# Patient Record
Sex: MALE | Race: WHITE | Employment: OTHER | URBAN - NONMETROPOLITAN AREA
[De-identification: names, ages, dates, MRNs, and addresses within clinical notes are randomized per-mention and may not be internally consistent; named-entity substitution may affect disease eponyms.]

---

## 2017-03-17 ENCOUNTER — OFFICE VISIT (OUTPATIENT)
Dept: FAMILY MEDICINE CLINIC | Age: 67
End: 2017-03-17

## 2017-03-17 VITALS
TEMPERATURE: 97.2 F | HEIGHT: 69 IN | OXYGEN SATURATION: 96 % | WEIGHT: 265 LBS | HEART RATE: 57 BPM | BODY MASS INDEX: 39.25 KG/M2 | SYSTOLIC BLOOD PRESSURE: 124 MMHG | DIASTOLIC BLOOD PRESSURE: 72 MMHG

## 2017-03-17 DIAGNOSIS — J01.40 ACUTE NON-RECURRENT PANSINUSITIS: Primary | ICD-10-CM

## 2017-03-17 PROCEDURE — 3017F COLORECTAL CA SCREEN DOC REV: CPT | Performed by: NURSE PRACTITIONER

## 2017-03-17 PROCEDURE — G8484 FLU IMMUNIZE NO ADMIN: HCPCS | Performed by: NURSE PRACTITIONER

## 2017-03-17 PROCEDURE — 99213 OFFICE O/P EST LOW 20 MIN: CPT | Performed by: NURSE PRACTITIONER

## 2017-03-17 PROCEDURE — 4040F PNEUMOC VAC/ADMIN/RCVD: CPT | Performed by: NURSE PRACTITIONER

## 2017-03-17 PROCEDURE — 1123F ACP DISCUSS/DSCN MKR DOCD: CPT | Performed by: NURSE PRACTITIONER

## 2017-03-17 PROCEDURE — G8427 DOCREV CUR MEDS BY ELIG CLIN: HCPCS | Performed by: NURSE PRACTITIONER

## 2017-03-17 PROCEDURE — 1036F TOBACCO NON-USER: CPT | Performed by: NURSE PRACTITIONER

## 2017-03-17 PROCEDURE — G8417 CALC BMI ABV UP PARAM F/U: HCPCS | Performed by: NURSE PRACTITIONER

## 2017-03-17 RX ORDER — IPRATROPIUM BROMIDE 21 UG/1
SPRAY, METERED NASAL
COMMUNITY
Start: 2006-12-11 | End: 2017-12-07

## 2017-03-17 RX ORDER — AMOXICILLIN AND CLAVULANATE POTASSIUM 875; 125 MG/1; MG/1
1 TABLET, FILM COATED ORAL 2 TIMES DAILY
Qty: 20 TABLET | Refills: 0 | Status: SHIPPED | OUTPATIENT
Start: 2017-03-17 | End: 2017-03-27

## 2017-03-17 ASSESSMENT — ENCOUNTER SYMPTOMS
WHEEZING: 0
COUGH: 1
RHINORRHEA: 1
SHORTNESS OF BREATH: 0
SINUS PRESSURE: 1

## 2017-04-04 ENCOUNTER — OFFICE VISIT (OUTPATIENT)
Dept: FAMILY MEDICINE CLINIC | Age: 67
End: 2017-04-04

## 2017-04-04 VITALS
BODY MASS INDEX: 39.25 KG/M2 | SYSTOLIC BLOOD PRESSURE: 130 MMHG | WEIGHT: 265 LBS | OXYGEN SATURATION: 97 % | HEIGHT: 69 IN | DIASTOLIC BLOOD PRESSURE: 72 MMHG | HEART RATE: 60 BPM

## 2017-04-04 DIAGNOSIS — J44.9 CHRONIC OBSTRUCTIVE PULMONARY DISEASE, UNSPECIFIED COPD TYPE (HCC): ICD-10-CM

## 2017-04-04 DIAGNOSIS — I10 ESSENTIAL HYPERTENSION: Primary | ICD-10-CM

## 2017-04-04 DIAGNOSIS — K50.90 CROHN'S DISEASE WITHOUT COMPLICATION, UNSPECIFIED GASTROINTESTINAL TRACT LOCATION (HCC): ICD-10-CM

## 2017-04-04 DIAGNOSIS — R73.03 PREDIABETES: ICD-10-CM

## 2017-04-04 DIAGNOSIS — Z11.59 NEED FOR HEPATITIS C SCREENING TEST: ICD-10-CM

## 2017-04-04 DIAGNOSIS — R73.9 HYPERGLYCEMIA: ICD-10-CM

## 2017-04-04 PROCEDURE — 3017F COLORECTAL CA SCREEN DOC REV: CPT | Performed by: FAMILY MEDICINE

## 2017-04-04 PROCEDURE — 1036F TOBACCO NON-USER: CPT | Performed by: FAMILY MEDICINE

## 2017-04-04 PROCEDURE — G8427 DOCREV CUR MEDS BY ELIG CLIN: HCPCS | Performed by: FAMILY MEDICINE

## 2017-04-04 PROCEDURE — G8926 SPIRO NO PERF OR DOC: HCPCS | Performed by: FAMILY MEDICINE

## 2017-04-04 PROCEDURE — G8417 CALC BMI ABV UP PARAM F/U: HCPCS | Performed by: FAMILY MEDICINE

## 2017-04-04 PROCEDURE — 1123F ACP DISCUSS/DSCN MKR DOCD: CPT | Performed by: FAMILY MEDICINE

## 2017-04-04 PROCEDURE — 3023F SPIROM DOC REV: CPT | Performed by: FAMILY MEDICINE

## 2017-04-04 PROCEDURE — 4040F PNEUMOC VAC/ADMIN/RCVD: CPT | Performed by: FAMILY MEDICINE

## 2017-04-04 PROCEDURE — 99214 OFFICE O/P EST MOD 30 MIN: CPT | Performed by: FAMILY MEDICINE

## 2017-04-04 ASSESSMENT — ENCOUNTER SYMPTOMS
SHORTNESS OF BREATH: 0
BLURRED VISION: 0

## 2017-04-05 DIAGNOSIS — Z11.59 NEED FOR HEPATITIS C SCREENING TEST: ICD-10-CM

## 2017-04-05 DIAGNOSIS — R73.9 HYPERGLYCEMIA: ICD-10-CM

## 2017-04-05 LAB
HBA1C MFR BLD: 5.6 % (ref 4.8–5.9)
HEPATITIS C ANTIBODY INTERPRETATION: NORMAL

## 2017-06-12 ENCOUNTER — PROCEDURE VISIT (OUTPATIENT)
Dept: FAMILY MEDICINE CLINIC | Age: 67
End: 2017-06-12

## 2017-06-12 VITALS
HEIGHT: 69 IN | OXYGEN SATURATION: 96 % | BODY MASS INDEX: 39.01 KG/M2 | SYSTOLIC BLOOD PRESSURE: 122 MMHG | DIASTOLIC BLOOD PRESSURE: 70 MMHG | WEIGHT: 263.4 LBS | HEART RATE: 70 BPM

## 2017-06-12 DIAGNOSIS — B35.6 TINEA CRURIS: Primary | ICD-10-CM

## 2017-06-12 DIAGNOSIS — J30.2 SEASONAL ALLERGIC RHINITIS, UNSPECIFIED ALLERGIC RHINITIS TRIGGER: ICD-10-CM

## 2017-06-12 PROCEDURE — G8428 CUR MEDS NOT DOCUMENT: HCPCS | Performed by: FAMILY MEDICINE

## 2017-06-12 PROCEDURE — 4040F PNEUMOC VAC/ADMIN/RCVD: CPT | Performed by: FAMILY MEDICINE

## 2017-06-12 PROCEDURE — 3017F COLORECTAL CA SCREEN DOC REV: CPT | Performed by: FAMILY MEDICINE

## 2017-06-12 PROCEDURE — G8417 CALC BMI ABV UP PARAM F/U: HCPCS | Performed by: FAMILY MEDICINE

## 2017-06-12 PROCEDURE — 99214 OFFICE O/P EST MOD 30 MIN: CPT | Performed by: FAMILY MEDICINE

## 2017-06-12 PROCEDURE — 1123F ACP DISCUSS/DSCN MKR DOCD: CPT | Performed by: FAMILY MEDICINE

## 2017-06-12 PROCEDURE — 1036F TOBACCO NON-USER: CPT | Performed by: FAMILY MEDICINE

## 2017-06-12 RX ORDER — CETIRIZINE HYDROCHLORIDE 10 MG/1
10 TABLET ORAL DAILY
Qty: 90 TABLET | Refills: 3 | Status: SHIPPED | OUTPATIENT
Start: 2017-06-12 | End: 2018-06-19

## 2017-06-12 RX ORDER — MONTELUKAST SODIUM 10 MG/1
TABLET ORAL
Qty: 90 TABLET | Refills: 3 | Status: SHIPPED | OUTPATIENT
Start: 2017-06-12 | End: 2018-04-12 | Stop reason: SDUPTHER

## 2017-06-12 ASSESSMENT — ENCOUNTER SYMPTOMS
DIARRHEA: 0
SORE THROAT: 0
EYE PAIN: 0
SHORTNESS OF BREATH: 0
COUGH: 1

## 2017-06-13 ENCOUNTER — TELEPHONE (OUTPATIENT)
Dept: FAMILY MEDICINE CLINIC | Age: 67
End: 2017-06-13

## 2017-06-13 DIAGNOSIS — J40 BRONCHITIS: ICD-10-CM

## 2017-06-13 DIAGNOSIS — J20.9 ACUTE BRONCHITIS, UNSPECIFIED ORGANISM: ICD-10-CM

## 2017-06-14 RX ORDER — BENZONATATE 100 MG/1
200 CAPSULE ORAL 3 TIMES DAILY PRN
Qty: 20 CAPSULE | Refills: 0 | Status: SHIPPED | OUTPATIENT
Start: 2017-06-14 | End: 2018-05-15 | Stop reason: SDUPTHER

## 2017-06-14 RX ORDER — AZITHROMYCIN 250 MG/1
TABLET, FILM COATED ORAL
Qty: 1 PACKET | Refills: 0 | Status: SHIPPED | OUTPATIENT
Start: 2017-06-14 | End: 2017-06-30

## 2017-06-28 DIAGNOSIS — E29.1 HYPOGONADISM MALE: ICD-10-CM

## 2017-06-28 RX ORDER — TESTOSTERONE GEL, 1% 10 MG/G
GEL TRANSDERMAL
Qty: 135 PACKAGE | Refills: 3 | Status: SHIPPED | OUTPATIENT
Start: 2017-06-28 | End: 2018-03-07 | Stop reason: SDUPTHER

## 2017-06-30 ENCOUNTER — HOSPITAL ENCOUNTER (EMERGENCY)
Age: 67
Discharge: HOME | End: 2017-06-30
Payer: MEDICARE

## 2017-06-30 ENCOUNTER — HOSPITAL ENCOUNTER (OUTPATIENT)
Dept: GENERAL RADIOLOGY | Age: 67
Discharge: HOME | End: 2017-06-30
Payer: MEDICARE

## 2017-06-30 VITALS
DIASTOLIC BLOOD PRESSURE: 69 MMHG | TEMPERATURE: 98.8 F | BODY MASS INDEX: 38.51 KG/M2 | SYSTOLIC BLOOD PRESSURE: 123 MMHG | OXYGEN SATURATION: 94 % | HEART RATE: 88 BPM | WEIGHT: 260 LBS | HEIGHT: 69 IN | RESPIRATION RATE: 20 BRPM

## 2017-06-30 DIAGNOSIS — R05.9 COUGH: ICD-10-CM

## 2017-06-30 DIAGNOSIS — J18.9 PNEUMONIA OF RIGHT LOWER LOBE DUE TO INFECTIOUS ORGANISM: Primary | ICD-10-CM

## 2017-06-30 LAB
ALBUMIN SERPL-MCNC: 3.8 G/DL (ref 3.9–4.9)
ALP BLD-CCNC: 73 U/L (ref 35–104)
ALT SERPL-CCNC: 19 U/L (ref 0–41)
ANION GAP SERPL CALCULATED.3IONS-SCNC: 19 MEQ/L (ref 7–13)
AST SERPL-CCNC: 29 U/L (ref 0–40)
BASOPHILS ABSOLUTE: 0.1 K/UL (ref 0–0.2)
BASOPHILS RELATIVE PERCENT: 0.4 %
BILIRUB SERPL-MCNC: 0.8 MG/DL (ref 0–1.2)
BUN BLDV-MCNC: 13 MG/DL (ref 8–23)
CALCIUM SERPL-MCNC: 8.7 MG/DL (ref 8.6–10.2)
CHLORIDE BLD-SCNC: 97 MEQ/L (ref 98–107)
CO2: 19 MEQ/L (ref 22–29)
CREAT SERPL-MCNC: 0.66 MG/DL (ref 0.7–1.2)
EOSINOPHILS ABSOLUTE: 0.2 K/UL (ref 0–0.7)
EOSINOPHILS RELATIVE PERCENT: 1.2 %
GFR AFRICAN AMERICAN: >60
GFR NON-AFRICAN AMERICAN: >60
GLOBULIN: 2.6 G/DL (ref 2.3–3.5)
GLUCOSE BLD-MCNC: 64 MG/DL (ref 74–109)
HCT VFR BLD CALC: 43.3 % (ref 42–52)
HEMOGLOBIN: 14.3 G/DL (ref 14–18)
LACTIC ACID: 1.3 MMOL/L (ref 0.5–2.2)
LYMPHOCYTES ABSOLUTE: 1.3 K/UL (ref 1–4.8)
LYMPHOCYTES RELATIVE PERCENT: 9.5 %
MCH RBC QN AUTO: 28.2 PG (ref 27–31.3)
MCHC RBC AUTO-ENTMCNC: 33.1 % (ref 33–37)
MCV RBC AUTO: 85.2 FL (ref 80–100)
MONOCYTES ABSOLUTE: 1 K/UL (ref 0.2–0.8)
MONOCYTES RELATIVE PERCENT: 7.4 %
NEUTROPHILS ABSOLUTE: 11.5 K/UL (ref 1.4–6.5)
NEUTROPHILS RELATIVE PERCENT: 81.5 %
PDW BLD-RTO: 13.6 % (ref 11.5–14.5)
PLATELET # BLD: 152 K/UL (ref 130–400)
POTASSIUM SERPL-SCNC: 3.9 MEQ/L (ref 3.5–5.1)
RBC # BLD: 5.08 M/UL (ref 4.7–6.1)
SODIUM BLD-SCNC: 135 MEQ/L (ref 132–144)
TOTAL PROTEIN: 6.4 G/DL (ref 6.4–8.1)
WBC # BLD: 14.1 K/UL (ref 4.8–10.8)

## 2017-06-30 PROCEDURE — 99283 EMERGENCY DEPT VISIT LOW MDM: CPT

## 2017-06-30 PROCEDURE — 85025 COMPLETE CBC W/AUTO DIFF WBC: CPT

## 2017-06-30 PROCEDURE — 6370000000 HC RX 637 (ALT 250 FOR IP): Performed by: PHYSICIAN ASSISTANT

## 2017-06-30 PROCEDURE — 71020 XR CHEST STANDARD TWO VW: CPT

## 2017-06-30 PROCEDURE — 83605 ASSAY OF LACTIC ACID: CPT

## 2017-06-30 PROCEDURE — 87040 BLOOD CULTURE FOR BACTERIA: CPT

## 2017-06-30 PROCEDURE — 36415 COLL VENOUS BLD VENIPUNCTURE: CPT

## 2017-06-30 PROCEDURE — 80053 COMPREHEN METABOLIC PANEL: CPT

## 2017-06-30 RX ORDER — GUAIFENESIN AND CODEINE PHOSPHATE 100; 10 MG/5ML; MG/5ML
5 SOLUTION ORAL 4 TIMES DAILY PRN
Qty: 60 ML | Refills: 0 | Status: SHIPPED | OUTPATIENT
Start: 2017-06-30 | End: 2017-07-07

## 2017-06-30 RX ORDER — BENZONATATE 100 MG/1
100 CAPSULE ORAL 3 TIMES DAILY PRN
Qty: 20 CAPSULE | Refills: 0 | Status: SHIPPED | OUTPATIENT
Start: 2017-06-30 | End: 2017-07-31 | Stop reason: ALTCHOICE

## 2017-06-30 RX ORDER — AMOXICILLIN AND CLAVULANATE POTASSIUM 875; 125 MG/1; MG/1
1 TABLET, FILM COATED ORAL 2 TIMES DAILY
Qty: 20 TABLET | Refills: 0 | Status: SHIPPED | OUTPATIENT
Start: 2017-06-30 | End: 2017-07-10

## 2017-06-30 RX ORDER — ACETAMINOPHEN 500 MG
1000 TABLET ORAL ONCE
Status: COMPLETED | OUTPATIENT
Start: 2017-06-30 | End: 2017-06-30

## 2017-06-30 RX ORDER — AMOXICILLIN AND CLAVULANATE POTASSIUM 875; 125 MG/1; MG/1
1 TABLET, FILM COATED ORAL ONCE
Status: COMPLETED | OUTPATIENT
Start: 2017-06-30 | End: 2017-06-30

## 2017-06-30 RX ADMIN — AMOXICILLIN AND CLAVULANATE POTASSIUM 1 TABLET: 875; 125 TABLET, FILM COATED ORAL at 20:58

## 2017-06-30 RX ADMIN — ACETAMINOPHEN 1000 MG: 500 TABLET ORAL at 19:41

## 2017-06-30 ASSESSMENT — PAIN SCALES - GENERAL
PAINLEVEL_OUTOF10: 0
PAINLEVEL_OUTOF10: 0

## 2017-06-30 ASSESSMENT — ENCOUNTER SYMPTOMS
BACK PAIN: 0
NAUSEA: 0
APNEA: 0
PHOTOPHOBIA: 0
VOICE CHANGE: 0
EYE DISCHARGE: 0
ANAL BLEEDING: 0
COUGH: 1
VOMITING: 0
ABDOMINAL DISTENTION: 0

## 2017-07-05 LAB
BLOOD CULTURE, ROUTINE: NORMAL
CULTURE, BLOOD 2: NORMAL

## 2017-07-31 ENCOUNTER — OFFICE VISIT (OUTPATIENT)
Dept: FAMILY MEDICINE CLINIC | Age: 67
End: 2017-07-31

## 2017-07-31 VITALS
HEIGHT: 70 IN | OXYGEN SATURATION: 96 % | BODY MASS INDEX: 37.62 KG/M2 | DIASTOLIC BLOOD PRESSURE: 54 MMHG | WEIGHT: 262.8 LBS | HEART RATE: 74 BPM | TEMPERATURE: 97.9 F | SYSTOLIC BLOOD PRESSURE: 96 MMHG

## 2017-07-31 DIAGNOSIS — M25.474 SWELLING OF FOOT JOINT, RIGHT: ICD-10-CM

## 2017-07-31 DIAGNOSIS — M25.474 SWELLING OF FOOT JOINT, RIGHT: Primary | ICD-10-CM

## 2017-07-31 DIAGNOSIS — G25.81 RESTLESS LEG SYNDROME: ICD-10-CM

## 2017-07-31 DIAGNOSIS — M79.671 RIGHT FOOT PAIN: ICD-10-CM

## 2017-07-31 LAB — URIC ACID, SERUM: 6.7 MG/DL (ref 3.4–7)

## 2017-07-31 PROCEDURE — 1036F TOBACCO NON-USER: CPT | Performed by: NURSE PRACTITIONER

## 2017-07-31 PROCEDURE — 99213 OFFICE O/P EST LOW 20 MIN: CPT | Performed by: NURSE PRACTITIONER

## 2017-07-31 PROCEDURE — 3017F COLORECTAL CA SCREEN DOC REV: CPT | Performed by: NURSE PRACTITIONER

## 2017-07-31 PROCEDURE — 1123F ACP DISCUSS/DSCN MKR DOCD: CPT | Performed by: NURSE PRACTITIONER

## 2017-07-31 PROCEDURE — G8427 DOCREV CUR MEDS BY ELIG CLIN: HCPCS | Performed by: NURSE PRACTITIONER

## 2017-07-31 PROCEDURE — 4040F PNEUMOC VAC/ADMIN/RCVD: CPT | Performed by: NURSE PRACTITIONER

## 2017-07-31 PROCEDURE — G8417 CALC BMI ABV UP PARAM F/U: HCPCS | Performed by: NURSE PRACTITIONER

## 2017-07-31 RX ORDER — NITROGLYCERIN 0.4 MG/1
0.4 TABLET SUBLINGUAL EVERY 5 MIN PRN
Qty: 25 TABLET | Refills: 0 | Status: SHIPPED | OUTPATIENT
Start: 2017-07-31 | End: 2017-12-12 | Stop reason: SDUPTHER

## 2017-07-31 RX ORDER — GABAPENTIN 300 MG/1
300 CAPSULE ORAL 3 TIMES DAILY
Qty: 90 CAPSULE | Refills: 3 | Status: SHIPPED | OUTPATIENT
Start: 2017-07-31 | End: 2017-10-23 | Stop reason: SDUPTHER

## 2017-07-31 RX ORDER — METHYLPREDNISOLONE 4 MG/1
TABLET ORAL
Qty: 21 TABLET | Refills: 0 | Status: SHIPPED | OUTPATIENT
Start: 2017-07-31 | End: 2017-08-06

## 2017-07-31 ASSESSMENT — ENCOUNTER SYMPTOMS
SHORTNESS OF BREATH: 0
COUGH: 0

## 2017-08-10 ENCOUNTER — OFFICE VISIT (OUTPATIENT)
Dept: FAMILY MEDICINE CLINIC | Age: 67
End: 2017-08-10

## 2017-08-10 VITALS
SYSTOLIC BLOOD PRESSURE: 118 MMHG | HEIGHT: 70 IN | OXYGEN SATURATION: 96 % | HEART RATE: 62 BPM | WEIGHT: 260.8 LBS | BODY MASS INDEX: 37.34 KG/M2 | DIASTOLIC BLOOD PRESSURE: 70 MMHG

## 2017-08-10 DIAGNOSIS — M79.671 PAIN OF RIGHT HEEL: ICD-10-CM

## 2017-08-10 DIAGNOSIS — R73.9 HYPERGLYCEMIA: ICD-10-CM

## 2017-08-10 DIAGNOSIS — C14.0 THROAT CANCER (HCC): ICD-10-CM

## 2017-08-10 DIAGNOSIS — R73.03 PREDIABETES: ICD-10-CM

## 2017-08-10 DIAGNOSIS — E29.1 HYPOGONADISM MALE: ICD-10-CM

## 2017-08-10 DIAGNOSIS — I10 ESSENTIAL HYPERTENSION: Primary | ICD-10-CM

## 2017-08-10 DIAGNOSIS — E78.2 MIXED HYPERLIPIDEMIA: ICD-10-CM

## 2017-08-10 LAB — HBA1C MFR BLD: 5.5 % (ref 4.8–5.9)

## 2017-08-10 PROCEDURE — G8417 CALC BMI ABV UP PARAM F/U: HCPCS | Performed by: FAMILY MEDICINE

## 2017-08-10 PROCEDURE — 1123F ACP DISCUSS/DSCN MKR DOCD: CPT | Performed by: FAMILY MEDICINE

## 2017-08-10 PROCEDURE — 4040F PNEUMOC VAC/ADMIN/RCVD: CPT | Performed by: FAMILY MEDICINE

## 2017-08-10 PROCEDURE — 99214 OFFICE O/P EST MOD 30 MIN: CPT | Performed by: FAMILY MEDICINE

## 2017-08-10 PROCEDURE — G8427 DOCREV CUR MEDS BY ELIG CLIN: HCPCS | Performed by: FAMILY MEDICINE

## 2017-08-10 PROCEDURE — 1036F TOBACCO NON-USER: CPT | Performed by: FAMILY MEDICINE

## 2017-08-10 PROCEDURE — 3017F COLORECTAL CA SCREEN DOC REV: CPT | Performed by: FAMILY MEDICINE

## 2017-08-10 ASSESSMENT — ENCOUNTER SYMPTOMS
SHORTNESS OF BREATH: 0
BLURRED VISION: 0

## 2017-08-11 LAB — TESTOSTERONE TOTAL-MALE: 681 NG/DL (ref 300–720)

## 2017-10-04 ENCOUNTER — OFFICE VISIT (OUTPATIENT)
Dept: FAMILY MEDICINE CLINIC | Age: 67
End: 2017-10-04

## 2017-10-04 VITALS
TEMPERATURE: 97.8 F | DIASTOLIC BLOOD PRESSURE: 70 MMHG | OXYGEN SATURATION: 96 % | BODY MASS INDEX: 39.69 KG/M2 | SYSTOLIC BLOOD PRESSURE: 118 MMHG | HEIGHT: 69 IN | HEART RATE: 71 BPM | WEIGHT: 268 LBS

## 2017-10-04 DIAGNOSIS — J20.9 ACUTE BRONCHITIS, UNSPECIFIED ORGANISM: Primary | ICD-10-CM

## 2017-10-04 DIAGNOSIS — B37.2 SKIN YEAST INFECTION: ICD-10-CM

## 2017-10-04 PROCEDURE — 1123F ACP DISCUSS/DSCN MKR DOCD: CPT | Performed by: NURSE PRACTITIONER

## 2017-10-04 PROCEDURE — G8417 CALC BMI ABV UP PARAM F/U: HCPCS | Performed by: NURSE PRACTITIONER

## 2017-10-04 PROCEDURE — G8484 FLU IMMUNIZE NO ADMIN: HCPCS | Performed by: NURSE PRACTITIONER

## 2017-10-04 PROCEDURE — 4040F PNEUMOC VAC/ADMIN/RCVD: CPT | Performed by: NURSE PRACTITIONER

## 2017-10-04 PROCEDURE — 1036F TOBACCO NON-USER: CPT | Performed by: NURSE PRACTITIONER

## 2017-10-04 PROCEDURE — 99213 OFFICE O/P EST LOW 20 MIN: CPT | Performed by: NURSE PRACTITIONER

## 2017-10-04 PROCEDURE — 3017F COLORECTAL CA SCREEN DOC REV: CPT | Performed by: NURSE PRACTITIONER

## 2017-10-04 PROCEDURE — G8427 DOCREV CUR MEDS BY ELIG CLIN: HCPCS | Performed by: NURSE PRACTITIONER

## 2017-10-04 RX ORDER — NYSTATIN 100000 U/G
CREAM TOPICAL
Qty: 1 TUBE | Refills: 0 | Status: SHIPPED | OUTPATIENT
Start: 2017-10-04 | End: 2017-10-23 | Stop reason: ALTCHOICE

## 2017-10-04 RX ORDER — AZITHROMYCIN 250 MG/1
TABLET, FILM COATED ORAL
Qty: 1 PACKET | Refills: 0 | Status: SHIPPED | OUTPATIENT
Start: 2017-10-04 | End: 2017-10-23 | Stop reason: ALTCHOICE

## 2017-10-04 ASSESSMENT — ENCOUNTER SYMPTOMS
SHORTNESS OF BREATH: 0
SINUS PRESSURE: 1
RHINORRHEA: 1
COUGH: 1
WHEEZING: 0

## 2017-10-04 NOTE — MR AVS SNAPSHOT
estimate of body fat, calculated from your height and weight. The higher your BMI, the greater your risk of heart disease, high blood pressure, type 2 diabetes, stroke, gallstones, arthritis, sleep apnea, and certain cancers. BMI is not perfect. It may overestimate body fat in athletes and people who are more muscular. Even a small weight loss (between 5 and 10 percent of your current weight) by decreasing your calorie intake and becoming more physically active will help lower your risk of developing or worsening diseases associated with obesity. Learn more at: [employer]             Today's Medication Changes          These changes are accurate as of: 10/4/17 11:30 AM.  If you have any questions, ask your nurse or doctor. START taking these medications           azithromycin 250 MG tablet   Commonly known as:  ZITHROMAX Z-OLAYINKA   Instructions: Take 2 tablets on day 1 then 1 tablet on days 2-5   Quantity:  1 packet   Refills:  0   Started by:  Harjeet Marquez CNP       nystatin 209611 UNIT/GM cream   Commonly known as:  MYCOSTATIN   Instructions:  Apply topically 2 times daily. Quantity:  1 Tube   Refills:  0   Started by:  Harjeet Marquez CNP            Where to Get Your Medications      These medications were sent to [address] [place] [address] [address] - [address], [address] [place] [address]  [id number] [place] [id number]     Phone:  [phone]     azithromycin 250 MG tablet    nystatin 387015 UNIT/GM cream               Your Current Medications Are              azithromycin (ZITHROMAX Z-OLAYINKA) 250 MG tablet Take 2 tablets on day 1 then 1 tablet on days 2-5    nystatin (MYCOSTATIN) 075391 UNIT/GM cream Apply topically 2 times daily.     gabapentin (NEURONTIN) 300 MG capsule Take 1 capsule by mouth 3 times daily    nitroGLYCERIN (NITROSTAT) 0.4 MG SL tablet Place 1 tablet under the tongue every 5 minutes as needed for Chest pain    fluticasone (FLONASE) 50 MCG/ACT nasal spray     testosterone (ANDROGEL; TESTIM) 50 MG/5GM (1%) GEL 1% gel Alternate one packet daily with 2 packets daily every other day    montelukast (SINGULAIR) 10 MG tablet TAKE 1 TABLET NIGHTLY    cetirizine (ZYRTEC ALLERGY) 10 MG tablet Take 1 tablet by mouth daily    ipratropium (ATROVENT) 0.03 % nasal spray by Nasal route    Saccharomyces boulardii (PROBIOTIC) 250 MG CAPS Take 1 tablet by mouth daily    pravastatin (PRAVACHOL) 40 MG tablet Take 1 tablet by mouth daily    metoprolol tartrate (LOPRESSOR) 50 MG tablet Take 1 1 /2 po bid    lisinopril (PRINIVIL;ZESTRIL) 20 MG tablet Take 1 tablet by mouth daily    ciclopirox (PENLAC) 8 % solution Apply qhs to affected nails, clean off once a week with alcohol soaked cotton ball    doxazosin (CARDURA) 2 MG tablet Take 1 tablet by mouth 2 times daily    ketoconazole (NIZORAL) 2 % cream Apply bid to both feet    esomeprazole Magnesium (NEXIUM) 20 MG PACK Take 20 mg by mouth daily    HUMIRA PEN STARTER 40 MG/0.8ML injection     aspirin 81 MG tablet Take 81 mg by mouth daily. albuterol (PROVENTIL HFA) 108 (90 BASE) MCG/ACT inhaler Inhale 2 puffs into the lungs every 6 hours as needed for Wheezing. mesalamine (PENTASA) 250 MG CR capsule Take 500 mg by mouth 4 times daily.       Allergies              Campath [Alemtuzumab]     Low grade fever    Mercaptopurine          Additional Information        Basic Information     Date Of Birth Sex Race Ethnicity Preferred Language    1950 Male White Non-/Non  English      Problem List as of 10/4/2017  Date Reviewed: 10/4/2017                Pain of right heel    Tinea unguium    Throat cancer (HCC)    Chronic back pain    Degenerative disc disease, lumbar    Osteoarthritis of lumbar spine    Mononeuritis multiplex    Seborrheic dermatitis    Hypogonadism male    Allergic rhinitis    Hyperlipidemia    Crohn disease ([facility]

## 2017-10-04 NOTE — PROGRESS NOTES
Subjective  Chief Complaint   Patient presents with    Cough     pt states that he has had a clear productive cough for about 2 weeks now. Cough   This is a new problem. The current episode started 1 to 4 weeks ago. The problem has been gradually worsening. The cough is productive of sputum (clear, thick). Associated symptoms include rhinorrhea. Pertinent negatives include no chest pain, chills, ear congestion, ear pain, fever, nasal congestion, shortness of breath or wheezing. The symptoms are aggravated by exercise. He has tried nothing for the symptoms. His past medical history is significant for COPD and pneumonia. Pt does also mention that he has had a rash in his left groin area that he would like looked at.      Patient Active Problem List    Diagnosis Date Noted    Pain of right heel     Tinea unguium     Throat cancer ([address]     Chronic back pain     Degenerative disc disease, lumbar     Osteoarthritis of lumbar spine     Mononeuritis multiplex     Seborrheic dermatitis     Hypogonadism male     Allergic rhinitis     Hyperlipidemia 01/21/2014    Crohn disease ([address] 01/21/2014    COPD (chronic obstructive pulmonary disease) ([address] 08/09/2012    GERD (gastroesophageal reflux disease)     Hypertension 10/21/2011     Past Medical History:   Diagnosis Date    Abnormal EMG 12/09/2015    Actinic keratoses     Allergic rhinitis     Anemia 11/18/2014    Arthritis     Chronic back pain     COPD (chronic obstructive pulmonary disease) ([address] 8/9/2012    Crohns disease ([address]     Degenerative disc disease, lumbar     Dermatitis     Diabetes ([address] 03/19/2015    diet controlled    GERD (gastroesophageal reflux disease)     History of atrial fibrillation 2006    Dr. Nikki Sheffield Hyperlipidemia 1/21/2014    Hypertension     Hypogonadism male     Mononeuritis multiplex     Osteoarthritis of lumbar spine     Pain of right heel     Paresthesia of foot, bilateral     Prediabetes 12/3/2014 by mouth 2 times daily 180 tablet 3    ketoconazole (NIZORAL) 2 % cream Apply bid to both feet 3 Tube 1    esomeprazole Magnesium (NEXIUM) 20 MG PACK Take 20 mg by mouth daily      HUMIRA PEN STARTER 40 MG/0.8ML injection       aspirin 81 MG tablet Take 81 mg by mouth daily.  albuterol (PROVENTIL HFA) 108 (90 BASE) MCG/ACT inhaler Inhale 2 puffs into the lungs every 6 hours as needed for Wheezing. 1 Inhaler 2    mesalamine (PENTASA) 250 MG CR capsule Take 500 mg by mouth 4 times daily.  nitroGLYCERIN (NITROSTAT) 0.4 MG SL tablet Place 1 tablet under the tongue every 5 minutes as needed for Chest pain 25 tablet 0    ciclopirox (PENLAC) 8 % solution Apply qhs to affected nails, clean off once a week with alcohol soaked cotton ball 3 Bottle 3     No current facility-administered medications on file prior to visit. Allergies   Allergen Reactions    Campath [Alemtuzumab]      Low grade fever    Mercaptopurine        Review of Systems   Constitutional: Negative for chills, diaphoresis, fatigue and fever. HENT: Positive for rhinorrhea, sinus pressure and sneezing. Negative for ear pain. Respiratory: Positive for cough. Negative for shortness of breath and wheezing. Cardiovascular: Negative for chest pain, palpitations and leg swelling. Objective  Vitals:    10/04/17 1107   BP: 118/70   Site: Left Arm   Position: Sitting   Cuff Size: Large Adult   Pulse: 71   Temp: 97.8 °F (36.6 °C)   TempSrc: Tympanic   SpO2: 96%   Weight: 268 lb (121.6 kg)   Height: 5' 9\" (1.753 m)     Physical Exam   Constitutional: He is oriented to person, place, and time. He appears well-developed and well-nourished. No distress. HENT:   Head: Normocephalic and atraumatic. Right Ear: Tympanic membrane, external ear and ear canal normal.   Left Ear: Tympanic membrane, external ear and ear canal normal.   Nose: Nose normal. Right sinus exhibits no maxillary sinus tenderness and no frontal sinus tenderness.  Left sinus exhibits no maxillary sinus tenderness and no frontal sinus tenderness. Mouth/Throat: Posterior oropharyngeal erythema present. No oropharyngeal exudate. Eyes: Conjunctivae are normal. Pupils are equal, round, and reactive to light. Neck: Normal range of motion. Neck supple. Cardiovascular: Normal rate, regular rhythm and normal heart sounds. Pulmonary/Chest: Effort normal. He has rhonchi in the left middle field and the left lower field. Lymphadenopathy:     He has no cervical adenopathy. Neurological: He is alert and oriented to person, place, and time. Skin: Skin is warm and dry. No rash noted. He is not diaphoretic. There is erythema. No pallor. Psychiatric: He has a normal mood and affect. His behavior is normal. Judgment and thought content normal.     Assessment & Plan    1. Acute bronchitis, unspecified organism  azithromycin (ZITHROMAX Z-OLAYINKA) 250 MG tablet   2. Skin yeast infection  nystatin (MYCOSTATIN) 119534 UNIT/GM cream     Pt advised to rest and drink plenty of fluids. Finish all antibiotics even if feeling better. OTC analgesics for symptom management. Salt water gargle for sore throat. RTO if symptoms worsen or if no improvement in 72 hours. Nystatin topically to left groin BID x 7 days. F/u if no improvement. Side effects, adverse effects of the medication prescribed today, as well as treatment plan/ rationale and result expectations have been discussed with the patient who expresses understanding and desires to proceed. Close follow up to evaluate treatment results and for coordination of care. I have reviewed the patient's medical history in detail and updated the computerized patient record. As always, patient is advised that if symptoms worsen in any way they will proceed to the nearest emergency room. No orders of the defined types were placed in this encounter.       Orders Placed This Encounter   Medications    azithromycin (Nanine Sarna)

## 2017-10-23 ENCOUNTER — TELEPHONE (OUTPATIENT)
Dept: FAMILY MEDICINE CLINIC | Age: 67
End: 2017-10-23

## 2017-10-23 ENCOUNTER — OFFICE VISIT (OUTPATIENT)
Dept: FAMILY MEDICINE CLINIC | Age: 67
End: 2017-10-23

## 2017-10-23 VITALS
OXYGEN SATURATION: 93 % | TEMPERATURE: 97.8 F | HEIGHT: 70 IN | SYSTOLIC BLOOD PRESSURE: 118 MMHG | HEART RATE: 65 BPM | WEIGHT: 267.4 LBS | DIASTOLIC BLOOD PRESSURE: 70 MMHG | BODY MASS INDEX: 38.28 KG/M2

## 2017-10-23 DIAGNOSIS — J20.9 ACUTE BRONCHITIS, UNSPECIFIED ORGANISM: Primary | ICD-10-CM

## 2017-10-23 DIAGNOSIS — B37.2 SKIN YEAST INFECTION: ICD-10-CM

## 2017-10-23 DIAGNOSIS — G25.81 RESTLESS LEG SYNDROME: ICD-10-CM

## 2017-10-23 PROCEDURE — 1123F ACP DISCUSS/DSCN MKR DOCD: CPT | Performed by: NURSE PRACTITIONER

## 2017-10-23 PROCEDURE — 99213 OFFICE O/P EST LOW 20 MIN: CPT | Performed by: NURSE PRACTITIONER

## 2017-10-23 PROCEDURE — G8484 FLU IMMUNIZE NO ADMIN: HCPCS | Performed by: NURSE PRACTITIONER

## 2017-10-23 PROCEDURE — G8427 DOCREV CUR MEDS BY ELIG CLIN: HCPCS | Performed by: NURSE PRACTITIONER

## 2017-10-23 PROCEDURE — 4040F PNEUMOC VAC/ADMIN/RCVD: CPT | Performed by: NURSE PRACTITIONER

## 2017-10-23 PROCEDURE — G8417 CALC BMI ABV UP PARAM F/U: HCPCS | Performed by: NURSE PRACTITIONER

## 2017-10-23 PROCEDURE — 1036F TOBACCO NON-USER: CPT | Performed by: NURSE PRACTITIONER

## 2017-10-23 PROCEDURE — 3017F COLORECTAL CA SCREEN DOC REV: CPT | Performed by: NURSE PRACTITIONER

## 2017-10-23 RX ORDER — METHYLPREDNISOLONE 4 MG/1
TABLET ORAL
Qty: 21 TABLET | Refills: 0 | Status: SHIPPED | OUTPATIENT
Start: 2017-10-23 | End: 2017-12-07

## 2017-10-23 RX ORDER — NYSTATIN 100000 U/G
CREAM TOPICAL
Qty: 1 TUBE | Refills: 0 | Status: SHIPPED | OUTPATIENT
Start: 2017-10-23 | End: 2017-12-12 | Stop reason: ALTCHOICE

## 2017-10-23 RX ORDER — DOXYCYCLINE HYCLATE 100 MG
100 TABLET ORAL 2 TIMES DAILY
Qty: 20 TABLET | Refills: 0 | Status: SHIPPED | OUTPATIENT
Start: 2017-10-23 | End: 2017-11-02

## 2017-10-23 RX ORDER — GABAPENTIN 300 MG/1
300 CAPSULE ORAL DAILY
Qty: 30 CAPSULE | Refills: 3 | Status: SHIPPED | OUTPATIENT
Start: 2017-10-23 | End: 2017-11-20 | Stop reason: SDUPTHER

## 2017-10-23 RX ORDER — LEVOFLOXACIN 500 MG/1
500 TABLET, FILM COATED ORAL DAILY
Qty: 7 TABLET | Refills: 0 | Status: SHIPPED | OUTPATIENT
Start: 2017-10-23 | End: 2017-10-23

## 2017-10-23 ASSESSMENT — ENCOUNTER SYMPTOMS
ABDOMINAL PAIN: 0
ABDOMINAL DISTENTION: 0
DIARRHEA: 0
NAUSEA: 0
SORE THROAT: 0
WHEEZING: 0
SHORTNESS OF BREATH: 1
COUGH: 1
RHINORRHEA: 1

## 2017-10-23 NOTE — PROGRESS NOTES
Subjective  Chief Complaint   Patient presents with    Cough     pt states he was since around Oct 1st and received a Zpack. pt states it helped, but now his cough is back. Cough   This is a recurrent problem. The current episode started 1 to 4 weeks ago. The problem has been gradually worsening. The problem occurs every few minutes. The cough is productive of sputum (mostly clear with occasional green). Associated symptoms include headaches (only during coughing fits), postnasal drip, rhinorrhea and shortness of breath (with exertion). Pertinent negatives include no chest pain, chills, ear congestion, ear pain, fever, sore throat or wheezing. Exacerbated by: night time. He has tried nothing for the symptoms. Pt. Reports that he finished the zpack and initially felt better with less coughing but over the weekend started to feel worse again. The cough is much worse at night time. He is having some shortness of breath. Says he was around his daughter this weekend who also had a cough so he is not sure if he got this from her or not.       Patient Active Problem List    Diagnosis Date Noted    Pain of right heel     Tinea unguium     Throat cancer ([address]     Chronic back pain     Degenerative disc disease, lumbar     Osteoarthritis of lumbar spine     Mononeuritis multiplex     Seborrheic dermatitis     Hypogonadism male     Allergic rhinitis     Hyperlipidemia 01/21/2014    Crohn disease ([address] 01/21/2014    COPD (chronic obstructive pulmonary disease) ([address] 08/09/2012    GERD (gastroesophageal reflux disease)     Hypertension 10/21/2011     Past Medical History:   Diagnosis Date    Abnormal EMG 12/09/2015    Actinic keratoses     Allergic rhinitis     Anemia 11/18/2014    Arthritis     Chronic back pain     COPD (chronic obstructive pulmonary disease) ([address] 8/9/2012    Crohns disease ([address]     Degenerative disc disease, lumbar     Dermatitis     Diabetes ([address] 03/19/2015 Saccharomyces boulardii (PROBIOTIC) 250 MG CAPS Take 1 tablet by mouth daily      pravastatin (PRAVACHOL) 40 MG tablet Take 1 tablet by mouth daily 90 tablet 3    metoprolol tartrate (LOPRESSOR) 50 MG tablet Take 1 1 /2 po bid 270 tablet 3    lisinopril (PRINIVIL;ZESTRIL) 20 MG tablet Take 1 tablet by mouth daily 90 tablet 3    ciclopirox (PENLAC) 8 % solution Apply qhs to affected nails, clean off once a week with alcohol soaked cotton ball 3 Bottle 3    doxazosin (CARDURA) 2 MG tablet Take 1 tablet by mouth 2 times daily 180 tablet 3    esomeprazole Magnesium (NEXIUM) 20 MG PACK Take 20 mg by mouth daily      HUMIRA PEN STARTER 40 MG/0.8ML injection       aspirin 81 MG tablet Take 81 mg by mouth daily.  albuterol (PROVENTIL HFA) 108 (90 BASE) MCG/ACT inhaler Inhale 2 puffs into the lungs every 6 hours as needed for Wheezing. 1 Inhaler 2    mesalamine (PENTASA) 250 MG CR capsule Take 500 mg by mouth 4 times daily. No current facility-administered medications on file prior to visit. Allergies   Allergen Reactions    Campath [Alemtuzumab]      Low grade fever    Mercaptopurine        Review of Systems   Constitutional: Negative for chills, diaphoresis, fatigue and fever. HENT: Positive for postnasal drip and rhinorrhea. Negative for ear pain and sore throat. Respiratory: Positive for cough and shortness of breath (with exertion). Negative for wheezing. Cardiovascular: Negative for chest pain, palpitations and leg swelling. Gastrointestinal: Negative for abdominal distention, abdominal pain, diarrhea and nausea. Neurological: Positive for headaches (only during coughing fits). Psychiatric/Behavioral: Negative.         Objective  Vitals:    10/23/17 1139 10/23/17 1152   BP: 118/70    Site: Left Arm    Position: Sitting    Cuff Size: Large Adult    Pulse: 72 65   Temp: 97.8 °F (36.6 °C)    TempSrc: Tympanic    SpO2: 94% 93%   Weight: 267 lb 6.4 oz (121.3 kg)    Height: 5' 10\" Date:   10/23/2018     Order Specific Question:   Reason for exam:     Answer:   cough, shortness of breath       Orders Placed This Encounter   Medications    methylPREDNISolone (MEDROL DOSEPACK) 4 MG tablet     Sig: Take by mouth. Dispense:  21 tablet     Refill:  0    levofloxacin (LEVAQUIN) 500 MG tablet     Sig: Take 1 tablet by mouth daily for 7 days     Dispense:  7 tablet     Refill:  0    gabapentin (NEURONTIN) 300 MG capsule     Sig: Take 1 capsule by mouth daily     Dispense:  30 capsule     Refill:  3    nystatin (MYCOSTATIN) 248676 UNIT/GM cream     Sig: Apply topically 2 times daily. Dispense:  1 Tube     Refill:  0     Side effects, adverse effects of the medication prescribed today, as well as treatment plan/ rationale and result expectations have been discussed with the patient who expresses understanding and desires to proceed. Close follow up to evaluate treatment results and for coordination of care. I have reviewed the patient's medical history in detail and updated the computerized patient record. As always, patient is advised that if symptoms worsen in any way they will proceed to the nearest emergency room. Return if symptoms worsen or fail to improve.     Oxana Conner, CNP

## 2017-10-23 NOTE — TELEPHONE ENCOUNTER
Pt states he is actually allergic to moxifloxacin not doxycycline. Pt wants doxycycline sent to pharmacy.

## 2017-11-10 DIAGNOSIS — I10 ESSENTIAL HYPERTENSION: ICD-10-CM

## 2017-11-10 RX ORDER — LISINOPRIL 20 MG/1
20 TABLET ORAL DAILY
Qty: 3 TABLET | Refills: 0 | Status: SHIPPED | OUTPATIENT
Start: 2017-11-10 | End: 2017-12-12 | Stop reason: SDUPTHER

## 2017-11-10 RX ORDER — DOXAZOSIN 2 MG/1
2 TABLET ORAL 2 TIMES DAILY
Qty: 6 TABLET | Refills: 0 | Status: SHIPPED | OUTPATIENT
Start: 2017-11-10 | End: 2017-11-20 | Stop reason: SDUPTHER

## 2017-11-10 RX ORDER — METOPROLOL TARTRATE 50 MG/1
TABLET, FILM COATED ORAL
Qty: 12 TABLET | Refills: 0 | Status: SHIPPED | OUTPATIENT
Start: 2017-11-10 | End: 2017-12-07 | Stop reason: ALTCHOICE

## 2017-11-10 NOTE — TELEPHONE ENCOUNTER
Pt called back and would like these meds sent to: [address], [address]  Ph: [phone]    Pt says he really appreciates it! Thank you.

## 2017-11-10 NOTE — TELEPHONE ENCOUNTER
Pt doesn't want these meds going to the pharmacy I picked. He is now traveling to [place], will be staying the night there, and will call back with new pharmacy.

## 2017-11-27 ENCOUNTER — CARE COORDINATION (OUTPATIENT)
Dept: CARE COORDINATION | Age: 67
End: 2017-11-27

## 2017-11-27 ENCOUNTER — NURSE ONLY (OUTPATIENT)
Dept: FAMILY MEDICINE CLINIC | Age: 67
End: 2017-11-27

## 2017-11-27 DIAGNOSIS — Z23 NEED FOR INFLUENZA VACCINATION: Primary | ICD-10-CM

## 2017-11-27 DIAGNOSIS — Z23 NEED FOR 23-POLYVALENT PNEUMOCOCCAL POLYSACCHARIDE VACCINE: ICD-10-CM

## 2017-11-27 PROCEDURE — 90732 PPSV23 VACC 2 YRS+ SUBQ/IM: CPT | Performed by: FAMILY MEDICINE

## 2017-11-27 PROCEDURE — G0008 ADMIN INFLUENZA VIRUS VAC: HCPCS | Performed by: FAMILY MEDICINE

## 2017-11-27 PROCEDURE — 90662 IIV NO PRSV INCREASED AG IM: CPT | Performed by: FAMILY MEDICINE

## 2017-11-27 PROCEDURE — G0009 ADMIN PNEUMOCOCCAL VACCINE: HCPCS | Performed by: FAMILY MEDICINE

## 2017-11-27 NOTE — PROGRESS NOTES
After obtaining consent, and per orders of Dr. Monroe Pena, injection of flu given in Left deltoid by Angelina Drake. Patient instructed to remain in clinic for 20 minutes afterwards, and to report any adverse reaction to me immediately. Vaccine Information Sheet, \"Influenza - Inactivated\"  given to Maci Marquez, or parent/legal guardian of  Maci Marquez and verbalized understanding. Patient responses:    Have you ever had a reaction to a flu vaccine? No  Are you able to eat eggs without adverse effects? No  Do you have any current illness? No  Have you ever had Guillian [place] Syndrome? No    Flu vaccine given per order. Please see immunization tab. After obtaining consent, and per orders of Dr. Monroe Pena, injection of pne23 given in Right deltoid by Charlie Rodriguez. Patient instructed to remain in clinic for 20 minutes afterwards, and to report any adverse reaction to me immediately.

## 2017-11-27 NOTE — CARE COORDINATION
financial resources (including ability to afford all required medical care)?:  Financially secure, resources adequate, no identified problems   How wells does the patient now understand their health and well-being (symptoms, signs or risk factors) and what they need to do to manage their health?:  Reasonable to good understanding and already engages in managing health or is willing to undertake better management   How well do you think your patient can engage in healthcare discussions? (Barriers include language, deafness, aphasia, alcohol or drug problems, learning difficulties, concentration):  Clear and open communication, no identified barriers   Do other services need to be involved to help this patient?:  Other care/services not required at this time   Suggested Interventions and Community Resources                  Prior to Admission medications    Medication Sig Start Date End Date Taking? Authorizing Provider   doxazosin (CARDURA) 2 MG tablet Take 1 tablet by mouth 2 times daily 11/20/17   Kenia Shaw MD   gabapentin (NEURONTIN) 300 MG capsule Take 1 capsule by mouth daily 11/20/17   Kenia Shaw MD   metoprolol tartrate (LOPRESSOR) 50 MG tablet Take 1 1 /2 po bid 11/10/17   Analia Woods CNP   lisinopril (PRINIVIL;ZESTRIL) 20 MG tablet Take 1 tablet by mouth daily 11/10/17   Analia Woods CNP   methylPREDNISolone (MEDROL DOSEPACK) 4 MG tablet Take by mouth. 10/23/17   Analia Woods CNP   nystatin (MYCOSTATIN) 056222 UNIT/GM cream Apply topically 2 times daily.  10/23/17   Analia Woods CNP   nitroGLYCERIN (NITROSTAT) 0.4 MG SL tablet Place 1 tablet under the tongue every 5 minutes as needed for Chest pain 7/31/17   Analia Woods CNP   fluticasone [facility]) 50 MCG/ACT nasal spray  6/20/17   Historical Provider, MD   testosterone (ANDROGEL; TESTIM) 50 MG/5GM (1%) GEL 1% gel Alternate one packet daily with 2 packets daily every other day 6/28/17   MD willie Negro

## 2017-11-28 ENCOUNTER — PROCEDURE VISIT (OUTPATIENT)
Dept: FAMILY MEDICINE CLINIC | Age: 67
End: 2017-11-28

## 2017-11-28 VITALS
DIASTOLIC BLOOD PRESSURE: 78 MMHG | WEIGHT: 273 LBS | HEIGHT: 70 IN | OXYGEN SATURATION: 94 % | SYSTOLIC BLOOD PRESSURE: 138 MMHG | HEART RATE: 69 BPM | BODY MASS INDEX: 39.08 KG/M2

## 2017-11-28 DIAGNOSIS — L91.8 INFLAMED SKIN TAG: Primary | ICD-10-CM

## 2017-11-28 PROCEDURE — 11200 RMVL SKIN TAGS UP TO&INC 15: CPT | Performed by: FAMILY MEDICINE

## 2017-11-28 ASSESSMENT — ENCOUNTER SYMPTOMS
SHORTNESS OF BREATH: 0
ABDOMINAL PAIN: 0

## 2017-11-28 NOTE — PROGRESS NOTES
Subjective  Nila Hernández, 79 y.o. male presents today with:  Chief Complaint   Patient presents with    Lesion(s)       HPI    Here today for treatment of skin takes around his right eye that are bothering him they're itchy tender can sometimes bleed and he can see them in his field of vision. Review of Systems   Constitutional: Negative for fever. Respiratory: Negative for shortness of breath. Cardiovascular: Negative for chest pain. Gastrointestinal: Negative for abdominal pain. Skin: Negative for rash. Past Medical History:   Diagnosis Date    Abnormal EMG 12/09/2015    Actinic keratoses     Allergic rhinitis     Anemia 11/18/2014    Arthritis     Chronic back pain     COPD (chronic obstructive pulmonary disease) ([facility] 8/9/2012    Crohns disease ([facility] [facility]     Degenerative disc disease, lumbar     Dermatitis     Diabetes ([facility] [address] 03/19/2015    diet controlled    GERD (gastroesophageal reflux disease)     History of atrial fibrillation 2006    Dr. Nav Shanks Hyperlipidemia 1/21/2014    Hypertension     Hypogonadism male     Mononeuritis multiplex     Osteoarthritis of lumbar spine     Pain of right heel     Paresthesia of foot, bilateral     Prediabetes 12/3/2014    Seborrheic dermatitis     Seborrheic keratoses, inflamed     Throat cancer (HCC)     throat, had surgery, sees Dr Barrera Mantle yearly    Tinea cruris     Tinea pedis     Tinea unguium      Past Surgical History:   Procedure Laterality Date    COLONOSCOPY  04/15/2015    LARYNGECTOMY  2004    UPPER GASTROINTESTINAL ENDOSCOPY  03/24/13    Dr. Frank James W/NAPOLEON RODRIGUEZ  2002     Social History     Social History    Marital status:      Spouse name: N/A    Number of children: 3    Years of education: N/A     Occupational History    Not on file.      Social History Main Topics    Smoking status: Former Smoker     Packs/day: 1.00     Types: Cigarettes     Quit date: 10/21/1990    Smokeless daily      HUMIRA PEN STARTER 40 MG/0.8ML injection       aspirin 81 MG tablet Take 81 mg by mouth daily.  albuterol (PROVENTIL HFA) 108 (90 BASE) MCG/ACT inhaler Inhale 2 puffs into the lungs every 6 hours as needed for Wheezing. 1 Inhaler 2    mesalamine (PENTASA) 250 MG CR capsule Take 500 mg by mouth 4 times daily. No current facility-administered medications for this visit. Objective    Vitals:    11/28/17 1725   BP: 138/78   Pulse: 69   SpO2: 94%   Weight: 273 lb (123.8 kg)   Height: 5' 10\" (1.778 m)       Physical Exam   Constitutional: He appears well-developed and well-nourished. HENT:   Head: Normocephalic and atraumatic. Skin: Skin is warm and dry. Skin colored pedunculated papules in the following locations: under right eye x 1, lateral right eyelid x 1, right eyelid x 3. Assessment & Plan   1. Inflamed skin tag  63155 - [place] REMOVAL OF SKIN TAGS, UP TO 15     Irritated skin tag(s) x 5. Electrocautery destruction to all 5 lesion(s). The lesions did not require any anesthesia. Informed consent was given by the patient. She understands the risks including but not limited to infection, bleeding and scarring. No guarantee how the scars will look. Post op instructions given to keep areas clean with soap and water. Advised patient to f/u in 2 weeks if not completely resolved. Orders Placed This Encounter   Procedures    39772 - MI REMOVAL OF SKIN TAGS, UP TO 15     No orders of the defined types were placed in this encounter. There are no discontinued medications. Return if symptoms worsen or fail to improve.     Starla Suarez MD

## 2017-11-28 NOTE — PATIENT INSTRUCTIONS
Clean surgical areas with antibacterial soap and water once daily. You can apply vaseline or polysporin to the affected areas. Follow up immediately if any redness surrounds the surgical sites or if drainage occurs at surgical sites.

## 2017-12-07 ENCOUNTER — TELEPHONE (OUTPATIENT)
Dept: FAMILY MEDICINE CLINIC | Age: 67
End: 2017-12-07

## 2017-12-07 ENCOUNTER — OFFICE VISIT (OUTPATIENT)
Dept: FAMILY MEDICINE CLINIC | Age: 67
End: 2017-12-07

## 2017-12-07 VITALS
OXYGEN SATURATION: 93 % | BODY MASS INDEX: 38.8 KG/M2 | HEART RATE: 66 BPM | WEIGHT: 271 LBS | DIASTOLIC BLOOD PRESSURE: 78 MMHG | TEMPERATURE: 98.4 F | HEIGHT: 70 IN | SYSTOLIC BLOOD PRESSURE: 120 MMHG

## 2017-12-07 DIAGNOSIS — J44.9 CHRONIC OBSTRUCTIVE PULMONARY DISEASE, UNSPECIFIED COPD TYPE (HCC): Primary | ICD-10-CM

## 2017-12-07 PROCEDURE — 4040F PNEUMOC VAC/ADMIN/RCVD: CPT | Performed by: FAMILY MEDICINE

## 2017-12-07 PROCEDURE — 3023F SPIROM DOC REV: CPT | Performed by: FAMILY MEDICINE

## 2017-12-07 PROCEDURE — G8926 SPIRO NO PERF OR DOC: HCPCS | Performed by: FAMILY MEDICINE

## 2017-12-07 PROCEDURE — G8427 DOCREV CUR MEDS BY ELIG CLIN: HCPCS | Performed by: FAMILY MEDICINE

## 2017-12-07 PROCEDURE — 1036F TOBACCO NON-USER: CPT | Performed by: FAMILY MEDICINE

## 2017-12-07 PROCEDURE — G8484 FLU IMMUNIZE NO ADMIN: HCPCS | Performed by: FAMILY MEDICINE

## 2017-12-07 PROCEDURE — 3017F COLORECTAL CA SCREEN DOC REV: CPT | Performed by: FAMILY MEDICINE

## 2017-12-07 PROCEDURE — 99214 OFFICE O/P EST MOD 30 MIN: CPT | Performed by: FAMILY MEDICINE

## 2017-12-07 PROCEDURE — 1123F ACP DISCUSS/DSCN MKR DOCD: CPT | Performed by: FAMILY MEDICINE

## 2017-12-07 PROCEDURE — G8417 CALC BMI ABV UP PARAM F/U: HCPCS | Performed by: FAMILY MEDICINE

## 2017-12-07 PROCEDURE — G8510 SCR DEP NEG, NO PLAN REQD: HCPCS | Performed by: FAMILY MEDICINE

## 2017-12-07 ASSESSMENT — ENCOUNTER SYMPTOMS
COUGH: 1
SORE THROAT: 0

## 2017-12-07 ASSESSMENT — PATIENT HEALTH QUESTIONNAIRE - PHQ9
SUM OF ALL RESPONSES TO PHQ QUESTIONS 1-9: 0
1. LITTLE INTEREST OR PLEASURE IN DOING THINGS: 0
2. FEELING DOWN, DEPRESSED OR HOPELESS: 0
SUM OF ALL RESPONSES TO PHQ9 QUESTIONS 1 & 2: 0

## 2017-12-07 NOTE — TELEPHONE ENCOUNTER
Has he been taking it or not? Sometimes, meds like metoprolol called a beta blockers can cause bronchospasm and are not necessarily the best choice for people with lung problems.

## 2017-12-08 ENCOUNTER — TELEPHONE (OUTPATIENT)
Dept: FAMILY MEDICINE CLINIC | Age: 67
End: 2017-12-08

## 2017-12-12 ENCOUNTER — OFFICE VISIT (OUTPATIENT)
Dept: FAMILY MEDICINE CLINIC | Age: 67
End: 2017-12-12

## 2017-12-12 VITALS
HEART RATE: 60 BPM | OXYGEN SATURATION: 93 % | SYSTOLIC BLOOD PRESSURE: 128 MMHG | DIASTOLIC BLOOD PRESSURE: 70 MMHG | HEIGHT: 70 IN | WEIGHT: 271 LBS | BODY MASS INDEX: 38.8 KG/M2

## 2017-12-12 DIAGNOSIS — J44.9 CHRONIC OBSTRUCTIVE PULMONARY DISEASE, UNSPECIFIED COPD TYPE (HCC): Primary | ICD-10-CM

## 2017-12-12 DIAGNOSIS — I10 ESSENTIAL HYPERTENSION: ICD-10-CM

## 2017-12-12 DIAGNOSIS — E78.5 HYPERLIPIDEMIA, UNSPECIFIED HYPERLIPIDEMIA TYPE: ICD-10-CM

## 2017-12-12 PROCEDURE — G8926 SPIRO NO PERF OR DOC: HCPCS | Performed by: FAMILY MEDICINE

## 2017-12-12 PROCEDURE — 3017F COLORECTAL CA SCREEN DOC REV: CPT | Performed by: FAMILY MEDICINE

## 2017-12-12 PROCEDURE — 4040F PNEUMOC VAC/ADMIN/RCVD: CPT | Performed by: FAMILY MEDICINE

## 2017-12-12 PROCEDURE — 99214 OFFICE O/P EST MOD 30 MIN: CPT | Performed by: FAMILY MEDICINE

## 2017-12-12 PROCEDURE — G8427 DOCREV CUR MEDS BY ELIG CLIN: HCPCS | Performed by: FAMILY MEDICINE

## 2017-12-12 PROCEDURE — 1123F ACP DISCUSS/DSCN MKR DOCD: CPT | Performed by: FAMILY MEDICINE

## 2017-12-12 PROCEDURE — G8484 FLU IMMUNIZE NO ADMIN: HCPCS | Performed by: FAMILY MEDICINE

## 2017-12-12 PROCEDURE — 1036F TOBACCO NON-USER: CPT | Performed by: FAMILY MEDICINE

## 2017-12-12 PROCEDURE — 3023F SPIROM DOC REV: CPT | Performed by: FAMILY MEDICINE

## 2017-12-12 PROCEDURE — G8417 CALC BMI ABV UP PARAM F/U: HCPCS | Performed by: FAMILY MEDICINE

## 2017-12-12 RX ORDER — LISINOPRIL 20 MG/1
20 TABLET ORAL DAILY
Qty: 90 TABLET | Refills: 3 | Status: SHIPPED | OUTPATIENT
Start: 2017-12-12 | End: 2018-11-30 | Stop reason: SDUPTHER

## 2017-12-12 RX ORDER — NITROGLYCERIN 0.4 MG/1
0.4 TABLET SUBLINGUAL EVERY 5 MIN PRN
Qty: 25 TABLET | Refills: 0 | Status: SHIPPED | OUTPATIENT
Start: 2017-12-12 | End: 2018-10-05 | Stop reason: SDUPTHER

## 2017-12-12 RX ORDER — METOPROLOL TARTRATE 50 MG/1
TABLET, FILM COATED ORAL
Qty: 270 TABLET | Refills: 3 | Status: SHIPPED | OUTPATIENT
Start: 2017-12-12 | End: 2018-04-12 | Stop reason: SDUPTHER

## 2017-12-12 RX ORDER — PRAVASTATIN SODIUM 40 MG
40 TABLET ORAL NIGHTLY
Qty: 90 TABLET | Refills: 3 | Status: SHIPPED | OUTPATIENT
Start: 2017-12-12 | End: 2018-11-30 | Stop reason: SDUPTHER

## 2017-12-12 ASSESSMENT — ENCOUNTER SYMPTOMS: BLURRED VISION: 0

## 2017-12-12 NOTE — PROGRESS NOTES
Subjective  Mandy Harada, 79 y.o. male presents today with:  Chief Complaint   Patient presents with    3 Month Follow-Up     feels like singulair is not working any longer       Hypertension   This is a chronic problem. The current episode started more than 1 year ago. The problem has been rapidly improving since onset. The problem is controlled. Pertinent negatives include no blurred vision or palpitations. There are no associated agents to hypertension. Risk factors for coronary artery disease include dyslipidemia, male gender and obesity. Past treatments include ACE inhibitors, alpha 1 blockers and beta blockers. Here today for f/u on HTN and COPD/cough. Still coughing Has appmt tomorrow with Dr. Laura Fonseca. He tried bevespi but had side effects so stopped it on Saturday. Tried stopping metoprolol but his bp went up. Review of Systems   Eyes: Negative for blurred vision. Cardiovascular: Negative for palpitations.        Past Medical History:   Diagnosis Date    Abnormal EMG 12/09/2015    Actinic keratoses     Allergic rhinitis     Anemia 11/18/2014    Arthritis     Chronic back pain     COPD (chronic obstructive pulmonary disease) ([facility]) 08/09/2012    Crohns disease ([address]     Degenerative disc disease, lumbar     Dermatitis     Diabetes ([address] 03/19/2015    diet controlled    GERD (gastroesophageal reflux disease)     History of atrial fibrillation 2006    Dr. Umer Batista Hyperlipidemia 1/21/2014    Hypertension     Hypogonadism male     Mononeuritis multiplex     Osteoarthritis of lumbar spine     Pain of right heel     Paresthesia of foot, bilateral     Prediabetes 12/3/2014    Seborrheic dermatitis     Seborrheic keratoses, inflamed     Throat cancer ([facility])     throat, had surgery, sees Dr Collins Wick yearly    Tinea cruris     Tinea pedis     Tinea unguium      Past Surgical History:   Procedure Laterality Date    COLONOSCOPY  04/15/2015    LARYNGECTOMY  2004    UPPER GASTROINTESTINAL ENDOSCOPY  03/24/13    Dr. Trinidad Paz W/NAPOLEON INJ  2002     Social History     Social History    Marital status:      Spouse name: N/A    Number of children: 3    Years of education: N/A     Occupational History    Not on file. 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      Family History   Problem Relation Age of Onset    Heart Disease Mother     Heart Disease Father      Allergies   Allergen Reactions    Bevespi Aerosphere [Glycopyrrolate-Formoterol] Other (See Comments)     Dizzy and felt like heart rate sped up    Campath [Alemtuzumab]      Low grade fever    Mercaptopurine     Moxifloxacin Other (See Comments)     Pt states He gets sores in his mouth     Current Outpatient Prescriptions   Medication Sig Dispense Refill    lisinopril (PRINIVIL;ZESTRIL) 20 MG tablet Take 1 tablet by mouth daily 90 tablet 3    pravastatin (PRAVACHOL) 40 MG tablet Take 1 tablet by mouth nightly 90 tablet 3    metoprolol tartrate (LOPRESSOR) 50 MG tablet Take 1 1 /2 po bid 270 tablet 3    nitroGLYCERIN (NITROSTAT) 0.4 MG SL tablet Place 1 tablet under the tongue every 5 minutes as needed for Chest pain Max 3 tabs 25 tablet 0    doxazosin (CARDURA) 2 MG tablet Take 1 tablet by mouth 2 times daily 180 tablet 3    gabapentin (NEURONTIN) 300 MG capsule Take 1 capsule by mouth daily 90 capsule 3    fluticasone (FLONASE) 50 MCG/ACT nasal spray       testosterone (ANDROGEL; TESTIM) 50 MG/5GM (1%) GEL 1% gel Alternate one packet daily with 2 packets daily every other day 135 Package 3    montelukast (SINGULAIR) 10 MG tablet TAKE 1 TABLET NIGHTLY 90 tablet 3    Value Date    LDLCALC 67 10/21/2016    LDLCALC 49 12/05/2015    LDLCALC 52 06/23/2015     No results found for: LABVLDL, VLDL  Lab Results   Component Value Date    CHOLHDLRATIO 4.0 03/19/2013    CHOLHDLRATIO 3.3 12/20/2012     Assessment & Plan   1. Chronic obstructive pulmonary disease, unspecified COPD type ([address]     2. Essential hypertension  lisinopril (PRINIVIL;ZESTRIL) 20 MG tablet    metoprolol tartrate (LOPRESSOR) 50 MG tablet   3. Hyperlipidemia, unspecified hyperlipidemia type  pravastatin (PRAVACHOL) 40 MG tablet     He already stopped taking the bevespi. He has an appointment to see Dr. Chema Sutton tomorrow. He states Dr. Concepcion Rodriguez was never concerned about him being on a beta blocker. BP better back on his beta blocker along with the alpha blocker and ace. Will see if Dr. Chema Sutton ok with continuing beta blocker. No orders of the defined types were placed in this encounter. Orders Placed This Encounter   Medications    lisinopril (PRINIVIL;ZESTRIL) 20 MG tablet     Sig: Take 1 tablet by mouth daily     Dispense:  90 tablet     Refill:  3    pravastatin (PRAVACHOL) 40 MG tablet     Sig: Take 1 tablet by mouth nightly     Dispense:  90 tablet     Refill:  3    metoprolol tartrate (LOPRESSOR) 50 MG tablet     Sig: Take 1 1 /2 po bid     Dispense:  270 tablet     Refill:  3    nitroGLYCERIN (NITROSTAT) 0.4 MG SL tablet     Sig: Place 1 tablet under the tongue every 5 minutes as needed for Chest pain Max 3 tabs     Dispense:  25 tablet     Refill:  0     Medications Discontinued During This Encounter   Medication Reason    nystatin (MYCOSTATIN) 062935 UNIT/GM cream Therapy completed    Glycopyrrolate-Formoterol (BEVESPI AEROSPHERE) 9-4.8 MCG/ACT AERO Side effects    lisinopril (PRINIVIL;ZESTRIL) 20 MG tablet Reorder    pravastatin (PRAVACHOL) 40 MG tablet Reorder    nitroGLYCERIN (NITROSTAT) 0.4 MG SL tablet Reorder     Return in about 4 months (around 4/12/2018).     Delmy Quigley MD

## 2017-12-13 ENCOUNTER — CARE COORDINATION (OUTPATIENT)
Dept: CARE COORDINATION | Age: 67
End: 2017-12-13

## 2017-12-13 ENCOUNTER — OFFICE VISIT (OUTPATIENT)
Dept: PULMONOLOGY | Age: 67
End: 2017-12-13

## 2017-12-13 VITALS
DIASTOLIC BLOOD PRESSURE: 70 MMHG | SYSTOLIC BLOOD PRESSURE: 124 MMHG | OXYGEN SATURATION: 90 % | HEART RATE: 83 BPM | TEMPERATURE: 97.4 F | BODY MASS INDEX: 38.8 KG/M2 | WEIGHT: 271 LBS | HEIGHT: 70 IN

## 2017-12-13 DIAGNOSIS — C14.0 THROAT CANCER (HCC): ICD-10-CM

## 2017-12-13 DIAGNOSIS — G47.33 OSA ON CPAP: Primary | ICD-10-CM

## 2017-12-13 DIAGNOSIS — E66.9 OBESITY (BMI 30-39.9): ICD-10-CM

## 2017-12-13 DIAGNOSIS — Z99.89 OSA ON CPAP: Primary | ICD-10-CM

## 2017-12-13 DIAGNOSIS — J44.9 CHRONIC OBSTRUCTIVE PULMONARY DISEASE, UNSPECIFIED COPD TYPE (HCC): ICD-10-CM

## 2017-12-13 PROCEDURE — 3017F COLORECTAL CA SCREEN DOC REV: CPT | Performed by: INTERNAL MEDICINE

## 2017-12-13 PROCEDURE — G8427 DOCREV CUR MEDS BY ELIG CLIN: HCPCS | Performed by: INTERNAL MEDICINE

## 2017-12-13 PROCEDURE — 1123F ACP DISCUSS/DSCN MKR DOCD: CPT | Performed by: INTERNAL MEDICINE

## 2017-12-13 PROCEDURE — G8417 CALC BMI ABV UP PARAM F/U: HCPCS | Performed by: INTERNAL MEDICINE

## 2017-12-13 PROCEDURE — G8484 FLU IMMUNIZE NO ADMIN: HCPCS | Performed by: INTERNAL MEDICINE

## 2017-12-13 PROCEDURE — G8926 SPIRO NO PERF OR DOC: HCPCS | Performed by: INTERNAL MEDICINE

## 2017-12-13 PROCEDURE — 3023F SPIROM DOC REV: CPT | Performed by: INTERNAL MEDICINE

## 2017-12-13 PROCEDURE — 4040F PNEUMOC VAC/ADMIN/RCVD: CPT | Performed by: INTERNAL MEDICINE

## 2017-12-13 PROCEDURE — 99214 OFFICE O/P EST MOD 30 MIN: CPT | Performed by: INTERNAL MEDICINE

## 2017-12-13 PROCEDURE — 1036F TOBACCO NON-USER: CPT | Performed by: INTERNAL MEDICINE

## 2017-12-13 ASSESSMENT — ENCOUNTER SYMPTOMS
DIARRHEA: 0
ABDOMINAL PAIN: 0
SORE THROAT: 0
NAUSEA: 0
COUGH: 1
VOMITING: 0
WHEEZING: 1
RHINORRHEA: 0
VOICE CHANGE: 0
CHEST TIGHTNESS: 0
SHORTNESS OF BREATH: 1
EYE ITCHING: 0

## 2017-12-13 NOTE — CARE COORDINATION
Ambulatory Care Coordination Note  12/13/2017  CM Risk Score: 6  Anne-Marie Mortality Risk Score: 4.36    ACC: Faye Loza, CARLIE    Summary Note: I met with Jasmeet Foster as a scheduled appointment to help him develop a healthy lifestyle in regards to diet and exercise. He and I have worked together in the past and he had great success in reducing his A1C while changing his eating habits and loosing weight. It has been a couple of years and he has gained about 47 pounds since our last visit. His weight today is 271.2 pounds He would like to get things back on track so that he can enjoy a healthy life. I reviewed his allergies, medical history, current medications. We did talk about his current eating habits and it seems that he has fallen back on bad habits. We talked about meal planning, portion, control, making sure that he has the proper food groups in his diet, healthy snacks, and exercise. He is motivated to make sure that he eats healthy and looses weight. He has plans to participate in [employer] after the first of the year which is when his insurance plan will take affect and he will be able to participate in this plan. I also spoke with him about COPD and he tells me that he is seeing Dr Mehdi Murillo today for his COPD. Ambulatory Care Coordination Assessment    Care Coordination Protocol  Program Enrollment:  Complex Care  Referral from Primary Care Provider:  No  Week 1 - Initial Assessment     Do you have all of your prescriptions and are they filled?:  Yes  Barriers to medication adherence:  None  Are you able to afford your medications?:  Yes  How often do you have trouble taking your medications the way you have been told to take them?:  I always take them as prescribed. Do you have Home O2 Therapy?:  No      Ability to seek help/take action for Emergent Urgent situations i.e. fire, crime, inclement weather or health crisis. :  Independent  Ability to ambulate to restroom:  Independent  Ability handle you rate their social network (family, work, friends)?:  Adequate participation with social networks   How would you rate their financial resources (including ability to afford all required medical care)?:  Financially secure, resources adequate, no identified problems   How wells does the patient now understand their health and well-being (symptoms, signs or risk factors) and what they need to do to manage their health?:  Reasonable to good understanding and already engages in managing health or is willing to undertake better management   How well do you think your patient can engage in healthcare discussions? (Barriers include language, deafness, aphasia, alcohol or drug problems, learning difficulties, concentration):  Clear and open communication, no identified barriers   Do other services need to be involved to help this patient?:  Other care/services not required at this time   Suggested Interventions and Community Resources   Disease Assocation: In Process   Zone Management Tools: In Process                  Prior to Admission medications    Medication Sig Start Date End Date Taking?  Authorizing Provider   lisinopril (PRINIVIL;ZESTRIL) 20 MG tablet Take 1 tablet by mouth daily 12/12/17  Yes Cecilia Ontiveros MD   pravastatin (PRAVACHOL) 40 MG tablet Take 1 tablet by mouth nightly 12/12/17  Yes Cecilia Ontiveros MD   metoprolol tartrate (LOPRESSOR) 50 MG tablet Take 1 1 /2 po bid 12/12/17  Yes Cecilia Ontiveros MD   doxazosin (CARDURA) 2 MG tablet Take 1 tablet by mouth 2 times daily 11/20/17  Yes Cecilia Ontiveros MD   gabapentin (NEURONTIN) 300 MG capsule Take 1 capsule by mouth daily 11/20/17  Yes Cecilia Ontiveros MD   fluticasone [place]) 50 MCG/ACT nasal spray  6/20/17  Yes Historical Provider, MD   testosterone (ANDROGEL; TESTIM) 50 MG/5GM (1%) GEL 1% gel Alternate one packet daily with 2 packets daily every other day 6/28/17  Yes Cecilia Ontiveros MD   montelukast (SINGULAIR) 10 MG tablet TAKE 1 TABLET NIGHTLY 6/12/17  Yes Kay Sawyer MD   Saccharomyces boulardii (PROBIOTIC) 250 MG CAPS Take 1 tablet by mouth daily   Yes Historical Provider, MD   esomeprazole Magnesium (NEXIUM) 20 MG PACK Take 20 mg by mouth daily   Yes Historical Provider, MD CUADRA PEN STARTER 40 MG/0.8ML injection  11/13/14  Yes Historical Provider, MD   aspirin 81 MG tablet Take 81 mg by mouth daily. Yes Historical Provider, MD   albuterol (PROVENTIL HFA) 108 (90 BASE) MCG/ACT inhaler Inhale 2 puffs into the lungs every 6 hours as needed for Wheezing. 5/8/12  Yes Abel Morgan MD   mesalamine (PENTASA) 250 MG CR capsule Take 500 mg by mouth 4 times daily.    Yes Historical Provider, MD   nitroGLYCERIN (NITROSTAT) 0.4 MG SL tablet Place 1 tablet under the tongue every 5 minutes as needed for Chest pain Max 3 tabs 12/12/17   Kay Sawyer MD   cetirizine (ZYRTEC ALLERGY) 10 MG tablet Take 1 tablet by mouth daily 6/12/17   Kay Sawyer MD   ciclopirox [facility]) 8 % solution Apply qhs to affected nails, clean off once a week with alcohol soaked cotton ball 11/29/16   Kay Sawyer MD       Future Appointments  Date Time Provider Chase Barrera   12/13/2017 1:45 PM Lilli New, [address] [facility],[address]   4/12/2018 1:00 PM Kay Sawyer MD [facility] EMERGENCY MEDICAL [place] AT [place]

## 2017-12-13 NOTE — PROGRESS NOTES
CARDIAC CATHETERIZATION      COLONOSCOPY  04/15/2015    KNEE ARTHROSCOPY      LARYNGECTOMY  2004    UPPER GASTROINTESTINAL ENDOSCOPY  03/24/13    Dr. Agustina Ortiz W/SOLOMONE INJ  2002     Family History   Problem Relation Age of Onset    Heart Disease Mother     Liver Disease Mother     Heart Disease Father     Cancer Sister      lung     Social History     Social History    Marital status:      Spouse name: N/A    Number of children: 3    Years of education: N/A     Occupational History    Not on file. 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 Review of Systems   Constitutional: Negative for chills, diaphoresis, fatigue and fever. HENT: Positive for postnasal drip. Negative for congestion, mouth sores, nosebleeds, rhinorrhea, sneezing, sore throat and voice change. Eyes: Negative for itching and visual disturbance. Respiratory: Positive for cough, shortness of breath and wheezing. Negative for chest tightness. Cardiovascular: Negative. Negative for chest pain, palpitations and leg swelling. Gastrointestinal: Negative for abdominal pain, diarrhea, nausea and vomiting. Genitourinary: Negative for difficulty urinating and hematuria. Musculoskeletal: Negative for arthralgias, joint swelling and myalgias. Skin: Negative for rash. Allergic/Immunologic: Negative for environmental allergies. Neurological: Negative for dizziness, tremors, weakness and headaches. Psychiatric/Behavioral: Negative for behavioral problems and sleep disturbance.          Objective:     Vitals:    12/13/17 1402   BP: 124/70   Site: Left testosterone (ANDROGEL; TESTIM) 50 MG/5GM (1%) GEL 1% gel Alternate one packet daily with 2 packets daily every other day 135 Package 3    montelukast (SINGULAIR) 10 MG tablet TAKE 1 TABLET NIGHTLY 90 tablet 3    cetirizine (ZYRTEC ALLERGY) 10 MG tablet Take 1 tablet by mouth daily 90 tablet 3    Saccharomyces boulardii (PROBIOTIC) 250 MG CAPS Take 1 tablet by mouth daily      ciclopirox (PENLAC) 8 % solution Apply qhs to affected nails, clean off once a week with alcohol soaked cotton ball 3 Bottle 3    esomeprazole Magnesium (NEXIUM) 20 MG PACK Take 20 mg by mouth daily      HUMIRA PEN STARTER 40 MG/0.8ML injection       aspirin 81 MG tablet Take 81 mg by mouth daily.  albuterol (PROVENTIL HFA) 108 (90 BASE) MCG/ACT inhaler Inhale 2 puffs into the lungs every 6 hours as needed for Wheezing. 1 Inhaler 2    mesalamine (PENTASA) 250 MG CR capsule Take 500 mg by mouth 4 times daily. No current facility-administered medications for this visit. Results for orders placed in visit on 10/23/17   XR CHEST STANDARD (2 VW)    Narrative EXAMINATION: XR CHEST STANDARD TWO VW    REASON FOR EXAM:J20.9 Acute bronchitis, unspecified organism ICD10    FINDINGS: Heart normal in size. Atherosclerotic changes in the aortic arch noted. Mediastinal structures otherwise unremarkable. Lung fields free of active disease. Bones and soft tissues intact. No change from June 30, 2017 study. Impression NO ACTIVE DISEASE IN THE CHEST. Assessment/Plan:     1. JARRELL on CPAP  Patient is on CPAP with 14 cm of H2O. He will use CPAP 6-8 hours every night nasal pillow. Sleeping well with CPAP therapy continue CPAP as before. Counseling: CPAP/BiPAP uses, patient advised to use CPAP at least 5-6 hours every night.     Driving: patient denies extreme caution when driving or operating machinery if there is a feeling of drowsiness, especially while driving it is preferable to stop driving and take a brief nap.  Sleep hygiene: He avoid supine sleep, sleep on her sides. Avoid  sleep deprivation. Explained sleep hygiene. Advice to avoid Alcohol and sedative      2. Chronic obstructive pulmonary disease, unspecified COPD type ([address]  Patient is currently using albuterol HFA 2 puff 4 times a day when necessary. He is having short of breath with exertion and occasional twitching which is baseline. Continue bronchodilator as before I'll request PFT for further evaluation. chest x-ray was done recently showing no active disease in the chest  - FULL PFT STUDY WITH PRE AND POST; Future    3. Throat cancer [facility]) s/p radiation s/p surgery. 2004  Patient had a surgery and radiation therapy done for throat cancer. Patient  is doing well with no recurrence    4. Obesity (BMI 30-39. 9)  Patient patient is advised try to lose weight. obesity related risk explained to the patient ,  Current weight:  271 lb (122.9 kg) Lbs. BMI:  Body mass index is 38.88 kg/m². Return in about 3 months (around 3/13/2018) for COPD, CPAP f/u.       Jodi Henderson MD

## 2017-12-29 ENCOUNTER — OFFICE VISIT (OUTPATIENT)
Dept: FAMILY MEDICINE CLINIC | Age: 67
End: 2017-12-29

## 2017-12-29 VITALS
WEIGHT: 272 LBS | DIASTOLIC BLOOD PRESSURE: 72 MMHG | OXYGEN SATURATION: 95 % | TEMPERATURE: 97.7 F | HEART RATE: 67 BPM | BODY MASS INDEX: 38.94 KG/M2 | SYSTOLIC BLOOD PRESSURE: 122 MMHG | HEIGHT: 70 IN

## 2017-12-29 DIAGNOSIS — R05.3 CHRONIC COUGH: Primary | ICD-10-CM

## 2017-12-29 PROCEDURE — G8427 DOCREV CUR MEDS BY ELIG CLIN: HCPCS | Performed by: NURSE PRACTITIONER

## 2017-12-29 PROCEDURE — 4040F PNEUMOC VAC/ADMIN/RCVD: CPT | Performed by: NURSE PRACTITIONER

## 2017-12-29 PROCEDURE — G8484 FLU IMMUNIZE NO ADMIN: HCPCS | Performed by: NURSE PRACTITIONER

## 2017-12-29 PROCEDURE — 99213 OFFICE O/P EST LOW 20 MIN: CPT | Performed by: NURSE PRACTITIONER

## 2017-12-29 PROCEDURE — G8417 CALC BMI ABV UP PARAM F/U: HCPCS | Performed by: NURSE PRACTITIONER

## 2017-12-29 PROCEDURE — 1036F TOBACCO NON-USER: CPT | Performed by: NURSE PRACTITIONER

## 2017-12-29 PROCEDURE — 1123F ACP DISCUSS/DSCN MKR DOCD: CPT | Performed by: NURSE PRACTITIONER

## 2017-12-29 PROCEDURE — 3017F COLORECTAL CA SCREEN DOC REV: CPT | Performed by: NURSE PRACTITIONER

## 2017-12-29 ASSESSMENT — ENCOUNTER SYMPTOMS
SHORTNESS OF BREATH: 1
COUGH: 1

## 2017-12-29 NOTE — PROGRESS NOTES
Subjective  Chief Complaint   Patient presents with    Cough     would like a chest xray       Cough   This is a new problem. The current episode started more than 1 month ago. The problem has been unchanged. The cough is non-productive. Associated symptoms include nasal congestion and shortness of breath. Pertinent negatives include no chest pain, chills or fever. Nothing aggravates the symptoms. He has tried a beta-agonist inhaler (mucinex) for the symptoms. The treatment provided no relief. Has had a cough since December 10. Saw Dr. Elenita Cano on 12/12/17 and then Dr. Tamar Mendoza on 12/13/17 who advised him to take mucinex for this. Is scheduled for PFT on 1/4/17. Feels that this is not getting worse but is not getting better either.       Patient Active Problem List    Diagnosis Date Noted    Pain of right heel     Tinea unguium     Throat cancer ([address]     Chronic back pain     Degenerative disc disease, lumbar     Osteoarthritis of lumbar spine     Mononeuritis multiplex     Seborrheic dermatitis     Hypogonadism male     Allergic rhinitis     Hyperlipidemia 01/21/2014    Crohn disease ([address] 01/21/2014    COPD (chronic obstructive pulmonary disease) ([address] 08/09/2012    GERD (gastroesophageal reflux disease)     Hypertension 10/21/2011     Past Medical History:   Diagnosis Date    Abnormal EMG 12/09/2015    Actinic keratoses     Allergic rhinitis     Anemia 11/18/2014    Arthritis     Chronic back pain     COPD (chronic obstructive pulmonary disease) ([address] 08/09/2012    Crohns disease ([address]     Degenerative disc disease, lumbar     Dermatitis     Diabetes ([address] 03/19/2015    diet controlled    GERD (gastroesophageal reflux disease)     History of atrial fibrillation 2006    Dr. Marcia Tolliver Hyperlipidemia 1/21/2014    Hypertension     Hypogonadism male     Mononeuritis multiplex     Osteoarthritis of lumbar spine     Pain of right heel     Paresthesia of foot, bilateral     MG/5GM (1%) GEL 1% gel Alternate one packet daily with 2 packets daily every other day 135 Package 3    montelukast (SINGULAIR) 10 MG tablet TAKE 1 TABLET NIGHTLY 90 tablet 3    Saccharomyces boulardii (PROBIOTIC) 250 MG CAPS Take 1 tablet by mouth daily      ciclopirox (PENLAC) 8 % solution Apply qhs to affected nails, clean off once a week with alcohol soaked cotton ball 3 Bottle 3    esomeprazole Magnesium (NEXIUM) 20 MG PACK Take 20 mg by mouth daily      HUMIRA PEN STARTER 40 MG/0.8ML injection       aspirin 81 MG tablet Take 81 mg by mouth daily.  albuterol (PROVENTIL HFA) 108 (90 BASE) MCG/ACT inhaler Inhale 2 puffs into the lungs every 6 hours as needed for Wheezing. 1 Inhaler 2    mesalamine (PENTASA) 250 MG CR capsule Take 500 mg by mouth 4 times daily.  cetirizine (ZYRTEC ALLERGY) 10 MG tablet Take 1 tablet by mouth daily 90 tablet 3     No current facility-administered medications on file prior to visit. Allergies   Allergen Reactions    Bevespi Aerosphere [Glycopyrrolate-Formoterol] Other (See Comments)     Dizzy and felt like heart rate sped up    Campath [Alemtuzumab]      Low grade fever    Mercaptopurine     Moxifloxacin Other (See Comments)     Pt states He gets sores in his mouth       Review of Systems   Constitutional: Negative for chills, diaphoresis, fatigue and fever. Respiratory: Positive for cough and shortness of breath. Cardiovascular: Negative for chest pain, palpitations and leg swelling. Objective  Vitals:    12/29/17 1441   BP: 122/72   Pulse: 67   Temp: 97.7 °F (36.5 °C)   TempSrc: Tympanic   SpO2: 95%   Weight: 272 lb (123.4 kg)   Height: 5' 10\" (1.778 m)     Physical Exam   Constitutional: He is oriented to person, place, and time. He appears well-developed and well-nourished. No distress. Cardiovascular: Normal rate, regular rhythm and normal heart sounds. Pulmonary/Chest: Effort normal. He has wheezes.    Neurological: He is alert and oriented to person, place, and time. Skin: Skin is warm and dry. No rash noted. He is not diaphoretic. No erythema. No pallor. Psychiatric: He has a normal mood and affect. His behavior is normal. Judgment and thought content normal.     Assessment & Plan    1. Chronic cough  XR CHEST STANDARD (2 VW)     Chest xray as ordered today. Restart zyrtec. PFTs on Monday as ordered by Dr. Prieto Beaulieu. Side effects, adverse effects of the medication prescribed today, as well as treatment plan/ rationale and result expectations have been discussed with the patient who expresses understanding and desires to proceed. Close follow up to evaluate treatment results and for coordination of care. I have reviewed the patient's medical history in detail and updated the computerized patient record. As always, patient is advised that if symptoms worsen in any way they will proceed to the nearest emergency room. Orders Placed This Encounter   Procedures    XR CHEST STANDARD (2 VW)     Standing Status:   Future     Number of Occurrences:   1     Standing Expiration Date:   12/29/2018     Order Specific Question:   Reason for exam:     Answer:   cough x 3 weeks       No orders of the defined types were placed in this encounter. Return if symptoms worsen or fail to improve.     Harjeet Marquez, CNP

## 2018-01-03 ENCOUNTER — CARE COORDINATOR VISIT (OUTPATIENT)
Dept: CARE COORDINATION | Age: 68
End: 2018-01-03

## 2018-01-03 NOTE — CARE COORDINATION
Tools: In Process         Goals Addressed             Most Recent     Conditions and Symptoms   Improving (1/3/2018)             I will schedule office visits, as directed by my provider. I will keep my appointment or reschedule if I have to cancel. I will notify my provider of any barriers to my plan of care. I will notify my provider of any symptoms that indicate a worsening of my condition. Barriers: none  Plan for overcoming my barriers: N/A  Confidence: 9/10  Anticipated Goal Completion Date: 1/31/2017              Prior to Admission medications    Medication Sig Start Date End Date Taking?  Authorizing Provider   CPAP Machine MISC by Does not apply route    Historical Provider, MD   lisinopril (PRINIVIL;ZESTRIL) 20 MG tablet Take 1 tablet by mouth daily 12/12/17   Gwendolyn Marcos MD   pravastatin (PRAVACHOL) 40 MG tablet Take 1 tablet by mouth nightly 12/12/17   Gwendolyn Marcos MD   metoprolol tartrate (LOPRESSOR) 50 MG tablet Take 1 1 /2 po bid 12/12/17   Gwendolyn Marcos MD   nitroGLYCERIN (NITROSTAT) 0.4 MG SL tablet Place 1 tablet under the tongue every 5 minutes as needed for Chest pain Max 3 tabs 12/12/17   Gwendolyn Marcos MD   doxazosin (CARDURA) 2 MG tablet Take 1 tablet by mouth 2 times daily 11/20/17   Gwendolyn Marcos MD   gabapentin (NEURONTIN) 300 MG capsule Take 1 capsule by mouth daily 11/20/17   Gwendolyn Marcos MD   fluticasone [place]ack) 50 MCG/ACT nasal spray  6/20/17   Historical Provider, MD   testosterone (ANDROGEL; TESTIM) 50 MG/5GM (1%) GEL 1% gel Alternate one packet daily with 2 packets daily every other day 6/28/17   Gwendolyn Marcos MD   montelukast (SINGULAIR) 10 MG tablet TAKE 1 TABLET NIGHTLY 6/12/17   Gwendolyn Marcos MD   cetirizine (ZYRTEC ALLERGY) 10 MG tablet Take 1 tablet by mouth daily 6/12/17   Gwendolyn Marcos MD   Saccharomyces boulardii (PROBIOTIC) 250 MG CAPS Take 1 tablet by mouth daily    Historical Provider, MD   ciclopirox (PENLAC) 8 % solution Apply qhs to affected nails,

## 2018-01-04 ENCOUNTER — HOSPITAL ENCOUNTER (OUTPATIENT)
Dept: PULMONOLOGY | Age: 68
Discharge: HOME | End: 2018-01-04
Payer: MEDICARE

## 2018-01-04 DIAGNOSIS — J44.9 CHRONIC OBSTRUCTIVE PULMONARY DISEASE, UNSPECIFIED COPD TYPE (HCC): ICD-10-CM

## 2018-01-04 PROCEDURE — 6360000002 HC RX W HCPCS: Performed by: INTERNAL MEDICINE

## 2018-01-04 PROCEDURE — 94729 DIFFUSING CAPACITY: CPT

## 2018-01-04 PROCEDURE — 94726 PLETHYSMOGRAPHY LUNG VOLUMES: CPT

## 2018-01-04 PROCEDURE — 94060 EVALUATION OF WHEEZING: CPT

## 2018-01-04 RX ORDER — ALBUTEROL SULFATE 2.5 MG/3ML
2.5 SOLUTION RESPIRATORY (INHALATION) ONCE
Status: COMPLETED | OUTPATIENT
Start: 2018-01-04 | End: 2018-01-04

## 2018-01-04 RX ADMIN — ALBUTEROL SULFATE 2.5 MG: 2.5 SOLUTION RESPIRATORY (INHALATION) at 10:04

## 2018-01-08 ENCOUNTER — TELEPHONE (OUTPATIENT)
Dept: PULMONOLOGY | Age: 68
End: 2018-01-08

## 2018-01-09 ENCOUNTER — TELEPHONE (OUTPATIENT)
Dept: SURGERY | Age: 68
End: 2018-01-09

## 2018-01-10 NOTE — TELEPHONE ENCOUNTER
Pt called again today, cherise answered, told her if he can not wait to put him on a Monday appt to go over results

## 2018-01-12 ENCOUNTER — TELEPHONE (OUTPATIENT)
Dept: PULMONOLOGY | Age: 68
End: 2018-01-12

## 2018-01-12 DIAGNOSIS — G47.33 OSA (OBSTRUCTIVE SLEEP APNEA): Primary | ICD-10-CM

## 2018-01-15 PROCEDURE — 94729 DIFFUSING CAPACITY: CPT | Performed by: INTERNAL MEDICINE

## 2018-01-15 PROCEDURE — 94060 EVALUATION OF WHEEZING: CPT | Performed by: INTERNAL MEDICINE

## 2018-01-15 PROCEDURE — 94726 PLETHYSMOGRAPHY LUNG VOLUMES: CPT | Performed by: INTERNAL MEDICINE

## 2018-01-17 ENCOUNTER — CARE COORDINATOR VISIT (OUTPATIENT)
Dept: FAMILY MEDICINE CLINIC | Age: 68
End: 2018-01-17

## 2018-01-17 ASSESSMENT — ENCOUNTER SYMPTOMS: DYSPNEA ASSOCIATED WITH: EXERTION

## 2018-01-29 ENCOUNTER — OFFICE VISIT (OUTPATIENT)
Dept: PULMONOLOGY | Age: 68
End: 2018-01-29
Payer: MEDICARE

## 2018-01-29 VITALS
WEIGHT: 273 LBS | SYSTOLIC BLOOD PRESSURE: 110 MMHG | DIASTOLIC BLOOD PRESSURE: 60 MMHG | OXYGEN SATURATION: 91 % | HEART RATE: 51 BPM | HEIGHT: 71 IN | TEMPERATURE: 97 F | BODY MASS INDEX: 38.22 KG/M2

## 2018-01-29 DIAGNOSIS — Z99.89 OSA ON CPAP: ICD-10-CM

## 2018-01-29 DIAGNOSIS — C14.0 THROAT CANCER (HCC): ICD-10-CM

## 2018-01-29 DIAGNOSIS — E66.9 OBESITY (BMI 30-39.9): ICD-10-CM

## 2018-01-29 DIAGNOSIS — J44.9 CHRONIC OBSTRUCTIVE PULMONARY DISEASE, UNSPECIFIED COPD TYPE (HCC): Primary | ICD-10-CM

## 2018-01-29 DIAGNOSIS — G47.33 OSA ON CPAP: ICD-10-CM

## 2018-01-29 PROCEDURE — 99214 OFFICE O/P EST MOD 30 MIN: CPT | Performed by: INTERNAL MEDICINE

## 2018-01-29 RX ORDER — PREDNISONE 5 MG/1
TABLET ORAL
COMMUNITY
End: 2018-05-10 | Stop reason: ALTCHOICE

## 2018-01-29 ASSESSMENT — ENCOUNTER SYMPTOMS
ABDOMINAL PAIN: 0
WHEEZING: 0
NAUSEA: 0
RHINORRHEA: 0
VOICE CHANGE: 0
SHORTNESS OF BREATH: 1
DIARRHEA: 0
CHEST TIGHTNESS: 0
SORE THROAT: 0
COUGH: 1
VOMITING: 0
EYE ITCHING: 0

## 2018-01-29 NOTE — PROGRESS NOTES
01/29/18 1138   BP: 110/60   Site: Right Arm   Position: Sitting   Cuff Size: Large Adult   Pulse: 51   Temp: 97 °F (36.1 °C)   TempSrc: Tympanic   SpO2: 91%   Weight: 273 lb (123.8 kg)   Height: 5' 11\" (1.803 m)         Physical Exam   Constitutional: He is oriented to person, place, and time. He appears well-developed and well-nourished. obese   HENT:   Head: Normocephalic and atraumatic. Nose: Nose normal.   Mouth/Throat: Oropharynx is clear and moist.   Eyes: Conjunctivae and EOM are normal. Pupils are equal, round, and reactive to light. Neck: No JVD present. No tracheal deviation present. No thyromegaly present. Cardiovascular: Normal rate and regular rhythm. Exam reveals no gallop and no friction rub. No murmur heard. Pulmonary/Chest: Effort normal. No respiratory distress. He has no wheezes. He has no rales. He exhibits no tenderness. Abdominal: He exhibits no distension. Musculoskeletal: Normal range of motion. Lymphadenopathy:     He has no cervical adenopathy. Neurological: He is alert and oriented to person, place, and time. No cranial nerve deficit. Skin: Skin is warm and dry. No rash noted. Psychiatric: He has a normal mood and affect.  His behavior is normal.       Current Outpatient Prescriptions   Medication Sig Dispense Refill    PredniSONE 5 MG (48) TBPK Take by mouth      CPAP Machine MISC New cpap supplies mask hose filters etc 1 each 0    lisinopril (PRINIVIL;ZESTRIL) 20 MG tablet Take 1 tablet by mouth daily 90 tablet 3    pravastatin (PRAVACHOL) 40 MG tablet Take 1 tablet by mouth nightly 90 tablet 3    metoprolol tartrate (LOPRESSOR) 50 MG tablet Take 1 1 /2 po bid 270 tablet 3    nitroGLYCERIN (NITROSTAT) 0.4 MG SL tablet Place 1 tablet under the tongue every 5 minutes as needed for Chest pain Max 3 tabs 25 tablet 0    doxazosin (CARDURA) 2 MG tablet Take 1 tablet by mouth 2 times daily 180 tablet 3    gabapentin (NEURONTIN) 300 MG capsule Take 1 capsule by had a surgery and radiation therapy done for throat cancer. Patient  is doing well with no recurrence    4. Obesity (BMI 30-39. 9)  Patient patient is advised try to lose weight. obesity related risk explained to the patient ,  Current weight:  273 lb (123.8 kg) Lbs. BMI:  Body mass index is 38.08 kg/m². No Follow-up on file.       Winifred Loera MD

## 2018-01-30 ENCOUNTER — TELEPHONE (OUTPATIENT)
Dept: PULMONOLOGY | Age: 68
End: 2018-01-30

## 2018-01-30 DIAGNOSIS — J44.9 CHRONIC OBSTRUCTIVE PULMONARY DISEASE, UNSPECIFIED COPD TYPE (HCC): Primary | ICD-10-CM

## 2018-02-01 ENCOUNTER — CARE COORDINATION (OUTPATIENT)
Dept: FAMILY MEDICINE CLINIC | Age: 68
End: 2018-02-01

## 2018-02-14 ENCOUNTER — CARE COORDINATION (OUTPATIENT)
Dept: FAMILY MEDICINE CLINIC | Age: 68
End: 2018-02-14

## 2018-02-14 NOTE — CARE COORDINATION
Ambulatory Care Coordination Note  2/14/2018  CM Risk Score: 2  Anne-Marie Mortality Risk Score: 3.27    ACC: Essence Tovar, CARLIE    Summary Note: I met with Lyndsey Lucas as a scheduled appointment to discuss diet and exercise. His weight today is 270.6 pounds which is down 2.4 pounds from his previous weight. He tells me that he is working on making good changes. he says that he is now going to the gym on a regular basis and he is walking two miles on the treadmill. He is also watching his diet and he is trying to eat 3 regular meals and at least one snack before bed. We did talk about his snacks as he says that he likes to eat peanut butter and crackers. He says that he is going to switch this to celery and peanut butter. I also encouraged him to eat more vegetables during the day and for a snack including carrots, grape tomatoes, and cucumbers. I also suggested dipping the vegetables in Isac dressing but no more than a quarter cup for 2 cups of vegetables. I also provided him with a 1800 calorie diabetic exchange menu. COPD Assessment    Does the patient understand envrionmental exposure?:  Yes  Is the patient able to verbalize Rescue vs. Long Acting medications?:  Yes  Does the patient have a nebulizer?:  No  Does the patient use a space with inhaled medications?:  No            Symptoms:               Care Coordination Interventions    Program Enrollment:  Complex Care  Referral from Primary Care Provider:  No  Suggested Interventions and Community Resources  Disease Association: In Process  Zone Management Tools: In Process         Goals Addressed             Most Recent     Conditions and Symptoms   Improving (2/14/2018)             I will schedule office visits, as directed by my provider. I will keep my appointment or reschedule if I have to cancel. I will notify my provider of any barriers to my plan of care.   I will notify my provider of any symptoms that indicate a worsening of my Historical Provider, MD   HUMIRA PEN STARTER 40 MG/0.8ML injection  11/13/14   Historical Provider, MD   aspirin 81 MG tablet Take 81 mg by mouth daily. Historical Provider, MD   albuterol (PROVENTIL HFA) 108 (90 BASE) MCG/ACT inhaler Inhale 2 puffs into the lungs every 6 hours as needed for Wheezing. 5/8/12   Doris Kimbrough MD   mesalamine (PENTASA) 250 MG CR capsule Take 500 mg by mouth 4 times daily.     Historical Provider, MD       Future Appointments  Date Time Provider Chase Barrera   4/12/2018 1:00 PM Cecilia Ontiveros MD [facility]   6/6/2018 2:45 PM Eduardo Barone MD [facility]

## 2018-02-19 RX ORDER — DOXAZOSIN MESYLATE 4 MG/1
TABLET ORAL
Qty: 90 TABLET | Refills: 3 | Status: SHIPPED | OUTPATIENT
Start: 2018-02-19 | End: 2019-02-11 | Stop reason: SDUPTHER

## 2018-02-19 RX ORDER — DOXAZOSIN MESYLATE 4 MG/1
4 TABLET ORAL NIGHTLY
COMMUNITY
End: 2018-02-19 | Stop reason: SDUPTHER

## 2018-02-28 ENCOUNTER — CARE COORDINATION (OUTPATIENT)
Dept: FAMILY MEDICINE CLINIC | Age: 68
End: 2018-02-28

## 2018-03-07 DIAGNOSIS — E29.1 HYPOGONADISM MALE: ICD-10-CM

## 2018-03-07 RX ORDER — TESTOSTERONE GEL, 1% 10 MG/G
GEL TRANSDERMAL
Qty: 135 PACKAGE | Refills: 0 | Status: SHIPPED | OUTPATIENT
Start: 2018-03-07 | End: 2018-06-19 | Stop reason: SDUPTHER

## 2018-03-14 ENCOUNTER — CARE COORDINATOR VISIT (OUTPATIENT)
Dept: FAMILY MEDICINE CLINIC | Age: 68
End: 2018-03-14

## 2018-03-28 ENCOUNTER — CARE COORDINATOR VISIT (OUTPATIENT)
Dept: FAMILY MEDICINE CLINIC | Age: 68
End: 2018-03-28

## 2018-03-28 NOTE — CARE COORDINATION
Ambulatory Care Coordination Note  3/28/2018  CM Risk Score: 2  Anne-Marie Mortality Risk Score: 3.27    ACC: Rodolfo Mirza, RN    Summary Note:  I met with Yulia Gr as a scheduled appointment to discuss diabetes and diet. He is down 4.6 pounds since our last visit 2 weeks ago. He tells me that he is has been watching his carbohydrates a little more closely. He tells me that he is eating 3 meals and one snack. He is going to the gym 4-5 times per week with a friend and this has helped to keep him on track with making sure that he exercises. We did talk about goals and he says that he would like to loose about 65 pounds to help him feel better. He says that he doesn't like to look at the big goal.  He wants to break up his goal into 10 pound increments. He also tells me that he is struggling with his daughter and he is stressed out about this. I explained that he really cannot help her unless he is in a good place and taking good care of his health. He does understand this concept and he is working on plans to make sure that he dedicates time to himself each and everyday. Care Coordination Interventions    Program Enrollment:  Complex Care  Referral from Primary Care Provider:  No  Suggested Interventions and Community Resources  Disease Association: In Process  Zone Management Tools: In Process         Goals Addressed             Most Recent     Conditions and Symptoms   Improving (3/28/2018)             I will schedule office visits, as directed by my provider. I will keep my appointment or reschedule if I have to cancel. I will notify my provider of any barriers to my plan of care. I will notify my provider of any symptoms that indicate a worsening of my condition.     Barriers: none  Plan for overcoming my barriers: N/A  Confidence: 9/10  Anticipated Goal Completion Date: 1/31/2017         Weight < 200                Yulia Gr will lose at least one pound per week or 4 pounds per month until he reaches

## 2018-04-11 ENCOUNTER — CARE COORDINATION (OUTPATIENT)
Dept: FAMILY MEDICINE CLINIC | Age: 68
End: 2018-04-11

## 2018-04-12 ENCOUNTER — OFFICE VISIT (OUTPATIENT)
Dept: FAMILY MEDICINE CLINIC | Age: 68
End: 2018-04-12
Payer: MEDICARE

## 2018-04-12 VITALS
SYSTOLIC BLOOD PRESSURE: 118 MMHG | HEART RATE: 59 BPM | BODY MASS INDEX: 38.11 KG/M2 | WEIGHT: 266.2 LBS | OXYGEN SATURATION: 95 % | DIASTOLIC BLOOD PRESSURE: 70 MMHG | HEIGHT: 70 IN

## 2018-04-12 DIAGNOSIS — Z12.5 PROSTATE CANCER SCREENING: ICD-10-CM

## 2018-04-12 DIAGNOSIS — I10 ESSENTIAL HYPERTENSION: ICD-10-CM

## 2018-04-12 DIAGNOSIS — E29.1 HYPOGONADISM MALE: ICD-10-CM

## 2018-04-12 DIAGNOSIS — J30.2 CHRONIC SEASONAL ALLERGIC RHINITIS, UNSPECIFIED TRIGGER: ICD-10-CM

## 2018-04-12 DIAGNOSIS — K21.9 GASTROESOPHAGEAL REFLUX DISEASE WITHOUT ESOPHAGITIS: ICD-10-CM

## 2018-04-12 DIAGNOSIS — E78.2 MIXED HYPERLIPIDEMIA: Primary | ICD-10-CM

## 2018-04-12 DIAGNOSIS — J44.9 CHRONIC OBSTRUCTIVE PULMONARY DISEASE, UNSPECIFIED COPD TYPE (HCC): ICD-10-CM

## 2018-04-12 DIAGNOSIS — L57.0 ACTINIC KERATOSES: ICD-10-CM

## 2018-04-12 PROCEDURE — 99214 OFFICE O/P EST MOD 30 MIN: CPT | Performed by: FAMILY MEDICINE

## 2018-04-12 RX ORDER — METOPROLOL TARTRATE 50 MG/1
TABLET, FILM COATED ORAL
Qty: 270 TABLET | Refills: 3 | Status: SHIPPED | OUTPATIENT
Start: 2018-04-12 | End: 2019-03-07 | Stop reason: SDUPTHER

## 2018-04-12 RX ORDER — MONTELUKAST SODIUM 10 MG/1
TABLET ORAL
Qty: 90 TABLET | Refills: 3 | Status: SHIPPED | OUTPATIENT
Start: 2018-04-12 | End: 2018-06-19 | Stop reason: SDUPTHER

## 2018-04-12 RX ORDER — TESTOSTERONE GEL, 1% 10 MG/G
GEL TRANSDERMAL
Qty: 135 PACKAGE | Refills: 0 | Status: CANCELLED | OUTPATIENT
Start: 2018-04-12 | End: 2018-07-13

## 2018-04-12 ASSESSMENT — ENCOUNTER SYMPTOMS
ABDOMINAL PAIN: 0
SHORTNESS OF BREATH: 0

## 2018-04-13 DIAGNOSIS — Z12.5 PROSTATE CANCER SCREENING: ICD-10-CM

## 2018-04-13 DIAGNOSIS — E29.1 HYPOGONADISM MALE: ICD-10-CM

## 2018-04-13 DIAGNOSIS — E78.2 MIXED HYPERLIPIDEMIA: ICD-10-CM

## 2018-04-13 DIAGNOSIS — I10 ESSENTIAL HYPERTENSION: ICD-10-CM

## 2018-04-13 LAB
ALT SERPL-CCNC: 13 U/L (ref 0–41)
ANION GAP SERPL CALCULATED.3IONS-SCNC: 13 MEQ/L (ref 7–13)
AST SERPL-CCNC: 16 U/L (ref 0–40)
BASOPHILS ABSOLUTE: 0.1 K/UL (ref 0–0.2)
BASOPHILS RELATIVE PERCENT: 1 %
BUN BLDV-MCNC: 16 MG/DL (ref 8–23)
CALCIUM SERPL-MCNC: 8.9 MG/DL (ref 8.6–10.2)
CHLORIDE BLD-SCNC: 101 MEQ/L (ref 98–107)
CHOLESTEROL, TOTAL: 113 MG/DL (ref 0–199)
CO2: 27 MEQ/L (ref 22–29)
CREAT SERPL-MCNC: 0.86 MG/DL (ref 0.7–1.2)
EOSINOPHILS ABSOLUTE: 0.4 K/UL (ref 0–0.7)
EOSINOPHILS RELATIVE PERCENT: 4.8 %
GFR AFRICAN AMERICAN: >60
GFR NON-AFRICAN AMERICAN: >60
GLUCOSE BLD-MCNC: 92 MG/DL (ref 74–109)
HCT VFR BLD CALC: 41.9 % (ref 42–52)
HDLC SERPL-MCNC: 32 MG/DL (ref 40–59)
HEMOGLOBIN: 14 G/DL (ref 14–18)
LDL CHOLESTEROL CALCULATED: 54 MG/DL (ref 0–129)
LYMPHOCYTES ABSOLUTE: 2.9 K/UL (ref 1–4.8)
LYMPHOCYTES RELATIVE PERCENT: 30.7 %
MCH RBC QN AUTO: 29.6 PG (ref 27–31.3)
MCHC RBC AUTO-ENTMCNC: 33.4 % (ref 33–37)
MCV RBC AUTO: 88.7 FL (ref 80–100)
MONOCYTES ABSOLUTE: 1.1 K/UL (ref 0.2–0.8)
MONOCYTES RELATIVE PERCENT: 11.9 %
NEUTROPHILS ABSOLUTE: 4.8 K/UL (ref 1.4–6.5)
NEUTROPHILS RELATIVE PERCENT: 51.6 %
PDW BLD-RTO: 14.8 % (ref 11.5–14.5)
PLATELET # BLD: 175 K/UL (ref 130–400)
POTASSIUM SERPL-SCNC: 4.2 MEQ/L (ref 3.5–5.1)
PROSTATE SPECIFIC ANTIGEN: 1.44 NG/ML (ref 0–5.4)
RBC # BLD: 4.73 M/UL (ref 4.7–6.1)
SODIUM BLD-SCNC: 141 MEQ/L (ref 132–144)
TRIGL SERPL-MCNC: 137 MG/DL (ref 0–200)
WBC # BLD: 9.3 K/UL (ref 4.8–10.8)

## 2018-04-16 LAB — TESTOSTERONE TOTAL-MALE: 446 NG/DL (ref 300–720)

## 2018-05-02 ENCOUNTER — CARE COORDINATION (OUTPATIENT)
Dept: FAMILY MEDICINE CLINIC | Age: 68
End: 2018-05-02

## 2018-05-10 ENCOUNTER — OFFICE VISIT (OUTPATIENT)
Dept: FAMILY MEDICINE CLINIC | Age: 68
End: 2018-05-10
Payer: MEDICARE

## 2018-05-10 VITALS
BODY MASS INDEX: 38.2 KG/M2 | HEART RATE: 61 BPM | WEIGHT: 266.8 LBS | HEIGHT: 70 IN | SYSTOLIC BLOOD PRESSURE: 116 MMHG | OXYGEN SATURATION: 94 % | TEMPERATURE: 97.3 F | DIASTOLIC BLOOD PRESSURE: 68 MMHG

## 2018-05-10 DIAGNOSIS — J20.9 ACUTE BRONCHITIS, UNSPECIFIED ORGANISM: Primary | ICD-10-CM

## 2018-05-10 PROCEDURE — 99213 OFFICE O/P EST LOW 20 MIN: CPT | Performed by: NURSE PRACTITIONER

## 2018-05-10 RX ORDER — AZITHROMYCIN 250 MG/1
TABLET, FILM COATED ORAL
Qty: 1 PACKET | Refills: 0 | Status: SHIPPED | OUTPATIENT
Start: 2018-05-10 | End: 2018-05-17 | Stop reason: ALTCHOICE

## 2018-05-10 ASSESSMENT — ENCOUNTER SYMPTOMS
SINUS PAIN: 0
RHINORRHEA: 1
COUGH: 1
SINUS PRESSURE: 0
WHEEZING: 1
SHORTNESS OF BREATH: 1

## 2018-05-15 DIAGNOSIS — J40 BRONCHITIS: ICD-10-CM

## 2018-05-16 ENCOUNTER — CARE COORDINATION (OUTPATIENT)
Dept: FAMILY MEDICINE CLINIC | Age: 68
End: 2018-05-16

## 2018-05-16 RX ORDER — BENZONATATE 100 MG/1
200 CAPSULE ORAL 3 TIMES DAILY PRN
Qty: 20 CAPSULE | Refills: 0 | Status: SHIPPED | OUTPATIENT
Start: 2018-05-16 | End: 2018-05-23

## 2018-05-17 ENCOUNTER — OFFICE VISIT (OUTPATIENT)
Dept: FAMILY MEDICINE CLINIC | Age: 68
End: 2018-05-17
Payer: MEDICARE

## 2018-05-17 VITALS
HEIGHT: 70 IN | SYSTOLIC BLOOD PRESSURE: 132 MMHG | HEART RATE: 63 BPM | OXYGEN SATURATION: 95 % | WEIGHT: 262 LBS | DIASTOLIC BLOOD PRESSURE: 80 MMHG | BODY MASS INDEX: 37.51 KG/M2 | TEMPERATURE: 97.7 F

## 2018-05-17 DIAGNOSIS — R06.02 SHORTNESS OF BREATH: ICD-10-CM

## 2018-05-17 DIAGNOSIS — R06.2 WHEEZING: ICD-10-CM

## 2018-05-17 DIAGNOSIS — J44.9 CHRONIC OBSTRUCTIVE PULMONARY DISEASE, UNSPECIFIED COPD TYPE (HCC): ICD-10-CM

## 2018-05-17 DIAGNOSIS — J20.9 ACUTE BRONCHITIS, UNSPECIFIED ORGANISM: Primary | ICD-10-CM

## 2018-05-17 PROCEDURE — 99213 OFFICE O/P EST LOW 20 MIN: CPT | Performed by: NURSE PRACTITIONER

## 2018-05-17 RX ORDER — CEFUROXIME AXETIL 500 MG/1
500 TABLET ORAL 2 TIMES DAILY
Qty: 20 TABLET | Refills: 0 | Status: SHIPPED | OUTPATIENT
Start: 2018-05-17 | End: 2018-05-27

## 2018-05-17 RX ORDER — METHYLPREDNISOLONE 4 MG/1
TABLET ORAL
Qty: 21 TABLET | Refills: 0 | Status: SHIPPED | OUTPATIENT
Start: 2018-05-17 | End: 2018-05-23

## 2018-05-17 ASSESSMENT — ENCOUNTER SYMPTOMS
SORE THROAT: 1
WHEEZING: 1
RHINORRHEA: 0
COUGH: 1
SHORTNESS OF BREATH: 1

## 2018-06-06 ENCOUNTER — OFFICE VISIT (OUTPATIENT)
Dept: PULMONOLOGY | Age: 68
End: 2018-06-06
Payer: MEDICARE

## 2018-06-06 VITALS
WEIGHT: 269 LBS | SYSTOLIC BLOOD PRESSURE: 138 MMHG | BODY MASS INDEX: 37.66 KG/M2 | DIASTOLIC BLOOD PRESSURE: 80 MMHG | TEMPERATURE: 97.3 F | OXYGEN SATURATION: 94 % | HEIGHT: 71 IN | HEART RATE: 61 BPM

## 2018-06-06 DIAGNOSIS — G47.33 OSA ON CPAP: Primary | ICD-10-CM

## 2018-06-06 DIAGNOSIS — J44.9 CHRONIC OBSTRUCTIVE PULMONARY DISEASE, UNSPECIFIED COPD TYPE (HCC): ICD-10-CM

## 2018-06-06 DIAGNOSIS — Z99.89 OSA ON CPAP: Primary | ICD-10-CM

## 2018-06-06 DIAGNOSIS — E66.9 OBESITY (BMI 30-39.9): ICD-10-CM

## 2018-06-06 DIAGNOSIS — C14.0 THROAT CANCER (HCC): ICD-10-CM

## 2018-06-06 PROCEDURE — 99214 OFFICE O/P EST MOD 30 MIN: CPT | Performed by: INTERNAL MEDICINE

## 2018-06-06 ASSESSMENT — ENCOUNTER SYMPTOMS
NAUSEA: 0
COUGH: 0
RHINORRHEA: 0
VOMITING: 0
ABDOMINAL PAIN: 0
VOICE CHANGE: 0
EYE ITCHING: 0
WHEEZING: 0
CHEST TIGHTNESS: 0
SORE THROAT: 0
DIARRHEA: 0
SHORTNESS OF BREATH: 1

## 2018-06-13 ENCOUNTER — CARE COORDINATION (OUTPATIENT)
Dept: FAMILY MEDICINE CLINIC | Age: 68
End: 2018-06-13

## 2018-06-19 ENCOUNTER — OFFICE VISIT (OUTPATIENT)
Dept: FAMILY MEDICINE CLINIC | Age: 68
End: 2018-06-19
Payer: MEDICARE

## 2018-06-19 VITALS
BODY MASS INDEX: 37.65 KG/M2 | DIASTOLIC BLOOD PRESSURE: 78 MMHG | SYSTOLIC BLOOD PRESSURE: 128 MMHG | OXYGEN SATURATION: 96 % | WEIGHT: 263 LBS | HEART RATE: 58 BPM | HEIGHT: 70 IN

## 2018-06-19 DIAGNOSIS — L23.9 ALLERGIC DERMATITIS: Primary | ICD-10-CM

## 2018-06-19 DIAGNOSIS — L50.3 DERMATOGRAPHIA: ICD-10-CM

## 2018-06-19 DIAGNOSIS — J30.2 CHRONIC SEASONAL ALLERGIC RHINITIS, UNSPECIFIED TRIGGER: ICD-10-CM

## 2018-06-19 DIAGNOSIS — E29.1 HYPOGONADISM MALE: ICD-10-CM

## 2018-06-19 PROCEDURE — 99213 OFFICE O/P EST LOW 20 MIN: CPT | Performed by: FAMILY MEDICINE

## 2018-06-19 RX ORDER — TESTOSTERONE GEL, 1% 10 MG/G
GEL TRANSDERMAL
Qty: 135 PACKAGE | Refills: 0 | Status: SHIPPED | OUTPATIENT
Start: 2018-06-19 | End: 2018-10-08 | Stop reason: SDUPTHER

## 2018-06-19 RX ORDER — TRIAMCINOLONE ACETONIDE 1 MG/G
CREAM TOPICAL
Qty: 80 G | Refills: 0 | Status: SHIPPED | OUTPATIENT
Start: 2018-06-19 | End: 2019-05-13 | Stop reason: SDUPTHER

## 2018-06-19 RX ORDER — METHYLPREDNISOLONE 4 MG/1
TABLET ORAL
Qty: 21 TABLET | Refills: 0 | Status: SHIPPED | OUTPATIENT
Start: 2018-06-19 | End: 2018-06-25

## 2018-06-19 RX ORDER — FAMOTIDINE 20 MG/1
20 TABLET, FILM COATED ORAL 2 TIMES DAILY
Qty: 60 TABLET | Refills: 0 | Status: SHIPPED | OUTPATIENT
Start: 2018-06-19 | End: 2019-03-07 | Stop reason: ALTCHOICE

## 2018-06-19 RX ORDER — MONTELUKAST SODIUM 10 MG/1
TABLET ORAL
Qty: 90 TABLET | Refills: 3 | Status: SHIPPED | OUTPATIENT
Start: 2018-06-19 | End: 2019-07-16 | Stop reason: SDUPTHER

## 2018-06-19 RX ORDER — CETIRIZINE HYDROCHLORIDE 10 MG/1
10 TABLET ORAL 2 TIMES DAILY
Qty: 60 TABLET | Refills: 0 | Status: SHIPPED | OUTPATIENT
Start: 2018-06-19 | End: 2019-10-09

## 2018-06-19 ASSESSMENT — ENCOUNTER SYMPTOMS
COUGH: 0
SORE THROAT: 0
SHORTNESS OF BREATH: 0

## 2018-07-13 ENCOUNTER — CARE COORDINATION (OUTPATIENT)
Dept: FAMILY MEDICINE CLINIC | Age: 68
End: 2018-07-13

## 2018-07-13 NOTE — CARE COORDINATION
nightly 12/12/17   Nerissa Molina MD   nitroGLYCERIN (NITROSTAT) 0.4 MG SL tablet Place 1 tablet under the tongue every 5 minutes as needed for Chest pain Max 3 tabs 12/12/17   Nerissa Molina MD   gabapentin (NEURONTIN) 300 MG capsule Take 1 capsule by mouth daily 11/20/17   Nerissa Molina MD   fluticasone Carolynn Cunning) 50 MCG/ACT nasal spray  6/20/17   Historical Provider, MD   Saccharomyces boulardii (PROBIOTIC) 250 MG CAPS Take 1 tablet by mouth daily    Historical Provider, MD   esomeprazole Magnesium (NEXIUM) 20 MG PACK Take 20 mg by mouth daily    Historical Provider, MD   HUMIRA PEN STARTER 40 MG/0.8ML injection  11/13/14   Historical Provider, MD   aspirin 81 MG tablet Take 81 mg by mouth daily. Historical Provider, MD   albuterol (PROVENTIL HFA) 108 (90 BASE) MCG/ACT inhaler Inhale 2 puffs into the lungs every 6 hours as needed for Wheezing. 5/8/12   Valentin Melendrez MD   mesalamine (PENTASA) 250 MG CR capsule Take 500 mg by mouth 4 times daily.     Historical Provider, MD       Future Appointments  Date Time Provider Chase Barrera   8/16/2018 1:15 PM Nerissa Molina MD [facility]   10/17/2018 3:45 PM Lotus Grimes MD [address] [place] [address]

## 2018-07-16 ENCOUNTER — OFFICE VISIT (OUTPATIENT)
Dept: FAMILY MEDICINE CLINIC | Age: 68
End: 2018-07-16
Payer: MEDICARE

## 2018-07-16 ENCOUNTER — HOSPITAL ENCOUNTER (OUTPATIENT)
Dept: ULTRASOUND IMAGING | Age: 68
Discharge: HOME | End: 2018-07-18
Payer: MEDICARE

## 2018-07-16 VITALS
HEIGHT: 70 IN | SYSTOLIC BLOOD PRESSURE: 104 MMHG | OXYGEN SATURATION: 93 % | WEIGHT: 262 LBS | HEART RATE: 70 BPM | DIASTOLIC BLOOD PRESSURE: 64 MMHG | TEMPERATURE: 98 F | BODY MASS INDEX: 37.51 KG/M2

## 2018-07-16 DIAGNOSIS — S85.992A OTHER SPECIFIED INJURY OF UNSPECIFIED BLOOD VESSEL AT LOWER LEG LEVEL, LEFT LEG, INITIAL ENCOUNTER: Primary | ICD-10-CM

## 2018-07-16 DIAGNOSIS — S85.992A OTHER SPECIFIED INJURY OF UNSPECIFIED BLOOD VESSEL AT LOWER LEG LEVEL, LEFT LEG, INITIAL ENCOUNTER: ICD-10-CM

## 2018-07-16 PROCEDURE — 99214 OFFICE O/P EST MOD 30 MIN: CPT | Performed by: NURSE PRACTITIONER

## 2018-07-16 PROCEDURE — 93971 EXTREMITY STUDY: CPT

## 2018-07-16 ASSESSMENT — ENCOUNTER SYMPTOMS
TROUBLE SWALLOWING: 0
EYE DISCHARGE: 0
FACIAL SWELLING: 0
EYE ITCHING: 0
HEMOPTYSIS: 0
SINUS PRESSURE: 0
RHINORRHEA: 0
SORE THROAT: 0
SINUS PAIN: 0
COUGH: 1
EYE PAIN: 0
SHORTNESS OF BREATH: 1
DIARRHEA: 0
ABDOMINAL PAIN: 0
EYE REDNESS: 0
VISUAL CHANGE: 0
SWOLLEN GLANDS: 0
STRIDOR: 0
HEARTBURN: 0
NAUSEA: 0
PHOTOPHOBIA: 0
VOICE CHANGE: 0
WHEEZING: 1
CHANGE IN BOWEL HABIT: 0
VOMITING: 0
CHEST TIGHTNESS: 0
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
## 2018-07-20 ENCOUNTER — OFFICE VISIT (OUTPATIENT)
Dept: FAMILY MEDICINE CLINIC | Age: 68
End: 2018-07-20
Payer: MEDICARE

## 2018-07-20 VITALS
SYSTOLIC BLOOD PRESSURE: 132 MMHG | WEIGHT: 268 LBS | BODY MASS INDEX: 39.69 KG/M2 | DIASTOLIC BLOOD PRESSURE: 78 MMHG | HEIGHT: 69 IN | TEMPERATURE: 97.7 F | HEART RATE: 70 BPM | OXYGEN SATURATION: 98 %

## 2018-07-20 DIAGNOSIS — J44.1 CHRONIC OBSTRUCTIVE PULMONARY DISEASE WITH ACUTE EXACERBATION (HCC): Primary | ICD-10-CM

## 2018-07-20 PROCEDURE — 99213 OFFICE O/P EST LOW 20 MIN: CPT | Performed by: NURSE PRACTITIONER

## 2018-07-20 RX ORDER — PREDNISONE 20 MG/1
20 TABLET ORAL 2 TIMES DAILY
Qty: 10 TABLET | Refills: 0 | Status: SHIPPED | OUTPATIENT
Start: 2018-07-20 | End: 2018-07-25

## 2018-07-20 ASSESSMENT — ENCOUNTER SYMPTOMS
RHINORRHEA: 0
WHEEZING: 1
COUGH: 1
SHORTNESS OF BREATH: 1

## 2018-07-20 NOTE — PROGRESS NOTES
Subjective  Chief Complaint   Patient presents with    Cough     pt has a cough and wheezing that started about 2 weeks ago that worsens at night. Cough   This is a recurrent problem. The current episode started in the past 7 days. The problem has been gradually worsening. The problem occurs hourly. The cough is productive of sputum. Associated symptoms include nasal congestion, shortness of breath and wheezing. Pertinent negatives include no chest pain, chills, fever or rhinorrhea. The symptoms are aggravated by lying down and exercise. He has tried steroid inhaler and a beta-agonist inhaler for the symptoms. The treatment provided mild relief. His past medical history is significant for bronchitis, COPD and environmental allergies.        Patient Active Problem List    Diagnosis Date Noted    JARRELL on CPAP 06/06/2018    Actinic keratoses     MACIEL (dyspnea on exertion)     Pain of right heel     Tinea unguium     Throat cancer (HCC)     Chronic back pain     Degenerative disc disease, lumbar     Osteoarthritis of lumbar spine     Mononeuritis multiplex     Seborrheic dermatitis     Hypogonadism male     Allergic rhinitis     Hyperlipidemia 01/21/2014    Crohn disease ([address] 01/21/2014    COPD (chronic obstructive pulmonary disease) ([address] 08/09/2012    GERD (gastroesophageal reflux disease)     Hypertension 10/21/2011     Past Medical History:   Diagnosis Date    Abnormal EMG 12/09/2015    Actinic keratoses     Actinic keratoses     Allergic rhinitis     Anemia 11/18/2014    Arthritis     Chronic back pain     COPD (chronic obstructive pulmonary disease) ([address] 08/09/2012    Crohns disease ([address]     Degenerative disc disease, lumbar     Dermatitis     Diabetes ([address] 03/19/2015    diet controlled    GERD (gastroesophageal reflux disease)     History of atrial fibrillation 2006    Dr. Diego Lazcano Hyperlipidemia 1/21/2014    Hypertension     Hypogonadism male     Mononeuritis multiplex  Osteoarthritis of lumbar spine     Pain of right heel     Paresthesia of foot, bilateral     Prediabetes 12/3/2014    Seborrheic dermatitis     Seborrheic keratoses, inflamed     Throat cancer (HCC)     throat, had surgery, sees Dr Hung Ty yearly    Tinea cruris     Tinea pedis     Tinea unguium      Past Surgical History:   Procedure Laterality Date    CARDIAC CATHETERIZATION      COLONOSCOPY  04/15/2015    KNEE ARTHROSCOPY      LARYNGECTOMY  2004    UPPER GASTROINTESTINAL ENDOSCOPY  03/24/13    Dr. 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 Current Outpatient Prescriptions on File Prior to Visit   Medication Sig Dispense Refill    montelukast (SINGULAIR) 10 MG tablet TAKE 1 TABLET NIGHTLY 90 tablet 3    testosterone (ANDROGEL; TESTIM) 50 MG/5GM (1%) GEL 1% gel Alternate one packet daily with 2 packets daily every other day.  135 Package 0    famotidine (PEPCID) 20 MG tablet Take 1 tablet by mouth 2 times daily 60 tablet 0    albuterol-ipratropium (COMBIVENT RESPIMAT)  MCG/ACT AERS inhaler Inhale 1 puff into the lungs every 6 hours 1 Inhaler 1    metoprolol tartrate (LOPRESSOR) 50 MG tablet Take 1.5 po bid 270 tablet 3    doxazosin (CARDURA) 4 MG tablet Take 1/2 BID 90 tablet 3    CPAP Machine MISC New cpap supplies mask hose filters etc 1 each 0    lisinopril (PRINIVIL;ZESTRIL) 20 MG tablet Take 1 tablet by mouth daily 90 tablet 3    pravastatin (PRAVACHOL) 40 MG tablet Take 1 tablet by mouth nightly 90 tablet 3    nitroGLYCERIN (NITROSTAT) 0.4 MG SL tablet Place 1 tablet under the tongue every 5 minutes as needed for Chest pain Max 3 tabs 25 tablet 0    gabapentin (NEURONTIN) 300 MG capsule Take 1 capsule by mouth daily 90 capsule 3    fluticasone (FLONASE) 50 MCG/ACT nasal spray       Saccharomyces boulardii (PROBIOTIC) 250 MG CAPS Take 1 tablet by mouth daily      esomeprazole Magnesium (NEXIUM) 20 MG PACK Take 20 mg by mouth daily      HUMIRA PEN STARTER 40 MG/0.8ML injection       aspirin 81 MG tablet Take 81 mg by mouth daily.  albuterol (PROVENTIL HFA) 108 (90 BASE) MCG/ACT inhaler Inhale 2 puffs into the lungs every 6 hours as needed for Wheezing. 1 Inhaler 2    mesalamine (PENTASA) 250 MG CR capsule Take 500 mg by mouth 4 times daily.  cetirizine (ZYRTEC ALLERGY) 10 MG tablet Take 1 tablet by mouth 2 times daily 60 tablet 0    triamcinolone (KENALOG) 0.1 % cream Apply bid to affected area(s)  Mon thru Friday only, take weekends off. Do not use on face, groin, armpits or breast tissue. 80 g 0     No current facility-administered medications on file prior to visit. Allergies   Allergen Reactions    Bevespi Aerosphere [Glycopyrrolate-Formoterol] Other (See Comments)     Dizzy and felt like heart rate sped up    Campath [Alemtuzumab]      Low grade fever    Mercaptopurine     Moxifloxacin Other (See Comments)     Pt states He gets sores in his mouth       Review of Systems   Constitutional: Negative for chills, diaphoresis, fatigue and fever. HENT: Negative for rhinorrhea. Respiratory: Positive for cough, shortness of breath and wheezing. Cardiovascular: Negative for chest pain, palpitations and leg swelling. Allergic/Immunologic: Positive for environmental allergies.

## 2018-07-26 ENCOUNTER — OFFICE VISIT (OUTPATIENT)
Dept: FAMILY MEDICINE CLINIC | Age: 68
End: 2018-07-26
Payer: MEDICARE

## 2018-07-26 VITALS
WEIGHT: 268 LBS | BODY MASS INDEX: 39.58 KG/M2 | TEMPERATURE: 97.7 F | DIASTOLIC BLOOD PRESSURE: 98 MMHG | OXYGEN SATURATION: 96 % | HEART RATE: 67 BPM | SYSTOLIC BLOOD PRESSURE: 154 MMHG

## 2018-07-26 DIAGNOSIS — Z23 NEED FOR SHINGLES VACCINE: ICD-10-CM

## 2018-07-26 DIAGNOSIS — M79.675 PAIN OF TOE OF LEFT FOOT: Primary | ICD-10-CM
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
## 2018-07-26 DIAGNOSIS — R58 ECCHYMOSIS: ICD-10-CM

## 2018-07-26 DIAGNOSIS — S80.12XD CONTUSION OF LEFT LOWER LEG, SUBSEQUENT ENCOUNTER: ICD-10-CM

## 2018-07-26 PROCEDURE — 99213 OFFICE O/P EST LOW 20 MIN: CPT | Performed by: FAMILY MEDICINE

## 2018-07-26 ASSESSMENT — ENCOUNTER SYMPTOMS
SHORTNESS OF BREATH: 0
ABDOMINAL PAIN: 0

## 2018-07-26 NOTE — PROGRESS NOTES
Laterality Date    CARDIAC CATHETERIZATION      COLONOSCOPY  04/15/2015    KNEE ARTHROSCOPY      LARYNGECTOMY  2004    UPPER GASTROINTESTINAL ENDOSCOPY  03/24/13    Dr. Herbert Leo W/NAPOLEON RODRIGUEZ  2002     Social History     Social History    Marital status:      Spouse name: N/A    Number of children: 3    Years of education: N/A     Occupational History    Not on file. 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 Family History   Problem Relation Age of Onset    Heart Disease Mother     Liver Disease Mother     Heart Disease Father     Cancer Sister         lung     Allergies   Allergen Reactions    Bevespi Aerosphere [Glycopyrrolate-Formoterol] Other (See Comments)     Dizzy and felt like heart rate sped up    Campath [Alemtuzumab]      Low grade fever    Mercaptopurine     Moxifloxacin Other (See Comments)     Pt states He gets sores in his mouth     Current Outpatient Prescriptions   Medication Sig Dispense Refill    zoster recombinant adjuvanted vaccine (SHINGRIX) 50 MCG SUSR injection Inject 0.5 mLs into the muscle once for 1 dose 0.5 mL 1    montelukast (SINGULAIR) 10 MG tablet TAKE 1 TABLET NIGHTLY 90 tablet 3    testosterone (ANDROGEL; TESTIM) 50 MG/5GM (1%) GEL 1% gel Alternate one packet daily with 2 packets daily every other day.  135 Package 0    cetirizine (ZYRTEC ALLERGY) 10 MG tablet Take 1 tablet by mouth 2 times daily 60 tablet 0    famotidine (PEPCID) 20 MG tablet Take 1 tablet by mouth 2 times daily 60 tablet 0    triamcinolone (KENALOG) 0.1 % cream Apply bid to affected area(s)  Mon thru Friday only, take

## 2018-07-26 NOTE — PATIENT INSTRUCTIONS
Patient Education        Bruises: Care Instructions  Your Care Instructions    Bruises occur when small blood vessels under the skin tear or rupture, most often from a twist, bump, or fall. Blood leaks into tissues under the skin and causes a black-and-blue spot that often turns colors, including purplish black, reddish blue, or yellowish green, as the bruise heals. Bruises hurt, but most are not serious and will go away on their own within 2 to 4 weeks. Sometimes, gravity causes them to spread down the body. A leg bruise usually will take longer to heal than a bruise on the face or arms. Follow-up care is a key part of your treatment and safety. Be sure to make and go to all appointments, and call your doctor if you are having problems. It's also a good idea to know your test results and keep a list of the medicines you take. How can you care for yourself at home? · Take pain medicines exactly as directed. ¨ If the doctor gave you a prescription medicine for pain, take it as prescribed. ¨ If you are not taking a prescription pain medicine, ask your doctor if you can take an over-the-counter medicine. · Put ice or a cold pack on the area for 10 to 20 minutes at a time. Put a thin cloth between the ice and your skin. · If you can, prop up the bruised area on pillows as much as possible for the next few days. Try to keep the bruise above the level of your heart. When should you call for help? Call your doctor now or seek immediate medical care if:    · You have signs of infection, such as:  ¨ Increased pain, swelling, warmth, or redness. ¨ Red streaks leading from the bruise. ¨ Pus draining from the bruise. ¨ A fever.     · You have a bruise on your leg and signs of a blood clot, such as:  ¨ Increasing redness and swelling along with warmth, tenderness, and pain in the bruised area. ¨ Pain in your calf, back of the knee, thigh, or groin.   ¨ Redness and swelling in your leg or groin.     · Your pain gets you are having problems. It's also a good idea to know your test results and keep a list of the medicines you take. How can you care for yourself at home? · Put ice or a cold pack on the sore area for 10 to 20 minutes at a time to stop swelling. Put a thin cloth between the ice pack and your skin. · Be safe with medicines. Read and follow all instructions on the label. ¨ If the doctor gave you a prescription medicine for pain, take it as prescribed. ¨ If you are not taking a prescription pain medicine, ask your doctor if you can take an over-the-counter medicine. · If you can, prop up the sore area on pillows as much as possible for the next few days. Try to keep the sore area above the level of your heart. When should you call for help? Call your doctor now or seek immediate medical care if:    · Your pain gets worse.     · You have new or worse swelling.     · You have tingling, weakness, or numbness in the area near the contusion.     · The area near the contusion is cold or pale.    Watch closely for changes in your health, and be sure to contact your doctor if:    · You do not get better as expected. Where can you learn more? Go to [email or web address]. org and sign in to your [employer] account. Enter U564 in the CareCloud box to learn more about \"Contusion: Care Instructions. \"     If you do not have an account, please click on the \"Sign Up Now\" link. Current as of: November 20, 2017  Content Version: 11.6  © 5792-3192 [employer]. Care instructions adapted under license by [facility] ([facility]). If you have questions about a medical condition or this instruction, always ask your healthcare professional. [address] any warranty or liability for your use of this information.

## 2018-08-16 ENCOUNTER — OFFICE VISIT (OUTPATIENT)
Dept: FAMILY MEDICINE CLINIC | Age: 68
End: 2018-08-16
Payer: MEDICARE

## 2018-08-16 VITALS
OXYGEN SATURATION: 98 % | HEIGHT: 70 IN | SYSTOLIC BLOOD PRESSURE: 116 MMHG | HEART RATE: 62 BPM | DIASTOLIC BLOOD PRESSURE: 62 MMHG | WEIGHT: 262 LBS | BODY MASS INDEX: 37.51 KG/M2

## 2018-08-16 DIAGNOSIS — G89.29 CHRONIC MIDLINE BACK PAIN, UNSPECIFIED BACK LOCATION: ICD-10-CM

## 2018-08-16 DIAGNOSIS — I10 ESSENTIAL HYPERTENSION: Primary | ICD-10-CM

## 2018-08-16 DIAGNOSIS — K21.9 GASTROESOPHAGEAL REFLUX DISEASE WITHOUT ESOPHAGITIS: ICD-10-CM

## 2018-08-16 DIAGNOSIS — E78.2 MIXED HYPERLIPIDEMIA: ICD-10-CM

## 2018-08-16 DIAGNOSIS — E29.1 HYPOGONADISM MALE: ICD-10-CM

## 2018-08-16 DIAGNOSIS — M54.9 CHRONIC MIDLINE BACK PAIN, UNSPECIFIED BACK LOCATION: ICD-10-CM

## 2018-08-16 PROCEDURE — 99214 OFFICE O/P EST MOD 30 MIN: CPT | Performed by: FAMILY MEDICINE

## 2018-08-16 RX ORDER — GABAPENTIN 300 MG/1
300 CAPSULE ORAL DAILY
Qty: 90 CAPSULE | Refills: 0 | Status: SHIPPED | OUTPATIENT
Start: 2018-08-16 | End: 2018-11-30 | Stop reason: SDUPTHER

## 2018-08-16 RX ORDER — TESTOSTERONE GEL, 1% 10 MG/G
GEL TRANSDERMAL
Qty: 135 PACKAGE | Refills: 0 | Status: CANCELLED | OUTPATIENT
Start: 2018-08-16 | End: 2018-11-16

## 2018-08-16 ASSESSMENT — ENCOUNTER SYMPTOMS
SHORTNESS OF BREATH: 0
ABDOMINAL PAIN: 0
BLURRED VISION: 0

## 2018-08-17 ENCOUNTER — CARE COORDINATION (OUTPATIENT)
Dept: FAMILY MEDICINE CLINIC | Age: 68
End: 2018-08-17

## 2018-08-17 NOTE — CARE COORDINATION
Ambulatory Care Coordination Note  8/17/2018  CM Risk Score: 2  Anne-Marie Mortality Risk Score: 4.73    ACC: Tejal Castillo RN    Summary Note: I met with Aracelis Robert as a scheduled appointment to discuss diet and weight. His weight is 260.6 pounds today which is about the same as his previous visit with me. He states that he has not been good about his diet or exercise. He says that he just cannot get into going to the gym. I again have asked him to walk on the track at the school. We spoke about taking the leap of sebastián, creating the action and then following through. I have asked him to schedule the time to at least walk at least every other day. He also says that he is trying to be better about his food choices but he admits that he usually is vigilant within a week or two of coming to see me. I have offered to increase the weigh in times to see if this would help. He says that he is going be better \"starting today. \"  I again spoke with him about water and increasing his water intake to promote weight loss. I have asked him to drink half of his weight in ounces of water. He says that he has a 20 ounce glass that he keeps with him all the time. He is agreeable to drinking at least six glasses everyday. COPD Assessment    Does the patient understand envrionmental exposure?:  Yes  Is the patient able to verbalize Rescue vs. Long Acting medications?:  Yes  Does the patient have a nebulizer?:  No  Does the patient use a space with inhaled medications?:  No            Symptoms:               Care Coordination Interventions    Program Enrollment:  Complex Care  Referral from Primary Care Provider:  No  Suggested Interventions and Community Resources  Disease Association: In Process  Zone Management Tools: In Process         Goals Addressed             Most Recent     Conditions and Symptoms   On track (8/17/2018)             I will schedule office visits, as directed by my provider.   I will keep my

## 2018-08-21 ENCOUNTER — OFFICE VISIT (OUTPATIENT)
Dept: FAMILY MEDICINE CLINIC | Age: 68
End: 2018-08-21
Payer: MEDICARE

## 2018-08-21 VITALS
HEART RATE: 65 BPM | SYSTOLIC BLOOD PRESSURE: 124 MMHG | WEIGHT: 262.6 LBS | OXYGEN SATURATION: 96 % | HEIGHT: 69 IN | DIASTOLIC BLOOD PRESSURE: 78 MMHG | TEMPERATURE: 96.4 F | BODY MASS INDEX: 38.89 KG/M2

## 2018-08-21 DIAGNOSIS — L24.9 IRRITANT CONTACT DERMATITIS, UNSPECIFIED TRIGGER: Primary | ICD-10-CM

## 2018-08-21 PROCEDURE — 99213 OFFICE O/P EST LOW 20 MIN: CPT | Performed by: NURSE PRACTITIONER

## 2018-08-21 RX ORDER — DESOXIMETASONE 2.5 MG/G
CREAM TOPICAL
Qty: 30 G | Refills: 0 | Status: SHIPPED | OUTPATIENT
Start: 2018-08-21 | End: 2019-01-03 | Stop reason: ALTCHOICE

## 2018-08-21 ASSESSMENT — ENCOUNTER SYMPTOMS: COLOR CHANGE: 1

## 2018-08-21 NOTE — PROGRESS NOTES
 COLONOSCOPY  04/15/2015    KNEE ARTHROSCOPY      LARYNGECTOMY  2004    UPPER GASTROINTESTINAL ENDOSCOPY  03/24/13    Dr. 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 Current Outpatient Prescriptions on File Prior to Visit   Medication Sig Dispense Refill    gabapentin (NEURONTIN) 300 MG capsule Take 1 capsule by mouth daily for 90 days. . 90 capsule 0    montelukast (SINGULAIR) 10 MG tablet TAKE 1 TABLET NIGHTLY 90 tablet 3    testosterone (ANDROGEL; TESTIM) 50 MG/5GM (1%) GEL 1% gel Alternate one packet daily with 2 packets daily every other day. 135 Package 0    cetirizine (ZYRTEC ALLERGY) 10 MG tablet Take 1 tablet by mouth 2 times daily 60 tablet 0    famotidine (PEPCID) 20 MG tablet Take 1 tablet by mouth 2 times daily 60 tablet 0    triamcinolone (KENALOG) 0.1 % cream Apply bid to affected area(s)  Mon thru Friday only, take weekends off. Do not use on face, groin, armpits or breast tissue.  80 g 0    albuterol-ipratropium (COMBIVENT RESPIMAT)  MCG/ACT AERS inhaler Inhale 1 puff into the lungs every 6 hours 1 Inhaler 1    metoprolol tartrate (LOPRESSOR) 50 MG tablet Take 1.5 po bid 270 tablet 3    doxazosin (CARDURA) 4 MG tablet Take 1/2 BID 90 tablet 3    CPAP Machine MISC New cpap supplies mask hose filters etc 1 each 0    lisinopril (PRINIVIL;ZESTRIL) 20 MG tablet Take 1 tablet by mouth daily 90 tablet 3    pravastatin (PRAVACHOL) 40 MG tablet Take 1 tablet by mouth nightly 90 tablet 3    nitroGLYCERIN (NITROSTAT) 0.4 MG SL tablet Place 1 tablet under the tongue every 5 minutes as needed for Chest pain Max 3 tabs 25 tablet 0    fluticasone (FLONASE) 50 MCG/ACT nasal spray       Saccharomyces boulardii (PROBIOTIC) 250 MG CAPS Take 1 tablet by mouth daily      esomeprazole Magnesium (NEXIUM) 20 MG PACK Take 20 mg by mouth daily      HUMIRA PEN STARTER 40 MG/0.8ML injection       aspirin 81 MG tablet Take 81 mg by mouth daily.  albuterol (PROVENTIL HFA) 108 (90 BASE) MCG/ACT inhaler Inhale 2 puffs into the lungs every 6 hours as needed for Wheezing. 1 Inhaler 2    mesalamine (PENTASA) 250 MG CR capsule Take 500 mg by mouth 4 times daily. No current facility-administered medications on file prior to visit. Allergies   Allergen Reactions    Bevespi Aerosphere [Glycopyrrolate-Formoterol] Other (See Comments)     Dizzy and felt like heart rate sped up    Campath [Alemtuzumab]      Low grade fever    Mercaptopurine     Moxifloxacin Other (See Comments)     Pt states He gets sores in his mouth       Review of Systems   Skin: Positive for color change. Objective  Vitals:    08/21/18 0854   BP: 124/78   Pulse: 65   Temp: 96.4 °F (35.8 °C)   SpO2: 96%   Weight: 262 lb 9.6 oz (119.1 kg)   Height: 5' 9\" (1.753 m)     Physical Exam   Constitutional: He is oriented to person, place, and time. He appears well-developed and well-nourished. Eyes: Pupils are equal, round, and reactive to light. Conjunctivae are normal.   Neurological: He is alert and oriented to person, place, and time. Skin: Skin is warm. Psychiatric: He has a normal mood and affect. His behavior is normal. Judgment and thought content normal.       Assessment & Plan     Diagnosis Orders   1.  Irritant contact dermatitis,

## 2018-08-30 ENCOUNTER — OFFICE VISIT (OUTPATIENT)
Dept: FAMILY MEDICINE CLINIC | Age: 68
End: 2018-08-30
Payer: MEDICARE

## 2018-08-30 VITALS
WEIGHT: 260 LBS | OXYGEN SATURATION: 96 % | HEIGHT: 70 IN | HEART RATE: 58 BPM | BODY MASS INDEX: 37.22 KG/M2 | DIASTOLIC BLOOD PRESSURE: 70 MMHG | SYSTOLIC BLOOD PRESSURE: 132 MMHG

## 2018-08-30 DIAGNOSIS — L30.9 DERMATITIS: Primary | ICD-10-CM

## 2018-08-30 DIAGNOSIS — L29.9 PRURITUS OF SKIN: ICD-10-CM

## 2018-08-30 DIAGNOSIS — L50.3 DERMATOGRAPHIA: ICD-10-CM

## 2018-08-30 PROCEDURE — 99213 OFFICE O/P EST LOW 20 MIN: CPT | Performed by: FAMILY MEDICINE

## 2018-08-30 RX ORDER — METHYLPREDNISOLONE 4 MG/1
TABLET ORAL
Qty: 21 TABLET | Refills: 0 | Status: SHIPPED | OUTPATIENT
Start: 2018-08-30 | End: 2018-09-24 | Stop reason: SDUPTHER

## 2018-08-30 ASSESSMENT — ENCOUNTER SYMPTOMS
SORE THROAT: 0
SHORTNESS OF BREATH: 0
COUGH: 0
DIARRHEA: 0
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
## 2018-09-14 ENCOUNTER — OFFICE VISIT (OUTPATIENT)
Dept: FAMILY MEDICINE CLINIC | Age: 68
End: 2018-09-14
Payer: MEDICARE

## 2018-09-14 VITALS
SYSTOLIC BLOOD PRESSURE: 130 MMHG | WEIGHT: 243.6 LBS | BODY MASS INDEX: 34.88 KG/M2 | OXYGEN SATURATION: 94 % | RESPIRATION RATE: 16 BRPM | HEART RATE: 68 BPM | TEMPERATURE: 97.2 F | HEIGHT: 70 IN | DIASTOLIC BLOOD PRESSURE: 88 MMHG

## 2018-09-14 DIAGNOSIS — J06.9 ACUTE URI: Primary | ICD-10-CM

## 2018-09-14 DIAGNOSIS — R05.9 COUGH: ICD-10-CM

## 2018-09-14 PROCEDURE — 99213 OFFICE O/P EST LOW 20 MIN: CPT | Performed by: NURSE PRACTITIONER

## 2018-09-14 RX ORDER — AZITHROMYCIN 250 MG/1
TABLET, FILM COATED ORAL
Qty: 1 PACKET | Refills: 0 | Status: SHIPPED | OUTPATIENT
Start: 2018-09-14 | End: 2018-09-21 | Stop reason: ALTCHOICE

## 2018-09-14 RX ORDER — BENZONATATE 100 MG/1
100 CAPSULE ORAL 3 TIMES DAILY PRN
Qty: 30 CAPSULE | Refills: 0 | Status: SHIPPED | OUTPATIENT
Start: 2018-09-14 | End: 2019-01-03 | Stop reason: ALTCHOICE

## 2018-09-14 ASSESSMENT — ENCOUNTER SYMPTOMS
COUGH: 1
RHINORRHEA: 1
SORE THROAT: 1
WHEEZING: 1
SHORTNESS OF BREATH: 1
SINUS PRESSURE: 1

## 2018-09-14 ASSESSMENT — PATIENT HEALTH QUESTIONNAIRE - PHQ9
SUM OF ALL RESPONSES TO PHQ QUESTIONS 1-9: 0
SUM OF ALL RESPONSES TO PHQ9 QUESTIONS 1 & 2: 0
2. FEELING DOWN, DEPRESSED OR HOPELESS: 0
SUM OF ALL RESPONSES TO PHQ QUESTIONS 1-9: 0
1. LITTLE INTEREST OR PLEASURE IN DOING THINGS: 0

## 2018-09-17 ENCOUNTER — CARE COORDINATION (OUTPATIENT)
Dept: FAMILY MEDICINE CLINIC | Age: 68
End: 2018-09-17

## 2018-09-17 NOTE — CARE COORDINATION
Ambulatory Care Coordination Note  9/17/2018  CM Risk Score: 2  Anne-Marie Mortality Risk Score: 4.73    ACC: Dilcia Eubanks, RN    Summary Note: I called to check in on Houston Jj and to discuss diet and exercise. He says that he is doing very well and his weight is down 3-4 pounds. He says that he has been watching what he is eating and he is trying to make healthier decisions about his meals and snacks in general.  He states that the biggest change that he has made is adjusting his portions down to a more reasonable amount. He is not measuring foods as we have discussed in the past however, he tells me that he is not overflowing and piling foods onto his plate as he had in the past.  He states that he has not gotten back to gym. He admits that he is making excuses not to go but he also feels that he has increased his activity and he is more active with his grandchildren. We spoke briefly about making a stop eating time at night and he states that he had done this in the past and he is going to try to set 8:30 pm as his stop time. I feel that [place] Jj is ready for discharge from care coordination but I will check in with him one more month to ensure that his changes are becoming habits. COPD Assessment    Does the patient understand envrionmental exposure?:  Yes  Is the patient able to verbalize Rescue vs. Long Acting medications?:  Yes  Does the patient have a nebulizer?:  No  Does the patient use a space with inhaled medications?:  No     No patient-reported symptoms         Symptoms:               Care Coordination Interventions    Program Enrollment:  Complex Care  Referral from Primary Care Provider:  No  Suggested Interventions and Community Resources  Disease Association: In Process  Zone Management Tools: In Process         Goals Addressed             Most Recent     Conditions and Symptoms   On track (9/17/2018)             I will schedule office visits, as directed by my provider.   I will keep my appointment or reschedule if I have to cancel. I will notify my provider of any barriers to my plan of care. I will notify my provider of any symptoms that indicate a worsening of my condition. Barriers: none  Plan for overcoming my barriers: N/A  Confidence: 9/10  Anticipated Goal Completion Date: 10/31/2018              Prior to Admission medications    Medication Sig Start Date End Date Taking? Authorizing Provider   azithromycin (ZITHROMAX) 250 MG tablet Take 2 tabs (500 mg) on Day 1, and take 1 tab (250 mg) on days 2 through 5. 9/14/18   Maricruz Taylor APRN - CNP   benzonatate (TESSALON) 100 MG capsule Take 1 capsule by mouth 3 times daily as needed for Cough 9/14/18   STARLA Laguerre - CNP   desoximetasone (TOPICORT) 0.25 % cream Apply topically 2 times daily. 8/21/18   SATRLA Marshall - CNP   gabapentin (NEURONTIN) 300 MG capsule Take 1 capsule by mouth daily for 90 days. . 8/16/18 11/14/18  Lottie Ag MD   montelukast (SINGULAIR) 10 MG tablet TAKE 1 TABLET NIGHTLY 6/19/18   Lottie Ag MD   testosterone (ANDROGEL; TESTIM) 50 MG/5GM (1%) GEL 1% gel Alternate one packet daily with 2 packets daily every other day. 6/19/18 9/19/18  Lottie Ag MD   cetirizine (ZYRTEC ALLERGY) 10 MG tablet Take 1 tablet by mouth 2 times daily 6/19/18   Lottie Ag MD   famotidine (PEPCID) 20 MG tablet Take 1 tablet by mouth 2 times daily 6/19/18   Lottie Ag MD   triamcinolone (KENALOG) 0.1 % cream Apply bid to affected area(s)  Mon thru Friday only, take weekends off. Do not use on face, groin, armpits or breast tissue.  6/19/18   Lottie Ag MD   albuterol-ipratropium (COMBIVENT RESPIMAT)  MCG/ACT AERS inhaler Inhale 1 puff into the lungs every 6 hours 6/6/18   Emiliano Espinosa MD   metoprolol tartrate (LOPRESSOR) 50 MG tablet Take 1.5 po bid 4/12/18   Lottie Ag MD   doxazosin (CARDURA) 4 MG tablet Take 1/2 BID 2/19/18   Lottie Ag MD   CPAP Machine MISC New cpap supplies

## 2018-09-21 ENCOUNTER — OFFICE VISIT (OUTPATIENT)
Dept: FAMILY MEDICINE CLINIC | Age: 68
End: 2018-09-21
Payer: MEDICARE

## 2018-09-21 VITALS
DIASTOLIC BLOOD PRESSURE: 76 MMHG | WEIGHT: 248 LBS | OXYGEN SATURATION: 95 % | BODY MASS INDEX: 35.5 KG/M2 | HEIGHT: 70 IN | HEART RATE: 69 BPM | SYSTOLIC BLOOD PRESSURE: 128 MMHG | TEMPERATURE: 97.3 F

## 2018-09-21 DIAGNOSIS — J44.1 CHRONIC OBSTRUCTIVE PULMONARY DISEASE WITH ACUTE EXACERBATION (HCC): Primary | ICD-10-CM

## 2018-09-21 PROCEDURE — 99213 OFFICE O/P EST LOW 20 MIN: CPT | Performed by: NURSE PRACTITIONER

## 2018-09-21 RX ORDER — CEFUROXIME AXETIL 500 MG/1
500 TABLET ORAL 2 TIMES DAILY
Qty: 20 TABLET | Refills: 0 | Status: SHIPPED | OUTPATIENT
Start: 2018-09-21 | End: 2018-10-01

## 2018-09-21 ASSESSMENT — ENCOUNTER SYMPTOMS
COUGH: 1
SHORTNESS OF BREATH: 1
SINUS PAIN: 0
SINUS PRESSURE: 0
RHINORRHEA: 1
SORE THROAT: 0
WHEEZING: 1

## 2018-09-21 NOTE — PROGRESS NOTES
(LOPRESSOR) 50 MG tablet Take 1.5 po bid 270 tablet 3    doxazosin (CARDURA) 4 MG tablet Take 1/2 BID 90 tablet 3    CPAP Machine MISC New cpap supplies mask hose filters etc 1 each 0    lisinopril (PRINIVIL;ZESTRIL) 20 MG tablet Take 1 tablet by mouth daily 90 tablet 3    pravastatin (PRAVACHOL) 40 MG tablet Take 1 tablet by mouth nightly 90 tablet 3    nitroGLYCERIN (NITROSTAT) 0.4 MG SL tablet Place 1 tablet under the tongue every 5 minutes as needed for Chest pain Max 3 tabs 25 tablet 0    fluticasone (FLONASE) 50 MCG/ACT nasal spray       Saccharomyces boulardii (PROBIOTIC) 250 MG CAPS Take 1 tablet by mouth daily      esomeprazole Magnesium (NEXIUM) 20 MG PACK Take 20 mg by mouth daily      HUMIRA PEN STARTER 40 MG/0.8ML injection       aspirin 81 MG tablet Take 81 mg by mouth daily.  albuterol (PROVENTIL HFA) 108 (90 BASE) MCG/ACT inhaler Inhale 2 puffs into the lungs every 6 hours as needed for Wheezing. 1 Inhaler 2    mesalamine (PENTASA) 250 MG CR capsule Take 500 mg by mouth 4 times daily.  testosterone (ANDROGEL; TESTIM) 50 MG/5GM (1%) GEL 1% gel Alternate one packet daily with 2 packets daily every other day. 135 Package 0    triamcinolone (KENALOG) 0.1 % cream Apply bid to affected area(s)  Mon thru Friday only, take weekends off. Do not use on face, groin, armpits or breast tissue. 80 g 0     No current facility-administered medications on file prior to visit. Allergies   Allergen Reactions    Bevespi Aerosphere [Glycopyrrolate-Formoterol] Other (See Comments)     Dizzy and felt like heart rate sped up    Campath [Alemtuzumab]      Low grade fever    Mercaptopurine     Moxifloxacin Other (See Comments)     Pt states He gets sores in his mouth       Review of Systems   Constitutional: Negative for chills, diaphoresis, fatigue and fever. HENT: Positive for congestion, postnasal drip and rhinorrhea. Negative for sinus pain, sinus pressure and sore throat.     Respiratory: (CEFTIN) 500 MG tablet     Pt advised to rest and drink plenty of fluids. Finish all antibiotics even if feeling better. OTC analgesics for symptom management. Salt water gargle for sore throat. Continue mucous relief OTC medication. Combivent QID. RTO if symptoms worsen or if no improvement in 72 hours. No orders of the defined types were placed in this encounter. Orders Placed This Encounter   Medications    cefUROXime (CEFTIN) 500 MG tablet     Sig: Take 1 tablet by mouth 2 times daily for 10 days     Dispense:  20 tablet     Refill:  0       Return if symptoms worsen or fail to improve.     Souleymane Alfaro, APRN - CNP

## 2018-09-24 ENCOUNTER — TELEPHONE (OUTPATIENT)
Dept: FAMILY MEDICINE CLINIC | Age: 68
End: 2018-09-24

## 2018-09-24 DIAGNOSIS — J44.1 COPD WITH ACUTE EXACERBATION (HCC): Primary | ICD-10-CM

## 2018-09-24 RX ORDER — METHYLPREDNISOLONE 4 MG/1
TABLET ORAL
Qty: 21 TABLET | Refills: 0 | Status: SHIPPED | OUTPATIENT
Start: 2018-09-24 | End: 2018-09-30

## 2018-09-24 NOTE — TELEPHONE ENCOUNTER
No appointments available here today. Sent in medrol dosepack, but suggest walk in clinic so he can be evaluated.

## 2018-09-24 NOTE — TELEPHONE ENCOUNTER
Pt is still taking the antibiotic, pt is wheezing, sob, ok at times then pt has a hard time breathing. Please advise. Pt is in the waiting room.

## 2018-10-08 ENCOUNTER — OFFICE VISIT (OUTPATIENT)
Dept: FAMILY MEDICINE CLINIC | Age: 68
End: 2018-10-08
Payer: MEDICARE

## 2018-10-08 VITALS
OXYGEN SATURATION: 96 % | WEIGHT: 254.2 LBS | DIASTOLIC BLOOD PRESSURE: 56 MMHG | BODY MASS INDEX: 36.39 KG/M2 | SYSTOLIC BLOOD PRESSURE: 108 MMHG | HEIGHT: 70 IN | HEART RATE: 58 BPM

## 2018-10-08 DIAGNOSIS — E78.2 MIXED HYPERLIPIDEMIA: Primary | ICD-10-CM

## 2018-10-08 DIAGNOSIS — R73.9 HYPERGLYCEMIA: ICD-10-CM

## 2018-10-08 DIAGNOSIS — E29.1 HYPOGONADISM MALE: ICD-10-CM

## 2018-10-08 PROCEDURE — 99214 OFFICE O/P EST MOD 30 MIN: CPT | Performed by: FAMILY MEDICINE

## 2018-10-08 RX ORDER — TESTOSTERONE GEL, 1% 10 MG/G
GEL TRANSDERMAL
Qty: 135 PACKAGE | Refills: 0 | Status: SHIPPED | OUTPATIENT
Start: 2018-10-08 | End: 2019-03-07 | Stop reason: SDUPTHER

## 2018-10-08 RX ORDER — TESTOSTERONE GEL, 1% 10 MG/G
GEL TRANSDERMAL
Qty: 135 PACKAGE | Refills: 0 | Status: SHIPPED | OUTPATIENT
Start: 2018-10-08 | End: 2018-10-08 | Stop reason: SDUPTHER

## 2018-10-08 ASSESSMENT — ENCOUNTER SYMPTOMS
SHORTNESS OF BREATH: 0
ABDOMINAL PAIN: 0

## 2018-10-08 NOTE — PROGRESS NOTES
membrane, external ear and ear canal normal.   Nose: Nose normal.   Mouth/Throat: Uvula is midline, oropharynx is clear and moist and mucous membranes are normal.   Neck: Normal range of motion. Neck supple. Cardiovascular: Normal rate, regular rhythm and normal heart sounds. No murmur heard. Pulmonary/Chest: Effort normal and breath sounds normal. He has no wheezes. Lymphadenopathy:     He has no cervical adenopathy. Skin: Skin is warm and dry. No rash noted. Lab Results   Component Value Date    CHOL 113 04/13/2018    CHOL 121 10/21/2016    CHOL 117 12/05/2015     Lab Results   Component Value Date    TRIG 137 04/13/2018    TRIG 96 10/21/2016    TRIG 178 12/05/2015     Lab Results   Component Value Date    HDL 32 (L) 04/13/2018    HDL 35 (L) 10/21/2016    HDL 32 (L) 12/05/2015     Lab Results   Component Value Date    LDLCALC 54 04/13/2018    LDLCALC 67 10/21/2016    LDLCALC 49 12/05/2015     No results found for: LABVLDL, VLDL  Lab Results   Component Value Date    CHOLHDLRATIO 4.0 03/19/2013    CHOLHDLRATIO 3.3 12/20/2012       Assessment & Plan    Diagnosis Orders   1. Mixed hyperlipidemia     2. Hypogonadism male  testosterone (ANDROGEL; TESTIM) 50 MG/5GM (1%) GEL 1% gel    DISCONTINUED: testosterone (ANDROGEL; TESTIM) 50 MG/5GM (1%) GEL 1% gel   3. Hyperglycemia  Hemoglobin A1C     Can stop zyrtec and pepcid. Plan as noted above. Lipids controlled so continue same. Orders Placed This Encounter   Procedures    Hemoglobin A1C     Standing Status:   Future     Standing Expiration Date:   10/8/2019     Orders Placed This Encounter   Medications    DISCONTD: testosterone (ANDROGEL; TESTIM) 50 MG/5GM (1%) GEL 1% gel     Sig: Alternate one packet daily with 2 packets daily every other day. Dispense:  135 Package     Refill:  0    testosterone (ANDROGEL; TESTIM) 50 MG/5GM (1%) GEL 1% gel     Sig: Alternate one packet daily with 2 packets daily every other day.      Dispense:  [address]

## 2018-10-17 ENCOUNTER — OFFICE VISIT (OUTPATIENT)
Dept: PULMONOLOGY | Age: 68
End: 2018-10-17
Payer: MEDICARE

## 2018-10-17 VITALS
HEIGHT: 70 IN | RESPIRATION RATE: 16 BRPM | BODY MASS INDEX: 37.22 KG/M2 | HEART RATE: 69 BPM | OXYGEN SATURATION: 94 % | WEIGHT: 260 LBS | SYSTOLIC BLOOD PRESSURE: 138 MMHG | TEMPERATURE: 97.9 F | DIASTOLIC BLOOD PRESSURE: 68 MMHG

## 2018-10-17 DIAGNOSIS — J44.1 CHRONIC OBSTRUCTIVE PULMONARY DISEASE WITH ACUTE EXACERBATION (HCC): Primary | ICD-10-CM

## 2018-10-17 DIAGNOSIS — E66.9 OBESITY (BMI 30-39.9): ICD-10-CM

## 2018-10-17 DIAGNOSIS — G47.33 OSA ON CPAP: ICD-10-CM

## 2018-10-17 DIAGNOSIS — Z99.89 OSA ON CPAP: ICD-10-CM

## 2018-10-17 PROCEDURE — 99214 OFFICE O/P EST MOD 30 MIN: CPT | Performed by: INTERNAL MEDICINE

## 2018-10-17 RX ORDER — PREDNISONE 10 MG/1
TABLET ORAL
Qty: 40 TABLET | Refills: 0 | Status: SHIPPED | OUTPATIENT
Start: 2018-10-17 | End: 2018-11-17

## 2018-10-17 ASSESSMENT — ENCOUNTER SYMPTOMS
NAUSEA: 0
VOMITING: 0
COUGH: 1
EYE ITCHING: 0
SORE THROAT: 0
DIARRHEA: 0
SHORTNESS OF BREATH: 1
WHEEZING: 1
ABDOMINAL PAIN: 0
CHEST TIGHTNESS: 0
RHINORRHEA: 0
VOICE CHANGE: 0

## 2018-10-17 NOTE — PROGRESS NOTES
Subjective:     Nimisha Finley is a 76 y.o. male who complains today of:     Chief Complaint   Patient presents with    Follow-up     four month f/u for JARRELL on CPAP and COPD. HPI  He is on combivent respimat  Puff TID and albuterol HFA 2 puff QID prn , since he started using combivent he think it is helping a lot  C/o shortness of breath  with exertion, more than usual .   C/o Wheezing with any exertion   Cough with  Clear Sputum  No Hemoptysis  No Chest tightness   No Chest pain with radiation  or pleuritic pain  No Fever or chills. No Rhinorrhea and postnasal drip. Patient is using CPAP with14 centimeters of H2O with heated humidity. Patient is using CPAP for about  6-7  hours every night. Patient is using CPAP with  Nasal pillow. Patient said  sleep is restful with the CPAP use. Patient is compliant with CPAP therapy and benefiting with CPAP use. No snoring with CPAP use. No early morning headache. Patient has no c/o vivid dreams or night castillo. No complaint of daytime sleepiness or tiredness with CPAP use. Patient denies taking naps. No sleepiness with driving,   Patient denies difficulty falling asleep or staying asleep. Allergies:  Bevespi aerosphere [glycopyrrolate-formoterol]; Campath [alemtuzumab];  Mercaptopurine; and Moxifloxacin  Past Medical History:   Diagnosis Date    Abnormal EMG 12/09/2015    Actinic keratoses     Actinic keratoses     Allergic rhinitis     Anemia 11/18/2014    Arthritis     Chronic back pain     COPD (chronic obstructive pulmonary disease) ([facility] [address] 08/09/2012    Crohns disease ([facility] [address]     Degenerative disc disease, lumbar     Dermatitis     Diabetes ([facility] [address] 03/19/2015    diet controlled    GERD (gastroesophageal reflux disease)     History of atrial fibrillation 2006    Dr. Jose Anderson Hyperlipidemia 1/21/2014    Hypertension     Hypogonadism male     Lung disease     Mononeuritis multiplex     Osteoarthritis of lumbar spine     Pain of right atherosclerotic calcifications in the aortic arch. Lungs are clear without consolidation. No pleural effusion or pneumothorax. There are moderate degenerative changes in spine. Impression NO ACUTE CARDIOPULMONARY ABNORMALITY. NO CHANGE.       ]  Assessment/Plan:     1. Chronic obstructive pulmonary disease with acute exacerbation ([address]  He is on combivent respimat  Puff TID and albuterol HFA 2 puff QID prn , C/o shortness of breath  with exertion, more than usual .  C/o Wheezing with any exertion. He will be started on taper dose of prednisone     2. JARRELL on CPAP  Patient is using CPAP with14 centimeters of H2O with heated humidity. Patient is using CPAP for about  6-7  hours every night. Patient is using CPAP with  Nasal pillow. Patient said  sleep is restful with the CPAP use. Patient is compliant with CPAP therapy and benefiting with CPAP use. Counseling: CPAP/BiPAP uses, patient advised to use CPAP at least 5-6 hours every night. Driving: patient is advised for extreme caution when driving or operating machinery if there is a feeling of drowsiness, especially while driving it is preferable to stop driving and take a brief nap. Sleep hygiene:Avoid supine sleep, sleep on  sides. Avoid  sleep deprivation. Explained sleep hygiene. Advice to avoid Alcohol and sedative      3. Obesity (BMI 30-39. 9)  Patient patient is advised try to lose weight. obesity related risk explained to the patient ,  Current weight:  260 lb (117.9 kg) Lbs. BMI:  Body mass index is 37.31 kg/m². Return in about 3 months (around 1/17/2019) for COPD, jarrell.       Dashawn Castro MD

## 2018-11-07 ENCOUNTER — NURSE ONLY (OUTPATIENT)
Dept: FAMILY MEDICINE CLINIC | Age: 68
End: 2018-11-07
Payer: MEDICARE

## 2018-11-07 DIAGNOSIS — Z23 NEED FOR INFLUENZA VACCINATION: Primary | ICD-10-CM

## 2018-11-07 PROCEDURE — G0008 ADMIN INFLUENZA VIRUS VAC: HCPCS | Performed by: FAMILY MEDICINE

## 2018-11-07 PROCEDURE — 90662 IIV NO PRSV INCREASED AG IM: CPT | Performed by: FAMILY MEDICINE

## 2018-11-14 ENCOUNTER — CARE COORDINATION (OUTPATIENT)
Dept: FAMILY MEDICINE CLINIC | Age: 68
End: 2018-11-14

## 2018-11-16 ENCOUNTER — HOSPITAL ENCOUNTER (EMERGENCY)
Age: 68
Discharge: HOME | End: 2018-11-17
Payer: MEDICARE

## 2018-11-16 DIAGNOSIS — J40 BRONCHITIS: Primary | ICD-10-CM

## 2018-11-16 DIAGNOSIS — J44.1 COPD EXACERBATION (HCC): ICD-10-CM

## 2018-11-16 LAB
BASOPHILS ABSOLUTE: 0.1 K/UL (ref 0–0.2)
BASOPHILS RELATIVE PERCENT: 1 %
EOSINOPHILS ABSOLUTE: 0.8 K/UL (ref 0–0.7)
EOSINOPHILS RELATIVE PERCENT: 9.5 %
HCT VFR BLD CALC: 40.8 % (ref 42–52)
HEMOGLOBIN: 13.8 G/DL (ref 14–18)
LYMPHOCYTES ABSOLUTE: 2.4 K/UL (ref 1–4.8)
LYMPHOCYTES RELATIVE PERCENT: 27.4 %
MCH RBC QN AUTO: 29.5 PG (ref 27–31.3)
MCHC RBC AUTO-ENTMCNC: 33.7 % (ref 33–37)
MCV RBC AUTO: 87.5 FL (ref 80–100)
MONOCYTES ABSOLUTE: 0.9 K/UL (ref 0.2–0.8)
MONOCYTES RELATIVE PERCENT: 10.9 %
NEUTROPHILS ABSOLUTE: 4.5 K/UL (ref 1.4–6.5)
NEUTROPHILS RELATIVE PERCENT: 51.2 %
PDW BLD-RTO: 14.1 % (ref 11.5–14.5)
PLATELET # BLD: 163 K/UL (ref 130–400)
RBC # BLD: 4.66 M/UL (ref 4.7–6.1)
WBC # BLD: 8.7 K/UL (ref 4.8–10.8)

## 2018-11-16 PROCEDURE — 96365 THER/PROPH/DIAG IV INF INIT: CPT

## 2018-11-16 PROCEDURE — 94640 AIRWAY INHALATION TREATMENT: CPT

## 2018-11-16 PROCEDURE — 99285 EMERGENCY DEPT VISIT HI MDM: CPT

## 2018-11-16 PROCEDURE — 80053 COMPREHEN METABOLIC PANEL: CPT

## 2018-11-16 PROCEDURE — 6370000000 HC RX 637 (ALT 250 FOR IP): Performed by: PERSONAL EMERGENCY RESPONSE ATTENDANT

## 2018-11-16 PROCEDURE — 6360000002 HC RX W HCPCS: Performed by: PERSONAL EMERGENCY RESPONSE ATTENDANT

## 2018-11-16 PROCEDURE — 85025 COMPLETE CBC W/AUTO DIFF WBC: CPT

## 2018-11-16 PROCEDURE — 36415 COLL VENOUS BLD VENIPUNCTURE: CPT

## 2018-11-16 RX ORDER — IPRATROPIUM BROMIDE AND ALBUTEROL SULFATE 2.5; .5 MG/3ML; MG/3ML
1 SOLUTION RESPIRATORY (INHALATION) CONTINUOUS PRN
Status: DISCONTINUED | OUTPATIENT
Start: 2018-11-16 | End: 2018-11-17 | Stop reason: HOSPADM

## 2018-11-16 RX ORDER — MAGNESIUM SULFATE IN WATER 40 MG/ML
2 INJECTION, SOLUTION INTRAVENOUS ONCE
Status: COMPLETED | OUTPATIENT
Start: 2018-11-16 | End: 2018-11-17

## 2018-11-16 RX ADMIN — MAGNESIUM SULFATE IN WATER 2 G: 40 INJECTION, SOLUTION INTRAVENOUS at 23:38

## 2018-11-16 RX ADMIN — IPRATROPIUM BROMIDE AND ALBUTEROL SULFATE 1 AMPULE: .5; 3 SOLUTION RESPIRATORY (INHALATION) at 23:18

## 2018-11-16 RX ADMIN — IPRATROPIUM BROMIDE AND ALBUTEROL SULFATE 1 AMPULE: .5; 3 SOLUTION RESPIRATORY (INHALATION) at 23:06

## 2018-11-16 RX ADMIN — IPRATROPIUM BROMIDE AND ALBUTEROL SULFATE 1 AMPULE: .5; 3 SOLUTION RESPIRATORY (INHALATION) at 23:26

## 2018-11-16 ASSESSMENT — ENCOUNTER SYMPTOMS
BLOOD IN STOOL: 0
COLOR CHANGE: 0
ABDOMINAL PAIN: 0
SORE THROAT: 0
VOMITING: 0
COUGH: 1
SHORTNESS OF BREATH: 1
NAUSEA: 0
WHEEZING: 1
RHINORRHEA: 0
DIARRHEA: 0

## 2018-11-17 ENCOUNTER — APPOINTMENT (OUTPATIENT)
Dept: GENERAL RADIOLOGY | Age: 68
End: 2018-11-17
Payer: MEDICARE

## 2018-11-17 VITALS
HEART RATE: 65 BPM | HEIGHT: 70 IN | SYSTOLIC BLOOD PRESSURE: 103 MMHG | BODY MASS INDEX: 36.65 KG/M2 | RESPIRATION RATE: 18 BRPM | DIASTOLIC BLOOD PRESSURE: 63 MMHG | TEMPERATURE: 97.5 F | WEIGHT: 256 LBS | OXYGEN SATURATION: 95 %

## 2018-11-17 LAB
ALBUMIN SERPL-MCNC: 3.7 G/DL (ref 3.9–4.9)
ALP BLD-CCNC: 72 U/L (ref 35–104)
ALT SERPL-CCNC: 16 U/L (ref 0–41)
ANION GAP SERPL CALCULATED.3IONS-SCNC: 11 MEQ/L (ref 7–13)
AST SERPL-CCNC: 20 U/L (ref 0–40)
BILIRUB SERPL-MCNC: 0.4 MG/DL (ref 0–1.2)
BUN BLDV-MCNC: 15 MG/DL (ref 8–23)
CALCIUM SERPL-MCNC: 9.1 MG/DL (ref 8.6–10.2)
CHLORIDE BLD-SCNC: 102 MEQ/L (ref 98–107)
CO2: 24 MEQ/L (ref 22–29)
CREAT SERPL-MCNC: 0.74 MG/DL (ref 0.7–1.2)
GFR AFRICAN AMERICAN: >60
GFR NON-AFRICAN AMERICAN: >60
GLOBULIN: 3 G/DL (ref 2.3–3.5)
GLUCOSE BLD-MCNC: 87 MG/DL (ref 74–109)
POTASSIUM SERPL-SCNC: 4.5 MEQ/L (ref 3.5–5.1)
SODIUM BLD-SCNC: 137 MEQ/L (ref 132–144)
TOTAL PROTEIN: 6.7 G/DL (ref 6.4–8.1)

## 2018-11-17 PROCEDURE — 6360000002 HC RX W HCPCS: Performed by: PERSONAL EMERGENCY RESPONSE ATTENDANT

## 2018-11-17 PROCEDURE — 96367 TX/PROPH/DG ADDL SEQ IV INF: CPT

## 2018-11-17 PROCEDURE — 96375 TX/PRO/DX INJ NEW DRUG ADDON: CPT

## 2018-11-17 PROCEDURE — 2580000003 HC RX 258: Performed by: PERSONAL EMERGENCY RESPONSE ATTENDANT

## 2018-11-17 PROCEDURE — 71046 X-RAY EXAM CHEST 2 VIEWS: CPT

## 2018-11-17 RX ORDER — METHYLPREDNISOLONE SODIUM SUCCINATE 125 MG/2ML
80 INJECTION, POWDER, LYOPHILIZED, FOR SOLUTION INTRAMUSCULAR; INTRAVENOUS ONCE
Status: COMPLETED | OUTPATIENT
Start: 2018-11-17 | End: 2018-11-17

## 2018-11-17 RX ORDER — ALBUTEROL SULFATE 2.5 MG/3ML
2.5 SOLUTION RESPIRATORY (INHALATION) EVERY 6 HOURS PRN
Qty: 120 EACH | Refills: 0 | Status: SHIPPED | OUTPATIENT
Start: 2018-11-17 | End: 2019-01-03 | Stop reason: SDUPTHER

## 2018-11-17 RX ORDER — AZITHROMYCIN 250 MG/1
250 TABLET, FILM COATED ORAL DAILY
Qty: 4 TABLET | Refills: 0 | Status: SHIPPED | OUTPATIENT
Start: 2018-11-17 | End: 2018-11-21

## 2018-11-17 RX ORDER — PREDNISONE 50 MG/1
50 TABLET ORAL DAILY
Qty: 5 TABLET | Refills: 0 | Status: SHIPPED | OUTPATIENT
Start: 2018-11-17 | End: 2018-11-22

## 2018-11-17 RX ADMIN — AZITHROMYCIN MONOHYDRATE 500 MG: 500 INJECTION, POWDER, LYOPHILIZED, FOR SOLUTION INTRAVENOUS at 00:33

## 2018-11-17 RX ADMIN — METHYLPREDNISOLONE SODIUM SUCCINATE 80 MG: 125 INJECTION, POWDER, FOR SOLUTION INTRAMUSCULAR; INTRAVENOUS at 00:32

## 2018-11-17 NOTE — ED NOTES
Pt medicated PER ORDERS, ANISHA. WELL, MED INFUSING VIA PUMP OVER 1 HOUR, IV SITE WNL, PT A&OX4, 0 DISTRESS, SKIN W/D/PINK, OCCASIONAL CONGESTED COUGH NOTED, 0 PAIN, MSP'S INTACT. 2L NC ON FOR COMFORT, PT ON MONITOR, SRMeme Bello RN  11/16/18 [id number]

## 2018-11-17 NOTE — ED PROVIDER NOTES
[facility] ED  eMERGENCY dEPARTMENT eNCOUnter      Pt Name: Ruby De La O  MRN: [id number]  Lorengfjohn 1950  Date of evaluation: 11/16/2018  Provider: Nati [facility], [address]    Ruby De La O is a 76 y.o. male with PMHx of COPD, Afib, HTN, DM (diet controlled) presents to the emergency department with SOB and wheezing. Patient states he had a moist cough with shortness of breath a couple weeks ago and was on steroids. Once he finished the steroids, the shortness of breath seemed to return. He has had increasing shortness of breath and wheezing over the past couple days which worsened this evening while in the cold weather. He does have chest congestion and a moist cough. He states sputum is clear. He is not doing inhaler or nebulizer at home. He denies fevers, runny nose, nasal congestion, chest pain. He has not been on antibiotics. HPI    Nursing Notes were reviewed. REVIEW OF SYSTEMS       Review of Systems   Constitutional: Negative for appetite change, chills and fever. HENT: Negative for congestion, rhinorrhea and sore throat. Respiratory: Positive for cough, shortness of breath and wheezing. Cardiovascular: Negative for chest pain. Gastrointestinal: Negative for abdominal pain, blood in stool, diarrhea, nausea and vomiting. Genitourinary: Negative for difficulty urinating. Musculoskeletal: Negative for neck stiffness. Skin: Negative for color change and rash. Neurological: Negative for dizziness, syncope, weakness, light-headedness, numbness and headaches. All other systems reviewed and are negative.             PAST MEDICAL HISTORY     Past Medical History:   Diagnosis Date    Abnormal EMG 12/09/2015    Actinic keratoses     Actinic keratoses     Allergic rhinitis     Anemia 11/18/2014    Arthritis     Chronic back pain     COPD (chronic obstructive pulmonary disease) ([facility] [address] 08/09/2012    Crohns disease (HCC)     Degenerative [employer] cpap supplies mask hose filters etc      lisinopril (PRINIVIL;ZESTRIL) 20 MG tablet Take 1 tablet by mouth daily, Disp-90 tablet, R-3Normal      pravastatin (PRAVACHOL) 40 MG tablet Take 1 tablet by mouth nightly, Disp-90 tablet, R-3Normal      Saccharomyces boulardii (PROBIOTIC) 250 MG CAPS Take 1 tablet by mouth daily      esomeprazole Magnesium (NEXIUM) 20 MG PACK Take 20 mg by mouth daily      HUMIRA PEN STARTER 40 MG/0.8ML injection Historical Med      aspirin 81 MG tablet Take 81 mg by mouth daily. mesalamine (PENTASA) 250 MG CR capsule Take 500 mg by mouth 4 times daily. benzonatate (TESSALON) 100 MG capsule Take 1 capsule by mouth 3 times daily as needed for Cough, Disp-30 capsule, R-0Normal      desoximetasone (TOPICORT) 0.25 % cream Apply topically 2 times daily. , Disp-30 g, R-0, Normal      cetirizine (ZYRTEC ALLERGY) 10 MG tablet Take 1 tablet by mouth 2 times daily, Disp-60 tablet, R-0Normal      famotidine (PEPCID) 20 MG tablet Take 1 tablet by mouth 2 times daily, Disp-60 tablet, R-0Normal      triamcinolone (KENALOG) 0.1 % cream Apply bid to affected area(s)  Mon thru Friday only, take weekends off. Do not use on face, groin, armpits or breast tissue., Disp-80 g, R-0, Normal      fluticasone (FLONASE) 50 MCG/ACT nasal spray Historical Med             ALLERGIES     Bevespi aerosphere [glycopyrrolate-formoterol]; Campath [alemtuzumab];  Mercaptopurine; and Moxifloxacin    FAMILY HISTORY       Family History   Problem Relation Age of Onset    Heart Disease Mother     Liver Disease Mother     Heart Disease Father     Cancer Sister         lung          SOCIAL HISTORY       Social History     Social History    Marital status:      Spouse name: N/A    Number of children: 3    Years of education: N/A     Social History Main Topics    Smoking status: Former Smoker     Packs/day: 1.00     Years: 25.00     Types: Cigarettes     Quit date: 10/21/1990    Smokeless tobacco:

## 2018-11-19 ENCOUNTER — TELEPHONE (OUTPATIENT)
Dept: FAMILY MEDICINE CLINIC | Age: 68
End: 2018-11-19

## 2018-11-30 DIAGNOSIS — G89.29 CHRONIC MIDLINE BACK PAIN, UNSPECIFIED BACK LOCATION: ICD-10-CM

## 2018-11-30 DIAGNOSIS — E78.5 HYPERLIPIDEMIA, UNSPECIFIED HYPERLIPIDEMIA TYPE: ICD-10-CM

## 2018-11-30 DIAGNOSIS — M54.9 CHRONIC MIDLINE BACK PAIN, UNSPECIFIED BACK LOCATION: ICD-10-CM

## 2018-11-30 DIAGNOSIS — I10 ESSENTIAL HYPERTENSION: ICD-10-CM

## 2018-12-03 RX ORDER — GABAPENTIN 300 MG/1
300 CAPSULE ORAL DAILY
Qty: 90 CAPSULE | Refills: 0 | Status: SHIPPED | OUTPATIENT
Start: 2018-12-03 | End: 2019-02-11 | Stop reason: SDUPTHER

## 2018-12-03 RX ORDER — PRAVASTATIN SODIUM 40 MG
40 TABLET ORAL NIGHTLY
Qty: 90 TABLET | Refills: 3 | Status: SHIPPED | OUTPATIENT
Start: 2018-12-03 | End: 2019-05-01 | Stop reason: SDUPTHER

## 2018-12-03 RX ORDER — LISINOPRIL 20 MG/1
20 TABLET ORAL DAILY
Qty: 90 TABLET | Refills: 3 | Status: SHIPPED | OUTPATIENT
Start: 2018-12-03 | End: 2019-11-21 | Stop reason: SDUPTHER

## 2018-12-06 ENCOUNTER — OFFICE VISIT (OUTPATIENT)
Dept: FAMILY MEDICINE CLINIC | Age: 68
End: 2018-12-06
Payer: MEDICARE

## 2018-12-06 VITALS
WEIGHT: 261 LBS | DIASTOLIC BLOOD PRESSURE: 64 MMHG | TEMPERATURE: 96.4 F | OXYGEN SATURATION: 97 % | HEART RATE: 67 BPM | SYSTOLIC BLOOD PRESSURE: 110 MMHG | HEIGHT: 69 IN | BODY MASS INDEX: 38.66 KG/M2

## 2018-12-06 DIAGNOSIS — J44.1 COPD EXACERBATION (HCC): Primary | ICD-10-CM

## 2018-12-06 PROCEDURE — 99213 OFFICE O/P EST LOW 20 MIN: CPT | Performed by: NURSE PRACTITIONER

## 2018-12-06 RX ORDER — AZITHROMYCIN 250 MG/1
250 TABLET, FILM COATED ORAL DAILY
Qty: 6 TABLET | Refills: 0 | Status: SHIPPED | OUTPATIENT
Start: 2018-12-06 | End: 2018-12-11

## 2018-12-06 RX ORDER — PREDNISONE 10 MG/1
TABLET ORAL
Qty: 40 TABLET | Refills: 0 | Status: SHIPPED | OUTPATIENT
Start: 2018-12-06 | End: 2019-01-03 | Stop reason: ALTCHOICE

## 2018-12-06 ASSESSMENT — ENCOUNTER SYMPTOMS
SINUS PRESSURE: 0
SHORTNESS OF BREATH: 1
SORE THROAT: 0
VOMITING: 0
EYE DISCHARGE: 0
EYE ITCHING: 0
EYE PAIN: 0
EYE REDNESS: 0
CHEST TIGHTNESS: 0
WHEEZING: 1
SINUS PAIN: 0
SPUTUM PRODUCTION: 1
COUGH: 1

## 2018-12-14 DIAGNOSIS — R73.9 HYPERGLYCEMIA: ICD-10-CM

## 2018-12-14 LAB — HBA1C MFR BLD: 6 % (ref 4.8–5.9)

## 2018-12-17 ENCOUNTER — CARE COORDINATION (OUTPATIENT)
Dept: CARE COORDINATION | Age: 68
End: 2018-12-17

## 2018-12-17 NOTE — CARE COORDINATION
mouth daily 12/3/18   Oj Leger MD   albuterol (PROVENTIL) (2.5 MG/3ML) 0.083% nebulizer solution Take 3 mLs by nebulization every 6 hours as needed for Wheezing 11/17/18   TEREZA Reddy   albuterol-ipratropium (COMBIVENT RESPIMAT)  MCG/ACT AERS inhaler Inhale 1 puff into the lungs every 6 hours 10/17/18   Margaret Douglas MD   testosterone (ANDROGEL; TESTIM) 50 MG/5GM (1%) GEL 1% gel Alternate one packet daily with 2 packets daily every other day. 10/8/18 1/8/19  Oj Leger MD   nitroGLYCERIN (NITROSTAT) 0.4 MG SL tablet DISSOLVE 1 TABLET UNDER THE TONGUE EVERY 5 MINUTES AS NEEDED FOR CHEST PAIN MAXIMUM OF 3 TABLETS 10/5/18   Oj Leger MD   benzonatate (TESSALON) 100 MG capsule Take 1 capsule by mouth 3 times daily as needed for Cough 9/14/18   STARLA Najera - CNP   desoximetasone (TOPICORT) 0.25 % cream Apply topically 2 times daily. 8/21/18   Fan GarciachesSTARLA ruby CNP   montelukast (SINGULAIR) 10 MG tablet TAKE 1 TABLET NIGHTLY 6/19/18   Oj Leger MD   cetirizine (ZYRTEC ALLERGY) 10 MG tablet Take 1 tablet by mouth 2 times daily 6/19/18   Oj Leger MD   famotidine (PEPCID) 20 MG tablet Take 1 tablet by mouth 2 times daily 6/19/18   Oj Leger MD   triamcinolone (KENALOG) 0.1 % cream Apply bid to affected area(s)  Mon thru Friday only, take weekends off. Do not use on face, groin, armpits or breast tissue.  6/19/18   Oj Leger MD   metoprolol tartrate (LOPRESSOR) 50 MG tablet Take 1.5 po bid 4/12/18   Oj Leger MD   doxazosin (CARDURA) 4 MG tablet Take 1/2 BID 2/19/18   Oj Leger MD   CPAP Machine MISC New cpap supplies mask hose filters etc 1/15/18   Margaret Douglas MD   fluticasone Rubbie Deejay) 50 MCG/ACT nasal spray  6/20/17   Historical Provider, MD   Saccharomyces boulardii (PROBIOTIC) 250 MG CAPS Take 1 tablet by mouth daily    Historical Provider, MD   esomeprazole Magnesium (NEXIUM) 20 MG PACK Take 20 mg by mouth daily    Historical Provider,

## 2019-01-03 ENCOUNTER — OFFICE VISIT (OUTPATIENT)
Dept: FAMILY MEDICINE CLINIC | Age: 69
End: 2019-01-03
Payer: MEDICARE

## 2019-01-03 VITALS
TEMPERATURE: 97.3 F | HEIGHT: 70 IN | HEART RATE: 74 BPM | WEIGHT: 260 LBS | OXYGEN SATURATION: 93 % | BODY MASS INDEX: 37.22 KG/M2 | DIASTOLIC BLOOD PRESSURE: 68 MMHG | SYSTOLIC BLOOD PRESSURE: 124 MMHG

## 2019-01-03 DIAGNOSIS — J44.1 CHRONIC OBSTRUCTIVE PULMONARY DISEASE WITH ACUTE EXACERBATION (HCC): Primary | ICD-10-CM

## 2019-01-03 DIAGNOSIS — R06.02 SHORTNESS OF BREATH: ICD-10-CM

## 2019-01-03 PROCEDURE — 99214 OFFICE O/P EST MOD 30 MIN: CPT | Performed by: NURSE PRACTITIONER

## 2019-01-03 RX ORDER — DOXYCYCLINE HYCLATE 100 MG
100 TABLET ORAL 2 TIMES DAILY
Qty: 14 TABLET | Refills: 0 | Status: SHIPPED | OUTPATIENT
Start: 2019-01-03 | End: 2019-01-10

## 2019-01-03 RX ORDER — METHYLPREDNISOLONE 4 MG/1
TABLET ORAL
Qty: 21 TABLET | Refills: 0 | Status: SHIPPED | OUTPATIENT
Start: 2019-01-03 | End: 2019-01-09

## 2019-01-03 RX ORDER — ALBUTEROL SULFATE 2.5 MG/3ML
2.5 SOLUTION RESPIRATORY (INHALATION) EVERY 6 HOURS PRN
Qty: 120 EACH | Refills: 2 | Status: SHIPPED | OUTPATIENT
Start: 2019-01-03 | End: 2019-01-11 | Stop reason: SDUPTHER

## 2019-01-03 ASSESSMENT — ENCOUNTER SYMPTOMS
EYE REDNESS: 0
ABDOMINAL PAIN: 0
EYES NEGATIVE: 1
GASTROINTESTINAL NEGATIVE: 1
CHEST TIGHTNESS: 1
EYE DISCHARGE: 0
VOMITING: 0
SHORTNESS OF BREATH: 1
NAUSEA: 0
WHEEZING: 1
EYE PAIN: 0
SINUS PRESSURE: 0
RHINORRHEA: 0
SINUS PAIN: 0
EYE ITCHING: 0
DIARRHEA: 0
SORE THROAT: 0
COUGH: 1

## 2019-01-10 ENCOUNTER — OFFICE VISIT (OUTPATIENT)
Dept: FAMILY MEDICINE CLINIC | Age: 69
End: 2019-01-10
Payer: MEDICARE

## 2019-01-10 VITALS
SYSTOLIC BLOOD PRESSURE: 130 MMHG | BODY MASS INDEX: 37.56 KG/M2 | DIASTOLIC BLOOD PRESSURE: 80 MMHG | HEART RATE: 65 BPM | WEIGHT: 262.4 LBS | OXYGEN SATURATION: 95 % | TEMPERATURE: 96.5 F | HEIGHT: 70 IN

## 2019-01-10 DIAGNOSIS — J44.1 CHRONIC OBSTRUCTIVE PULMONARY DISEASE WITH ACUTE EXACERBATION (HCC): Primary | ICD-10-CM

## 2019-01-10 PROCEDURE — 99213 OFFICE O/P EST LOW 20 MIN: CPT | Performed by: NURSE PRACTITIONER

## 2019-01-10 ASSESSMENT — ENCOUNTER SYMPTOMS
COUGH: 0
SHORTNESS OF BREATH: 0
CHEST TIGHTNESS: 0

## 2019-01-11 RX ORDER — ALBUTEROL SULFATE 2.5 MG/3ML
2.5 SOLUTION RESPIRATORY (INHALATION) EVERY 6 HOURS PRN
Qty: 120 EACH | Refills: 2 | Status: SHIPPED | OUTPATIENT
Start: 2019-01-11 | End: 2019-10-18 | Stop reason: SDUPTHER

## 2019-01-22 ENCOUNTER — TELEPHONE (OUTPATIENT)
Dept: PULMONOLOGY | Age: 69
End: 2019-01-22

## 2019-01-22 RX ORDER — ALBUTEROL SULFATE 2.5 MG/3ML
2.5 SOLUTION RESPIRATORY (INHALATION) EVERY 6 HOURS PRN
Qty: 120 EACH | Refills: 2 | Status: CANCELLED | OUTPATIENT
Start: 2019-01-22

## 2019-01-23 ENCOUNTER — OFFICE VISIT (OUTPATIENT)
Dept: PULMONOLOGY | Age: 69
End: 2019-01-23
Payer: MEDICARE

## 2019-01-23 VITALS
TEMPERATURE: 98.9 F | OXYGEN SATURATION: 93 % | WEIGHT: 260 LBS | BODY MASS INDEX: 38.51 KG/M2 | DIASTOLIC BLOOD PRESSURE: 70 MMHG | HEIGHT: 69 IN | RESPIRATION RATE: 16 BRPM | HEART RATE: 79 BPM | SYSTOLIC BLOOD PRESSURE: 134 MMHG

## 2019-01-23 DIAGNOSIS — J44.9 CHRONIC OBSTRUCTIVE PULMONARY DISEASE, UNSPECIFIED COPD TYPE (HCC): Primary | ICD-10-CM

## 2019-01-23 DIAGNOSIS — Z99.89 OSA ON CPAP: ICD-10-CM

## 2019-01-23 DIAGNOSIS — J20.9 ACUTE BRONCHITIS, UNSPECIFIED ORGANISM: ICD-10-CM

## 2019-01-23 DIAGNOSIS — G47.33 OSA ON CPAP: ICD-10-CM

## 2019-01-23 PROCEDURE — 99214 OFFICE O/P EST MOD 30 MIN: CPT | Performed by: INTERNAL MEDICINE

## 2019-01-23 RX ORDER — PREDNISONE 10 MG/1
TABLET ORAL
Qty: 40 TABLET | Refills: 0 | Status: SHIPPED | OUTPATIENT
Start: 2019-01-23 | End: 2019-03-07 | Stop reason: ALTCHOICE

## 2019-01-23 ASSESSMENT — ENCOUNTER SYMPTOMS
RHINORRHEA: 0
SORE THROAT: 0
EYE ITCHING: 0
SHORTNESS OF BREATH: 1
ABDOMINAL PAIN: 0
WHEEZING: 1
CHEST TIGHTNESS: 0
VOICE CHANGE: 0
VOMITING: 0
DIARRHEA: 0
NAUSEA: 0
COUGH: 1

## 2019-02-11 DIAGNOSIS — G89.29 CHRONIC MIDLINE BACK PAIN, UNSPECIFIED BACK LOCATION: ICD-10-CM

## 2019-02-11 DIAGNOSIS — M54.9 CHRONIC MIDLINE BACK PAIN, UNSPECIFIED BACK LOCATION: ICD-10-CM

## 2019-02-11 RX ORDER — DOXAZOSIN MESYLATE 4 MG/1
TABLET ORAL
Qty: 90 TABLET | Refills: 0 | Status: SHIPPED | OUTPATIENT
Start: 2019-02-11 | End: 2019-03-07 | Stop reason: SDUPTHER

## 2019-02-11 RX ORDER — GABAPENTIN 300 MG/1
300 CAPSULE ORAL DAILY
Qty: 90 CAPSULE | Refills: 0 | Status: SHIPPED | OUTPATIENT
Start: 2019-02-11 | End: 2019-03-07 | Stop reason: SDUPTHER

## 2019-02-27 ENCOUNTER — OFFICE VISIT (OUTPATIENT)
Dept: PULMONOLOGY | Age: 69
End: 2019-02-27
Payer: MEDICARE

## 2019-02-27 VITALS
TEMPERATURE: 98.6 F | OXYGEN SATURATION: 95 % | HEIGHT: 70 IN | BODY MASS INDEX: 37.51 KG/M2 | WEIGHT: 262 LBS | RESPIRATION RATE: 16 BRPM | HEART RATE: 65 BPM | SYSTOLIC BLOOD PRESSURE: 120 MMHG | DIASTOLIC BLOOD PRESSURE: 60 MMHG

## 2019-02-27 DIAGNOSIS — G47.33 OSA ON CPAP: ICD-10-CM

## 2019-02-27 DIAGNOSIS — J44.9 CHRONIC OBSTRUCTIVE PULMONARY DISEASE, UNSPECIFIED COPD TYPE (HCC): Primary | ICD-10-CM

## 2019-02-27 DIAGNOSIS — R06.09 DOE (DYSPNEA ON EXERTION): ICD-10-CM

## 2019-02-27 DIAGNOSIS — Z99.89 OSA ON CPAP: ICD-10-CM

## 2019-02-27 DIAGNOSIS — E66.9 OBESITY (BMI 30-39.9): ICD-10-CM

## 2019-02-27 PROCEDURE — 99214 OFFICE O/P EST MOD 30 MIN: CPT | Performed by: INTERNAL MEDICINE

## 2019-02-27 RX ORDER — ALBUTEROL SULFATE 90 UG/1
2 AEROSOL, METERED RESPIRATORY (INHALATION) EVERY 6 HOURS PRN
COMMUNITY
End: 2020-02-27 | Stop reason: SDUPTHER

## 2019-02-27 ASSESSMENT — ENCOUNTER SYMPTOMS
ABDOMINAL PAIN: 0
VOMITING: 0
WHEEZING: 1
VOICE CHANGE: 0
SHORTNESS OF BREATH: 1
EYE ITCHING: 0
SORE THROAT: 0
NAUSEA: 0
COUGH: 1
DIARRHEA: 0
CHEST TIGHTNESS: 0
RHINORRHEA: 0

## 2019-03-07 ENCOUNTER — OFFICE VISIT (OUTPATIENT)
Dept: FAMILY MEDICINE CLINIC | Age: 69
End: 2019-03-07
Payer: MEDICARE

## 2019-03-07 ENCOUNTER — TELEPHONE (OUTPATIENT)
Dept: FAMILY MEDICINE CLINIC | Age: 69
End: 2019-03-07

## 2019-03-07 VITALS
OXYGEN SATURATION: 98 % | BODY MASS INDEX: 38.36 KG/M2 | HEART RATE: 69 BPM | WEIGHT: 259 LBS | DIASTOLIC BLOOD PRESSURE: 60 MMHG | SYSTOLIC BLOOD PRESSURE: 100 MMHG | HEIGHT: 69 IN

## 2019-03-07 DIAGNOSIS — R73.03 PREDIABETES: ICD-10-CM

## 2019-03-07 DIAGNOSIS — Z12.5 PROSTATE CANCER SCREENING: ICD-10-CM

## 2019-03-07 DIAGNOSIS — G89.29 CHRONIC MIDLINE BACK PAIN, UNSPECIFIED BACK LOCATION: ICD-10-CM

## 2019-03-07 DIAGNOSIS — Z13.1 DIABETES MELLITUS SCREENING: ICD-10-CM

## 2019-03-07 DIAGNOSIS — G89.29 CHRONIC BILATERAL LOW BACK PAIN WITH LEFT-SIDED SCIATICA: Primary | ICD-10-CM

## 2019-03-07 DIAGNOSIS — E29.1 HYPOGONADISM MALE: ICD-10-CM

## 2019-03-07 DIAGNOSIS — R29.898 WEAKNESS OF BOTH LOWER EXTREMITIES: ICD-10-CM

## 2019-03-07 DIAGNOSIS — M54.42 CHRONIC BILATERAL LOW BACK PAIN WITH LEFT-SIDED SCIATICA: Primary | ICD-10-CM

## 2019-03-07 DIAGNOSIS — I10 ESSENTIAL HYPERTENSION: ICD-10-CM

## 2019-03-07 DIAGNOSIS — M54.9 CHRONIC MIDLINE BACK PAIN, UNSPECIFIED BACK LOCATION: ICD-10-CM

## 2019-03-07 DIAGNOSIS — E78.5 HYPERLIPIDEMIA, UNSPECIFIED HYPERLIPIDEMIA TYPE: ICD-10-CM

## 2019-03-07 PROCEDURE — 99213 OFFICE O/P EST LOW 20 MIN: CPT | Performed by: NURSE PRACTITIONER

## 2019-03-07 PROCEDURE — G8510 SCR DEP NEG, NO PLAN REQD: HCPCS | Performed by: NURSE PRACTITIONER

## 2019-03-07 RX ORDER — TESTOSTERONE GEL, 1% 10 MG/G
GEL TRANSDERMAL
Qty: 135 PACKAGE | Refills: 0 | Status: SHIPPED | OUTPATIENT
Start: 2019-03-07 | End: 2019-07-30 | Stop reason: SDUPTHER

## 2019-03-07 RX ORDER — GABAPENTIN 300 MG/1
300 CAPSULE ORAL DAILY
Qty: 90 CAPSULE | Refills: 0 | Status: SHIPPED | OUTPATIENT
Start: 2019-03-07 | End: 2019-05-01 | Stop reason: SDUPTHER

## 2019-03-07 RX ORDER — METOPROLOL TARTRATE 50 MG/1
TABLET, FILM COATED ORAL
Qty: 270 TABLET | Refills: 3 | Status: SHIPPED | OUTPATIENT
Start: 2019-03-07 | End: 2020-05-22 | Stop reason: SDUPTHER

## 2019-03-07 RX ORDER — DOXAZOSIN MESYLATE 4 MG/1
TABLET ORAL
Qty: 90 TABLET | Refills: 0 | Status: SHIPPED | OUTPATIENT
Start: 2019-03-07 | End: 2019-05-01 | Stop reason: SDUPTHER

## 2019-03-07 ASSESSMENT — PATIENT HEALTH QUESTIONNAIRE - PHQ9
SUM OF ALL RESPONSES TO PHQ QUESTIONS 1-9: 0
2. FEELING DOWN, DEPRESSED OR HOPELESS: 0
1. LITTLE INTEREST OR PLEASURE IN DOING THINGS: 0
SUM OF ALL RESPONSES TO PHQ QUESTIONS 1-9: 0
SUM OF ALL RESPONSES TO PHQ9 QUESTIONS 1 & 2: 0

## 2019-03-07 ASSESSMENT — ENCOUNTER SYMPTOMS
COUGH: 0
ROS SKIN COMMENTS: DRY SKIN
BACK PAIN: 1
SHORTNESS OF BREATH: 0

## 2019-03-08 DIAGNOSIS — I10 ESSENTIAL HYPERTENSION: ICD-10-CM

## 2019-03-08 DIAGNOSIS — E29.1 HYPOGONADISM MALE: ICD-10-CM

## 2019-03-08 DIAGNOSIS — Z12.5 PROSTATE CANCER SCREENING: ICD-10-CM

## 2019-03-08 DIAGNOSIS — R73.03 PREDIABETES: ICD-10-CM

## 2019-03-08 DIAGNOSIS — E78.5 HYPERLIPIDEMIA, UNSPECIFIED HYPERLIPIDEMIA TYPE: ICD-10-CM

## 2019-03-08 LAB
ALBUMIN SERPL-MCNC: 3.9 G/DL (ref 3.5–4.6)
ALP BLD-CCNC: 78 U/L (ref 35–104)
ALT SERPL-CCNC: 12 U/L (ref 0–41)
ANION GAP SERPL CALCULATED.3IONS-SCNC: 15 MEQ/L (ref 9–15)
AST SERPL-CCNC: 18 U/L (ref 0–40)
BASOPHILS ABSOLUTE: 0.1 K/UL (ref 0–0.2)
BASOPHILS RELATIVE PERCENT: 0.7 %
BILIRUB SERPL-MCNC: 0.4 MG/DL (ref 0.2–0.7)
BUN BLDV-MCNC: 18 MG/DL (ref 8–23)
CALCIUM SERPL-MCNC: 8.8 MG/DL (ref 8.5–9.9)
CHLORIDE BLD-SCNC: 101 MEQ/L (ref 95–107)
CHOLESTEROL, TOTAL: 108 MG/DL (ref 0–199)
CO2: 24 MEQ/L (ref 20–31)
CREAT SERPL-MCNC: 0.74 MG/DL (ref 0.7–1.2)
EOSINOPHILS ABSOLUTE: 0.6 K/UL (ref 0–0.7)
EOSINOPHILS RELATIVE PERCENT: 6.1 %
GFR AFRICAN AMERICAN: >60
GFR NON-AFRICAN AMERICAN: >60
GLOBULIN: 2.8 G/DL (ref 2.3–3.5)
GLUCOSE BLD-MCNC: 76 MG/DL (ref 70–99)
HBA1C MFR BLD: 5.6 % (ref 4.8–5.9)
HCT VFR BLD CALC: 41 % (ref 42–52)
HDLC SERPL-MCNC: 33 MG/DL (ref 40–59)
HEMOGLOBIN: 13.9 G/DL (ref 14–18)
LDL CHOLESTEROL CALCULATED: 53 MG/DL (ref 0–129)
LYMPHOCYTES ABSOLUTE: 2 K/UL (ref 1–4.8)
LYMPHOCYTES RELATIVE PERCENT: 21.1 %
MCH RBC QN AUTO: 30.2 PG (ref 27–31.3)
MCHC RBC AUTO-ENTMCNC: 34 % (ref 33–37)
MCV RBC AUTO: 88.8 FL (ref 80–100)
MONOCYTES ABSOLUTE: 1.2 K/UL (ref 0.2–0.8)
MONOCYTES RELATIVE PERCENT: 12.4 %
NEUTROPHILS ABSOLUTE: 5.6 K/UL (ref 1.4–6.5)
NEUTROPHILS RELATIVE PERCENT: 59.7 %
PDW BLD-RTO: 13.3 % (ref 11.5–14.5)
PLATELET # BLD: 199 K/UL (ref 130–400)
POTASSIUM SERPL-SCNC: 3.8 MEQ/L (ref 3.4–4.9)
PROSTATE SPECIFIC ANTIGEN: 2.64 NG/ML (ref 0–5.4)
RBC # BLD: 4.61 M/UL (ref 4.7–6.1)
SODIUM BLD-SCNC: 140 MEQ/L (ref 135–144)
TOTAL PROTEIN: 6.7 G/DL (ref 6.3–8)
TRIGL SERPL-MCNC: 111 MG/DL (ref 0–150)
WBC # BLD: 9.5 K/UL (ref 4.8–10.8)

## 2019-03-10 LAB — TESTOSTERONE TOTAL-MALE: 367 NG/DL (ref 300–720)

## 2019-03-11 DIAGNOSIS — R97.20 RISING PSA LEVEL: Primary | ICD-10-CM

## 2019-03-12 ENCOUNTER — TELEPHONE (OUTPATIENT)
Dept: FAMILY MEDICINE CLINIC | Age: 69
End: 2019-03-12

## 2019-03-13 ENCOUNTER — HOSPITAL ENCOUNTER (OUTPATIENT)
Dept: PHYSICAL THERAPY | Age: 69
Setting detail: THERAPIES SERIES
Discharge: HOME | End: 2019-03-13
Payer: MEDICARE

## 2019-03-13 PROCEDURE — G8978 MOBILITY CURRENT STATUS: HCPCS

## 2019-03-13 PROCEDURE — G8979 MOBILITY GOAL STATUS: HCPCS

## 2019-03-13 PROCEDURE — 97110 THERAPEUTIC EXERCISES: CPT

## 2019-03-13 PROCEDURE — 97162 PT EVAL MOD COMPLEX 30 MIN: CPT

## 2019-03-13 ASSESSMENT — PAIN DESCRIPTION - DIRECTION: RADIATING_TOWARDS: L BUTTOCK

## 2019-03-13 ASSESSMENT — PAIN DESCRIPTION - FREQUENCY: FREQUENCY: CONTINUOUS

## 2019-03-13 ASSESSMENT — PAIN DESCRIPTION - ORIENTATION: ORIENTATION: LEFT;LOWER

## 2019-03-13 ASSESSMENT — PAIN DESCRIPTION - PROGRESSION: CLINICAL_PROGRESSION: GRADUALLY WORSENING

## 2019-03-13 ASSESSMENT — PAIN DESCRIPTION - PAIN TYPE: TYPE: CHRONIC PAIN

## 2019-03-13 ASSESSMENT — PAIN DESCRIPTION - DESCRIPTORS: DESCRIPTORS: TIGHTNESS;NUMBNESS

## 2019-03-13 ASSESSMENT — PAIN DESCRIPTION - LOCATION: LOCATION: BUTTOCKS;BACK

## 2019-03-13 ASSESSMENT — PAIN - FUNCTIONAL ASSESSMENT: PAIN_FUNCTIONAL_ASSESSMENT: PREVENTS OR INTERFERES WITH MANY ACTIVE NOT PASSIVE ACTIVITIES

## 2019-03-13 ASSESSMENT — PAIN DESCRIPTION - ONSET: ONSET: PROGRESSIVE

## 2019-03-13 ASSESSMENT — PAIN SCALES - GENERAL: PAINLEVEL_OUTOF10: 2

## 2019-03-15 ENCOUNTER — HOSPITAL ENCOUNTER (EMERGENCY)
Age: 69
Discharge: HOME | End: 2019-03-15
Attending: EMERGENCY MEDICINE
Payer: MEDICARE

## 2019-03-15 ENCOUNTER — APPOINTMENT (OUTPATIENT)
Dept: GENERAL RADIOLOGY | Age: 69
End: 2019-03-15
Payer: MEDICARE

## 2019-03-15 ENCOUNTER — TELEPHONE (OUTPATIENT)
Dept: FAMILY MEDICINE CLINIC | Age: 69
End: 2019-03-15

## 2019-03-15 VITALS
RESPIRATION RATE: 23 BRPM | DIASTOLIC BLOOD PRESSURE: 83 MMHG | WEIGHT: 256 LBS | HEIGHT: 70 IN | SYSTOLIC BLOOD PRESSURE: 137 MMHG | HEART RATE: 73 BPM | BODY MASS INDEX: 36.65 KG/M2 | OXYGEN SATURATION: 92 % | TEMPERATURE: 98 F

## 2019-03-15 DIAGNOSIS — I48.0 PAROXYSMAL A-FIB (HCC): Primary | ICD-10-CM

## 2019-03-15 LAB
ALBUMIN SERPL-MCNC: 3.6 G/DL (ref 3.5–4.6)
ALP BLD-CCNC: 81 U/L (ref 35–104)
ALT SERPL-CCNC: 12 U/L (ref 0–41)
ANION GAP SERPL CALCULATED.3IONS-SCNC: 8 MEQ/L (ref 9–15)
APTT: 24.6 SEC (ref 21.6–35.4)
AST SERPL-CCNC: 18 U/L (ref 0–40)
BASOPHILS ABSOLUTE: 0.1 K/UL (ref 0–0.2)
BASOPHILS RELATIVE PERCENT: 0.7 %
BILIRUB SERPL-MCNC: 0.3 MG/DL (ref 0.2–0.7)
BUN BLDV-MCNC: 13 MG/DL (ref 8–23)
CALCIUM SERPL-MCNC: 8.2 MG/DL (ref 8.5–9.9)
CHLORIDE BLD-SCNC: 105 MEQ/L (ref 95–107)
CO2: 27 MEQ/L (ref 20–31)
CREAT SERPL-MCNC: 0.79 MG/DL (ref 0.7–1.2)
EKG ATRIAL RATE: 80 BPM
EKG P AXIS: 59 DEGREES
EKG P-R INTERVAL: 162 MS
EKG Q-T INTERVAL: 374 MS
EKG QRS DURATION: 98 MS
EKG QTC CALCULATION (BAZETT): 431 MS
EKG R AXIS: -3 DEGREES
EKG T AXIS: 13 DEGREES
EKG VENTRICULAR RATE: 80 BPM
EOSINOPHILS ABSOLUTE: 0.9 K/UL (ref 0–0.7)
EOSINOPHILS RELATIVE PERCENT: 8.2 %
GFR AFRICAN AMERICAN: >60
GFR NON-AFRICAN AMERICAN: >60
GLOBULIN: 3.2 G/DL (ref 2.3–3.5)
GLUCOSE BLD-MCNC: 112 MG/DL (ref 70–99)
HCT VFR BLD CALC: 41.9 % (ref 42–52)
HEMOGLOBIN: 14.2 G/DL (ref 14–18)
INR BLD: 1
LYMPHOCYTES ABSOLUTE: 1.8 K/UL (ref 1–4.8)
LYMPHOCYTES RELATIVE PERCENT: 16.9 %
MAGNESIUM: 1.9 MG/DL (ref 1.7–2.4)
MCH RBC QN AUTO: 29.7 PG (ref 27–31.3)
MCHC RBC AUTO-ENTMCNC: 33.9 % (ref 33–37)
MCV RBC AUTO: 87.6 FL (ref 80–100)
MONOCYTES ABSOLUTE: 1.1 K/UL (ref 0.2–0.8)
MONOCYTES RELATIVE PERCENT: 10.6 %
NEUTROPHILS ABSOLUTE: 6.7 K/UL (ref 1.4–6.5)
NEUTROPHILS RELATIVE PERCENT: 63.6 %
PDW BLD-RTO: 13.6 % (ref 11.5–14.5)
PLATELET # BLD: 183 K/UL (ref 130–400)
POTASSIUM SERPL-SCNC: 4.1 MEQ/L (ref 3.4–4.9)
PRO-BNP: 102 PG/ML
PROTHROMBIN TIME: 9.8 SEC (ref 9–11.5)
RBC # BLD: 4.78 M/UL (ref 4.7–6.1)
SODIUM BLD-SCNC: 140 MEQ/L (ref 135–144)
TOTAL PROTEIN: 6.8 G/DL (ref 6.3–8)
TROPONIN: <0.01 NG/ML (ref 0–0.01)
WBC # BLD: 10.6 K/UL (ref 4.8–10.8)

## 2019-03-15 PROCEDURE — 2580000003 HC RX 258: Performed by: EMERGENCY MEDICINE

## 2019-03-15 PROCEDURE — 85610 PROTHROMBIN TIME: CPT

## 2019-03-15 PROCEDURE — 96374 THER/PROPH/DIAG INJ IV PUSH: CPT

## 2019-03-15 PROCEDURE — 83735 ASSAY OF MAGNESIUM: CPT

## 2019-03-15 PROCEDURE — 80053 COMPREHEN METABOLIC PANEL: CPT

## 2019-03-15 PROCEDURE — 36415 COLL VENOUS BLD VENIPUNCTURE: CPT

## 2019-03-15 PROCEDURE — 93005 ELECTROCARDIOGRAM TRACING: CPT

## 2019-03-15 PROCEDURE — 85025 COMPLETE CBC W/AUTO DIFF WBC: CPT

## 2019-03-15 PROCEDURE — 85730 THROMBOPLASTIN TIME PARTIAL: CPT

## 2019-03-15 PROCEDURE — 84484 ASSAY OF TROPONIN QUANT: CPT

## 2019-03-15 PROCEDURE — 2500000003 HC RX 250 WO HCPCS: Performed by: EMERGENCY MEDICINE

## 2019-03-15 PROCEDURE — 71045 X-RAY EXAM CHEST 1 VIEW: CPT

## 2019-03-15 PROCEDURE — 99285 EMERGENCY DEPT VISIT HI MDM: CPT

## 2019-03-15 PROCEDURE — 83880 ASSAY OF NATRIURETIC PEPTIDE: CPT

## 2019-03-15 RX ORDER — METOPROLOL TARTRATE 5 MG/5ML
5 INJECTION INTRAVENOUS ONCE
Status: COMPLETED | OUTPATIENT
Start: 2019-03-15 | End: 2019-03-15

## 2019-03-15 RX ORDER — 0.9 % SODIUM CHLORIDE 0.9 %
500 INTRAVENOUS SOLUTION INTRAVENOUS ONCE
Status: COMPLETED | OUTPATIENT
Start: 2019-03-15 | End: 2019-03-15

## 2019-03-15 RX ADMIN — SODIUM CHLORIDE 500 ML: 9 INJECTION, SOLUTION INTRAVENOUS at 09:53

## 2019-03-15 RX ADMIN — METOPROLOL TARTRATE 5 MG: 5 INJECTION INTRAVENOUS at 09:53

## 2019-03-15 ASSESSMENT — ENCOUNTER SYMPTOMS
SHORTNESS OF BREATH: 0
DIARRHEA: 0
ABDOMINAL PAIN: 0
NAUSEA: 0
COUGH: 0
VOMITING: 0
SORE THROAT: 0
BACK PAIN: 0

## 2019-03-18 ENCOUNTER — HOSPITAL ENCOUNTER (OUTPATIENT)
Dept: PHYSICAL THERAPY | Age: 69
Setting detail: THERAPIES SERIES
Discharge: HOME | End: 2019-03-18
Payer: MEDICARE

## 2019-03-18 PROCEDURE — 97110 THERAPEUTIC EXERCISES: CPT

## 2019-03-18 ASSESSMENT — PAIN DESCRIPTION - PROGRESSION: CLINICAL_PROGRESSION: GRADUALLY WORSENING

## 2019-03-22 ENCOUNTER — HOSPITAL ENCOUNTER (OUTPATIENT)
Dept: PHYSICAL THERAPY | Age: 69
Setting detail: THERAPIES SERIES
Discharge: HOME | End: 2019-03-22
Payer: MEDICARE

## 2019-03-22 PROCEDURE — 97110 THERAPEUTIC EXERCISES: CPT

## 2019-03-22 ASSESSMENT — PAIN DESCRIPTION - PROGRESSION: CLINICAL_PROGRESSION: GRADUALLY WORSENING

## 2019-03-25 ENCOUNTER — HOSPITAL ENCOUNTER (OUTPATIENT)
Dept: PHYSICAL THERAPY | Age: 69
Setting detail: THERAPIES SERIES
Discharge: HOME | End: 2019-03-25
Payer: MEDICARE

## 2019-03-25 PROCEDURE — 97110 THERAPEUTIC EXERCISES: CPT

## 2019-03-25 ASSESSMENT — PAIN DESCRIPTION - PROGRESSION: CLINICAL_PROGRESSION: GRADUALLY WORSENING

## 2019-03-26 ENCOUNTER — OFFICE VISIT (OUTPATIENT)
Dept: UROLOGY | Age: 69
End: 2019-03-26
Payer: MEDICARE

## 2019-03-26 VITALS
SYSTOLIC BLOOD PRESSURE: 106 MMHG | WEIGHT: 255 LBS | BODY MASS INDEX: 37.77 KG/M2 | HEART RATE: 79 BPM | HEIGHT: 69 IN | DIASTOLIC BLOOD PRESSURE: 60 MMHG

## 2019-03-26 DIAGNOSIS — R97.20 ELEVATED PSA: Primary | ICD-10-CM

## 2019-03-26 DIAGNOSIS — R97.20 RISING PSA LEVEL: ICD-10-CM

## 2019-03-26 LAB
BILIRUBIN, POC: NORMAL
BLOOD URINE, POC: NORMAL
CLARITY, POC: CLEAR
COLOR, POC: YELLOW
GLUCOSE URINE, POC: NORMAL
KETONES, POC: NORMAL
LEUKOCYTE EST, POC: NORMAL
NITRITE, POC: NORMAL
PH, POC: 5.5
PROTEIN, POC: NORMAL
SPECIFIC GRAVITY, POC: 1.02
UROBILINOGEN, POC: 0.2

## 2019-03-26 PROCEDURE — 81003 URINALYSIS AUTO W/O SCOPE: CPT | Performed by: UROLOGY

## 2019-03-26 PROCEDURE — 99203 OFFICE O/P NEW LOW 30 MIN: CPT | Performed by: UROLOGY

## 2019-03-26 RX ORDER — FAMOTIDINE 20 MG/1
20 TABLET, FILM COATED ORAL 2 TIMES DAILY
COMMUNITY
End: 2019-05-13 | Stop reason: ALTCHOICE

## 2019-03-28 ENCOUNTER — HOSPITAL ENCOUNTER (OUTPATIENT)
Dept: PHYSICAL THERAPY | Age: 69
Setting detail: THERAPIES SERIES
Discharge: HOME | End: 2019-03-28
Payer: MEDICARE

## 2019-03-28 PROCEDURE — 97110 THERAPEUTIC EXERCISES: CPT

## 2019-03-29 ENCOUNTER — APPOINTMENT (OUTPATIENT)
Dept: PHYSICAL THERAPY | Age: 69
End: 2019-03-29
Payer: MEDICARE

## 2019-04-01 ENCOUNTER — HOSPITAL ENCOUNTER (OUTPATIENT)
Dept: PHYSICAL THERAPY | Age: 69
Setting detail: THERAPIES SERIES
Discharge: HOME | End: 2019-04-01
Payer: MEDICARE

## 2019-04-01 PROCEDURE — 97110 THERAPEUTIC EXERCISES: CPT

## 2019-04-01 NOTE — PROGRESS NOTES
[address] [address]  Outpatient Physical Therapy    Treatment Note        Date: 2019  Patient: Ml Chang  : 1950  ACCT #: [de-identified]  Referring Practitioner: NICHOLE Valdes  Diagnosis: Chronic midline low back pain, unspecified back location; chronic bilateral low back pain with L sided sciatica; weakness in B LEs    Visit Information:  PT Visit Information  Onset Date: 19  PT Insurance Information: [employer] Medicare  Total # of Visits to Date: 6  Plan of Care/Certification Expiration Date: 19  No Show: 0  Canceled Appointment: 0  Progress Note Counter:  (PN due 19)    Subjective: Pt states \"It's not bothering me today. I'm just really stiff in the morning. \" Pt reports sleeping w/ pillow between knees w/ inc relief. Comments: RTD ? HEP Compliance:  x Good ? Fair ? Poor ?  Reports not doing due to:    Vital Signs  Patient Currently in Pain: Denies   Pain Screening  Patient Currently in Pain: Denies    OBJECTIVE:   Exercises  Exercise 1: SF, L-3.0  5 min  Exercise 2: LTR 10\"x10  Exercise 3: seated lumbar flexion stretch with pball (center, L, R)  5 sec x 10  Exercise 4: TA with breath 5 sec x20  Exercise 7: DLS 3 ways x 20  Exercise 8: TA SLR x 20, b/l  Exercise 9: supine, hip abd with RTB, adduction with ball , 5 sec x 20   Exercise 10: hip flexor stretch 3x30\" B at steps   Exercise 11: HS stretch, 20 sec x 3, standing at steps  Exercise 12: piriformis stretch 20 sec x 3, b/l  Exercise 14: bridges 5 sec x 12  Exercise 15: DLS double arm reach x 20 , with ball #4  Exercise 16: Clams x15 B   Exercise 20: HEP: bridges, SLRs      Strength: ? NT  x MMT completed:  Strength RLE  R Hip Flexion: 4+/5  R Hip ABduction: 4+/5  Strength LLE  L Hip Flexion: 4+/5  L Hip ABduction: 4+/5     ROM: x NT  ? ROM measurements:     Modalities:  Modalities  Moist heat: HP to low back x10 min      *Indicates exercise, modality, or manual techniques to be initiated when appropriate    Assessment: Body structures, Functions, Activity limitations: Decreased ROM, Decreased strength, Increased Pain  Assessment: Significantly inc B hip flex/ABD strength upon MMT this date. Good yancy to added clamshells though inc verbal/tactile cue req to imp form/hip alignment. Treatment Diagnosis: back pain, impaired lumbar ROM, impaired core and B LE strength     Goals:   Long term goals  Time Frame for Long term goals : 4-6 weeks  Long term goal 1: Pt will be indep and compliant with HEP to manage symtoms. Long term goal 2: Pt will increase lumbar AROM to >75% of normal limits to facilitate improved ability to perform ADLs  Long term goal 3: Pt will increase core and B hip strength to >3/5 and 4+/5 respectively to improve functional mobility  Long term goal 4: Pt will improve MARLA to </= 12/50 to demonstrate improved  functional activity tolerance. Long term goal 5: Pt will decrease pain with functional activities </= 2/10 to improve QOL. Progress toward goals: Lumbar AROM, core/hip strength     POST-PAIN       Pain Rating (0-10 pain scale):   0/10   Location and pain description same as pre-treatment unless indicated. Action: ? NA   ? Perform HEP  ? Meds as prescribed  ? Modalities as prescribed   ? Call Physician     Frequency/Duration:  Plan  Times per week: 2  Plan weeks: 4-6  Current Treatment Recommendations: Strengthening, Manual Therapy - Joint Manipulation, Equipment Evaluation, Education, & procurement, Gait Training, Patient/Caregiver Education & Training, Pain Management, Neuromuscular Re-education, Manual Therapy - Soft Tissue Mobilization, Home Exercise Program, Positioning, Modalities, Safety Education & Training, Aquatics, Balance Training, Functional Mobility Training     Pt to continue current HEP. See objective section for any therapeutic exercise changes, additions or modifications this date.     PT Individual Minutes  Time In: 6216  Time Out: 0644  Minutes: 48  Timed

## 2019-04-05 ENCOUNTER — HOSPITAL ENCOUNTER (OUTPATIENT)
Dept: PHYSICAL THERAPY | Age: 69
Setting detail: THERAPIES SERIES
Discharge: HOME | End: 2019-04-05
Payer: MEDICARE

## 2019-04-05 PROCEDURE — 97110 THERAPEUTIC EXERCISES: CPT

## 2019-04-09 ENCOUNTER — HOSPITAL ENCOUNTER (OUTPATIENT)
Dept: PHYSICAL THERAPY | Age: 69
Setting detail: THERAPIES SERIES
Discharge: HOME | End: 2019-04-09
Payer: MEDICARE

## 2019-04-09 PROCEDURE — G8978 MOBILITY CURRENT STATUS: HCPCS

## 2019-04-09 PROCEDURE — G8979 MOBILITY GOAL STATUS: HCPCS

## 2019-04-09 PROCEDURE — 97110 THERAPEUTIC EXERCISES: CPT

## 2019-04-09 NOTE — DISCHARGE SUMMARY
[facility] [address]     Ph: [phone]  Fax: [phone]    [] Certification  [] Recertification []  Plan of Care  [] Progress Note [x] Discharge      To:  Referring Practitioner: NICHOLE Regan      From:  German Shepard PT  Patient: Robert Cordero     : 1950        Date: 4/10/2019  Treatment Diagnosis: back pain, impaired lumbar ROM, impaired core and B LE strength    Plan of Care/Certification Expiration Date: 19  Progress Report Period from:  3/13/2019  to 4/10/2019    Total # of Visits to Date: 8   No Show: 0    Canceled Appointment: 0     OBJECTIVE:   Long Term Goals - Time Frame for Long term goals : 4-6 weeks  Goals Current/ Discharge status Met   Long term goal 1: Pt will be indep and compliant with HEP to manage symtoms. Indep/compliant  [x] yes  [] no   Long term goal 2: Pt will increase lumbar AROM to >75% of normal limits to facilitate improved ability to perform ADLs  Spine  Lumbar: flexion/ ext 100%%, L SB 25%, R SB 25%, B rot 50%   [x] yes  [x] no   Long term goal 3: Pt will increase core and B hip strength to >3/5 and 4+/5 respectively to improve functional mobility Strength RLE  R Hip Flexion: 5/5  R Hip Extension: 4/5  R Hip ABduction: 5/5  R Hip Internal Rotation: 5/5  R Hip External Rotation: 5/5  R Knee Flexion: 4+/5  R Knee Extension: 5/5  R Ankle Dorsiflexion: 5/5  Strength LLE  L Hip Flexion: 5/5  L Hip Extension: 4+/5  L Hip ABduction: 5/5  L Hip Internal Rotation: 5/5  L Hip External Rotation: 5/5  L Knee Flexion: 4+/5  L Knee Extension: 5/5  L Ankle Dorsiflexion: 5/5 [x] yes  [] no   Long term goal 4: Pt will improve MARLA to </= 12/50 to demonstrate improved  functional activity tolerance.   14/50 or 28% disability  [] yes  [x] no   Long term goal 5: Pt will decrease pain with functional activities </= 2/10 to improve QOL.  0/10 reported w/ all activity  [x] yes  [] no        Body structures, Functions, Activity limitations: Decreased ROM, Decreased strength, Increased Pain  Assessment: Pt self reports feeling 98% normal function currently and denies LBP w/ all actiivty. Pt cont's to c/o occasional \"sharp jab\" in Rt hip w/ exs and stiffness in LB upon waking though satisfied w/ current abilities. Pt w/ significantly improved B LE strength w/ grades ranging from 4/5 to 5/5. Lumbar flex/ext WFL's though SB and rot remains limited to 25-50%. D/C this date per POC and pt wishing to continue to perform HEP to manage symptoms. Discharge Recommendations: Defer PT at this time      New Education Provided:    G-What's in My Handbag  PT G-What's in My Handbag  Functional Assessment Tool Used: MARLA and pt self report function, MMT  Score: 14/50 or 28% disability; self reports 98% of PLOF, MMT 4-5/5 grossly  Mobility: Walking and Moving Around Current Status (): At least 1 percent but less than 20 percent impaired, limited or restricted  Mobility: Walking and Moving Around Goal Status (): At least 1 percent but less than 20 percent impaired, limited or restricted    PLAN: discharge             ? Patient Status:[] Continue/ Initiate plan of Care    [x] Discharge PT. Recommend pt continue with HEP. [] Additional visits requested, Please re-certify for additional visits:          Signature: Objective measures taken by: Electronically signed by Veda Jiang PTA on 4/9/19 at 3:23 PM    Electronically signed by Jess Thakur PT on 4/10/2019 at 1:17 PM    If you have any questions or concerns, please don't hesitate to call. Thank you for your referral.    I have reviewed this plan of care and certify a need for medically necessary rehabilitation services.     Physician Signature:__________________________________________________________  Date:  Please sign and return

## 2019-04-09 NOTE — PROGRESS NOTES
[address] [address]  Outpatient Physical Therapy    Treatment Note        Date: 2019  Patient: Ozzie Vizcarra  : 1950  ACCT #: [de-identified]  Referring Practitioner: NICHOLE Whitney  Diagnosis: Chronic midline low back pain, unspecified back location; chronic bilateral low back pain with L sided sciatica; weakness in B LEs    Visit Information:  PT Visit Information  Onset Date: 19  PT Insurance Information: A[employer]na Medicare  Total # of Visits to Date: 8  Plan of Care/Certification Expiration Date: 19  No Show: 0  Canceled Appointment: 0  Progress Note Counter:  (PN due 19)    Subjective: Pt presenting to appt stating \"I have some stiffness in the mornings but not pain. \"   Comments: RTD- in    HEP Compliance:  [x] Good [] Fair [] Poor [] Reports not doing due to:    Vital Signs  Patient Currently in Pain: Denies   Pain Screening  Patient Currently in Pain: Denies    OBJECTIVE:   Exercises  Exercise 1: SF, L-3.5 x  5 min  Exercise 2: LTR 10s x 10  Exercise 3: seated lumbar flexion stretch with pball (center, L, R)  5 sec x 10  Exercise 8: TA SLR x 25, b/l  Exercise 12: piriformis stretch seated 20 sec x 3, b/l  Exercise 14: bridges 5 sec x 15  Exercise 17: S/L hip ABD x15 b/l  Exercise 18:  Objective measures 10 min   Exercise 20: HEP: S/L Hip ABD, clams     Strength: [] NT  [x] MMT completed:  Strength RLE  R Hip Flexion: 5/5  R Hip Extension: 4/5  R Hip ABduction: 5/5  R Hip Internal Rotation: 5/5  R Hip External Rotation: 5/5  R Knee Flexion: 4+/5  R Knee Extension: 5/5  R Ankle Dorsiflexion: 5/5  Strength LLE  L Hip Flexion: 5/5  L Hip Extension: 4+/5  L Hip ABduction: 5/5  L Hip Internal Rotation: 5/5  L Hip External Rotation: 5/5  L Knee Flexion: 4+/5  L Knee Extension: 5/5  L Ankle Dorsiflexion: 5/5     ROM: [] NT  [x] ROM measurements:  Spine  Lumbar: flexion/ ext 100%%, L SB 25%, R SB 25%, B rot 50%    Modalities:  Modalities  Moist heat: HP to low back x10 min *Indicates exercise, modality, or manual techniques to be initiated when appropriate    Assessment: Body structures, Functions, Activity limitations: Decreased ROM, Decreased strength, Increased Pain  Assessment: Pt self reports feeling 98% normal function currently and denies LBP w/ all actiivty. Pt cont's to c/o occ \"sharp jab\" in Rt hip w/ exs and stiffness in LB upon waking though satisfied w/ current abilities. Pt w/ significantly improved B LE strength w/ grades ranging from 4/5 to 5/5. Lumbar flex/ext WFL's though SB and rot remains limited to 25-50%. D/C this date per POC and pt wishing to cont indep. Treatment Diagnosis: back pain, impaired lumbar ROM, impaired core and B LE strength      Goals:   Long term goals  Time Frame for Long term goals : 4-6 weeks  Long term goal 1: Pt will be indep and compliant with HEP to manage symtoms. Long term goal 2: Pt will increase lumbar AROM to >75% of normal limits to facilitate improved ability to perform ADLs  Long term goal 3: Pt will increase core and B hip strength to >3/5 and 4+/5 respectively to improve functional mobility  Long term goal 4: Pt will improve MARLA to </= 12/50 to demonstrate improved  functional activity tolerance. Long term goal 5: Pt will decrease pain with functional activities </= 2/10 to improve QOL. Progress toward goals: Please refer to D/C note for details. POST-PAIN       Pain Rating (0-10 pain scale):   0/10   Location and pain description same as pre-treatment unless indicated. Action: [] NA   [] Perform HEP  [] Meds as prescribed  [] Modalities as prescribed   [] Call Physician     Frequency/Duration:  Plan  Plan Comment: D/C this date per POC and pt. Pt to continue current HEP. See objective section for any therapeutic exercise changes, additions or modifications this date.      PT Individual Minutes  Time In: 1942  Time Out: 1550  Minutes: 53  Timed Code Treatment Minutes: 43 Minutes  Procedure Minutes: 10

## 2019-04-12 ENCOUNTER — APPOINTMENT (OUTPATIENT)
Dept: PHYSICAL THERAPY | Age: 69
End: 2019-04-12
Payer: MEDICARE

## 2019-05-01 ENCOUNTER — OFFICE VISIT (OUTPATIENT)
Dept: FAMILY MEDICINE CLINIC | Age: 69
End: 2019-05-01
Payer: MEDICARE

## 2019-05-01 VITALS
OXYGEN SATURATION: 92 % | HEART RATE: 70 BPM | SYSTOLIC BLOOD PRESSURE: 136 MMHG | BODY MASS INDEX: 39.28 KG/M2 | WEIGHT: 265.2 LBS | TEMPERATURE: 97.5 F | HEIGHT: 69 IN | DIASTOLIC BLOOD PRESSURE: 78 MMHG

## 2019-05-01 DIAGNOSIS — M54.9 CHRONIC MIDLINE BACK PAIN, UNSPECIFIED BACK LOCATION: ICD-10-CM

## 2019-05-01 DIAGNOSIS — I10 ESSENTIAL HYPERTENSION: ICD-10-CM

## 2019-05-01 DIAGNOSIS — J44.1 COPD EXACERBATION (HCC): Primary | ICD-10-CM

## 2019-05-01 DIAGNOSIS — E78.5 HYPERLIPIDEMIA, UNSPECIFIED HYPERLIPIDEMIA TYPE: ICD-10-CM

## 2019-05-01 DIAGNOSIS — G89.29 CHRONIC MIDLINE BACK PAIN, UNSPECIFIED BACK LOCATION: ICD-10-CM

## 2019-05-01 PROCEDURE — 99213 OFFICE O/P EST LOW 20 MIN: CPT | Performed by: NURSE PRACTITIONER

## 2019-05-01 RX ORDER — DOXAZOSIN MESYLATE 4 MG/1
TABLET ORAL
Qty: 90 TABLET | Refills: 0 | Status: SHIPPED | OUTPATIENT
Start: 2019-05-01 | End: 2019-07-16 | Stop reason: SDUPTHER

## 2019-05-01 RX ORDER — METHYLPREDNISOLONE 4 MG/1
TABLET ORAL
Qty: 21 TABLET | Refills: 0 | Status: SHIPPED | OUTPATIENT
Start: 2019-05-01 | End: 2019-05-07

## 2019-05-01 RX ORDER — GABAPENTIN 300 MG/1
300 CAPSULE ORAL DAILY
Qty: 90 CAPSULE | Refills: 0 | Status: SHIPPED | OUTPATIENT
Start: 2019-05-01 | End: 2019-07-16 | Stop reason: SDUPTHER

## 2019-05-01 RX ORDER — PRAVASTATIN SODIUM 40 MG
40 TABLET ORAL NIGHTLY
Qty: 90 TABLET | Refills: 3 | Status: SHIPPED | OUTPATIENT
Start: 2019-05-01 | End: 2020-04-13

## 2019-05-01 ASSESSMENT — ENCOUNTER SYMPTOMS
COUGH: 0
SHORTNESS OF BREATH: 1

## 2019-05-01 NOTE — PROGRESS NOTES
ARTHROSCOPY      LARYNGECTOMY  2004    UPPER GASTROINTESTINAL ENDOSCOPY  13    Dr. Kristi HOWARD/NAPOLEON INJ  2002     Family History   Problem Relation Age of Onset    Heart Disease Mother     Liver Disease Mother     Heart Disease Father     Cancer Sister         lung     Social History     Socioeconomic History    Marital status:      Spouse name: None    Number of children: 3    Years of education: None    Highest education level: None   Occupational History    None   Social Needs    Financial resource strain: None    Food insecurity:     Worry: None     Inability: None    Transportation needs:     Medical: None     Non-medical: None   Tobacco Use    Smoking status: Former Smoker     Packs/day: 1.00     Years: 25.00     Pack years: 25.00     Types: Cigarettes     Last attempt to quit: 10/21/1990     Years since quittin.5    Smokeless tobacco: Never Used   Substance and Sexual Activity    Alcohol use: No     Comment: Attends [employer] 25 years    Drug use: No    Sexual activity: None   Lifestyle    Physical activity:     Days per week: None     Minutes per session: None    Stress: None   Relationships    Social connections:     Talks on phone: None     Gets together: None     Attends Mu-ism service: None     Active member of club or organization: None     Attends meetings of clubs or organizations: None     Relationship status: None    Intimate partner violence:     Fear of current or ex partner: None     Emotionally abused: None     Physically abused: None     Forced sexual activity: None   Other Topics Concern    None   Social History Narrative    Lives alone.      Current Outpatient Medications on File Prior to Visit   Medication Sig Dispense Refill    testosterone (ANDROGEL; TESTIM) 50 MG/5GM (1%) GEL 1% gel Alternate one packet daily with 2 packets daily every other day 135 Package 0    metoprolol tartrate (LOPRESSOR) 50 MG tablet Take 1.5 po bid 270 tablet 3    albuterol sulfate  (90 Base) MCG/ACT inhaler Inhale 2 puffs into the lungs every 6 hours as needed      albuterol (PROVENTIL) (2.5 MG/3ML) 0.083% nebulizer solution Take 3 mLs by nebulization every 6 hours as needed for Wheezing 120 each 2    lisinopril (PRINIVIL;ZESTRIL) 20 MG tablet Take 1 tablet by mouth daily 90 tablet 3    nitroGLYCERIN (NITROSTAT) 0.4 MG SL tablet DISSOLVE 1 TABLET UNDER THE TONGUE EVERY 5 MINUTES AS NEEDED FOR CHEST PAIN MAXIMUM OF 3 TABLETS 25 tablet 0    montelukast (SINGULAIR) 10 MG tablet TAKE 1 TABLET NIGHTLY 90 tablet 3    CPAP Machine MISC New cpap supplies mask hose filters etc 1 each 0    Saccharomyces boulardii (PROBIOTIC) 250 MG CAPS Take 1 tablet by mouth daily      esomeprazole Magnesium (NEXIUM) 20 MG PACK Take 20 mg by mouth daily      HUMIRA PEN STARTER 40 MG/0.8ML injection       aspirin 81 MG tablet Take 81 mg by mouth daily.  mesalamine (PENTASA) 250 MG CR capsule Take 500 mg by mouth 4 times daily.  famotidine (PEPCID) 20 MG tablet Take 20 mg by mouth 2 times daily      fluticasone-salmeterol (ADVAIR) 250-50 MCG/DOSE AEPB Inhale 1 puff into the lungs every 12 hours      tiotropium (SPIRIVA HANDIHALER) 18 MCG inhalation capsule Inhale 1 capsule into the lungs daily 90 capsule 1    cetirizine (ZYRTEC ALLERGY) 10 MG tablet Take 1 tablet by mouth 2 times daily (Patient taking differently: Take 10 mg by mouth 2 times daily as needed ) 60 tablet 0    triamcinolone (KENALOG) 0.1 % cream Apply bid to affected area(s)  Mon thru Friday only, take weekends off. Do not use on face, groin, armpits or breast tissue. 80 g 0     No current facility-administered medications on file prior to visit.       Allergies   Allergen Reactions    Bevespi Aerosphere [Glycopyrrolate-Formoterol] Other (See Comments)     Dizzy and felt like heart rate sped up    Campath [Alemtuzumab]      Low grade fever    Mercaptopurine     Moxifloxacin Other (See Comments)     Pt states He gets sores in his mouth       Review of Systems   Constitutional: Negative for fatigue. HENT: Positive for congestion and postnasal drip. Respiratory: Positive for shortness of breath. Negative for cough. Cardiovascular: Negative for chest pain. Objective  Vitals:    05/01/19 1138   BP: 136/78   Pulse: 70   Temp: 97.5 °F (36.4 °C)   SpO2: 92%   Weight: 265 lb 3.2 oz (120.3 kg)   Height: 5' 9\" (1.753 m)     Physical Exam   Constitutional: He is oriented to person, place, and time. He appears well-developed and well-nourished. HENT:   Head: Normocephalic. Right Ear: External ear normal.   Left Ear: External ear normal.   Nose: Nose normal.   Mouth/Throat: Oropharynx is clear and moist.   Eyes: Pupils are equal, round, and reactive to light. Conjunctivae and EOM are normal.   Neck: Neck supple. No thyromegaly present. Cardiovascular: Normal rate, regular rhythm, normal heart sounds and intact distal pulses. Pulmonary/Chest: Effort normal. He has wheezes. Lymphadenopathy:     He has no cervical adenopathy. Neurological: He is alert and oriented to person, place, and time. Skin: Skin is warm. Psychiatric: He has a normal mood and affect. His behavior is normal. Judgment and thought content normal.   Nursing note and vitals reviewed. Assessment & Plan     Diagnosis Orders   1. COPD exacerbation (HCC)  methylPREDNISolone (MEDROL DOSEPACK) 4 MG tablet   2. Hyperlipidemia, unspecified hyperlipidemia type  pravastatin (PRAVACHOL) 40 MG tablet   3. Chronic midline back pain, unspecified back location  gabapentin (NEURONTIN) 300 MG capsule   4. Essential hypertension  doxazosin (CARDURA) 4 MG tablet        No orders of the defined types were placed in this encounter.       Orders Placed This Encounter   Medications    pravastatin (PRAVACHOL) 40 MG tablet     Sig: Take 1 tablet by mouth nightly     Dispense:  90 tablet     Refill:  3    gabapentin

## 2019-05-13 ENCOUNTER — OFFICE VISIT (OUTPATIENT)
Dept: FAMILY MEDICINE CLINIC | Age: 69
End: 2019-05-13
Payer: MEDICARE

## 2019-05-13 VITALS
OXYGEN SATURATION: 98 % | HEIGHT: 70 IN | SYSTOLIC BLOOD PRESSURE: 102 MMHG | WEIGHT: 260 LBS | HEART RATE: 65 BPM | DIASTOLIC BLOOD PRESSURE: 70 MMHG | TEMPERATURE: 97 F | BODY MASS INDEX: 37.22 KG/M2

## 2019-05-13 DIAGNOSIS — L23.9 ALLERGIC DERMATITIS: ICD-10-CM

## 2019-05-13 DIAGNOSIS — R09.81 SINUS CONGESTION: Primary | ICD-10-CM

## 2019-05-13 PROCEDURE — 99213 OFFICE O/P EST LOW 20 MIN: CPT | Performed by: NURSE PRACTITIONER

## 2019-05-13 RX ORDER — TRIAMCINOLONE ACETONIDE 1 MG/G
CREAM TOPICAL
Qty: 80 G | Refills: 0 | Status: SHIPPED | OUTPATIENT
Start: 2019-05-13 | End: 2019-09-25 | Stop reason: ALTCHOICE

## 2019-05-13 RX ORDER — FLUTICASONE PROPIONATE 50 MCG
1 SPRAY, SUSPENSION (ML) NASAL DAILY
Qty: 3 BOTTLE | Refills: 1 | Status: SHIPPED | OUTPATIENT
Start: 2019-05-13 | End: 2021-06-01 | Stop reason: ALTCHOICE

## 2019-05-13 ASSESSMENT — ENCOUNTER SYMPTOMS
COUGH: 0
SHORTNESS OF BREATH: 0
DIARRHEA: 0
COLOR CHANGE: 0

## 2019-05-13 NOTE — PROGRESS NOTES
Subjective  Chief Complaint   Patient presents with    Rash     states that both of his arms are itching. states that is has been itching on the LT arm for a few weeks also has a spot on his forehead. Rash   This is a new problem. The current episode started more than 1 month ago. The problem is unchanged. Location: left forearm and right forearm. The rash is characterized by itchiness and dryness. He was exposed to nothing (has been cutting the grass, no gardening). Pertinent negatives include no congestion, cough, diarrhea, fatigue, fever or shortness of breath. Treatments tried: kenalog cream \"a couple times\" The treatment provided mild relief.        Past Medical History:   Diagnosis Date    Abnormal EMG 12/09/2015    Actinic keratoses     Actinic keratoses     Allergic rhinitis     Anemia 11/18/2014    Arthritis     Chronic back pain     COPD [facility] obstructive [facility] disease[facility] ([facility]) 08/09/2012    Crohns disease ([address]     Degenerative disc disease, lumbar     Dermatitis     Diabetes ([address] 03/19/2015    diet controlled    GERD (gastroesophageal reflux disease)     History of atrial fibrillation 2006    Dr. Lea Barnhart Hyperlipidemia 1/21/2014    Hypertension     Hypogonadism male     Lung disease     Mononeuritis multiplex     Osteoarthritis of lumbar spine     Pain of right heel     Paresthesia of foot, bilateral     Prediabetes 12/3/2014    Seborrheic dermatitis     Seborrheic keratoses, inflamed     Throat cancer ([facility])     throat, had surgery, sees Dr Missy Castañeda yearly    Tinea cruris     Tinea pedis     Tinea unguium      Patient Active Problem List    Diagnosis Date Noted    JARRELL on CPAP 06/06/2018    Actinic keratoses     MACIEL (dyspnea on exertion)     Pain of right heel     Tinea unguium     Throat cancer ([address]     Chronic back pain     Degenerative disc disease, lumbar     Osteoarthritis of lumbar spine     Mononeuritis multiplex     Seborrheic dermatitis     Hypogonadism male     Allergic rhinitis     Hyperlipidemia 2014    [facility] 2014    COPD [facility] ([facility] 2012    GERD (gastroesophageal reflux disease)     Hypertension 10/21/2011     Past Surgical History:   Procedure Laterality Date    CARDIAC CATHETERIZATION      COLONOSCOPY  04/15/2015    KNEE ARTHROSCOPY      LARYNGECTOMY  2004    UPPER GASTROINTESTINAL ENDOSCOPY  13    Dr. Faisal HOWARD/RADIESSE INJ       Family History   Problem Relation Age of Onset    Heart Disease Mother     Liver Disease Mother     Heart Disease Father     Cancer Sister         lung     Social History     Socioeconomic History    Marital status:      Spouse name: None    Number of children: 3    Years of education: None    Highest education level: None   Occupational History    None   Social Needs    Financial resource strain: None    Food insecurity:     Worry: None     Inability: None    Transportation needs:     Medical: None     Non-medical: None   Tobacco Use    Smoking status: Former Smoker     Packs/day: 1.00     Years: 25.00     Pack years: 25.00     Types: Cigarettes     Last attempt to quit: 10/21/1990     Years since quittin.5    Smokeless tobacco: Never Used   Substance and Sexual Activity    Alcohol use: No     Comment: Attends [employer] 25 years    Drug use: No    Sexual activity: None   Lifestyle    Physical activity:     Days per week: None     Minutes per session: None    Stress: None   Relationships    Social connections:     Talks on phone: None     Gets together: None     Attends Protestant service: None     Active member of club or organization: None     Attends meetings of clubs or organizations: None     Relationship status: None    Intimate partner violence:     Fear of current or ex partner: None     Emotionally abused: None     Physically abused: None     Forced sexual activity: None   Other Topics Concern    None   Social History Narrative    Lives alone. Current Outpatient Medications on File Prior to Visit   Medication Sig Dispense Refill    pravastatin (PRAVACHOL) 40 MG tablet Take 1 tablet by mouth nightly 90 tablet 3    gabapentin (NEURONTIN) 300 MG capsule Take 1 capsule by mouth daily for 90 days. 90 capsule 0    doxazosin (CARDURA) 4 MG tablet Take 1/2 BID 90 tablet 0    testosterone (ANDROGEL; TESTIM) 50 MG/5GM (1%) GEL 1% gel Alternate one packet daily with 2 packets daily every other day 135 Package 0    metoprolol tartrate (LOPRESSOR) 50 MG tablet Take 1.5 po bid 270 tablet 3    albuterol sulfate  (90 Base) MCG/ACT inhaler Inhale 2 puffs into the lungs every 6 hours as needed      tiotropium (SPIRIVA HANDIHALER) 18 MCG inhalation capsule Inhale 1 capsule into the lungs daily 90 capsule 1    albuterol (PROVENTIL) (2.5 MG/3ML) 0.083% nebulizer solution Take 3 mLs by nebulization every 6 hours as needed for Wheezing 120 each 2    lisinopril (PRINIVIL;ZESTRIL) 20 MG tablet Take 1 tablet by mouth daily 90 tablet 3    nitroGLYCERIN (NITROSTAT) 0.4 MG SL tablet DISSOLVE 1 TABLET UNDER THE TONGUE EVERY 5 MINUTES AS NEEDED FOR CHEST PAIN MAXIMUM OF 3 TABLETS 25 tablet 0    montelukast (SINGULAIR) 10 MG tablet TAKE 1 TABLET NIGHTLY 90 tablet 3    cetirizine (ZYRTEC ALLERGY) 10 MG tablet Take 1 tablet by mouth 2 times daily (Patient taking differently: Take 10 mg by mouth 2 times daily as needed ) 60 tablet 0    CPAP Machine MISC New cpap supplies mask hose filters etc 1 each 0    Saccharomyces boulardii (PROBIOTIC) 250 MG CAPS Take 1 tablet by mouth daily      esomeprazole Magnesium (NEXIUM) 20 MG PACK Take 20 mg by mouth daily      HUMIRA PEN STARTER 40 MG/0.8ML injection       aspirin 81 MG tablet Take 81 mg by mouth daily.  mesalamine (PENTASA) 250 MG CR capsule Take 500 mg by mouth 4 times daily.       fluticasone-salmeterol (ADVAIR) 250-50 MCG/DOSE AEPB Inhale 1 puff into the lungs every 12 hours       No current facility-administered medications on file prior to visit. Allergies   Allergen Reactions    Alemtuzumab      Low grade fever  Other reaction(s): Other: See Comments  Low grade fever    Glycopyrrolate-Formoterol Other (See Comments)     Dizzy and felt like heart rate sped up  Other reaction(s): Other: See Comments  Dizzy and felt like heart rate sped up    Mercaptopurine     Moxifloxacin Other (See Comments)     Pt states He gets sores in his mouth       Review of Systems   Constitutional: Negative for chills, diaphoresis, fatigue and fever. HENT: Negative for congestion. Respiratory: Negative for cough and shortness of breath. Cardiovascular: Negative for chest pain, palpitations and leg swelling. Gastrointestinal: Negative for diarrhea. Skin: Positive for rash. Negative for color change, pallor and wound. Neurological: Negative for dizziness and light-headedness. Psychiatric/Behavioral: Negative for decreased concentration. The patient is not nervous/anxious. Objective  Vitals:    05/13/19 1236   BP: 102/70   Site: Left Upper Arm   Position: Sitting   Cuff Size: Large Adult   Pulse: 65   Temp: 97 °F (36.1 °C)   SpO2: 98%   Weight: 260 lb (117.9 kg)   Height: 5' 10\" (1.778 m)     Physical Exam   Constitutional: He is oriented to person, place, and time. He appears well-developed and well-nourished. No distress. HENT:   Head: Normocephalic and atraumatic. Right Ear: External ear normal.   Left Ear: External ear normal.   Cardiovascular: Normal rate, regular rhythm and normal heart sounds. Pulmonary/Chest: Effort normal and breath sounds normal. No respiratory distress. Musculoskeletal: Normal range of motion. He exhibits no edema. Neurological: He is alert and oriented to person, place, and time. No cranial nerve deficit. Skin: Skin is warm and dry. Capillary refill takes less than 2 seconds.  Rash (bilateral erythematous macular rash on forearms) noted. He is not diaphoretic. There is erythema. No pallor. Psychiatric: He has a normal mood and affect. His behavior is normal. Judgment and thought content normal.       Assessment& Plan     Diagnosis Orders   1. Sinus congestion  fluticasone (FLONASE) 50 MCG/ACT nasal spray   2. Allergic dermatitis  triamcinolone (KENALOG) 0.1 % cream     Recommend to keep areas clean/dry as able. May add OTC anti-histamines for itching/irritation. Topical therapy as prescribed. Discussed alarm symptoms indicating subsequent/emergent treatment. Patient acknowledges/agrees with plan. F/u as scheduled in . Side effects, adverse effects of the medication prescribed today, as well as treatment plan/ rationale and result expectations have been discussed with the patient who expresses understanding and desires to proceed. Close follow up to evaluate treatment results and for coordination of care. I have reviewed the patient's medical history in detail and updated the computerized patient record. As always, patient is advised that if symptoms worsen in any way they will proceed to the nearest emergency room. No orders of the defined types were placed in this encounter. Orders Placed This Encounter   Medications    triamcinolone (KENALOG) 0.1 % cream     Sig: Apply bid to affected area(s)  Mon thru Friday only, take weekends off. Do not use on face, groin, armpits or breast tissue. Dispense:  80 g     Refill:  0    fluticasone (FLONASE) 50 MCG/ACT nasal spray     Si spray by Each Nare route daily 1 Spray in each nostril     Dispense:  3 Bottle     Refill:  1       Return if symptoms worsen or fail to improve.     Suzy Nielsen, APRN - CNP

## 2019-05-20 ENCOUNTER — OFFICE VISIT (OUTPATIENT)
Dept: FAMILY MEDICINE CLINIC | Age: 69
End: 2019-05-20
Payer: MEDICARE

## 2019-05-20 VITALS
TEMPERATURE: 97.2 F | SYSTOLIC BLOOD PRESSURE: 130 MMHG | OXYGEN SATURATION: 98 % | HEART RATE: 63 BPM | BODY MASS INDEX: 38.98 KG/M2 | DIASTOLIC BLOOD PRESSURE: 68 MMHG | WEIGHT: 263.2 LBS | HEIGHT: 69 IN

## 2019-05-20 DIAGNOSIS — J44.1 COPD WITH ACUTE EXACERBATION (HCC): Primary | ICD-10-CM

## 2019-05-20 PROCEDURE — 99213 OFFICE O/P EST LOW 20 MIN: CPT | Performed by: NURSE PRACTITIONER

## 2019-05-20 RX ORDER — METHYLPREDNISOLONE 4 MG/1
TABLET ORAL
Qty: 1 KIT | Refills: 0 | Status: SHIPPED | OUTPATIENT
Start: 2019-05-20 | End: 2019-05-26

## 2019-05-20 RX ORDER — AZITHROMYCIN 250 MG/1
250 TABLET, FILM COATED ORAL DAILY
Qty: 6 TABLET | Refills: 0 | Status: SHIPPED | OUTPATIENT
Start: 2019-05-20 | End: 2019-05-25

## 2019-05-20 ASSESSMENT — ENCOUNTER SYMPTOMS
WHEEZING: 0
COUGH: 1
SHORTNESS OF BREATH: 1
CHEST TIGHTNESS: 1
SORE THROAT: 0
RHINORRHEA: 0

## 2019-05-20 NOTE — PROGRESS NOTES
Subjective  Shelly Milder, 71 y.o. male presents today with:  Chief Complaint   Patient presents with    Chest Congestion     x 2-3 days    Cough       Cough   This is a new problem. Episode onset: 2-3 days. The problem has been gradually worsening. The cough is productive of purulent sputum. Associated symptoms include shortness of breath. Pertinent negatives include no chest pain, chills, fever, nasal congestion, postnasal drip, rhinorrhea, sore throat, sweats or wheezing. Treatments tried: albuterol nebulizer, spiriva  The treatment provided mild relief. His past medical history is significant for bronchitis, COPD and pneumonia. Review of Systems   Constitutional: Negative for chills and fever. HENT: Negative for congestion, postnasal drip, rhinorrhea and sore throat. Respiratory: Positive for cough, chest tightness and shortness of breath. Negative for wheezing. Cardiovascular: Negative for chest pain. Objective    Vitals:    05/20/19 1550   BP: 130/68   Pulse: 63   Temp: 97.2 °F (36.2 °C)   SpO2: 98%   Weight: 263 lb 3.2 oz (119.4 kg)   Height: 5' 9\" (1.753 m)       Physical Exam   Constitutional: He appears well-developed and well-nourished. No distress. HENT:   Head: Normocephalic and atraumatic. Right Ear: Tympanic membrane and external ear normal.   Left Ear: Tympanic membrane and external ear normal.   Mouth/Throat: Oropharynx is clear and moist. No oropharyngeal exudate. Eyes: Pupils are equal, round, and reactive to light. Right eye exhibits no discharge. Left eye exhibits no discharge. Neck: No tracheal deviation present. Cardiovascular: Normal rate and regular rhythm. Exam reveals no gallop and no friction rub. No murmur heard. Pulmonary/Chest: Effort normal. No stridor. No respiratory distress. He has wheezes (diffuse expiratory). He has no rales. He exhibits no tenderness. Lymphadenopathy:     He has no cervical adenopathy. Neurological: He is alert.    Skin: Skin is warm. No rash noted. He is not diaphoretic. No erythema. No pallor. Vitals reviewed. POC Testing Today:No results found for this visit on 05/20/19. Assessment & Plan    Diagnosis Orders   1. COPD with acute exacerbation (HCC)  methylPREDNISolone (MEDROL DOSEPACK) 4 MG tablet    azithromycin (ZITHROMAX) 250 MG tablet       No orders of the defined types were placed in this encounter. Continue using nebulizer and Spiriva. Antibiotic Instructions:   Complete the full course of antibiotics as ordered. To prevent antibiotic resistances please take medication as ordered and for the full duration even if you start to feel better. Take each dose with a small snack or meal to lessen potential GI upset. Consider intake of yogurt or probiotic during antibiotic use and for a few days after to help reduce the risk of developing a secondary infection. Take the yogurt or probiotic at least 2 hours after taking the antibiotic. Avoid alcohol while taking antibiotics. Oral Steroid Instructions: Take each dose with a small snack or meal to lessen potential GI upset. Follow dosing instructions provided with prescription. Common side effects include difficulty sleeping and irritability. Take full course as ordered. Return if symptoms worsen or fail to improve, for follow up with your PCP. Side effects and adverse effects of any medication prescribed today, as well as treatment plan/rationale, follow-up care, and result expectations have been discussed with the patient. Expresses understanding and desires to proceed with treatment plan. Discussed signs and symptoms which require immediate follow-up in ED/call to 911. Understanding verbalized. I have reviewed and updated the electronic medical record.     Zayda Rihcardson, STARLA - CNP

## 2019-06-12 ENCOUNTER — OFFICE VISIT (OUTPATIENT)
Dept: FAMILY MEDICINE CLINIC | Age: 69
End: 2019-06-12
Payer: MEDICARE

## 2019-06-12 VITALS
WEIGHT: 263 LBS | HEIGHT: 70 IN | BODY MASS INDEX: 37.65 KG/M2 | SYSTOLIC BLOOD PRESSURE: 124 MMHG | OXYGEN SATURATION: 94 % | DIASTOLIC BLOOD PRESSURE: 70 MMHG | HEART RATE: 70 BPM

## 2019-06-12 DIAGNOSIS — K50.90 CROHN'S DISEASE WITHOUT COMPLICATION, UNSPECIFIED GASTROINTESTINAL TRACT LOCATION (HCC): ICD-10-CM

## 2019-06-12 DIAGNOSIS — I10 ESSENTIAL HYPERTENSION: ICD-10-CM

## 2019-06-12 DIAGNOSIS — M54.9 CHRONIC MIDLINE BACK PAIN, UNSPECIFIED BACK LOCATION: ICD-10-CM

## 2019-06-12 DIAGNOSIS — E78.2 MIXED HYPERLIPIDEMIA: Primary | ICD-10-CM

## 2019-06-12 DIAGNOSIS — L23.9 ALLERGIC DERMATITIS: ICD-10-CM

## 2019-06-12 DIAGNOSIS — G89.29 CHRONIC MIDLINE BACK PAIN, UNSPECIFIED BACK LOCATION: ICD-10-CM

## 2019-06-12 DIAGNOSIS — E78.5 HYPERLIPIDEMIA, UNSPECIFIED HYPERLIPIDEMIA TYPE: ICD-10-CM

## 2019-06-12 PROCEDURE — 99213 OFFICE O/P EST LOW 20 MIN: CPT | Performed by: NURSE PRACTITIONER

## 2019-06-12 ASSESSMENT — ENCOUNTER SYMPTOMS
BACK PAIN: 1
SHORTNESS OF BREATH: 0
COUGH: 0

## 2019-06-12 NOTE — PROGRESS NOTES
Subjective  Chief Complaint   Patient presents with    3 Month Follow-Up     check up, wants labs to check for DM. Hypertension   This is a chronic problem. The current episode started more than 1 year ago. The problem is unchanged. The problem is controlled. Pertinent negatives include no chest pain, headaches, malaise/fatigue, palpitations, peripheral edema or shortness of breath. There are no associated agents to hypertension. Risk factors for coronary artery disease include male gender, obesity and dyslipidemia (prediabetes). Past treatments include ACE inhibitors and central alpha agonists. The current treatment provides significant improvement. There is no history of chronic renal disease. Hyperlipidemia   This is a chronic problem. The current episode started more than 1 year ago. The problem is controlled. Recent lipid tests were reviewed and are normal. Exacerbating diseases include obesity. He has no history of chronic renal disease, diabetes, hypothyroidism, liver disease or nephrotic syndrome. There are no known factors aggravating his hyperlipidemia. Pertinent negatives include no chest pain, focal sensory loss, focal weakness, leg pain, myalgias or shortness of breath. Current antihyperlipidemic treatment includes statins. The current treatment provides significant improvement of lipids. There are no compliance problems. Risk factors for coronary artery disease include dyslipidemia, male sex, hypertension and obesity. Pt here for general check up. States he is doing well overall. Still having issues with rash and itching on bilateral forearms. Taking gabapentin daily for his chronic back pain and sciatica. Doing very well with that.      Past Medical History:   Diagnosis Date    Abnormal EMG 12/09/2015    Actinic keratoses     Actinic keratoses     Allergic rhinitis     Anemia 11/18/2014    Arthritis     Chronic back pain     COPD (chronic obstructive pulmonary disease) None   Social Needs    Financial resource strain: None    Food insecurity:     Worry: None     Inability: None    Transportation needs:     Medical: None     Non-medical: None   Tobacco Use    Smoking status: Former Smoker     Packs/day: 1.00     Years: 25.00     Pack years: 25.00     Types: Cigarettes     Last attempt to quit: 10/21/1990     Years since quittin.6    Smokeless tobacco: Never Used   Substance and Sexual Activity    Alcohol use: No     Comment: Attends AA, 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    Drug use: No    Sexual activity: None   Lifestyle    Physical activity:     Days per week: None     Minutes per session: None    Stress: None   Relationships    Social connections:     Talks on phone: None     Gets together: None     Attends Mandaeism service: None     Active member of club or organization: None     Attends meetings of clubs or organizations: None     Relationship status: None    Intimate partner violence:     Fear of current or ex partner: None     Emotionally abused: None     Physically abused: None     Forced sexual activity: None   Other Topics Concern    None   Social History Narrative    Lives alone. Current Outpatient Medications on File Prior to Visit   Medication Sig Dispense Refill    triamcinolone (KENALOG) 0.1 % cream Apply bid to affected area(s)  Mon thru Friday only, take weekends off. Do not use on face, groin, armpits or breast tissue. 80 g 0    fluticasone (FLONASE) 50 MCG/ACT nasal spray 1 spray by Each Nare route daily 1 Spray in each nostril 3 Bottle 1    pravastatin (PRAVACHOL) 40 MG tablet Take 1 tablet by mouth nightly 90 tablet 3    gabapentin (NEURONTIN) 300 MG capsule Take 1 capsule by mouth daily for 90 days.  90 capsule 0    doxazosin (CARDURA) 4 MG tablet Take 1/2 BID 90 tablet 0    fluticasone-salmeterol (ADVAIR) 250-50 MCG/DOSE AEPB Inhale 1 puff into the lungs every 12 hours      testosterone (ANDROGEL; TESTIM) 50 MG/5GM (1%) GEL 1% gel Alternate one packet daily with 2 packets daily every other day 135 Package 0    metoprolol tartrate (LOPRESSOR) 50 MG tablet Take 1.5 po bid 270 tablet 3    albuterol sulfate  (90 Base) MCG/ACT inhaler Inhale 2 puffs into the lungs every 6 hours as needed      tiotropium (SPIRIVA HANDIHALER) 18 MCG inhalation capsule Inhale 1 capsule into the lungs daily 90 capsule 1    albuterol (PROVENTIL) (2.5 MG/3ML) 0.083% nebulizer solution Take 3 mLs by nebulization every 6 hours as needed for Wheezing 120 each 2    lisinopril (PRINIVIL;ZESTRIL) 20 MG tablet Take 1 tablet by mouth daily 90 tablet 3    nitroGLYCERIN (NITROSTAT) 0.4 MG SL tablet DISSOLVE 1 TABLET UNDER THE TONGUE EVERY 5 MINUTES AS NEEDED FOR CHEST PAIN MAXIMUM OF 3 TABLETS 25 tablet 0    montelukast (SINGULAIR) 10 MG tablet TAKE 1 TABLET NIGHTLY 90 tablet 3    cetirizine (ZYRTEC ALLERGY) 10 MG tablet Take 1 tablet by mouth 2 times daily (Patient taking differently: Take 10 mg by mouth 2 times daily as needed ) 60 tablet 0    CPAP Machine MISC New cpap supplies mask hose filters etc 1 each 0    Saccharomyces boulardii (PROBIOTIC) 250 MG CAPS Take 1 tablet by mouth daily      esomeprazole Magnesium (NEXIUM) 20 MG PACK Take 20 mg by mouth daily      HUMIRA PEN STARTER 40 MG/0.8ML injection       aspirin 81 MG tablet Take 81 mg by mouth daily.  mesalamine (PENTASA) 250 MG CR capsule Take 500 mg by mouth 4 times daily. No current facility-administered medications on file prior to visit. Allergies   Allergen Reactions    Alemtuzumab      Low grade fever  Other reaction(s): Other: See Comments  Low grade fever    Glycopyrrolate-Formoterol Other (See Comments)     Dizzy and felt like heart rate sped up  Other reaction(s):  Other: See Comments  Dizzy and felt like heart rate sped up    Mercaptopurine     Moxifloxacin Other (See Comments)     Pt states He gets sores in his mouth       Review of Systems   Constitutional: Negative for chills, record. As always, patient is advised that if symptoms worsen in any way they will proceed to the nearest emergency room. No orders of the defined types were placed in this encounter. No orders of the defined types were placed in this encounter. Return in about 4 months (around 10/12/2019) for check up, htn, lipid.     STARLA Olivares CNP

## 2019-07-16 DIAGNOSIS — M54.9 CHRONIC MIDLINE BACK PAIN, UNSPECIFIED BACK LOCATION: ICD-10-CM

## 2019-07-16 DIAGNOSIS — G89.29 CHRONIC MIDLINE BACK PAIN, UNSPECIFIED BACK LOCATION: ICD-10-CM

## 2019-07-16 DIAGNOSIS — J30.2 CHRONIC SEASONAL ALLERGIC RHINITIS: ICD-10-CM

## 2019-07-16 DIAGNOSIS — I10 ESSENTIAL HYPERTENSION: ICD-10-CM

## 2019-07-16 RX ORDER — MONTELUKAST SODIUM 10 MG/1
TABLET ORAL
Qty: 90 TABLET | Refills: 3 | Status: SHIPPED | OUTPATIENT
Start: 2019-07-16 | End: 2020-07-22 | Stop reason: SDUPTHER

## 2019-07-16 RX ORDER — GABAPENTIN 300 MG/1
300 CAPSULE ORAL DAILY
Qty: 90 CAPSULE | Refills: 0 | Status: SHIPPED | OUTPATIENT
Start: 2019-07-16 | End: 2019-10-09 | Stop reason: SDUPTHER

## 2019-07-16 RX ORDER — DOXAZOSIN MESYLATE 4 MG/1
TABLET ORAL
Qty: 90 TABLET | Refills: 0 | Status: SHIPPED | OUTPATIENT
Start: 2019-07-16 | End: 2019-10-09 | Stop reason: SDUPTHER

## 2019-07-17 ENCOUNTER — OFFICE VISIT (OUTPATIENT)
Dept: FAMILY MEDICINE CLINIC | Age: 69
End: 2019-07-17
Payer: MEDICARE

## 2019-07-17 VITALS
SYSTOLIC BLOOD PRESSURE: 116 MMHG | DIASTOLIC BLOOD PRESSURE: 68 MMHG | TEMPERATURE: 98.4 F | BODY MASS INDEX: 37.94 KG/M2 | HEIGHT: 70 IN | HEART RATE: 67 BPM | WEIGHT: 265 LBS | OXYGEN SATURATION: 97 %

## 2019-07-17 DIAGNOSIS — B35.4 TINEA CORPORIS: Primary | ICD-10-CM

## 2019-07-17 DIAGNOSIS — L21.9 SEBORRHEIC DERMATITIS: ICD-10-CM

## 2019-07-17 PROCEDURE — 99214 OFFICE O/P EST MOD 30 MIN: CPT | Performed by: FAMILY MEDICINE

## 2019-07-17 ASSESSMENT — ENCOUNTER SYMPTOMS
VOICE CHANGE: 0
COLOR CHANGE: 0
NAUSEA: 0
TROUBLE SWALLOWING: 0
CONSTIPATION: 0
EYE DISCHARGE: 0
ABDOMINAL DISTENTION: 0
ABDOMINAL PAIN: 0
DIARRHEA: 0
SHORTNESS OF BREATH: 0
COUGH: 0

## 2019-07-17 NOTE — PROGRESS NOTES
you may also use the shampoo on this area. Utilize routinely, but once under control it may not be necessary to let the scalp soak. Improvement should be noted. Skin changes can lead behind the treatment. Patient is been advised to get a loofah or a pumice to remove dry skin gently from his feet and lotion at night. He should leave them open to air though to avoid any recurrence of athlete's foot. Patient will apply nystatin with triamcinolone to the areas of forearm      Patient Education        Ringworm: Care Instructions  Your Care Instructions  Ringworm is a fungus infection of the skin. It is not caused by a worm. Ringworm causes a round, scaly rash that may crack and itch. The rash can spread over a wide area. One type of fungus that causes ringworm is often found in locker rooms and swimming pools. It grows well in warm, moist areas of the skin, such as in skin folds. You can get ringworm by sharing towels, clothing, and sports equipment. You can also get it by touching someone who has ringworm. Ringworm is treated with cream that kills the fungus. If the rash is widespread, you may need pills to get rid of it. Ringworm often comes back after treatment. If the rash becomes infected with bacteria, you may need antibiotics. Follow-up care is a key part of your treatment and safety. Be sure to make and go to all appointments, and call your doctor if you are having problems. It's also a good idea to know your test results and keep a list of the medicines you take. How can you care for yourself at home? · Take your medicines exactly as prescribed. Call your doctor if you have any problems with your medicine. · Wash the rash with soap and water, remove flaky skin, and dry thoroughly. · Try an over-the-counter cream with clotrimazole or miconazole in it. Brand names include Lotrimin, Micatin, and Tinactin. Terbinafine cream (Lamisil) is also available without a prescription.  Spread the cream beyond

## 2019-07-24 ENCOUNTER — OFFICE VISIT (OUTPATIENT)
Dept: PULMONOLOGY | Age: 69
End: 2019-07-24
Payer: MEDICARE

## 2019-07-24 VITALS
DIASTOLIC BLOOD PRESSURE: 74 MMHG | OXYGEN SATURATION: 96 % | TEMPERATURE: 98.4 F | BODY MASS INDEX: 37.94 KG/M2 | RESPIRATION RATE: 16 BRPM | WEIGHT: 265 LBS | HEIGHT: 70 IN | SYSTOLIC BLOOD PRESSURE: 118 MMHG | HEART RATE: 72 BPM

## 2019-07-24 DIAGNOSIS — G47.33 OSA ON CPAP: ICD-10-CM

## 2019-07-24 DIAGNOSIS — J44.9 CHRONIC OBSTRUCTIVE PULMONARY DISEASE, UNSPECIFIED COPD TYPE (HCC): Primary | ICD-10-CM

## 2019-07-24 DIAGNOSIS — Z99.89 OSA ON CPAP: ICD-10-CM

## 2019-07-24 DIAGNOSIS — E66.9 OBESITY (BMI 30-39.9): ICD-10-CM

## 2019-07-24 PROCEDURE — 99214 OFFICE O/P EST MOD 30 MIN: CPT | Performed by: INTERNAL MEDICINE

## 2019-07-24 RX ORDER — PREDNISOLONE ACETATE 10 MG/ML
SUSPENSION/ DROPS OPHTHALMIC
COMMUNITY
Start: 2019-07-18 | End: 2019-12-19 | Stop reason: ALTCHOICE

## 2019-07-24 ASSESSMENT — ENCOUNTER SYMPTOMS
CHEST TIGHTNESS: 0
ABDOMINAL PAIN: 0
DIARRHEA: 0
SORE THROAT: 0
NAUSEA: 0
RHINORRHEA: 0
VOICE CHANGE: 0
COUGH: 1
VOMITING: 0
EYE ITCHING: 0
WHEEZING: 0
SHORTNESS OF BREATH: 1

## 2019-07-24 NOTE — PROGRESS NOTES
Assessment/Plan:     1. Chronic obstructive pulmonary disease, unspecified COPD type ([address]  Patient is using spiriva and albutero HFA and nebulizer , he said advair is also not covered   C/o shortness of breath with exertion. No Wheezing  . Cough with clear  Sputum. Continue bronchodilator therapy as before    2. JARRELL on CPAP  Patient is using CPAP with  14  centimeters of H2O with heated humidity. He  is using CPAP for about  5-7 hours every night. He  is using CPAP with nasal pillow He said  sleep is restful with the CPAP use. He  is compliant with CPAP therapy and benefiting with CPAP use. No snoring with CPAP use. Continue CPAP as before    Counseling: CPAP/BiPAP uses, patient advised to use CPAP at least 5-6 hours every night. Driving: patient is advised for extreme caution when driving or operating machinery if there is a feeling of drowsiness, especially while driving it is preferable to stop driving and take a brief nap. Sleep hygiene:Avoid supine sleep, sleep on  sides. Avoid  sleep deprivation. Explained sleep hygiene. Advice to avoid Alcohol and sedative    3. Obesity (BMI 30-39. 9)  Patient patient is advised try to lose weight. obesity related risk explained to the patient ,  Current weight:  265 lb (120.2 kg) Lbs. BMI:  Body mass index is 38.02 kg/m². Return in about 4 months (around 11/24/2019) for jarrell, COPD.       Reza Infante MD

## 2019-07-27 DIAGNOSIS — E29.1 HYPOGONADISM MALE: ICD-10-CM

## 2019-07-27 NOTE — TELEPHONE ENCOUNTER
[facility], he's going out of town and would like it as soon as possible.

## 2019-07-30 DIAGNOSIS — E29.1 HYPOGONADISM MALE: ICD-10-CM

## 2019-07-30 RX ORDER — TESTOSTERONE GEL, 1% 10 MG/G
GEL TRANSDERMAL
Qty: 135 PACKAGE | Refills: 0 | OUTPATIENT
Start: 2019-07-30 | End: 2019-12-24

## 2019-07-30 RX ORDER — TESTOSTERONE GEL, 1% 10 MG/G
GEL TRANSDERMAL
Qty: 135 PACKAGE | Refills: 0 | Status: SHIPPED | OUTPATIENT
Start: 2019-07-30 | End: 2019-11-07 | Stop reason: SDUPTHER

## 2019-08-19 ENCOUNTER — OFFICE VISIT (OUTPATIENT)
Dept: FAMILY MEDICINE CLINIC | Age: 69
End: 2019-08-19
Payer: MEDICARE

## 2019-08-19 VITALS
SYSTOLIC BLOOD PRESSURE: 132 MMHG | HEIGHT: 70 IN | WEIGHT: 265 LBS | OXYGEN SATURATION: 97 % | HEART RATE: 67 BPM | BODY MASS INDEX: 37.94 KG/M2 | DIASTOLIC BLOOD PRESSURE: 68 MMHG

## 2019-08-19 DIAGNOSIS — M54.50 ACUTE LEFT-SIDED LOW BACK PAIN WITHOUT SCIATICA: Primary | ICD-10-CM

## 2019-08-19 PROCEDURE — 99213 OFFICE O/P EST LOW 20 MIN: CPT | Performed by: NURSE PRACTITIONER

## 2019-08-19 RX ORDER — METHYLPREDNISOLONE 4 MG/1
TABLET ORAL
Qty: 1 KIT | Refills: 0 | Status: SHIPPED | OUTPATIENT
Start: 2019-08-19 | End: 2019-08-25

## 2019-08-19 ASSESSMENT — ENCOUNTER SYMPTOMS
COLOR CHANGE: 0
BACK PAIN: 1

## 2019-09-19 DIAGNOSIS — R97.20 RISING PSA LEVEL: ICD-10-CM

## 2019-09-20 LAB — PROSTATE SPECIFIC ANTIGEN: 2.2 NG/ML (ref 0–6.22)

## 2019-09-23 ENCOUNTER — TELEPHONE (OUTPATIENT)
Dept: FAMILY MEDICINE CLINIC | Age: 69
End: 2019-09-23

## 2019-09-23 ENCOUNTER — HOSPITAL ENCOUNTER (EMERGENCY)
Age: 69
Discharge: HOME | End: 2019-09-23
Payer: MEDICARE

## 2019-09-23 ENCOUNTER — APPOINTMENT (OUTPATIENT)
Dept: GENERAL RADIOLOGY | Age: 69
End: 2019-09-23
Payer: MEDICARE

## 2019-09-23 VITALS
WEIGHT: 265 LBS | HEIGHT: 70 IN | DIASTOLIC BLOOD PRESSURE: 82 MMHG | HEART RATE: 78 BPM | OXYGEN SATURATION: 92 % | BODY MASS INDEX: 37.94 KG/M2 | SYSTOLIC BLOOD PRESSURE: 134 MMHG | RESPIRATION RATE: 19 BRPM | TEMPERATURE: 98.4 F

## 2019-09-23 DIAGNOSIS — J44.1 COPD WITH ACUTE EXACERBATION (HCC): Primary | ICD-10-CM

## 2019-09-23 DIAGNOSIS — R07.9 CHEST PAIN, UNSPECIFIED TYPE: ICD-10-CM

## 2019-09-23 LAB
ALBUMIN SERPL-MCNC: 3.6 G/DL (ref 3.5–4.6)
ALP BLD-CCNC: 71 U/L (ref 35–104)
ALT SERPL-CCNC: 15 U/L (ref 0–41)
ANION GAP SERPL CALCULATED.3IONS-SCNC: 10 MEQ/L (ref 9–15)
AST SERPL-CCNC: 18 U/L (ref 0–40)
BASOPHILS ABSOLUTE: 0.2 K/UL (ref 0–0.2)
BASOPHILS RELATIVE PERCENT: 1.4 %
BILIRUB SERPL-MCNC: <0.2 MG/DL (ref 0.2–0.7)
BUN BLDV-MCNC: 12 MG/DL (ref 8–23)
CALCIUM SERPL-MCNC: 8.9 MG/DL (ref 8.5–9.9)
CHLORIDE BLD-SCNC: 103 MEQ/L (ref 95–107)
CO2: 26 MEQ/L (ref 20–31)
CREAT SERPL-MCNC: 0.74 MG/DL (ref 0.7–1.2)
EKG ATRIAL RATE: 84 BPM
EKG Q-T INTERVAL: 300 MS
EKG QRS DURATION: 96 MS
EKG QTC CALCULATION (BAZETT): 436 MS
EKG R AXIS: -3 DEGREES
EKG T AXIS: -14 DEGREES
EKG VENTRICULAR RATE: 127 BPM
EOSINOPHILS ABSOLUTE: 0.5 K/UL (ref 0–0.7)
EOSINOPHILS RELATIVE PERCENT: 4.5 %
GFR AFRICAN AMERICAN: >60
GFR NON-AFRICAN AMERICAN: >60
GLOBULIN: 3.1 G/DL (ref 2.3–3.5)
GLUCOSE BLD-MCNC: 107 MG/DL (ref 70–99)
HCT VFR BLD CALC: 41.7 % (ref 42–52)
HEMOGLOBIN: 13.9 G/DL (ref 14–18)
LYMPHOCYTES ABSOLUTE: 2.5 K/UL (ref 1–4.8)
LYMPHOCYTES RELATIVE PERCENT: 21.1 %
MCH RBC QN AUTO: 29.1 PG (ref 27–31.3)
MCHC RBC AUTO-ENTMCNC: 33.3 % (ref 33–37)
MCV RBC AUTO: 87.5 FL (ref 80–100)
MONOCYTES ABSOLUTE: 1.3 K/UL (ref 0.2–0.8)
MONOCYTES RELATIVE PERCENT: 11.2 %
NEUTROPHILS ABSOLUTE: 7.2 K/UL (ref 1.4–6.5)
NEUTROPHILS RELATIVE PERCENT: 61.8 %
PDW BLD-RTO: 13.8 % (ref 11.5–14.5)
PLATELET # BLD: 199 K/UL (ref 130–400)
POTASSIUM SERPL-SCNC: 4 MEQ/L (ref 3.4–4.9)
RBC # BLD: 4.77 M/UL (ref 4.7–6.1)
SODIUM BLD-SCNC: 139 MEQ/L (ref 135–144)
TOTAL PROTEIN: 6.7 G/DL (ref 6.3–8)
TROPONIN: <0.01 NG/ML (ref 0–0.01)
TROPONIN: <0.01 NG/ML (ref 0–0.01)
WBC # BLD: 11.6 K/UL (ref 4.8–10.8)

## 2019-09-23 PROCEDURE — 84484 ASSAY OF TROPONIN QUANT: CPT

## 2019-09-23 PROCEDURE — 85025 COMPLETE CBC W/AUTO DIFF WBC: CPT

## 2019-09-23 PROCEDURE — 93010 ELECTROCARDIOGRAM REPORT: CPT | Performed by: INTERNAL MEDICINE

## 2019-09-23 PROCEDURE — 94640 AIRWAY INHALATION TREATMENT: CPT

## 2019-09-23 PROCEDURE — 71045 X-RAY EXAM CHEST 1 VIEW: CPT

## 2019-09-23 PROCEDURE — 6370000000 HC RX 637 (ALT 250 FOR IP): Performed by: PHYSICIAN ASSISTANT

## 2019-09-23 PROCEDURE — 6360000002 HC RX W HCPCS: Performed by: PHYSICIAN ASSISTANT

## 2019-09-23 PROCEDURE — 93005 ELECTROCARDIOGRAM TRACING: CPT | Performed by: EMERGENCY MEDICINE

## 2019-09-23 PROCEDURE — 99285 EMERGENCY DEPT VISIT HI MDM: CPT

## 2019-09-23 PROCEDURE — 36415 COLL VENOUS BLD VENIPUNCTURE: CPT

## 2019-09-23 PROCEDURE — 80053 COMPREHEN METABOLIC PANEL: CPT

## 2019-09-23 PROCEDURE — 96374 THER/PROPH/DIAG INJ IV PUSH: CPT

## 2019-09-23 RX ORDER — IPRATROPIUM BROMIDE AND ALBUTEROL SULFATE 2.5; .5 MG/3ML; MG/3ML
1 SOLUTION RESPIRATORY (INHALATION) ONCE
Status: COMPLETED | OUTPATIENT
Start: 2019-09-23 | End: 2019-09-23

## 2019-09-23 RX ORDER — PREDNISONE 50 MG/1
50 TABLET ORAL DAILY
Qty: 5 TABLET | Refills: 0 | Status: SHIPPED | OUTPATIENT
Start: 2019-09-23 | End: 2019-10-18 | Stop reason: SDUPTHER

## 2019-09-23 RX ORDER — AZITHROMYCIN 250 MG/1
TABLET, FILM COATED ORAL
Qty: 1 PACKET | Refills: 0 | Status: SHIPPED | OUTPATIENT
Start: 2019-09-23 | End: 2019-10-02 | Stop reason: ALTCHOICE

## 2019-09-23 RX ORDER — METHYLPREDNISOLONE SODIUM SUCCINATE 125 MG/2ML
125 INJECTION, POWDER, LYOPHILIZED, FOR SOLUTION INTRAMUSCULAR; INTRAVENOUS ONCE
Status: COMPLETED | OUTPATIENT
Start: 2019-09-23 | End: 2019-09-23

## 2019-09-23 RX ADMIN — METHYLPREDNISOLONE SODIUM SUCCINATE 125 MG: 125 INJECTION, POWDER, FOR SOLUTION INTRAMUSCULAR; INTRAVENOUS at 06:07

## 2019-09-23 RX ADMIN — IPRATROPIUM BROMIDE AND ALBUTEROL SULFATE 1 AMPULE: .5; 3 SOLUTION RESPIRATORY (INHALATION) at 04:53

## 2019-09-25 ENCOUNTER — OFFICE VISIT (OUTPATIENT)
Dept: FAMILY MEDICINE CLINIC | Age: 69
End: 2019-09-25
Payer: MEDICARE

## 2019-09-25 VITALS
HEART RATE: 62 BPM | SYSTOLIC BLOOD PRESSURE: 138 MMHG | HEIGHT: 70 IN | TEMPERATURE: 97.2 F | OXYGEN SATURATION: 95 % | BODY MASS INDEX: 37.14 KG/M2 | WEIGHT: 259.45 LBS | DIASTOLIC BLOOD PRESSURE: 70 MMHG

## 2019-09-25 DIAGNOSIS — R20.2 PARESTHESIA OF FINGER: ICD-10-CM

## 2019-09-25 DIAGNOSIS — B35.4 TINEA CORPORIS: ICD-10-CM

## 2019-09-25 DIAGNOSIS — R73.09 ELEVATED GLUCOSE: Primary | ICD-10-CM

## 2019-09-25 DIAGNOSIS — L21.9 SEBORRHEIC DERMATITIS: ICD-10-CM

## 2019-09-25 PROCEDURE — 99214 OFFICE O/P EST MOD 30 MIN: CPT | Performed by: FAMILY MEDICINE

## 2019-09-25 NOTE — PATIENT INSTRUCTIONS
Pt will have labs done and f/u 1-2 weeks. Consider EMG for hand symptoms and forearm itching    Continue with skin care regimen and scalp and eyebrows    Pt to stop nystatin cream    Bring to next appt all foot treatments    Patient Education        Ringworm: Care Instructions  Your Care Instructions  Ringworm is a fungus infection of the skin. It is not caused by a worm. Ringworm causes a round, scaly rash that may crack and itch. The rash can spread over a wide area. One type of fungus that causes ringworm is often found in locker rooms and swimming pools. It grows well in warm, moist areas of the skin, such as in skin folds. You can get ringworm by sharing towels, clothing, and sports equipment. You can also get it by touching someone who has ringworm. Ringworm is treated with cream that kills the fungus. If the rash is widespread, you may need pills to get rid of it. Ringworm often comes back after treatment. If the rash becomes infected with bacteria, you may need antibiotics. Follow-up care is a key part of your treatment and safety. Be sure to make and go to all appointments, and call your doctor if you are having problems. It's also a good idea to know your test results and keep a list of the medicines you take. How can you care for yourself at home? · Take your medicines exactly as prescribed. Call your doctor if you have any problems with your medicine. · Wash the rash with soap and water, remove flaky skin, and dry thoroughly. · Try an over-the-counter cream with clotrimazole or miconazole in it. Brand names include Lotrimin, Micatin, and Tinactin. Terbinafine cream (Lamisil) is also available without a prescription. Spread the cream beyond the edge or border of the rash. Follow the directions on the package. Do not stop using the medicine just because your skin clears up. You will probably need to continue treatment for 2 to 4 weeks. · To keep from getting another infection:  ?  Do not go

## 2019-09-26 ENCOUNTER — OFFICE VISIT (OUTPATIENT)
Dept: UROLOGY | Age: 69
End: 2019-09-26
Payer: MEDICARE

## 2019-09-26 VITALS
HEIGHT: 70 IN | SYSTOLIC BLOOD PRESSURE: 130 MMHG | HEART RATE: 53 BPM | DIASTOLIC BLOOD PRESSURE: 70 MMHG | WEIGHT: 265 LBS | BODY MASS INDEX: 37.94 KG/M2

## 2019-09-26 DIAGNOSIS — R35.0 URINARY FREQUENCY: Primary | ICD-10-CM

## 2019-09-26 DIAGNOSIS — R97.20 RISING PSA LEVEL: ICD-10-CM

## 2019-09-26 PROCEDURE — 99212 OFFICE O/P EST SF 10 MIN: CPT | Performed by: UROLOGY

## 2019-09-26 ASSESSMENT — ENCOUNTER SYMPTOMS
COUGH: 0
DIARRHEA: 0
CONSTIPATION: 0
EYE DISCHARGE: 0
COLOR CHANGE: 0
VOICE CHANGE: 0
ABDOMINAL DISTENTION: 0
ABDOMINAL PAIN: 0
TROUBLE SWALLOWING: 0
SHORTNESS OF BREATH: 0
NAUSEA: 0

## 2019-09-26 NOTE — PROGRESS NOTES
[facility] EVALUATION NOTE                                                 H&P                                                                                                                                                 Reason for Visit  Variable PSA    History of Present Illness  71-year-old male with a slight elevation of his PSA  He is on hormone replacement therapy  Repeat PSA 6 months later shows it to be back to baseline levels      Urologic Review of Systems/Symptoms  Denies hematuria  Denies dysuria  Denies incontinence  Denies flank pain  Other Urologic: Voiding without difficulty    Review of Systems  Head and neck: No issues/reviewed  Cardiac: No recent issues/reviewed  Pulmonary: No issues/reviewed  Gastrointestinal: No issues/reviewed  Neurologic: No recent issues/reviewed  Extremities: No issues/reviewed  Lymphatics: No lymphadenopathy no change  Genitourinary: See above  Skin: No issues/reviewed  Hospitalization: None recent  Meds reviewed  All 14 categories of Review of Systems otherwise reviewed no other findings reported. Past Medical History:   Diagnosis Date    Abnormal EMG 12/09/2015    Actinic keratoses     Actinic keratoses     Allergic rhinitis     Anemia 11/18/2014    Arthritis     Chronic back pain     COPD (chronic obstructive pulmonary disease) ([address] 08/09/2012    Crohns disease ([address]     Degenerative disc disease, lumbar     Dermatitis     Diabetes ([address] 03/19/2015    diet controlled    GERD (gastroesophageal reflux disease)     History of atrial fibrillation 2006    Dr. Heladio Yeboah Hyperlipidemia 1/21/2014    Hypertension     Hypogonadism male     Lung disease     Mononeuritis multiplex     Mononeuritis multiplex     Obesity (BMI 30-39. 9) 7/24/2019    Osteoarthritis of lumbar spine     Pain of right heel     Paresthesia of foot, bilateral     Prediabetes 12/3/2014    Seborrheic dermatitis     Seborrheic keratoses, inflamed     Throat cancer ([address] throat, had surgery, sees Dr Andres Knight yearly    Tinea cruris     Tinea pedis     Tinea unguium      Past Surgical History:   Procedure Laterality Date    CARDIAC CATHETERIZATION      CATARACT REMOVAL WITH IMPLANT Right 2019    CATARACT REMOVAL WITH IMPLANT Left 2019    COLONOSCOPY  04/15/2015    KNEE ARTHROSCOPY      LARYNGECTOMY  2004    UPPER GASTROINTESTINAL ENDOSCOPY  13    Dr. Clemente HOWARD/NAPOLEON RODRIGUEZ       Social History     Socioeconomic History    Marital status:      Spouse name: None    Number of children: 3    Years of education: None    Highest education level: None   Occupational History    None   Social Needs    Financial resource strain: None    Food insecurity:     Worry: None     Inability: None    Transportation needs:     Medical: None     Non-medical: None   Tobacco Use    Smoking status: Former Smoker     Packs/day: 1.00     Years: 25.00     Pack years: 25.00     Types: Cigarettes     Last attempt to quit: 10/21/1990     Years since quittin.9    Smokeless tobacco: Never Used   Substance and Sexual Activity    Alcohol use: No     Comment: Attends AA, 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    Drug use: No    Sexual activity: None   Lifestyle    Physical activity:     Days per week: None     Minutes per session: None    Stress: None   Relationships    Social connections:     Talks on phone: None     Gets together: None     Attends Anabaptism service: None     Active member of club or organization: None     Attends meetings of clubs or organizations: None     Relationship status: None    Intimate partner violence:     Fear of current or ex partner: None     Emotionally abused: None     Physically abused: None     Forced sexual activity: None   Other Topics Concern    None   Social History Narrative    Lives alone.      Family History   Problem Relation Age of Onset    Heart Disease Mother     Liver Disease Mother     Heart Disease

## 2019-10-02 ENCOUNTER — OFFICE VISIT (OUTPATIENT)
Dept: FAMILY MEDICINE CLINIC | Age: 69
End: 2019-10-02
Payer: MEDICARE

## 2019-10-02 VITALS
OXYGEN SATURATION: 98 % | WEIGHT: 268.6 LBS | DIASTOLIC BLOOD PRESSURE: 78 MMHG | HEART RATE: 60 BPM | BODY MASS INDEX: 38.45 KG/M2 | SYSTOLIC BLOOD PRESSURE: 130 MMHG | HEIGHT: 70 IN | TEMPERATURE: 97.4 F

## 2019-10-02 DIAGNOSIS — M25.571 ACUTE RIGHT ANKLE PAIN: Primary | ICD-10-CM

## 2019-10-02 PROBLEM — Z98.42 S/P CATARACT EXTRACTION AND INSERTION OF INTRAOCULAR LENS, LEFT: Status: ACTIVE | Noted: 2019-07-23

## 2019-10-02 PROBLEM — Z96.1 S/P CATARACT EXTRACTION AND INSERTION OF INTRAOCULAR LENS, LEFT: Status: ACTIVE | Noted: 2019-07-23

## 2019-10-02 PROBLEM — C14.0 MALIGNANT NEOPLASM OF EAR, NOSE, AND THROAT: Status: ACTIVE | Noted: 2019-10-02

## 2019-10-02 PROBLEM — R07.9 CHEST PAIN: Status: ACTIVE | Noted: 2019-10-02

## 2019-10-02 PROBLEM — C76.0 MALIGNANT NEOPLASM OF EAR, NOSE, AND THROAT: Status: ACTIVE | Noted: 2019-10-02

## 2019-10-02 PROBLEM — C44.201 MALIGNANT NEOPLASM OF EAR, NOSE, AND THROAT: Status: ACTIVE | Noted: 2019-10-02

## 2019-10-02 PROCEDURE — 99213 OFFICE O/P EST LOW 20 MIN: CPT | Performed by: NURSE PRACTITIONER

## 2019-10-03 ASSESSMENT — ENCOUNTER SYMPTOMS
COUGH: 1
SHORTNESS OF BREATH: 1
ABDOMINAL PAIN: 0
DIARRHEA: 0
PHOTOPHOBIA: 0
VOMITING: 0
SORE THROAT: 0

## 2019-10-09 ENCOUNTER — OFFICE VISIT (OUTPATIENT)
Dept: FAMILY MEDICINE CLINIC | Age: 69
End: 2019-10-09
Payer: MEDICARE

## 2019-10-09 VITALS
HEART RATE: 64 BPM | BODY MASS INDEX: 38.6 KG/M2 | SYSTOLIC BLOOD PRESSURE: 102 MMHG | WEIGHT: 269.6 LBS | HEIGHT: 70 IN | OXYGEN SATURATION: 96 % | DIASTOLIC BLOOD PRESSURE: 62 MMHG

## 2019-10-09 DIAGNOSIS — R79.82 ELEVATED C-REACTIVE PROTEIN (CRP): ICD-10-CM

## 2019-10-09 DIAGNOSIS — L30.1 DERMATITIS, DYSHIDROTIC: ICD-10-CM

## 2019-10-09 DIAGNOSIS — G89.29 CHRONIC MIDLINE BACK PAIN, UNSPECIFIED BACK LOCATION: ICD-10-CM

## 2019-10-09 DIAGNOSIS — R20.2 PARESTHESIA OF LEFT UPPER EXTREMITY: Primary | ICD-10-CM

## 2019-10-09 DIAGNOSIS — I10 ESSENTIAL HYPERTENSION: ICD-10-CM

## 2019-10-09 DIAGNOSIS — M54.9 CHRONIC MIDLINE BACK PAIN, UNSPECIFIED BACK LOCATION: ICD-10-CM

## 2019-10-09 PROCEDURE — G0008 ADMIN INFLUENZA VIRUS VAC: HCPCS | Performed by: FAMILY MEDICINE

## 2019-10-09 PROCEDURE — 90653 IIV ADJUVANT VACCINE IM: CPT | Performed by: FAMILY MEDICINE

## 2019-10-09 PROCEDURE — 99214 OFFICE O/P EST MOD 30 MIN: CPT | Performed by: FAMILY MEDICINE

## 2019-10-09 RX ORDER — NITROGLYCERIN 0.4 MG/1
TABLET SUBLINGUAL
Qty: 25 TABLET | Refills: 0 | Status: SHIPPED | OUTPATIENT
Start: 2019-10-09 | End: 2020-02-21

## 2019-10-09 RX ORDER — DOXAZOSIN MESYLATE 4 MG/1
TABLET ORAL
Qty: 90 TABLET | Refills: 0 | Status: SHIPPED | OUTPATIENT
Start: 2019-10-09 | End: 2020-02-21

## 2019-10-09 RX ORDER — GABAPENTIN 300 MG/1
300 CAPSULE ORAL DAILY
Qty: 90 CAPSULE | Refills: 0 | Status: SHIPPED | OUTPATIENT
Start: 2019-10-09 | End: 2019-11-21 | Stop reason: SDUPTHER

## 2019-10-09 ASSESSMENT — ENCOUNTER SYMPTOMS
DIARRHEA: 0
CONSTIPATION: 0
ABDOMINAL DISTENTION: 0
EYE DISCHARGE: 0
VOICE CHANGE: 0
TROUBLE SWALLOWING: 0
NAUSEA: 0
COUGH: 0
SHORTNESS OF BREATH: 0
COLOR CHANGE: 0
ABDOMINAL PAIN: 0

## 2019-10-14 ENCOUNTER — TELEPHONE (OUTPATIENT)
Dept: UROLOGY | Age: 69
End: 2019-10-14

## 2019-10-18 ENCOUNTER — OFFICE VISIT (OUTPATIENT)
Dept: FAMILY MEDICINE CLINIC | Age: 69
End: 2019-10-18
Payer: MEDICARE

## 2019-10-18 VITALS — OXYGEN SATURATION: 98 % | TEMPERATURE: 97.3 F | BODY MASS INDEX: 38.68 KG/M2 | HEART RATE: 63 BPM | HEIGHT: 70 IN

## 2019-10-18 DIAGNOSIS — J44.1 COPD EXACERBATION (HCC): Primary | ICD-10-CM

## 2019-10-18 PROCEDURE — 99213 OFFICE O/P EST LOW 20 MIN: CPT | Performed by: NURSE PRACTITIONER

## 2019-10-18 PROCEDURE — 94640 AIRWAY INHALATION TREATMENT: CPT | Performed by: NURSE PRACTITIONER

## 2019-10-18 PROCEDURE — 96372 THER/PROPH/DIAG INJ SC/IM: CPT | Performed by: NURSE PRACTITIONER

## 2019-10-18 RX ORDER — IPRATROPIUM BROMIDE AND ALBUTEROL SULFATE 2.5; .5 MG/3ML; MG/3ML
1 SOLUTION RESPIRATORY (INHALATION) ONCE
Status: COMPLETED | OUTPATIENT
Start: 2019-10-18 | End: 2019-10-18

## 2019-10-18 RX ORDER — ALBUTEROL SULFATE 2.5 MG/3ML
2.5 SOLUTION RESPIRATORY (INHALATION) EVERY 6 HOURS PRN
Qty: 120 EACH | Refills: 0 | Status: SHIPPED | OUTPATIENT
Start: 2019-10-18 | End: 2019-11-27 | Stop reason: SDUPTHER

## 2019-10-18 RX ORDER — AZITHROMYCIN 250 MG/1
TABLET, FILM COATED ORAL
Qty: 1 PACKET | Refills: 0 | Status: SHIPPED | OUTPATIENT
Start: 2019-10-18 | End: 2019-10-28

## 2019-10-18 RX ORDER — METHYLPREDNISOLONE ACETATE 80 MG/ML
80 INJECTION, SUSPENSION INTRA-ARTICULAR; INTRALESIONAL; INTRAMUSCULAR; SOFT TISSUE ONCE
Status: COMPLETED | OUTPATIENT
Start: 2019-10-18 | End: 2019-10-18

## 2019-10-18 RX ORDER — PREDNISONE 20 MG/1
40 TABLET ORAL DAILY
Qty: 10 TABLET | Refills: 0 | Status: SHIPPED | OUTPATIENT
Start: 2019-10-18 | End: 2019-10-23

## 2019-10-18 RX ADMIN — IPRATROPIUM BROMIDE AND ALBUTEROL SULFATE 1 AMPULE: 2.5; .5 SOLUTION RESPIRATORY (INHALATION) at 17:09

## 2019-10-18 RX ADMIN — METHYLPREDNISOLONE ACETATE 80 MG: 80 INJECTION, SUSPENSION INTRA-ARTICULAR; INTRALESIONAL; INTRAMUSCULAR; SOFT TISSUE at 16:52

## 2019-10-18 ASSESSMENT — ENCOUNTER SYMPTOMS
CHEST TIGHTNESS: 1
COUGH: 1
SHORTNESS OF BREATH: 1
SORE THROAT: 0
RHINORRHEA: 0
WHEEZING: 1

## 2019-10-23 ENCOUNTER — OFFICE VISIT (OUTPATIENT)
Dept: FAMILY MEDICINE CLINIC | Age: 69
End: 2019-10-23
Payer: MEDICARE

## 2019-10-23 VITALS
SYSTOLIC BLOOD PRESSURE: 130 MMHG | DIASTOLIC BLOOD PRESSURE: 70 MMHG | TEMPERATURE: 97.3 F | HEIGHT: 70 IN | OXYGEN SATURATION: 98 % | BODY MASS INDEX: 38.65 KG/M2 | HEART RATE: 60 BPM | WEIGHT: 270 LBS

## 2019-10-23 DIAGNOSIS — J44.1 COPD EXACERBATION (HCC): Primary | ICD-10-CM

## 2019-10-23 PROCEDURE — 99212 OFFICE O/P EST SF 10 MIN: CPT | Performed by: NURSE PRACTITIONER

## 2019-10-23 ASSESSMENT — ENCOUNTER SYMPTOMS
SHORTNESS OF BREATH: 1
SINUS PAIN: 0
CHEST TIGHTNESS: 0
RHINORRHEA: 0
SINUS PRESSURE: 0
COUGH: 1

## 2019-11-07 ENCOUNTER — OFFICE VISIT (OUTPATIENT)
Dept: FAMILY MEDICINE CLINIC | Age: 69
End: 2019-11-07
Payer: MEDICARE

## 2019-11-07 VITALS
HEART RATE: 68 BPM | SYSTOLIC BLOOD PRESSURE: 120 MMHG | WEIGHT: 272 LBS | BODY MASS INDEX: 40.29 KG/M2 | TEMPERATURE: 97.1 F | HEIGHT: 69 IN | RESPIRATION RATE: 16 BRPM | DIASTOLIC BLOOD PRESSURE: 78 MMHG | OXYGEN SATURATION: 98 %

## 2019-11-07 DIAGNOSIS — R06.02 SHORTNESS OF BREATH ON EXERTION: Primary | ICD-10-CM

## 2019-11-07 DIAGNOSIS — J22 LOWER RESPIRATORY INFECTION (E.G., BRONCHITIS, PNEUMONIA, PNEUMONITIS, PULMONITIS): ICD-10-CM

## 2019-11-07 DIAGNOSIS — J44.9 CHRONIC OBSTRUCTIVE PULMONARY DISEASE, UNSPECIFIED COPD TYPE (HCC): ICD-10-CM

## 2019-11-07 DIAGNOSIS — R06.2 WHEEZING: ICD-10-CM

## 2019-11-07 PROCEDURE — 94640 AIRWAY INHALATION TREATMENT: CPT | Performed by: NURSE PRACTITIONER

## 2019-11-07 PROCEDURE — 99213 OFFICE O/P EST LOW 20 MIN: CPT | Performed by: NURSE PRACTITIONER

## 2019-11-07 RX ORDER — AZITHROMYCIN 250 MG/1
250 TABLET, FILM COATED ORAL SEE ADMIN INSTRUCTIONS
Qty: 6 TABLET | Refills: 0 | Status: SHIPPED | OUTPATIENT
Start: 2019-11-07 | End: 2019-11-12 | Stop reason: ALTCHOICE

## 2019-11-07 RX ORDER — TESTOSTERONE GEL, 1% 10 MG/G
GEL TRANSDERMAL
Qty: 135 PACKAGE | Refills: 0 | Status: SHIPPED | OUTPATIENT
Start: 2019-11-07 | End: 2019-11-07

## 2019-11-07 RX ORDER — IPRATROPIUM BROMIDE AND ALBUTEROL SULFATE 2.5; .5 MG/3ML; MG/3ML
1 SOLUTION RESPIRATORY (INHALATION) ONCE
Status: COMPLETED | OUTPATIENT
Start: 2019-11-07 | End: 2019-11-07

## 2019-11-07 RX ORDER — PREDNISONE 20 MG/1
40 TABLET ORAL DAILY
Qty: 10 TABLET | Refills: 0 | Status: SHIPPED | OUTPATIENT
Start: 2019-11-07 | End: 2019-11-12 | Stop reason: ALTCHOICE

## 2019-11-07 RX ADMIN — IPRATROPIUM BROMIDE AND ALBUTEROL SULFATE 1 AMPULE: 2.5; .5 SOLUTION RESPIRATORY (INHALATION) at 15:25

## 2019-11-07 ASSESSMENT — ENCOUNTER SYMPTOMS
CHEST TIGHTNESS: 0
SINUS PAIN: 0
SHORTNESS OF BREATH: 1
WHEEZING: 1
SINUS PRESSURE: 0
SPUTUM PRODUCTION: 1
RHINORRHEA: 0
SORE THROAT: 0
COUGH: 1
VOMITING: 1

## 2019-11-12 ENCOUNTER — OFFICE VISIT (OUTPATIENT)
Dept: FAMILY MEDICINE CLINIC | Age: 69
End: 2019-11-12
Payer: MEDICARE

## 2019-11-12 VITALS
OXYGEN SATURATION: 95 % | HEART RATE: 63 BPM | DIASTOLIC BLOOD PRESSURE: 60 MMHG | BODY MASS INDEX: 40.88 KG/M2 | HEIGHT: 69 IN | TEMPERATURE: 96.6 F | WEIGHT: 276 LBS | SYSTOLIC BLOOD PRESSURE: 104 MMHG

## 2019-11-12 DIAGNOSIS — R20.2 PARESTHESIA OF LEFT UPPER EXTREMITY: ICD-10-CM

## 2019-11-12 DIAGNOSIS — L30.1 DERMATITIS, DYSHIDROTIC: Primary | ICD-10-CM

## 2019-11-12 PROCEDURE — 99213 OFFICE O/P EST LOW 20 MIN: CPT | Performed by: FAMILY MEDICINE

## 2019-11-12 ASSESSMENT — ENCOUNTER SYMPTOMS
NAUSEA: 0
COUGH: 0
EYE DISCHARGE: 0
DIARRHEA: 0
COLOR CHANGE: 0
CONSTIPATION: 0
ABDOMINAL PAIN: 0
TROUBLE SWALLOWING: 0
SHORTNESS OF BREATH: 0
ABDOMINAL DISTENTION: 0
VOICE CHANGE: 0

## 2019-11-19 DIAGNOSIS — E29.1 HYPOGONADISM MALE: Primary | ICD-10-CM

## 2019-11-19 DIAGNOSIS — I10 ESSENTIAL HYPERTENSION: ICD-10-CM

## 2019-11-19 DIAGNOSIS — M54.9 CHRONIC MIDLINE BACK PAIN, UNSPECIFIED BACK LOCATION: ICD-10-CM

## 2019-11-19 DIAGNOSIS — G89.29 CHRONIC MIDLINE BACK PAIN, UNSPECIFIED BACK LOCATION: ICD-10-CM

## 2019-11-21 RX ORDER — GABAPENTIN 300 MG/1
300 CAPSULE ORAL DAILY
Qty: 90 CAPSULE | Refills: 1 | Status: SHIPPED | OUTPATIENT
Start: 2019-11-21 | End: 2020-07-22 | Stop reason: SDUPTHER

## 2019-11-21 RX ORDER — LISINOPRIL 20 MG/1
20 TABLET ORAL DAILY
Qty: 90 TABLET | Refills: 3 | Status: SHIPPED | OUTPATIENT
Start: 2019-11-21 | End: 2019-12-19 | Stop reason: SDUPTHER

## 2019-11-21 RX ORDER — TESTOSTERONE GEL, 1% 10 MG/G
GEL TRANSDERMAL
Qty: 135 PACKAGE | Refills: 0 | Status: SHIPPED | OUTPATIENT
Start: 2019-11-21 | End: 2021-05-14 | Stop reason: SDUPTHER

## 2019-11-27 ENCOUNTER — OFFICE VISIT (OUTPATIENT)
Dept: PULMONOLOGY | Age: 69
End: 2019-11-27
Payer: MEDICARE

## 2019-11-27 ENCOUNTER — HOSPITAL ENCOUNTER (OUTPATIENT)
Dept: NEUROLOGY | Age: 69
Discharge: HOME | End: 2019-11-27
Payer: MEDICARE

## 2019-11-27 VITALS
WEIGHT: 269 LBS | DIASTOLIC BLOOD PRESSURE: 52 MMHG | BODY MASS INDEX: 39.84 KG/M2 | SYSTOLIC BLOOD PRESSURE: 110 MMHG | HEART RATE: 81 BPM | HEIGHT: 69 IN | TEMPERATURE: 97.6 F | OXYGEN SATURATION: 93 % | RESPIRATION RATE: 16 BRPM

## 2019-11-27 DIAGNOSIS — E66.9 OBESITY (BMI 30-39.9): ICD-10-CM

## 2019-11-27 DIAGNOSIS — G47.33 OSA ON CPAP: Primary | ICD-10-CM

## 2019-11-27 DIAGNOSIS — J20.9 ACUTE BRONCHITIS, UNSPECIFIED ORGANISM: ICD-10-CM

## 2019-11-27 DIAGNOSIS — R20.2 PARESTHESIA OF LEFT UPPER EXTREMITY: ICD-10-CM

## 2019-11-27 DIAGNOSIS — Z99.89 OSA ON CPAP: Primary | ICD-10-CM

## 2019-11-27 DIAGNOSIS — J44.1 COPD WITH EXACERBATION (HCC): ICD-10-CM

## 2019-11-27 PROCEDURE — 95910 NRV CNDJ TEST 7-8 STUDIES: CPT

## 2019-11-27 PROCEDURE — 99214 OFFICE O/P EST MOD 30 MIN: CPT | Performed by: INTERNAL MEDICINE

## 2019-11-27 PROCEDURE — 95886 MUSC TEST DONE W/N TEST COMP: CPT

## 2019-11-27 RX ORDER — PREDNISONE 10 MG/1
TABLET ORAL
Qty: 40 TABLET | Refills: 0 | Status: SHIPPED | OUTPATIENT
Start: 2019-11-27 | End: 2019-12-19 | Stop reason: ALTCHOICE

## 2019-11-27 RX ORDER — AZITHROMYCIN 250 MG/1
250 TABLET, FILM COATED ORAL SEE ADMIN INSTRUCTIONS
Qty: 6 TABLET | Refills: 1 | Status: SHIPPED | OUTPATIENT
Start: 2019-11-27 | End: 2019-12-02

## 2019-11-27 RX ORDER — ALBUTEROL SULFATE 2.5 MG/3ML
2.5 SOLUTION RESPIRATORY (INHALATION) EVERY 6 HOURS PRN
Qty: 120 EACH | Refills: 0 | Status: SHIPPED | OUTPATIENT
Start: 2019-11-27 | End: 2019-12-02 | Stop reason: SDUPTHER

## 2019-11-27 ASSESSMENT — ENCOUNTER SYMPTOMS
CHEST TIGHTNESS: 0
EYE ITCHING: 0
SORE THROAT: 0
RHINORRHEA: 0
VOICE CHANGE: 0
SHORTNESS OF BREATH: 1
DIARRHEA: 0
COUGH: 1
NAUSEA: 0
VOMITING: 0
ABDOMINAL PAIN: 0
WHEEZING: 1

## 2019-12-01 PROBLEM — G62.9 PERIPHERAL NEUROPATHY: Status: ACTIVE | Noted: 2019-12-01

## 2019-12-02 RX ORDER — ALBUTEROL SULFATE 2.5 MG/3ML
2.5 SOLUTION RESPIRATORY (INHALATION) EVERY 6 HOURS PRN
Qty: 120 EACH | Refills: 0 | Status: SHIPPED | OUTPATIENT
Start: 2019-12-02 | End: 2019-12-05 | Stop reason: SDUPTHER

## 2019-12-05 DIAGNOSIS — R20.2 PARESTHESIA OF FINGER: ICD-10-CM

## 2019-12-05 DIAGNOSIS — R73.09 ELEVATED GLUCOSE: ICD-10-CM

## 2019-12-05 LAB
ANION GAP SERPL CALCULATED.3IONS-SCNC: 9 MEQ/L (ref 9–15)
BUN BLDV-MCNC: 22 MG/DL (ref 8–23)
CALCIUM SERPL-MCNC: 9.2 MG/DL (ref 8.5–9.9)
CHLORIDE BLD-SCNC: 102 MEQ/L (ref 95–107)
CO2: 27 MEQ/L (ref 20–31)
CREAT SERPL-MCNC: 0.74 MG/DL (ref 0.7–1.2)
GFR AFRICAN AMERICAN: >60
GFR NON-AFRICAN AMERICAN: >60
GLUCOSE BLD-MCNC: 123 MG/DL (ref 70–99)
HBA1C MFR BLD: 6.1 % (ref 4.8–5.9)
POTASSIUM SERPL-SCNC: 4 MEQ/L (ref 3.4–4.9)
SODIUM BLD-SCNC: 138 MEQ/L (ref 135–144)

## 2019-12-05 RX ORDER — ALBUTEROL SULFATE 2.5 MG/3ML
SOLUTION RESPIRATORY (INHALATION)
Qty: 360 EACH | Refills: 2 | Status: SHIPPED | OUTPATIENT
Start: 2019-12-05 | End: 2019-12-20 | Stop reason: SDUPTHER

## 2019-12-19 ENCOUNTER — OFFICE VISIT (OUTPATIENT)
Dept: FAMILY MEDICINE CLINIC | Age: 69
End: 2019-12-19
Payer: MEDICARE

## 2019-12-19 VITALS
HEART RATE: 58 BPM | HEIGHT: 70 IN | TEMPERATURE: 96.2 F | BODY MASS INDEX: 37.22 KG/M2 | DIASTOLIC BLOOD PRESSURE: 60 MMHG | OXYGEN SATURATION: 98 % | WEIGHT: 260 LBS | SYSTOLIC BLOOD PRESSURE: 104 MMHG

## 2019-12-19 DIAGNOSIS — R73.03 PREDIABETES: Primary | ICD-10-CM

## 2019-12-19 DIAGNOSIS — I10 ESSENTIAL HYPERTENSION: ICD-10-CM

## 2019-12-19 PROCEDURE — 99213 OFFICE O/P EST LOW 20 MIN: CPT | Performed by: NURSE PRACTITIONER

## 2019-12-19 RX ORDER — LISINOPRIL 20 MG/1
20 TABLET ORAL DAILY
Qty: 90 TABLET | Refills: 3 | Status: SHIPPED | OUTPATIENT
Start: 2019-12-19 | End: 2021-01-06 | Stop reason: SDUPTHER

## 2019-12-19 ASSESSMENT — ENCOUNTER SYMPTOMS
COUGH: 0
SHORTNESS OF BREATH: 1

## 2019-12-20 RX ORDER — ALBUTEROL SULFATE 2.5 MG/3ML
SOLUTION RESPIRATORY (INHALATION)
Qty: 360 EACH | Refills: 2 | Status: SHIPPED | OUTPATIENT
Start: 2019-12-20 | End: 2020-12-23 | Stop reason: SDUPTHER

## 2019-12-23 ENCOUNTER — TELEPHONE (OUTPATIENT)
Dept: FAMILY MEDICINE CLINIC | Age: 69
End: 2019-12-23

## 2020-01-29 ENCOUNTER — PROCEDURE VISIT (OUTPATIENT)
Dept: FAMILY MEDICINE CLINIC | Age: 70
End: 2020-01-29
Payer: MEDICARE

## 2020-01-29 VITALS
OXYGEN SATURATION: 98 % | SYSTOLIC BLOOD PRESSURE: 128 MMHG | HEIGHT: 69 IN | BODY MASS INDEX: 40.58 KG/M2 | WEIGHT: 274 LBS | DIASTOLIC BLOOD PRESSURE: 68 MMHG | HEART RATE: 60 BPM

## 2020-01-29 PROBLEM — J20.9 ACUTE BRONCHITIS: Status: RESOLVED | Noted: 2019-11-27 | Resolved: 2020-01-29

## 2020-01-29 PROCEDURE — 17110 DESTRUCTION B9 LES UP TO 14: CPT | Performed by: FAMILY MEDICINE

## 2020-01-29 PROCEDURE — 17000 DESTRUCT PREMALG LESION: CPT | Performed by: FAMILY MEDICINE

## 2020-01-29 PROCEDURE — 99214 OFFICE O/P EST MOD 30 MIN: CPT | Performed by: FAMILY MEDICINE

## 2020-01-29 PROCEDURE — 17003 DESTRUCT PREMALG LES 2-14: CPT | Performed by: FAMILY MEDICINE

## 2020-01-29 ASSESSMENT — PATIENT HEALTH QUESTIONNAIRE - PHQ9
SUM OF ALL RESPONSES TO PHQ QUESTIONS 1-9: 0
SUM OF ALL RESPONSES TO PHQ QUESTIONS 1-9: 0
2. FEELING DOWN, DEPRESSED OR HOPELESS: 0
SUM OF ALL RESPONSES TO PHQ9 QUESTIONS 1 & 2: 0
1. LITTLE INTEREST OR PLEASURE IN DOING THINGS: 0

## 2020-01-29 ASSESSMENT — ENCOUNTER SYMPTOMS
SHORTNESS OF BREATH: 0
COUGH: 0
TROUBLE SWALLOWING: 0
ABDOMINAL DISTENTION: 0
DIARRHEA: 0
COLOR CHANGE: 0
VOICE CHANGE: 0
CONSTIPATION: 0
NAUSEA: 0
ABDOMINAL PAIN: 0
EYE DISCHARGE: 0

## 2020-01-29 NOTE — PATIENT INSTRUCTIONS
Patient Education        Learning About Diabetes Food Guidelines  Your Care Instructions    Meal planning is important to manage diabetes. It helps keep your blood sugar at a target level (which you set with your doctor). You don't have to eat special foods. You can eat what your family eats, including sweets once in a while. But you do have to pay attention to how often you eat and how much you eat of certain foods. You may want to work with a dietitian or a certified diabetes educator (CDE) to help you plan meals and snacks. A dietitian or CDE can also help you lose weight if that is one of your goals. What should you know about eating carbs? Managing the amount of carbohydrate (carbs) you eat is an important part of healthy meals when you have diabetes. Carbohydrate is found in many foods. · Learn which foods have carbs. And learn the amounts of carbs in different foods. ? Bread, cereal, pasta, and rice have about 15 grams of carbs in a serving. A serving is 1 slice of bread (1 ounce), ½ cup of cooked cereal, or 1/3 cup of cooked pasta or rice. ? Fruits have 15 grams of carbs in a serving. A serving is 1 small fresh fruit, such as an apple or orange; ½ of a banana; ½ cup of cooked or canned fruit; ½ cup of fruit juice; 1 cup of melon or raspberries; or 2 tablespoons of dried fruit. ? Milk and no-sugar-added yogurt have 15 grams of carbs in a serving. A serving is 1 cup of milk or 2/3 cup of no-sugar-added yogurt. ? Starchy vegetables have 15 grams of carbs in a serving. A serving is ½ cup of mashed potatoes or sweet potato; 1 cup winter squash; ½ of a small baked potato; ½ cup of cooked beans; or ½ cup cooked corn or green peas. · Learn how much carbs to eat each day and at each meal. A dietitian or CDE can teach you how to keep track of the amount of carbs you eat. This is called carbohydrate counting. · If you are not sure how to count carbohydrate grams, use the Plate Method to plan meals.  It is a when cooking. · Don't skip meals. Your blood sugar may drop too low if you skip meals and take insulin or certain medicines for diabetes. · Check with your doctor before you drink alcohol. Alcohol can cause your blood sugar to drop too low. Alcohol can also cause a bad reaction if you take certain diabetes medicines. Follow-up care is a key part of your treatment and safety. Be sure to make and go to all appointments, and call your doctor if you are having problems. It's also a good idea to know your test results and keep a list of the medicines you take. Where can you learn more? Go to https://[email or web address].DocDep. org and sign in to your [employer] account. Enter S098 in the [employer] box to learn more about \"Learning About Diabetes Food Guidelines. \"     If you do not have an account, please click on the \"Sign Up Now\" link. Current as of: April 16, 2019  Content Version: 12.3  © 0264-3573 [employer]. Care instructions adapted under license by [facility] ([facility]). If you have questions about a medical condition or this instruction, always ask your healthcare professional. [address] any warranty or liability for your use of this information.

## 2020-01-29 NOTE — PROGRESS NOTES
fatigue and unexpected weight change. HENT: Negative for congestion, dental problem, trouble swallowing and voice change. Eyes: Negative for discharge and visual disturbance. Respiratory: Negative for cough and shortness of breath. Cardiovascular: Negative for chest pain. Gastrointestinal: Negative for abdominal distention, abdominal pain, constipation, diarrhea and nausea. Endocrine: Negative for polydipsia and polyuria. Genitourinary: Negative for dysuria and urgency. Musculoskeletal: Negative for gait problem and joint swelling. Skin: Negative for color change and rash. Allergic/Immunologic: Negative for environmental allergies and food allergies. Neurological: Negative for speech difficulty and weakness. Hematological: Does not bruise/bleed easily. Psychiatric/Behavioral: Negative for agitation and behavioral problems. Objective:   /68   Pulse 60   Ht 5' 9\" (1.753 m)   Wt 274 lb (124.3 kg)   SpO2 98%   BMI 40.46 kg/m²     Physical Exam  Vitals signs reviewed. Constitutional:       General: He is not in acute distress. Appearance: He is well-developed. HENT:      Head: Normocephalic and atraumatic. Right Ear: External ear normal.      Left Ear: External ear normal.      Nose: Nose normal.   Eyes:      General:         Right eye: No discharge. Left eye: No discharge. Conjunctiva/sclera: Conjunctivae normal.      Pupils: Pupils are equal, round, and reactive to light. Neck:      Musculoskeletal: Neck supple. Thyroid: No thyromegaly. Cardiovascular:      Rate and Rhythm: Normal rate and regular rhythm. Pulmonary:      Effort: Pulmonary effort is normal. No respiratory distress. Abdominal:      General: There is no distension. Skin:     General: Skin is warm and dry. Comments: Right preauricular area x1 and right postauricular area x2 erythematous base raised rough white flake noted.     Left hip and left lower lumbar x2 brown soft flake raised lesions 3 mm diameters   Neurological:      Mental Status: He is alert and oriented to person, place, and time. Coordination: Coordination normal.   Psychiatric:         Thought Content: Thought content normal.         Judgment: Judgment normal.         No results found for this visit on 01/29/20. Recent Results (from the past 2016 hour(s))   Basic Metabolic Panel    Collection Time: 12/05/19  2:32 PM   Result Value Ref Range    Sodium 138 135 - 144 mEq/L    Potassium 4.0 3.4 - 4.9 mEq/L    Chloride 102 95 - 107 mEq/L    CO2 27 20 - 31 mEq/L    Anion Gap 9 9 - 15 mEq/L    Glucose 123 (H) 70 - 99 mg/dL    BUN 22 8 - 23 mg/dL    CREATININE 0.74 0.70 - 1.20 mg/dL    GFR Non-African American >60.0 >60    GFR  >60.0 >60    Calcium 9.2 8.5 - 9.9 mg/dL   Hemoglobin A1C    Collection Time: 12/05/19  2:32 PM   Result Value Ref Range    Hemoglobin A1C 6.1 (H) 4.8 - 5.9 %       Educated the patient at length that his hemoglobin A1c now reveals that he is in diabetic category and he should start a more official regimen of caring for himself. Encouraged to have diabetic education done    Assessment:       Diagnosis Orders   1. Type 2 diabetes mellitus without complication, without long-term current use of insulin ([facility] [address]  [facility]    Microalbumin / Creatinine Urine Ratio    Hemoglobin A1C   2. Actinic keratoses  IL DESTRUC PREMALIGNANT, FIRST LESION    IL DESTRUC PREMALIGNANT,2-14 LESIONS   3. COPD with exacerbation ([address]     4. JARRELL on CPAP     5.  Crohn's disease without complication, unspecified gastrointestinal tract location ([address]     6. Seborrheic keratoses  IL DESTRUCTION BENIGN LESIONS UP TO 14         Orders Placed This Encounter   Procedures    Microalbumin / Creatinine Urine Ratio     Standing Status:   Future     Standing Expiration Date:   1/28/2021    Hemoglobin A1C     Standing Status:   Future     Standing Expiration Date:   1/29/2021   Severo [place] Nickie Harris     Referral Priority:   Routine     Referral Type:   Eval and Treat     Referral Reason:   Specialty Services Required     Number of Visits Requested:   1    RI DESTRUC PREMALIGNANT, FIRST LESION    RI DESTRUC PREMALIGNANT,2-14 LESIONS    RI DESTRUCTION BENIGN LESIONS UP TO 14         Plan:   Return in about 6 months (around 7/29/2020) for 30 min skin and for routine major medical condition management. Patient Instructions     Patient Education        Learning About Diabetes Food Guidelines  Your Care Instructions    Meal planning is important to manage diabetes. It helps keep your blood sugar at a target level (which you set with your doctor). You don't have to eat special foods. You can eat what your family eats, including sweets once in a while. But you do have to pay attention to how often you eat and how much you eat of certain foods. You may want to work with a dietitian or a certified diabetes educator (CDE) to help you plan meals and snacks. A dietitian or CDE can also help you lose weight if that is one of your goals. What should you know about eating carbs? Managing the amount of carbohydrate (carbs) you eat is an important part of healthy meals when you have diabetes. Carbohydrate is found in many foods. · Learn which foods have carbs. And learn the amounts of carbs in different foods. ? Bread, cereal, pasta, and rice have about 15 grams of carbs in a serving. A serving is 1 slice of bread (1 ounce), ½ cup of cooked cereal, or 1/3 cup of cooked pasta or rice. ? Fruits have 15 grams of carbs in a serving. A serving is 1 small fresh fruit, such as an apple or orange; ½ of a banana; ½ cup of cooked or canned fruit; ½ cup of fruit juice; 1 cup of melon or raspberries; or 2 tablespoons of dried fruit. ? Milk and no-sugar-added yogurt have 15 grams of carbs in a serving. A serving is 1 cup of milk or 2/3 cup of no-sugar-added yogurt.   ? Starchy vegetables have 15 grams of carbs in a serving. A serving is ½ cup of mashed potatoes or sweet potato; 1 cup winter squash; ½ of a small baked potato; ½ cup of cooked beans; or ½ cup cooked corn or green peas. · Learn how much carbs to eat each day and at each meal. A dietitian or CDE can teach you how to keep track of the amount of carbs you eat. This is called carbohydrate counting. · If you are not sure how to count carbohydrate grams, use the Plate Method to plan meals. It is a good, quick way to make sure that you have a balanced meal. It also helps you spread carbs throughout the day. ? Divide your plate by types of foods. Put non-starchy vegetables on half the plate, meat or other protein food on one-quarter of the plate, and a grain or starchy vegetable in the final quarter of the plate. To this you can add a small piece of fruit and 1 cup of milk or yogurt, depending on how many carbs you are supposed to eat at a meal.  · Try to eat about the same amount of carbs at each meal. Do not \"save up\" your daily allowance of carbs to eat at one meal.  · Proteins have very little or no carbs per serving. Examples of proteins are beef, chicken, turkey, fish, eggs, tofu, cheese, cottage cheese, and peanut butter. A serving size of meat is 3 ounces, which is about the size of a deck of cards. Examples of meat substitute serving sizes (equal to 1 ounce of meat) are 1/4 cup of cottage cheese, 1 egg, 1 tablespoon of peanut butter, and ½ cup of tofu. How can you eat out and still eat healthy? · Learn to estimate the serving sizes of foods that have carbohydrate. If you measure food at home, it will be easier to estimate the amount in a serving of restaurant food. · If the meal you order has too much carbohydrate (such as potatoes, corn, or baked beans), ask to have a low-carbohydrate food instead. Ask for a salad or green vegetables.   · If you use insulin, check your blood sugar before and after eating out to help you plan how much to eat in the future. · If you eat more carbohydrate at a meal than you had planned, take a walk or do other exercise. This will help lower your blood sugar. What else should you know? · Limit saturated fat, such as the fat from meat and dairy products. This is a healthy choice because people who have diabetes are at higher risk of heart disease. So choose lean cuts of meat and nonfat or low-fat dairy products. Use olive or canola oil instead of butter or shortening when cooking. · Don't skip meals. Your blood sugar may drop too low if you skip meals and take insulin or certain medicines for diabetes. · Check with your doctor before you drink alcohol. Alcohol can cause your blood sugar to drop too low. Alcohol can also cause a bad reaction if you take certain diabetes medicines. Follow-up care is a key part of your treatment and safety. Be sure to make and go to all appointments, and call your doctor if you are having problems. It's also a good idea to know your test results and keep a list of the medicines you take. Where can you learn more? Go to https://[email or web address] org and sign in to your [employer] account. Enter A170 in the [employer] box to learn more about \"Learning About Diabetes Food Guidelines. \"     If you do not have an account, please click on the \"Sign Up Now\" link. Current as of: April 16, 2019  Content Version: 12.3  © 2963-9770 Healthwise, Incorporated. Care instructions adapted under license by [facility] [place] ([facility]). If you have questions about a medical condition or this instruction, always ask your healthcare professional. [place] [address] any warranty or liability for your use of this information. Sher Badillo M.D.

## 2020-02-03 ENCOUNTER — HOSPITAL ENCOUNTER (OUTPATIENT)
Dept: DIABETES SERVICES | Age: 70
Setting detail: THERAPIES SERIES
Discharge: HOME | End: 2020-02-03
Payer: MEDICARE

## 2020-02-03 PROCEDURE — G0108 DIAB MANAGE TRN  PER INDIV: HCPCS

## 2020-02-03 SDOH — ECONOMIC STABILITY: FOOD INSECURITY: ADDITIONAL INFORMATION: NO

## 2020-02-03 ASSESSMENT — PROBLEM AREAS IN DIABETES QUESTIONNAIRE (PAID)
PAID-5 TOTAL SCORE: 8
FEELING SCARED WHEN YOU THINK ABOUT LIVING WITH DIABETES: 4
WORRYING ABOUT THE FUTURE AND THE POSSIBILITY OF SERIOUS COMPLICATIONS: 4
FEELING DEPRESSED WHEN YOU THINK ABOUT LIVING WITH DIABETES: 0
FEELING THAT DIABETES IS TAKING UP TOO MUCH OF YOUR MENTAL AND PHYSICAL ENERGY EVERY DAY: 0
COPING WITH COMPLICATIONS OF DIABETES: 0

## 2020-02-03 ASSESSMENT — SLEEP AND FATIGUE QUESTIONNAIRES
HAVE YOU EVER BEEN TESTED FOR SLEEP APNEA: YES
HOW DO YOU RATE THE QUALITY OF YOUR SLEEP: GOOD
HOW MANY HOURS OF SLEEP ARE YOU GETTING, ON AVERAGE: 7 OR MORE

## 2020-02-03 NOTE — PROGRESS NOTES
Diabetes Self- Management Education Program Assessment and Education Record-   Also see Diabetic Screening    Patient, Jenifer Marte,  here for diabetes self-management education initial assessment. Today's visit was in an individual setting. Diet History :  Diet Questionnaire and typical meal /portion sheet completed  []Yes  [x] NO    Goals setting:  Initial Goal Set with Patient  [x]Yes  [] NO  (SEE  EDUCATION AND GOALS)    MEDICAL HISTORY:  Past Medical History:   Diagnosis Date    Abnormal EMG 12/09/2015    Actinic keratoses     Actinic keratoses     Allergic rhinitis     Anemia 11/18/2014    Arthritis     Chronic back pain     COPD (chronic obstructive pulmonary disease) ([facility] [address] 08/09/2012    Crohns disease     Degenerative disc disease, lumbar     Dermatitis     Diabetes ([facility] [address] 03/19/2015    diet controlled    GERD (gastroesophageal reflux disease)     History of atrial fibrillation 2006    Dr. Santiago Knight History of throat cancer 2004     cleared for reoccurence years ago    Hyperlipidemia 1/21/2014    Hypertension     Hypogonadism male     Lung disease     Mononeuritis multiplex     Mononeuritis multiplex     Obesity (BMI 30-39. 9) 7/24/2019    Osteoarthritis of lumbar spine     Pain of right heel     Paresthesia of foot, bilateral     Prediabetes 12/3/2014    Seborrheic dermatitis     Seborrheic keratoses, inflamed     Throat cancer (HCC)     throat, had surgery, sees Dr Dolores Fields yearly    Tinea cruris     Tinea pedis     Tinea unguium      Family History   Problem Relation Age of Onset    Heart Disease Mother     Liver Disease Mother     Heart Disease Father     Cancer Sister         lung     Alemtuzumab; Glycopyrrolate-formoterol; Mercaptopurine; and Moxifloxacin   Immunization History   Administered Date(s) Administered    DTaP 04/21/2016    DTaP vaccine 12/08/1988, 02/09/1989, 04/13/1989, 04/05/1990, 04/16/1993, 04/21/2016    Hepatitis B Ped/Adol (Engerix-B, Recombivax HB) 08/24/2001, 10/12/2001, 03/08/2002    Hib vaccine 04/16/1993    Influenza A (S2Q8-96) Vaccine PF IM 11/13/2009    Influenza Vaccine, unspecified formulation 11/05/2005    Influenza Virus Vaccine 11/20/2006, 11/13/2009, 09/06/2013, 10/20/2016    Influenza, High Dose (Fluzone 65 yrs and older) 10/20/2016, 11/27/2017, 11/07/2018    Influenza, Triv, inactivated, subunit, adjuvanted, IM (Fluad 65 yrs and older) 10/09/2019    MMR 04/05/1990, 08/24/2001    Pneumococcal Conjugate 13-valent (Dvedwwl53) 11/14/2016    Pneumococcal Polysaccharide ([id number]) 11/27/2017    Polio OPV 12/08/1988, 02/09/1989, 04/05/1990, 04/16/1993    Zoster Live (Zostavax) 09/24/2013    Zoster Recombinant (Shingrix) 08/13/2018, 01/26/2019       Current Medications  Current Outpatient Medications   Medication Sig Dispense Refill    albuterol (PROVENTIL) (2.5 MG/3ML) 0.083% nebulizer solution Use every 6 hours as needed for wheezing  DX:J44.1 360 each 2    lisinopril (PRINIVIL;ZESTRIL) 20 MG tablet Take 1 tablet by mouth daily 90 tablet 3    gabapentin (NEURONTIN) 300 MG capsule Take 1 capsule by mouth daily for 90 days.  90 capsule 1    testosterone (ANDROGEL; TESTIM) 50 MG/5GM (1%) GEL 1% gel Alternate one packet daily with 2 packets daily every other day 135 Package 0    doxazosin (CARDURA) 4 MG tablet Take 1/2 BID 90 tablet 0    nitroGLYCERIN (NITROSTAT) 0.4 MG SL tablet DISSOLVE 1 TABLET UNDER THE TONGUE EVERY 5 MINUTES AS NEEDED FOR CHEST PAIN MAXIMUM OF 3 TABLETS 25 tablet 0    montelukast (SINGULAIR) 10 MG tablet TAKE 1 TABLET NIGHTLY 90 tablet 3    fluticasone (FLONASE) 50 MCG/ACT nasal spray 1 spray by Each Nare route daily 1 Spray in each nostril 3 Bottle 1    pravastatin (PRAVACHOL) 40 MG tablet Take 1 tablet by mouth nightly 90 tablet 3    metoprolol tartrate (LOPRESSOR) 50 MG tablet Take 1.5 po bid 270 tablet 3    albuterol sulfate  (90 Base) MCG/ACT inhaler Inhale 2 puffs into the lungs every 6 hours as needed      CPAP Machine MISC New cpap supplies mask hose filters etc 1 each 0    Saccharomyces boulardii (PROBIOTIC) 250 MG CAPS Take 1 tablet by mouth daily      esomeprazole Magnesium (NEXIUM) 20 MG PACK Take 20 mg by mouth daily      aspirin 81 MG tablet Take 81 mg by mouth daily.  mesalamine (PENTASA) 250 MG CR capsule Take 500 mg by mouth 4 times daily.  tiotropium (SPIRIVA HANDIHALER) 18 MCG inhalation capsule Inhale 1 capsule into the lungs daily 90 capsule 3     No current facility-administered medications for this encounter.    :     Comments:  Allergies: Allergies   Allergen Reactions    Alemtuzumab      Low grade fever  Other reaction(s): Other: See Comments  Low grade fever    Glycopyrrolate-Formoterol Other (See Comments)     Dizzy and felt like heart rate sped up  Other reaction(s): Other: See Comments  Dizzy and felt like heart rate sped up    Mercaptopurine     Moxifloxacin Other (See Comments)     Pt states He gets sores in his mouth       Diabetes 5  / Health Status    A1C blood level - at goal < 7%   Lab Results   Component Value Date    LABA1C 6.1 (H) 12/05/2019    LABA1C 5.6 03/08/2019    LABA1C 6.0 (H) 12/14/2018     Lab Results   Component Value Date    LDLCALC 53 03/08/2019    CREATININE 0.74 12/05/2019       Blood pressure (140/90)  Or less  BP Readings from Last 3 Encounters:   01/29/20 128/68   12/19/19 104/60   11/27/19 (!) 110/52        Cholesterol ( LDL under  100)   Lab Results   Component Value Date    LDLCALC 53 03/08/2019       4 . Smoking ? []Yes   [x]No    5. Taking an Asprin daily?   [x]Yes   []No            Diabetes Self- Management Education Record    Participant Name: Benjamin Ridley  Referring Provider: Juhi Miranda MD   Assessment/Evaluation Ratings:  1=Needs Instruction   4=Demonstrates Understanding/Competency  2=Needs Review   NC=Not Covered    3=Comprehends Key Points  N/A=Not Applicable    Topics/Learning Objectives Initial Assessment Date:

## 2020-02-11 ENCOUNTER — HOSPITAL ENCOUNTER (OUTPATIENT)
Dept: DIABETES SERVICES | Age: 70
Setting detail: THERAPIES SERIES
Discharge: HOME | End: 2020-02-11
Payer: MEDICARE

## 2020-02-11 PROCEDURE — G0109 DIAB MANAGE TRN IND/GROUP: HCPCS

## 2020-02-11 NOTE — PROGRESS NOTES
CPAP Machine MISC New cpap supplies mask hose filters etc 1 each 0    Saccharomyces boulardii (PROBIOTIC) 250 MG CAPS Take 1 tablet by mouth daily      esomeprazole Magnesium (NEXIUM) 20 MG PACK Take 20 mg by mouth daily      aspirin 81 MG tablet Take 81 mg by mouth daily.  mesalamine (PENTASA) 250 MG CR capsule Take 500 mg by mouth 4 times daily.  tiotropium (SPIRIVA HANDIHALER) 18 MCG inhalation capsule Inhale 1 capsule into the lungs daily 90 capsule 3     No current facility-administered medications for this encounter.    :     Comments:  Allergies: Allergies   Allergen Reactions    Alemtuzumab      Low grade fever  Other reaction(s): Other: See Comments  Low grade fever    Glycopyrrolate-Formoterol Other (See Comments)     Dizzy and felt like heart rate sped up  Other reaction(s): Other: See Comments  Dizzy and felt like heart rate sped up    Mercaptopurine     Moxifloxacin Other (See Comments)     Pt states He gets sores in his mouth       Diabetes 5  / Health Status    A1C blood level - at goal < 7%   Lab Results   Component Value Date    LABA1C 6.1 (H) 12/05/2019    LABA1C 5.6 03/08/2019    LABA1C 6.0 (H) 12/14/2018     Lab Results   Component Value Date    LDLCALC 53 03/08/2019    CREATININE 0.74 12/05/2019       Blood pressure (140/90)  Or less  BP Readings from Last 3 Encounters:   01/29/20 128/68   12/19/19 104/60   11/27/19 (!) 110/52        Cholesterol ( LDL under  100)   Lab Results   Component Value Date    LDLCALC 53 03/08/2019       4 . Smoking ? []Yes   [x]No    5. Taking an Asprin daily? [x]Yes   []No            Diabetes Self- Management Education Record    Participant Name: Cherelle Rhodes  Referring Provider: Oscar Griffin MD   Assessment/Evaluation Ratings:  1=Needs Instruction   4=Demonstrates Understanding/Competency  2=Needs Review   NC=Not Covered    3=Comprehends Key Points  N/A=Not Applicable    Topics/Learning Objectives Initial Assessment Date:   Instr.  Date Reinforce Date Post- session Eval Comments   Diabetes disease process & Treatment process: Define diabetes & pre-diabetes; Identify own type of diabetes; role of the pancreas; signs/symptoms; diagnostic criteria; prevention & treatment options; contributing factors. (1) 02/03/20 Efe Workman RN  02/11/20  Ulysses Riojas RN     02/03/20 Newly diagnosed. Has had preDM for the past few years but has been able to keep \"under control\" with diet and weight loss. Recent spike in A1C and worried. Reports poor knowledge and would like to learn. Efe Workman RN     02/11/20 Discussed basic pathophysiology of DM with the group including the pancreas, insulin, insulin resistance and livers involvement. Reviewed the different types of DM, risk factors, diagnosis, symptoms and the treatment options. Ulysses Riojas RN       Incorporating nutritional management into lifestyle: Describe effect of type, amount & timing of food on blood glucose; Describe basic meal planning techniques & current nutrition guideline   (1) 02/03/20 Efe Workman RN     02/03/20 Reviewed the importance of eating a balanced diet including portion control and still eating carbohydrates. Efe Workman RN     What to eat - Food groups, When to eat - timing of meals and snacks, and How much to eat - portions control. calories/ day   CHO choices/ meal   CHO choices/  day   grams of protein /day   gram of fat /day     Correctly read food labels & demonstrate CHO counting & portion control with personalized meal plan. Identify dining out strategies, & dietary sick day guidelines. (1) 02/03/20 Efe Workman RN     02/03/20  Importance of label reading reviewed with patient including focus on total carbohydrates and not just sugar content as well as serving size.  Efe Workman RN      Incorporating physical activity into lifestyle:   Verbalize effect of exercise on blood glucose levels; benefits of regular exercise; safety considerations; contraindications; maintenance of activity. (1) 02/03/20 Rita Enrique RN  02/11/20  Leobardo Cruz RN     02/03/20 Discussed importance of activity and set goal to get back to the gym 3 times a week. Rita Enrique RN    02/11/20 - Reviewed with the group the importance of exercise, recommendations by the ADA, effects on BG, weight loss and precautions. Discussed what patient is doing to stay active with the group. Leobardo Cruz RN      Using medications safely:  Identify effects of diabetes medicines on blood glucose levels; List diabetes medication taken, action & side effects;  (1) 02/03/20 Rita Enrique RN     02/03/20 no diabetes meds. Rita Enrique RN    Insulin / Injectable - Appropriate injection sites; proper storage; supplies needed; proper technique; safe needle disposal guidelines. (1) 02/03/20 Rita Enrique RN        Monitoring blood glucose, interpreting and using results:  Identify recommended & personal blood glucose targets; importance of testing; testing supplies; HgbA1C target levels; Factors affecting blood glucose; Importance of logging blood glucose levels for pattern recognition; ketone testing; safe lancet disposal.   (1) 02/03/20 Rita Enrique RN  02/11/20  Leobardo Cruz RN     02/03/20 Does not have monitor to check BG. Reviewed goal for A1c and BG. Rita Enrique RN     2/11/20 - Discussed timing of monitoring and goals for BG. Aware of goal A1C and current A1C. BG guidelines reviewed and rule of 15 to treat low BG. Reviewed the importance of monitoring at different times and occasionally post prandial. Encouraged to keep a log book. Leobardo Cruz RN       Prevention, detection & treatment of acute complications:  Identify symptoms of hyper & hypoglycemia, and prevention & treatment strategies.     (1) 02/03/20 Rita Enrique RN  02/11/20  Leobardo Cruz RN     02/03/20 - Patient able to recognize signs and symptoms of high and low BG. Reviewed treatment of both high and low BG. Shaina Strickland RN    02/11/20 - Patient able to recognize signs and symptoms of high and low BG. Reviewed treatment of both high and low BG. Discussed with the group causes of both high and low BG. Reviewed BG guidelines and troubleshooting unexpected numbers. Jacqueline Conn RN       Describe sick day guidelines & indications for physician notification. Identify short term consequences of poor control. (1) 02/03/20 Shaina Strickland RN        Prevention, detection & treatment of chronic complications:  Define the natural course of diabetes & describe the relationship of blood glucose levels to long term complications of diabetes. Identify preventative measures & standards of care. (1) 02/03/20 Shaina Strickland RN        Developing strategies to address psychosocial issues:  Describe feelings about living with diabetes; Describe how stress, depression & anxiety affect blood glucose; Identify coping strategies; Identify support needed & support network available. (1) 02/03/20 Shaina Strickland RN  02/11/20  Jacqueline Conn RN     02/03/20 Worried about diagnosis and possibility of complications. Shaina Strickland RN     02/11/20 - Reviewed healthy coping and unhealthy coping with the group. Discussed what ways of coping each person usually uses and brainstormed ways to change to a healthy coping mechanism with the group. Stressed the importance of stress reduction and the negative effects of stress on the body including elevated BG. Community resources and support networks reviewed and handout provided. Jacqueline Conn RN       Developing strategies to promote health/change behavior: Identify 7 self-care behaviors; Personal health risk factors; Benefits, challenges & strategies for behavioral change;    (1) 02/03/20 Shaina Strickland RN          Individualized goal selection.               My goal , to help me improve my health, I will:     1. 02/03/20 go to the gym 3 times a week. Ken Howe RN     02/11/20  0%. Has not started yet. Annelise Muñoz RN        2.02/11/20  Follow a meal plan for healthy eating habits-make a plan with dietician. Annelise Muñoz RN         Plan  Follow-up Appointments planned in group setting. Next Appointment 2/12/20 2/13/20. Instruction Method: [x]Lecture/Discussion  [x]Power Point Presentation  [x]Handouts  []Return Demonstration      Education Materials/Equipment Provided:      [x]Self-Management  - Initial Assessment - Where do I Begin booklet and Counting Carbs from the ADA. [x]Self-Management  Class 1 - On the Road to better managing your diabetes - Packet of Handouts from Diabetes Department    [] Self-Management  Class 2 - Meal Plan,  Choose your Foods - Food Lists for [employer] and Nutrition in the [employer] Resources - fast facts about fast food    [] Self-Management  Class 3 -  Diabetes ID card,  foot care tips sheet,  Continuing Your Journey with Diabetes, Individualized Diabetes report card, Sick Day Rules, Diabetes Cookbooks, Magazines and Pedometers when available    []Glucose Meter     []Insulin Kit     []Other      Encounter Type Date Start Time End Time Comments No Show Dates   Assessment 02/03/20  Ken Howe RN  1300 1400  Newly diagnosed. Has had preDM for the past few years but has been able to keep \"under control\" with diet and weight loss. Recent spike in A1C and worried. Reports poor knowledge and would like to learn. Class 1 - Understanding diabetes 02/11/20  Annelise Muñoz RN   0900 1200  Pt is new to diabetes and very attentive and participates in class. He does not have a meter. No diabetes meds. A1C 6.1. he is hoping to reverse Diabetes if possible by lifestyle changes.      Class 2- Nutrition and diabetes          Class 3 - Preventing Complications         Individual MNT         3 Month Follow-up      []In Person  []Telephone    Meter Instrx        Insulin Instrx      []Pen []Vial & Syringe      DSMS Support Plan:  Follow-up plan:     [] MNT referral request / Appointment     [] Annual update referral request and appointment after      []ADA  Where do I Begin, Living with Type 2 Diabetes ADA home support program     [] [address] [place] [address] [phone]     [] Fit Walks : FREE [facility] or [facility]    []  Diabetes support Group      []   Emotional Support      [] Vanessa on phone      []  Internet web sites - ADA and D- life    []  Journals/Magazines    []  Other ___________________________      Post Education Referrals:      [] [address] [place] [address] information sheet and [address] [facility] , [address] [phone]      [] Dental care    [] Podiatrist     []  Opthamologist      []Other    Stuart Martinez RN

## 2020-02-12 ENCOUNTER — HOSPITAL ENCOUNTER (OUTPATIENT)
Dept: DIABETES SERVICES | Age: 70
Setting detail: THERAPIES SERIES
Discharge: HOME | End: 2020-02-12
Payer: MEDICARE

## 2020-02-12 PROCEDURE — G0109 DIAB MANAGE TRN IND/GROUP: HCPCS

## 2020-02-12 NOTE — PROGRESS NOTES
10/12/2001, 03/08/2002    Hib vaccine 04/16/1993    Influenza A ([id number]) Vaccine PF IM 11/13/2009    Influenza Vaccine, unspecified formulation 11/05/2005    Influenza Virus Vaccine 11/20/2006, 11/13/2009, 09/06/2013, 10/20/2016    Influenza, High Dose (Fluzone 65 yrs and older) 10/20/2016, 11/27/2017, 11/07/2018    Influenza, Triv, inactivated, subunit, adjuvanted, IM (Fluad 65 yrs and older) 10/09/2019    MMR 04/05/1990, 08/24/2001    Pneumococcal Conjugate 13-valent (Yxpjmiv25) 11/14/2016    Pneumococcal Polysaccharide ([id number]) 11/27/2017    Polio OPV 12/08/1988, 02/09/1989, 04/05/1990, 04/16/1993    Zoster Live (Zostavax) 09/24/2013    Zoster Recombinant (Shingrix) 08/13/2018, 01/26/2019       Current Medications  Current Outpatient Medications   Medication Sig Dispense Refill    albuterol (PROVENTIL) (2.5 MG/3ML) 0.083% nebulizer solution Use every 6 hours as needed for wheezing  [id number] 360 each 2    lisinopril (PRINIVIL;ZESTRIL) 20 MG tablet Take 1 tablet by mouth daily 90 tablet 3    gabapentin (NEURONTIN) 300 MG capsule Take 1 capsule by mouth daily for 90 days.  90 capsule 1    testosterone (ANDROGEL; TESTIM) 50 MG/5GM (1%) GEL 1% gel Alternate one packet daily with 2 packets daily every other day 135 Package 0    doxazosin (CARDURA) 4 MG tablet Take 1/2 BID 90 tablet 0    nitroGLYCERIN (NITROSTAT) 0.4 MG SL tablet DISSOLVE 1 TABLET UNDER THE TONGUE EVERY 5 MINUTES AS NEEDED FOR CHEST PAIN MAXIMUM OF 3 TABLETS 25 tablet 0    montelukast (SINGULAIR) 10 MG tablet TAKE 1 TABLET NIGHTLY 90 tablet 3    fluticasone (FLONASE) 50 MCG/ACT nasal spray 1 spray by Each Nare route daily 1 Spray in each nostril 3 Bottle 1    pravastatin (PRAVACHOL) 40 MG tablet Take 1 tablet by mouth nightly 90 tablet 3    metoprolol tartrate (LOPRESSOR) 50 MG tablet Take 1.5 po bid 270 tablet 3    albuterol sulfate  (90 Base) MCG/ACT inhaler Inhale 2 puffs into the lungs every 6 hours as needed      CPAP Machine MISC New cpap supplies mask hose filters etc 1 each 0    Saccharomyces boulardii (PROBIOTIC) 250 MG CAPS Take 1 tablet by mouth daily      esomeprazole Magnesium (NEXIUM) 20 MG PACK Take 20 mg by mouth daily      aspirin 81 MG tablet Take 81 mg by mouth daily.  mesalamine (PENTASA) 250 MG CR capsule Take 500 mg by mouth 4 times daily.  tiotropium (SPIRIVA HANDIHALER) 18 MCG inhalation capsule Inhale 1 capsule into the lungs daily 90 capsule 3     No current facility-administered medications for this encounter.    :     Comments:  Allergies: Allergies   Allergen Reactions    Alemtuzumab      Low grade fever  Other reaction(s): Other: See Comments  Low grade fever    Glycopyrrolate-Formoterol Other (See Comments)     Dizzy and felt like heart rate sped up  Other reaction(s): Other: See Comments  Dizzy and felt like heart rate sped up    Mercaptopurine     Moxifloxacin Other (See Comments)     Pt states He gets sores in his mouth       Diabetes 5  / Health Status    A1C blood level - at goal < 7%   Lab Results   Component Value Date    LABA1C 6.1 (H) 12/05/2019    LABA1C 5.6 03/08/2019    LABA1C 6.0 (H) 12/14/2018     Lab Results   Component Value Date    LDLCALC 53 03/08/2019    CREATININE 0.74 12/05/2019       Blood pressure (140/90)  Or less  BP Readings from Last 3 Encounters:   01/29/20 128/68   12/19/19 104/60   11/27/19 (!) 110/52        Cholesterol ( LDL under  100)   Lab Results   Component Value Date    LDLCALC 53 03/08/2019       4 . Smoking ? []Yes   [x]No    5. Taking an Asprin daily? [x]Yes   []No            Diabetes Self- Management Education Record    Participant Name: Jossie Turk  Referring Provider: Leeann Garcia MD   Assessment/Evaluation Ratings:  1=Needs Instruction   4=Demonstrates Understanding/Competency  2=Needs Review   NC=Not Covered    3=Comprehends Key Points  N/A=Not Applicable    Topics/Learning Objectives Initial Assessment Date:   Instr.  Date Reinforce Date Post- session Eval Comments   Diabetes disease process & Treatment process: Define diabetes & pre-diabetes; Identify own type of diabetes; role of the pancreas; signs/symptoms; diagnostic criteria; prevention & treatment options; contributing factors. (1) 02/03/20 Angelo Pool, RN  02/11/20  Jina Crenshaw RN     02/03/20 Newly diagnosed. Has had preDM for the past few years but has been able to keep \"under control\" with diet and weight loss. Recent spike in A1C and worried. Reports poor knowledge and would like to learn. Angelo Seals RN     02/11/20 Discussed basic pathophysiology of DM with the group including the pancreas, insulin, insulin resistance and livers involvement. Reviewed the different types of DM, risk factors, diagnosis, symptoms and the treatment options. Jina Crenshaw RN       Incorporating nutritional management into lifestyle: Describe effect of type, amount & timing of food on blood glucose; Describe basic meal planning techniques & current nutrition guideline   (1) 02/03/20 Angelo Seals RN  2/12/2020  MARIEL Acuña   02/03/20 Reviewed the importance of eating a balanced diet including portion control and still eating carbohydrates. Angelo Seals RN     2/12/2020 What to eat - Food groups, When to eat - timing of meals and snacks, and How much to eat - portions control. MARIEL Acuña     2000 calories/ day   4-5 CHO choices/ meal   14 CHO choices/  day   grams of protein /day   gram of fat /day     Correctly read food labels & demonstrate CHO counting & portion control with personalized meal plan. Identify dining out strategies, & dietary sick day guidelines. (1) 02/03/20 Angelo Seals RN  2/12/2020  MARIEL Acuña   02/03/20  Importance of label reading reviewed with patient including focus on total carbohydrates and not just sugar content as well as serving size.  Angelo Seals RN     2/12/2020 Instructed on carb counting, Identify symptoms of hyper & hypoglycemia, and prevention & treatment strategies. (1) 02/03/20 Melissa Jurado RN  02/11/20  Julieta Weir RN     02/03/20 - Patient able to recognize signs and symptoms of high and low BG. Reviewed treatment of both high and low BG. Melissa Jurado RN    02/11/20 - Patient able to recognize signs and symptoms of high and low BG. Reviewed treatment of both high and low BG. Discussed with the group causes of both high and low BG. Reviewed BG guidelines and troubleshooting unexpected numbers. Julieta Weir RN       Describe sick day guidelines & indications for physician notification. Identify short term consequences of poor control. (1) 02/03/20 Melissa Jurado RN        Prevention, detection & treatment of chronic complications:  Define the natural course of diabetes & describe the relationship of blood glucose levels to long term complications of diabetes. Identify preventative measures & standards of care. (1) 02/03/20 Melissa Jurado RN        Developing strategies to address psychosocial issues:  Describe feelings about living with diabetes; Describe how stress, depression & anxiety affect blood glucose; Identify coping strategies; Identify support needed & support network available. (1) 02/03/20 Melissa Jurado RN  02/11/20  Julieta Weir RN     02/03/20 Worried about diagnosis and possibility of complications. Melissa Jurado RN     02/11/20 - Reviewed healthy coping and unhealthy coping with the group. Discussed what ways of coping each person usually uses and brainstormed ways to change to a healthy coping mechanism with the group. Stressed the importance of stress reduction and the negative effects of stress on the body including elevated BG. Community resources and support networks reviewed and handout provided.  Julieta Weir RN       Developing strategies to promote health/change behavior: Identify 7 self-care behaviors; Personal health risk factors; Benefits, challenges & strategies for behavioral change;    (1) 02/03/20 Alfred Nagel RN          Individualized goal selection. My goal , to help me improve my health, I will:     1. 02/03/20 go to the gym 3 times a week. Alfred Nagel RN     02/11/20  0%. Has not started yet. Neris Cedeno RN        2.02/11/20  Follow a meal plan for healthy eating habits-make a plan with dietician. Neris Cedeno RN         Plan  Follow-up Appointments planned in group setting. Next Appointment 2/12/20 2/13/20. Instruction Method: [x]Lecture/Discussion  [x]Power Point Presentation  [x]Handouts  []Return Demonstration      Education Materials/Equipment Provided:      [x]Self-Management  - Initial Assessment - Where do I Begin booklet and Counting Carbs from the ADA. [x]Self-Management  Class 1 - On the Road to better managing your diabetes - Packet of Handouts from Diabetes Department    [x] Self-Management  Class 2 - Meal Plan,  Choose your Foods - Food Lists for [employer] and Nutrition in the [employer] Resources - fast facts about fast food    [] Self-Management  Class 3 -  Diabetes ID card,  foot care tips sheet,  Continuing Your Journey with Diabetes, Individualized Diabetes report card, Sick Day Rules, Diabetes Cookbooks, Magazines and Pedometers when available    []Glucose Meter     []Insulin Kit     []Other      Encounter Type Date Start Time End Time Comments No Show Dates   Assessment 02/03/20  Alfred Nagel RN  1300 1400  Newly diagnosed. Has had preDM for the past few years but has been able to keep \"under control\" with diet and weight loss. Recent spike in A1C and worried. Reports poor knowledge and would like to learn. Class 1 - Understanding diabetes 02/11/20  Neris Cedeno RN   0900 1200  Pt is new to diabetes and very attentive and participates in class. He does not have a meter. No diabetes meds.  A1C 6.1. he is hoping to reverse Diabetes if possible by lifestyle changes.      Class 2- Nutrition and diabetes   2/12/2020   Orvil Heimlich, RDN LD 0900 1200 Attentive in class; showed good understandning    Class 3 - Preventing Complications         Individual MNT         3 Month Follow-up      []In Person  []Telephone    Meter Instrx        Insulin Instrx      []Pen  []Vial & Syringe      DSMS Support Plan:  Follow-up plan:     [] MNT referral request / Appointment     [] Annual update referral request and appointment after      []ADA  Where do I Begin, Living with Type 2 Diabetes ADA home support program     [] [address]     [] Fit Walks : FREE [employer] or [facility]    []  Diabetes support Group      []   Emotional Support      [] Vanessa on phone      []  Internet web sites - ADA and D- life    []  Journals/Magazines    []  Other ___________________________      Post Education Referrals:      [] [address] [place] [address] information sheet and [address] [facility] , [address]       [] Dental care    [] Podiatrist     []  Opthamologist      []Other

## 2020-02-13 ENCOUNTER — APPOINTMENT (OUTPATIENT)
Dept: DIABETES SERVICES | Age: 70
End: 2020-02-13
Payer: MEDICARE

## 2020-02-19 ENCOUNTER — OFFICE VISIT (OUTPATIENT)
Dept: FAMILY MEDICINE CLINIC | Age: 70
End: 2020-02-19
Payer: MEDICARE

## 2020-02-19 VITALS
TEMPERATURE: 96.6 F | HEIGHT: 70 IN | SYSTOLIC BLOOD PRESSURE: 116 MMHG | WEIGHT: 269 LBS | OXYGEN SATURATION: 95 % | HEART RATE: 62 BPM | DIASTOLIC BLOOD PRESSURE: 70 MMHG | BODY MASS INDEX: 38.51 KG/M2

## 2020-02-19 PROCEDURE — 96372 THER/PROPH/DIAG INJ SC/IM: CPT | Performed by: FAMILY MEDICINE

## 2020-02-19 PROCEDURE — 99215 OFFICE O/P EST HI 40 MIN: CPT | Performed by: FAMILY MEDICINE

## 2020-02-19 PROCEDURE — 94640 AIRWAY INHALATION TREATMENT: CPT | Performed by: FAMILY MEDICINE

## 2020-02-19 RX ORDER — METHYLPREDNISOLONE ACETATE 80 MG/ML
80 INJECTION, SUSPENSION INTRA-ARTICULAR; INTRALESIONAL; INTRAMUSCULAR; SOFT TISSUE ONCE
Status: COMPLETED | OUTPATIENT
Start: 2020-02-19 | End: 2020-02-19

## 2020-02-19 RX ORDER — ALBUTEROL SULFATE 2.5 MG/3ML
2.5 SOLUTION RESPIRATORY (INHALATION) ONCE
Status: COMPLETED | OUTPATIENT
Start: 2020-02-19 | End: 2020-02-19

## 2020-02-19 RX ORDER — IPRATROPIUM BROMIDE AND ALBUTEROL SULFATE 2.5; .5 MG/3ML; MG/3ML
1 SOLUTION RESPIRATORY (INHALATION) ONCE
Status: COMPLETED | OUTPATIENT
Start: 2020-02-19 | End: 2020-02-19

## 2020-02-19 RX ADMIN — ALBUTEROL SULFATE 2.5 MG: 2.5 SOLUTION RESPIRATORY (INHALATION) at 10:21

## 2020-02-19 RX ADMIN — IPRATROPIUM BROMIDE AND ALBUTEROL SULFATE 1 AMPULE: 2.5; .5 SOLUTION RESPIRATORY (INHALATION) at 09:52

## 2020-02-19 RX ADMIN — METHYLPREDNISOLONE ACETATE 80 MG: 80 INJECTION, SUSPENSION INTRA-ARTICULAR; INTRALESIONAL; INTRAMUSCULAR; SOFT TISSUE at 10:20

## 2020-02-19 ASSESSMENT — ENCOUNTER SYMPTOMS
SHORTNESS OF BREATH: 1
CONSTIPATION: 0
ABDOMINAL PAIN: 0
COUGH: 1
EYE DISCHARGE: 0
VOICE CHANGE: 0
COLOR CHANGE: 0
WHEEZING: 0
TROUBLE SWALLOWING: 0
ABDOMINAL DISTENTION: 0
NAUSEA: 0
DIARRHEA: 0
CHEST TIGHTNESS: 0

## 2020-02-19 NOTE — PROGRESS NOTES
Pt was educated on the reason for a nebulizer treatment as well as the benefits and potential side effects. Pt acknowledged information and agreed to the treatment ordered. Pt was give an Duo-Neb treatment. After obtaining consent, and per orders of Dr. Sofia Laws, injection of MethyLPREDnisolone given in Right upper quad. gluteus by Arminda Butterfield. Patient instructed to remain in clinic for 20 minutes afterwards, and to report any adverse reaction to me immediately.

## 2020-02-19 NOTE — PROGRESS NOTES
Subjective:      Patient ID: Carrol Weir is a 71 y.o. male who presents for:  Chief Complaint   Patient presents with    Shortness of Breath     states that he has been having SOB for the last 2 weeks. has been using nebulizer frequently he states. did not use it this morning. states that he has a lot of phlegm that is hard to get up. states that he did use his albuterol inhaler this morning. No smoking,  Gas heat. No humidifier in bedroom. No fireplace use. Patient came to the office today extremely short of breath. Wanted to see if he could be seen somewhere. He was about to be sent to the emergency room when the staff brought him to my attention. Patient was able to have conversation. States his significant shortness of breath is been going on for a couple days     see review of systems    Shortness of Breath   Pertinent negatives include no abdominal pain, chest pain, fever, leg swelling, rash or wheezing. Current Outpatient Medications on File Prior to Visit   Medication Sig Dispense Refill    albuterol (PROVENTIL) (2.5 MG/3ML) 0.083% nebulizer solution Use every 6 hours as needed for wheezing  DX:J44.1 360 each 2    lisinopril (PRINIVIL;ZESTRIL) 20 MG tablet Take 1 tablet by mouth daily 90 tablet 3    gabapentin (NEURONTIN) 300 MG capsule Take 1 capsule by mouth daily for 90 days.  90 capsule 1    testosterone (ANDROGEL; TESTIM) 50 MG/5GM (1%) GEL 1% gel Alternate one packet daily with 2 packets daily every other day 135 Package 0    doxazosin (CARDURA) 4 MG tablet Take 1/2 BID 90 tablet 0    nitroGLYCERIN (NITROSTAT) 0.4 MG SL tablet DISSOLVE 1 TABLET UNDER THE TONGUE EVERY 5 MINUTES AS NEEDED FOR CHEST PAIN MAXIMUM OF 3 TABLETS 25 tablet 0    tiotropium (SPIRIVA HANDIHALER) 18 MCG inhalation capsule Inhale 1 capsule into the lungs daily 90 capsule 3    montelukast (SINGULAIR) 10 MG tablet TAKE 1 TABLET NIGHTLY 90 tablet 3    fluticasone (FLONASE) 50 MCG/ACT nasal spray 1 spray 2019    CATARACT REMOVAL WITH IMPLANT Left 2019    COLONOSCOPY  04/15/2015    KNEE ARTHROSCOPY      LARYNGECTOMY  2004    UPPER GASTROINTESTINAL ENDOSCOPY  13    Dr. Elham HOWARD/NAPOLEON RODRIGUEZ       Social History     Socioeconomic History    Marital status:      Spouse name: Not on file    Number of children: 3    Years of education: Not on file    Highest education level: Not on file   Occupational History    Not on file   Social Needs    Financial resource strain: Not on file    Food insecurity:     Worry: Not on file     Inability: Not on file    Transportation needs:     Medical: Not on file     Non-medical: Not on file   Tobacco Use    Smoking status: Former Smoker     Packs/day: 1.00     Years: 25.00     Pack years: 25.00     Types: Cigarettes     Last attempt to quit: 10/21/1990     Years since quittin.3    Smokeless tobacco: Never Used   Substance and Sexual Activity    Alcohol use: No     Comment: Attends [employer] 25 years    Drug use: No    Sexual activity: Not on file   Lifestyle    Physical activity:     Days per week: Not on file     Minutes per session: Not on file    Stress: Not on file   Relationships    Social connections:     Talks on phone: Not on file     Gets together: Not on file     Attends Advent service: Not on file     Active member of club or organization: Not on file     Attends meetings of clubs or organizations: Not on file     Relationship status: Not on file    Intimate partner violence:     Fear of current or ex partner: Not on file     Emotionally abused: Not on file     Physically abused: Not on file     Forced sexual activity: Not on file   Other Topics Concern    Not on file   Social History Narrative    Lives alone.      Family History   Problem Relation Age of Onset    Heart Disease Mother     Liver Disease Mother     Heart Disease Father     Cancer Sister         lung     Allergies: back a little, and breathe out slowly and completely. 5. Place the spacer's mouthpiece in your mouth. 6. Press down on the inhaler to spray one puff of medicine into the spacer, and then start breathing in slowly. Wait to inhale until after you have pressed down on the inhaler. Some spacers have a whistle. If you hear it, you should breathe in more slowly. 7. Hold your breath for 10 seconds. This will let the medicine settle in your lungs. 8. If you need to take a second dose, wait 30 to 60 seconds to allow the inhaler valve to refill. To use an inhaler without a spacer  1. Shake the inhaler as directed. Remove the cap. Check the instructions to see if you need to prime your inhaler before you use it. If it needs priming, follow the instructions on how to prime your inhaler. 2. Hold the inhaler upright with the mouthpiece at the bottom. 3. Tilt your head back a little, and breathe out slowly and completely. 4. Position the inhaler in one of two ways:  ? You can place the inhaler in your mouth. This is easier for most people. And it lowers the risk that any of the medicine will get into your eyes. ? Or you can place the inhaler 1 to 2 inches in front of your open mouth, without closing your lips over it. Try to open your mouth as wide as you can. Placing the inhaler in front of your open mouth may be better for getting the medicine into your lungs. But some people may find this too hard to do. 5. Start taking slow, even breaths through your mouth. Press down on the inhaler once, then inhale fully. 6. Hold your breath for 10 seconds. This will let the medicine settle in your lungs. 7. If you need to take a second dose, wait 30 to 60 seconds to allow the inhaler valve to refill. Where can you learn more? Go to https://MobileAdssara.Cardiosonic. org and sign in to your [employer] account. Enter K111 in the [facility] box to learn more about \"Using a Metered-Dose Inhaler: Care Instructions. \" If you do not have an account, please click on the \"Sign Up Now\" link. Current as of: June 9, 2019  Content Version: 12.3  © 8919-3856 Healthwise, Incorporated. Care instructions adapted under license by [facility] ([facility]). If you have questions about a medical condition or this instruction, always ask your healthcare professional. [address] any warranty or liability for your use of this information. Sher Cartwright M.D.

## 2020-02-19 NOTE — PATIENT INSTRUCTIONS
Pt will use nebulizer machine every 6 hours today and every 6-8 hour tomorrow. F/u appt 2 days    Treatment 4 p and 10 p due    Patient Education        Using a Metered-Dose Inhaler: Care Instructions  Your Care Instructions    A metered-dose inhaler lets you breathe medicine into your lungs quickly. Inhaled medicine works faster than the same medicine in a pill. An inhaler allows you to take less medicine than you would need if you took it as a pill. \"Metered-dose\" means that the inhaler gives a measured amount of medicine each time you use it. A metered-dose inhaler gives medicine in the form of a liquid mist.  Your doctor may want you to use a spacer with your inhaler. A spacer is a chamber that you attach to the inhaler. The chamber holds the medicine before you inhale it. That way, you can inhale the medicine in as many breaths as you need. Doctors recommend using a spacer with most metered-dose inhalers, especially those with corticosteroid medicines. Follow-up care is a key part of your treatment and safety. Be sure to make and go to all appointments, and call your doctor if you are having problems. It's also a good idea to know your test results and keep a list of the medicines you take. How can you care for yourself at home? To get started using your inhaler  · Talk with your health care provider to be sure you are using your inhaler the right way. It might help if you practice using it in front of a mirror. Use the inhaler exactly as prescribed. · Check that you have the correct medicine. If you use more than one inhaler, put a label on each one. This will let you know which one to use at the right time. · Keep track of how much medicine is in the inhaler. Check the label to see how many doses are in the container. If you know how many puffs you can take, you can replace the inhaler before you run out. Ask your health care provider how you can keep track of how much medicine is left.   · Use a spacer if you have problems pressing the inhaler and breathing in at the same time. You also may need a spacer if you are using corticosteroid medicines. · If you are using a corticosteroid inhaler, gargle and rinse out your mouth with water after use. Do not swallow the water. Swallowing the water will increase the chance that the medicine will get into your bloodstream. This may make it more likely that you will have side effects. To use a spacer with an inhaler  1. Shake the inhaler. Remove the inhaler cap, and place the mouthpiece of the inhaler into the spacer. Check the inhaler instructions to see if you need to prime your inhaler before you use it. If it needs priming, follow the instructions on how to prime your inhaler. 2. Remove the cap from the spacer. 3. Hold the inhaler upright with the mouthpiece at the bottom. 4. Tilt your head back a little, and breathe out slowly and completely. 5. Place the spacer's mouthpiece in your mouth. 6. Press down on the inhaler to spray one puff of medicine into the spacer, and then start breathing in slowly. Wait to inhale until after you have pressed down on the inhaler. Some spacers have a whistle. If you hear it, you should breathe in more slowly. 7. Hold your breath for 10 seconds. This will let the medicine settle in your lungs. 8. If you need to take a second dose, wait 30 to 60 seconds to allow the inhaler valve to refill. To use an inhaler without a spacer  1. Shake the inhaler as directed. Remove the cap. Check the instructions to see if you need to prime your inhaler before you use it. If it needs priming, follow the instructions on how to prime your inhaler. 2. Hold the inhaler upright with the mouthpiece at the bottom. 3. Tilt your head back a little, and breathe out slowly and completely. 4. Position the inhaler in one of two ways:  ? You can place the inhaler in your mouth. This is easier for most people.  And it lowers the risk that any of the medicine will get into your eyes. ? Or you can place the inhaler 1 to 2 inches in front of your open mouth, without closing your lips over it. Try to open your mouth as wide as you can. Placing the inhaler in front of your open mouth may be better for getting the medicine into your lungs. But some people may find this too hard to do. 5. Start taking slow, even breaths through your mouth. Press down on the inhaler once, then inhale fully. 6. Hold your breath for 10 seconds. This will let the medicine settle in your lungs. 7. If you need to take a second dose, wait 30 to 60 seconds to allow the inhaler valve to refill. Where can you learn more? Go to https://Magnomatics.Simply [employer] Station[employer] Plan. org and sign in to your [employer] account. Enter K111 in the [place] box to learn more about \"Using a Metered-Dose Inhaler: Care Instructions. \"     If you do not have an account, please click on the \"Sign Up Now\" link. Current as of: June 9, 2019  Content Version: 12.3  © 6192-1219 Healthwise, Incorporated. Care instructions adapted under license by [facility] ([facility]). If you have questions about a medical condition or this instruction, always ask your healthcare professional. [address] any warranty or liability for your use of this information.

## 2020-02-20 ENCOUNTER — APPOINTMENT (OUTPATIENT)
Dept: DIABETES SERVICES | Age: 70
End: 2020-02-20
Payer: MEDICARE

## 2020-02-21 ENCOUNTER — OFFICE VISIT (OUTPATIENT)
Dept: FAMILY MEDICINE CLINIC | Age: 70
End: 2020-02-21
Payer: MEDICARE

## 2020-02-21 VITALS
HEART RATE: 68 BPM | BODY MASS INDEX: 39.99 KG/M2 | DIASTOLIC BLOOD PRESSURE: 64 MMHG | WEIGHT: 270 LBS | SYSTOLIC BLOOD PRESSURE: 102 MMHG | HEIGHT: 69 IN | OXYGEN SATURATION: 95 %

## 2020-02-21 DIAGNOSIS — E29.1 HYPOGONADISM MALE: ICD-10-CM

## 2020-02-21 DIAGNOSIS — E11.9 TYPE 2 DIABETES MELLITUS WITHOUT COMPLICATION, WITHOUT LONG-TERM CURRENT USE OF INSULIN (HCC): ICD-10-CM

## 2020-02-21 PROBLEM — Z98.49 HISTORY OF CATARACT EXTRACTION: Status: ACTIVE | Noted: 2019-07-23

## 2020-02-21 PROBLEM — R20.2 PARESTHESIA OF SKIN: Status: ACTIVE | Noted: 2020-02-21

## 2020-02-21 LAB
CREATININE URINE: 147.2 MG/DL
HBA1C MFR BLD: 5.4 % (ref 4.8–5.9)
MICROALBUMIN UR-MCNC: <1.2 MG/DL
MICROALBUMIN/CREAT UR-RTO: NORMAL MG/G (ref 0–30)

## 2020-02-21 PROCEDURE — 99214 OFFICE O/P EST MOD 30 MIN: CPT | Performed by: FAMILY MEDICINE

## 2020-02-21 RX ORDER — DOXYCYCLINE HYCLATE 100 MG
100 TABLET ORAL 2 TIMES DAILY
Qty: 14 TABLET | Refills: 0 | Status: SHIPPED | OUTPATIENT
Start: 2020-02-21 | End: 2020-02-28

## 2020-02-21 RX ORDER — BUDESONIDE AND FORMOTEROL FUMARATE DIHYDRATE 160; 4.5 UG/1; UG/1
2 AEROSOL RESPIRATORY (INHALATION) 2 TIMES DAILY
Qty: 1 INHALER | Refills: 0 | COMMUNITY
Start: 2020-02-21 | End: 2020-02-27 | Stop reason: SDUPTHER

## 2020-02-21 RX ORDER — BUDESONIDE AND FORMOTEROL FUMARATE DIHYDRATE 160; 4.5 UG/1; UG/1
2 AEROSOL RESPIRATORY (INHALATION) 2 TIMES DAILY
Qty: 1 INHALER | Refills: 0 | COMMUNITY
Start: 2020-02-21 | End: 2020-02-21

## 2020-02-21 ASSESSMENT — ENCOUNTER SYMPTOMS
CONSTIPATION: 0
VOICE CHANGE: 0
COLOR CHANGE: 0
COUGH: 1
EYE DISCHARGE: 0
DIARRHEA: 0
TROUBLE SWALLOWING: 0
NAUSEA: 0
ABDOMINAL DISTENTION: 0
ABDOMINAL PAIN: 0
SHORTNESS OF BREATH: 0

## 2020-02-21 NOTE — PROGRESS NOTES
Subjective:      Patient ID: Jenifer Marte is a 71 y.o. male who presents for:  Chief Complaint   Patient presents with    Check-Up     2 day recomendation COPD - O2 togling between 93- and 95        Feeling better. Definitely notes improvement after nebulizer treatments at home. Is out of Symbicort. Is coughing and productive yellow sputum. No fevers but feeling more fatigued it is been 2 days since his initial evaluation for this. In November he was on steroids and azithromycin from pulmonology. His appointment 2 days ago he had 2 nebulizer treatments and steroid injection    Patient has been feeling well with his testosterone gel. Insurance is requesting information for refill. We completed that paperwork today. He has not had labs for almost a year for his testosterone level. Current Outpatient Medications on File Prior to Visit   Medication Sig Dispense Refill    albuterol (PROVENTIL) (2.5 MG/3ML) 0.083% nebulizer solution Use every 6 hours as needed for wheezing  DX:J44.1 (Patient taking differently: Take 2.5 mg by nebulization every 4 hours as needed Use every 6 hours as needed for wheezing  DX:J44.1) 360 each 2    lisinopril (PRINIVIL;ZESTRIL) 20 MG tablet Take 1 tablet by mouth daily 90 tablet 3    gabapentin (NEURONTIN) 300 MG capsule Take 1 capsule by mouth daily for 90 days.  90 capsule 1    testosterone (ANDROGEL; TESTIM) 50 MG/5GM (1%) GEL 1% gel Alternate one packet daily with 2 packets daily every other day 135 Package 0    doxazosin (CARDURA) 4 MG tablet Take 1/2 BID (Patient taking differently: Take 4 mg by mouth 2 times daily Take 1/2 BID ) 90 tablet 0    nitroGLYCERIN (NITROSTAT) 0.4 MG SL tablet DISSOLVE 1 TABLET UNDER THE TONGUE EVERY 5 MINUTES AS NEEDED FOR CHEST PAIN MAXIMUM OF 3 TABLETS 25 tablet 0    tiotropium (SPIRIVA HANDIHALER) 18 MCG inhalation capsule Inhale 1 capsule into the lungs daily 90 capsule 3    montelukast (SINGULAIR) 10 MG tablet TAKE 1 TABLET NIGHTLY History Narrative    Lives alone. Family History   Problem Relation Age of Onset    Heart Disease Mother     Liver Disease Mother     Heart Disease Father     Cancer Sister         lung     Allergies:  Alemtuzumab; Glycopyrrolate-formoterol; Mercaptopurine; and Moxifloxacin    Review of Systems   Constitutional: Positive for fatigue. Negative for activity change, appetite change and unexpected weight change. HENT: Negative for congestion, dental problem, trouble swallowing and voice change. Eyes: Negative for discharge and visual disturbance. Respiratory: Positive for cough. Negative for shortness of breath. Cardiovascular: Negative for chest pain and leg swelling. Gastrointestinal: Negative for abdominal distention, abdominal pain, constipation, diarrhea and nausea. Endocrine: Negative for polydipsia and polyuria. Genitourinary: Negative for dysuria and urgency. Musculoskeletal: Negative for gait problem and joint swelling. Skin: Negative for color change and rash. Allergic/Immunologic: Negative for environmental allergies and food allergies. Neurological: Negative for speech difficulty and weakness. Hematological: Does not bruise/bleed easily. Psychiatric/Behavioral: Negative for agitation and behavioral problems. Objective:   /64   Pulse 68   Ht 5' 9\" (1.753 m)   Wt 270 lb (122.5 kg)   SpO2 95%   BMI 39.87 kg/m²     Physical Exam  Constitutional:       General: He is not in acute distress. Appearance: Normal appearance. He is well-developed. He is not diaphoretic. HENT:      Head: Normocephalic and atraumatic. Right Ear: Hearing, tympanic membrane, ear canal and external ear normal. No drainage. Left Ear: Hearing, tympanic membrane, ear canal and external ear normal. No drainage. Nose: No nasal deformity, mucosal edema or rhinorrhea. Mouth/Throat:      Dentition: Normal dentition.       Pharynx: No oropharyngeal exudate, posterior oropharyngeal erythema or uvula swelling. Eyes:      General: Lids are normal.      Conjunctiva/sclera: Conjunctivae normal.      Right eye: No exudate. Left eye: No exudate. Pupils: Pupils are equal, round, and reactive to light. Neck:      Musculoskeletal: Full passive range of motion without pain and neck supple. Thyroid: No thyromegaly. Vascular: Normal carotid pulses. Trachea: No tracheal deviation. Cardiovascular:      Rate and Rhythm: Normal rate and regular rhythm. Chest Wall: PMI displaced: nl PMI. Pulses:           Carotid pulses are 2+ on the right side and 2+ on the left side. Radial pulses are 2+ on the right side and 2+ on the left side. Heart sounds: S1 normal and S2 normal. No murmur. No friction rub. No gallop. Pulmonary:      Effort: Pulmonary effort is normal.      Breath sounds: Wheezing present. No rhonchi or rales. Lymphadenopathy:      Head:      Right side of head: No preauricular adenopathy. Left side of head: No preauricular adenopathy. Cervical:      Right cervical: No superficial cervical adenopathy. Left cervical: No superficial cervical adenopathy. Skin:     Coloration: Skin is not pale. Findings: No ecchymosis or rash. Nails: There is no clubbing. Neurological:      Mental Status: He is alert. Motor: No abnormal muscle tone. Gait: Gait normal.   Psychiatric:         Mood and Affect: Mood is not anxious. Affect is not inappropriate. Speech: Speech normal.         Behavior: Behavior is not agitated. Judgment: Judgment normal.         No results found for this visit on 02/21/20.     Recent Results (from the past 2016 hour(s))   Basic Metabolic Panel    Collection Time: 12/05/19  2:32 PM   Result Value Ref Range    Sodium 138 135 - 144 mEq/L    Potassium 4.0 3.4 - 4.9 mEq/L    Chloride 102 95 - 107 mEq/L    CO2 27 20 - 31 mEq/L    Anion Gap 9 9 - 15 mEq/L    Glucose 123 (H) 70 - 99 mg/dL    BUN 22 8 - 23 mg/dL    CREATININE 0.74 0.70 - 1.20 mg/dL    GFR Non-African American >60.0 >60    GFR  >60.0 >60    Calcium 9.2 8.5 - 9.9 mg/dL   Hemoglobin A1C    Collection Time: 12/05/19  2:32 PM   Result Value Ref Range    Hemoglobin A1C 6.1 (H) 4.8 - 5.9 %           Assessment:       Diagnosis Orders   1. COPD with exacerbation (HCC)  doxycycline hyclate (VIBRA-TABS) 100 MG tablet    XR CHEST STANDARD (2 VW)    DISCONTINUED: budesonide-formoterol (SYMBICORT) 160-4.5 MCG/ACT AERO   2. Hypogonadism male  Testosterone Free and Total Male         Orders Placed This Encounter   Procedures    XR CHEST STANDARD (2 VW)     Standing Status:   Future     Number of Occurrences:   1     Standing Expiration Date:   2/21/2021     Order Specific Question:   Reason for exam:     Answer:   cough    Testosterone Free and Total Male     Standing Status:   Future     Number of Occurrences:   1     Standing Expiration Date:   2/21/2021         Plan:   Return in about 6 days (around 2/27/2020) for Dr Pastora rodriguez on 27th. Patient Instructions   In an effort to avoid resistance we will rotate his antibiotic to doxycycline this time. I have given him samples of Symbicort 160 for a total of 120 days. He has a follow-up on next Thursday with Dr. Carmen Mai. Testosterone level has been ordered. Prescription renewed. Patient Education        Learning About Saving Energy When You Have a Chronic Condition  Introduction    Everyday tasks can be tiring when you have COPD, heart failure, or another long-term (chronic) condition. You may feel at times that you've lost your ability to live your life. But learning to conserve, or save, your energy can help you be less tired. Conserving your energy means finding ways of doing daily activities with as little effort as possible. With some small changes in the way you do things, you can get your tasks done more easily. Some treatments are available that might help.

## 2020-02-21 NOTE — PATIENT INSTRUCTIONS
In an effort to avoid resistance we will rotate his antibiotic to doxycycline this time. I have given him samples of Symbicort 160 for a total of 120 days. He has a follow-up on next Thursday with Dr. Hellen Gaines. Testosterone level has been ordered. Prescription renewed. Patient Education        Learning About Saving Energy When You Have a Chronic Condition  Introduction    Everyday tasks can be tiring when you have COPD, heart failure, or another long-term (chronic) condition. You may feel at times that you've lost your ability to live your life. But learning to conserve, or save, your energy can help you be less tired. Conserving your energy means finding ways of doing daily activities with as little effort as possible. With some small changes in the way you do things, you can get your tasks done more easily. Some treatments are available that might help. Pulmonary rehabilitation can teach you ways to breathe easier. Cardiac rehabilitation can help make your heart stronger. You also may want to see an occupational or physical therapist. The therapist can give you more tips on building strength and moving with less effort. What can you do to conserve your energy? Planning  · Make a list of what you have to do every day. Group the tasks by location. · Do all the chores in one part of your house around the same time. · Go out for errands or do chores at the time of day when you have the most energy. · Plan rest periods into your day. Getting things done  · Sit down as often as you can when you get dressed, do chores, or cook. · Use a cart with wheels to roll items, such as laundry, from one room to another. · Push or slide boxes or other large items instead of lifting them. Reaching and bending  · Put things you use the most on shelves that are at the level of your waist or shoulder. · Use long-handled grabbers or other tools to reach items on a high shelf or to  things off the floor.  Use long-handled dusters when you clean the house. · Use a raised toilet seat to avoid bending too far to sit or stand up. Eating  · Eat several small meals instead of three larger meals. · If you get too tired to eat much, try to choose healthy foods that have more calories. Have a yogurt-and-fruit smoothie for breakfast. Put avocado on a sandwich. Or add cheese or peanut butter to snacks. · If you don't feel very hungry, try to eat first and drink water or other fluids later, after a meal. This can help keep you from losing weight. Sip small amounts of fluids if you need to drink while you eat. Having sex  · Choose the time of day when you have more energy. · A [id number]-gv-[id number] position for sex can be less tiring. Sometimes you may want to focus more on caressing. Watch closely for changes in your health, and be sure to contact your doctor if you have any problems. Where can you learn more? Go to https://Sure2Sign RecruitingpeSaluspot.Emergent Views. org and sign in to your [employer] account. Enter H190 in the [employer] box to learn more about \"Learning About Saving Energy When You Have a Chronic Condition. \"     If you do not have an account, please click on the \"Sign Up Now\" link. Current as of: June 9, 2019  Content Version: 12.3  © 1121-4353 Healthwise, Incorporated. Care instructions adapted under license by [facility] ([facility]). If you have questions about a medical condition or this instruction, always ask your healthcare professional. [place] [address] any warranty or liability for your use of this information.

## 2020-02-23 LAB
SEX HORMONE BINDING GLOBULIN: 47 NMOL/L (ref 11–80)
TESTOSTERONE FREE PERCENT: 1.5 % (ref 1.6–2.9)
TESTOSTERONE FREE, CALC: 62 PG/ML (ref 47–244)
TESTOSTERONE TOTAL-MALE: 414 NG/DL (ref 300–720)

## 2020-02-27 ENCOUNTER — OFFICE VISIT (OUTPATIENT)
Dept: PULMONOLOGY | Age: 70
End: 2020-02-27
Payer: MEDICARE

## 2020-02-27 VITALS
HEIGHT: 69 IN | WEIGHT: 269 LBS | RESPIRATION RATE: 16 BRPM | BODY MASS INDEX: 39.84 KG/M2 | OXYGEN SATURATION: 93 % | HEART RATE: 73 BPM | SYSTOLIC BLOOD PRESSURE: 134 MMHG | TEMPERATURE: 96.5 F | DIASTOLIC BLOOD PRESSURE: 74 MMHG

## 2020-02-27 PROCEDURE — 99214 OFFICE O/P EST MOD 30 MIN: CPT | Performed by: INTERNAL MEDICINE

## 2020-02-27 RX ORDER — BUDESONIDE AND FORMOTEROL FUMARATE DIHYDRATE 160; 4.5 UG/1; UG/1
2 AEROSOL RESPIRATORY (INHALATION) 2 TIMES DAILY
Qty: 3 INHALER | Refills: 1 | Status: SHIPPED | OUTPATIENT
Start: 2020-02-27 | End: 2020-02-27 | Stop reason: SDUPTHER

## 2020-02-27 RX ORDER — ALBUTEROL SULFATE 90 UG/1
2 AEROSOL, METERED RESPIRATORY (INHALATION) EVERY 6 HOURS PRN
Qty: 3 INHALER | Refills: 1 | Status: SHIPPED | OUTPATIENT
Start: 2020-02-27 | End: 2021-04-29 | Stop reason: SDUPTHER

## 2020-02-27 ASSESSMENT — ENCOUNTER SYMPTOMS
WHEEZING: 0
COUGH: 0
VOICE CHANGE: 0
SORE THROAT: 0
CHEST TIGHTNESS: 0
EYE ITCHING: 0
DIARRHEA: 0
RHINORRHEA: 0
ABDOMINAL PAIN: 0
NAUSEA: 0
SHORTNESS OF BREATH: 1
VOMITING: 0

## 2020-02-27 NOTE — PROGRESS NOTES
Subjective:     Rachel Disla is a 71 y.o. male who complains today of:     Chief Complaint   Patient presents with    Follow-up     three month f/u for JARRELL and COPD. HPI  Patient was seen by dr. Gissell Wells for bronchitis and he was given doxycycline with  steroid shot. He was coughing green mucus  and more shortness of breath. He is feeling much better now. CXR was done show no active disease. He is on symbicort and spiriva, albuterol HFA prn   C/o shortness of breath with exertion. No Wheezing   No Cough   No Hemoptysis  No Chest tightness   No Chest pain with radiation  or pleuritic pain  No Fever or chills. No Rhinorrhea and postnasal drip. He is using CPAP with  14  centimeters of H2O with heated humidity. He is using CPAP for about  6-8  hours every night. He is using CPAP with  Nasal pillow    He said  sleep is restful with the CPAP use. He is compliant with CPAP therapy and benefiting with CPAP use. No snoring with CPAP use. No complaint of daytime sleepiness or tiredness with CPAP use. He denies taking naps. No sleepiness with driving. He denies difficulty falling asleep or staying asleep.             Allergies:  Alemtuzumab; Glycopyrrolate-formoterol; Mercaptopurine; and Moxifloxacin  Past Medical History:   Diagnosis Date    Abnormal EMG 12/09/2015    Actinic keratoses     Actinic keratoses     Allergic rhinitis     Anemia 11/18/2014    Arthritis     Chronic back pain     [facility] obstructive [facility] 08/09/2012    Crohns disease     Degenerative disc disease, lumbar     Dermatitis     Diabetes ([address] 03/19/2015    diet controlled    GERD (gastroesophageal reflux disease)     History of atrial fibrillation 2006    Dr. Nickie Ribera History of throat cancer 2004     cleared for reoccurence years ago    Hyperlipidemia 1/21/2014    Hypertension     Hypogonadism male     Lung disease     Mononeuritis multiplex     Mononeuritis multiplex     Obesity (BMI 30-39. 9) 2019    Osteoarthritis of lumbar spine     Pain of right heel     Paresthesia of foot, bilateral     Prediabetes 12/3/2014    Seborrheic dermatitis     Seborrheic keratoses, inflamed     Throat cancer (HCC)     throat, had surgery, sees Dr Fuentes Mass yearly    Tinea cruris     Tinea pedis     Tinea unguium      Past Surgical History:   Procedure Laterality Date    CARDIAC CATHETERIZATION      CATARACT REMOVAL WITH IMPLANT Right 2019    CATARACT REMOVAL WITH IMPLANT Left 2019    COLONOSCOPY  04/15/2015    KNEE ARTHROSCOPY      LARYNGECTOMY  2004    UPPER GASTROINTESTINAL ENDOSCOPY  13    Dr. Antonio Dimas W/[facility]       Family History   Problem Relation Age of Onset    Heart Disease Mother     Liver Disease Mother     Heart Disease Father     Cancer Sister         lung     Social History     Socioeconomic History    Marital status:      Spouse name: Not on file    Number of children: 3    Years of education: Not on file    Highest education level: Not on file   Occupational History    Not on file   Social Needs    Financial resource strain: Not on file    Food insecurity:     Worry: Not on file     Inability: Not on file    Transportation needs:     Medical: Not on file     Non-medical: Not on file   Tobacco Use    Smoking status: Former Smoker     Packs/day: 1.00     Years: 25.00     Pack years: 25.00     Types: Cigarettes     Last attempt to quit: 10/21/1990     Years since quittin.3    Smokeless tobacco: Never Used   Substance and Sexual Activity    Alcohol use: No     Comment: Attends [employer] 25 years    Drug use: No    Sexual activity: Not on file   Lifestyle    Physical activity:     Days per week: Not on file     Minutes per session: Not on file    Stress: Not on file   Relationships    Social connections:     Talks on phone: Not on file     Gets together: Not on file     Attends [employer] service: Not on file     Active member of club or organization: Not on file     Attends meetings of clubs or organizations: Not on file     Relationship status: Not on file    Intimate partner violence:     Fear of current or ex partner: Not on file     Emotionally abused: Not on file     Physically abused: Not on file     Forced sexual activity: Not on file   Other Topics Concern    Not on file   Social History Narrative    Lives alone. Review of Systems   Constitutional: Negative for chills, diaphoresis, fatigue and fever. HENT: Negative for congestion, mouth sores, nosebleeds, postnasal drip, rhinorrhea, sneezing, sore throat and voice change. Eyes: Negative for itching and visual disturbance. Respiratory: Positive for shortness of breath. Negative for cough, chest tightness and wheezing. Cardiovascular: Negative. Negative for chest pain, palpitations and leg swelling. Gastrointestinal: Negative for abdominal pain, diarrhea, nausea and vomiting. Genitourinary: Negative for difficulty urinating and hematuria. Musculoskeletal: Negative for arthralgias, joint swelling and myalgias. Skin: Negative for rash. Allergic/Immunologic: Negative for environmental allergies. Neurological: Negative for dizziness, tremors, weakness and headaches. Psychiatric/Behavioral: Negative for behavioral problems and sleep disturbance.         :     Vitals:    02/27/20 1600   BP: 134/74   Pulse: 73   Resp: 16   Temp: 96.5 °F (35.8 °C)   TempSrc: Tympanic   SpO2: 93%   Weight: 269 lb (122 kg)   Height: 5' 9\" (1.753 m)     Wt Readings from Last 3 Encounters:   02/27/20 269 lb (122 kg)   02/21/20 270 lb (122.5 kg)   02/19/20 269 lb (122 kg)         Physical Exam  Constitutional:       Appearance: He is well-developed. He is obese. HENT:      Head: Normocephalic and atraumatic.       Nose: Nose normal.   Eyes:      Conjunctiva/sclera: Conjunctivae normal.      Pupils: Pupils are equal, round, and is advised try to lose weight. obesity related risk explained to the patient ,  Current weight:  269 lb (122 kg) Lbs. BMI:  Body mass index is 39.72 kg/m². Suggested weight control approaches, including dietary changes , exercise, behavioral modification. Return in about 3 months (around 5/27/2020) for COPD, rhona.       Sharda Copeland MD

## 2020-02-28 RX ORDER — BUDESONIDE AND FORMOTEROL FUMARATE DIHYDRATE 160; 4.5 UG/1; UG/1
2 AEROSOL RESPIRATORY (INHALATION) 2 TIMES DAILY
Qty: 3 INHALER | Refills: 1 | Status: SHIPPED | OUTPATIENT
Start: 2020-02-28 | End: 2021-04-29 | Stop reason: SDUPTHER

## 2020-03-05 ENCOUNTER — APPOINTMENT (OUTPATIENT)
Dept: DIABETES SERVICES | Age: 70
End: 2020-03-05
Payer: MEDICARE

## 2020-03-12 ENCOUNTER — HOSPITAL ENCOUNTER (OUTPATIENT)
Dept: DIABETES SERVICES | Age: 70
Setting detail: THERAPIES SERIES
Discharge: HOME | End: 2020-03-12
Payer: MEDICARE

## 2020-03-12 PROCEDURE — G0109 DIAB MANAGE TRN IND/GROUP: HCPCS

## 2020-03-12 NOTE — PROGRESS NOTES
10/12/2001, 03/08/2002    Hib vaccine 04/16/1993    Influenza A ([id number]) Vaccine PF IM 11/13/2009    Influenza Vaccine, unspecified formulation 11/05/2005    Influenza Virus Vaccine 11/20/2006, 11/13/2009, 09/06/2013, 10/20/2016    Influenza, High Dose (Fluzone 65 yrs and older) 10/20/2016, 11/27/2017, 11/07/2018    Influenza, Triv, inactivated, subunit, adjuvanted, IM (Fluad 65 yrs and older) 10/09/2019    MMR 04/05/1990, 08/24/2001    Pneumococcal Conjugate 13-valent ([id number]) 11/14/2016    Pneumococcal Polysaccharide ([id number]) 11/27/2017    Polio OPV 12/08/1988, 02/09/1989, 04/05/1990, 04/16/1993    Zoster Live (Zostavax) 09/24/2013    Zoster Recombinant (Shingrix) 08/13/2018, 01/26/2019       Current Medications  Current Outpatient Medications   Medication Sig Dispense Refill    albuterol (PROVENTIL) (2.5 MG/3ML) 0.083% nebulizer solution Use every 6 hours as needed for wheezing  DX:J44.1 360 each 2    lisinopril (PRINIVIL;ZESTRIL) 20 MG tablet Take 1 tablet by mouth daily 90 tablet 3    gabapentin (NEURONTIN) 300 MG capsule Take 1 capsule by mouth daily for 90 days.  90 capsule 1    testosterone (ANDROGEL; TESTIM) 50 MG/5GM (1%) GEL 1% gel Alternate one packet daily with 2 packets daily every other day 135 Package 0    doxazosin (CARDURA) 4 MG tablet Take 1/2 BID 90 tablet 0    nitroGLYCERIN (NITROSTAT) 0.4 MG SL tablet DISSOLVE 1 TABLET UNDER THE TONGUE EVERY 5 MINUTES AS NEEDED FOR CHEST PAIN MAXIMUM OF 3 TABLETS 25 tablet 0    montelukast (SINGULAIR) 10 MG tablet TAKE 1 TABLET NIGHTLY 90 tablet 3    fluticasone (FLONASE) 50 MCG/ACT nasal spray 1 spray by Each Nare route daily 1 Spray in each nostril 3 Bottle 1    pravastatin (PRAVACHOL) 40 MG tablet Take 1 tablet by mouth nightly 90 tablet 3    metoprolol tartrate (LOPRESSOR) 50 MG tablet Take 1.5 po bid 270 tablet 3    albuterol sulfate  (90 Base) MCG/ACT inhaler Inhale 2 puffs into the lungs every 6 hours as needed      CPAP Machine MISC New cpap supplies mask hose filters etc 1 each 0    Saccharomyces boulardii (PROBIOTIC) 250 MG CAPS Take 1 tablet by mouth daily      esomeprazole Magnesium (NEXIUM) 20 MG PACK Take 20 mg by mouth daily      aspirin 81 MG tablet Take 81 mg by mouth daily.  mesalamine (PENTASA) 250 MG CR capsule Take 500 mg by mouth 4 times daily.  tiotropium (SPIRIVA HANDIHALER) 18 MCG inhalation capsule Inhale 1 capsule into the lungs daily 90 capsule 3     No current facility-administered medications for this encounter.    :     Comments:  Allergies: Allergies   Allergen Reactions    Alemtuzumab      Low grade fever  Other reaction(s): Other: See Comments  Low grade fever    Glycopyrrolate-Formoterol Other (See Comments)     Dizzy and felt like heart rate sped up  Other reaction(s): Other: See Comments  Dizzy and felt like heart rate sped up    Mercaptopurine     Moxifloxacin Other (See Comments)     Pt states He gets sores in his mouth       Diabetes 5  / Health Status    A1C blood level - at goal < 7%   Lab Results   Component Value Date    LABA1C 6.1 (H) 12/05/2019    LABA1C 5.6 03/08/2019    LABA1C 6.0 (H) 12/14/2018     Lab Results   Component Value Date    LDLCALC 53 03/08/2019    CREATININE 0.74 12/05/2019       Blood pressure (140/90)  Or less  BP Readings from Last 3 Encounters:   01/29/20 128/68   12/19/19 104/60   11/27/19 (!) 110/52        Cholesterol ( LDL under  100)   Lab Results   Component Value Date    LDLCALC 53 03/08/2019       4 . Smoking ? []Yes   [x]No    5. Taking an Asprin daily? [x]Yes   []No            Diabetes Self- Management Education Record    Participant Name: Jenifer Marte  Referring Provider: Fawad Johnson MD   Assessment/Evaluation Ratings:  1=Needs Instruction   4=Demonstrates Understanding/Competency  2=Needs Review   NC=Not Covered    3=Comprehends Key Points  N/A=Not Applicable    Topics/Learning Objectives Initial Assessment Date:   Instr.  Date Reinforce Date Post- session Eval Comments   Diabetes disease process & Treatment process: Define diabetes & pre-diabetes; Identify own type of diabetes; role of the pancreas; signs/symptoms; diagnostic criteria; prevention & treatment options; contributing factors. (1) 02/03/20 Eleni Osullivan RN  02/11/20  Stephany Ramos RN     02/03/20 Newly diagnosed. Has had preDM for the past few years but has been able to keep \"under control\" with diet and weight loss. Recent spike in A1C and worried. Reports poor knowledge and would like to learn. Eleni Osullivan RN     02/11/20 Discussed basic pathophysiology of DM with the group including the pancreas, insulin, insulin resistance and livers involvement. Reviewed the different types of DM, risk factors, diagnosis, symptoms and the treatment options. Stephany Ramos RN       Incorporating nutritional management into lifestyle: Describe effect of type, amount & timing of food on blood glucose; Describe basic meal planning techniques & current nutrition guideline   (1) 02/03/20 Eleni Osullivan RN  2/12/2020  MARIEL Gomes   02/03/20 Reviewed the importance of eating a balanced diet including portion control and still eating carbohydrates. Eleni Osullivan RN     2/12/2020 What to eat - Food groups, When to eat - timing of meals and snacks, and How much to eat - portions control. MARIEL Gomes     2000 calories/ day   4-5 CHO choices/ meal   14 CHO choices/  day   grams of protein /day   gram of fat /day     Correctly read food labels & demonstrate CHO counting & portion control with personalized meal plan. Identify dining out strategies, & dietary sick day guidelines. (1) 02/03/20 Eleni Osullivan RN  2/12/2020  MARIEL Gomes   02/03/20  Importance of label reading reviewed with patient including focus on total carbohydrates and not just sugar content as well as serving size.  Eleni Osullivan RN     2/12/2020 Instructed on carb counting, carb containing foods and importance of balanced diet. MARIEL Paredes     Incorporating physical activity into lifestyle:   Verbalize effect of exercise on blood glucose levels; benefits of regular exercise; safety considerations; contraindications; maintenance of activity. (1) 02/03/20 Willie Stokes RN  02/11/20  Michelle Clay RN     02/03/20 Discussed importance of activity and set goal to get back to the gym 3 times a week. Willie Stokes RN    02/11/20 - Reviewed with the group the importance of exercise, recommendations by the ADA, effects on BG, weight loss and precautions. Discussed what patient is doing to stay active with the group. Michelle Clay RN      Using medications safely:  Identify effects of diabetes medicines on blood glucose levels; List diabetes medication taken, action & side effects;  (1) 02/03/20 Willie Stokes RN  03/12/20  Willie Stokes RN     02/03/20 no diabetes meds. Willie Stokes RN     03/12/20 - Discussed all medication classes with group and modes of action including insulin and other injectables. Patient aware of BG medications available, side effects and mode of action. Willie Stokes RN     Insulin / Injectable - Appropriate injection sites; proper storage; supplies needed; proper technique; safe needle disposal guidelines. (1) 02/03/20 Willie Stokes RN  03/12/20   Willie Stokes RN    03/12/20 - Discussed insulin stigma and proper technique including site selection and self injection. Reviewed both pen and vial/syringe use. Discussed needle disposal and proper insulin storage. Willie Stokes RN       Monitoring blood glucose, interpreting and using results:  Identify recommended & personal blood glucose targets; importance of testing; testing supplies; HgbA1C target levels; Factors affecting blood glucose;  Importance of logging blood glucose levels for pattern recognition; ketone testing; safe lancet disposal.   (1) 02/03/20 Quoc Mcguire Type 2 Diabetes ADA home support program     [] [address] [place] [address] [phone]     [] Fit Walks : [facility] or [facility]    [x]  Diabetes support Group      []   Emotional Support      [] Vanessa on phone      []  Internet web sites - ADA and D- life    []  Journals/Magazines    [x]  Other __[employer]_________________________      Post Education Referrals:      [] [facility] information sheet and [address] [facility] , 21       [] Dental care    [] Podiatrist     [x]  Opthamologist      []Other    Eleni Osullivan RN

## 2020-04-27 ENCOUNTER — OFFICE VISIT (OUTPATIENT)
Dept: FAMILY MEDICINE CLINIC | Age: 70
End: 2020-04-27
Payer: MEDICARE

## 2020-04-27 VITALS
WEIGHT: 265.4 LBS | HEIGHT: 70 IN | OXYGEN SATURATION: 98 % | HEART RATE: 67 BPM | TEMPERATURE: 97.8 F | BODY MASS INDEX: 37.99 KG/M2 | SYSTOLIC BLOOD PRESSURE: 114 MMHG | DIASTOLIC BLOOD PRESSURE: 60 MMHG

## 2020-04-27 PROBLEM — C14.0 MALIGNANT NEOPLASM OF EAR, NOSE, AND THROAT: Status: RESOLVED | Noted: 2019-10-02 | Resolved: 2020-04-27

## 2020-04-27 PROBLEM — C44.201 MALIGNANT NEOPLASM OF EAR, NOSE, AND THROAT: Status: RESOLVED | Noted: 2019-10-02 | Resolved: 2020-04-27

## 2020-04-27 PROBLEM — C76.0 MALIGNANT NEOPLASM OF EAR, NOSE, AND THROAT: Status: RESOLVED | Noted: 2019-10-02 | Resolved: 2020-04-27

## 2020-04-27 PROCEDURE — 99215 OFFICE O/P EST HI 40 MIN: CPT | Performed by: FAMILY MEDICINE

## 2020-04-27 RX ORDER — TERBINAFINE HYDROCHLORIDE 250 MG/1
250 TABLET ORAL DAILY
Qty: 84 TABLET | Refills: 0 | Status: SHIPPED | OUTPATIENT
Start: 2020-04-27 | End: 2020-07-22 | Stop reason: SDUPTHER

## 2020-04-27 ASSESSMENT — ENCOUNTER SYMPTOMS
CONSTIPATION: 0
ABDOMINAL DISTENTION: 0
ABDOMINAL PAIN: 0
VOICE CHANGE: 0
COUGH: 0
NAUSEA: 0
COLOR CHANGE: 0
TROUBLE SWALLOWING: 0
DIARRHEA: 0
SHORTNESS OF BREATH: 0
EYE DISCHARGE: 0

## 2020-04-27 NOTE — PROGRESS NOTES
Psychiatric/Behavioral: Negative for agitation and behavioral problems. Objective:   /60   Pulse 67   Temp 97.8 °F (36.6 °C) (Tympanic)   Ht 5' 10\" (1.778 m)   Wt 265 lb 6.4 oz (120.4 kg)   SpO2 98%   BMI 38.08 kg/m²     Physical Exam  Constitutional:       General: He is not in acute distress. Appearance: Normal appearance. He is well-developed. He is not toxic-appearing. HENT:      Head: Normocephalic and atraumatic. Right Ear: Hearing and tympanic membrane normal.      Left Ear: Hearing and tympanic membrane normal.      Nose: Nose normal. No nasal deformity. Eyes:      General: Lids are normal.         Right eye: No discharge. Left eye: No discharge. Conjunctiva/sclera: Conjunctivae normal.      Pupils: Pupils are equal, round, and reactive to light. Neck:      Musculoskeletal: Full passive range of motion without pain. Thyroid: No thyroid mass or thyromegaly. Vascular: No JVD. Trachea: Trachea and phonation normal.   Cardiovascular:      Rate and Rhythm: Normal rate and regular rhythm. Pulmonary:      Effort: No accessory muscle usage or respiratory distress. Musculoskeletal:      Comments: FROM all large muscle groups and joints as witnessed when walking to exam table, getting on, and getting off the exam table. Skin:     General: Skin is warm and dry. Findings: No rash. Comments: Pink base with white flaking noted near the nasolabial fold at the top near the nose junction. White flaking noted in between the eyebrows. Thickened and irregular discolored toenails noted bilateral feet. No active AK's noted on skin scan. Neurological:      Mental Status: He is alert. Motor: No tremor or atrophy. Gait: Gait normal.   Psychiatric:         Speech: Speech normal.         Behavior: Behavior normal.         Thought Content: Thought content normal.             No results found for this visit on 04/27/20.     Recent Results scalp for 2-3 minutes. Rinse off and bath as usual.  Avoid hair products with high alcohol content. If this is affecting the upper face and eyebrows you may also use the shampoo on this area. Pt will obtain a soft bristle brush (like a baby brush) to use on the scalp once done bathing, while the skin is still moist to remove the thickened, flaking skin. Utilize routinely, but once under control it may not be necessary to let the scalp soak. Improvement should be noted. Skin changes can lead behind the treatment. 4. For itching area of upper back apply icy hot or other preferred muscle rub    Patient Education        Toenail Fungus: Care Instructions  Your Care Instructions  A toenail that is infected by a fungus usually turns white or yellow. As the fungus spreads, the nail turns a darker color and gets thicker, and its edges start to turn ragged and crumble. A bad infection can cause toe pain, and the nail may pull away from the toe. Toenails that are exposed to moisture and warmth a lot are more likely to get infected by a fungus. This can happen from wearing sweaty shoes often and from walking barefoot on shower floors. It is hard to treat toenail fungus, and the infection can return after it has cleared up. But medicines can sometimes get rid of toenail fungus for good. If the infection is very bad, or if it causes a lot of pain, you may need to have the nail removed. Follow-up care is a key part of your treatment and safety. Be sure to make and go to all appointments, and call your doctor if you are having problems. It's also a good idea to know your test results and keep a list of the medicines you take. How can you care for yourself at home? · Take your medicines exactly as prescribed. Call your doctor if you have any problems with your medicine. You will get more details on the specific medicines your doctor prescribes.   · If your doctor gave you a cream or liquid to put on your toenail, use

## 2020-04-27 NOTE — PATIENT INSTRUCTIONS
of pain, you may need to have the nail removed. Follow-up care is a key part of your treatment and safety. Be sure to make and go to all appointments, and call your doctor if you are having problems. It's also a good idea to know your test results and keep a list of the medicines you take. How can you care for yourself at home? · Take your medicines exactly as prescribed. Call your doctor if you have any problems with your medicine. You will get more details on the specific medicines your doctor prescribes. · If your doctor gave you a cream or liquid to put on your toenail, use it exactly as directed. · Wash your feet often, and wash your hands after touching your feet. · Keep your toenails clean and dry. Dry your feet completely after you bathe and before you put on shoes and socks. · Keep your toenails trimmed. · Change socks often. Wear dry socks that absorb moisture. · Do not go barefoot in public places. · Use a spray or powder that fights fungus on your feet and in your shoes. · Do not pick at the skin around your nails. · Do not use nail polish or fake nails on your toenails. When should you call for help? Call your doctor now or seek immediate medical care if:    · You have signs of infection, such as:  ? Increased pain, swelling, warmth, or redness. ? Red streaks leading from the site. ? Pus draining from the site. ? A fever.     · You have new or increased toe pain.    Watch closely for changes in your health, and be sure to contact your doctor if:    · You do not get better as expected. Where can you learn more? Go to [email or web address]://[email or web address] org and sign in to your uiu account. Enter D202 in the [facility] box to learn more about \"Toenail Fungus: Care Instructions. \"     If you do not have an account, please click on the \"Sign Up Now\" link. Current as of: October 30, 2019Content Version: 12.4  © 2898-9126 [employer]wise, Incorporated.   Care instructions

## 2020-04-28 DIAGNOSIS — B35.1 ONYCHOMYCOSIS: ICD-10-CM

## 2020-04-28 LAB
ALT SERPL-CCNC: 14 U/L (ref 0–41)
AST SERPL-CCNC: 20 U/L (ref 0–40)

## 2020-04-29 ENCOUNTER — CARE COORDINATION (OUTPATIENT)
Dept: CARE COORDINATION | Age: 70
End: 2020-04-29

## 2020-04-29 NOTE — CARE COORDINATION
Ambulatory Care Coordination Note  4/29/2020  CM Risk Score: 6  Charlson 10 Year Mortality Risk Score: 98%     ACC: Glenroy Harris, CARLIE    Summary Note:     Dr Agustina Santos sent a message to have me work with Katty Strickland on his diet and weight. He tells me that his weight is back up to 260-265 pounds   He states that most of this is from eating the wrong foods. He is not exercising much. The gym is closed. He is walking around his block a couple of times per week. I did make him aware that I am not in the office. I will e mail him a smart food list that I have created. I will have him review to see if this is something he can use. I explained that I want him to eat frequent small meals. I have asked him to include one protein and one carb serving with each meal   He will try this at home and call if he has any issues. We will begin full follow up appointments once I am in the office      Care Coordination Interventions    Program Enrollment:  Complex Care  Referral from Primary Care Provider:  No  Suggested Interventions and Community Resources  Disease Association: In Process  Zone Management Tools: In Process         Goals Addressed    None         Prior to Admission medications    Medication Sig Start Date End Date Taking?  Authorizing Provider   terbinafine (LAMISIL) 250 MG tablet Take 1 tablet by mouth daily 4/27/20 7/20/20  Betty Saab MD   pravastatin (PRAVACHOL) 40 MG tablet TAKE 1 TABLET NIGHTLY 4/13/20   Betty Saab MD   budesonide-formoterol [facility]) 160-4.5 MCG/ACT AERO Inhale 2 puffs into the lungs 2 times daily 2 each LOT [id number] 2/28/20   Fermin Jackson MD   albuterol sulfate  (90 Base) MCG/ACT inhaler Inhale 2 puffs into the lungs every 6 hours as needed for Wheezing or Shortness of Breath 2/27/20   Fermin Jackson MD   doxazosin (CARDURA) 4 MG tablet TAKE ONE-HALF (1/2) TABLET TWICE A DAY 2/21/20   Betty Saab MD   nitroGLYCERIN (NITROSTAT) 0.4 MG SL

## 2020-05-22 ENCOUNTER — OFFICE VISIT (OUTPATIENT)
Dept: FAMILY MEDICINE CLINIC | Age: 70
End: 2020-05-22
Payer: MEDICARE

## 2020-05-22 VITALS
TEMPERATURE: 97.9 F | HEART RATE: 61 BPM | SYSTOLIC BLOOD PRESSURE: 108 MMHG | OXYGEN SATURATION: 98 % | WEIGHT: 270 LBS | HEIGHT: 69 IN | DIASTOLIC BLOOD PRESSURE: 64 MMHG | BODY MASS INDEX: 39.99 KG/M2

## 2020-05-22 PROCEDURE — 99214 OFFICE O/P EST MOD 30 MIN: CPT | Performed by: FAMILY MEDICINE

## 2020-05-22 RX ORDER — METOPROLOL TARTRATE 50 MG/1
TABLET, FILM COATED ORAL
Qty: 270 TABLET | Refills: 3 | Status: SHIPPED | OUTPATIENT
Start: 2020-05-22 | End: 2020-12-28 | Stop reason: SDUPTHER

## 2020-05-22 NOTE — PROGRESS NOTES
Subjective:      Patient ID: Lanette Sol is a 79 y.o. male who presents for:  Chief Complaint   Patient presents with    Skin Problem     Itching  -        Icy hot helped the upper back with less arm symptoms. If he does not use it he notes an itching sensation in his upper back low neck and left upper extremity. Patient was using [facility] and doing well. When he discontinued it he noted a return in the redness and flaking in his face. He noted an exacerbation about 1 week ago. He just recently resumed the [facility]. He did have his liver functions checked and has not noted much change in his nails yet. Current Outpatient Medications on File Prior to Visit   Medication Sig Dispense Refill    terbinafine (LAMISIL) 250 MG tablet Take 1 tablet by mouth daily 84 tablet 0    pravastatin (PRAVACHOL) 40 MG tablet TAKE 1 TABLET NIGHTLY 90 tablet 1    budesonide-formoterol (SYMBICORT) 160-4.5 MCG/ACT AERO Inhale 2 puffs into the lungs 2 times daily 2 each LOT [id number] [facility] [id number] 3 Inhaler 1    albuterol sulfate  (90 Base) MCG/ACT inhaler Inhale 2 puffs into the lungs every 6 hours as needed for Wheezing or Shortness of Breath 3 Inhaler 1    doxazosin (CARDURA) 4 MG tablet TAKE ONE-HALF (1/2) TABLET TWICE A DAY 90 tablet 4    nitroGLYCERIN (NITROSTAT) 0.4 MG SL tablet DISSOLVE 1 TABLET UNDER THE TONGUE EVERY 5 MINUTES AS NEEDED FOR CHEST PAIN. MAXIMUM OF 3 TABLETS 25 tablet 2    albuterol (PROVENTIL) (2.5 MG/3ML) 0.083% nebulizer solution Use every 6 hours as needed for wheezing  DX:J44.1 (Patient taking differently: Take 2.5 mg by nebulization every 4 hours as needed Use every 6 hours as needed for wheezing  DX:J44.1) 360 each 2    lisinopril (PRINIVIL;ZESTRIL) 20 MG tablet Take 1 tablet by mouth daily 90 tablet 3    gabapentin (NEURONTIN) 300 MG capsule Take 1 capsule by mouth daily for 90 days.  90 capsule 1    montelukast (SINGULAIR) 10 MG tablet TAKE 1 TABLET NIGHTLY (Patient taking differently: Take 10 mg by mouth nightly TAKE 1 TABLET NIGHTLY) 90 tablet 3    fluticasone (FLONASE) 50 MCG/ACT nasal spray 1 spray by Each Nare route daily 1 Spray in each nostril 3 Bottle 1    CPAP Machine MISC New cpap supplies mask hose filters etc 1 each 0    Saccharomyces boulardii (PROBIOTIC) 250 MG CAPS Take 1 tablet by mouth daily      esomeprazole Magnesium (NEXIUM) 20 MG PACK Take 20 mg by mouth daily      aspirin 81 MG tablet Take 81 mg by mouth daily.  mesalamine (PENTASA) 250 MG CR capsule Take 500 mg by mouth 4 times daily.  testosterone (ANDROGEL; TESTIM) 50 MG/5GM (1%) GEL 1% gel Alternate one packet daily with 2 packets daily every other day 135 Package 0    tiotropium (SPIRIVA HANDIHALER) 18 MCG inhalation capsule Inhale 1 capsule into the lungs daily 90 capsule 3     No current facility-administered medications on file prior to visit. Past Medical History:   Diagnosis Date    Abnormal EMG 12/09/2015    Actinic keratoses     Actinic keratoses     Allergic rhinitis     Anemia 11/18/2014    Arthritis     Chronic back pain     [facility] disease[facility]) 08/09/2012    Crohns disease     Degenerative disc disease, lumbar     Dermatitis     Diabetes ([address] 03/19/2015    diet controlled    GERD (gastroesophageal reflux disease)     History of atrial fibrillation 2006    Dr. Rich Browning History of throat cancer 2004     cleared for reoccurence years ago    Hyperlipidemia 1/21/2014    Hypertension     Hypogonadism male     Lung disease     Mononeuritis multiplex     Mononeuritis multiplex     Obesity (BMI 30-39. 9) 7/24/2019    Osteoarthritis of lumbar spine     Pain of right heel     Paresthesia of foot, bilateral     Prediabetes 12/3/2014    Seborrheic dermatitis     Seborrheic keratoses, inflamed     Throat cancer ([facility])     throat, had surgery, sees Dr Blanca Vela yearly    Tinea cruris     Tinea pedis     Tinea unguium      Past Surgical History:   Procedure Laterality Date    CARDIAC CATHETERIZATION      CATARACT REMOVAL WITH IMPLANT Right 2019    CATARACT REMOVAL WITH IMPLANT Left 2019    COLONOSCOPY  04/15/2015    KNEE ARTHROSCOPY      LARYNGECTOMY  2004    UPPER GASTROINTESTINAL ENDOSCOPY  13    Dr. Viviane HOWARD/NAPOLEON RODRIGUEZ  2002     Social History     Socioeconomic History    Marital status:      Spouse name: Not on file    Number of children: 3    Years of education: Not on file    Highest education level: Not on file   Occupational History    Not on file   Social Needs    Financial resource strain: Not on file    Food insecurity     Worry: Not on file     Inability: Not on file   [employer] needs     Medical: Not on file     Non-medical: Not on file   Tobacco Use    Smoking status: Former Smoker     Packs/day: 1.00     Years: 25.00     Pack years: 25.00     Types: Cigarettes     Last attempt to quit: 10/21/1990     Years since quittin.6    Smokeless tobacco: Never Used   Substance and Sexual Activity    Alcohol use: No     Comment: Attends [employer] 25 years    Drug use: No    Sexual activity: Not on file   Lifestyle    Physical activity     Days per week: Not on file     Minutes per session: Not on file    Stress: Not on file   Relationships    Social connections     Talks on phone: Not on file     Gets together: Not on file     Attends Rastafarian service: Not on file     Active member of club or organization: Not on file     Attends meetings of clubs or organizations: Not on file     Relationship status: Not on file    Intimate partner violence     Fear of current or ex partner: Not on file     Emotionally abused: Not on file     Physically abused: Not on file     Forced sexual activity: Not on file   Other Topics Concern    Not on file   Social History Narrative    Lives alone.      Family History   Problem Relation Age of Onset    Heart

## 2020-05-26 ENCOUNTER — OFFICE VISIT (OUTPATIENT)
Dept: PULMONOLOGY | Age: 70
End: 2020-05-26
Payer: MEDICARE

## 2020-05-26 VITALS
RESPIRATION RATE: 16 BRPM | HEART RATE: 66 BPM | SYSTOLIC BLOOD PRESSURE: 122 MMHG | HEIGHT: 70 IN | TEMPERATURE: 97.1 F | BODY MASS INDEX: 38.51 KG/M2 | OXYGEN SATURATION: 93 % | WEIGHT: 269 LBS | DIASTOLIC BLOOD PRESSURE: 70 MMHG

## 2020-05-26 PROCEDURE — 99214 OFFICE O/P EST MOD 30 MIN: CPT | Performed by: INTERNAL MEDICINE

## 2020-05-26 ASSESSMENT — ENCOUNTER SYMPTOMS
ABDOMINAL PAIN: 0
VOICE CHANGE: 0
SHORTNESS OF BREATH: 1
EYE ITCHING: 0
SORE THROAT: 0
RHINORRHEA: 0
COUGH: 1
VOMITING: 0
DIARRHEA: 0
NAUSEA: 0
WHEEZING: 1
CHEST TIGHTNESS: 0

## 2020-05-26 NOTE — PROGRESS NOTES
Subjective:     James Lu is a 79 y.o. male who complains today of:     Chief Complaint   Patient presents with    Follow-up     three month f/u for COPD and JARRELL. HPI  He is using CPAP with 14   centimeters of H2O with heated humidity. He is using CPAP for about  7   hours every night. He is using CPAP with  Nasal pillow  Mask. He said  sleep is restful with the CPAP use. He is compliant with CPAP therapy and benefiting with CPAP use. No snoring with CPAP use. No complaint of daytime sleepiness or tiredness with CPAP use. He denies taking naps. No sleepiness with driving. He denies difficulty falling asleep or staying asleep. Patient is using symbicort and spiriva, albuterol HFA prn   C/o shortness of breath , worse with exertion. Occasional Wheezing   C/o Cough with green Sputum  No Hemoptysis  No Chest tightness   No Chest pain with radiation  or pleuritic pain  No Fever or chills. No Rhinorrhea and postnasal drip. Allergies:  Alemtuzumab; Glycopyrrolate-formoterol; Mercaptopurine; and Moxifloxacin  Past Medical History:   Diagnosis Date    Abnormal EMG 12/09/2015    Actinic keratoses     Actinic keratoses     Allergic rhinitis     Anemia 11/18/2014    Arthritis     Chronic back pain     [facility] ([facility]) 08/09/2012    Crohns disease     Degenerative disc disease, lumbar     Dermatitis     Diabetes ([facility] [address] 03/19/2015    diet controlled    GERD (gastroesophageal reflux disease)     History of atrial fibrillation 2006    Dr. Meg Clayton History of throat cancer 2004     cleared for reoccurence years ago    Hyperlipidemia 1/21/2014    Hypertension     Hypogonadism male     Lung disease     Mononeuritis multiplex     Mononeuritis multiplex     Obesity (BMI 30-39. 9) 7/24/2019    Osteoarthritis of lumbar spine     Pain of right heel     Paresthesia of foot, bilateral     Prediabetes 12/3/2014    Seborrheic dermatitis    

## 2020-06-01 DIAGNOSIS — B35.1 ONYCHOMYCOSIS: ICD-10-CM

## 2020-06-01 DIAGNOSIS — R20.2 PARESTHESIA OF FINGER: ICD-10-CM

## 2020-06-01 DIAGNOSIS — R73.09 ELEVATED GLUCOSE: ICD-10-CM

## 2020-06-01 LAB
ALT SERPL-CCNC: 14 U/L (ref 0–41)
ANION GAP SERPL CALCULATED.3IONS-SCNC: 11 MEQ/L (ref 9–15)
AST SERPL-CCNC: 23 U/L (ref 0–40)
BUN BLDV-MCNC: 17 MG/DL (ref 8–23)
CALCIUM SERPL-MCNC: 9.3 MG/DL (ref 8.5–9.9)
CHLORIDE BLD-SCNC: 102 MEQ/L (ref 95–107)
CO2: 27 MEQ/L (ref 20–31)
CREAT SERPL-MCNC: 0.87 MG/DL (ref 0.7–1.2)
GFR AFRICAN AMERICAN: >60
GFR NON-AFRICAN AMERICAN: >60
GLUCOSE BLD-MCNC: 73 MG/DL (ref 70–99)
POTASSIUM SERPL-SCNC: 4.8 MEQ/L (ref 3.4–4.9)
SODIUM BLD-SCNC: 140 MEQ/L (ref 135–144)

## 2020-06-03 ENCOUNTER — OFFICE VISIT (OUTPATIENT)
Dept: FAMILY MEDICINE CLINIC | Age: 70
End: 2020-06-03
Payer: MEDICARE

## 2020-06-03 VITALS
TEMPERATURE: 97.8 F | HEIGHT: 69 IN | HEART RATE: 62 BPM | WEIGHT: 268 LBS | BODY MASS INDEX: 39.69 KG/M2 | OXYGEN SATURATION: 97 % | SYSTOLIC BLOOD PRESSURE: 122 MMHG | DIASTOLIC BLOOD PRESSURE: 64 MMHG

## 2020-06-03 PROCEDURE — 99214 OFFICE O/P EST MOD 30 MIN: CPT | Performed by: FAMILY MEDICINE

## 2020-06-03 ASSESSMENT — ENCOUNTER SYMPTOMS
CONSTIPATION: 0
RECTAL PAIN: 0
NAUSEA: 0
COUGH: 0
PHOTOPHOBIA: 0
SHORTNESS OF BREATH: 1
ABDOMINAL PAIN: 1
WHEEZING: 0
DIARRHEA: 0
CHEST TIGHTNESS: 0
ABDOMINAL DISTENTION: 0

## 2020-06-03 NOTE — PROGRESS NOTES
2014    Hypertension     Hypogonadism male     Lung disease     Mononeuritis multiplex     Mononeuritis multiplex     Obesity (BMI 30-39. 9) 2019    Osteoarthritis of lumbar spine     Pain of right heel     Paresthesia of foot, bilateral     Prediabetes 12/3/2014    Seborrheic dermatitis     Seborrheic keratoses, inflamed     Throat cancer ([facility])     throat, had surgery, sees Dr Blanca Clemonsails yearly    Tinea cruris     Tinea pedis     Tinea unguium      Past Surgical History:   Procedure Laterality Date    CARDIAC CATHETERIZATION      CATARACT REMOVAL WITH IMPLANT Right 2019    CATARACT REMOVAL WITH IMPLANT Left 2019    COLONOSCOPY  04/15/2015    KNEE ARTHROSCOPY      LARYNGECTOMY  2004    UPPER GASTROINTESTINAL ENDOSCOPY  13    Dr. Renate HOWARD/NAPOLEON RODRIGUEZ       Social History     Socioeconomic History    Marital status:      Spouse name: Not on file    Number of children: 3    Years of education: Not on file    Highest education level: Not on file   Occupational History    Not on file   Social Needs    Financial resource strain: Not on file    Food insecurity     Worry: Not on file     Inability: Not on file   [employer] needs     Medical: Not on file     Non-medical: Not on file   Tobacco Use    Smoking status: Former Smoker     Packs/day: 1.00     Years: 25.00     Pack years: 25.00     Types: Cigarettes     Last attempt to quit: 10/21/1990     Years since quittin.6    Smokeless tobacco: Never Used   Substance and Sexual Activity    Alcohol use: No     Comment: 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 Cranial Nerves: Cranial nerve deficit: CN II-XII GI without obvious deficit. Sensory: Sensory deficit: grossly intact for hands. Motor: No tremor. Atrophy: B UE and LE without atrophy. Abnormal muscle tone: B UE and LE have normal tone. Coordination: Coordination normal.      Gait: Gait normal.   Psychiatric:         Attention and Perception: He is attentive. Mood and Affect: Mood is not anxious. Affect is not inappropriate. Speech: Speech normal.         Behavior: Behavior is not agitated. Behavior is cooperative. Judgment: Judgment normal.         No results found for this visit on 06/03/20. Recent Results (from the past 2016 hour(s))   ALT    Collection Time: 04/28/20  3:59 PM   Result Value Ref Range    ALT 14 0 - 41 U/L   AST    Collection Time: 04/28/20  3:59 PM   Result Value Ref Range    AST 20 0 - 40 U/L   AST    Collection Time: 06/01/20  2:15 PM   Result Value Ref Range    AST 23 0 - 40 U/L   ALT    Collection Time: 06/01/20  2:15 PM   Result Value Ref Range    ALT 14 0 - 41 U/L   Basic Metabolic Panel    Collection Time: 06/01/20  2:15 PM   Result Value Ref Range    Sodium 140 135 - 144 mEq/L    Potassium 4.8 3.4 - 4.9 mEq/L    Chloride 102 95 - 107 mEq/L    CO2 27 20 - 31 mEq/L    Anion Gap 11 9 - 15 mEq/L    Glucose 73 70 - 99 mg/dL    BUN 17 8 - 23 mg/dL    CREATININE 0.87 0.70 - 1.20 mg/dL    GFR Non-African American >60.0 >60    GFR  >60.0 >60    Calcium 9.3 8.5 - 9.9 mg/dL           Assessment:       Diagnosis Orders   1. COPD with exacerbation ([address]     2. Seborrheic dermatitis     3. Onychomycosis           No orders of the defined types were placed in this encounter. Plan:   Return in about 4 months (around 10/2/2020) for change july skin check to october so flu shot can be done same day.     Patient Instructions   Be sure to use spacer tube with on your toenails. When should you call for help? Call your doctor now or seek immediate medical care if:  · You have signs of infection, such as:  ? Increased pain, swelling, warmth, or redness. ? Red streaks leading from the site. ? Pus draining from the site. ? A fever. · You have new or increased toe pain. Watch closely for changes in your health, and be sure to contact your doctor if:  · You do not get better as expected. Where can you learn more? Go to [email or web address]. org and sign in to your [employer] account. Enter D202 in the Digitiliti box to learn more about \"Toenail Fungus: Care Instructions. \"     If you do not have an account, please click on the \"Sign Up Now\" link. Current as of: October 31, 2019               Content Version: 12.5  © 3300-0911 Healthwise, Incorporated. Care instructions adapted under license by [facility] ([facility]). If you have questions about a medical condition or this instruction, always ask your healthcare professional. [address] any warranty or liability for your use of this information. Sher Aragon M.D.

## 2020-06-03 NOTE — PATIENT INSTRUCTIONS
Be sure to use spacer tube with albuterol inhaler and may pretreat with albuterol before activities that cause shortness of breath. Patient Education        Toenail Fungus: Care Instructions  Your Care Instructions  A toenail that is infected by a fungus usually turns white or yellow. As the fungus spreads, the nail turns a darker color and gets thicker, and its edges start to turn ragged and crumble. A bad infection can cause toe pain, and the nail may pull away from the toe. Toenails that are exposed to moisture and warmth a lot are more likely to get infected by a fungus. This can happen from wearing sweaty shoes often and from walking barefoot on shower floors. It is hard to treat toenail fungus, and the infection can return after it has cleared up. But medicines can sometimes get rid of toenail fungus for good. If the infection is very bad, or if it causes a lot of pain, you may need to have the nail removed. Follow-up care is a key part of your treatment and safety. Be sure to make and go to all appointments, and call your doctor if you are having problems. It's also a good idea to know your test results and keep a list of the medicines you take. How can you care for yourself at home? · Take your medicines exactly as prescribed. Call your doctor if you have any problems with your medicine. You will get more details on the specific medicines your doctor prescribes. · If your doctor gave you a cream or liquid to put on your toenail, use it exactly as directed. · Wash your feet often, and wash your hands after touching your feet. · Keep your toenails clean and dry. Dry your feet completely after you bathe and before you put on shoes and socks. · Keep your toenails trimmed. · Change socks often. Wear dry socks that absorb moisture. · Do not go barefoot in public places. · Use a spray or powder that fights fungus on your feet and in your shoes. · Do not pick at the skin around your nails.   · Do

## 2020-06-10 ENCOUNTER — HOSPITAL ENCOUNTER (OUTPATIENT)
Dept: PHYSICAL THERAPY | Age: 70
Setting detail: THERAPIES SERIES
Discharge: HOME | End: 2020-06-10
Payer: MEDICARE

## 2020-06-10 PROCEDURE — 97162 PT EVAL MOD COMPLEX 30 MIN: CPT

## 2020-06-10 PROCEDURE — 97140 MANUAL THERAPY 1/> REGIONS: CPT

## 2020-06-10 PROCEDURE — 97110 THERAPEUTIC EXERCISES: CPT

## 2020-06-10 ASSESSMENT — PAIN SCALES - GENERAL: PAINLEVEL_OUTOF10: 0

## 2020-06-10 NOTE — PLAN OF CARE
[address]     Ph: [phone]  Fax: [phone]    [] Certification  [] Recertification [x]  Plan of Care  [] Progress Note [] Discharge      To:  Dr. Agustin Comings        From:  Arthur Brar, PT  Patient: Christina Matias       : 1950  Diagnosis: cervical radiculitis, paresthesia      Date: 6/10/2020  Treatment Diagnosis: neck pain, decreased cervical ROM, postural abnormality     Plan of Care/Certification Expiration Date: 20  Progress Report Period from:  6/10/2020  to 6/10/2020    Total # of Visits to Date: 1   No Show: 0    Canceled Appointment: 0     OBJECTIVE:   Short Term Goals - Time Frame for Short term goals: 2 wks     Goals Current/Discharge status  Met   Short term goal 1: Independent with HEP   Need for HEP  [] yes  [] no   Short term goal 2: Report 25% reduction in sensory changes   C/o parasthesia Lt anterior forearm and cervicothoracic junction [] yes  [] no     Long Term Goals - Time Frame for Long term goals : 5 wks   Goals Current/ Discharge status Met   Long term goal 1: Improve cervical AROM 10 degrees ext, SB   Spine  Cervical: flex 66, ext 18, Rt SB 28, Lt SB 28  [] yes  [] no   Long term goal 2: Improve postural awareness to decrease strain on neck  Postural asymmetry with kyphoscoliosis  [] yes  [] no   Long term goal 3: NDI </= 2 to demonstrate improved tolerance to overall activity  NDI: 5 [] yes  [] no       Body structures, Functions, Activity limitations: Decreased ROM, Increased pain, Decreased posture  Assessment: Pt presents with 6 month h/o sensory changes in Lt anterior forearm and posterior neck with \"itching\" sensation. Pt with postural asymmetry with kyphoscoliosis. Pt with decreased cervical ROM. Pt would benefit from skilled PT to address deficits and decrease parasthesia.    Prognosis: Good    PT Education: Goals;PT Role;Plan of Care;Home Exercise Program    PLAN: [x] Evaluate and Treat  Frequency/Duration:  Plan  Times per week: 2  Plan weeks: 4-5  Current Treatment Recommendations: Strengthening, ROM, Manual Therapy - Soft Tissue Mobilization, Home Exercise Program, Patient/Caregiver Education & Training, Modalities, Equipment Evaluation, Education, & procurement     Precautions:    NA                      Patient Status:[x] Continue/ Initiate plan of Care    [] Discharge PT. Recommend pt continue with HEP. [] Additional visits requested, Please re-certify for additional visits:        Signature: Electronically signed by Charlotte Johnson PT on 6/10/20 at 4:30 PM EDT    If you have any questions or concerns, please don't hesitate to call.   Thank you for your referral.
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
## 2020-06-15 ENCOUNTER — HOSPITAL ENCOUNTER (OUTPATIENT)
Dept: PHYSICAL THERAPY | Age: 70
Setting detail: THERAPIES SERIES
Discharge: HOME | End: 2020-06-15
Payer: MEDICARE

## 2020-06-15 PROCEDURE — 97110 THERAPEUTIC EXERCISES: CPT

## 2020-06-15 PROCEDURE — 97140 MANUAL THERAPY 1/> REGIONS: CPT

## 2020-06-15 ASSESSMENT — PAIN SCALES - GENERAL: PAINLEVEL_OUTOF10: 0

## 2020-06-15 NOTE — PROGRESS NOTES
posture  Assessment: Initiated therex and manual per PT POC to improve pt's thoracic and cervical mobility for improved posture and tolerance to functional activities. Pt denies increased pain or radicular symptoms with activies. Reports relief with manual. Cues to decreased SCM compensation with chin tucks with poor carryover. Treatment Diagnosis: neck pain, decreased cervical ROM, postural abnormality   Prognosis: Good     Goals:  Short term goals  Time Frame for Short term goals: 2 wks   Short term goal 1: Independent with HEP   Short term goal 2: Report 25% reduction in sensory changes     Long term goals  Time Frame for Long term goals : 5 wks   Long term goal 1: Improve cervical AROM 10 degrees ext, SB   Long term goal 2: Improve postural awareness to decrease strain on neck   Long term goal 3: NDI </= 2 to demonstrate improved tolerance to overall activity   Progress toward goals: Progressing towards all    POST-PAIN       Pain Rating (0-10 pain scale):  0 /10   Location and pain description same as pre-treatment unless indicated. Action: [] NA   [x] Perform HEP  [] Meds as prescribed  [] Modalities as prescribed   [] Call Physician     Frequency/Duration:  Plan  Times per week: 2  Plan weeks: 4-5  Current Treatment Recommendations: Strengthening, ROM, Manual Therapy - Soft Tissue Mobilization, Home Exercise Program, Patient/Caregiver Education & Training, Modalities, Equipment Evaluation, Education, & procurement     Pt to continue current HEP. See objective section for any therapeutic exercise changes, additions or modifications this date.          PT Individual Minutes  Time In: 1011  Time Out: 4102  Minutes: 53  Timed Code Treatment Minutes: 53 Minutes  Procedure Minutes: 0     Timed Activity Minutes Units   Ther Ex 43 3   Manual  10 1       Signature:  Electronically signed by Dominic Kurtz PTA on 6/15/20 at 10:14 AM EDT

## 2020-06-18 ENCOUNTER — HOSPITAL ENCOUNTER (OUTPATIENT)
Dept: PHYSICAL THERAPY | Age: 70
Setting detail: THERAPIES SERIES
Discharge: HOME | End: 2020-06-18
Payer: MEDICARE

## 2020-06-18 PROCEDURE — 97140 MANUAL THERAPY 1/> REGIONS: CPT

## 2020-06-18 PROCEDURE — 97110 THERAPEUTIC EXERCISES: CPT

## 2020-06-18 NOTE — PROGRESS NOTES
[address] [address]  Outpatient Physical Therapy    Treatment Note        Date: 2020  Patient: Tenzin Magaña  : 1950  ACCT #: [de-identified]  Referring Practitioner: Dr. Belkis Ortega   Diagnosis: cervical radiculitis, paresthesia     Visit Information:  PT Visit Information  Onset Date: (6 mos )  PT Insurance Information: Jewel Alvarez Medicare(based on medical necessity )  Total # of Visits Approved: (based on medical necessity, follows medicare guidelines )  Total # of Visits to Date: 3  Plan of Care/Certification Expiration Date: 20  No Show: 0  Progress Note Due Date: 20  Canceled Appointment: 0  Progress Note Counter: 3/10    Subjective: Pt notes feeling itching sensation \"less and less. \" Reports fair compliance with HEP. Comments: Pt 20 min late. Able to accomodate. HEP Compliance:  [x] Good [] Fair [] Poor [] Reports not doing due to:    Vital Signs  Patient Currently in Pain: Denies   Pain Screening  Patient Currently in Pain: Denies    OBJECTIVE:   Exercises  Exercise 2: posture exs x15 shrugs, rolls, retraction   Exercise 4: cervical AROM 3 sec x 10  Exercise 5: UT stretch 30 sec x 3 b/l  Exercise 6: pec str in corner 30 sec x 3  Exercise 7: Rolling ball up wall for thoracic extension 5 sec x 10  Exercise 8: Seated thoracic rotation with tennis ball pass x10  Exercise 9: trunk SB over PBall 5 sec x 10  Exercise 11: RTB rows/lats x15 ea  Exercise 19: Seated exercises completed with lumbar towel roll to facilitate improved posture    Manual:   Manual therapy  Manual traction: cervical distraction with some relief   Soft Tissue Mobalization: suboccipital release   Other: Total manual: 10 min    *Indicates exercise, modality, or manual techniques to be initiated when appropriate    Assessment: Body structures, Functions, Activity limitations: Decreased ROM, Increased pain, Decreased posture  Assessment: Cues to decrease shoulder elevation compensations.  Added TB rows and lats to progress postural muscle strengthening with good tolerance. Treatment Diagnosis: neck pain, decreased cervical ROM, postural abnormality   Prognosis: Good       Goals:  Short term goals  Time Frame for Short term goals: 2 wks   Short term goal 1: Independent with HEP   Short term goal 2: Report 25% reduction in sensory changes     Long term goals  Time Frame for Long term goals : 5 wks   Long term goal 1: Improve cervical AROM 10 degrees ext, SB   Long term goal 2: Improve postural awareness to decrease strain on neck   Long term goal 3: NDI </= 2 to demonstrate improved tolerance to overall activity   Progress toward goals: Progressing towards all    POST-PAIN       Pain Rating (0-10 pain scale):   0/10   Location and pain description same as pre-treatment unless indicated. Action: [] NA   [x] Perform HEP  [] Meds as prescribed  [] Modalities as prescribed   [] Call Physician     Frequency/Duration:  Plan  Times per week: 2  Plan weeks: 4-5  Current Treatment Recommendations: Strengthening, ROM, Manual Therapy - Soft Tissue Mobilization, Home Exercise Program, Patient/Caregiver Education & Training, Modalities, Equipment Evaluation, Education, & procurement     Pt to continue current HEP. See objective section for any therapeutic exercise changes, additions or modifications this date.          PT Individual Minutes  Time In: 1520  Time Out: 1600  Minutes: 40  Timed Code Treatment Minutes: 40 Minutes  Procedure Minutes: 0     Timed Activity Minutes Units   Ther Ex 30 2   Manual  10 1       Signature:  Electronically signed by Gardenia Jenkins PTA on 6/18/20 at 3:58 PM EDT

## 2020-06-22 ENCOUNTER — HOSPITAL ENCOUNTER (OUTPATIENT)
Dept: PHYSICAL THERAPY | Age: 70
Setting detail: THERAPIES SERIES
Discharge: HOME | End: 2020-06-22
Payer: MEDICARE

## 2020-06-22 PROCEDURE — 97140 MANUAL THERAPY 1/> REGIONS: CPT

## 2020-06-22 PROCEDURE — 97110 THERAPEUTIC EXERCISES: CPT

## 2020-06-22 NOTE — PROGRESS NOTES
structures, Functions, Activity limitations: Decreased ROM, Increased pain, Decreased posture  Assessment: Cues for correcting technique's. Added 3 way chest pulls to continue progressing postural muscle strengthening and cervical isometrics with good tolerance. Treatment Diagnosis: neck pain, decreased cervical ROM, postural abnormality   Prognosis: Good       Goals:  Short term goals  Time Frame for Short term goals: 2 wks   Short term goal 1: Independent with HEP   Short term goal 2: Report 25% reduction in sensory changes     Long term goals  Time Frame for Long term goals : 5 wks   Long term goal 1: Improve cervical AROM 10 degrees ext, SB   Long term goal 2: Improve postural awareness to decrease strain on neck   Long term goal 3: NDI </= 2 to demonstrate improved tolerance to overall activity   Progress toward goals:rom, strength    POST-PAIN       Pain Rating (0-10 pain scale):   0/10   Location and pain description same as pre-treatment unless indicated. Action: [x] NA   [] Perform HEP  [] Meds as prescribed  [] Modalities as prescribed   [] Call Physician     Frequency/Duration:  Plan  Times per week: 2  Plan weeks: 4-5  Current Treatment Recommendations: Strengthening, ROM, Manual Therapy - Soft Tissue Mobilization, Home Exercise Program, Patient/Caregiver Education & Training, Modalities, Equipment Evaluation, Education, & procurement     Pt to continue current HEP. See objective section for any therapeutic exercise changes, additions or modifications this date.          PT Individual Minutes  Time In: 1168  Time Out: 1278  Minutes: 53  Timed Code Treatment Minutes: 53 Minutes  Procedure Minutes:0     Timed Activity Minutes Units   Ther Ex 43 3   Manual  10 1       Signature:  Electronically signed by Sancho Calvillo PTA on 6/22/20 at 4:25 PM EDT

## 2020-06-24 ENCOUNTER — HOSPITAL ENCOUNTER (OUTPATIENT)
Dept: PHYSICAL THERAPY | Age: 70
Setting detail: THERAPIES SERIES
Discharge: HOME | End: 2020-06-24
Payer: MEDICARE

## 2020-06-24 PROCEDURE — 97110 THERAPEUTIC EXERCISES: CPT

## 2020-06-24 PROCEDURE — 97140 MANUAL THERAPY 1/> REGIONS: CPT

## 2020-06-24 ASSESSMENT — PAIN SCALES - GENERAL: PAINLEVEL_OUTOF10: 0

## 2020-06-24 NOTE — PROGRESS NOTES
[facility]   Outpatient Physical Therapy   Treatment Note  [x] [address]  [] [facility]            of [address]  Date: 2020  Patient: Fela Parikh  : 1950  ACCT #: [de-identified]  Referring Practitioner: Dr. Roberts Prior   Diagnosis: cervical radiculitis, paresthesia     Visit Information:  PT Visit Information  Onset Date: (6 mos )  PT Insurance Information: Salom[employer] Edouard Medicare(based on medical necessity )  Total # of Visits Approved: (based on medical necessity, follows medicare guidelines )  Total # of Visits to Date: 5  Plan of Care/Certification Expiration Date: 20  No Show: 0  Progress Note Due Date: 20  Canceled Appointment: 0  Progress Note Counter: 5/10    SUBJECTIVE:   Subjective  Subjective: Pt reports approximately 50% improvement with symptoms. States no longer having to use icy hot. HEP Compliance:  [x] Good [] Fair [] Poor [] Reports not doing due to:    PAIN   Location:      Pain Rating (0-10 pain scale):  Pain Level: 0  Pain Description:     Action:  [x] Acceptable for treatment  []  Other:    OBJECTIVE:   Exercises  Exercise 2: posture exs x10 with RTB resistance   Exercise 3: seated PBall: pelvic tilts AP, lateral   Exercise 5: UT stretch 30 sec/ 3; Levator str 30 sec/ 3   Exercise 7: seated orange pball over head and diagonal x10 ea   Exercise 8: trunk rotation holding Pball x10   Exercise 11: RTB rows/ lats x15 ea   Exercise 12: chest pulls 3 way x10 ea   Exercise 20: HEP: Tband exercises    Manual: []  NA   Manual therapy  Joint mobilization: cervical glides Gr II, III   Manual traction: cervical distraction with some relief   Soft Tissue Mobalization: suboccipital , ricki UT    ROM: [] NT   Spine  Cervical: flex 68, ext 28, Rt SB 28, Lt SB 28     HEP  Continue with current Home Exercise Program.  See Objective section for progression of HEP.       Comments:       POST-PAIN    Pain Rating (0-10 pain scale): 0  Location

## 2020-07-01 ENCOUNTER — HOSPITAL ENCOUNTER (OUTPATIENT)
Dept: PHYSICAL THERAPY | Age: 70
Setting detail: THERAPIES SERIES
Discharge: HOME | End: 2020-07-01
Payer: MEDICARE

## 2020-07-01 PROCEDURE — 97110 THERAPEUTIC EXERCISES: CPT

## 2020-07-01 PROCEDURE — 97140 MANUAL THERAPY 1/> REGIONS: CPT

## 2020-07-01 NOTE — PROGRESS NOTES
[address]     Ph: [phone]  Fax: [phone]    [] Certification  [] Recertification []  Plan of Care  [x] Progress Note [] Discharge      To:  Dr. Marquis Kenny        From:  Mo Mello, PT, DPT  Patient: Danelle Estrada       : 1950  Diagnosis: cervical radiculitis, paresthesia      Date: 2020  Treatment Diagnosis: neck pain, decreased cervical ROM, postural abnormality     Plan of Care/Certification Expiration Date: 20  Progress Report Period from:  6/10/2020  to 2020    Total # of Visits to Date: 6   No Show: 1    Canceled Appointment: 0     OBJECTIVE:   Short Term Goals - Time Frame for Short term goals: 2 wks     Goals Current/Discharge status  Met   Short term goal 1: Independent with HEP   Pt reports compliance with HEP [x] yes  [] no   Short term goal 2: Report 25% reduction in sensory changes   Pt reports at least 50% improvement in sensory changes [x] yes  [] no     Long Term Goals - Time Frame for Long term goals : 5 wks   Goals Current/ Discharge status Met   Long term goal 1: Improve cervical AROM 10 degrees ext, SB  Spine  Cervical: flex 60, ext 24, Rt SB 22, Lt SB 20    [] yes  [x] no   Long term goal 2: Improve postural awareness to decrease strain on neck  Forward head with signficantly increased thoracic kyphosis and decreased lumbar lordosis in sitting. Continued poor postural awareness. [] yes  [x] no   Long term goal 3: NDI </= 2 to demonstrate improved tolerance to overall activity  NDI = 8/50 = 16% disability  [] yes  [x] no       Body structures, Functions, Activity limitations: Decreased ROM, Increased pain, Decreased posture  Assessment: Pt without signficant changes in cervical ROM. Continues to demonstrate poor seated and standing posture, though reports decreased occurence of sensory changes in Lt forearm since beginning skilled therapy.  Pt would benefit from continued therapy to end of current POC to continue to address postural and ROM deficits for improved tolerance to functional activities. Pt in agreement. Prognosis: Good    PLAN: [x] Evaluate and Treat  Frequency/Duration:  Plan  Times per week: 2  Plan weeks: 4-5  Current Treatment Recommendations: Strengthening, ROM, Manual Therapy - Soft Tissue Mobilization, Home Exercise Program, Patient/Caregiver Education & Training, Modalities, Equipment Evaluation, Education, & procurement     Precautions:   none                      Patient Status:[x] Continue/ Initiate plan of Care    [] Discharge PT. Recommend pt continue with HEP. [] Additional visits requested, Please re-certify for additional visits:        Signature: Objective information by: Electronically signed by Renata Whitfield PTA on 7/1/20 at 10:09 AM EDT  Electronically signed by Deepika Carbone PT on 7/6/2020 at 8:15 AM      If you have any questions or concerns, please don't hesitate to call.   Thank you for your referral.

## 2020-07-01 NOTE — PROGRESS NOTES
signficant changes in cervical ROM. Continues to demonstrate poor seated and standing posture, though reports decreased occurence of sensory changes in Lt forearm since beginning skilled therapy. Pt would benefit from continued therapy to end of current POC to continue to address postural and ROM deficits for improved tolerance to functional activities. Treatment Diagnosis: neck pain, decreased cervical ROM, postural abnormality   Prognosis: Good       Goals:  Short term goals  Time Frame for Short term goals: 2 wks   Short term goal 1: Independent with HEP   Short term goal 2: Report 25% reduction in sensory changes     Long term goals  Time Frame for Long term goals : 5 wks   Long term goal 1: Improve cervical AROM 10 degrees ext, SB   Long term goal 2: Improve postural awareness to decrease strain on neck   Long term goal 3: NDI </= 2 to demonstrate improved tolerance to overall activity   Progress toward goals: Tested for PN    POST-PAIN       Pain Rating (0-10 pain scale):  0 /10   Location and pain description same as pre-treatment unless indicated. Action: [] NA   [x] Perform HEP  [] Meds as prescribed  [] Modalities as prescribed   [] Call Physician     Frequency/Duration:  Plan  Times per week: 2  Plan weeks: 4-5  Current Treatment Recommendations: Strengthening, ROM, Manual Therapy - Soft Tissue Mobilization, Home Exercise Program, Patient/Caregiver Education & Training, Modalities, Equipment Evaluation, Education, & procurement     Pt to continue current HEP. See objective section for any therapeutic exercise changes, additions or modifications this date.          PT Individual Minutes  Time In: 1003  Time Out: 1056  Minutes: 53  Timed Code Treatment Minutes: 53 Minutes  Procedure Minutes: 0     Timed Activity Minutes Units   Ther Ex 41 3   Manual  12 1       Signature:  Electronically signed by Kaleigh Bowen PTA on 7/1/20 at 10:20 AM EDT

## 2020-07-06 ENCOUNTER — HOSPITAL ENCOUNTER (OUTPATIENT)
Dept: PHYSICAL THERAPY | Age: 70
Setting detail: THERAPIES SERIES
Discharge: HOME | End: 2020-07-06
Payer: MEDICARE

## 2020-07-06 NOTE — PROGRESS NOTES
[address]       Physical Therapy  Cancellation/No-show Note  Patient Name:  Robel Zuniga  :  1950   Date:  2020  Referring Practitioner: Dr. Tomy Trevizo   Diagnosis: cervical radiculitis, paresthesia     Visit Information:  PT Visit Information  Onset Date: (6 mos )  PT Insurance Information: Santiago Montiel Medicare(based on medical necessity )  Total # of Visits Approved: (based on medical necessity, follows medicare guidelines )  Total # of Visits to Date: 6  Plan of Care/Certification Expiration Date: 20  No Show: 1  Canceled Appointment: 1  Progress Note Counter: 6/10, cxl 2020    For today's appointment patient:  [x]  Cancelled  []  Rescheduled appointment  []  No-show   []  Called pt to remind of next appointment     Reason given by patient:  []  Patient ill  []  Conflicting appointment  []  No transportation    []  Conflict with work  []  No reason given  [x]  Other: No water      Comments:       Signature: Electronically signed by Teto Clements PTA on 20 at 12:55 PM EDT

## 2020-07-08 ENCOUNTER — HOSPITAL ENCOUNTER (OUTPATIENT)
Dept: PHYSICAL THERAPY | Age: 70
Setting detail: THERAPIES SERIES
Discharge: HOME | End: 2020-07-08
Payer: MEDICARE

## 2020-07-08 PROCEDURE — 97140 MANUAL THERAPY 1/> REGIONS: CPT

## 2020-07-08 PROCEDURE — 97110 THERAPEUTIC EXERCISES: CPT

## 2020-07-08 ASSESSMENT — PAIN SCALES - GENERAL: PAINLEVEL_OUTOF10: 0

## 2020-07-08 NOTE — PROGRESS NOTES
[facility]   Outpatient Physical Therapy   Treatment Note  [x] [address]  [] [facility]            of [address]  Date: 2020  Patient: Tara Tejeda  : 1950  ACCT #: [de-identified]  Referring Practitioner: Dr. Kandi Greco   Diagnosis: cervical radiculitis, paresthesia     Visit Information:  PT Visit Information  Onset Date: (6 mos )  PT Insurance Information: Gayland Bigger Medicare(based on medical necessity )  Total # of Visits Approved: (based on medical necessity, follows medicare guidelines )  Total # of Visits to Date: 7  Plan of Care/Certification Expiration Date: 20  No Show: 1  Progress Note Due Date: 20  Canceled Appointment: 1  Progress Note Counter: 7/10    SUBJECTIVE:   Subjective  Subjective: Pt states symptoms only occurring 1-2 times per day.    HEP Compliance:  [x] Good [] Fair [] Poor [] Reports not doing due to:    PAIN   Location:      Pain Rating (0-10 pain scale):  Pain Level: 0  Pain Description:     Action:  [x] Acceptable for treatment  []  Other:    OBJECTIVE:   Exercises  Exercise 2: Shrugs, rolls GTB x10 with VCs for correct technique   Exercise 4: cervical AROM 3 sec x 10 with VCs for upright posture and avoid compensation   Exercise 5: UT stretch 30 sec/ 3; Levator str 30 sec/ 3   Exercise 9: trunk SB edge of mat x10   Exercise 11: GTB rows/ lats x10 ea with VCs for correct technique   Exercise 12: chest pulls GTB neutral x10 and diagonals x10 ea   Exercise 13: wall stands with chin tucks x5  Exercise 14: seated pelvic tilts   Exercise 15: Initiated barrel stretch 30 sec/ 3 ea   Exercise 16: Initiated UBE retro x3 minutes     Manual: []  NA   Manual therapy  Manual traction: cervical distraction   Soft Tissue Mobalization: suboccipital , ricki UT  Other: total manual: 10 min    ROM: [] NT   Spine  Cervical: Rt rotation 60, Lt rotation 45     HEP  Continue with current Home Exercise Program.  See Objective section for progression of HEP. Comments:       POST-PAIN    Pain Rating (0-10 pain scale): 0  Location and Pain Description same as pre-pain unless otherwise indicated. Action: [x] NA  [] Call Physician  [] Perform HEP  [] Meds as prescribed     ASSESSMENT:     Conditions Requiring Skilled Therapeutic Intervention  Assessment: pt with good tolerance to all treatment, but requires VCs for upright posture and occ VCs for correct technique with ther ex. Initiated standing with chin tucks with improved technique. pt feels he is progressing well and sleeping better. Tolerance to treatment:  [x] Good   [] Fair   [] Poor  [] Fatigued   [] Increased pain   Limited by:    Goals:     Short term goals  Time Frame for Short term goals: 2 wks   Short term goal 1: Independent with HEP   Short term goal 2: Report 25% reduction in sensory changes   Long term goals  Time Frame for Long term goals : 5 wks   Long term goal 1: Improve cervical AROM 10 degrees ext, SB   Long term goal 2: Improve postural awareness to decrease strain on neck   Long term goal 3: NDI </= 2 to demonstrate improved tolerance to overall activity     Progress toward goals: LTG 1, 2   Goals Met: STG 1, 2,     PLAN:  [x] Continue POC to pt tolerance  [] Hold PT for ___ days.   See note to physician  [] Discharge PT    Signature:   Electronically signed by Asiya Lockett PT on 7/8/20 at 3:08 PM EDT    PT Individual Minutes  Time In: 1301  Time Out: 5777  Minutes: 45  Timed Code Treatment Minutes: 45 Minutes    Activity Minutes Units   Ther Ex 35 2   Manual   10  1

## 2020-07-13 ENCOUNTER — HOSPITAL ENCOUNTER (OUTPATIENT)
Dept: PHYSICAL THERAPY | Age: 70
Setting detail: THERAPIES SERIES
Discharge: HOME | End: 2020-07-13
Payer: MEDICARE

## 2020-07-13 PROCEDURE — 97140 MANUAL THERAPY 1/> REGIONS: CPT

## 2020-07-13 PROCEDURE — 97110 THERAPEUTIC EXERCISES: CPT

## 2020-07-15 ENCOUNTER — HOSPITAL ENCOUNTER (OUTPATIENT)
Dept: PHYSICAL THERAPY | Age: 70
Setting detail: THERAPIES SERIES
Discharge: HOME | End: 2020-07-15
Payer: MEDICARE

## 2020-07-15 DIAGNOSIS — B35.1 ONYCHOMYCOSIS: ICD-10-CM

## 2020-07-15 LAB
ALT SERPL-CCNC: 14 U/L (ref 0–41)
AST SERPL-CCNC: 19 U/L (ref 0–40)
CHOLESTEROL, FASTING: 102 MG/DL (ref 0–199)
HDLC SERPL-MCNC: 34 MG/DL (ref 40–59)
LDL CHOLESTEROL CALCULATED: 41 MG/DL (ref 0–129)
TRIGLYCERIDE, FASTING: 133 MG/DL (ref 0–150)

## 2020-07-15 PROCEDURE — 97140 MANUAL THERAPY 1/> REGIONS: CPT

## 2020-07-15 PROCEDURE — 97110 THERAPEUTIC EXERCISES: CPT

## 2020-07-15 ASSESSMENT — PAIN SCALES - GENERAL: PAINLEVEL_OUTOF10: 0

## 2020-07-15 NOTE — PROGRESS NOTES
[address]   Outpatient Physical Therapy   Treatment Note  [x] [address]  [] [facility]  Date: 7/15/2020  Patient: Yuly Issa  : 1950  ACCT #: [de-identified]  Referring Practitioner: Dr. Dayan De León   Diagnosis: cervical radiculitis, paresthesia     Visit Information:  PT Visit Information  Onset Date: (6 mos )  PT Insurance Information: [id number] [facility]. Medicare(based on medical necessity )  Total # of Visits Approved: (based on medical necessity, follows medicare guidelines )  Total # of Visits to Date: 9  Plan of Care/Certification Expiration Date: 20  No Show: 1  Progress Note Due Date: 20  Canceled Appointment: 1  Progress Note Counter: 9/10    SUBJECTIVE:   Subjective  Subjective: Pt reports everything is loosening up. Pt states would like to discuss options next visit hold vs discharge. States no itching currently, only \"once in a while\".    HEP Compliance:  [x] Good [] Fair [] Poor [] Reports not doing due to:    PAIN   Location:      Pain Rating (0-10 pain scale):  Pain Level: 0  Pain Description:     Action:  [x] Acceptable for treatment  []  Other:    OBJECTIVE:   Exercises  Exercise 8: trunk rotation, diagonals, ext/ flexion holding Pball x10   Exercise 9: trunk SB holding Pball x10 bilaterally   Exercise 10: SA wall slides YTB x10  Exercise 11: GTB rows/ lats x15 ea with VCs for correct technique  Exercise 12: chest pulls GTB neutral/ diagonals x10 and diagonals x10 ea   Exercise 13: wall stands with chin tucks x5  Exercise 15: barrel str 30sec/ 3   Exercise 16: UBE L2.0 retro/fwd x 2.5 min ea  Exercise 17: supine Pball: DKTC, LTR, bridges x10   Exercise 18: thoracic extension with ball reaches with decreased coordination   Exercise 20: HEP:continue current     Manual: []  NA   Manual therapy  Manual traction: cervical distraction   Soft Tissue Mobalization: suboccipital , ricki UT  Other: total manual: 12 minutes    ROM: [] NT   Spine  Cervical: flex 67, ext 24, Rt SB 26, Lt SB 28     HEP  Continue with current Home Exercise Program.  See Objective section for progression of HEP. Comments:       POST-PAIN    Pain Rating (0-10 pain scale): 0  Location and Pain Description same as pre-pain unless otherwise indicated. Action: [x] NA  [] Call Physician  [] Perform HEP  [] Meds as prescribed     ASSESSMENT:     Conditions Requiring Skilled Therapeutic Intervention  Assessment: Pt with 80-90% improvement since initiating therapy. Pt continues to require VCs for upright posture, but states this has been an issue since childhood. Plan hold for 30 days after next visit as pt is concerned symptoms will return without PT. Tolerance to treatment:  [x] Good   [] Fair   [] Poor  [] Fatigued   [] Increased pain   Limited by:    Goals:     Short term goals  Time Frame for Short term goals: 2 wks   Short term goal 1: Independent with HEP   Short term goal 2: Report 25% reduction in sensory changes   Long term goals  Time Frame for Long term goals : 5 wks   Long term goal 1: Improve cervical AROM 10 degrees ext, SB   Long term goal 2: Improve postural awareness to decrease strain on neck   Long term goal 3: NDI </= 2 to demonstrate improved tolerance to overall activity     Progress toward goals: LTG 1, 2   Goals Met: STG 1, 2     PLAN:  [x] Continue POC to pt tolerance  [] Hold PT for ___ days.   See note to physician  [] Discharge PT    Signature:   Electronically signed by Nikki Arredondo PT on 7/15/20 at 1:50 PM EDT    PT Individual Minutes  Time In: 5472  Time Out: 8274  Minutes: 45  Timed Code Treatment Minutes: 45 Minutes    Activity Minutes Units   Ther Ex 33 2   Manual   12 1

## 2020-07-22 ENCOUNTER — HOSPITAL ENCOUNTER (OUTPATIENT)
Dept: PHYSICAL THERAPY | Age: 70
Setting detail: THERAPIES SERIES
Discharge: HOME | End: 2020-07-22
Payer: MEDICARE

## 2020-07-22 ENCOUNTER — OFFICE VISIT (OUTPATIENT)
Dept: FAMILY MEDICINE CLINIC | Age: 70
End: 2020-07-22
Payer: MEDICARE

## 2020-07-22 VITALS
SYSTOLIC BLOOD PRESSURE: 122 MMHG | TEMPERATURE: 98.4 F | OXYGEN SATURATION: 97 % | BODY MASS INDEX: 40.14 KG/M2 | DIASTOLIC BLOOD PRESSURE: 68 MMHG | HEART RATE: 66 BPM | HEIGHT: 69 IN | WEIGHT: 271 LBS

## 2020-07-22 PROBLEM — F10.11 NONDEPENDENT ALCOHOL ABUSE, IN REMISSION: Status: ACTIVE | Noted: 2020-07-22

## 2020-07-22 PROBLEM — G47.30 SLEEP APNEA: Status: ACTIVE | Noted: 2018-06-06

## 2020-07-22 PROBLEM — R73.09 BLOOD GLUCOSE ABNORMAL: Status: ACTIVE | Noted: 2020-07-22

## 2020-07-22 PROBLEM — Z87.891 PERSONAL HISTORY OF TOBACCO USE, PRESENTING HAZARDS TO HEALTH: Status: ACTIVE | Noted: 2020-07-22

## 2020-07-22 PROBLEM — E66.01 MORBIDLY OBESE (HCC): Status: ACTIVE | Noted: 2020-07-22

## 2020-07-22 PROBLEM — I48.0 PAROXYSMAL A-FIB (HCC): Status: ACTIVE | Noted: 2020-07-22

## 2020-07-22 PROCEDURE — 97110 THERAPEUTIC EXERCISES: CPT

## 2020-07-22 PROCEDURE — 97140 MANUAL THERAPY 1/> REGIONS: CPT

## 2020-07-22 PROCEDURE — 99214 OFFICE O/P EST MOD 30 MIN: CPT | Performed by: FAMILY MEDICINE

## 2020-07-22 RX ORDER — MONTELUKAST SODIUM 10 MG/1
10 TABLET ORAL NIGHTLY
Qty: 90 TABLET | Refills: 1 | Status: SHIPPED | OUTPATIENT
Start: 2020-07-22 | End: 2020-08-11 | Stop reason: SDUPTHER

## 2020-07-22 RX ORDER — GABAPENTIN 300 MG/1
300 CAPSULE ORAL DAILY
Qty: 90 CAPSULE | Refills: 1 | Status: SHIPPED | OUTPATIENT
Start: 2020-07-22 | End: 2021-03-12

## 2020-07-22 RX ORDER — ALBUTEROL SULFATE 2.5 MG/3ML
2.5 SOLUTION RESPIRATORY (INHALATION) EVERY 6 HOURS PRN
Qty: 120 EACH | Refills: 3 | Status: SHIPPED | OUTPATIENT
Start: 2020-07-22 | End: 2021-03-31 | Stop reason: SDUPTHER

## 2020-07-22 RX ORDER — TERBINAFINE HYDROCHLORIDE 250 MG/1
250 TABLET ORAL DAILY
Qty: 84 TABLET | Refills: 0 | Status: SHIPPED | OUTPATIENT
Start: 2020-07-22 | End: 2020-10-14

## 2020-07-22 NOTE — PROGRESS NOTES
[address]     Ph: [phone]  Fax: [phone]    [] Certification  [] Recertification [x]  Plan of Care  [x] Progress Note [] Discharge      To:  Referring Practitioner: Dr. Symone Barajas       From:  Elvi Lang PT   Patient: Julianne Zaldivar     : 1950  Diagnosis: cervical radiculitis, paresthesia      Date: 2020       Plan of Care/Certification Expiration Date: 20  Progress Report Period from:  6/10/20 to 2020    Total # of Visits to Date: 10   No Show: 1    Canceled Appointment: 1     OBJECTIVE:   Short Term Goals - Time Frame for Short term goals: 2 wks     Goals Current/Discharge status  Met   Short term goal 1: Independent with HEP   Patient independent with Enzo Rodriguez issued for progression of resistance  [x] yes  [] no   Short term goal 2: Report 25% reduction in sensory changes   75% reduction in symptoms  [x] yes  [] no     Long Term Goals - Time Frame for Long term goals : 5 wks   Goals Current/ Discharge status Met   Long term goal 1: Improve cervical AROM 10 degrees ext, SB  Flexion 67, ext 24, SB R 26 L 28   [x] yes  [x] no, SB   Long term goal 2: Improve postural awareness to decrease strain on neck  Continues to require cueing for postural awareness, but improving  [x] yes  [] no   Long term goal 3: NDI </= 2 to demonstrate improved tolerance to overall activity   [] yes  [x] no       Body structures, Functions, Activity limitations: Decreased ROM, Increased pain, Decreased posture  Assessment: Patient continues to demonstrate decreased postural awareness- able to correct for short periods of time. Issued GTB for HEP progression, patient verbalizes good understanding of current program. Pt has met or partially met all goals. Pt requests hold PT at this time vs discharge. Pt is concerned symptoms will return. Will hold x30 days. Pt to call with any issues.      New Education Provided:  HEP     PLAN: [x] Evaluate and Treat  Frequency/Duration:  Plan  Plan Comment: Hold for up to 30 days as previously discussed with PT. Precautions:                  Patient Status:[] Continue/ Initiate plan of Care    [x] Hold PT. Recommend pt continue with HEP. [] Additional visits requested, Please re-certify for additional visits:        Signature: Electronically signed by Petrona Quinn PTA on 7/22/20 at 2:08 PM EDT  Electronically signed by Sayra Cordova PT on 7/28/2020 at 7:50 AM    If you have any questions or concerns, please don't hesitate to call.   Thank you for your referral.

## 2020-07-22 NOTE — PROGRESS NOTES
[address] [address]  Outpatient Physical Therapy    Treatment Note        Date: 2020  Patient: Lien Mendiola  : 1950  ACCT #: [de-identified]  Referring Practitioner: Dr. Baron Nolan   Diagnosis: cervical radiculitis, paresthesia     Visit Information:  PT Visit Information  Onset Date: (6 mos )  PT Insurance Information: Mayank Angulo Medicare(based on medical necessity )  Total # of Visits Approved: (based on medical necessity, follows medicare guidelines )  Total # of Visits to Date: 10  Plan of Care/Certification Expiration Date: 20  No Show: 1  Canceled Appointment: 1  Progress Note Counter: 10/10    Subjective: Patient reports 75% reduction in symptoms since starting therapy. States he has occasional \"itching\" sensation along forearm however all other symptoms seem to be better. Notes he has follow up with PCP this PM. Would like to hold therapy for up to 30 days to make sure symptoms do not worsen. HEP Compliance:  [x] Good [] Fair [] Poor [] Reports not doing due to:    Vital Signs  Patient Currently in Pain: Denies   Pain Screening  Patient Currently in Pain: Denies    OBJECTIVE:   Exercises  Exercise 8: trunk rotation, diagonals, ext/ flexion holding Pball x15  Exercise 9: trunk SB holding Pball x10 bilaterally   Exercise 10: SA wall slides YTB x15  Exercise 11: GTB rows/ lats x20 ea with VCs for correct technique  Exercise 12: chest pulls GTB neutral/ diagonals x15 and diagonals x15 ea  Exercise 13: wall stands with chin tucks x5  Exercise 15: barrel str 30sec/ 3   Exercise 16: UBE L2.0 retro/fwd x 2.5 min ea  Exercise 17: supine Pball: DKTC, LTR, bridges x10   Exercise 20: HEP:continue current   Manual:   Manual therapy  Manual traction: cervical distraction   Soft Tissue Mobalization: suboccipital , ricki UT  Other: total manual: 15  minutes    Assessment:    Body structures, Functions, Activity limitations: Decreased ROM, Increased pain, Decreased posture  Assessment: Patient continues to demonstrate decreased postural awareness- able to correct for short periods of time. Issued GTB for HEP progression, patient verbalizes good understanding of current program.    Goals:  Short term goals  Time Frame for Short term goals: 2 wks   Short term goal 1: Independent with HEP   Short term goal 2: Report 25% reduction in sensory changes     Long term goals  Time Frame for Long term goals : 5 wks   Long term goal 1: Improve cervical AROM 10 degrees ext, SB   Long term goal 2: Improve postural awareness to decrease strain on neck   Long term goal 3: NDI </= 2 to demonstrate improved tolerance to overall activity   Progress toward goals: continue towards all     POST-PAIN       Pain Rating (0-10 pain scale):  0 /10   Location and pain description same as pre-treatment unless indicated. Action: [] NA   [] Perform HEP  [] Meds as prescribed  [] Modalities as prescribed   [] Call Physician     Frequency/Duration:  Plan  Plan Comment: Hold for up to 30 days as previously discussed with PT. Pt to continue current HEP. See objective section for any therapeutic exercise changes, additions or modifications this date.     PT Individual Minutes  Time In: 1300  Time Out: 2829  Minutes: 55  Timed Code Treatment Minutes: 55 Minutes  Procedure Minutes:0     Timed Activity Minutes Units   Ther Ex 40 3   Manual  15 1       Signature:  Electronically signed by Alicia Lara PTA on 7/22/20 at 2:04 PM EDT

## 2020-07-22 NOTE — PROGRESS NOTES
Diagnosis Orders   1. Onychomycosis  terbinafine (LAMISIL) 250 MG tablet   2. Essential hypertension     3. COPD with exacerbation (HCC)  [place] NEBULIZER ADMINISTRATION SET    albuterol (PROVENTIL) (2.5 MG/3ML) 0.083% nebulizer solution   4. Paroxysmal A-fib ([address]     5. Morbidly obese ([address]     6. Chronic midline back pain, unspecified back location  gabapentin (NEURONTIN) 300 MG capsule   7. Chronic seasonal allergic rhinitis  montelukast (SINGULAIR) 10 MG tablet     Return in about 3 months (around 10/22/2020) for for review of outcome of today's recommendation and flu shot. Subjective:      Patient ID: Lizbeth Slade is a 79 y.o. male who presents for:  Chief Complaint   Patient presents with    Nail Problem     on feet     Blood Pressure Check     Pt states that his bp has been going up pt concerned with his numbers        Soaking toenails about once a week in vinegar    Recent headache, home bp monitor (not verified as accurate) showed 151/81. He worked on improving timing of meds for bp and reading went down to 138/70 and no headache. Now he is noting swelling in B LE for the last 3 days. Pt was eating antipasta made by the store over the weekend. Notes swelling improves with elevation easiliy    No irregular heart beats noted. Keeping up with cardiology appt. Physical therapy- completed. Sensation in L arm and in the back is 75-80% better. He has HEP for this    With increased heat he has been using the nebulizer more often.            Current Outpatient Medications on File Prior to Visit   Medication Sig Dispense Refill    metoprolol tartrate (LOPRESSOR) 50 MG tablet Take 1.5 po bid 270 tablet 3    pravastatin (PRAVACHOL) 40 MG tablet TAKE 1 TABLET NIGHTLY 90 tablet 1    budesonide-formoterol (SYMBICORT) 160-4.5 MCG/ACT AERO Inhale 2 puffs into the lungs 2 times daily 2 each LOT [id number] EXP [id number] 3 Inhaler 1    albuterol sulfate  (90 Base) MCG/ACT inhaler Inhale 2 puffs into the     Dr. Anay Caputo History of throat cancer 2004     cleared for reoccurence years ago    Hyperlipidemia 2014    Hypertension     Hypogonadism male     Lung disease     Mononeuritis multiplex     Mononeuritis multiplex     Nondependent alcohol abuse, in remission 2020    Obesity (BMI 30-39. 9) 2019    Osteoarthritis of lumbar spine     Pain of right heel     Paresthesia of foot, bilateral     Prediabetes 12/3/2014    Seborrheic dermatitis     Seborrheic keratoses, inflamed     Throat cancer (HCC)     throat, had surgery, sees Dr Doe Dasha yearly    Tinea cruris     Tinea pedis     Tinea unguium      Past Surgical History:   Procedure Laterality Date    CARDIAC CATHETERIZATION      CATARACT REMOVAL WITH IMPLANT Right 2019    CATARACT REMOVAL WITH IMPLANT Left 2019    COLONOSCOPY  04/15/2015    KNEE ARTHROSCOPY      LARYNGECTOMY  2004    UPPER GASTROINTESTINAL ENDOSCOPY  13    Dr. Jania Medeiros W[facility]       Social History     Socioeconomic History    Marital status:      Spouse name: Not on file    Number of children: 3    Years of education: Not on file    Highest education level: Not on file   Occupational History    Not on file   Social Needs    Financial resource strain: Not on file    Food insecurity     Worry: Not on file     Inability: Not on file   Glocal needs     Medical: Not on file     Non-medical: Not on file   Tobacco Use    Smoking status: Former Smoker     Packs/day: 1.00     Years: 25.00     Pack years: 25.00     Types: Cigarettes     Last attempt to quit: 10/21/1990     Years since quittin.7    Smokeless tobacco: Never Used   Substance and Sexual Activity    Alcohol use: No     Comment: Attends , 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    Drug use: No    Sexual activity: Not on file   Lifestyle    Physical activity     Days per week: Not on file     Minutes per session: Not on file    Stress: Not on file   Relationships    Social connections     Talks on phone: Not on file     Gets together: Not on file     Attends Spiritism service: Not on file     Active member of club or organization: Not on file     Attends meetings of clubs or organizations: Not on file     Relationship status: Not on file    Intimate partner violence     Fear of current or ex partner: Not on file     Emotionally abused: Not on file     Physically abused: Not on file     Forced sexual activity: Not on file   Other Topics Concern    Not on file   Social History Narrative    Lives alone. Family History   Problem Relation Age of Onset    Heart Disease Mother     Liver Disease Mother     Heart Disease Father     Cancer Sister         lung     Allergies:  Alemtuzumab; Glycopyrrolate-formoterol; Mercaptopurine; and Moxifloxacin    Review of Systems   All other systems reviewed and are negative. Objective:   /68   Pulse 66   Temp 98.4 °F (36.9 °C)   Ht 5' 9\" (1.753 m)   Wt 271 lb (122.9 kg)   SpO2 97%   BMI 40.02 kg/m²     Physical Exam  Constitutional:       Appearance: Normal appearance. He is well-developed. He is not ill-appearing or diaphoretic. Comments: Vitals signs reviewed   HENT:      Head: Normocephalic and atraumatic. Right Ear: Hearing and external ear normal.      Left Ear: Hearing and external ear normal.      Nose: No nasal deformity or rhinorrhea. Eyes:      General: Lids are normal.         Right eye: No discharge. Left eye: No discharge. Extraocular Movements:      Right eye: No nystagmus. Left eye: No nystagmus. Conjunctiva/sclera: Conjunctivae normal.      Right eye: No hemorrhage. Left eye: No hemorrhage. Pupils: Pupils are equal, round, and reactive to light. Neck:      Musculoskeletal: Full passive range of motion without pain and neck supple. Normal range of motion. Thyroid: No thyroid mass or thyromegaly.       Vascular: Normal carotid pulses. No carotid bruit or JVD. Trachea: No tracheal tenderness or tracheal deviation. Cardiovascular:      Rate and Rhythm: Normal rate and regular rhythm. Chest Wall: PMI displaced: normal location PMI. Pulses:           Carotid pulses are 2+ on the right side and 2+ on the left side. Radial pulses are 2+ on the right side and 2+ on the left side. Heart sounds: S1 normal and S2 normal. No murmur. No friction rub. No gallop. No S3 sounds. Pulmonary:      Effort: Pulmonary effort is normal. No accessory muscle usage or respiratory distress. Breath sounds: Normal breath sounds. Chest:      Chest wall: No deformity. Abdominal:      General: There is no distension. Musculoskeletal:         General: No deformity. Cervical back: He exhibits normal range of motion, no tenderness and no deformity. Lymphadenopathy:      Cervical:      Right cervical: No superficial cervical adenopathy. Left cervical: No superficial cervical adenopathy. Upper Body:      Right upper body: No supraclavicular adenopathy. Left upper body: No supraclavicular adenopathy. Skin:     General: Skin is warm and dry. Findings: No bruising or rash. Nails: There is no clubbing. Comments: Thickened irregular toenails present   Neurological:      Mental Status: He is alert. Cranial Nerves: Cranial nerve deficit: CN II-XII GI without obvious deficit. Sensory: Sensory deficit: grossly intact for hands. Motor: No tremor. Atrophy: B UE and LE without atrophy. Abnormal muscle tone: B UE and LE have normal tone. Coordination: Coordination normal.      Gait: Gait normal.   Psychiatric:         Attention and Perception: He is attentive. Mood and Affect: Mood is not anxious. Affect is not inappropriate. Speech: Speech normal.         Behavior: Behavior is not agitated. Behavior is cooperative.          Judgment: Judgment normal.         No results found for this visit on 07/22/20. Recent Results (from the past 2016 hour(s))   AST    Collection Time: 06/01/20  2:15 PM   Result Value Ref Range    AST 23 0 - 40 U/L   ALT    Collection Time: 06/01/20  2:15 PM   Result Value Ref Range    ALT 14 0 - 41 U/L   Basic Metabolic Panel    Collection Time: 06/01/20  2:15 PM   Result Value Ref Range    Sodium 140 135 - 144 mEq/L    Potassium 4.8 3.4 - 4.9 mEq/L    Chloride 102 95 - 107 mEq/L    CO2 27 20 - 31 mEq/L    Anion Gap 11 9 - 15 mEq/L    Glucose 73 70 - 99 mg/dL    BUN 17 8 - 23 mg/dL    CREATININE 0.87 0.70 - 1.20 mg/dL    GFR Non-African American >60.0 >60    GFR  >60.0 >60    Calcium 9.3 8.5 - 9.9 mg/dL   ALT    Collection Time: 07/15/20 11:09 AM   Result Value Ref Range    ALT 14 0 - 41 U/L   AST    Collection Time: 07/15/20 11:09 AM   Result Value Ref Range    AST 19 0 - 40 U/L   Lipid, Fasting    Collection Time: 07/15/20 11:09 AM   Result Value Ref Range    Cholesterol, Fasting 102 0 - 199 mg/dL    Triglyceride, Fasting 133 0 - 150 mg/dL    HDL 34 (L) 40 - 59 mg/dL    LDL Calculated 41 0 - 129 mg/dL           Assessment:       Diagnosis Orders   1. Onychomycosis  terbinafine (LAMISIL) 250 MG tablet   2. Essential hypertension     3. COPD with exacerbation (HCC)  [place] NEBULIZER ADMINISTRATION SET    albuterol (PROVENTIL) (2.5 MG/3ML) 0.083% nebulizer solution   4. Paroxysmal A-fib ([address]     5. Morbidly obese ([address]     6. Chronic midline back pain, unspecified back location  gabapentin (NEURONTIN) 300 MG capsule   7. Chronic seasonal allergic rhinitis  montelukast (SINGULAIR) 10 MG tablet         Orders Placed This Encounter   Procedures    [place] NEBULIZER ADMINISTRATION SET         Plan:   Return in about 3 months (around 10/22/2020) for for review of outcome of today's recommendation and flu shot. Patient Instructions   Patient's medical conditions are all pretty stable at this time.   Continue to work on onychomycosis  Patient Education Toenail Fungus: Care Instructions  Your Care Instructions  A toenail that is infected by a fungus usually turns white or yellow. As the fungus spreads, the nail turns a darker color and gets thicker, and its edges start to turn ragged and crumble. A bad infection can cause toe pain, and the nail may pull away from the toe. Toenails that are exposed to moisture and warmth a lot are more likely to get infected by a fungus. This can happen from wearing sweaty shoes often and from walking barefoot on shower floors. It is hard to treat toenail fungus, and the infection can return after it has cleared up. But medicines can sometimes get rid of toenail fungus for good. If the infection is very bad, or if it causes a lot of pain, you may need to have the nail removed. Follow-up care is a key part of your treatment and safety. Be sure to make and go to all appointments, and call your doctor if you are having problems. It's also a good idea to know your test results and keep a list of the medicines you take. How can you care for yourself at home? · Take your medicines exactly as prescribed. Call your doctor if you have any problems with your medicine. You will get more details on the specific medicines your doctor prescribes. · If your doctor gave you a cream or liquid to put on your toenail, use it exactly as directed. · Wash your feet often, and wash your hands after touching your feet. · Keep your toenails clean and dry. Dry your feet completely after you bathe and before you put on shoes and socks. · Keep your toenails trimmed. · Change socks often. Wear dry socks that absorb moisture. · Do not go barefoot in public places. · Use a spray or powder that fights fungus on your feet and in your shoes. · Do not pick at the skin around your nails. · Do not use nail polish or fake nails on your toenails. When should you call for help?    Call your doctor now or seek immediate medical care if:  · You have signs of infection, such as:  ? Increased pain, swelling, warmth, or redness. ? Red streaks leading from the site. ? Pus draining from the site. ? A fever. · You have new or increased toe pain. Watch closely for changes in your health, and be sure to contact your doctor if:  · You do not get better as expected. Where can you learn more? Go to https://SwitchNotepeThriveHiveeb.[employer]Fitness. org and sign in to your [employer] account. Enter D202 in the [employer] box to learn more about \"Toenail Fungus: Care Instructions. \"     If you do not have an account, please click on the \"Sign Up Now\" link. Current as of: October 31, 2019               Content Version: 12.5  © 6281-5659 Healthwise, Incorporated. Care instructions adapted under license by [facility] [address] If you have questions about a medical condition or this instruction, always ask your healthcare professional. [place] [address] any warranty or liability for your use of this information. Sher Shirley M.D.

## 2020-07-22 NOTE — PATIENT INSTRUCTIONS
Patient's medical conditions are all pretty stable at this time. Continue to work on onychomycosis  Patient Education        Toenail Fungus: Care Instructions  Your Care Instructions  A toenail that is infected by a fungus usually turns white or yellow. As the fungus spreads, the nail turns a darker color and gets thicker, and its edges start to turn ragged and crumble. A bad infection can cause toe pain, and the nail may pull away from the toe. Toenails that are exposed to moisture and warmth a lot are more likely to get infected by a fungus. This can happen from wearing sweaty shoes often and from walking barefoot on shower floors. It is hard to treat toenail fungus, and the infection can return after it has cleared up. But medicines can sometimes get rid of toenail fungus for good. If the infection is very bad, or if it causes a lot of pain, you may need to have the nail removed. Follow-up care is a key part of your treatment and safety. Be sure to make and go to all appointments, and call your doctor if you are having problems. It's also a good idea to know your test results and keep a list of the medicines you take. How can you care for yourself at home? · Take your medicines exactly as prescribed. Call your doctor if you have any problems with your medicine. You will get more details on the specific medicines your doctor prescribes. · If your doctor gave you a cream or liquid to put on your toenail, use it exactly as directed. · Wash your feet often, and wash your hands after touching your feet. · Keep your toenails clean and dry. Dry your feet completely after you bathe and before you put on shoes and socks. · Keep your toenails trimmed. · Change socks often. Wear dry socks that absorb moisture. · Do not go barefoot in public places. · Use a spray or powder that fights fungus on your feet and in your shoes. · Do not pick at the skin around your nails.   · Do not use nail polish or fake nails on your toenails. When should you call for help? Call your doctor now or seek immediate medical care if:  · You have signs of infection, such as:  ? Increased pain, swelling, warmth, or redness. ? Red streaks leading from the site. ? Pus draining from the site. ? A fever. · You have new or increased toe pain. Watch closely for changes in your health, and be sure to contact your doctor if:  · You do not get better as expected. Where can you learn more? Go to https://[facility].The [employer]ish[employer] website. org and sign in to your [employer] account. Enter D202 in the [employer] box to learn more about \"Toenail Fungus: Care Instructions. \"     If you do not have an account, please click on the \"Sign Up Now\" link. Current as of: October 31, 2019               Content Version: 12.5  © 1488-2260 Healthwise, Incorporated. Care instructions adapted under license by [facility] ([facility]). If you have questions about a medical condition or this instruction, always ask your healthcare professional. [address] any warranty or liability for your use of this information.

## 2020-08-05 ENCOUNTER — TELEPHONE (OUTPATIENT)
Dept: FAMILY MEDICINE CLINIC | Age: 70
End: 2020-08-05

## 2020-08-05 NOTE — TELEPHONE ENCOUNTER
Pt states that he would like lab order to test for hep c. Would like call when available to complete.

## 2020-08-11 ENCOUNTER — OFFICE VISIT (OUTPATIENT)
Dept: FAMILY MEDICINE CLINIC | Age: 70
End: 2020-08-11
Payer: MEDICARE

## 2020-08-11 VITALS
SYSTOLIC BLOOD PRESSURE: 120 MMHG | OXYGEN SATURATION: 97 % | BODY MASS INDEX: 41.18 KG/M2 | TEMPERATURE: 97.2 F | DIASTOLIC BLOOD PRESSURE: 66 MMHG | HEART RATE: 56 BPM | WEIGHT: 278 LBS | HEIGHT: 69 IN

## 2020-08-11 PROCEDURE — 99214 OFFICE O/P EST MOD 30 MIN: CPT | Performed by: FAMILY MEDICINE

## 2020-08-11 RX ORDER — MONTELUKAST SODIUM 10 MG/1
10 TABLET ORAL NIGHTLY
Qty: 90 TABLET | Refills: 3 | Status: SHIPPED | OUTPATIENT
Start: 2020-08-11 | End: 2021-05-25 | Stop reason: SDUPTHER

## 2020-08-11 ASSESSMENT — ENCOUNTER SYMPTOMS
SORE THROAT: 0
COLOR CHANGE: 0
TROUBLE SWALLOWING: 0

## 2020-08-11 NOTE — PROGRESS NOTES
Diagnosis Orders   1. Radiculopathy, cervical  AFL - ([facility]) - Yimi Bella MD, Neurosurgery, [place]   2. Chronic seasonal allergic rhinitis  montelukast (SINGULAIR) 10 MG tablet   3. Onychomycosis       No follow-ups on file. Subjective:      Patient ID: Danelle Estrada is a 79 y.o. male who presents for:  Chief Complaint   Patient presents with    Skin Problem     Left arm still itchy     Health Maintenance     Pt states that  saw DR Gladys Rai and that hes due in March 2021 for colonoscopy--       Patient is here with increasing itching in the left upper extremity again. He is actually scratched some areas of the skin open. Denies seeing any redness, dryness, actual rash. Just itches. He did physical therapy for cervical radiculopathy. While he was doing the therapy the itch was less. It was also less in his neck and back. His neck and back still responds well to I[employer]. He is not doing his exercises from therapy at home. Still taking medication for onychomycosis. No complications. Reviewed liver functions which are normal.    Needs a refill of his montelukast for his seasonal allergies. It helps control him well. Current Outpatient Medications on File Prior to Visit   Medication Sig Dispense Refill    gabapentin (NEURONTIN) 300 MG capsule Take 1 capsule by mouth daily for 90 days.  90 capsule 1    terbinafine (LAMISIL) 250 MG tablet Take 1 tablet by mouth daily 84 tablet 0    albuterol (PROVENTIL) (2.5 MG/3ML) 0.083% nebulizer solution Take 3 mLs by nebulization every 6 hours as needed for Wheezing 120 each 3    metoprolol tartrate (LOPRESSOR) 50 MG tablet Take 1.5 po bid 270 tablet 3    pravastatin (PRAVACHOL) 40 MG tablet TAKE 1 TABLET NIGHTLY 90 tablet 1    budesonide-formoterol (SYMBICORT) 160-4.5 MCG/ACT AERO Inhale 2 puffs into the lungs 2 times daily 2 each LOT [id number] 3 Inhaler 1    albuterol sulfate  (90 Base) MCG/ACT inhaler Inhale 2 puffs into the lungs every 6 hours as needed for Wheezing or Shortness of Breath 3 Inhaler 1    doxazosin (CARDURA) 4 MG tablet TAKE ONE-HALF (1/2) TABLET TWICE A DAY 90 tablet 4    nitroGLYCERIN (NITROSTAT) 0.4 MG SL tablet DISSOLVE 1 TABLET UNDER THE TONGUE EVERY 5 MINUTES AS NEEDED FOR CHEST PAIN. MAXIMUM OF 3 TABLETS 25 tablet 2    albuterol (PROVENTIL) (2.5 MG/3ML) 0.083% nebulizer solution Use every 6 hours as needed for wheezing  DX:J44.1 (Patient taking differently: Take 2.5 mg by nebulization every 4 hours as needed Use every 6 hours as needed for wheezing  DX:J44.1) 360 each 2    lisinopril (PRINIVIL;ZESTRIL) 20 MG tablet Take 1 tablet by mouth daily 90 tablet 3    testosterone (ANDROGEL; TESTIM) 50 MG/5GM (1%) GEL 1% gel Alternate one packet daily with 2 packets daily every other day 135 Package 0    tiotropium (SPIRIVA HANDIHALER) 18 MCG inhalation capsule Inhale 1 capsule into the lungs daily 90 capsule 3    fluticasone (FLONASE) 50 MCG/ACT nasal spray 1 spray by Each Nare route daily 1 Spray in each nostril 3 Bottle 1    CPAP Machine MISC New cpap supplies mask hose filters etc 1 each 0    Saccharomyces boulardii (PROBIOTIC) 250 MG CAPS Take 1 tablet by mouth daily      esomeprazole Magnesium (NEXIUM) 20 MG PACK Take 20 mg by mouth daily      aspirin 81 MG tablet Take 81 mg by mouth daily.  mesalamine (PENTASA) 250 MG CR capsule Take 500 mg by mouth 4 times daily. No current facility-administered medications on file prior to visit.       Past Medical History:   Diagnosis Date    Abnormal EMG 12/09/2015    Actinic keratoses     Actinic keratoses     Allergic rhinitis     Anemia 11/18/2014    Arthritis     Chronic back pain     [facility] 08/09/2012    Crohns disease     Degenerative disc disease, lumbar     Dermatitis     [facility] 03/19/2015    diet controlled    GERD (gastroesophageal reflux disease)     History of atrial fibrillation 2006    Dr. Salvatore Alcazar History of throat cancer 2004     cleared for reoccurence years ago    Hyperlipidemia 2014    Hypertension     Hypogonadism male     Lung disease     Mononeuritis multiplex     Mononeuritis multiplex     Nondependent alcohol abuse, in remission 2020    Obesity (BMI 30-39. 9) 2019    Osteoarthritis of lumbar spine     Pain of right heel     Paresthesia of foot, bilateral     Prediabetes 12/3/2014    Seborrheic dermatitis     Seborrheic keratoses, inflamed     Throat cancer (HCC)     throat, had surgery, sees Dr Latia Larose yearly    Tinea cruris     Tinea pedis     Tinea unguium      Past Surgical History:   Procedure Laterality Date    CARDIAC CATHETERIZATION      CATARACT REMOVAL WITH IMPLANT Right 2019    CATARACT REMOVAL WITH IMPLANT Left 2019    COLONOSCOPY  04/15/2015    KNEE ARTHROSCOPY      LARYNGECTOMY  2004    UPPER GASTROINTESTINAL ENDOSCOPY  13    Dr. Karen Carvajal W/[facility]       Social History     Socioeconomic History    Marital status:      Spouse name: Not on file    Number of children: 3    Years of education: Not on file    Highest education level: Not on file   Occupational History    Not on file   Social Needs    Financial resource strain: Not on file    Food insecurity     Worry: Not on file     Inability: Not on file   [employer] needs     Medical: Not on file     Non-medical: Not on file   Tobacco Use    Smoking status: Former Smoker     Packs/day: 1.00     Years: 25.00     Pack years: 25.00     Types: Cigarettes     Last attempt to quit: 10/21/1990     Years since quittin.8    Smokeless tobacco: Never Used   Substance and Sexual Activity    Alcohol use: No     Comment: Attends [employer], 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    Drug use: No    Sexual activity: Not on file   Lifestyle    Physical activity     Days per week: Not on file     Minutes per session: Not on file    Stress: Not on file   Relationships    Social connections     Talks on phone: Not on file     Gets together: Not on file     Attends Jain service: Not on file     Active member of club or organization: Not on file     Attends meetings of clubs or organizations: Not on file     Relationship status: Not on file    Intimate partner violence     Fear of current or ex partner: Not on file     Emotionally abused: Not on file     Physically abused: Not on file     Forced sexual activity: Not on file   Other Topics Concern    Not on file   Social History Narrative    Lives alone. Family History   Problem Relation Age of Onset    Heart Disease Mother     Liver Disease Mother     Heart Disease Father     Cancer Sister         lung     Allergies:  Alemtuzumab; Glycopyrrolate-formoterol; Mercaptopurine; and Moxifloxacin    Review of Systems   Constitutional: Negative for activity change, chills and fever. HENT: Negative for congestion, postnasal drip, sore throat and trouble swallowing. Musculoskeletal: Negative for gait problem, joint swelling, neck pain and neck stiffness. Skin: Positive for wound (escoriations ). Negative for color change and rash. Neurological: Negative for dizziness, tremors, weakness, numbness and headaches. Psychiatric/Behavioral: Negative for agitation and sleep disturbance. Objective:   /66   Pulse 56   Temp 97.2 °F (36.2 °C)   Ht 5' 9\" (1.753 m)   Wt 278 lb (126.1 kg)   SpO2 97%   BMI 41.05 kg/m²     Physical Exam  Vitals signs reviewed. Constitutional:       General: He is not in acute distress. Appearance: He is well-developed. HENT:      Head: Normocephalic and atraumatic. Right Ear: External ear normal.      Left Ear: External ear normal.      Nose: Nose normal.   Eyes:      General:         Right eye: No discharge. Left eye: No discharge. Conjunctiva/sclera: Conjunctivae normal.      Pupils: Pupils are equal, round, and reactive to light. montelukast (SINGULAIR) 10 MG tablet   3. Onychomycosis           Orders Placed This Encounter   Procedures    AFL - ([facility]) - Virgil Billy MD, Neurosurgery, [place]     Referral Priority:   Routine     Referral Type:   Eval and Treat     Referral Reason:   Specialty Services Required     Referred to Provider:   Alisson Forrest MD     Requested Specialty:   Neurosurgery     Number of Visits Requested:   1         Plan:   No follow-ups on file. Patient Instructions   Discussed further evaluation options with the patient for his symptoms. I do not believe there is any skin abnormality. I believe this is radiated discomfort and itching from a mild radiculopathy cervical.  Refer to specialty to evaluate further. Continue treatment for onychomycosis. Advised patient to trim nails once a week and apply vinegar to the tips of the nails for 2 to 3 minutes then rinse off. Montelukast refilled for allergy control. Patient Education        Pinched Nerve in the Neck: Care Instructions  Your Care Instructions  A pinched nerve in the neck happens when a vertebra or disc in the upper part of your spine is damaged. This damage can happen because of an injury. Or it can just happen with age. The changes caused by the damage may put pressure on a nearby nerve root, pinching it. This causes symptoms such as sharp pain in your neck, shoulder, arm, hand, or back. You may also have tingling or numbness. Sometimes it makes your arm weaker. The symptoms are usually worse when you turn your head or strain your neck. For many people, the symptoms get better over time and finally go away. Early treatment usually includes medicines for pain and swelling. Sometimes physical therapy and special exercises may help. Follow-up care is a key part of your treatment and safety. Be sure to make and go to all appointments, and call your doctor if you are having problems.  It's also a good idea to know your test results and keep a list of the medicines you take. How can you care for yourself at home? · Be safe with medicines. Read and follow all instructions on the label. ? If the doctor gave you a prescription medicine for pain, take it as prescribed. ? If you are not taking a prescription pain medicine, ask your doctor if you can take an over-the-counter medicine. · Try using a heating pad on a low or medium setting for 15 to 20 minutes every 2 or 3 hours. Try a warm shower in place of one session with the heating pad. You can also buy single-use heat wraps that last up to 8 hours. · You can also try an ice pack for 10 to 15 minutes every 2 to 3 hours. There isn't strong evidence that either heat or ice will help. But you can try them to see if they help you. · Don't spend too long in one position. Take short breaks to move around and change positions. · Wear a seat belt and shoulder harness when you are in a car. · Sleep with a pillow under your head and neck that keeps your neck straight. · If you were given a neck brace (cervical collar) to limit neck motion, wear it as instructed for as many days as your doctor tells you to. Do not wear it longer than you were told to. Wearing a brace for too long can lead to neck stiffness and can weaken the neck muscles. · Follow your doctor's instructions for gentle neck-stretching exercises. · Do not smoke. Smoking can slow healing of your discs. If you need help quitting, talk to your doctor about stop-smoking programs and medicines. These can increase your chances of quitting for good. · Avoid strenuous work or exercise until your doctor says it is okay. When should you call for help? [id number] anytime you think you may need emergency care. For example, call if:  · You are unable to move an arm or a leg at all. Call your doctor now or seek immediate medical care if:  · You have new or worse symptoms in your arms, legs, chest, belly, or buttocks.  Symptoms may include:  ? Numbness or tingling. ? Weakness. ? Pain. · You lose bladder or bowel control. Watch closely for changes in your health, and be sure to contact your doctor if:  · You are not getting better as expected. Where can you learn more? Go to https://chpepiceweb.[email or web address] org and sign in to your [employer] account. Enter S359 in the [employer] box to learn more about \"Pinched Nerve in the Neck: Care Instructions. \"     If you do not have an account, please click on the \"Sign Up Now\" link. Current as of: March 2, 2020               Content Version: 12.5  © 3950-1018 [email or web address]. Care instructions adapted under license by [employer] ([facility]). If you have questions about a medical condition or this instruction, always ask your healthcare professional. [address] any warranty or liability for your use of this information. Patient Education        Dermatomes: Anatomy Sketch    Current as of: October 31, 2019               Content Version: 12.5  © 2006-2020 [email or web address]. Care instructions adapted under license by [employer] ([facility]). If you have questions about a medical condition or this instruction, always ask your healthcare professional. [address] any warranty or liability for your use of this information. Sher Gunn M.D.

## 2020-08-11 NOTE — PATIENT INSTRUCTIONS
Discussed further evaluation options with the patient for his symptoms. I do not believe there is any skin abnormality. I believe this is radiated discomfort and itching from a mild radiculopathy cervical.  Refer to specialty to evaluate further. Continue treatment for onychomycosis. Advised patient to trim nails once a week and apply vinegar to the tips of the nails for 2 to 3 minutes then rinse off. Montelukast refilled for allergy control. Patient Education        Pinched Nerve in the Neck: Care Instructions  Your Care Instructions  A pinched nerve in the neck happens when a vertebra or disc in the upper part of your spine is damaged. This damage can happen because of an injury. Or it can just happen with age. The changes caused by the damage may put pressure on a nearby nerve root, pinching it. This causes symptoms such as sharp pain in your neck, shoulder, arm, hand, or back. You may also have tingling or numbness. Sometimes it makes your arm weaker. The symptoms are usually worse when you turn your head or strain your neck. For many people, the symptoms get better over time and finally go away. Early treatment usually includes medicines for pain and swelling. Sometimes physical therapy and special exercises may help. Follow-up care is a key part of your treatment and safety. Be sure to make and go to all appointments, and call your doctor if you are having problems. It's also a good idea to know your test results and keep a list of the medicines you take. How can you care for yourself at home? · Be safe with medicines. Read and follow all instructions on the label. ? If the doctor gave you a prescription medicine for pain, take it as prescribed. ? If you are not taking a prescription pain medicine, ask your doctor if you can take an over-the-counter medicine. · Try using a heating pad on a low or medium setting for 15 to 20 minutes every 2 or 3 hours.  Try a warm shower in place of one session with the heating pad. You can also buy single-use heat wraps that last up to 8 hours. · You can also try an ice pack for 10 to 15 minutes every 2 to 3 hours. There isn't strong evidence that either heat or ice will help. But you can try them to see if they help you. · Don't spend too long in one position. Take short breaks to move around and change positions. · Wear a seat belt and shoulder harness when you are in a car. · Sleep with a pillow under your head and neck that keeps your neck straight. · If you were given a neck brace (cervical collar) to limit neck motion, wear it as instructed for as many days as your doctor tells you to. Do not wear it longer than you were told to. Wearing a brace for too long can lead to neck stiffness and can weaken the neck muscles. · Follow your doctor's instructions for gentle neck-stretching exercises. · Do not smoke. Smoking can slow healing of your discs. If you need help quitting, talk to your doctor about stop-smoking programs and medicines. These can increase your chances of quitting for good. · Avoid strenuous work or exercise until your doctor says it is okay. When should you call for help? [id number] anytime you think you may need emergency care. For example, call if:  · You are unable to move an arm or a leg at all. Call your doctor now or seek immediate medical care if:  · You have new or worse symptoms in your arms, legs, chest, belly, or buttocks. Symptoms may include:  ? Numbness or tingling. ? Weakness. ? Pain. · You lose bladder or bowel control. Watch closely for changes in your health, and be sure to contact your doctor if:  · You are not getting better as expected. Where can you learn more? Go to https://[employer]ilistics.[employer]. org and sign in to your [employer] account. Enter L972 in the [employer] box to learn more about \"Pinched Nerve in the Neck: Care Instructions. \"     If you do not have an account, please click on the \"Sign Up Now\" link. Current as of: March 2, 2020               Content Version: 12.5  © 2006-2020 Healthwise, [employer]. Care instructions adapted under license by [employer] ([facility]). If you have questions about a medical condition or this instruction, always ask your healthcare professional. [address] any warranty or liability for your use of this information. Patient Education        Dermatomes: Anatomy Sketch    Current as of: October 31, 2019               Content Version: 12.5  © 2006-2020 Healthwise, [employer]. Care instructions adapted under license by [employer] ([facility]). If you have questions about a medical condition or this instruction, always ask your healthcare professional. [address] any warranty or liability for your use of this information.

## 2020-08-27 ENCOUNTER — OFFICE VISIT (OUTPATIENT)
Dept: PULMONOLOGY | Age: 70
End: 2020-08-27
Payer: MEDICARE

## 2020-08-27 VITALS
OXYGEN SATURATION: 95 % | SYSTOLIC BLOOD PRESSURE: 120 MMHG | DIASTOLIC BLOOD PRESSURE: 64 MMHG | TEMPERATURE: 97.6 F | WEIGHT: 268 LBS | HEART RATE: 67 BPM | HEIGHT: 69 IN | BODY MASS INDEX: 39.69 KG/M2 | RESPIRATION RATE: 16 BRPM

## 2020-08-27 PROCEDURE — 99214 OFFICE O/P EST MOD 30 MIN: CPT | Performed by: INTERNAL MEDICINE

## 2020-08-27 RX ORDER — CETIRIZINE HYDROCHLORIDE 5 MG/1
TABLET ORAL
COMMUNITY
End: 2022-05-19

## 2020-08-27 RX ORDER — INFLIXIMAB 100 MG/10ML
5 INJECTION, POWDER, LYOPHILIZED, FOR SOLUTION INTRAVENOUS SEE ADMIN INSTRUCTIONS
COMMUNITY
End: 2021-10-28 | Stop reason: ALTCHOICE

## 2020-08-27 ASSESSMENT — ENCOUNTER SYMPTOMS
VOICE CHANGE: 0
ABDOMINAL PAIN: 0
EYE ITCHING: 0
SHORTNESS OF BREATH: 1
RHINORRHEA: 0
NAUSEA: 0
WHEEZING: 0
CHEST TIGHTNESS: 0
DIARRHEA: 0
VOMITING: 0
COUGH: 0
SORE THROAT: 0

## 2020-08-27 NOTE — PROGRESS NOTES
Subjective:     Sheng Berg is a 79 y.o. male who complains today of:     Chief Complaint   Patient presents with    Follow-up     three month f/u for COPD and JARRELL. HPI  He is using CPAP with 14 centimeters of H2O with heated humidity. He is using CPAP for about 7 hours every night. He is using CPAP with   Nasal pillow Mask. He said  sleep is restful with the CPAP use. He is compliant with CPAP therapy and benefiting with CPAP use. No snoring with CPAP use. No complaint of daytime sleepiness or tiredness with CPAP use. He denies taking naps. No sleepiness with driving. He denies difficulty falling asleep or staying asleep. Patient is using symbicort and spiriva, albuterol HFA prn   C/o shortness of breath with exertion. No  Wheezing   No Cough. No Hemoptysis  No Chest tightness   No Chest pain with radiation  or pleuritic pain  No Fever or chills. No Rhinorrhea and postnasal drip. Allergies:  Alemtuzumab; Glycopyrrolate-formoterol; Mercaptopurine; and Moxifloxacin  Past Medical History:   Diagnosis Date    Abnormal EMG 12/09/2015    Actinic keratoses     Actinic keratoses     Allergic rhinitis     Anemia 11/18/2014    Arthritis     Chronic back pain     [facility] ([facility]) 08/09/2012    Crohns disease     Degenerative disc disease, lumbar     Dermatitis     Diabetes ([facility] [address] 03/19/2015    diet controlled    GERD (gastroesophageal reflux disease)     History of atrial fibrillation 2006    Dr. Bartolome Ricks History of throat cancer 2004     cleared for reoccurence years ago    Hyperlipidemia 1/21/2014    Hypertension     Hypogonadism male     Lung disease     Mononeuritis multiplex     Mononeuritis multiplex     Nondependent alcohol abuse, in remission 7/22/2020    Obesity (BMI 30-39. 9) 7/24/2019    Osteoarthritis of lumbar spine     Pain of right heel     Paresthesia of foot, bilateral     Prediabetes 12/3/2014    Seborrheic dermatitis     Seborrheic keratoses, inflamed     Throat cancer (HCC)     throat, had surgery, sees Dr Marina Jeff yearly    Tinea cruris     Tinea pedis     Tinea unguium      Past Surgical History:   Procedure Laterality Date    CARDIAC CATHETERIZATION      CATARACT REMOVAL WITH IMPLANT Right 2019    CATARACT REMOVAL WITH IMPLANT Left 2019    COLONOSCOPY  04/15/2015    KNEE ARTHROSCOPY      LARYNGECTOMY  2004    UPPER GASTROINTESTINAL ENDOSCOPY  13    Dr. Valente Snellen W/NAPOLEON RODRIGUEZ       Family History   Problem Relation Age of Onset    Heart Disease Mother     Liver Disease Mother     Heart Disease Father     Cancer Sister         lung     Social History     Socioeconomic History    Marital status:      Spouse name: Not on file    Number of children: 3    Years of education: Not on file    Highest education level: Not on file   Occupational History    Not on file   Social Needs    Financial resource strain: Not on file    Food insecurity     Worry: Not on file     Inability: Not on file   [employer] needs     Medical: Not on file     Non-medical: Not on file   Tobacco Use    Smoking status: Former Smoker     Packs/day: 1.00     Years: 25.00     Pack years: 25.00     Types: Cigarettes     Last attempt to quit: 10/21/1990     Years since quittin.8    Smokeless tobacco: Never Used   Substance and Sexual Activity    Alcohol use: No     Comment: Attends [employer] 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    Drug use: No    Sexual activity: Not on file   Lifestyle    Physical activity     Days per week: Not on file     Minutes per session: Not on file    Stress: Not on file   Relationships    Social connections     Talks on phone: Not on file     Gets together: Not on file     Attends Congregation service: Not on file     Active member of club or organization: Not on file     Attends meetings of clubs or organizations: Not on file     Relationship status: Not on file    Intimate partner violence     Fear of current or ex partner: Not on file     Emotionally abused: Not on file     Physically abused: Not on file     Forced sexual activity: Not on file   Other Topics Concern    Not on file   Social History Narrative    Lives alone. Review of Systems   Constitutional: Negative for chills, diaphoresis, fatigue and fever. HENT: Negative for congestion, mouth sores, nosebleeds, postnasal drip, rhinorrhea, sneezing, sore throat and voice change. Eyes: Negative for itching and visual disturbance. Respiratory: Positive for shortness of breath. Negative for cough, chest tightness and wheezing. Cardiovascular: Negative. Negative for chest pain, palpitations and leg swelling. Gastrointestinal: Negative for abdominal pain, diarrhea, nausea and vomiting. Genitourinary: Negative for difficulty urinating and hematuria. Musculoskeletal: Negative for arthralgias, joint swelling and myalgias. Skin: Negative for rash. Allergic/Immunologic: Negative for environmental allergies. Neurological: Negative for dizziness, tremors, weakness and headaches. Psychiatric/Behavioral: Positive for sleep disturbance. Negative for behavioral problems. :     Vitals:    08/27/20 1418   BP: 120/64   Pulse: 67   Resp: 16   Temp: 97.6 °F (36.4 °C)   TempSrc: Temporal   SpO2: 95%   Weight: 268 lb (121.6 kg)   Height: 5' 9\" (1.753 m)     Wt Readings from Last 3 Encounters:   08/27/20 268 lb (121.6 kg)   08/11/20 278 lb (126.1 kg)   07/22/20 271 lb (122.9 kg)         Physical Exam  Constitutional:       Appearance: He is well-developed. He is obese. HENT:      Head: Normocephalic and atraumatic. Nose: Nose normal.   Eyes:      Conjunctiva/sclera: Conjunctivae normal.      Pupils: Pupils are equal, round, and reactive to light. Neck:      Thyroid: No thyromegaly. Vascular: No JVD. Trachea: No tracheal deviation.    Cardiovascular:      Rate and Rhythm: Normal rate and regular rhythm. Heart sounds: No murmur. No friction rub. No gallop. Pulmonary:      Effort: Pulmonary effort is normal. No respiratory distress. Breath sounds: Normal breath sounds. No wheezing or rales. Comments: diminished Breath sound bilaterally. Chest:      Chest wall: No tenderness. Abdominal:      General: There is no distension. Musculoskeletal: Normal range of motion. Lymphadenopathy:      Cervical: No cervical adenopathy. Skin:     General: Skin is warm and dry. Findings: No rash. Neurological:      Mental Status: He is alert and oriented to person, place, and time. Cranial Nerves: No cranial nerve deficit. Psychiatric:         Behavior: Behavior normal.         Current Outpatient Medications   Medication Sig Dispense Refill    cetirizine (ZYRTEC) 5 MG tablet TAKE ONE TABLET BY MOUTH  PRN      inFLIXimab (REMICADE) 100 MG injection Infuse 5 mg/kg intravenously See Admin Instructions      montelukast (SINGULAIR) 10 MG tablet Take 1 tablet by mouth nightly TAKE 1 TABLET NIGHTLY 90 tablet 3    gabapentin (NEURONTIN) 300 MG capsule Take 1 capsule by mouth daily for 90 days.  90 capsule 1    terbinafine (LAMISIL) 250 MG tablet Take 1 tablet by mouth daily 84 tablet 0    albuterol (PROVENTIL) (2.5 MG/3ML) 0.083% nebulizer solution Take 3 mLs by nebulization every 6 hours as needed for Wheezing 120 each 3    metoprolol tartrate (LOPRESSOR) 50 MG tablet Take 1.5 po bid 270 tablet 3    pravastatin (PRAVACHOL) 40 MG tablet TAKE 1 TABLET NIGHTLY 90 tablet 1    budesonide-formoterol (SYMBICORT) 160-4.5 MCG/ACT AERO Inhale 2 puffs into the lungs 2 times daily 2 each LOT [id number] EXP [id number] 3 Inhaler 1    albuterol sulfate  (90 Base) MCG/ACT inhaler Inhale 2 puffs into the lungs every 6 hours as needed for Wheezing or Shortness of Breath 3 Inhaler 1    doxazosin (CARDURA) 4 MG tablet TAKE ONE-HALF (1/2) TABLET TWICE A DAY 90 tablet 4    years    Gender: Male    Order Date: 9/23/2019 4:45 AM.     Exam: XR CHEST PORTABLE    Number of Views: 1     Indication:   Chest Pain, rapid heart beat per pt. Comparison: 3/15/2019    Findings: Stable, enlarged cardiomediastinal silhouette with thoracic aortic vascular calcifications. No pneumonia, pneumothorax or pleural effusion. Impression Impression:  Stable, enlarged cardiomediastinal silhouette with thoracic aortic vascular calcifications       Assessment/Plan:     1. JARRELL on CPAP  He is using CPAP with 14 centimeters of H2O with heated humidity. He is using CPAP for about 7 hours every night. He is using CPAP with   Nasal pillow Mask. He said  sleep is restful with the CPAP use. He is compliant with CPAP therapy and benefiting with CPAP use. No snoring with CPAP use. Continue CPAP therapy as before    2. Obesity (BMI 30-39. 9)  Patient patient is advised try to lose weight. obesity related risk explained to the patient ,  Current weight:  268 lb (121.6 kg) Lbs. BMI:  Body mass index is 39.58 kg/m². Suggested weight control approaches, including dietary changes , exercise, behavioral modification. 3. Chronic obstructive pulmonary disease, unspecified COPD type ([facility] [address]  he is using symbicort and spiriva, albuterol HFA prn C/o shortness of breath with exertion. No  Wheezing   No Cough. Continue bronchodilator therapy as before      Return in about 6 months (around 2/27/2021) for COPD, jarrell.       Radha Chang MD

## 2020-08-31 ENCOUNTER — OFFICE VISIT (OUTPATIENT)
Dept: FAMILY MEDICINE CLINIC | Age: 70
End: 2020-08-31
Payer: MEDICARE

## 2020-08-31 VITALS
BODY MASS INDEX: 39.78 KG/M2 | WEIGHT: 268.6 LBS | HEART RATE: 69 BPM | SYSTOLIC BLOOD PRESSURE: 136 MMHG | OXYGEN SATURATION: 96 % | HEIGHT: 69 IN | DIASTOLIC BLOOD PRESSURE: 78 MMHG | TEMPERATURE: 97.6 F

## 2020-08-31 PROCEDURE — 99214 OFFICE O/P EST MOD 30 MIN: CPT | Performed by: FAMILY MEDICINE

## 2020-08-31 RX ORDER — PRAVASTATIN SODIUM 40 MG
TABLET ORAL
Qty: 90 TABLET | Refills: 1 | Status: SHIPPED | OUTPATIENT
Start: 2020-08-31 | End: 2020-09-16 | Stop reason: SDUPTHER

## 2020-08-31 ASSESSMENT — ENCOUNTER SYMPTOMS: COLOR CHANGE: 0

## 2020-08-31 NOTE — PROGRESS NOTES
Diagnosis Orders   1. Acute pain of right knee  [facility] - [facility]ie[facility]ana   2. Hyperlipidemia, unspecified hyperlipidemia type  pravastatin (PRAVACHOL) 40 MG tablet   3. Radiculopathy, cervical     4. Onychomycosis       Return for as scheduled. Subjective:      Patient ID: Regina Lucas is a 79 y.o. male who presents for:  Chief Complaint   Patient presents with    Knee Pain     RT knee x 3-4 weeks     Arm Pain     RT arm- one day it felt like the bone was hurting but has since gone away.  Health Maintenance     Spoke to pt requesting records for colonoscopy        Approximately 3 to 4 weeks patient has had increasing intermittent pain in his right knee. It sometimes even clicks. It has not swollen or become red but it does feel warm at times. He notes going upstairs to be the most difficult thing. He also notes getting out of a chair or out of the car after sitting for a little while is difficult. Going up stairs is more difficult than going downstairs. Mostly stiffness. There is some pain at times. No problem with flexion. No weakness. No loss of control. No ice or heat. No meds    Noting continued symptoms in his left upper arm. Still isolated to that side. Is seeing neurosurgery soon. No worsening of symptoms. Same issues coming and going. Taking terbinafine for his onychomycosis. He is on his second course. He is using the vinegar to wash the toenails once a week. He is trimming them routinely      Current Outpatient Medications on File Prior to Visit   Medication Sig Dispense Refill    cetirizine (ZYRTEC) 5 MG tablet TAKE ONE TABLET BY MOUTH  PRN      inFLIXimab (REMICADE) 100 MG injection Infuse 5 mg/kg intravenously See Admin Instructions      montelukast (SINGULAIR) 10 MG tablet Take 1 tablet by mouth nightly TAKE 1 TABLET NIGHTLY 90 tablet 3    gabapentin (NEURONTIN) 300 MG capsule Take 1 capsule by mouth daily for 90 days.  90 capsule 1    terbinafine (LAMISIL) 250 MG tablet Take 1 tablet by mouth daily 84 tablet 0    albuterol (PROVENTIL) (2.5 MG/3ML) 0.083% nebulizer solution Take 3 mLs by nebulization every 6 hours as needed for Wheezing 120 each 3    metoprolol tartrate (LOPRESSOR) 50 MG tablet Take 1.5 po bid 270 tablet 3    budesonide-formoterol (SYMBICORT) 160-4.5 MCG/ACT AERO Inhale 2 puffs into the lungs 2 times daily 2 each LOT [id number] EXP [id number] 3 Inhaler 1    albuterol sulfate  (90 Base) MCG/ACT inhaler Inhale 2 puffs into the lungs every 6 hours as needed for Wheezing or Shortness of Breath 3 Inhaler 1    doxazosin (CARDURA) 4 MG tablet TAKE ONE-HALF (1/2) TABLET TWICE A DAY 90 tablet 4    nitroGLYCERIN (NITROSTAT) 0.4 MG SL tablet DISSOLVE 1 TABLET UNDER THE TONGUE EVERY 5 MINUTES AS NEEDED FOR CHEST PAIN. MAXIMUM OF 3 TABLETS 25 tablet 2    albuterol (PROVENTIL) (2.5 MG/3ML) 0.083% nebulizer solution Use every 6 hours as needed for wheezing  DX:J44.1 (Patient taking differently: Take 2.5 mg by nebulization every 4 hours as needed Use every 6 hours as needed for wheezing  DX:J44.1) 360 each 2    lisinopril (PRINIVIL;ZESTRIL) 20 MG tablet Take 1 tablet by mouth daily 90 tablet 3    testosterone (ANDROGEL; TESTIM) 50 MG/5GM (1%) GEL 1% gel Alternate one packet daily with 2 packets daily every other day 135 Package 0    tiotropium (SPIRIVA HANDIHALER) 18 MCG inhalation capsule Inhale 1 capsule into the lungs daily 90 capsule 3    fluticasone (FLONASE) 50 MCG/ACT nasal spray 1 spray by Each Nare route daily 1 Spray in each nostril 3 Bottle 1    CPAP Machine MISC New cpap supplies mask hose filters etc 1 each 0    Saccharomyces boulardii (PROBIOTIC) 250 MG CAPS Take 1 tablet by mouth daily      esomeprazole Magnesium (NEXIUM) 20 MG PACK Take 20 mg by mouth daily      aspirin 81 MG tablet Take 81 mg by mouth daily.  mesalamine (PENTASA) 250 MG CR capsule Take 500 mg by mouth 4 times daily.        No current facility-administered medications on file prior to visit. Past Medical History:   Diagnosis Date    Abnormal EMG 12/09/2015    Actinic keratoses     Actinic keratoses     Allergic rhinitis     Anemia 11/18/2014    Arthritis     Chronic back pain     COPD (chronic obstructive pulmonary disease) ([facility]) 08/09/2012    Crohns disease     Degenerative disc disease, lumbar     Dermatitis     Diabetes ([facility] [address] 03/19/2015    diet controlled    GERD (gastroesophageal reflux disease)     History of atrial fibrillation 2006    Dr. Cristine Phillips History of throat cancer 2004     cleared for reoccurence years ago    Hyperlipidemia 1/21/2014    Hypertension     Hypogonadism male     Lung disease     Mononeuritis multiplex     Mononeuritis multiplex     Nondependent alcohol abuse, in remission 7/22/2020    Obesity (BMI 30-39. 9) 7/24/2019    Osteoarthritis of lumbar spine     Pain of right heel     Paresthesia of foot, bilateral     Prediabetes 12/3/2014    Seborrheic dermatitis     Seborrheic keratoses, inflamed     Throat cancer ([facility])     throat, had surgery, sees Dr Imelda Chaidez yearly    Tinea cruris     Tinea pedis     Tinea unguium      Past Surgical History:   Procedure Laterality Date    CARDIAC CATHETERIZATION      CATARACT REMOVAL WITH IMPLANT Right 09/09/2019    CATARACT REMOVAL WITH IMPLANT Left 07/22/2019    COLONOSCOPY  04/15/2015    KNEE ARTHROSCOPY      LARYNGECTOMY  2004    UPPER GASTROINTESTINAL ENDOSCOPY  03/24/13    Dr. Awa HOWARD/NAPOLE[facility] RODRIGUEZ  2002     Social History     Socioeconomic History    Marital status:      Spouse name: Not on file    Number of children: 3    Years of education: Not on file    Highest education level: Not on file   Occupational History    Not on file   Social Needs    Financial resource strain: Not on file    Food insecurity     Worry: Not on file     Inability: Not on file    Transportation needs     Medical: Not on file     Non-medical: Not on file   Tobacco Use    Smoking status: Former Smoker     Packs/day: 1.00     Years: 25.00     Pack years: 25.00     Types: Cigarettes     Last attempt to quit: 10/21/1990     Years since quittin.8    Smokeless tobacco: Never Used   Substance and Sexual Activity    Alcohol use: No     Comment: Attends [employer], 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    Drug use: No    Sexual activity: Not on file   Lifestyle    Physical activity     Days per week: Not on file     Minutes per session: Not on file    Stress: Not on file   Relationships    Social connections     Talks on phone: Not on file     Gets together: Not on file     Attends Confucianism service: Not on file     Active member of club or organization: Not on file     Attends meetings of clubs or organizations: Not on file     Relationship status: Not on file    Intimate partner violence     Fear of current or ex partner: Not on file     Emotionally abused: Not on file     Physically abused: Not on file     Forced sexual activity: Not on file   Other Topics Concern    Not on file   Social History Narrative    Lives alone. Family History   Problem Relation Age of Onset    Heart Disease Mother     Liver Disease Mother     Heart Disease Father     Cancer Sister         lung     Allergies:  Alemtuzumab; Glycopyrrolate-formoterol; Mercaptopurine; and Moxifloxacin    Review of Systems   Constitutional: Positive for activity change. Negative for chills and fever. Musculoskeletal: Positive for gait problem and neck pain. Negative for joint swelling. Skin: Negative for color change and rash. Neurological: Negative for tremors, weakness and numbness. Psychiatric/Behavioral: Negative for sleep disturbance. Objective:   /78   Pulse 69   Temp 97.6 °F (36.4 °C)   Ht 5' 9\" (1.753 m)   Wt 268 lb 9.6 oz (121.8 kg)   SpO2 96%   BMI 39.67 kg/m²     Physical Exam  Vitals signs reviewed.    Constitutional:       General: He is not in acute distress. Appearance: He is well-developed. HENT:      Head: Normocephalic and atraumatic. Right Ear: External ear normal.      Left Ear: External ear normal.      Nose: Nose normal.   Eyes:      General:         Right eye: No discharge. Left eye: No discharge. Conjunctiva/sclera: Conjunctivae normal.      Pupils: Pupils are equal, round, and reactive to light. Neck:      Musculoskeletal: Neck supple. Thyroid: No thyromegaly. Cardiovascular:      Rate and Rhythm: Normal rate and regular rhythm. Pulmonary:      Effort: Pulmonary effort is normal. No respiratory distress. Abdominal:      General: There is no distension. Musculoskeletal: Normal range of motion. General: No swelling, tenderness or deformity. Comments: Clicking heard with extension of the right knee. Flexion is normal   Skin:     General: Skin is warm and dry. Neurological:      Mental Status: He is alert and oriented to person, place, and time. Coordination: Coordination normal.   Psychiatric:         Thought Content: Thought content normal.         Judgment: Judgment normal.         No results found for this visit on 08/31/20. Recent Results (from the past 2016 hour(s))   ALT    Collection Time: 07/15/20 11:09 AM   Result Value Ref Range    ALT 14 0 - 41 U/L   AST    Collection Time: 07/15/20 11:09 AM   Result Value Ref Range    AST 19 0 - 40 U/L   Lipid, Fasting    Collection Time: 07/15/20 11:09 AM   Result Value Ref Range    Cholesterol, Fasting 102 0 - 199 mg/dL    Triglyceride, Fasting 133 0 - 150 mg/dL    HDL 34 (L) 40 - 59 mg/dL    LDL Calculated 41 0 - 129 mg/dL           Assessment:       Diagnosis Orders   1. Acute pain of right knee  [facility] - [facility]   2. Hyperlipidemia, unspecified hyperlipidemia type  pravastatin (PRAVACHOL) 40 MG tablet   3. Radiculopathy, cervical     4.  Onychomycosis           Orders Placed This Encounter   Procedures   [facility] - [facility]ie/[facility]     Referral Priority:   Routine     Referral Type:   Eval and Treat     Referral Reason:   Specialty Services Required     Requested Specialty:   Physical Therapy     Number of Visits Requested:   1         Plan:   Return for as scheduled. Patient Instructions   Patient will ice knee 5 to 10 minutes 2-3 times per day for the next few days. Refer to PT. Routine f/u due in oct    LFTs normal regarding medication for onychomycosis. Keep appointment with neurosurgery for arm symptoms and neck findings          Sher Choudhury M.D.

## 2020-08-31 NOTE — PATIENT INSTRUCTIONS
Patient will ice knee 5 to 10 minutes 2-3 times per day for the next few days. Refer to PT. Routine f/u due in oct    LFTs normal regarding medication for onychomycosis.     Keep appointment with neurosurgery for arm symptoms and neck findings

## 2020-09-14 ENCOUNTER — HOSPITAL ENCOUNTER (OUTPATIENT)
Dept: PHYSICAL THERAPY | Age: 70
Setting detail: THERAPIES SERIES
Discharge: HOME | End: 2020-09-14
Payer: MEDICARE

## 2020-09-14 PROCEDURE — 97162 PT EVAL MOD COMPLEX 30 MIN: CPT

## 2020-09-14 ASSESSMENT — PAIN DESCRIPTION - PAIN TYPE: TYPE: ACUTE PAIN

## 2020-09-14 ASSESSMENT — PAIN SCALES - GENERAL: PAINLEVEL_OUTOF10: 0

## 2020-09-14 ASSESSMENT — PAIN DESCRIPTION - LOCATION: LOCATION: KNEE

## 2020-09-14 ASSESSMENT — PAIN DESCRIPTION - FREQUENCY: FREQUENCY: INTERMITTENT

## 2020-09-14 ASSESSMENT — PAIN DESCRIPTION - ORIENTATION: ORIENTATION: RIGHT

## 2020-09-14 NOTE — PROGRESS NOTES
Increased pain, Decreased strength  Assessment: Pt is a 80 yo male referred for PT eval of R knee pain. Pt reports his pain is intermit in nature with symptoms most noted during transitional movements including sit-to-stand and steps. Pt reported no pain at time of eval. Pt reports occasional clicking of right knee with sensations of instability but declines hx of falls. Special testing revealed no ligamentous instability or meniscal irritation. Pt does have RLE weakness as noted during functional assessment. Pt can benefit from continued PT services to address strength and to monitor pain symptoms with pt's progressions of daily activity. Pt likes to walk a few times a week and do activities with grandkids which he reports he has reduced since pain started. Discharge Recommendations: Continue to assess pending progress      PT Education: Plan of Care;Goals; Home Exercise Program  Patient Education: Discussed doing knee AROM prior to sit to stands, doing sit-to-stands during commercial breaks while watching TV, and getting back to recreational walking. Pt encouraged to ice R knee if soreness arises. PLAN: [x] Evaluate and Treat  Frequency/Duration:  Plan  Times per week: 1-2  Plan weeks: 3-4  Current Treatment Recommendations: Strengthening, ROM, Manual Therapy - Soft Tissue Mobilization, Home Exercise Program, Patient/Caregiver Education & Training, Modalities, Equipment Evaluation, Education, & procurement, Manual Therapy - Joint Manipulation, Neuromuscular Re-education, Balance Training, Pain Management  Plan Comment: Will start 1x/wk with frequency adjusted as need per symptom report                       Patient Status:[x] Continue/ Initiate plan of Care    [] Discharge PT. Recommend pt continue with HEP.      [] Additional visits requested, Please re-certify for additional visits:          Signature: Electronically signed by Ruben Flor PT on 9/14/20 at 7:09 PM EDT      If you have any questions or concerns, please don't hesitate to call. Thank you for your referral.    I have reviewed this plan of care and certify a need for medically necessary rehabilitation services.     Physician Signature:__________________________________________________________  Date:  Please sign and return
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
## 2020-09-16 ENCOUNTER — OFFICE VISIT (OUTPATIENT)
Dept: FAMILY MEDICINE CLINIC | Age: 70
End: 2020-09-16
Payer: MEDICARE

## 2020-09-16 VITALS
WEIGHT: 270 LBS | HEART RATE: 63 BPM | OXYGEN SATURATION: 97 % | BODY MASS INDEX: 39.99 KG/M2 | TEMPERATURE: 97.1 F | HEIGHT: 69 IN | DIASTOLIC BLOOD PRESSURE: 76 MMHG | SYSTOLIC BLOOD PRESSURE: 138 MMHG

## 2020-09-16 PROCEDURE — G0008 ADMIN INFLUENZA VIRUS VAC: HCPCS | Performed by: FAMILY MEDICINE

## 2020-09-16 PROCEDURE — 90694 VACC AIIV4 NO PRSRV 0.5ML IM: CPT | Performed by: FAMILY MEDICINE

## 2020-09-16 PROCEDURE — 99214 OFFICE O/P EST MOD 30 MIN: CPT | Performed by: FAMILY MEDICINE

## 2020-09-16 RX ORDER — PRAVASTATIN SODIUM 40 MG
TABLET ORAL
Qty: 90 TABLET | Refills: 3 | Status: SHIPPED | OUTPATIENT
Start: 2020-09-16 | End: 2020-12-23 | Stop reason: SINTOL

## 2020-09-16 ASSESSMENT — ENCOUNTER SYMPTOMS: COLOR CHANGE: 1

## 2020-09-16 NOTE — PROGRESS NOTES
Diagnosis Orders   1. Bruising     2. Onychomycosis     3. Hyperlipidemia, unspecified hyperlipidemia type  pravastatin (PRAVACHOL) 40 MG tablet   4. Essential hypertension     5. Flu vaccine need  INFLUENZA, QUADV, ADJUVANTED, 65 YRS =, IM, PF, PREFILL SYR, 0.5ML (FLUAD)     Return for keep next planned appointment. Subjective:      Patient ID: Demarco Giles is a 79 y.o. male who presents for:  Chief Complaint   Patient presents with    Skin Problem     Spots on arms RT side     Hematoma     unexplained- no injury     Health Maintenance     declines colonoscopy - states that he see CC DR FLYNN        Pt noted bruising on only his R arm. He does not recall an injury. No pain. It is going away. He noted a similar one about 2 weeks ago in a slightly different spot but the same arm. He is ambidextrous. No blood thinners. Is worried because he was exposed to a household with bed bugs. No lesions anywhere else on the body. His arms are having a change in texture on them. He wonders if it is old age. No pain, itch, or discoloration. His toenails do not seem to be changing much despite his second course of oral treatment. He has seen podiatry for other reasons in the past.       Current Outpatient Medications on File Prior to Visit   Medication Sig Dispense Refill    cetirizine (ZYRTEC) 5 MG tablet TAKE ONE TABLET BY MOUTH  PRN      inFLIXimab (REMICADE) 100 MG injection Infuse 5 mg/kg intravenously See Admin Instructions      montelukast (SINGULAIR) 10 MG tablet Take 1 tablet by mouth nightly TAKE 1 TABLET NIGHTLY 90 tablet 3    gabapentin (NEURONTIN) 300 MG capsule Take 1 capsule by mouth daily for 90 days.  90 capsule 1    terbinafine (LAMISIL) 250 MG tablet Take 1 tablet by mouth daily 84 tablet 0    albuterol (PROVENTIL) (2.5 MG/3ML) 0.083% nebulizer solution Take 3 mLs by nebulization every 6 hours as needed for Wheezing 120 each 3    metoprolol tartrate (LOPRESSOR) 50 MG tablet Take 1.5 po bid 270 tablet 3    budesonide-formoterol (SYMBICORT) 160-4.5 MCG/ACT AERO Inhale 2 puffs into the lungs 2 times daily 2 each LOT [id number] 3 Inhaler 1    albuterol sulfate  (90 Base) MCG/ACT inhaler Inhale 2 puffs into the lungs every 6 hours as needed for Wheezing or Shortness of Breath 3 Inhaler 1    doxazosin (CARDURA) 4 MG tablet TAKE ONE-HALF (1/2) TABLET TWICE A DAY 90 tablet 4    nitroGLYCERIN (NITROSTAT) 0.4 MG SL tablet DISSOLVE 1 TABLET UNDER THE TONGUE EVERY 5 MINUTES AS NEEDED FOR CHEST PAIN. MAXIMUM OF 3 TABLETS 25 tablet 2    albuterol (PROVENTIL) (2.5 MG/3ML) 0.083% nebulizer solution Use every 6 hours as needed for wheezing  DX:J44.1 (Patient taking differently: Take 2.5 mg by nebulization every 4 hours as needed Use every 6 hours as needed for wheezing  DX:J44.1) 360 each 2    lisinopril (PRINIVIL;ZESTRIL) 20 MG tablet Take 1 tablet by mouth daily 90 tablet 3    testosterone (ANDROGEL; TESTIM) 50 MG/5GM (1%) GEL 1% gel Alternate one packet daily with 2 packets daily every other day 135 Package 0    tiotropium (SPIRIVA HANDIHALER) 18 MCG inhalation capsule Inhale 1 capsule into the lungs daily 90 capsule 3    fluticasone (FLONASE) 50 MCG/ACT nasal spray 1 spray by Each Nare route daily 1 Spray in each nostril 3 Bottle 1    CPAP Machine MISC New cpap supplies mask hose filters etc 1 each 0    Saccharomyces boulardii (PROBIOTIC) 250 MG CAPS Take 1 tablet by mouth daily      esomeprazole Magnesium (NEXIUM) 20 MG PACK Take 20 mg by mouth daily      aspirin 81 MG tablet Take 81 mg by mouth daily.  mesalamine (PENTASA) 250 MG CR capsule Take 500 mg by mouth 4 times daily. No current facility-administered medications on file prior to visit.       Past Medical History:   Diagnosis Date    Abnormal EMG 12/09/2015    Actinic keratoses     Actinic keratoses     Allergic rhinitis     Anemia 11/18/2014    Arthritis     Chronic back pain     [facility] 2012    Crohns disease     Degenerative disc disease, lumbar     Dermatitis     Diabetes ([facility] 2015    diet controlled    GERD (gastroesophageal reflux disease)     History of atrial fibrillation     Dr. Danelle Larry History of throat cancer 2004     cleared for reoccurence years ago    Hyperlipidemia 2014    Hypertension     Hypogonadism male     Lung disease     Mononeuritis multiplex     Mononeuritis multiplex     Nondependent alcohol abuse, in remission 2020    Obesity (BMI 30-39. 9) 2019    Osteoarthritis of lumbar spine     Pain of right heel     Paresthesia of foot, bilateral     Prediabetes 12/3/2014    Seborrheic dermatitis     Seborrheic keratoses, inflamed     Throat cancer (HCC)     throat, had surgery, sees Dr Tracie Lee yearly    Tinea cruris     Tinea pedis     Tinea unguium      Past Surgical History:   Procedure Laterality Date    CARDIAC CATHETERIZATION      CATARACT REMOVAL WITH IMPLANT Right 2019    CATARACT REMOVAL WITH IMPLANT Left 2019    COLONOSCOPY  04/15/2015    KNEE ARTHROSCOPY      LARYNGECTOMY  2004    UPPER GASTROINTESTINAL ENDOSCOPY  13    Dr. Everett Hart W/NAPOLEON RODRIGUEZ       Social History     Socioeconomic History    Marital status:      Spouse name: Not on file    Number of children: 3    Years of education: Not on file    Highest education level: Not on file   Occupational History    Not on file   Social Needs    Financial resource strain: Not on file    Food insecurity     Worry: Not on file     Inability: Not on file   [employer] needs     Medical: Not on file     Non-medical: Not on file   Tobacco Use    Smoking status: Former Smoker     Packs/day: 1.00     Years: 25.00     Pack years: 25.00     Types: Cigarettes     Last attempt to quit: 10/21/1990     Years since quittin.9    Smokeless tobacco: Never Used normal. No nasal deformity. Eyes:      General: Lids are normal.         Right eye: No discharge. Left eye: No discharge. Conjunctiva/sclera: Conjunctivae normal.      Pupils: Pupils are equal, round, and reactive to light. Neck:      Musculoskeletal: Full passive range of motion without pain. Thyroid: No thyroid mass or thyromegaly. Vascular: No JVD. Trachea: Trachea and phonation normal.   Cardiovascular:      Rate and Rhythm: Normal rate and regular rhythm. Pulmonary:      Effort: No accessory muscle usage or respiratory distress. Musculoskeletal:      Comments: FROM all large muscle groups and joints as witnessed when walking to exam table, getting on, and getting off the exam table. Skin:     General: Skin is warm and dry. Findings: No rash. Comments: Right upper extremity anteriorly near the axilla there are 2 discrete ecchymotic regions obviously a few days old each 1 about a centimeter in diameter. Within the axilla posteriorly near the junction of the arm there are 3 faded ecchymotic areas. No other skin abnormalities noted. No notable change in discoloration and thickening of multiple toenails. Neurological:      Mental Status: He is alert. Motor: No tremor or atrophy. Gait: Gait normal.   Psychiatric:         Speech: Speech normal.         Behavior: Behavior normal.         Thought Content: Thought content normal.         No results found for this visit on 09/16/20.     Recent Results (from the past 2016 hour(s))   ALT    Collection Time: 07/15/20 11:09 AM   Result Value Ref Range    ALT 14 0 - 41 U/L   AST    Collection Time: 07/15/20 11:09 AM   Result Value Ref Range    AST 19 0 - 40 U/L   Lipid, Fasting    Collection Time: 07/15/20 11:09 AM   Result Value Ref Range    Cholesterol, Fasting 102 0 - 199 mg/dL    Triglyceride, Fasting 133 0 - 150 mg/dL    HDL 34 (L) 40 - 59 mg/dL    LDL Calculated 41 0 - 129 mg/dL           Assessment: Diagnosis Orders   1. Bruising     2. Onychomycosis     3. Hyperlipidemia, unspecified hyperlipidemia type  pravastatin (PRAVACHOL) 40 MG tablet   4. Essential hypertension     5. Flu vaccine need  INFLUENZA, QUADV, ADJUVANTED, 65 YRS =, IM, PF, PREFILL SYR, 0.5ML (FLUAD)         Orders Placed This Encounter   Procedures    INFLUENZA, QUADV, ADJUVANTED, 72 YRS =, IM, PF, PREFILL SYR, 0.5ML (FLUAD)         Plan:   Return for keep next planned appointment. Patient Instructions   Pt will go back to his podiatrist for his toenails, likely need removal    Will look for source of injury for bruising. Patient will evaluate things he is doing at home. The bruises really appeared to be in a location that would be related to some activity he is doing. Sher Frey M.D.

## 2020-09-16 NOTE — PATIENT INSTRUCTIONS
Pt will go back to his podiatrist for his toenails, likely need removal    Will look for source of injury for bruising. Patient will evaluate things he is doing at home. The bruises really appeared to be in a location that would be related to some activity he is doing.

## 2020-09-16 NOTE — PROGRESS NOTES
Vaccine Information Sheet, \"Influenza - Inactivated\"  given to Perry Torres, or parent/legal guardian of  Perry Torres and verbalized understanding. Patient responses:    Have you ever had a reaction to a flu vaccine? No  Are you able to eat eggs without adverse effects? Yes  Do you have any current illness? No  Have you ever had [place] Syndrome? No    Flu vaccine given per order. Please see immunization tab. After obtaining consent, and per orders of Dr. Jean Marie Smith, injection of high dose flu given in Left deltoid by Lola Palma. Patient instructed to remain in clinic for 20 minutes afterwards, and to report any adverse reaction to me immediately. r

## 2020-09-21 ENCOUNTER — HOSPITAL ENCOUNTER (OUTPATIENT)
Dept: PHYSICAL THERAPY | Age: 70
Setting detail: THERAPIES SERIES
Discharge: HOME | End: 2020-09-21
Payer: MEDICARE

## 2020-09-21 ENCOUNTER — OFFICE VISIT (OUTPATIENT)
Dept: FAMILY MEDICINE CLINIC | Age: 70
End: 2020-09-21
Payer: MEDICARE

## 2020-09-21 VITALS
BODY MASS INDEX: 39.99 KG/M2 | HEART RATE: 64 BPM | SYSTOLIC BLOOD PRESSURE: 136 MMHG | HEIGHT: 69 IN | WEIGHT: 270 LBS | OXYGEN SATURATION: 97 % | DIASTOLIC BLOOD PRESSURE: 78 MMHG | TEMPERATURE: 98.6 F

## 2020-09-21 PROCEDURE — 99213 OFFICE O/P EST LOW 20 MIN: CPT | Performed by: FAMILY MEDICINE

## 2020-09-21 PROCEDURE — 97110 THERAPEUTIC EXERCISES: CPT

## 2020-09-21 ASSESSMENT — ENCOUNTER SYMPTOMS: COLOR CHANGE: 0

## 2020-09-21 NOTE — PROGRESS NOTES
MCG/ACT AERO Inhale 2 puffs into the lungs 2 times daily 2 each LOT [id number] EXP [id number] 3 Inhaler 1    albuterol sulfate  (90 Base) MCG/ACT inhaler Inhale 2 puffs into the lungs every 6 hours as needed for Wheezing or Shortness of Breath 3 Inhaler 1    doxazosin (CARDURA) 4 MG tablet TAKE ONE-HALF (1/2) TABLET TWICE A DAY 90 tablet 4    nitroGLYCERIN (NITROSTAT) 0.4 MG SL tablet DISSOLVE 1 TABLET UNDER THE TONGUE EVERY 5 MINUTES AS NEEDED FOR CHEST PAIN. MAXIMUM OF 3 TABLETS 25 tablet 2    albuterol (PROVENTIL) (2.5 MG/3ML) 0.083% nebulizer solution Use every 6 hours as needed for wheezing  DX:J44.1 (Patient taking differently: Take 2.5 mg by nebulization every 4 hours as needed Use every 6 hours as needed for wheezing  DX:J44.1) 360 each 2    lisinopril (PRINIVIL;ZESTRIL) 20 MG tablet Take 1 tablet by mouth daily 90 tablet 3    testosterone (ANDROGEL; TESTIM) 50 MG/5GM (1%) GEL 1% gel Alternate one packet daily with 2 packets daily every other day 135 Package 0    tiotropium (SPIRIVA HANDIHALER) 18 MCG inhalation capsule Inhale 1 capsule into the lungs daily 90 capsule 3    fluticasone (FLONASE) 50 MCG/ACT nasal spray 1 spray by Each Nare route daily 1 Spray in each nostril 3 Bottle 1    CPAP Machine MISC New cpap supplies mask hose filters etc 1 each 0    Saccharomyces boulardii (PROBIOTIC) 250 MG CAPS Take 1 tablet by mouth daily      esomeprazole Magnesium (NEXIUM) 20 MG PACK Take 20 mg by mouth daily      aspirin 81 MG tablet Take 81 mg by mouth daily.  mesalamine (PENTASA) 250 MG CR capsule Take 500 mg by mouth 4 times daily. No current facility-administered medications on file prior to visit.       Past Medical History:   Diagnosis Date    Abnormal EMG 12/09/2015    Actinic keratoses     Actinic keratoses     Allergic rhinitis     Anemia 11/18/2014    Arthritis     Chronic back pain     COPD (chronic obstructive pulmonary disease) ([facility] [address] 08/09/2012    Crohns disease     Degenerative disc disease, lumbar     Dermatitis     Diabetes ([facility] [address] 2015    diet controlled    GERD (gastroesophageal reflux disease)     History of atrial fibrillation     Dr. Bert Clements History of throat cancer 2004     cleared for reoccurence years ago    Hyperlipidemia 2014    Hypertension     Hypogonadism male     Lung disease     Mononeuritis multiplex     Mononeuritis multiplex     Nondependent alcohol abuse, in remission 2020    Obesity (BMI 30-39. 9) 2019    Osteoarthritis of lumbar spine     Pain of right heel     Paresthesia of foot, bilateral     Prediabetes 12/3/2014    Seborrheic dermatitis     Seborrheic keratoses, inflamed     Throat cancer (HCC)     throat, had surgery, sees Dr Jacy Coleman yearly    Tinea cruris     Tinea pedis     Tinea unguium      Past Surgical History:   Procedure Laterality Date    CARDIAC CATHETERIZATION      CATARACT REMOVAL WITH IMPLANT Right 2019    CATARACT REMOVAL WITH IMPLANT Left 2019    COLONOSCOPY  04/15/2015    KNEE ARTHROSCOPY      LARYNGECTOMY  2004    UPPER GASTROINTESTINAL ENDOSCOPY  13    Dr. Poncho Carrillo W/NAPOLEON RODRIGUEZ       Social History     Socioeconomic History    Marital status:      Spouse name: Not on file    Number of children: 3    Years of education: Not on file    Highest education level: Not on file   Occupational History    Not on file   Social Needs    Financial resource strain: Not on file    Food insecurity     Worry: Not on file     Inability: Not on file   [employer] needs     Medical: Not on file     Non-medical: Not on file   Tobacco Use    Smoking status: Former Smoker     Packs/day: 1.00     Years: 25.00     Pack years: 25.00     Types: Cigarettes     Last attempt to quit: 10/21/1990     Years since quittin.9    Smokeless tobacco: Never Used   Substance and Sexual Activity    Alcohol use: No     Comment: Attends AA, sobjulissa 25 years    Drug use: No    Sexual activity: Not on file   Lifestyle    Physical activity     Days per week: Not on file     Minutes per session: Not on file    Stress: Not on file   Relationships    Social connections     Talks on phone: Not on file     Gets together: Not on file     Attends Sikh service: Not on file     Active member of club or organization: Not on file     Attends meetings of clubs or organizations: Not on file     Relationship status: Not on file    Intimate partner violence     Fear of current or ex partner: Not on file     Emotionally abused: Not on file     Physically abused: Not on file     Forced sexual activity: Not on file   Other Topics Concern    Not on file   Social History Narrative    Lives alone. Family History   Problem Relation Age of Onset    Heart Disease Mother     Liver Disease Mother     Heart Disease Father     Cancer Sister         lung     Allergies:  Alemtuzumab; Glycopyrrolate-formoterol; Mercaptopurine; and Moxifloxacin    Review of Systems   Constitutional: Negative for chills and fever. Skin: Positive for wound. Negative for color change and rash. Allergic/Immunologic: Negative for environmental allergies, food allergies and immunocompromised state. Hematological: Negative for adenopathy. Does not bruise/bleed easily. Psychiatric/Behavioral: Negative for behavioral problems and sleep disturbance. Objective:   /78   Pulse 64   Temp 98.6 °F (37 °C)   Ht 5' 9\" (1.753 m)   Wt 270 lb (122.5 kg)   SpO2 97%   BMI 39.87 kg/m²     Physical Exam  Constitutional:       General: He is not in acute distress. Appearance: Normal appearance. He is well-developed. He is not toxic-appearing. HENT:      Head: Normocephalic and atraumatic. Right Ear: Hearing and tympanic membrane normal.      Left Ear: Hearing and tympanic membrane normal.      Nose: Nose normal. No nasal deformity.    Eyes:      General: Lids are normal. Right eye: No discharge. Left eye: No discharge. Conjunctiva/sclera: Conjunctivae normal.      Pupils: Pupils are equal, round, and reactive to light. Neck:      Musculoskeletal: Full passive range of motion without pain. Thyroid: No thyroid mass or thyromegaly. Vascular: No JVD. Trachea: Trachea and phonation normal.   Cardiovascular:      Rate and Rhythm: Normal rate and regular rhythm. Pulmonary:      Effort: No accessory muscle usage or respiratory distress. Musculoskeletal:      Comments: FROM all large muscle groups and joints as witnessed when walking to exam table, getting on, and getting off the exam table. Skin:     General: Skin is warm and dry. Findings: No rash. Comments: R ant shin and central post. Neck red base with rough white flake lesion x 1 each location   Neurological:      Mental Status: He is alert. Motor: No tremor or atrophy. Gait: Gait normal.   Psychiatric:         Speech: Speech normal.         Behavior: Behavior normal.         Thought Content: Thought content normal.         No results found for this visit on 09/21/20. PROCEDURE:  The procedure was discussed with the patient. All questions were answered and alternative options discussed. The patient is aware of the risks of bleeding, infection, unsatisfactory scar result. Informed consent paperwork was signed by the patient. Liquid nitrogen was applied to the affected areas until freezing was noted then a thaw was allowed between dosing for a total of 2 applications. Applied to 2 lesion(s). The patient tolerated the procedure well. Post op instructions given. A printed copy provided. Assessment:       Diagnosis Orders   1. Actinic keratoses     2. Bruising           No orders of the defined types were placed in this encounter. Plan:   Return in about 6 months (around 3/21/2021) for 15 min skin check.     Patient Instructions   Cryotherapy instructions    Post op instructions given. A printed copy provided. It is best to leave blisters alone if they form for the first 1-3 days to allow the desired damaged tissue (precancer lesion, wart, or whatever lesion is being removed) to separate from healthy tissue. They should be covered with a bandage to prevent blister breakage and dirt exposure. The wounds should remain dry while there is a blister, therefore if this is a sweaty location like the foot you may need to change socks multiple times per day. When the blister(s) pop or patient removes the top as instructed between day 3-5, apply antibiotic (NOT triple antibiotic, one brand is Neosporin) ointment and a bandage to affected area(s). The ointment should be applied to the open area as long as it is not covered with skin. Exposed tissue is meant to be moist.  Once a scab is formed the patient may stop applying ointment. The scab may appear yellow while moist, don't confuse this with infection. If the wound is infection pus will drain from the site. If this treatment was for a large wart you may note that a plug of skin may fall out of the area that was treated. That is the center of the wart and it is appropriate for it to come out. If exposed skin remains, treat that area as you would a ruptured blister as mentioned above. Bacitracin sample supplied                  Sher Shirley M.D.

## 2020-09-29 ENCOUNTER — TELEPHONE (OUTPATIENT)
Dept: FAMILY MEDICINE CLINIC | Age: 70
End: 2020-09-29

## 2020-09-29 NOTE — TELEPHONE ENCOUNTER
Pt calling asking for an appt  States that he is short of breath, ankles swollen and some chest tightness. Pt said that he does see cardiology. I advised pt to go to the ER .  Pt agreed will go to the ER       [facility]

## 2020-09-30 NOTE — TELEPHONE ENCOUNTER
Called and spoke with Carolyn Diaz, he did got to [facility] in [facility] yesterday to the ER. They admitted him for observation. He stayed last night and will be staying there tonight. Noted that he will be getting a stress test done tomorrow and his BP is still elevated. He did state that he is doing okay and I advised him to give us a call to schedule a Hospital f/u when he is d/c.

## 2020-09-30 NOTE — TELEPHONE ENCOUNTER
I don't see an ER visit in [facility], please ensure he is doing okay and did go to ER.

## 2020-10-05 ENCOUNTER — HOSPITAL ENCOUNTER (OUTPATIENT)
Dept: PHYSICAL THERAPY | Age: 70
Setting detail: THERAPIES SERIES
Discharge: HOME | End: 2020-10-05
Payer: MEDICARE

## 2020-10-05 PROCEDURE — 97110 THERAPEUTIC EXERCISES: CPT

## 2020-10-05 NOTE — PROGRESS NOTES
[id number] [address]  Outpatient Physical Therapy    Treatment Note        Date: 10/5/2020  Patient: Severino Davison  : 1950  ACCT #: [de-identified]  Referring Practitioner: Dr. Guy Denis   Diagnosis: Acute pain of right knee    Visit Information:  PT Visit Information  Onset Date: 20  PT Insurance Information: AET[employer] Medicare  Total # of Visits to Date: 3  Plan of Care/Certification Expiration Date: 10/30/20  No Show: 1  Canceled Appointment: 0  Progress Note Counter: / (N/S on )  (PN 10/14/20)    Subjective: no pain currently, it comes and goes     HEP Compliance:  [x] Good [] Fair [] Poor [] Reports not doing due to:    Vital Signs  Patient Currently in Pain: Denies   Pain Screening  Patient Currently in Pain: Denies    OBJECTIVE:   Exercises  Exercise 1: Bike seat-4, 5 min  Exercise 2: box steps x 15, 4 in box step  Exercise 3: CC walkouts 4-way, 3 plates x 5  Exercise 4: rocker board 4-way x 15, lg  Exercise 5: TG squats L-10 x 15 and heel raises x 15  Exercise 7: step ups F/L x 15, 4 in step, leading with RLE  Exercise 8: SLR x 15, b/l  Exercise 9: bridges x 10  Exercise 10: HS curls x 10, b/l, YTB  Exercise 20: HEP, SLR and S/L abd            Strength: [] NT  [x] MMT completed:  Strength RLE  Comment: SLR 4+/5         ROM: [x] NT  [] ROM measurements:           *Indicates exercise, modality, or manual techniques to be initiated when appropriate    Assessment: Body structures, Functions, Activity limitations: Increased pain, Decreased strength  Assessment: vc's required to avoid rocking on Lg tilt bd.  UE support used, added, step ups with 4 in step, SLR's and bridges to improve LE strength, good overall tolerance  Treatment Diagnosis: R knee pain, decreased R LE strength, decreased balance          Goals:  Short term goals  Time Frame for Short term goals: 2 wks  Short term goal 1: Independent with HEP   Short term goal 2: Report no pain with recreational walks in neighborhood    Long term goals  Time Frame for Long term goals : 3-4 weeks  Long term goal 1: Improve ricki LE strength to at least 4+/5, demoing good concentric/eccentric control on steps  Long term goal 2: No pain ambulating steps to navigate split-level home pain free. Long term goal 3: Self report of >90% daily function  Progress toward goals:improved strength as noted    POST-PAIN       Pain Rating (0-10 pain scale):   0/10   Location and pain description same as pre-treatment unless indicated. Action: [] NA   [] Perform HEP  [] Meds as prescribed  [] Modalities as prescribed   [] Call Physician     Frequency/Duration:  Plan  Times per week: 1-2  Plan weeks: 3-4  Current Treatment Recommendations: Strengthening, ROM, Manual Therapy - Soft Tissue Mobilization, Home Exercise Program, Patient/Caregiver Education & Training, Modalities, Equipment Evaluation, Education, & procurement, Manual Therapy - Joint Manipulation, Neuromuscular Re-education, Balance Training, Pain Management  Plan Comment: Will start 1x/wk with frequency adjusted as need per symptom report     Pt to continue current HEP. See objective section for any therapeutic exercise changes, additions or modifications this date.          PT Individual Minutes  Time In: 1300  Time Out: 1467  Minutes: 38  Timed Code Treatment Minutes: 38 Minutes  Procedure Minutes:0     Timed Activity Minutes Units   Ther Ex 38 3       Signature:  Electronically signed by Salvador Welch PTA on 10/5/20 at 1:37 PM EDT

## 2020-10-12 ENCOUNTER — HOSPITAL ENCOUNTER (OUTPATIENT)
Dept: PHYSICAL THERAPY | Age: 70
Setting detail: THERAPIES SERIES
Discharge: HOME | End: 2020-10-12
Payer: MEDICARE

## 2020-10-12 PROCEDURE — 97110 THERAPEUTIC EXERCISES: CPT

## 2020-10-12 NOTE — DISCHARGE SUMMARY
[address]     Ph: [phone]  Fax: [phone]    [] Certification  [] Recertification []  Plan of Care  [] Progress Note [x] Discharge      To:  Referring Practitioner: Dr. Ismael Torres       From:  Sarah Ramos DPT  Patient: Josef Maldonado     : 1950  Diagnosis: Acute pain of right knee     Date: 10/12/2020  Treatment Diagnosis: R knee pain, decreased R LE strength, decreased balance    Plan of Care/Certification Expiration Date: 10/30/20  Progress Report Period from:  2020  to 10/12/2020    Total # of Visits to Date: 4   No Show: 1    Canceled Appointment: 0     OBJECTIVE:   Short Term Goals - Time Frame for Short term goals: 2 wks    Goals Current/Discharge status  Met   Short term goal 1: Independent with HEP   Pt indep w/ HEP [x] yes  [] no   Short term goal 2: Report no pain with recreational walks in neighborhood  Pt denies pain walking in neighborhood [x] yes  [] no     Long Term Goals - Time Frame for Long term goals : 3-4 weeks  Goals Current/ Discharge status Met   Long term goal 1: Improve ricki LE strength to at least 4+/5, demoing good concentric/eccentric control on steps Strength RLE  Comment: 4+/5 ankle, knee, and hip; pt demo's improved eccentric/concentric control on 6'' box steps today  Good eccentric control on both 6'' and 8'' steps [x] yes  [] no   Long term goal 2: No pain ambulating steps to navigate split-level home pain free. Pt denies pain ambulating steps in dept or at home. [x] yes  [] no   Long term goal 3: Self report of >90% daily function Pt self-reports functioning at 95% [x] yes  [] no       Body structures, Functions, Activity limitations: Increased pain, Decreased strength  Assessment: Pt currently meeting all goals at this time.  Pt self-reports functioning at 95%, Pt denies pain w/ his routine walking in the neighborhood, or with 6'' or 8'' steps this date. Pt states he will continue to walk and perform HEP upon D/C. Prognosis: Good    PLAN:   Frequency/Duration:  Plan  Plan Comment: D/C PT                     Patient Status:[] Continue/ Initiate plan of Care    [x] Discharge PT. Recommend pt continue with HEP. [] Additional visits requested, Please re-certify for additional visits:          Signature: Objective Measures Obtained By: Electronically signed by Farhan Arnold PTA on 10/12/20 at 1:31 PM EDT  Signature: Electronically signed by Magalie Aragon PT on 10/12/2020 at 1:52 PM       If you have any questions or concerns, please don't hesitate to call.   Thank you for your referral.

## 2020-10-12 NOTE — PROGRESS NOTES
[id number] [address]  Outpatient Physical Therapy    Treatment Note        Date: 10/12/2020  Patient: Vitor East  : 1950  ACCT #: [de-identified]  Referring Practitioner: Dr. Winter Doyle   Diagnosis: Acute pain of right knee    Visit Information:  PT Visit Information  Onset Date: 20  PT Insurance Information: AET[employer] Medicare  Total # of Visits to Date: 4  Plan of Care/Certification Expiration Date: 10/30/20  No Show: 1  Progress Note Due Date: 10/14/20  Canceled Appointment: 0  Progress Note Counter:   (PN 10/14/20)    Subjective: Pt cont's to deny any pain stating \"it feels a lot better. \" Pt self-reports functioning at 95%. Pt reports the last time he took a walk in his neighborhood the knee didn't hurt. Pt also reports steps at home have gotten better. HEP Compliance:  [x] Good [] Fair [] Poor [] Reports not doing due to:    Vital Signs  Patient Currently in Pain: Denies   Pain Screening  Patient Currently in Pain: Denies    OBJECTIVE:   Exercises  Exercise 1: Bike seat-4, 5 min  Exercise 2: obj measures, HEP, and discussing progress/goals x20 min    Stairs  # Steps : 12  Stairs Height: 6\"  Rails: Right ascending  Device: No Device  Assistance: Independent  Comment: reciprocal pattern, good eccentric control, no pain    Stairs  # Steps : 12  Stairs Height: 8\"  Rails: Right ascending  Device: No Device  Assistance: Independent  Comment: reciprocal pattern, good eccentric control, no pain    Strength: [] NT  [x] MMT completed:  Strength RLE  Comment: 4+/5 ankle, knee, and hip; pt demo's improved eccentric/concentric control on 6'' box steps today     ROM: [x] NT  [] ROM measurements:    *Indicates exercise, modality, or manual techniques to be initiated when appropriate    Assessment: Body structures, Functions, Activity limitations: Increased pain, Decreased strength  Assessment: Pt currently meeting all goals at this time.  Pt self-reports functioning at 95%, Pt denies pain w/ his routine walking in the neighborhood, or with 6'' or 8'' steps this date. Pt states he will continue to walk and perform HEP upon D/C. Treatment Diagnosis: R knee pain, decreased R LE strength, decreased balance  Prognosis: Good     Goals:  Short term goals  Time Frame for Short term goals: 2 wks  Short term goal 1: Independent with HEP   Short term goal 2: Report no pain with recreational walks in neighborhood  Long term goals  Time Frame for Long term goals : 3-4 weeks  Long term goal 1: Improve ricki LE strength to at least 4+/5, demoing good concentric/eccentric control on steps  Long term goal 2: No pain ambulating steps to navigate split-level home pain free. Long term goal 3: Self report of >90% daily function  Progress toward goals: see D/C    POST-PAIN       Pain Rating (0-10 pain scale):   0/10   Location and pain description same as pre-treatment unless indicated. Action: [] NA   [x] Perform HEP  [] Meds as prescribed  [] Modalities as prescribed   [] Call Physician     Frequency/Duration:  Plan  Plan Comment: D/C PT     Pt to continue current HEP. See objective section for any therapeutic exercise changes, additions or modifications this date.     PT Individual Minutes  Time In: 2784  Time Out: 5573  Minutes: 25  Timed Code Treatment Minutes: 25 Minutes  Procedure Minutes: 0     Timed Activity Minutes Units   Ther Ex 25 2     Signature:  Electronically signed by Kathlynn Severin, PTA on 10/12/20 at 1:07 PM EDT

## 2020-10-19 ENCOUNTER — APPOINTMENT (OUTPATIENT)
Dept: PHYSICAL THERAPY | Age: 70
End: 2020-10-19
Payer: MEDICARE

## 2020-10-26 ENCOUNTER — APPOINTMENT (OUTPATIENT)
Dept: PHYSICAL THERAPY | Age: 70
End: 2020-10-26
Payer: MEDICARE

## 2020-10-26 ENCOUNTER — OFFICE VISIT (OUTPATIENT)
Dept: FAMILY MEDICINE CLINIC | Age: 70
End: 2020-10-26
Payer: MEDICARE

## 2020-10-26 VITALS
WEIGHT: 269 LBS | SYSTOLIC BLOOD PRESSURE: 112 MMHG | OXYGEN SATURATION: 98 % | BODY MASS INDEX: 38.51 KG/M2 | TEMPERATURE: 97 F | HEART RATE: 55 BPM | HEIGHT: 70 IN | DIASTOLIC BLOOD PRESSURE: 74 MMHG

## 2020-10-26 PROCEDURE — 99214 OFFICE O/P EST MOD 30 MIN: CPT | Performed by: FAMILY MEDICINE

## 2020-10-26 RX ORDER — FUROSEMIDE 20 MG/1
20 TABLET ORAL DAILY
COMMUNITY
Start: 2020-10-01 | End: 2020-10-26 | Stop reason: SDUPTHER

## 2020-10-26 RX ORDER — FUROSEMIDE 20 MG/1
20 TABLET ORAL DAILY
Qty: 30 TABLET | Refills: 2 | Status: SHIPPED | OUTPATIENT
Start: 2020-10-26 | End: 2021-05-25 | Stop reason: SDUPTHER

## 2020-10-26 RX ORDER — FUROSEMIDE 40 MG/1
TABLET ORAL
COMMUNITY
Start: 2020-09-30 | End: 2020-10-26 | Stop reason: ALTCHOICE

## 2020-10-26 ASSESSMENT — ENCOUNTER SYMPTOMS
COUGH: 0
PHOTOPHOBIA: 0
WHEEZING: 0
ABDOMINAL PAIN: 0
CHEST TIGHTNESS: 0
SHORTNESS OF BREATH: 1
ABDOMINAL DISTENTION: 0

## 2020-10-26 NOTE — PROGRESS NOTES
Plan:   Return in about 3 months (around 10/22/2020) for for review of outcome of today's recommendation and flu shot.     Patient Instructions   Patient's medical conditions are all pretty stable at this time.   Continue to work on onychomycosis  Patient Education        Health Maintenance       Pt states that  saw DR Ashtyn Draper and that [facility] due in March 2021 for colonoscopy--

## 2020-10-26 NOTE — PROGRESS NOTES
Diagnosis Orders   1. Acute diastolic congestive heart failure (HCC)  Basic Metabolic Panel    furosemide (LASIX) 20 MG tablet   2. Chronic obstructive pulmonary disease, unspecified COPD type ([facility] [address]       Return in about 4 months (around 2/26/2021) for POCT HbA1c, for routine major medical condition management. Patient Instructions   Patient will continue furosemide 20 mg daily. He will get a BMP checked in the next week. Follow-up in 3 months. Keep cardiology planned work-up. Patient Education        Learning About Saving Energy When You Have a Chronic Condition  Introduction    Everyday tasks can be tiring when you have COPD, heart failure, or another long-term (chronic) condition. You may feel at times that you've lost your ability to live your life. But learning to conserve, or save, your energy can help you be less tired. Conserving your energy means finding ways of doing daily activities with as little effort as possible. With some small changes in the way you do things, you can get your tasks done more easily. Some treatments are available that might help. Pulmonary rehabilitation can teach you ways to breathe easier. Cardiac rehabilitation can help make your heart stronger. You also may want to see an occupational or physical therapist. The therapist can give you more tips on building strength and moving with less effort. What can you do to conserve your energy? Planning  · Make a list of what you have to do every day. Group the tasks by location. · Do all the chores in one part of your house around the same time. · Go out for errands or do chores at the time of day when you have the most energy. · Plan rest periods into your day. Getting things done  · Sit down as often as you can when you get dressed, do chores, or cook. · Use a cart with wheels to roll items, such as laundry, from one room to another. · Push or slide boxes or other large items instead of lifting them.   Reaching and bending  · Put things you use the most on shelves that are at the level of your waist or shoulder. · Use long-handled grabbers or other tools to reach items on a high shelf or to  things off the floor. Use long-handled dusters when you clean the house. · Use a raised toilet seat to avoid bending too far to sit or stand up. Eating  · Eat several small meals instead of three larger meals. · If you get too tired to eat much, try to choose healthy foods that have more calories. Have a yogurt-and-fruit smoothie for breakfast. Put avocado on a sandwich. Or add cheese or peanut butter to snacks. · If you don't feel very hungry, try to eat first and drink water or other fluids later, after a meal. This can help keep you from losing weight. Sip small amounts of fluids if you need to drink while you eat. Having sex  · Choose the time of day when you have more energy. · A adau-oh-ddpg position for sex can be less tiring. Sometimes you may want to focus more on caressing. Watch closely for changes in your health, and be sure to contact your doctor if you have any problems. Where can you learn more? Go to https://SSN Funding.healthShipServ. org and sign in to your Share Some Style account. Enter H190 in the [facility] box to learn more about \"Learning About Saving Energy When You Have a Chronic Condition. \"     If you do not have an account, please click on the \"Sign Up Now\" link. Current as of: February 24, 2020               Content Version: 12.6  © 2006-2020 Zyn[employer], Incorporated. Care instructions adapted under license by [facility] ([facility]). If you have questions about a medical condition or this instruction, always ask your healthcare professional. [place] [address] any warranty or liability for your use of this information. Patient Education        Learning About Heart Failure Zones  What are heart failure zones?      Heart failure zones give you an easy way to see changes in your heart failure symptoms. They also tell you when you need to get help. Check every day to see which zone you are in. Green zone. You are doing well. This is where you want to be. · Your weight is stable. It's not going up or down. · You breathe easily. · You are sleeping well. You are able to lie flat without shortness of breath. · You can do your usual activities. Yellow zone. Be careful. Your symptoms are changing. Call your doctor. · You have new or increased shortness of breath. · You are dizzy or lightheaded, or you feel like you may faint. · You have sudden weight gain, such as more than 2 to 3 pounds in a day or 5 pounds in a week. (Your doctor may suggest a different range of weight gain.)  · You have increased swelling in your legs, ankles, or feet. · You are so tired or weak that you can't do your usual activities. · You are not sleeping well. Shortness of breath wakes you up at night. You need extra pillows. Red zone. This is an emergency. Call 911. You have symptoms of sudden heart failure. For example:  · You have severe trouble breathing. · You cough up pink, foamy mucus. · You have a new irregular or fast heartbeat. You have symptoms of a heart attack. These may include:  · Chest pain or pressure, or a strange feeling in the chest.  · Sweating. · Shortness of breath. · Nausea or vomiting. · Pain, pressure, or a strange feeling in the back, neck, jaw, or upper belly or in one or both shoulders or arms. · Lightheadedness or sudden weakness. · A fast or irregular heartbeat. If you have symptoms of a heart attack: After you call 911, the  may tell you to chew 1 adult-strength or 2 to 4 low-dose aspirin. Wait for an ambulance. Do not try to drive yourself. Follow-up care is a key part of your treatment and safety. Be sure to make and go to all appointments, and call your doctor if you are having problems.  It's also a good idea to know your test results and keep a list of the medicines you take. Where can you learn more? Go to https://chpepiceweb.Grupo LeÃ±oso SACV. org and sign in to your [employer] account. Enter T174 in the [facility] box to learn more about \"Learning About Heart Failure Zones. \"     If you do not have an account, please click on the \"Sign Up Now\" link. Current as of: December 16, 2019               Content Version: 12.6  © 6368-2295 Nightpro. Care instructions adapted under license by [facility] ([facility]). If you have questions about a medical condition or this instruction, always ask your healthcare professional. [place] [address] any warranty or liability for your use of this information. Subjective:      Patient ID: Marina Zamora is a 79 y.o. male who presents for:  Chief Complaint   Patient presents with    3 Month Follow-Up     pt states that he was recently in the hospital 9/29/2020 - 10/1/2020     Discuss Medications     lasix        Patient is here for follow-up of recent hospitalization. Had increased shortness of breath and lower extremity edema. Was admitted to the hospital and monitored. Hospital chart was reviewed to find diastolic heart failure was a diagnosis at the time. Patient states he is currently taking furosemide 20 mg every day which is different than what they had instructed him to do. He is not taking potassium. Current Outpatient Medications on File Prior to Visit   Medication Sig Dispense Refill    pravastatin (PRAVACHOL) 40 MG tablet TAKE 1 TABLET NIGHTLY 90 tablet 3    cetirizine (ZYRTEC) 5 MG tablet TAKE ONE TABLET BY MOUTH  PRN      inFLIXimab (REMICADE) 100 MG injection Infuse 5 mg/kg intravenously See Admin Instructions      montelukast (SINGULAIR) 10 MG tablet Take 1 tablet by mouth nightly TAKE 1 TABLET NIGHTLY 90 tablet 3    gabapentin (NEURONTIN) 300 MG capsule Take 1 capsule by mouth daily for 90 days.  90 capsule 1    albuterol (PROVENTIL) (2.5 MG/3ML) 0.083% Abnormal EMG 12/09/2015    Actinic keratoses     Actinic keratoses     Allergic rhinitis     Anemia 11/18/2014    Arthritis     Chronic back pain     COPD (chronic obstructive pulmonary disease) ([facility]) 08/09/2012    COPD (chronic obstructive pulmonary disease) ([facility])     Crohns disease     Degenerative disc disease, lumbar     Dermatitis     Diabetes ([address] 03/19/2015    diet controlled    Gastrointestinal problem     GERD (gastroesophageal reflux disease)     History of atrial fibrillation 2006    Dr. Margarita Rm History of throat cancer 2004     cleared for reoccurence years ago    Hyperlipidemia 1/21/2014    Hypertension     Hypogonadism male     Lung disease     Mononeuritis multiplex     Mononeuritis multiplex     Nondependent alcohol abuse, in remission 7/22/2020    Obesity (BMI 30-39. 9) 7/24/2019    Osteoarthritis of lumbar spine     Pain of right heel     Paresthesia of foot, bilateral     Prediabetes 12/3/2014    Restless leg syndrome     Seborrheic dermatitis     Seborrheic keratoses, inflamed     Throat cancer (formerly [facility])     throat, had surgery, sees Dr Josafat Byers yearly    Tinea cruris     Tinea pedis     Tinea unguium      Past Surgical History:   Procedure Laterality Date    CARDIAC CATHETERIZATION      CARPAL TUNNEL RELEASE      CATARACT REMOVAL WITH IMPLANT Right 09/09/2019    CATARACT REMOVAL WITH IMPLANT Left 07/22/2019    COLON SURGERY      COLONOSCOPY  04/15/2015    KNEE ARTHROSCOPY      LARYNGECTOMY  2004    UPPER GASTROINTESTINAL ENDOSCOPY  03/24/13    Dr. Amelia HOWARD/NAPOLEON RODRIGUEZ  2002     Social History     Socioeconomic History    Marital status:      Spouse name: Not on file    Number of children: 3    Years of education: Not on file    Highest education level: Not on file   Occupational History    Not on file   Social Needs    Financial resource strain: Not on file    Food insecurity     Worry: Not on file     Inability: Not on file    Transportation needs     Medical: Not on file     Non-medical: Not on file   Tobacco Use    Smoking status: Former Smoker     Packs/day: 1.00     Years: 25.00     Pack years: 25.00     Types: Cigarettes     Last attempt to quit: 10/21/1990     Years since quittin.0    Smokeless tobacco: Never Used   Substance and Sexual Activity    Alcohol use: No     Comment: Attends [employer] 25 years    Drug use: No    Sexual activity: Not on file   Lifestyle    Physical activity     Days per week: Not on file     Minutes per session: Not on file    Stress: Not on file   Relationships    Social connections     Talks on phone: Not on file     Gets together: Not on file     Attends Confucianism service: Not on file     Active member of club or organization: Not on file     Attends meetings of clubs or organizations: Not on file     Relationship status: Not on file    Intimate partner violence     Fear of current or ex partner: Not on file     Emotionally abused: Not on file     Physically abused: Not on file     Forced sexual activity: Not on file   Other Topics Concern    Not on file   Social History Narrative    Lives alone. Family History   Problem Relation Age of Onset    Heart Disease Mother     Liver Disease Mother     High Blood Pressure Mother     Heart Disease Father     Arthritis Father     Cancer Sister         lung     Allergies:  Alemtuzumab; Glycopyrrolate-formoterol; Mercaptopurine; and Moxifloxacin    Review of Systems   Constitutional: Negative for activity change, appetite change, diaphoresis and unexpected weight change. Eyes: Negative for photophobia and visual disturbance. Respiratory: Positive for shortness of breath ( With increased activity and resolves as soon as he rests). Negative for cough, chest tightness and wheezing. No orthopnea   Cardiovascular: Negative for chest pain, palpitations and leg swelling.    Gastrointestinal: Negative for abdominal distention and abdominal pain. Genitourinary: Negative for flank pain and frequency. Musculoskeletal: Negative for gait problem and joint swelling. Neurological: Negative for dizziness, weakness, light-headedness and headaches. Psychiatric/Behavioral: Negative for confusion. Objective:   /74   Pulse 55   Temp 97 °F (36.1 °C)   Ht 5' 10\" (1.778 m)   Wt 269 lb (122 kg)   SpO2 98%   BMI 38.60 kg/m²     Physical Exam  Constitutional:       Appearance: Normal appearance. He is well-developed. He is not ill-appearing or diaphoretic. Comments: Vitals signs reviewed   HENT:      Head: Normocephalic and atraumatic. Right Ear: Hearing and external ear normal.      Left Ear: Hearing and external ear normal.      Nose: No nasal deformity or rhinorrhea. Eyes:      General: Lids are normal.         Right eye: No discharge. Left eye: No discharge. Extraocular Movements:      Right eye: No nystagmus. Left eye: No nystagmus. Conjunctiva/sclera: Conjunctivae normal.      Right eye: No hemorrhage. Left eye: No hemorrhage. Pupils: Pupils are equal, round, and reactive to light. Neck:      Musculoskeletal: Full passive range of motion without pain and neck supple. Normal range of motion. Thyroid: No thyroid mass or thyromegaly. Vascular: Normal carotid pulses. No carotid bruit or JVD. Trachea: No tracheal tenderness or tracheal deviation. Cardiovascular:      Rate and Rhythm: Normal rate and regular rhythm. Chest Wall: PMI displaced: normal location PMI. Pulses:           Carotid pulses are 2+ on the right side and 2+ on the left side. Radial pulses are 2+ on the right side and 2+ on the left side. Heart sounds: S1 normal and S2 normal. No murmur. No friction rub. No gallop. No S3 sounds. Pulmonary:      Effort: Pulmonary effort is normal. No accessory muscle usage or respiratory distress.       Breath sounds: Normal breath sounds. Chest:      Chest wall: No deformity. Abdominal:      General: There is no distension. Musculoskeletal:         General: No deformity. Cervical back: He exhibits normal range of motion, no tenderness and no deformity. Lymphadenopathy:      Cervical:      Right cervical: No superficial cervical adenopathy. Left cervical: No superficial cervical adenopathy. Upper Body:      Right upper body: No supraclavicular adenopathy. Left upper body: No supraclavicular adenopathy. Skin:     General: Skin is warm and dry. Findings: No bruising or rash. Nails: There is no clubbing. Neurological:      Mental Status: He is alert. Cranial Nerves: Cranial nerve deficit: CN II-XII GI without obvious deficit. Sensory: Sensory deficit: grossly intact for hands. Motor: No tremor. Atrophy: B UE and LE without atrophy. Abnormal muscle tone: B UE and LE have normal tone. Coordination: Coordination normal.      Gait: Gait normal.   Psychiatric:         Attention and Perception: He is attentive. Mood and Affect: Mood is not anxious. Affect is not inappropriate. Speech: Speech normal.         Behavior: Behavior is not agitated. Behavior is cooperative. Judgment: Judgment normal.         No results found for this visit on 10/26/20. No results found for this or any previous visit (from the past 2016 hour(s)). Assessment:       Diagnosis Orders   1. Acute diastolic congestive heart failure (HCC)  Basic Metabolic Panel    furosemide (LASIX) 20 MG tablet   2. [facility]           Orders Placed This Encounter   Procedures    Basic Metabolic Panel     Standing Status:   Future     Standing Expiration Date:   10/26/2021         Plan:   Return in about 4 months (around 2/26/2021) for POCT HbA1c, for routine major medical condition management.     Patient Instructions   Patient will continue activities. Yellow zone. Be careful. Your symptoms are changing. Call your doctor. · You have new or increased shortness of breath. · You are dizzy or lightheaded, or you feel like you may faint. · You have sudden weight gain, such as more than 2 to 3 pounds in a day or 5 pounds in a week. (Your doctor may suggest a different range of weight gain.)  · You have increased swelling in your legs, ankles, or feet. · You are so tired or weak that you can't do your usual activities. · You are not sleeping well. Shortness of breath wakes you up at night. You need extra pillows. Red zone. This is an emergency. Call 911. You have symptoms of sudden heart failure. For example:  · You have severe trouble breathing. · You cough up pink, foamy mucus. · You have a new irregular or fast heartbeat. You have symptoms of a heart attack. These may include:  · Chest pain or pressure, or a strange feeling in the chest.  · Sweating. · Shortness of breath. · Nausea or vomiting. · Pain, pressure, or a strange feeling in the back, neck, jaw, or upper belly or in one or both shoulders or arms. · Lightheadedness or sudden weakness. · A fast or irregular heartbeat. If you have symptoms of a heart attack: After you call 911, the  may tell you to chew 1 adult-strength or 2 to 4 low-dose aspirin. Wait for an ambulance. Do not try to drive yourself. Follow-up care is a key part of your treatment and safety. Be sure to make and go to all appointments, and call your doctor if you are having problems. It's also a good idea to know your test results and keep a list of the medicines you take. Where can you learn more? Go to https://DEONTICSpeAyeah Games.Hackers / Founders. org and sign in to your Silk[employer] account. Enter T174 in the Tip or Skip box to learn more about \"Learning About Heart Failure Zones. \"     If you do not have an account, please click on the \"Sign Up Now\" link.   Current as of: December 16, 1459               GKBWGNP Version: 12.6  © 3178-9895 [employer] Incorporated. Care instructions adapted under license by [facility] ([facility]). If you have questions about a medical condition or this instruction, always ask your healthcare professional. [address] any warranty or liability for your use of this information. This note was partially created with the assistance of dictation. This may lead to grammatical or spelling errors. Sher Dior Se, M.D.

## 2020-10-26 NOTE — PATIENT INSTRUCTIONS
Patient will continue furosemide 20 mg daily. He will get a BMP checked in the next week. Follow-up in 3 months. Keep cardiology planned work-up. Patient Education        Learning About Saving Energy When You Have a Chronic Condition  Introduction    Everyday tasks can be tiring when you have COPD, heart failure, or another long-term (chronic) condition. You may feel at times that you've lost your ability to live your life. But learning to conserve, or save, your energy can help you be less tired. Conserving your energy means finding ways of doing daily activities with as little effort as possible. With some small changes in the way you do things, you can get your tasks done more easily. Some treatments are available that might help. Pulmonary rehabilitation can teach you ways to breathe easier. Cardiac rehabilitation can help make your heart stronger. You also may want to see an occupational or physical therapist. The therapist can give you more tips on building strength and moving with less effort. What can you do to conserve your energy? Planning  · Make a list of what you have to do every day. Group the tasks by location. · Do all the chores in one part of your house around the same time. · Go out for errands or do chores at the time of day when you have the most energy. · Plan rest periods into your day. Getting things done  · Sit down as often as you can when you get dressed, do chores, or cook. · Use a cart with wheels to roll items, such as laundry, from one room to another. · Push or slide boxes or other large items instead of lifting them. Reaching and bending  · Put things you use the most on shelves that are at the level of your waist or shoulder. · Use long-handled grabbers or other tools to reach items on a high shelf or to  things off the floor. Use long-handled dusters when you clean the house.   · Use a raised toilet seat to avoid bending too far to sit or stand up.  Eating  · Eat several small meals instead of three larger meals. · If you get too tired to eat much, try to choose healthy foods that have more calories. Have a yogurt-and-fruit smoothie for breakfast. Put avocado on a sandwich. Or add cheese or peanut butter to snacks. · If you don't feel very hungry, try to eat first and drink water or other fluids later, after a meal. This can help keep you from losing weight. Sip small amounts of fluids if you need to drink while you eat. Having sex  · Choose the time of day when you have more energy. · A hlbt-aa-tqnb position for sex can be less tiring. Sometimes you may want to focus more on caressing. Watch closely for changes in your health, and be sure to contact your doctor if you have any problems. Where can you learn more? Go to https://IMedExchangepezoilaeb.SchoolTube. org and sign in to your [employer] account. Enter H190 in the [facility] box to learn more about \"Learning About Saving Energy When You Have a Chronic Condition. \"     If you do not have an account, please click on the \"Sign Up Now\" link. Current as of: February 24, 2020               Content Version: 12.6  © 2006-2020 viaForensics. Care instructions adapted under license by [facility] ([facility]). If you have questions about a medical condition or this instruction, always ask your healthcare professional. [place] [address] any warranty or liability for your use of this information. Patient Education        Learning About Heart Failure Zones  What are heart failure zones? Heart failure zones give you an easy way to see changes in your heart failure symptoms. They also tell you when you need to get help. Check every day to see which zone you are in. Green zone. You are doing well. This is where you want to be. · Your weight is stable. It's not going up or down. · You breathe easily. · You are sleeping well.  You are able to lie flat without shortness of breath. · You can do your usual activities. Yellow zone. Be careful. Your symptoms are changing. Call your doctor. · You have new or increased shortness of breath. · You are dizzy or lightheaded, or you feel like you may faint. · You have sudden weight gain, such as more than 2 to 3 pounds in a day or 5 pounds in a week. (Your doctor may suggest a different range of weight gain.)  · You have increased swelling in your legs, ankles, or feet. · You are so tired or weak that you can't do your usual activities. · You are not sleeping well. Shortness of breath wakes you up at night. You need extra pillows. Red zone. This is an emergency. Call 911. You have symptoms of sudden heart failure. For example:  · You have severe trouble breathing. · You cough up pink, foamy mucus. · You have a new irregular or fast heartbeat. You have symptoms of a heart attack. These may include:  · Chest pain or pressure, or a strange feeling in the chest.  · Sweating. · Shortness of breath. · Nausea or vomiting. · Pain, pressure, or a strange feeling in the back, neck, jaw, or upper belly or in one or both shoulders or arms. · Lightheadedness or sudden weakness. · A fast or irregular heartbeat. If you have symptoms of a heart attack: After you call 911, the  may tell you to chew 1 adult-strength or 2 to 4 low-dose aspirin. Wait for an ambulance. Do not try to drive yourself. Follow-up care is a key part of your treatment and safety. Be sure to make and go to all appointments, and call your doctor if you are having problems. It's also a good idea to know your test results and keep a list of the medicines you take. Where can you learn more? Go to https://Cobase.bMenu. org and sign in to your Popcorn[employer] account. Enter T174 in the [employer]) box to learn more about \"Learning About Heart Failure Zones. \"     If you do not have an account, please click on the \"Sign Up Now\" link.   Current as of:

## 2020-10-29 DIAGNOSIS — B35.1 ONYCHOMYCOSIS: ICD-10-CM

## 2020-10-29 DIAGNOSIS — I50.31 ACUTE DIASTOLIC CONGESTIVE HEART FAILURE (HCC): ICD-10-CM

## 2020-10-29 LAB
ALT SERPL-CCNC: 14 U/L (ref 0–41)
ANION GAP SERPL CALCULATED.3IONS-SCNC: 8 MEQ/L (ref 9–15)
AST SERPL-CCNC: 18 U/L (ref 0–40)
BUN BLDV-MCNC: 18 MG/DL (ref 8–23)
CALCIUM SERPL-MCNC: 8.7 MG/DL (ref 8.5–9.9)
CHLORIDE BLD-SCNC: 105 MEQ/L (ref 95–107)
CO2: 29 MEQ/L (ref 20–31)
CREAT SERPL-MCNC: 0.87 MG/DL (ref 0.7–1.2)
GFR AFRICAN AMERICAN: >60
GFR NON-AFRICAN AMERICAN: >60
GLUCOSE BLD-MCNC: 105 MG/DL (ref 70–99)
POTASSIUM SERPL-SCNC: 3.9 MEQ/L (ref 3.4–4.9)
SODIUM BLD-SCNC: 142 MEQ/L (ref 135–144)

## 2020-11-05 ENCOUNTER — TELEPHONE (OUTPATIENT)
Dept: DIABETES SERVICES | Age: 70
End: 2020-11-05

## 2020-11-05 NOTE — PROGRESS NOTES
Diabetes Self-Management Education Follow-Up    Name:  Joshua Ugarte   :  1950     Follow-Up Date:  2020     Education completed on:  2020    Goal:  Decrease portions and read labels on food  How often did you meet your goal?     []All the time (5)   [x]Most of the time (4)   []Half the time (3)   []Occasionally (2)    []Never (1)    Goal:  Begin to exercise  How often did you meet your goal?     []All the time (5)   []Most of the time (4)   [x]Half the time (3)   []Occasionally (2)    []Never (1)    FBS: 145  Date: 2020 FBS: 105  Date: 10/29/2020  Wt:   274  Date: 2020 Wt:   269  Date: 10/26/2020  A1C: 6.1  Date: 2020 A1C: 5.4  Date: 2020    Physician Appointment (date of most recent or next scheduled): last labs done in October. Recent health problems or change in diabetes medications: denies    Do you know the amount of carbohydrates you eat per meal?   []Yes   [x]No   Frequency of self-foot exam:   [x]Daily  []Other   Eye exam completed? [x]Yes   []No  How many times a day do you check your blood sugar? []1   []2   []3   []4   [x]none  Have you been exercising since class? [x]Yes    []No   If yes, what kind? Walking and gym   If yes, how many days/week? [x]1   [x]2   []3   []4   []more    Feelings/Attitudes/Other questions: reports doing \"really well\" since attending education and states that his most recent numbers he was told that he doesn't have diabetes. Encouraged to keep up the eating well and exercise to keep his BG in the target range as well as hi A1C. Denies any needs and voices appreciation for the phone call.        Diabetes Self- Management Education Record    Participant Name: Joshua Ugarte  Referring Provider: Skinny Pascual MD   Assessment/Evaluation Ratings:  1=Needs Instruction   4=Demonstrates Understanding/Competency  2=Needs Review   NC=Not Covered    3=Comprehends Key Points  N/A=Not Applicable    Topics/Learning Objectives Initial Assessment Date:   Instr. Date Reinforce Date Post- session Eval Comments   Diabetes disease process & Treatment process: Define diabetes & pre-diabetes; Identify own type of diabetes; role of the pancreas; signs/symptoms; diagnostic criteria; prevention & treatment options; contributing factors. (1) 02/03/20 Ada Doshi RN  02/11/20  Niru Akres RN    (3) 11/05/20 Ada Doshi RN   02/03/20 Newly diagnosed. Has had preDM for the past few years but has been able to keep \"under control\" with diet and weight loss. Recent spike in A1C and worried. Reports poor knowledge and would like to learn. Ada Doshi RN     02/11/20 Discussed basic pathophysiology of DM with the group including the pancreas, insulin, insulin resistance and livers involvement. Reviewed the different types of DM, risk factors, diagnosis, symptoms and the treatment options. Niru Akers RN       Incorporating nutritional management into lifestyle: Describe effect of type, amount & timing of food on blood glucose; Describe basic meal planning techniques & current nutrition guideline   (1) 02/03/20 Ada Doshi RN  2/12/2020  MARIEL Andersen  (3) 11/05/20 Ada Doshi RN  02/03/20 Reviewed the importance of eating a balanced diet including portion control and still eating carbohydrates. Ada Doshi RN     2/12/2020 What to eat - Food groups, When to eat - timing of meals and snacks, and How much to eat - portions control. MARIEL Andersen     2000 calories/ day   4-5 CHO choices/ meal   14 CHO choices/  day   grams of protein /day   gram of fat /day     Correctly read food labels & demonstrate CHO counting & portion control with personalized meal plan. Identify dining out strategies, & dietary sick day guidelines.    (1) 02/03/20 Ada Doshi RN  2/12/2020  MARIEL Andersen  (3) 11/05/20 Ada Doshi RN  02/03/20  Importance of label reading reviewed with patient including focus on total carbohydrates and not just sugar content as well as serving size. Ekaterina Sapp RN     2/12/2020 Instructed on carb counting, carb containing foods and importance of balanced diet. MARIEL Galvez     Incorporating physical activity into lifestyle:   Verbalize effect of exercise on blood glucose levels; benefits of regular exercise; safety considerations; contraindications; maintenance of activity. (1) 02/03/20 Ekaterina Sapp RN  02/11/20  Arnold Nielsen RN    (3) 11/05/20 Ekaterina Sapp RN  02/03/20 Discussed importance of activity and set goal to get back to the gym 3 times a week. Ekaterina Sapp RN    02/11/20 - Reviewed with the group the importance of exercise, recommendations by the ADA, effects on BG, weight loss and precautions. Discussed what patient is doing to stay active with the group. Arnold Nielsen RN      Using medications safely:  Identify effects of diabetes medicines on blood glucose levels; List diabetes medication taken, action & side effects;  (1) 02/03/20 Ekaterina Sapp RN  11/05/20  Ekaterina Sapp RN    (3) 11/05/20 Ekaterina Sapp RN  02/03/20 no diabetes meds. Ekaterina Sapp RN     11/05/20 - Discussed all medication classes with group and modes of action including insulin and other injectables. Patient aware of BG medications available, side effects and mode of action. Ekaterina Sapp RN     Insulin / Injectable - Appropriate injection sites; proper storage; supplies needed; proper technique; safe needle disposal guidelines. (1) 02/03/20 Ekaterina Sapp RN  11/05/20   Ekaterina Sapp RN   (3) 11/05/20 Ekaterina Sapp RN  11/05/20 - Discussed insulin stigma and proper technique including site selection and self injection. Reviewed both pen and vial/syringe use. Discussed needle disposal and proper insulin storage.  Ekaterina Sapp RN       Monitoring blood glucose, interpreting and using results:  Identify recommended & personal blood glucose targets; importance of testing; testing supplies; HgbA1C target levels; Factors affecting blood glucose; Importance of logging blood glucose levels for pattern recognition; ketone testing; safe lancet disposal.   (1) 02/03/20 Coby James RN  02/11/20  Ricardo Guzman RN    (3) 11/05/20 Coby James RN  02/03/20 Does not have monitor to check BG. Reviewed goal for A1c and BG. Coby James RN     2/11/20 - Discussed timing of monitoring and goals for BG. Aware of goal A1C and current A1C. BG guidelines reviewed and rule of 15 to treat low BG. Reviewed the importance of monitoring at different times and occasionally post prandial. Encouraged to keep a log book. Ricardo Guzman RN       Prevention, detection & treatment of acute complications:  Identify symptoms of hyper & hypoglycemia, and prevention & treatment strategies. (1) 02/03/20 Coby James RN  02/11/20  Ricardo Guzman RN    (3) 11/05/20 Coby James RN  02/03/20 - Patient able to recognize signs and symptoms of high and low BG. Reviewed treatment of both high and low BG. Coby James RN    02/11/20 - Patient able to recognize signs and symptoms of high and low BG. Reviewed treatment of both high and low BG. Discussed with the group causes of both high and low BG. Reviewed BG guidelines and troubleshooting unexpected numbers. Ricardo Guzman RN                 Describe sick day guidelines & indications for physician notification. Identify short term consequences of poor control. (1) 02/03/20 Coby James RN  11/05/20  Coby James RN   (3) 11/05/20 Coby James RN  11/05/20 - Reviewed sick days with group and what to do when sick. Including continuing medications, tips for staying hydrated and when to call the doctor. Handout given.  Coby James RN       Prevention, detection & treatment of chronic complications:  Define the natural course of diabetes & describe the relationship of blood glucose levels to long term complications of diabetes. Identify preventative measures & standards of care. (1) 02/03/20 Dennis Jay RN  11/05/20  Dennis Jay RN   (3) 11/05/20 Dennis Jay RN  11/05/20 - Reviewed natural course of T2DM with group and chronic complications related to elevated BG. Discussed chronic complications and how to prevent them from happening. Reviewed patient's ABC's with each patient and provided with ways to reach goal numbers. Dennis Jay RN     Developing strategies to address psychosocial issues:  Describe feelings about living with diabetes; Describe how stress, depression & anxiety affect blood glucose; Identify coping strategies; Identify support needed & support network available. (1) 02/03/20 Dennis Jay RN  02/11/20  Amy Hector RN    (3) 11/05/20 Dennis Jay RN  02/03/20 Worried about diagnosis and possibility of complications. Dennis Jay RN     02/11/20 - Reviewed healthy coping and unhealthy coping with the group. Discussed what ways of coping each person usually uses and brainstormed ways to change to a healthy coping mechanism with the group. Stressed the importance of stress reduction and the negative effects of stress on the body including elevated BG. Community resources and support networks reviewed and handout provided. Amy Hector RN       Developing strategies to promote health/change behavior: Identify 7 self-care behaviors; Personal health risk factors; Benefits, challenges & strategies for behavioral change;    (1) 02/03/20 Dennis Jay RN  11/05/20  Dennis Jay RN   (3) 11/05/20 Dennis Jay RN  11/05/20 - Discussed willingness to change behaviors and allowed patient to select goal to work on. Willing to change. Goals set for behavior change and follow up scheduled. Dennis Jay RN       Individualized goal selection.               My goal , to help me improve my health, I will:     1. 02/03/20 go to the gym 3 times a week. Raquel Gibbs RN     02/11/20  0%. Has not started yet. Rich Lieberman RN    11/05/20 50% of the time getting in exercise. Raquel Gibbs RN    2.02/11/20  Follow a meal plan for healthy eating habits-make a plan with dietician. Rich Lieberman RN    11/05/20 50% \"eating better\" per patient. Raquel Gibbs RN     11/05/20 75% eating better. Raquel Gibbs RN     3. 11/05/20 Attend eye exam. Raquel Gibbs RN     11/05/20 100% done - cataract sx done. Raquel Gibbs RN        Plan  Follow-up as needed      Encounter Type Date Start Time End Time Comments No Show Dates   Assessment 02/03/20  Raquel Gibbs RN  [id number]  Newly diagnosed. Has had preDM for the past few years but has been able to keep \"under control\" with diet and weight loss. Recent spike in A1C and worried. Reports poor knowledge and would like to learn. Class 1 - Understanding diabetes 02/11/20  Rich Lieberman RN   0900 1200  Pt is new to diabetes and very attentive and participates in class. He does not have a meter. No diabetes meds. A1C 6.1. he is hoping to reverse Diabetes if possible by lifestyle changes. Class 2- Nutrition and diabetes   2/12/2020   Jen Harvey RDN LD 0900 1200 Attentive in class; showed good understandning    Class 3 - Preventing Complications 63/22/17  Raquel Gibbs RN  0900 1200 Attentive and good participation in class.      Individual MNT         Follow-up 11/05/20  Raquel Gibbs RN  12:18 PM    []In Person  [x]Telephone    Meter Instrx        Insulin Instrx      []Pen  []Vial & Syringe      DSMS Support Plan:  Follow-up plan:     [x] MNT referral request / Appointment     [] Annual update referral request and appointment after      []ADA  Where do I Begin, Living with Type 2 Diabetes ADA home support program     [] [address] [place] [address] [phone]     [] Fit Walks : [facility] or [facility]    [x]  Diabetes support Group []   Emotional Support      [] Vanessa on phone      []  Internet web sites - ADA and D- life    []  Journals/Magazines    [x]  Other __[employer]_________________________      Post Education Referrals:      [] [address] [place] [address] information sheet and [address] [facility] [address]       [] Dental care    [] Podiatrist     [x]  Opthamologist      []Other    Yemi Black RN

## 2020-11-10 ENCOUNTER — OFFICE VISIT (OUTPATIENT)
Dept: PULMONOLOGY | Age: 70
End: 2020-11-10
Payer: MEDICARE

## 2020-11-10 VITALS
RESPIRATION RATE: 16 BRPM | HEIGHT: 69 IN | HEART RATE: 79 BPM | TEMPERATURE: 97.7 F | DIASTOLIC BLOOD PRESSURE: 66 MMHG | SYSTOLIC BLOOD PRESSURE: 122 MMHG | OXYGEN SATURATION: 96 % | BODY MASS INDEX: 39.99 KG/M2 | WEIGHT: 270 LBS

## 2020-11-10 PROCEDURE — 99214 OFFICE O/P EST MOD 30 MIN: CPT | Performed by: INTERNAL MEDICINE

## 2020-11-10 ASSESSMENT — ENCOUNTER SYMPTOMS
COUGH: 0
EYE ITCHING: 0
ABDOMINAL PAIN: 0
VOMITING: 0
VOICE CHANGE: 0
SHORTNESS OF BREATH: 1
SORE THROAT: 0
DIARRHEA: 0
RHINORRHEA: 0
WHEEZING: 0
NAUSEA: 0
CHEST TIGHTNESS: 0

## 2020-11-10 NOTE — PROGRESS NOTES
Subjective:     Ned Holt is a 79 y.o. male who complains today of:     Chief Complaint   Patient presents with    Follow-Up from Hospital     f/u after hosp. HPI  He was in hospital at [facility] for diastolic HF in 5/59/73 -46/9/87   Patient is using symbicort and spiriva, albuterol HFA prn   C/o shortness of breath  with exertion. No Wheezing   No Cough with  Sputum  No Hemoptysis  No Chest tightness   No Chest pain with radiation  or pleuritic pain  No Fever or chills. No Rhinorrhea and postnasal drip. Using bronchodilator with symbicort and spiriva, albuterol HFA prn     He is using CPAP with 14  centimeters of H2O with heated humidity. He is using CPAP for about   6-7   hours every night. He is using CPAP with   Nasal pillow. He said  sleep is restful with the CPAP use. He is compliant with CPAP therapy and benefiting with CPAP use. No snoring with CPAP use. No complaint of daytime sleepiness or tiredness with CPAP use. He denies taking naps. No sleepiness with driving. He denies difficulty falling asleep or staying asleep.       Allergies:  Alemtuzumab; Glycopyrrolate-formoterol; Mercaptopurine; and Moxifloxacin  Past Medical History:   Diagnosis Date    [facility]     Abnormal EMG 12/09/2015    Actinic keratoses     Actinic keratoses     Allergic rhinitis     Anemia 11/18/2014    Arthritis     Chronic back pain     COPD [facility] disease[facility] 08/09/2012    COPD [facility]ch[facility] obstructive [facility] disease) [facility]     Crohns disease     Degenerative disc disease, lumbar     Dermatitis     Diabetes ([facility] [address] 03/19/2015    diet controlled    Gastrointestinal problem     GERD (gastroesophageal reflux disease)     History of atrial fibrillation 2006    Dr. Dariana Pradhan History of throat cancer 2004     cleared for reoccurence years ago    Hyperlipidemia 1/21/2014    Hypertension     Hypogonadism male     Lung disease     Mononeuritis multiplex     Mononeuritis Not on file     Minutes per session: Not on file    Stress: Not on file   Relationships    Social connections     Talks on phone: Not on file     Gets together: Not on file     Attends Advent service: Not on file     Active member of club or organization: Not on file     Attends meetings of clubs or organizations: Not on file     Relationship status: Not on file    Intimate partner violence     Fear of current or ex partner: Not on file     Emotionally abused: Not on file     Physically abused: Not on file     Forced sexual activity: Not on file   Other Topics Concern    Not on file   Social History Narrative    Lives alone. Review of Systems   Constitutional: Negative for chills, diaphoresis, fatigue and fever. HENT: Negative for congestion, mouth sores, nosebleeds, postnasal drip, rhinorrhea, sneezing, sore throat and voice change. Eyes: Negative for itching and visual disturbance. Respiratory: Positive for shortness of breath. Negative for cough, chest tightness and wheezing. Cardiovascular: Negative. Negative for chest pain, palpitations and leg swelling. Gastrointestinal: Negative for abdominal pain, diarrhea, nausea and vomiting. Genitourinary: Negative for difficulty urinating and hematuria. Musculoskeletal: Negative for arthralgias, joint swelling and myalgias. Skin: Negative for rash. Allergic/Immunologic: Negative for environmental allergies. Neurological: Negative for dizziness, tremors, weakness and headaches. Psychiatric/Behavioral: Positive for sleep disturbance. Negative for behavioral problems.          :     Vitals:    11/10/20 1137   BP: 122/66   Pulse: 79   Resp: 16   Temp: 97.7 °F (36.5 °C)   TempSrc: Temporal   SpO2: 96%   Weight: 270 lb (122.5 kg)   Height: 5' 9\" (1.753 m)     Wt Readings from Last 3 Encounters:   11/10/20 270 lb (122.5 kg)   10/26/20 269 lb (122 kg)   09/23/20 268 lb (121.6 kg)         Physical Exam  Constitutional:       Appearance: He is well-developed. HENT:      Head: Normocephalic and atraumatic. Nose: Nose normal.   Eyes:      Conjunctiva/sclera: Conjunctivae normal.      Pupils: Pupils are equal, round, and reactive to light. Neck:      Thyroid: No thyromegaly. Vascular: No JVD. Trachea: No tracheal deviation. Cardiovascular:      Rate and Rhythm: Normal rate and regular rhythm. Heart sounds: No murmur. No friction rub. No gallop. Pulmonary:      Effort: Pulmonary effort is normal. No respiratory distress. Breath sounds: Normal breath sounds. No wheezing or rales. Chest:      Chest wall: No tenderness. Abdominal:      General: There is no distension. Musculoskeletal: Normal range of motion. Right lower leg: Edema (1+) present. Left lower leg: Edema (1+) present. Lymphadenopathy:      Cervical: No cervical adenopathy. Skin:     General: Skin is warm and dry. Findings: No rash. Neurological:      Mental Status: He is alert and oriented to person, place, and time. Cranial Nerves: No cranial nerve deficit.    Psychiatric:         Behavior: Behavior normal.         Current Outpatient Medications   Medication Sig Dispense Refill    furosemide (LASIX) 20 MG tablet Take 1 tablet by mouth daily 30 tablet 2    pravastatin (PRAVACHOL) 40 MG tablet TAKE 1 TABLET NIGHTLY 90 tablet 3    cetirizine (ZYRTEC) 5 MG tablet TAKE ONE TABLET BY MOUTH  PRN      inFLIXimab (REMICADE) 100 MG injection Infuse 5 mg/kg intravenously See Admin Instructions      montelukast (SINGULAIR) 10 MG tablet Take 1 tablet by mouth nightly TAKE 1 TABLET NIGHTLY 90 tablet 3    albuterol (PROVENTIL) (2.5 MG/3ML) 0.083% nebulizer solution Take 3 mLs by nebulization every 6 hours as needed for Wheezing 120 each 3    metoprolol tartrate (LOPRESSOR) 50 MG tablet Take 1.5 po bid 270 tablet 3    budesonide-formoterol (SYMBICORT) 160-4.5 MCG/ACT AERO Inhale 2 puffs into the lungs 2 times daily 2 each LOT [id number] EXP [id number] 3 Inhaler 1    albuterol sulfate  (90 Base) MCG/ACT inhaler Inhale 2 puffs into the lungs every 6 hours as needed for Wheezing or Shortness of Breath 3 Inhaler 1    doxazosin (CARDURA) 4 MG tablet TAKE ONE-HALF (1/2) TABLET TWICE A DAY 90 tablet 4    nitroGLYCERIN (NITROSTAT) 0.4 MG SL tablet DISSOLVE 1 TABLET UNDER THE TONGUE EVERY 5 MINUTES AS NEEDED FOR CHEST PAIN. MAXIMUM OF 3 TABLETS 25 tablet 2    albuterol (PROVENTIL) (2.5 MG/3ML) 0.083% nebulizer solution Use every 6 hours as needed for wheezing  DX:J44.1 (Patient taking differently: Take 2.5 mg by nebulization every 4 hours as needed Use every 6 hours as needed for wheezing  DX:J44.1) 360 each 2    lisinopril (PRINIVIL;ZESTRIL) 20 MG tablet Take 1 tablet by mouth daily 90 tablet 3    fluticasone (FLONASE) 50 MCG/ACT nasal spray 1 spray by Each Nare route daily 1 Spray in each nostril 3 Bottle 1    CPAP Machine MISC New cpap supplies mask hose filters etc 1 each 0    Saccharomyces boulardii (PROBIOTIC) 250 MG CAPS Take 1 tablet by mouth daily      esomeprazole Magnesium (NEXIUM) 20 MG PACK Take 20 mg by mouth daily      aspirin 81 MG tablet Take 81 mg by mouth daily.  mesalamine (PENTASA) 250 MG CR capsule Take 500 mg by mouth 4 times daily.  gabapentin (NEURONTIN) 300 MG capsule Take 1 capsule by mouth daily for 90 days. 90 capsule 1    testosterone (ANDROGEL; TESTIM) 50 MG/5GM (1%) GEL 1% gel Alternate one packet daily with 2 packets daily every other day 135 Package 0    tiotropium (SPIRIVA HANDIHALER) 18 MCG inhalation capsule Inhale 1 capsule into the lungs daily 90 capsule 3     No current facility-administered medications for this visit.         Results for orders placed in visit on 02/21/20   XR CHEST STANDARD (2 VW)    Narrative EXAMINATION: XR CHEST (2 VW)    DATE AND TIME:2/21/2020 11:08 AM     CLINICAL HISTORY: Shortness of breath  J44.1 COPD with exacerbation ([address] ICD10     COMPARISONS: November 7, 2019 FINDINGS: Allowing for inspiratory effort lungs are clear of any fresh infiltrate. No overt signs of pulmonary edema. No effusion. No pneumothorax. Bones intact. Impression NO ACTIVE LUNG DISEASE.     ]  Results for orders placed during the hospital encounter of 19   XR CHEST PORTABLE    Narrative Patient MRN: [id number]    : 1950    Age:  71 years    Gender: Male    Order Date: 2019 4:45 AM.     Exam: XR CHEST PORTABLE    Number of Views: 1     Indication:   Chest Pain, rapid heart beat per pt. Comparison: 3/15/2019    Findings: Stable, enlarged cardiomediastinal silhouette with thoracic aortic vascular calcifications. No pneumonia, pneumothorax or pleural effusion. Impression Impression:  Stable, enlarged cardiomediastinal silhouette with thoracic aortic vascular calcifications         Assessment/Plan:     1. JARRELL on CPAP  He is using CPAP with 14  centimeters of H2O with heated humidity. He is using CPAP for about   6-7   hours every night. He is using CPAP with   Nasal pillow. He said  sleep is restful with the CPAP use. He is compliant with CPAP therapy and benefiting with CPAP use. No snoring with CPAP use. Continue CPAP therapy as before. Counseling: CPAP/BiPAP uses, patient advised to use CPAP at least 5-6 hours every night. Driving: patient is advised for extreme caution when driving or operating machinery if there is a feeling of drowsiness, especially while driving it is preferable to stop driving and take a brief nap. Sleep hygiene:Avoid supine sleep, sleep on  sides. Avoid  sleep deprivation. Explained sleep hygiene. Advice to avoid Alcohol and sedative    2. Obesity (BMI 30-39. 9)  Patient patient is advised try to lose weight. obesity related risk explained to the patient ,  Current weight:  270 lb (122.5 kg) Lbs. BMI:  Body mass index is 39.87 kg/m². Suggested weight control approaches, including dietary changes , exercise, behavioral modification.     3. Chronic obstructive pulmonary disease, unspecified COPD type ([facility] [address]  He  is using symbicort and spiriva, albuterol HFA prn , C/o shortness of breath  with exertion. No Wheezing . No Cough with  Sputum. He was in hospital at [facility] for diastolic HF in 6/73/63 -38/7/68. He said he is doing better now. Return in about 4 months (around 3/10/2021) for rhona, COPD.       Samy Timmons MD

## 2020-11-17 NOTE — TELEPHONE ENCOUNTER
He had a negative test in 2017. There has been no change in technology since then. Has he had a new risk for exposure? Never

## 2020-11-25 RX ORDER — TIOTROPIUM BROMIDE 18 UG/1
CAPSULE ORAL; RESPIRATORY (INHALATION)
Qty: 90 CAPSULE | Refills: 3 | Status: SHIPPED | OUTPATIENT
Start: 2020-11-25 | End: 2022-04-13

## 2020-12-01 ENCOUNTER — CLINICAL DOCUMENTATION (OUTPATIENT)
Dept: PHYSICAL THERAPY | Age: 70
End: 2020-12-01

## 2020-12-01 NOTE — PROGRESS NOTES
[facility]    [x]  [address] []  [address]     [address] [place] [address]  Ph: [phone]     Ph: [phone]  Fax: [phone]     Fax: [phone]    [] Certification  [] Recertification []  Plan of Care  [] Progress Note [x] Discharge    Date: 2020  Patient Name: Justen Carrera  : 1950  MRN: [id number]    To:    Dr. Rochelle Lawson     From: Derrick Malin PT     [x]   Patient was previously on hold since 2020 and is now appropriate for    discharge. Please see last POC/ Progress Report for last measured    functional status. Thank you for your referral and the opportunity to treat this patient. Please contact us with any questions or concerns.     Electronically signed by Derrick Malin PT on 2020 at 1:12 PM

## 2020-12-14 ENCOUNTER — HOSPITAL ENCOUNTER (OUTPATIENT)
Dept: GENERAL RADIOLOGY | Age: 70
Discharge: HOME | End: 2020-12-16
Payer: MEDICARE

## 2020-12-14 PROCEDURE — 72050 X-RAY EXAM NECK SPINE 4/5VWS: CPT

## 2020-12-23 ENCOUNTER — TELEPHONE (OUTPATIENT)
Dept: ADMINISTRATIVE | Age: 70
End: 2020-12-23

## 2020-12-23 ENCOUNTER — OFFICE VISIT (OUTPATIENT)
Dept: FAMILY MEDICINE CLINIC | Age: 70
End: 2020-12-23
Payer: MEDICARE

## 2020-12-23 ENCOUNTER — NURSE TRIAGE (OUTPATIENT)
Dept: OTHER | Facility: CLINIC | Age: 70
End: 2020-12-23

## 2020-12-23 VITALS
OXYGEN SATURATION: 95 % | HEART RATE: 59 BPM | HEIGHT: 70 IN | TEMPERATURE: 97.3 F | SYSTOLIC BLOOD PRESSURE: 112 MMHG | BODY MASS INDEX: 37.94 KG/M2 | DIASTOLIC BLOOD PRESSURE: 60 MMHG | WEIGHT: 265 LBS

## 2020-12-23 PROCEDURE — 99214 OFFICE O/P EST MOD 30 MIN: CPT | Performed by: FAMILY MEDICINE

## 2020-12-23 RX ORDER — TERBINAFINE HYDROCHLORIDE 250 MG/1
250 TABLET ORAL DAILY
Qty: 84 TABLET | Refills: 0 | Status: SHIPPED | OUTPATIENT
Start: 2020-12-23 | End: 2021-05-25 | Stop reason: SDUPTHER

## 2020-12-23 RX ORDER — ATORVASTATIN CALCIUM 40 MG/1
TABLET, FILM COATED ORAL
COMMUNITY
Start: 2020-11-13 | End: 2020-12-23 | Stop reason: DRUGHIGH

## 2020-12-23 RX ORDER — PRAVASTATIN SODIUM 40 MG
TABLET ORAL
Qty: 90 TABLET | Refills: 3 | Status: SHIPPED | OUTPATIENT
Start: 2020-12-23 | End: 2022-01-10

## 2020-12-23 RX ORDER — AZITHROMYCIN 250 MG/1
TABLET, FILM COATED ORAL
Qty: 6 TABLET | Refills: 0 | Status: SHIPPED | OUTPATIENT
Start: 2020-12-23 | End: 2021-03-23 | Stop reason: ALTCHOICE

## 2020-12-23 ASSESSMENT — ENCOUNTER SYMPTOMS
FREQUENT THROAT CLEARING: 1
HOARSE VOICE: 1
SPUTUM PRODUCTION: 1
WHEEZING: 0
HEMOPTYSIS: 0
COUGH: 1
SHORTNESS OF BREATH: 1
SORE THROAT: 0
RHINORRHEA: 1
DIFFICULTY BREATHING: 1
TROUBLE SWALLOWING: 0

## 2020-12-23 ASSESSMENT — COPD QUESTIONNAIRES: COPD: 1

## 2020-12-23 NOTE — PROGRESS NOTES
Diagnosis Orders   1. COPD exacerbation (HCC)  azithromycin (ZITHROMAX) 250 MG tablet   2. Hyperlipidemia, unspecified hyperlipidemia type  pravastatin (PRAVACHOL) 40 MG tablet   3. Onychomycosis  terbinafine (LAMISIL) 250 MG tablet   4. Tinea corporis  nystatin-triamcinolone (MYCOLOG II) 291682-6.1 UNIT/GM-% cream     Return in about 2 weeks (around 1/6/2021). There are no Patient Instructions on file for this visit. Subjective:      Patient ID: Andrea Quiroga is a 79 y.o. male who presents for:  Chief Complaint   Patient presents with    Check-Up     Pt wanted to get his lungs listened to, feeling short of breath.  COPD    Health Maintenance     due for AWV, will be getting colon screen done 03/2021       Patient notes his toenails are starting to grow out normal.  Needs another course of medication. Hospital put him on atorvastatin but he does not tolerate that well. He would like to go back on pravastatin for his cholesterol. COPD  He complains of cough, difficulty breathing, frequent throat clearing, hoarse voice, shortness of breath and sputum production. There is no hemoptysis or wheezing. This is a recurrent problem. The current episode started in the past 7 days. The problem occurs intermittently. The problem has been gradually worsening. The cough is productive of purulent sputum. Associated symptoms include dyspnea on exertion, nasal congestion and rhinorrhea. Pertinent negatives include no fever, orthopnea, PND, sore throat or trouble swallowing. His symptoms are aggravated by nothing. His symptoms are alleviated by leukotriene antagonist, beta-agonist, oral steroids and ipratropium. He reports minimal improvement on treatment. His past medical history is significant for COPD.        Current Outpatient Medications on File Prior to Visit   Medication Sig Dispense Refill    SPIRIVA HANDIHALER 18 MCG inhalation capsule INHALE THE CONTENTS OF 1 CAPSULE DAILY 90 capsule 3    furosemide (LASIX) 20 MG tablet Take 1 tablet by mouth daily 30 tablet 2    cetirizine (ZYRTEC) 5 MG tablet TAKE ONE TABLET BY MOUTH  PRN      inFLIXimab (REMICADE) 100 MG injection Infuse 5 mg/kg intravenously See Admin Instructions      montelukast (SINGULAIR) 10 MG tablet Take 1 tablet by mouth nightly TAKE 1 TABLET NIGHTLY 90 tablet 3    gabapentin (NEURONTIN) 300 MG capsule Take 1 capsule by mouth daily for 90 days. 90 capsule 1    albuterol (PROVENTIL) (2.5 MG/3ML) 0.083% nebulizer solution Take 3 mLs by nebulization every 6 hours as needed for Wheezing 120 each 3    budesonide-formoterol (SYMBICORT) 160-4.5 MCG/ACT AERO Inhale 2 puffs into the lungs 2 times daily 2 each LOT [id number] 3 Inhaler 1    albuterol sulfate  (90 Base) MCG/ACT inhaler Inhale 2 puffs into the lungs every 6 hours as needed for Wheezing or Shortness of Breath 3 Inhaler 1    doxazosin (CARDURA) 4 MG tablet TAKE ONE-HALF (1/2) TABLET TWICE A DAY 90 tablet 4    nitroGLYCERIN (NITROSTAT) 0.4 MG SL tablet DISSOLVE 1 TABLET UNDER THE TONGUE EVERY 5 MINUTES AS NEEDED FOR CHEST PAIN. MAXIMUM OF 3 TABLETS 25 tablet 2    lisinopril (PRINIVIL;ZESTRIL) 20 MG tablet Take 1 tablet by mouth daily 90 tablet 3    testosterone (ANDROGEL; TESTIM) 50 MG/5GM (1%) GEL 1% gel Alternate one packet daily with 2 packets daily every other day 135 Package 0    fluticasone (FLONASE) 50 MCG/ACT nasal spray 1 spray by Each Nare route daily 1 Spray in each nostril 3 Bottle 1    CPAP Machine MISC New cpap supplies mask hose filters etc 1 each 0    Saccharomyces boulardii (PROBIOTIC) 250 MG CAPS Take 1 tablet by mouth daily      esomeprazole Magnesium (NEXIUM) 20 MG PACK Take 20 mg by mouth daily      aspirin 81 MG tablet Take 81 mg by mouth daily.  mesalamine (PENTASA) 250 MG CR capsule Take 500 mg by mouth 4 times daily. No current facility-administered medications on file prior to visit.       Past Medical History:   Diagnosis Date  A[facility]fib ([facility] [address]     Abnormal EMG 12/09/2015    Actinic keratoses     Actinic keratoses     Acute diastolic congestive heart failure (HCC) 1/6/2021    Allergic rhinitis     Anemia 11/18/2014    Arthritis     Chronic back pain     COPD [facility]chronic obstructive pulmonary disease) ([facility] 08/09/2012    COPD (chronic obstructive pulmonary disease) ([facility])     Crohns disease     Degenerative disc disease, lumbar     Dermatitis     Diabetes ([facility] [address] 03/19/2015    diet controlled    Gastrointestinal problem     GERD (gastroesophageal reflux disease)     History of atrial fibrillation 2006    Dr. Siddhartha Robles History of throat cancer 2004     cleared for reoccurence years ago    Hyperlipidemia 1/21/2014    Hypertension     Hypogonadism male     Lung disease     Mononeuritis multiplex     Mononeuritis multiplex     Nondependent alcohol abuse, in remission 7/22/2020    Obesity (BMI 30-39. 9) 7/24/2019    Osteoarthritis of lumbar spine     Pain of right heel     Paresthesia of foot, bilateral     Prediabetes 12/3/2014    Restless leg syndrome     Seborrheic dermatitis     Seborrheic keratoses, inflamed     Throat cancer ([facility])     throat, had surgery, sees Dr Freeman Haley yearly    Tinea cruris     Tinea pedis     Tinea unguium      Past Surgical History:   Procedure Laterality Date    CARDIAC CATHETERIZATION      CARPAL TUNNEL RELEASE      CATARACT REMOVAL WITH IMPLANT Right 09/09/2019    CATARACT REMOVAL WITH IMPLANT Left 07/22/2019    COLON SURGERY      COLONOSCOPY  04/15/2015    KNEE ARTHROSCOPY      LARYNGECTOMY  2004    UPPER GASTROINTESTINAL ENDOSCOPY  03/24/13    Dr. Braydon HOWARD/NAPOLEON RODRIGUEZ  2002     Social History     Socioeconomic History    Marital status:      Spouse name: Not on file    Number of children: 3    Years of education: Not on file    Highest education level: Not on file   Occupational History    Not on file   Social Needs    Financial resource strain: Not on file    Food insecurity     Worry: Not on file     Inability: Not on file    Transportation needs     Medical: Not on file     Non-medical: Not on file   Tobacco Use    Smoking status: Former Smoker     Packs/day: 1.00     Years: 25.00     Pack years: 25.00     Types: Cigarettes     Quit date: 10/21/1990     Years since quittin.2    Smokeless tobacco: Never Used   Substance and Sexual Activity    Alcohol use: No     Comment: Attends [employer], 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    Drug use: No    Sexual activity: Not on file   Lifestyle    Physical activity     Days per week: Not on file     Minutes per session: Not on file    Stress: Not on file   Relationships    Social connections     Talks on phone: Not on file     Gets together: Not on file     Attends Church service: Not on file     Active member of club or organization: Not on file     Attends meetings of clubs or organizations: Not on file     Relationship status: Not on file    Intimate partner violence     Fear of current or ex partner: Not on file     Emotionally abused: Not on file     Physically abused: Not on file     Forced sexual activity: Not on file   Other Topics Concern    Not on file   Social History Narrative    Lives alone. Family History   Problem Relation Age of Onset    Heart Disease Mother     Liver Disease Mother     High Blood Pressure Mother     Heart Disease Father     Arthritis Father     Cancer Sister         lung     Allergies:  Alemtuzumab, Glycopyrrolate-formoterol, Mercaptopurine, and Moxifloxacin    Review of Systems   Constitutional: Negative for fever. HENT: Positive for congestion, hoarse voice and rhinorrhea. Negative for sore throat and trouble swallowing. Respiratory: Positive for cough, sputum production and shortness of breath. Negative for hemoptysis and wheezing. No frothy sputum.   Green sputum   Cardiovascular: Positive for dyspnea on exertion and leg swelling (resolves with propping). Negative for PND. Objective:   /60 (Site: Left Upper Arm, Position: Sitting, Cuff Size: Large Adult)   Pulse 59   Temp 97.3 °F (36.3 °C) (Infrared)   Ht 5' 10\" (1.778 m)   Wt 265 lb (120.2 kg)   SpO2 95%   BMI 38.02 kg/m²     Physical Exam    No results found for this visit on 12/23/20. Recent Results (from the past 2016 hour(s))   AST    Collection Time: 10/29/20  1:04 PM   Result Value Ref Range    AST 18 0 - 40 U/L   ALT    Collection Time: 10/29/20  1:04 PM   Result Value Ref Range    ALT 14 0 - 41 U/L   Basic Metabolic Panel    Collection Time: 10/29/20  1:04 PM   Result Value Ref Range    Sodium 142 135 - 144 mEq/L    Potassium 3.9 3.4 - 4.9 mEq/L    Chloride 105 95 - 107 mEq/L    CO2 29 20 - 31 mEq/L    Anion Gap 8 (L) 9 - 15 mEq/L    Glucose 105 (H) 70 - 99 mg/dL    BUN 18 8 - 23 mg/dL    CREATININE 0.87 0.70 - 1.20 mg/dL    GFR Non-African American >60.0 >60    GFR  >60.0 >60    Calcium 8.7 8.5 - 9.9 mg/dL           Assessment:       Diagnosis Orders   1. COPD exacerbation (HCC)  azithromycin (ZITHROMAX) 250 MG tablet   2. Hyperlipidemia, unspecified hyperlipidemia type  pravastatin (PRAVACHOL) 40 MG tablet   3. Onychomycosis  terbinafine (LAMISIL) 250 MG tablet   4. Tinea corporis  nystatin-triamcinolone (MYCOLOG II) 419750-7.1 UNIT/GM-% cream         No orders of the defined types were placed in this encounter. Plan:   Return in about 2 weeks (around 1/6/2021). There are no Patient Instructions on file for this visit. This note was partially created with the assistance of dictation. This may lead to grammatical or spelling errors. Sher Montgomery M.D.

## 2020-12-23 NOTE — TELEPHONE ENCOUNTER
C/o post nasal drip and shortness of breath and occasional cough. Take inhalers everyday d/t copd. Reason for Disposition   MODERATE longstanding difficulty breathing (e.g., speaks in phrases, SOB even at rest, pulse 100-120) and SAME as normal    Answer Assessment - Initial Assessment Questions  1. RESPIRATORY STATUS: \"Describe your breathing? \" (e.g., wheezing, shortness of breath, unable to speak, severe coughing)       Shortness of breathe    2. ONSET: \"When did this breathing problem begin? \"       Couple weeks    3. PATTERN \"Does the difficult breathing come and go, or has it been constant since it started? \"       Comes and goes    4. SEVERITY: \"How bad is your breathing? \" (e.g., mild, moderate, severe)     - MILD: No SOB at rest, mild SOB with walking, speaks normally in sentences, can lay down, no retractions, pulse < 100.     - MODERATE: SOB at rest, SOB with minimal exertion and prefers to sit, cannot lie down flat, speaks in phrases, mild retractions, audible wheezing, pulse 100-120.     - SEVERE: Very SOB at rest, speaks in single words, struggling to breathe, sitting hunched forward, retractions, pulse > 120       Mild    5. RECURRENT SYMPTOM: \"Have you had difficulty breathing before? \" If so, ask: \"When was the last time? \" and \"What happened that time? \"       Yes, have copd    6. CARDIAC HISTORY: \"Do you have any history of heart disease? \" (e.g., heart attack, angina, bypass surgery, angioplasty)       Heart failure    7. LUNG HISTORY: \"Do you have any history of lung disease? \"  (e.g., pulmonary embolus, asthma, emphysema)      Copd    8. CAUSE: \"What do you think is causing the breathing problem? \"       Not sure    9. OTHER SYMPTOMS: \"Do you have any other symptoms? (e.g., dizziness, runny nose, cough, chest pain, fever)      Denies    10. PREGNANCY: \"Is there any chance you are pregnant? \" \"When was your last menstrual period? \"        Na    11.  TRAVEL: \"Have you traveled out of the country in the last month? \" (e.g., travel history, exposures)        Denies    Protocols used: BREATHING DIFFICULTY-ADULT-[place]    Patient called pre-service center [facility]) to schedule appointment, with red flag complaint, transferred to RN access for triage. See above questions and answers. Caller talking full sentences without any distress on phone. Discussed disposition and patient agreeable. Discussed potential consequences for not following disposition recommendation. Aware to call back with any concerns or persistent, worsening, or new symptoms develop. Warm transfer to [facility] [place] scheduling for appointment. Attention Provider: Thank you for allowing me to participate in the care of your patient. The  patient was connected to triage in response to information provided to the E[facility]. Please do not respond through this encounter as the response is not directed to a shared pool.

## 2020-12-28 RX ORDER — METOPROLOL TARTRATE 50 MG/1
TABLET, FILM COATED ORAL
Qty: 15 TABLET | Refills: 0 | Status: SHIPPED | OUTPATIENT
Start: 2020-12-28 | End: 2022-01-12 | Stop reason: SDUPTHER

## 2021-01-06 ENCOUNTER — OFFICE VISIT (OUTPATIENT)
Dept: FAMILY MEDICINE CLINIC | Age: 71
End: 2021-01-06
Payer: MEDICARE

## 2021-01-06 DIAGNOSIS — I95.1 ORTHOSTATIC HYPOTENSION: ICD-10-CM

## 2021-01-06 DIAGNOSIS — I48.0 PAROXYSMAL A-FIB (HCC): ICD-10-CM

## 2021-01-06 DIAGNOSIS — K50.80 CROHN'S DISEASE OF BOTH SMALL AND LARGE INTESTINE WITHOUT COMPLICATION (HCC): ICD-10-CM

## 2021-01-06 DIAGNOSIS — E11.9 TYPE 2 DIABETES MELLITUS WITHOUT COMPLICATION, WITHOUT LONG-TERM CURRENT USE OF INSULIN (HCC): ICD-10-CM

## 2021-01-06 DIAGNOSIS — J44.1 COPD EXACERBATION (HCC): Primary | ICD-10-CM

## 2021-01-06 DIAGNOSIS — E66.01 MORBIDLY OBESE (HCC): ICD-10-CM

## 2021-01-06 DIAGNOSIS — I10 ESSENTIAL HYPERTENSION: ICD-10-CM

## 2021-01-06 DIAGNOSIS — L21.9 SEBORRHEIC DERMATITIS: ICD-10-CM

## 2021-01-06 DIAGNOSIS — L57.0 ACTINIC KERATOSES: ICD-10-CM

## 2021-01-06 DIAGNOSIS — I50.31 ACUTE DIASTOLIC CONGESTIVE HEART FAILURE (HCC): ICD-10-CM

## 2021-01-06 PROCEDURE — 17000 DESTRUCT PREMALG LESION: CPT | Performed by: FAMILY MEDICINE

## 2021-01-06 PROCEDURE — 99215 OFFICE O/P EST HI 40 MIN: CPT | Performed by: FAMILY MEDICINE

## 2021-01-06 RX ORDER — LISINOPRIL 20 MG/1
20 TABLET ORAL DAILY
Qty: 90 TABLET | Refills: 3 | Status: SHIPPED | OUTPATIENT
Start: 2021-01-06 | End: 2021-11-29 | Stop reason: SDUPTHER

## 2021-01-06 NOTE — PATIENT INSTRUCTIONS
Low bp, hold lasix for one day and work on hydration. Breathing better. Selsun blue for flaking face rash    No change in meds for CHF or Crohns due to current good control. Pt follows with cardiology every 6 months    Cryotherapy instructions    Post op instructions given. A printed copy provided. It is best to leave blisters alone if they form for the first 1-3 days to allow the desired damaged tissue (precancer lesion, wart, or whatever lesion is being removed) to separate from healthy tissue. They should be covered with a bandage to prevent blister breakage and dirt exposure. The wounds should remain dry while there is a blister, therefore if this is a sweaty location like the foot you may need to change socks multiple times per day. When the blister(s) pop or patient removes the top as instructed between day 3-5, apply antibiotic (NOT triple antibiotic, one brand is Neosporin) ointment and a bandage to affected area(s). The ointment should be applied to the open area as long as it is not covered with skin. Exposed tissue is meant to be moist.  Once a scab is formed the patient may stop applying ointment. The scab may appear yellow while moist, don't confuse this with infection. If the wound is infection pus will drain from the site. If this treatment was for a large wart you may note that a plug of skin may fall out of the area that was treated. That is the center of the wart and it is appropriate for it to come out. If exposed skin remains, treat that area as you would a ruptured blister as mentioned above.     Bacitracin sample supplied

## 2021-01-06 NOTE — PROGRESS NOTES
Diagnosis Orders   1. COPD exacerbation ([address]     2. Orthostatic hypotension     3. Crohn's disease of both small and large intestine without complication ([address]     4. Seborrheic dermatitis     5. Actinic keratoses  [place] DESTRUC PREMALIGNANT, FIRST LESION   6. Acute diastolic congestive heart failure (HCC)     7. Paroxysmal A-fib ([address]     8. Morbidly obese ([facility]     9. Type 2 diabetes mellitus without complication, without long-term current use of insulin ([facility]       Return in about 14 weeks (around 4/14/2021) for copd, blood pressure. Patient Instructions   Low bp, hold lasix for one day and work on hydration. Breathing better. Selsun blue for flaking face rash    Cryotherapy instructions    Post op instructions given. A printed copy provided. It is best to leave blisters alone if they form for the first 1-3 days to allow the desired damaged tissue (precancer lesion, wart, or whatever lesion is being removed) to separate from healthy tissue. They should be covered with a bandage to prevent blister breakage and dirt exposure. The wounds should remain dry while there is a blister, therefore if this is a sweaty location like the foot you may need to change socks multiple times per day. When the blister(s) pop or patient removes the top as instructed between day 3-5, apply antibiotic (NOT triple antibiotic, one brand is Neosporin) ointment and a bandage to affected area(s). The ointment should be applied to the open area as long as it is not covered with skin. Exposed tissue is meant to be moist.  Once a scab is formed the patient may stop applying ointment. The scab may appear yellow while moist, don't confuse this with infection. If the wound is infection pus will drain from the site. If this treatment was for a large wart you may note that a plug of skin may fall out of the area that was treated. That is the center of the wart and it is appropriate for it to come out.   If exposed skin remains, treat that area as you would a ruptured blister as mentioned above. Bacitracin sample supplied    No change in meds for CHF or Crohns due to current good control. Pt follows with cardiology every 6 months          Subjective:      Patient ID: Nick Dill is a 79 y.o. male who presents for:  Chief Complaint   Patient presents with    2 Week Follow-Up     Pt feeling slightly sluggish today     Discuss Medications       Patient notes feeling sluggish with upper lobe around today. Feels better when he sitting down. In general his breathing has been well controlled and he is without complaints. No medication side effects. No cough. Denies hemoptysis. Being followed by GI for his Crohn's disease. Medication has things under good control at this time. No diarrhea or constipation. Denies abdominal pain. Patient denies symptoms of irregular heartbeat. Denies symptoms of congestive heart failure as well. Sees cardiology regularly. He has been interested in losing weight more recently. Trying to be more active and watch what he eats. No actual workout regimen. Current Outpatient Medications on File Prior to Visit   Medication Sig Dispense Refill    metoprolol tartrate (LOPRESSOR) 50 MG tablet Take 1.5 po bid 15 tablet 0    azithromycin (ZITHROMAX) 250 MG tablet 2 tabs by mouth first day. 1 tab daily thereafter until gone. 6 tablet 0    pravastatin (PRAVACHOL) 40 MG tablet TAKE 1 TABLET NIGHTLY 90 tablet 3    terbinafine (LAMISIL) 250 MG tablet Take 1 tablet by mouth daily 84 tablet 0    nystatin-triamcinolone (MYCOLOG II) 494993-8.1 UNIT/GM-% cream Apply topically 2 times daily.  15 g 1    SPIRIVA HANDIHALER 18 MCG inhalation capsule INHALE THE CONTENTS OF 1 CAPSULE DAILY 90 capsule 3    furosemide (LASIX) 20 MG tablet Take 1 tablet by mouth daily 30 tablet 2    cetirizine (ZYRTEC) 5 MG tablet TAKE ONE TABLET BY MOUTH  PRN      inFLIXimab (REMICADE) 100 MG injection Infuse 5 mg/kg intravenously See Admin Instructions      montelukast (SINGULAIR) 10 MG tablet Take 1 tablet by mouth nightly TAKE 1 TABLET NIGHTLY 90 tablet 3    gabapentin (NEURONTIN) 300 MG capsule Take 1 capsule by mouth daily for 90 days. 90 capsule 1    albuterol (PROVENTIL) (2.5 MG/3ML) 0.083% nebulizer solution Take 3 mLs by nebulization every 6 hours as needed for Wheezing 120 each 3    budesonide-formoterol (SYMBICORT) 160-4.5 MCG/ACT AERO Inhale 2 puffs into the lungs 2 times daily 2 each LOT [id number] 3 Inhaler 1    albuterol sulfate  (90 Base) MCG/ACT inhaler Inhale 2 puffs into the lungs every 6 hours as needed for Wheezing or Shortness of Breath 3 Inhaler 1    doxazosin (CARDURA) 4 MG tablet TAKE ONE-HALF (1/2) TABLET TWICE A DAY 90 tablet 4    nitroGLYCERIN (NITROSTAT) 0.4 MG SL tablet DISSOLVE 1 TABLET UNDER THE TONGUE EVERY 5 MINUTES AS NEEDED FOR CHEST PAIN. MAXIMUM OF 3 TABLETS 25 tablet 2    testosterone (ANDROGEL; TESTIM) 50 MG/5GM (1%) GEL 1% gel Alternate one packet daily with 2 packets daily every other day 135 Package 0    fluticasone (FLONASE) 50 MCG/ACT nasal spray 1 spray by Each Nare route daily 1 Spray in each nostril 3 Bottle 1    CPAP Machine MISC New cpap supplies mask hose filters etc 1 each 0    Saccharomyces boulardii (PROBIOTIC) 250 MG CAPS Take 1 tablet by mouth daily      esomeprazole Magnesium (NEXIUM) 20 MG PACK Take 20 mg by mouth daily      aspirin 81 MG tablet Take 81 mg by mouth daily.  mesalamine (PENTASA) 250 MG CR capsule Take 500 mg by mouth 4 times daily. No current facility-administered medications on file prior to visit.       Past Medical History:   Diagnosis Date    A-fib [facility])     Abnormal EMG 12/09/2015    Actinic keratoses     Actinic keratoses     Acute diastolic congestive heart failure (HCC) 1/6/2021    Allergic rhinitis     Anemia 11/18/2014    Arthritis     Chronic back pain     [facility] 08/09/2012    COPD (chronic obstructive pulmonary disease) [facility]     Crohns disease     Degenerative disc disease, lumbar     Dermatitis     Diabetes ([facility] 03/19/2015    diet controlled    Gastrointestinal problem     GERD (gastroesophageal reflux disease)     History of atrial fibrillation 2006    Dr. Zehra Johnson History of throat cancer 2004     cleared for reoccurence years ago    Hyperlipidemia 1/21/2014    Hypertension     Hypogonadism male     Lung disease     Mononeuritis multiplex     Mononeuritis multiplex     Nondependent alcohol abuse, in remission 7/22/2020    Obesity (BMI 30-39. 9) 7/24/2019    Osteoarthritis of lumbar spine     Pain of right heel     Paresthesia of foot, bilateral     Prediabetes 12/3/2014    Restless leg syndrome     Seborrheic dermatitis     Seborrheic keratoses, inflamed     Throat cancer ([facility])     throat, had surgery, sees Dr Amish Ramirez yearly    Tinea cruris     Tinea pedis     Tinea unguium      Past Surgical History:   Procedure Laterality Date    CARDIAC CATHETERIZATION      CARPAL TUNNEL RELEASE      CATARACT REMOVAL WITH IMPLANT Right 09/09/2019    CATARACT REMOVAL WITH IMPLANT Left 07/22/2019    COLON SURGERY      COLONOSCOPY  04/15/2015    KNEE ARTHROSCOPY      LARYNGECTOMY  2004    UPPER GASTROINTESTINAL ENDOSCOPY  03/24/13    Dr. Blake Prieto W/NAPOLEON INJ  2002     Social History     Socioeconomic History    Marital status:      Spouse name: Not on file    Number of children: 3    Years of education: Not on file    Highest education level: Not on file   Occupational History    Not on file   Social Needs    Financial resource strain: Not on file    Food insecurity     Worry: Not on file     Inability: Not on file   [employer] needs     Medical: Not on file     Non-medical: Not on file   Tobacco Use    Smoking status: Former Smoker     Packs/day: 1.00     Years: 25.00 Pack years: 25.00     Types: Cigarettes     Quit date: 10/21/1990     Years since quittin.2    Smokeless tobacco: Never Used   Substance and Sexual Activity    Alcohol use: No     Comment: Attends [employer], 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    Drug use: No    Sexual activity: Not on file   Lifestyle    Physical activity     Days per week: Not on file     Minutes per session: Not on file    Stress: Not on file   Relationships    Social connections     Talks on phone: Not on file     Gets together: Not on file     Attends [employer] service: Not on file     Active member of club or organization: Not on file     Attends meetings of clubs or organizations: Not on file     Relationship status: Not on file    Intimate partner violence     Fear of current or ex partner: Not on file     Emotionally abused: Not on file     Physically abused: Not on file     Forced sexual activity: Not on file   Other Topics Concern    Not on file   Social History Narrative    Lives alone. Family History   Problem Relation Age of Onset    Heart Disease Mother     Liver Disease Mother     High Blood Pressure Mother     Heart Disease Father     Arthritis Father     Cancer Sister         lung     Allergies:  Alemtuzumab, Glycopyrrolate-formoterol, Mercaptopurine, and Moxifloxacin    Review of Systems   Constitutional: Positive for fatigue. Negative for activity change, appetite change, chills, diaphoresis, fever and unexpected weight change. HENT: Negative for congestion and sore throat. Eyes: Negative for photophobia and visual disturbance. Respiratory: Negative for cough, chest tightness and shortness of breath. No orthopnea   Cardiovascular: Negative for chest pain, palpitations and leg swelling. Gastrointestinal: Negative for abdominal distention, abdominal pain, diarrhea and nausea. Genitourinary: Negative for flank pain and frequency. Musculoskeletal: Negative for gait problem and joint swelling.    Neurological: Negative for dizziness, weakness, light-headedness and headaches. Psychiatric/Behavioral: Negative for confusion. Objective:   /62 (Site: Left Upper Arm, Position: Sitting, Cuff Size: Medium Adult)   Pulse 62   Temp 98.2 °F (36.8 °C)   Ht 5' 9\" (1.753 m)   Wt 263 lb (119.3 kg)   SpO2 96%   BMI 38.84 kg/m²     Physical Exam  Constitutional:       Appearance: Normal appearance. He is well-developed. He is not ill-appearing or diaphoretic. Comments: Vitals signs reviewed   HENT:      Head: Normocephalic and atraumatic. Right Ear: Hearing and external ear normal.      Left Ear: Hearing and external ear normal.      Nose: No nasal deformity or rhinorrhea. Eyes:      General: Lids are normal.         Right eye: No discharge. Left eye: No discharge. Extraocular Movements:      Right eye: No nystagmus. Left eye: No nystagmus. Conjunctiva/sclera: Conjunctivae normal.      Right eye: No hemorrhage. Left eye: No hemorrhage. Pupils: Pupils are equal, round, and reactive to light. Neck:      Musculoskeletal: Full passive range of motion without pain and neck supple. Normal range of motion. Thyroid: No thyroid mass or thyromegaly. Vascular: Normal carotid pulses. No carotid bruit or JVD. Trachea: No tracheal tenderness or tracheal deviation. Cardiovascular:      Rate and Rhythm: Normal rate and regular rhythm. Chest Wall: PMI displaced: normal location PMI. Pulses:           Carotid pulses are 2+ on the right side and 2+ on the left side. Radial pulses are 2+ on the right side and 2+ on the left side. Heart sounds: S1 normal and S2 normal. No murmur. No friction rub. No gallop. No S3 sounds. Pulmonary:      Effort: Pulmonary effort is normal. No accessory muscle usage or respiratory distress. Breath sounds: Normal breath sounds. Chest:      Chest wall: No deformity. Abdominal:      General: There is no distension. Musculoskeletal:         General: No deformity. Cervical back: He exhibits normal range of motion, no tenderness and no deformity. Lymphadenopathy:      Cervical:      Right cervical: No superficial cervical adenopathy. Left cervical: No superficial cervical adenopathy. Upper Body:      Right upper body: No supraclavicular adenopathy. Left upper body: No supraclavicular adenopathy. Skin:     General: Skin is warm and dry. Findings: No bruising or rash. Nails: There is no clubbing. Comments: 1 erythematous base raised rough white flaking lesion noted on right forearm   Neurological:      Mental Status: He is alert. Cranial Nerves: Cranial nerve deficit: CN II-XII GI without obvious deficit. Sensory: Sensory deficit: grossly intact for hands. Motor: No tremor. Atrophy: B UE and LE without atrophy. Abnormal muscle tone: B UE and LE have normal tone. Coordination: Coordination normal.      Gait: Gait normal.   Psychiatric:         Attention and Perception: He is attentive. Mood and Affect: Mood is not anxious. Affect is not inappropriate. Speech: Speech normal.         Behavior: Behavior is not agitated. Behavior is cooperative. Judgment: Judgment normal.     Patient's blood pressure was improved after sitting for a little while. Discussed different medications and side effects and realized patient is fluid behind while taking Lasix    No results found for this visit on 01/06/21.     Recent Results (from the past 2016 hour(s))   AST    Collection Time: 10/29/20  1:04 PM   Result Value Ref Range    AST 18 0 - 40 U/L   ALT    Collection Time: 10/29/20  1:04 PM   Result Value Ref Range    ALT 14 0 - 41 U/L   Basic Metabolic Panel    Collection Time: 10/29/20  1:04 PM   Result Value Ref Range    Sodium 142 135 - 144 mEq/L    Potassium 3.9 3.4 - 4.9 mEq/L    Chloride 105 95 - 107 mEq/L    CO2 29 20 - 31 mEq/L    Anion Gap 8 (L) 9 - 15 mEq/L Glucose 105 (H) 70 - 99 mg/dL    BUN 18 8 - 23 mg/dL    CREATININE 0.87 0.70 - 1.20 mg/dL    GFR Non-African American >60.0 >60    GFR  >60.0 >60    Calcium 8.7 8.5 - 9.9 mg/dL         PROCEDURE:  The procedure was discussed with the patient. All questions were answered and alternative options discussed. The patient is aware of the risks of bleeding, infection, unsatisfactory scar result. Informed consent paperwork was signed by the patient. Liquid nitrogen was applied to the affected areas until freezing was noted then a thaw was allowed between dosing for a total of 2 applications. Applied to 1 lesion(s). The patient tolerated the procedure well. Post op instructions given. A printed copy provided. Assessment:       Diagnosis Orders   1. COPD exacerbation ([address]     2. Orthostatic hypotension     3. Crohn's disease of both small and large intestine without complication ([address]     4. Seborrheic dermatitis     5. Actinic keratoses  [place] DESTRUC PREMALIGNANT, FIRST LESION   6. Acute diastolic congestive heart failure (HCC)     7. Paroxysmal A-fib ([address]     8. Morbidly obese ([address]     9. Type 2 diabetes mellitus without complication, without long-term current use of insulin ([address]           Orders Placed This Encounter   Procedures     [facility], FIRST LESION         Plan:   Return in about 14 weeks (around 4/14/2021) for copd, blood pressure. Patient Instructions   Low bp, hold lasix for one day and work on hydration. Breathing better. Selsun blue for flaking face rash    Cryotherapy instructions    Post op instructions given. A printed copy provided. It is best to leave blisters alone if they form for the first 1-3 days to allow the desired damaged tissue (precancer lesion, wart, or whatever lesion is being removed) to separate from healthy tissue. They should be covered with a bandage to prevent blister breakage and dirt exposure.   The wounds should remain dry while there is a blister, therefore if this is a sweaty location like the foot you may need to change socks multiple times per day. When the blister(s) pop or patient removes the top as instructed between day 3-5, apply antibiotic (NOT triple antibiotic, one brand is Neosporin) ointment and a bandage to affected area(s). The ointment should be applied to the open area as long as it is not covered with skin. Exposed tissue is meant to be moist.  Once a scab is formed the patient may stop applying ointment. The scab may appear yellow while moist, don't confuse this with infection. If the wound is infection pus will drain from the site. If this treatment was for a large wart you may note that a plug of skin may fall out of the area that was treated. That is the center of the wart and it is appropriate for it to come out. If exposed skin remains, treat that area as you would a ruptured blister as mentioned above. Bacitracin sample supplied    No change in meds for CHF or Crohns due to current good control. Pt follows with cardiology every 6 months          This note was partially created with the assistance of dictation. This may lead to grammatical or spelling errors. Sher Rehman M.D.

## 2021-01-11 VITALS
HEART RATE: 62 BPM | WEIGHT: 263 LBS | TEMPERATURE: 98.2 F | DIASTOLIC BLOOD PRESSURE: 62 MMHG | HEIGHT: 69 IN | OXYGEN SATURATION: 96 % | BODY MASS INDEX: 38.95 KG/M2 | SYSTOLIC BLOOD PRESSURE: 110 MMHG

## 2021-01-11 ASSESSMENT — ENCOUNTER SYMPTOMS
ABDOMINAL PAIN: 0
ABDOMINAL DISTENTION: 0
NAUSEA: 0
DIARRHEA: 0
CHEST TIGHTNESS: 0
SHORTNESS OF BREATH: 0
COUGH: 0
PHOTOPHOBIA: 0
SORE THROAT: 0

## 2021-01-26 DIAGNOSIS — J44.1 COPD EXACERBATION (HCC): ICD-10-CM

## 2021-01-27 NOTE — TELEPHONE ENCOUNTER
Zithromax is not generally the best for sinus issues.   If he is feeling ill he should have at least a video visit to discuss this

## 2021-01-28 ENCOUNTER — TELEPHONE (OUTPATIENT)
Dept: FAMILY MEDICINE CLINIC | Age: 71
End: 2021-01-28

## 2021-01-28 ASSESSMENT — PATIENT HEALTH QUESTIONNAIRE - PHQ9
SUM OF ALL RESPONSES TO PHQ QUESTIONS 1-9: 0
1. LITTLE INTEREST OR PLEASURE IN DOING THINGS: 0
2. FEELING DOWN, DEPRESSED OR HOPELESS: 0

## 2021-02-01 RX ORDER — AZITHROMYCIN 250 MG/1
TABLET, FILM COATED ORAL
Qty: 6 TABLET | Refills: 0 | OUTPATIENT
Start: 2021-02-01

## 2021-02-26 ENCOUNTER — TELEPHONE (OUTPATIENT)
Dept: PULMONOLOGY | Age: 71
End: 2021-02-26

## 2021-02-26 ENCOUNTER — OFFICE VISIT (OUTPATIENT)
Dept: FAMILY MEDICINE CLINIC | Age: 71
End: 2021-02-26
Payer: MEDICARE

## 2021-02-26 VITALS
TEMPERATURE: 98.3 F | SYSTOLIC BLOOD PRESSURE: 94 MMHG | WEIGHT: 259 LBS | HEIGHT: 69 IN | OXYGEN SATURATION: 96 % | BODY MASS INDEX: 38.36 KG/M2 | HEART RATE: 64 BPM | DIASTOLIC BLOOD PRESSURE: 62 MMHG

## 2021-02-26 DIAGNOSIS — B35.1 ONYCHOMYCOSIS: ICD-10-CM

## 2021-02-26 DIAGNOSIS — L21.9 SEBORRHEIC DERMATITIS: Primary | ICD-10-CM

## 2021-02-26 PROCEDURE — 99214 OFFICE O/P EST MOD 30 MIN: CPT | Performed by: FAMILY MEDICINE

## 2021-02-26 PROCEDURE — 3288F FALL RISK ASSESSMENT DOCD: CPT | Performed by: FAMILY MEDICINE

## 2021-02-26 ASSESSMENT — PATIENT HEALTH QUESTIONNAIRE - PHQ9
1. LITTLE INTEREST OR PLEASURE IN DOING THINGS: 0
SUM OF ALL RESPONSES TO PHQ QUESTIONS 1-9: 0
SUM OF ALL RESPONSES TO PHQ9 QUESTIONS 1 & 2: 0
SUM OF ALL RESPONSES TO PHQ QUESTIONS 1-9: 0

## 2021-02-26 ASSESSMENT — ENCOUNTER SYMPTOMS: COLOR CHANGE: 1

## 2021-02-26 NOTE — TELEPHONE ENCOUNTER
[facility] called stating that they are unable to obtain a Compliance Report at this time because they have to send him a SD Card for CPAP.

## 2021-02-26 NOTE — PATIENT INSTRUCTIONS
Continue Selsun Blue as a face wash intermittently to keep seborrhea under control. Toenail care trimming no more than once a week. Trimmed straight across. Can consider soaking the nail in vinegar for 2 to 3 minutes and then rinsing with water to help decrease fungal count. Initiate Penlac toenail polish routinely. Recheck at next visit with medical conditions in March  Patient Education        Toenail Fungus: Care Instructions  Overview  A nail that is infected by a fungus usually turns white or yellow. As the fungus spreads, the nail turns a darker color and gets thicker. And the nail edges start to turn ragged and crumble. A bad infection can cause pain, and the nail may pull away from the toe or finger. Nails that are exposed to moisture and warmth a lot are more likely to get infected by a fungus. This can happen from wearing sweaty shoes often and from walking barefoot on shower floors. Or it can happen if you share personal things, such as towels and nail clippers. It's hard to treat nail fungus. And the infection can return after it has cleared up. But medicines can sometimes get rid of nail fungus for good. If the infection is very bad, or if it causes a lot of pain, you may need to have the nail removed. Follow-up care is a key part of your treatment and safety. Be sure to make and go to all appointments, and call your doctor if you are having problems. It's also a good idea to know your test results and keep a list of the medicines you take. How can you care for yourself at home? · Take your medicines exactly as prescribed. Call your doctor if you have any problems with your medicine. · If your doctor gave you a cream or liquid to put on your nail, use it exactly as directed. · Wash your hands and feet often, and wash your hands after touching your feet. · Keep your nails clean and dry. Dry your feet completely after you bathe and before you put on shoes and socks.   · Keep your nails trimmed. · Change socks often. Wear dry socks that absorb moisture. · Don't go barefoot in public places. · Use a spray or powder that fights fungus on your feet and in your shoes. · Don't pick at the skin around your nails. · Don't use nail polish or fake nails on your nails. · Don't share personal things, such as towels and nail clippers. When should you call for help? Call your doctor now or seek immediate medical care if:    · You have signs of infection, such as:  ? Increased pain, swelling, warmth, or redness. ? Red streaks leading from the site. ? Pus draining from the site. ? A fever.     · You have new or increased toe pain. Watch closely for changes in your health, and be sure to contact your doctor if:    · You do not get better as expected. Where can you learn more? Go to https://[employer]vantag[employer]ipptereb.[employer]. org and sign in to your [employer] account. Enter D202 in the [facility] box to learn more about \"Toenail Fungus: Care Instructions. \"     If you do not have an account, please click on the \"Sign Up Now\" link. Current as of: July 2, 2020               Content Version: 12.6  © 7585-5931 Healthwise, Incorporated. Care instructions adapted under license by [facility] ([facility]). If you have questions about a medical condition or this instruction, always ask your healthcare professional. [place] [address] any warranty or liability for your use of this information. Patient Education        Seborrheic Dermatitis: Care Instructions  Your Care Instructions  Seborrheic dermatitis (say \"ijd-vgu-OHN-ick juc-twx-MY-tus\") is a skin problem that causes a reddish rash with greasy, flaky, yellow skin patches. The rash may appear on many parts of the body. It may be on the scalp, face (especially the eyebrow area and between the nose and mouth), ears, breasts, underarms, and genital area. The flaky skin on the scalp is called dandruff.   This rash is often a long-term (chronic) condition. It may last for years. But the symptoms may come and go. Symptoms can be treated with special creams, shampoos, or other skin care. The cause of seborrheic dermatitis is not fully understood. It may occur when skin glands make too much oil. It may get worse in cold weather or with stress. A type of skin fungus, or yeast, may also be linked with this condition. Follow-up care is a key part of your treatment and safety. Be sure to make and go to all appointments, and call your doctor if you are having problems. It's also a good idea to know your test results and keep a list of the medicines you take. How can you care for yourself at home? · If your doctor prescribes a steroid cream, dandruff shampoo, or antifungal cream or medicine, use it as directed. If your doctor prescribes other medicine, take it as directed. · Use a dandruff shampoo if seborrheic dermatitis affects your scalp. This includes Head & Shoulders, Sebulex, and Selsun Blue. You may need to try a few kinds of shampoo to find the one that works best for you. · To help with itching:  ? Use hydrocortisone cream. Follow the directions on the label. ? Use cold, wet cloths. ? Take an over-the-counter antihistamine, such as diphenhydramine (Benadryl) or loratadine (Claritin). Read and follow all instructions on the label. When should you call for help? Call your doctor now or seek immediate medical care if:    · You have signs of infection, such as:  ? Increased pain, swelling, warmth, or redness. ? Red streaks leading from the rash. ? Pus draining from the rash. ? A fever. Watch closely for changes in your health, and be sure to contact your doctor if:    · The rash gets worse or spreads to other parts of your body.     · You do not get better as expected. Where can you learn more? Go to https://sunshine.[employer]. org and sign in to your [employer] account.  Enter U225 in the [employer] box to learn more about \"Seborrheic Dermatitis: Care Instructions. \"     If you do not have an account, please click on the \"Sign Up Now\" link. Current as of: July 2, 2020               Content Version: 12.6  © 4605-6080 [employer]. Care instructions adapted under license by [facility] ([facility]). If you have questions about a medical condition or this instruction, always ask your healthcare professional. [address] any warranty or liability for your use of this information.

## 2021-02-26 NOTE — PROGRESS NOTES
Diagnosis Orders   1. Seborrheic dermatitis     2. Onychomycosis  ciclopirox (PENLAC) 8 % solution     Return for keep next planned appointment. Patient Instructions     Continue [facility] as a face wash intermittently to keep seborrhea under control. Toenail care trimming no more than once a week. Trimmed straight across. Can consider soaking the nail in vinegar for 2 to 3 minutes and then rinsing with water to help decrease fungal count. Initiate Penlac toenail polish routinely. Recheck at next visit with medical conditions in March  Patient Education        Toenail Fungus: Care Instructions  Overview  A nail that is infected by a fungus usually turns white or yellow. As the fungus spreads, the nail turns a darker color and gets thicker. And the nail edges start to turn ragged and crumble. A bad infection can cause pain, and the nail may pull away from the toe or finger. Nails that are exposed to moisture and warmth a lot are more likely to get infected by a fungus. This can happen from wearing sweaty shoes often and from walking barefoot on shower floors. Or it can happen if you share personal things, such as towels and nail clippers. It's hard to treat nail fungus. And the infection can return after it has cleared up. But medicines can sometimes get rid of nail fungus for good. If the infection is very bad, or if it causes a lot of pain, you may need to have the nail removed. Follow-up care is a key part of your treatment and safety. Be sure to make and go to all appointments, and call your doctor if you are having problems. It's also a good idea to know your test results and keep a list of the medicines you take. How can you care for yourself at home? · Take your medicines exactly as prescribed. Call your doctor if you have any problems with your medicine. · If your doctor gave you a cream or liquid to put on your nail, use it exactly as directed.   · Wash your hands and feet often, and wash your hands after touching your feet. · Keep your nails clean and dry. Dry your feet completely after you bathe and before you put on shoes and socks. · Keep your nails trimmed. · Change socks often. Wear dry socks that absorb moisture. · Don't go barefoot in public places. · Use a spray or powder that fights fungus on your feet and in your shoes. · Don't pick at the skin around your nails. · Don't use nail polish or fake nails on your nails. · Don't share personal things, such as towels and nail clippers. When should you call for help? Call your doctor now or seek immediate medical care if:    · You have signs of infection, such as:  ? Increased pain, swelling, warmth, or redness. ? Red streaks leading from the site. ? Pus draining from the site. ? A fever.     · You have new or increased toe pain. Watch closely for changes in your health, and be sure to contact your doctor if:    · You do not get better as expected. Where can you learn more? Go to [email or web address] and sign in to your [employer] account. Enter D202 in the [facility] box to learn more about \"Toenail Fungus: Care Instructions. \"     If you do not have an account, please click on the \"Sign Up Now\" link. Current as of: July 2, 2020               Content Version: 12.6  © 3736-9720 [employer]. Care instructions adapted under license by [facility] ([facility]). If you have questions about a medical condition or this instruction, always ask your healthcare professional. [place] [address] any warranty or liability for your use of this information. Patient Education        Seborrheic Dermatitis: Care Instructions  Your Care Instructions  Seborrheic dermatitis (say \"npw-jti-QID-ick rlm-fcj-MP-tus\") is a skin problem that causes a reddish rash with greasy, flaky, yellow skin patches. The rash may appear on many parts of the body.  It may be on the scalp, face (especially the eyebrow area and between the nose and mouth), ears, breasts, underarms, and genital area. The flaky skin on the scalp is called dandruff. This rash is often a long-term (chronic) condition. It may last for years. But the symptoms may come and go. Symptoms can be treated with special creams, shampoos, or other skin care. The cause of seborrheic dermatitis is not fully understood. It may occur when skin glands make too much oil. It may get worse in cold weather or with stress. A type of skin fungus, or yeast, may also be linked with this condition. Follow-up care is a key part of your treatment and safety. Be sure to make and go to all appointments, and call your doctor if you are having problems. It's also a good idea to know your test results and keep a list of the medicines you take. How can you care for yourself at home? · If your doctor prescribes a steroid cream, dandruff shampoo, or antifungal cream or medicine, use it as directed. If your doctor prescribes other medicine, take it as directed. · Use a dandruff shampoo if seborrheic dermatitis affects your scalp. This includes Head & Shoulders, Sebulex, and Selsun Blue. You may need to try a few kinds of shampoo to find the one that works best for you. · To help with itching:  ? Use hydrocortisone cream. Follow the directions on the label. ? Use cold, wet cloths. ? Take an over-the-counter antihistamine, such as diphenhydramine (Benadryl) or loratadine (Claritin). Read and follow all instructions on the label. When should you call for help? Call your doctor now or seek immediate medical care if:    · You have signs of infection, such as:  ? Increased pain, swelling, warmth, or redness. ? Red streaks leading from the rash. ? Pus draining from the rash. ? A fever. Watch closely for changes in your health, and be sure to contact your doctor if:    · The rash gets worse or spreads to other parts of your body.     · You do not get better as expected. Where can you learn more? Go to [email or web address] and sign in to your [employer] account. Enter U444 in the [employer] box to learn more about \"Seborrheic Dermatitis: Care Instructions. \"     If you do not have an account, please click on the \"Sign Up Now\" link. Current as of: July 2, 2020               Content Version: 12.6  © 2006-2020 "[employer] i-marker. Care instructions adapted under license by [facility] ([facility]). If you have questions about a medical condition or this instruction, always ask your healthcare professional. [place] [address] any warranty or liability for your use of this information. Subjective:      Patient ID: Jaimie Grant is a 79 y.o. male who presents for:  Chief Complaint   Patient presents with    Skin Problem     skin on face getting very dry     Toe Pain     would like you too look at tonails        Patient notes a history of oily skin in the past with some rash before now his skin is dry. Not flaky dry just regular dry. He does not know what to do about this. He states he had gotten [employer] in the past and uses to wash his face though he has not been doing this recently. Patient states his toenails appear to be growing out better. He saw the [place] doctor who said he should not have more Lamisil pills. We revisited the fact that this is true this is not the a year-long medication. He denies doing any of the preventative wash or nail polish at this time. Current Outpatient Medications on File Prior to Visit   Medication Sig Dispense Refill    lisinopril (PRINIVIL;ZESTRIL) 20 MG tablet Take 1 tablet by mouth daily 90 tablet 3    metoprolol tartrate (LOPRESSOR) 50 MG tablet Take 1.5 po bid 15 tablet 0    azithromycin (ZITHROMAX) 250 MG tablet 2 tabs by mouth first day. 1 tab daily thereafter until gone.  6 tablet 0    pravastatin (PRAVACHOL) 40 MG tablet TAKE 1 TABLET NIGHTLY 90 tablet 3    terbinafine (LAMISIL) 250 MG tablet Take 1 tablet by mouth daily 84 tablet 0    nystatin-triamcinolone (MYCOLOG II) 435354-8.1 UNIT/GM-% cream Apply topically 2 times daily. 15 g 1    SPIRIVA HANDIHALER 18 MCG inhalation capsule INHALE THE CONTENTS OF 1 CAPSULE DAILY 90 capsule 3    furosemide (LASIX) 20 MG tablet Take 1 tablet by mouth daily 30 tablet 2    cetirizine (ZYRTEC) 5 MG tablet TAKE ONE TABLET BY MOUTH  PRN      inFLIXimab (REMICADE) 100 MG injection Infuse 5 mg/kg intravenously See Admin Instructions      montelukast (SINGULAIR) 10 MG tablet Take 1 tablet by mouth nightly TAKE 1 TABLET NIGHTLY 90 tablet 3    gabapentin (NEURONTIN) 300 MG capsule Take 1 capsule by mouth daily for 90 days. 90 capsule 1    albuterol (PROVENTIL) (2.5 MG/3ML) 0.083% nebulizer solution Take 3 mLs by nebulization every 6 hours as needed for Wheezing 120 each 3    budesonide-formoterol (SYMBICORT) 160-4.5 MCG/ACT AERO Inhale 2 puffs into the lungs 2 times daily 2 each LOT [id number] 3 Inhaler 1    albuterol sulfate  (90 Base) MCG/ACT inhaler Inhale 2 puffs into the lungs every 6 hours as needed for Wheezing or Shortness of Breath 3 Inhaler 1    doxazosin (CARDURA) 4 MG tablet TAKE ONE-HALF (1/2) TABLET TWICE A DAY 90 tablet 4    nitroGLYCERIN (NITROSTAT) 0.4 MG SL tablet DISSOLVE 1 TABLET UNDER THE TONGUE EVERY 5 MINUTES AS NEEDED FOR CHEST PAIN. MAXIMUM OF 3 TABLETS 25 tablet 2    fluticasone (FLONASE) 50 MCG/ACT nasal spray 1 spray by Each Nare route daily 1 Spray in each nostril 3 Bottle 1    CPAP Machine MISC New cpap supplies mask hose filters etc 1 each 0    Saccharomyces boulardii (PROBIOTIC) 250 MG CAPS Take 1 tablet by mouth daily      esomeprazole Magnesium (NEXIUM) 20 MG PACK Take 20 mg by mouth daily      aspirin 81 MG tablet Take 81 mg by mouth daily.  mesalamine (PENTASA) 250 MG CR capsule Take 500 mg by mouth 4 times daily.       testosterone (ANDROGEL; TESTIM) 50 MG/5GM (1%) GEL 1% gel Alternate one packet daily with 2 packets daily every other day 135 Package 0     No current facility-administered medications on file prior to visit. Past Medical History:   Diagnosis Date    A-fib [facility])     Abnormal EMG 12/09/2015    Actinic keratoses     Actinic keratoses     Acute diastolic congestive heart failure (HCC) 1/6/2021    Allergic rhinitis     Anemia 11/18/2014    Arthritis     Chronic back pain     COPD (chronic obstructive pulmonary disease) ([facility]) 08/09/2012    COPD (chronic obstructive pulmonary disease) ([facility])     Crohns disease     Degenerative disc disease, lumbar     Dermatitis     Diabetes ([facility] [address] 03/19/2015    diet controlled    Gastrointestinal problem     GERD (gastroesophageal reflux disease)     History of atrial fibrillation 2006    Dr. Sara Kim History of throat cancer 2004     cleared for reoccurence years ago    Hyperlipidemia 1/21/2014    Hypertension     Hypogonadism male     Lung disease     Mononeuritis multiplex     Mononeuritis multiplex     Nondependent alcohol abuse, in remission 7/22/2020    Obesity (BMI 30-39. 9) 7/24/2019    Osteoarthritis of lumbar spine     Pain of right heel     Paresthesia of foot, bilateral     Prediabetes 12/3/2014    Restless leg syndrome     Seborrheic dermatitis     Seborrheic keratoses, inflamed     Throat cancer ([facility])     throat, had surgery, sees Dr Chaparrita Boone yearly    Tinea cruris     Tinea pedis     Tinea unguium      Past Surgical History:   Procedure Laterality Date    CARDIAC CATHETERIZATION      CARPAL TUNNEL RELEASE      CATARACT REMOVAL WITH IMPLANT Right 09/09/2019    CATARACT REMOVAL WITH IMPLANT Left 07/22/2019    COLON SURGERY      COLONOSCOPY  04/15/2015    KNEE ARTHROSCOPY      LARYNGECTOMY  2004    UPPER GASTROINTESTINAL ENDOSCOPY  03/24/13    Dr. Maryam Sears W/NAPOLEON RODRIGUEZ  2002     Social History     Socioeconomic History    Marital status:      Spouse immunocompromised state. Hematological: Negative for adenopathy. Does not bruise/bleed easily. Psychiatric/Behavioral: Negative for behavioral problems and sleep disturbance. Objective:   BP 94/62   Pulse 64   Temp 98.3 °F (36.8 °C)   Ht 5' 9\" (1.753 m)   Wt 259 lb (117.5 kg)   SpO2 96%   BMI 38.25 kg/m²     Physical Exam  Constitutional:       General: He is not in acute distress. Appearance: Normal appearance. He is well-developed. He is not toxic-appearing. HENT:      Head: Normocephalic and atraumatic. Right Ear: Hearing and tympanic membrane normal.      Left Ear: Hearing and tympanic membrane normal.      Nose: Nose normal. No nasal deformity. Eyes:      General: Lids are normal.         Right eye: No discharge. Left eye: No discharge. Conjunctiva/sclera: Conjunctivae normal.      Pupils: Pupils are equal, round, and reactive to light. Neck:      Musculoskeletal: Full passive range of motion without pain. Thyroid: No thyroid mass or thyromegaly. Vascular: No JVD. Trachea: Trachea and phonation normal.   Cardiovascular:      Rate and Rhythm: Normal rate and regular rhythm. Pulmonary:      Effort: No accessory muscle usage or respiratory distress. Musculoskeletal:      Comments: FROM all large muscle groups and joints as witnessed when walking to exam table, getting on, and getting off the exam table. Skin:     General: Skin is warm and dry. Findings: No rash. Comments: Facial skin clear and without flake or abnormality. Bilateral first toes reveal 50% or less thickening and growth acne external edge of the base of the nail shows clearing   Neurological:      Mental Status: He is alert. Motor: No tremor or atrophy. Gait: Gait normal.   Psychiatric:         Speech: Speech normal.         Behavior: Behavior normal.         Thought Content: Thought content normal.         No results found for this visit on 02/26/21.     No results found for this or any previous visit (from the past 2016 hour(s)). Assessment:       Diagnosis Orders   1. Seborrheic dermatitis     2. Onychomycosis  ciclopirox (PENLAC) 8 % solution         No orders of the defined types were placed in this encounter. Plan:   Return for keep next planned appointment. Patient Instructions     Continue [facility] as a face wash intermittently to keep seborrhea under control. Toenail care trimming no more than once a week. Trimmed straight across. Can consider soaking the nail in vinegar for 2 to 3 minutes and then rinsing with water to help decrease fungal count. Initiate Penlac toenail polish routinely. Recheck at next visit with medical conditions in March  Patient Education        Toenail Fungus: Care Instructions  Overview  A nail that is infected by a fungus usually turns white or yellow. As the fungus spreads, the nail turns a darker color and gets thicker. And the nail edges start to turn ragged and crumble. A bad infection can cause pain, and the nail may pull away from the toe or finger. Nails that are exposed to moisture and warmth a lot are more likely to get infected by a fungus. This can happen from wearing sweaty shoes often and from walking barefoot on shower floors. Or it can happen if you share personal things, such as towels and nail clippers. It's hard to treat nail fungus. And the infection can return after it has cleared up. But medicines can sometimes get rid of nail fungus for good. If the infection is very bad, or if it causes a lot of pain, you may need to have the nail removed. Follow-up care is a key part of your treatment and safety. Be sure to make and go to all appointments, and call your doctor if you are having problems. It's also a good idea to know your test results and keep a list of the medicines you take. How can you care for yourself at home? · Take your medicines exactly as prescribed.  Call your doctor if you have any problems with your medicine. · If your doctor gave you a cream or liquid to put on your nail, use it exactly as directed. · Wash your hands and feet often, and wash your hands after touching your feet. · Keep your nails clean and dry. Dry your feet completely after you bathe and before you put on shoes and socks. · Keep your nails trimmed. · Change socks often. Wear dry socks that absorb moisture. · Don't go barefoot in public places. · Use a spray or powder that fights fungus on your feet and in your shoes. · Don't pick at the skin around your nails. · Don't use nail polish or fake nails on your nails. · Don't share personal things, such as towels and nail clippers. When should you call for help? Call your doctor now or seek immediate medical care if:    · You have signs of infection, such as:  ? Increased pain, swelling, warmth, or redness. ? Red streaks leading from the site. ? Pus draining from the site. ? A fever.     · You have new or increased toe pain. Watch closely for changes in your health, and be sure to contact your doctor if:    · You do not get better as expected. Where can you learn more? Go to [email or web address] and sign in to your [employer] account. Enter D202 in the [facility] box to learn more about \"Toenail Fungus: Care Instructions. \"     If you do not have an account, please click on the \"Sign Up Now\" link. Current as of: July 2, 2020               Content Version: 12.6  © 7689-0506 [employer]. Care instructions adapted under license by [facility] ([facility]). If you have questions about a medical condition or this instruction, always ask your healthcare professional. [place] [address] any warranty or liability for your use of this information.          Patient Education        Seborrheic Dermatitis: Care Instructions  Your Care Instructions  Seborrheic dermatitis (say \"yax-zgz-LZB-ick lky-hqf-GO-tus\") is closely for changes in your health, and be sure to contact your doctor if:    · The rash gets worse or spreads to other parts of your body.     · You do not get better as expected. Where can you learn more? Go to [email or web address] and sign in to your [employer] account. Enter W147 in the [employer] box to learn more about \"Seborrheic Dermatitis: Care Instructions. \"     If you do not have an account, please click on the \"Sign Up Now\" link. Current as of: July 2, 2020               Content Version: 12.6  © 5897-0736 [employer]. Care instructions adapted under license by [facility] ([facility]). If you have questions about a medical condition or this instruction, always ask your healthcare professional. [address] any warranty or liability for your use of this information. This note was partially created with the assistance of dictation. This may lead to grammatical or spelling errors. Sher Suarez M.D.

## 2021-02-26 NOTE — PROGRESS NOTES
Return in about 14 weeks (around 4/14/2021) for copd, blood pressure.   Patient Instructions   Low bp, hold lasix for one day and work on hydration.     Breathing better.      Selsun blue for flaking face rash

## 2021-03-03 ENCOUNTER — OFFICE VISIT (OUTPATIENT)
Dept: FAMILY MEDICINE CLINIC | Age: 71
End: 2021-03-03
Payer: MEDICARE

## 2021-03-03 VITALS
BODY MASS INDEX: 38.21 KG/M2 | HEART RATE: 77 BPM | SYSTOLIC BLOOD PRESSURE: 102 MMHG | HEIGHT: 69 IN | OXYGEN SATURATION: 98 % | DIASTOLIC BLOOD PRESSURE: 68 MMHG | WEIGHT: 258 LBS | TEMPERATURE: 98.5 F

## 2021-03-03 DIAGNOSIS — R19.5 LOOSE STOOLS: ICD-10-CM

## 2021-03-03 DIAGNOSIS — E11.9 TYPE 2 DIABETES MELLITUS WITHOUT COMPLICATION, WITHOUT LONG-TERM CURRENT USE OF INSULIN (HCC): ICD-10-CM

## 2021-03-03 DIAGNOSIS — R19.5 LOOSE STOOLS: Primary | ICD-10-CM

## 2021-03-03 LAB
ALBUMIN SERPL-MCNC: 3.8 G/DL (ref 3.5–4.6)
ALP BLD-CCNC: 94 U/L (ref 35–104)
ALT SERPL-CCNC: 12 U/L (ref 0–41)
ANION GAP SERPL CALCULATED.3IONS-SCNC: 11 MEQ/L (ref 9–15)
AST SERPL-CCNC: 20 U/L (ref 0–40)
BASOPHILS ABSOLUTE: 0.1 K/UL (ref 0–0.2)
BASOPHILS RELATIVE PERCENT: 0.9 %
BILIRUB SERPL-MCNC: 0.3 MG/DL (ref 0.2–0.7)
BUN BLDV-MCNC: 14 MG/DL (ref 8–23)
CALCIUM SERPL-MCNC: 8.7 MG/DL (ref 8.5–9.9)
CHLORIDE BLD-SCNC: 102 MEQ/L (ref 95–107)
CO2: 25 MEQ/L (ref 20–31)
CREAT SERPL-MCNC: 0.98 MG/DL (ref 0.7–1.2)
EOSINOPHILS ABSOLUTE: 0.3 K/UL (ref 0–0.7)
EOSINOPHILS RELATIVE PERCENT: 3.7 %
GFR AFRICAN AMERICAN: >60
GFR NON-AFRICAN AMERICAN: >60
GLOBULIN: 2.8 G/DL (ref 2.3–3.5)
GLUCOSE BLD-MCNC: 84 MG/DL (ref 70–99)
HBA1C MFR BLD: 5.6 %
HCT VFR BLD CALC: 39.2 % (ref 42–52)
HEMOGLOBIN: 13 G/DL (ref 14–18)
LYMPHOCYTES ABSOLUTE: 1.4 K/UL (ref 1–4.8)
LYMPHOCYTES RELATIVE PERCENT: 15.9 %
MCH RBC QN AUTO: 29.3 PG (ref 27–31.3)
MCHC RBC AUTO-ENTMCNC: 33 % (ref 33–37)
MCV RBC AUTO: 88.7 FL (ref 80–100)
MONOCYTES ABSOLUTE: 0.9 K/UL (ref 0.2–0.8)
MONOCYTES RELATIVE PERCENT: 10 %
NEUTROPHILS ABSOLUTE: 6.3 K/UL (ref 1.4–6.5)
NEUTROPHILS RELATIVE PERCENT: 69.5 %
PDW BLD-RTO: 13.6 % (ref 11.5–14.5)
PLATELET # BLD: 202 K/UL (ref 130–400)
POTASSIUM SERPL-SCNC: 4 MEQ/L (ref 3.4–4.9)
RBC # BLD: 4.42 M/UL (ref 4.7–6.1)
SODIUM BLD-SCNC: 138 MEQ/L (ref 135–144)
TOTAL PROTEIN: 6.6 G/DL (ref 6.3–8)
TSH SERPL DL<=0.05 MIU/L-ACNC: 1.38 UIU/ML (ref 0.44–3.86)
WBC # BLD: 9.1 K/UL (ref 4.8–10.8)

## 2021-03-03 PROCEDURE — 83036 HEMOGLOBIN GLYCOSYLATED A1C: CPT | Performed by: FAMILY MEDICINE

## 2021-03-03 PROCEDURE — 99214 OFFICE O/P EST MOD 30 MIN: CPT | Performed by: FAMILY MEDICINE

## 2021-03-03 ASSESSMENT — ENCOUNTER SYMPTOMS
BLOOD IN STOOL: 0
SHORTNESS OF BREATH: 0
NAUSEA: 0
ABDOMINAL DISTENTION: 0
DIARRHEA: 1
CONSTIPATION: 0
ABDOMINAL PAIN: 0
COUGH: 0
VOMITING: 0
SORE THROAT: 0

## 2021-03-03 NOTE — PATIENT INSTRUCTIONS
Hold off on treatment until we get some answers regarding the loose stools. Especially since he is tolerating this so well and they are decreasing in frequency. Diabetic control seems good with hemoglobin A1c 5.6 today.   Remainder of laboratories etc. will be done at his routine visit

## 2021-03-03 NOTE — PROGRESS NOTES
Instructions 1/6/21       Return in about 14 weeks (around 4/14/2021) for copd, blood pressure.   Low bp, hold lasix for one day and work on hydration.     Breathing better.      Selsun blue for flaking face rash     No change in meds for CHF or Crohns due to current good control.      Pt follows with cardiology every 6 months

## 2021-03-05 DIAGNOSIS — R19.5 LOOSE STOOLS: ICD-10-CM

## 2021-03-06 LAB
C DIFF TOXIN/ANTIGEN: NORMAL
GI BACTERIAL PATHOGENS BY PCR: NORMAL
GIARDIA ANTIGEN STOOL: NORMAL
HEMOCCULT STL QL: NORMAL

## 2021-03-10 ENCOUNTER — OFFICE VISIT (OUTPATIENT)
Dept: PULMONOLOGY | Age: 71
End: 2021-03-10
Payer: MEDICARE

## 2021-03-10 VITALS
TEMPERATURE: 96.8 F | BODY MASS INDEX: 38.21 KG/M2 | OXYGEN SATURATION: 94 % | SYSTOLIC BLOOD PRESSURE: 124 MMHG | HEART RATE: 86 BPM | HEIGHT: 69 IN | RESPIRATION RATE: 16 BRPM | WEIGHT: 258 LBS | DIASTOLIC BLOOD PRESSURE: 78 MMHG

## 2021-03-10 DIAGNOSIS — J44.9 CHRONIC OBSTRUCTIVE PULMONARY DISEASE, UNSPECIFIED COPD TYPE (HCC): ICD-10-CM

## 2021-03-10 DIAGNOSIS — E66.9 OBESITY (BMI 30-39.9): ICD-10-CM

## 2021-03-10 DIAGNOSIS — G47.33 OSA ON CPAP: Primary | ICD-10-CM

## 2021-03-10 DIAGNOSIS — Z99.89 OSA ON CPAP: Primary | ICD-10-CM

## 2021-03-10 PROCEDURE — 99214 OFFICE O/P EST MOD 30 MIN: CPT | Performed by: INTERNAL MEDICINE

## 2021-03-10 ASSESSMENT — ENCOUNTER SYMPTOMS
VOICE CHANGE: 0
DIARRHEA: 0
SORE THROAT: 0
CHEST TIGHTNESS: 0
NAUSEA: 0
COUGH: 0
VOMITING: 0
RHINORRHEA: 0
WHEEZING: 0
ABDOMINAL PAIN: 0
EYE ITCHING: 0
SHORTNESS OF BREATH: 1

## 2021-03-10 NOTE — PROGRESS NOTES
Subjective:     Bruce Spears is a 79 y.o. male who complains today of:     Chief Complaint   Patient presents with    Follow-up     four month f/u for  JARRELL giorgi COPD. HPI  He is using CPAP with 14   centimeters of H2O with heated humidity. His CPAP is more than 11 yrs old. He is using CPAP for about 6-8   hours every night. He is using CPAP with  Nasal  pillow  He said  sleep is restful with the CPAP use. He is compliant with CPAP therapy and benefiting with CPAP use. No snoring with CPAP use. No complaint of daytime sleepiness or tiredness with CPAP use. He denies taking naps. No sleepiness with driving. He denies difficulty falling asleep or staying asleep. He is using symbicort and spiriva, albuterol HFA prn   C/o shortness of breath , worse with exertion. No Wheezing   No Cough with  Sputum  No Hemoptysis  No Chest tightness   No Chest pain with radiation  or pleuritic pain  No Fever or chills. No Rhinorrhea and postnasal drip.     Allergies:  Alemtuzumab, Glycopyrrolate-formoterol, Mercaptopurine, and Moxifloxacin  Past Medical History:   Diagnosis Date    A-fib ([address]     Abnormal EMG 12/09/2015    Actinic keratoses     Actinic keratoses     Acute diastolic congestive heart failure (HCC) 1/6/2021    Allergic rhinitis     Anemia 11/18/2014    Arthritis     Chronic back pain     [facility] 08/09/2012    [facility]     Crohns disease     Degenerative disc disease, lumbar     Dermatitis     Diabetes ([address] 03/19/2015    diet controlled    Gastrointestinal problem     GERD (gastroesophageal reflux disease)     History of atrial fibrillation 2006    Dr. Ignacio Conde History of throat cancer 2004     cleared for reoccurence years ago    Hyperlipidemia 1/21/2014    Hypertension     Hypogonadism male     Lung disease     Mononeuritis multiplex     Mononeuritis multiplex     Nondependent alcohol file    Stress: Not on file   Relationships    Social connections     Talks on phone: Not on file     Gets together: Not on file     Attends Che[employer] service: Not on file     Active member of club or organization: Not on file     Attends meetings of clubs or organizations: Not on file     Relationship status: Not on file    Intimate partner violence     Fear of current or ex partner: Not on file     Emotionally abused: Not on file     Physically abused: Not on file     Forced sexual activity: Not on file   Other Topics Concern    Not on file   Social History Narrative    Lives alone. Review of Systems   Constitutional: Negative for chills, diaphoresis, fatigue and fever. HENT: Negative for congestion, mouth sores, nosebleeds, postnasal drip, rhinorrhea, sneezing, sore throat and voice change. Eyes: Negative for itching and visual disturbance. Respiratory: Positive for shortness of breath. Negative for cough, chest tightness and wheezing. Cardiovascular: Negative. Negative for chest pain, palpitations and leg swelling. Gastrointestinal: Negative for abdominal pain, diarrhea, nausea and vomiting. Genitourinary: Negative for difficulty urinating and hematuria. Musculoskeletal: Negative for arthralgias, joint swelling and myalgias. Skin: Negative for rash. Allergic/Immunologic: Negative for environmental allergies. Neurological: Negative for dizziness, tremors, weakness and headaches. Psychiatric/Behavioral: Positive for sleep disturbance. Negative for behavioral problems. :     Vitals:    03/10/21 1435   BP: 124/78   Pulse: 86   Resp: 16   Temp: 96.8 °F (36 °C)   SpO2: 94%   Weight: 258 lb (117 kg)   Height: 5' 9\" (1.753 m)     Wt Readings from Last 3 Encounters:   03/10/21 258 lb (117 kg)   03/03/21 258 lb (117 kg)   02/26/21 259 lb (117.5 kg)         Physical Exam  Constitutional:       Appearance: He is well-developed. He is obese.    HENT:      Head: Normocephalic and atraumatic. Nose: Nose normal.   Eyes:      Conjunctiva/sclera: Conjunctivae normal.      Pupils: Pupils are equal, round, and reactive to light. Neck:      Thyroid: No thyromegaly. Vascular: No JVD. Trachea: No tracheal deviation. Cardiovascular:      Rate and Rhythm: Normal rate and regular rhythm. Heart sounds: No murmur. No friction rub. No gallop. Pulmonary:      Effort: Pulmonary effort is normal. No respiratory distress. Breath sounds: Normal breath sounds. No wheezing or rales. Chest:      Chest wall: No tenderness. Abdominal:      General: There is no distension. Musculoskeletal: Normal range of motion. Lymphadenopathy:      Cervical: No cervical adenopathy. Skin:     General: Skin is warm and dry. Findings: No rash. Neurological:      Mental Status: He is alert and oriented to person, place, and time. Cranial Nerves: No cranial nerve deficit. Psychiatric:         Behavior: Behavior normal.         Current Outpatient Medications   Medication Sig Dispense Refill    ciclopirox (PENLAC) 8 % solution Apply topically nightly. 1 Bottle 2    lisinopril (PRINIVIL;ZESTRIL) 20 MG tablet Take 1 tablet by mouth daily 90 tablet 3    metoprolol tartrate (LOPRESSOR) 50 MG tablet Take 1.5 po bid 15 tablet 0    azithromycin (ZITHROMAX) 250 MG tablet 2 tabs by mouth first day. 1 tab daily thereafter until gone. 6 tablet 0    pravastatin (PRAVACHOL) 40 MG tablet TAKE 1 TABLET NIGHTLY 90 tablet 3    terbinafine (LAMISIL) 250 MG tablet Take 1 tablet by mouth daily 84 tablet 0    nystatin-triamcinolone (MYCOLOG II) 236706-3.1 UNIT/GM-% cream Apply topically 2 times daily.  15 g 1    SPIRIVA HANDIHALER 18 MCG inhalation capsule INHALE THE CONTENTS OF 1 CAPSULE DAILY 90 capsule 3    furosemide (LASIX) 20 MG tablet Take 1 tablet by mouth daily 30 tablet 2    cetirizine (ZYRTEC) 5 MG tablet TAKE ONE TABLET BY MOUTH  PRN      inFLIXimab (REMICADE) 100 MG injection Infuse 5 mg/kg intravenously See Admin Instructions      montelukast (SINGULAIR) 10 MG tablet Take 1 tablet by mouth nightly TAKE 1 TABLET NIGHTLY 90 tablet 3    albuterol (PROVENTIL) (2.5 MG/3ML) 0.083% nebulizer solution Take 3 mLs by nebulization every 6 hours as needed for Wheezing 120 each 3    budesonide-formoterol (SYMBICORT) 160-4.5 MCG/ACT AERO Inhale 2 puffs into the lungs 2 times daily 2 each LOT [id number] 3 Inhaler 1    albuterol sulfate  (90 Base) MCG/ACT inhaler Inhale 2 puffs into the lungs every 6 hours as needed for Wheezing or Shortness of Breath 3 Inhaler 1    doxazosin (CARDURA) 4 MG tablet TAKE ONE-HALF (1/2) TABLET TWICE A DAY 90 tablet 4    nitroGLYCERIN (NITROSTAT) 0.4 MG SL tablet DISSOLVE 1 TABLET UNDER THE TONGUE EVERY 5 MINUTES AS NEEDED FOR CHEST PAIN. MAXIMUM OF 3 TABLETS 25 tablet 2    fluticasone (FLONASE) 50 MCG/ACT nasal spray 1 spray by Each Nare route daily 1 Spray in each nostril 3 Bottle 1    CPAP Machine MISC New cpap supplies mask hose filters etc 1 each 0    Saccharomyces boulardii (PROBIOTIC) 250 MG CAPS Take 1 tablet by mouth daily      esomeprazole Magnesium (NEXIUM) 20 MG PACK Take 20 mg by mouth daily      aspirin 81 MG tablet Take 81 mg by mouth daily.  mesalamine (PENTASA) 250 MG CR capsule Take 500 mg by mouth 4 times daily.  gabapentin (NEURONTIN) 300 MG capsule Take 1 capsule by mouth daily for 90 days. 90 capsule 1    testosterone (ANDROGEL; TESTIM) 50 MG/5GM (1%) GEL 1% gel Alternate one packet daily with 2 packets daily every other day 135 Package 0     No current facility-administered medications for this visit.         Results for orders placed in visit on 02/21/20   XR CHEST STANDARD (2 VW)    Narrative EXAMINATION: XR CHEST (2 VW)    DATE AND TIME:2/21/2020 11:08 AM     CLINICAL HISTORY: Shortness of breath  J44.1 COPD with exacerbation ([address] ICD10     COMPARISONS: November 7, 2019     FINDINGS: Allowing for inspiratory effort lungs are clear of any fresh infiltrate. No overt signs of pulmonary edema. No effusion. No pneumothorax. Bones intact. Impression NO ACTIVE LUNG DISEASE.     ]  Results for orders placed during the hospital encounter of 19   XR CHEST PORTABLE    Narrative Patient MRN: [id number]    : 1950    Age:  71 years    Gender: Male    Order Date: 2019 4:45 AM.     Exam: XR CHEST PORTABLE    Number of Views: 1     Indication:   Chest Pain, rapid heart beat per pt. Comparison: 3/15/2019    Findings: Stable, enlarged cardiomediastinal silhouette with thoracic aortic vascular calcifications. No pneumonia, pneumothorax or pleural effusion. Impression Impression:  Stable, enlarged cardiomediastinal silhouette with thoracic aortic vascular calcifications     ]    Assessment/Plan:     1. JARRELL on CPAP  He is using CPAP with 14   centimeters of H2O with heated humidity. His CPAP is more than 11 yrs old. want new CPAP He is using CPAP for about 6-8   hours every night. He is using CPAP with  Nasal  pillowHe said  sleep is restful with the CPAP use. He is compliant with CPAP therapy and benefiting with CPAP use. No snoring with CPAP use. Continue CPAP therapy as before. New CPAP requested. Counseling: CPAP/BiPAP uses, He advised to use CPAP at least 5-6 hours every night. Driving: He is advised for extreme caution when driving or operating machinery if there is a feeling of drowsiness, especially while driving it is preferable to stop driving and take a brief nap. Sleep hygiene:Avoid supine sleep, sleep on  sides. Avoid  sleep deprivation. Explained sleep hygiene. Advice to avoid Alcohol and sedative    2. Chronic obstructive pulmonary disease, unspecified COPD type ([address]  He is using symbicort and spiriva, albuterol HFA prn . C/o shortness of breath , worse with exertion. No Wheezing , No Cough with  Sputum. He is using symbicort and spiriva, albuterol HFA prn.   Continue same    - XR CHEST STANDARD (2 VW); Future  - Full PFT Study With Bronchodilator; Future  - COVID-19 Ambulatory; Future    3. Obesity (BMI 30-39. 9)  He is advised try to lose weight. obesity related risk explained to the patient ,  Current weight:  258 lb (117 kg) Lbs. BMI:  Body mass index is 38.1 kg/m². Suggested weight control approaches, including dietary changes , exercise, behavioral modification. Return in about 6 weeks (around 4/21/2021) for COPD, rhona.       Lieutenant Jumana MD

## 2021-03-11 DIAGNOSIS — G89.29 CHRONIC MIDLINE BACK PAIN, UNSPECIFIED BACK LOCATION: ICD-10-CM

## 2021-03-11 DIAGNOSIS — M54.9 CHRONIC MIDLINE BACK PAIN, UNSPECIFIED BACK LOCATION: ICD-10-CM

## 2021-03-11 DIAGNOSIS — I10 ESSENTIAL HYPERTENSION: ICD-10-CM

## 2021-03-12 RX ORDER — GABAPENTIN 300 MG/1
CAPSULE ORAL
Qty: 90 CAPSULE | Refills: 3 | Status: SHIPPED | OUTPATIENT
Start: 2021-03-12 | End: 2022-04-14

## 2021-03-12 RX ORDER — DOXAZOSIN MESYLATE 4 MG/1
TABLET ORAL
Qty: 90 TABLET | Refills: 3 | Status: SHIPPED | OUTPATIENT
Start: 2021-03-12 | End: 2022-01-12 | Stop reason: SDUPTHER

## 2021-03-23 ENCOUNTER — OFFICE VISIT (OUTPATIENT)
Dept: FAMILY MEDICINE CLINIC | Age: 71
End: 2021-03-23
Payer: MEDICARE

## 2021-03-23 VITALS
BODY MASS INDEX: 38.45 KG/M2 | DIASTOLIC BLOOD PRESSURE: 66 MMHG | HEART RATE: 56 BPM | OXYGEN SATURATION: 97 % | HEIGHT: 69 IN | TEMPERATURE: 98.2 F | SYSTOLIC BLOOD PRESSURE: 108 MMHG | WEIGHT: 259.6 LBS

## 2021-03-23 DIAGNOSIS — E11.9 TYPE 2 DIABETES MELLITUS WITHOUT COMPLICATION, WITHOUT LONG-TERM CURRENT USE OF INSULIN (HCC): ICD-10-CM

## 2021-03-23 DIAGNOSIS — Z00.00 MEDICARE ANNUAL WELLNESS VISIT, SUBSEQUENT: Primary | ICD-10-CM

## 2021-03-23 DIAGNOSIS — Z13.31 SCREENING FOR DEPRESSION: ICD-10-CM

## 2021-03-23 PROCEDURE — G0439 PPPS, SUBSEQ VISIT: HCPCS | Performed by: FAMILY MEDICINE

## 2021-03-23 PROCEDURE — 99213 OFFICE O/P EST LOW 20 MIN: CPT | Performed by: FAMILY MEDICINE

## 2021-03-23 ASSESSMENT — PATIENT HEALTH QUESTIONNAIRE - PHQ9
SUM OF ALL RESPONSES TO PHQ9 QUESTIONS 1 & 2: 0
2. FEELING DOWN, DEPRESSED OR HOPELESS: 0
SUM OF ALL RESPONSES TO PHQ QUESTIONS 1-9: 0
1. LITTLE INTEREST OR PLEASURE IN DOING THINGS: 0

## 2021-03-23 ASSESSMENT — ENCOUNTER SYMPTOMS
SHORTNESS OF BREATH: 0
PHOTOPHOBIA: 0
ABDOMINAL DISTENTION: 0
CHEST TIGHTNESS: 0
ABDOMINAL PAIN: 0

## 2021-03-23 NOTE — PATIENT INSTRUCTIONS
Personalized Preventive Plan for Daksha Hughes - 3/23/2021  Medicare offers a range of preventive health benefits. Some of the tests and screenings are paid in full while other may be subject to a deductible, co-insurance, and/or copay. Some of these benefits include a comprehensive review of your medical history including lifestyle, illnesses that may run in your family, and various assessments and screenings as appropriate. After reviewing your medical record and screening and assessments performed today your provider may have ordered immunizations, labs, imaging, and/or referrals for you. A list of these orders (if applicable) as well as your Preventive Care list are included within your After Visit Summary for your review. Other Preventive Recommendations:    · A preventive eye exam performed by an eye specialist is recommended every 1-2 years to screen for glaucoma; cataracts, macular degeneration, and other eye disorders. · A preventive dental visit is recommended every 6 months. · Try to get at least 150 minutes of exercise per week or 10,000 steps per day on a pedometer . · Order or download the FREE \"Exercise & Physical Activity: Your Everyday Guide\" from [employer]. Call 4-320.535.3386 or search [employer] online. · You need 5165-0905 mg of calcium and 2413-4668 IU of vitamin D per day. It is possible to meet your calcium requirement with diet alone, but a vitamin D supplement is usually necessary to meet this goal.  · When exposed to the sun, use a sunscreen that protects against both UVA and UVB radiation with an SPF of 30 or greater. Reapply every 2 to 3 hours or after sweating, drying off with a towel, or swimming. · Always wear a seat belt when traveling in a car. Always wear a helmet when riding a bicycle or motorcycle. Patient Education        Learning About Meal Planning for Diabetes  Why plan your meals?      Meal planning can be a hannah part of managing diabetes. Planning meals and snacks with the right balance of carbohydrate, protein, and fat can help you keep your blood sugar at the target level you set with your doctor. You don't have to eat special foods. You can eat what your family eats, including sweets once in a while. But you do have to pay attention to how often you eat and how much you eat of certain foods. You may want to work with a dietitian or a certified diabetes educator. He or she can give you tips and meal ideas and can answer your questions about meal planning. This health professional can also help you reach a healthy weight if that is one of your goals. What plan is right for you? Your dietitian or diabetes educator may suggest that you start with the plate format or carbohydrate counting. The plate format  The plate format is a simple way to help you manage how you eat. You plan meals by learning how much space each food should take on a plate. Using the plate format helps you spread carbohydrate throughout the day. It can make it easier to keep your blood sugar level within your target range. It also helps you see if you're eating healthy portion sizes. To use the plate format, you put non-starchy vegetables on half your plate. Add meat or meat substitutes on one-quarter of the plate. Put a grain or starchy vegetable (such as brown rice or a potato) on the final quarter of the plate. You can add a small piece of fruit and some low-fat or fat-free milk or yogurt, depending on your carbohydrate goal for each meal.  Here are some tips for using the plate format:  · Make sure that you are not using an oversized plate. A 9-inch plate is best. Many restaurants use larger plates. · Get used to using the plate format at home. Then you can use it when you eat out. · Write down your questions about using the plate format. Talk to your doctor, a dietitian, or a diabetes educator about your concerns.   Carbohydrate during the day. So the pump must be programmed at meals to give you extra insulin to cover the rise in blood sugar after meals. When you know how much carbohydrate you will eat, you can take the right amount of insulin. Or, if you always use the same amount of insulin, you need to make sure that you eat the same amount of carbohydrate at meals. If you need more help to understand carbohydrate counting and food labels, ask your doctor, dietitian, or diabetes educator. How can you plan healthy meals? Here are some tips to get started:  · Plan your meals a week at a time. Don't forget to include snacks too. · Use cookbooks or online recipes to plan several main meals. Plan some quick meals for busy nights. You also can double some recipes that freeze well. Then you can save half for other busy nights when you don't have time to cook. · Make sure you have the ingredients you need for your recipes. If you're running low on basic items, put these items on your shopping list too. · List foods that you use to make breakfasts, lunches, and snacks. List plenty of fruits and vegetables. · Post this list on the refrigerator. Add to it as you think of more things you need. · Take the list to the store to do your weekly shopping. Follow-up care is a key part of your treatment and safety. Be sure to make and go to all appointments, and call your doctor if you are having problems. It's also a good idea to know your test results and keep a list of the medicines you take. Where can you learn more? Go to [email or web address] org and sign in to your [employer] account. Enter O927 in the [facility] box to learn more about \"Learning About Meal Planning for Diabetes. \"     If you do not have an account, please click on the \"Sign Up Now\" link. Current as of: August 31, 2020               Content Version: 12.8  © 9397-0913 [employer].    Care instructions adapted under license by [facility]

## 2021-03-23 NOTE — PROGRESS NOTES
2/26/2021:   Continue Selsun Blue as a face wash intermittently to keep seborrhea under control. Toenail care trimming no more than once a week. Trimmed straight across. Can consider soaking the nail in vinegar for 2 to 3 minutes and then rinsing with water to help decrease fungal count. Initiate Penlac toenail polish routinely.      Recheck at next visit with medical conditions in March  Patient Education

## 2021-03-23 NOTE — PROGRESS NOTES
Diagnosis Orders   1. Medicare annual wellness visit, subsequent     2. Type 2 diabetes mellitus without complication, without long-term current use of insulin (HCC)  Microalbumin / Creatinine Urine Ratio    HM DIABETES FOOT EXAM    HM DIABETES EYE EXAM   3. Screening for depression       Return for Medicare Annual Wellness Visit in 1 year. Patient Instructions       Personalized Preventive Plan for Tanvi Melchor - 3/23/2021  Medicare offers a range of preventive health benefits. Some of the tests and screenings are paid in full while other may be subject to a deductible, co-insurance, and/or copay. Some of these benefits include a comprehensive review of your medical history including lifestyle, illnesses that may run in your family, and various assessments and screenings as appropriate. After reviewing your medical record and screening and assessments performed today your provider may have ordered immunizations, labs, imaging, and/or referrals for you. A list of these orders (if applicable) as well as your Preventive Care list are included within your After Visit Summary for your review. Other Preventive Recommendations:    · A preventive eye exam performed by an eye specialist is recommended every 1-2 years to screen for glaucoma; cataracts, macular degeneration, and other eye disorders. · A preventive dental visit is recommended every 6 months. · Try to get at least 150 minutes of exercise per week or 10,000 steps per day on a pedometer . · Order or download the FREE \"Exercise & Physical Activity: Your Everyday Guide\" from [employer]. Call 2-415.331.8773 or search [employer] online. · You need 9614-0243 mg of calcium and 9515-2282 IU of vitamin D per day.  It is possible to meet your calcium requirement with diet alone, but a vitamin D supplement is usually necessary to meet this goal.  · When exposed to the sun, use a sunscreen that protects against both UVA and UVB radiation with an SPF of 30 or greater. Reapply every 2 to 3 hours or after sweating, drying off with a towel, or swimming. · Always wear a seat belt when traveling in a car. Always wear a helmet when riding a bicycle or motorcycle. Patient Education        Learning About Meal Planning for Diabetes  Why plan your meals? Meal planning can be a key part of managing diabetes. Planning meals and snacks with the right balance of carbohydrate, protein, and fat can help you keep your blood sugar at the target level you set with your doctor. You don't have to eat special foods. You can eat what your family eats, including sweets once in a while. But you do have to pay attention to how often you eat and how much you eat of certain foods. You may want to work with a dietitian or a certified diabetes educator. He or she can give you tips and meal ideas and can answer your questions about meal planning. This health professional can also help you reach a healthy weight if that is one of your goals. What plan is right for you? Your dietitian or diabetes educator may suggest that you start with the plate format or carbohydrate counting. The plate format  The plate format is a simple way to help you manage how you eat. You plan meals by learning how much space each food should take on a plate. Using the plate format helps you spread carbohydrate throughout the day. It can make it easier to keep your blood sugar level within your target range. It also helps you see if you're eating healthy portion sizes. To use the plate format, you put non-starchy vegetables on half your plate. Add meat or meat substitutes on one-quarter of the plate. Put a grain or starchy vegetable (such as brown rice or a potato) on the final quarter of the plate.  You can add a small piece of fruit and some low-fat or fat-free milk or yogurt, depending on your carbohydrate goal for each meal.  Here are some tips for using the plate lists carbohydrate in these foods. Ask your doctor, dietitian, or diabetes educator about books or other nutrition guides you can use. If you take insulin, you need to know how many grams of carbohydrate are in a meal. This lets you know how much rapid-acting insulin to take before you eat. If you use an insulin pump, you get a constant rate of insulin during the day. So the pump must be programmed at meals to give you extra insulin to cover the rise in blood sugar after meals. When you know how much carbohydrate you will eat, you can take the right amount of insulin. Or, if you always use the same amount of insulin, you need to make sure that you eat the same amount of carbohydrate at meals. If you need more help to understand carbohydrate counting and food labels, ask your doctor, dietitian, or diabetes educator. How can you plan healthy meals? Here are some tips to get started:  · Plan your meals a week at a time. Don't forget to include snacks too. · Use cookbooks or online recipes to plan several main meals. Plan some quick meals for busy nights. You also can double some recipes that freeze well. Then you can save half for other busy nights when you don't have time to cook. · Make sure you have the ingredients you need for your recipes. If you're running low on basic items, put these items on your shopping list too. · List foods that you use to make breakfasts, lunches, and snacks. List plenty of fruits and vegetables. · Post this list on the refrigerator. Add to it as you think of more things you need. · Take the list to the store to do your weekly shopping. Follow-up care is a key part of your treatment and safety. Be sure to make and go to all appointments, and call your doctor if you are having problems. It's also a good idea to know your test results and keep a list of the medicines you take. Where can you learn more? Go to [email or web address] and sign in to your MyChart account. Enter O410 in the [facility] box to learn more about \"Learning About Meal Planning for Diabetes. \"     If you do not have an account, please click on the \"Sign Up Now\" link. Current as of: August 31, 2020               Content Version: 12.8  © 5637-1512 [employer]. Care instructions adapted under license by [employer] [facility] ([facility]). If you have questions about a medical condition or this instruction, always ask your healthcare professional. [address] any warranty or liability for your use of this information. Patient Education         Diabetes: How to Build Your Plate (93:20)  Your health professional recommends that you watch this short online health video. Learn how to build a healthy meal using the plate method to help manage your diabetes. How to watch the video    Scan the QR code   OR Visit the website    [email or web address]   Current as of: August 31, 2020               Content Version: 12.8  © 2006-2021 [employer]. Care instructions adapted under license by [employer] [facility] ([facility]). If you have questions about a medical condition or this instruction, always ask your healthcare professional. [address] any warranty or liability for your use of this information. Subjective:      Patient ID: Mary Gonzales is a 79 y.o. male who presents for:  Chief Complaint   Patient presents with    Medicare AWV    Skin Exam    Advance Care Planning       Patient states he continues to be concerned about his weight he knows he needs start being active. He would like recommendations on diet. He would like to get on my chart so he can do more interactive learning sessions with the material available. He continues to control his blood sugar with diet and activity only. Most recent hemoglobin A1c in March was 5.6.     PHQ-9 Total Score: 0 (3/23/2021 12:41 PM)        Current Outpatient Medications on File Prior to Visit   Medication Sig Dispense Refill    gabapentin (NEURONTIN) 300 MG capsule TAKE 1 CAPSULE DAILY 90 capsule 3    doxazosin (CARDURA) 4 MG tablet TAKE ONE-HALF (1/2) TABLET TWICE A DAY 90 tablet 3    CPAP Machine MISC by Does not apply route New CPAP with 14 cm 1 each 0    ciclopirox (PENLAC) 8 % solution Apply topically nightly. 1 Bottle 2    lisinopril (PRINIVIL;ZESTRIL) 20 MG tablet Take 1 tablet by mouth daily 90 tablet 3    metoprolol tartrate (LOPRESSOR) 50 MG tablet Take 1.5 po bid 15 tablet 0    pravastatin (PRAVACHOL) 40 MG tablet TAKE 1 TABLET NIGHTLY 90 tablet 3    nystatin-triamcinolone (MYCOLOG II) 564547-6.1 UNIT/GM-% cream Apply topically 2 times daily. 15 g 1    SPIRIVA HANDIHALER 18 MCG inhalation capsule INHALE THE CONTENTS OF 1 CAPSULE DAILY 90 capsule 3    furosemide (LASIX) 20 MG tablet Take 1 tablet by mouth daily 30 tablet 2    cetirizine (ZYRTEC) 5 MG tablet TAKE ONE TABLET BY MOUTH  PRN      inFLIXimab (REMICADE) 100 MG injection Infuse 5 mg/kg intravenously See Admin Instructions      montelukast (SINGULAIR) 10 MG tablet Take 1 tablet by mouth nightly TAKE 1 TABLET NIGHTLY 90 tablet 3    albuterol (PROVENTIL) (2.5 MG/3ML) 0.083% nebulizer solution Take 3 mLs by nebulization every 6 hours as needed for Wheezing 120 each 3    budesonide-formoterol (SYMBICORT) 160-4.5 MCG/ACT AERO Inhale 2 puffs into the lungs 2 times daily 2 each LOT [id number] EXP [id number] 3 Inhaler 1    albuterol sulfate  (90 Base) MCG/ACT inhaler Inhale 2 puffs into the lungs every 6 hours as needed for Wheezing or Shortness of Breath 3 Inhaler 1    nitroGLYCERIN (NITROSTAT) 0.4 MG SL tablet DISSOLVE 1 TABLET UNDER THE TONGUE EVERY 5 MINUTES AS NEEDED FOR CHEST PAIN.  MAXIMUM OF 3 TABLETS 25 tablet 2    fluticasone (FLONASE) 50 MCG/ACT nasal spray 1 spray by Each Nare route daily 1 Spray in each nostril 3 Bottle 1    CPAP Machine MISC New cpap supplies mask hose filters etc 1 each 0    Saccharomyces boulardii (PROBIOTIC) 250 MG CAPS Take 1 tablet by mouth daily      esomeprazole Magnesium (NEXIUM) 20 MG PACK Take 20 mg by mouth daily      aspirin 81 MG tablet Take 81 mg by mouth daily.  mesalamine (PENTASA) 250 MG CR capsule Take 500 mg by mouth 4 times daily.  testosterone (ANDROGEL; TESTIM) 50 MG/5GM (1%) GEL 1% gel Alternate one packet daily with 2 packets daily every other day 135 Package 0     No current facility-administered medications on file prior to visit. Past Medical History:   Diagnosis Date    A-fib [facility])     Abnormal EMG 12/09/2015    Actinic keratoses     Actinic keratoses     Acute diastolic congestive heart failure ([facility]) 1/6/2021    Allergic rhinitis     Anemia 11/18/2014    Arthritis     Chronic back pain     COPD (chronic obstructive pulmonary disease) ([facility]) 08/09/2012    COPD (chronic obstructive pulmonary disease) ([facility])     Crohns disease     Degenerative disc disease, lumbar     Dermatitis     Diabetes ([address] 03/19/2015    diet controlled    Gastrointestinal problem     GERD (gastroesophageal reflux disease)     History of atrial fibrillation 2006    Dr. Shadia Larson History of throat cancer 2004     cleared for reoccurence years ago    Hyperlipidemia 1/21/2014    Hypertension     Hypogonadism male     Lung disease     Mononeuritis multiplex     Mononeuritis multiplex     Nondependent alcohol abuse, in remission 7/22/2020    Obesity (BMI 30-39. 9) 7/24/2019    Osteoarthritis of lumbar spine     Pain of right heel     Paresthesia of foot, bilateral     Prediabetes 12/3/2014    Restless leg syndrome     Seborrheic dermatitis     Seborrheic keratoses, inflamed     Throat cancer ([facility])     throat, had surgery, sees Dr Alma Bragg yearly    Tinea cruris     Tinea pedis     Tinea unguium      Past Surgical History:   Procedure Laterality Date    CARDIAC CATHETERIZATION      CARPAL TUNNEL RELEASE      CATARACT REMOVAL WITH IMPLANT Right Father     Cancer Sister         lung     Allergies:  Alemtuzumab, Glycopyrrolate-formoterol, Mercaptopurine, and Moxifloxacin    Review of Systems   Constitutional: Negative for activity change, appetite change, diaphoresis and unexpected weight change. Eyes: Negative for photophobia and visual disturbance. Respiratory: Negative for chest tightness and shortness of breath. No orthopnea   Cardiovascular: Negative for chest pain, palpitations and leg swelling. Gastrointestinal: Negative for abdominal distention and abdominal pain. Genitourinary: Negative for flank pain and frequency. Musculoskeletal: Negative for gait problem and joint swelling. Neurological: Negative for dizziness, weakness, light-headedness and headaches. Psychiatric/Behavioral: Negative for confusion. Objective:   /66 (Site: Left Upper Arm, Position: Sitting, Cuff Size: Large Adult)   Pulse 56   Temp 98.2 °F (36.8 °C)   Ht 5' 9\" (1.753 m)   Wt 259 lb 9.6 oz (117.8 kg)   SpO2 97%   BMI 38.34 kg/m²     Physical Exam  Constitutional:       Appearance: Normal appearance. He is well-developed. He is not ill-appearing or diaphoretic. Comments: Vitals signs reviewed   HENT:      Head: Normocephalic and atraumatic. Right Ear: Hearing and external ear normal.      Left Ear: Hearing and external ear normal.      Nose: No nasal deformity or rhinorrhea. Eyes:      General: Lids are normal.         Right eye: No discharge. Left eye: No discharge. Extraocular Movements:      Right eye: No nystagmus. Left eye: No nystagmus. Conjunctiva/sclera: Conjunctivae normal.      Right eye: No hemorrhage. Left eye: No hemorrhage. Pupils: Pupils are equal, round, and reactive to light. Neck:      Musculoskeletal: Full passive range of motion without pain and neck supple. Normal range of motion. Thyroid: No thyroid mass or thyromegaly. Vascular: Normal carotid pulses.  No carotid bruit or JVD. Trachea: No tracheal tenderness or tracheal deviation. Cardiovascular:      Rate and Rhythm: Normal rate and regular rhythm. Chest Wall: PMI displaced: normal location PMI. Pulses:           Carotid pulses are 2+ on the right side and 2+ on the left side. Radial pulses are 2+ on the right side and 2+ on the left side. Heart sounds: S1 normal and S2 normal. No murmur. No friction rub. No gallop. No S3 sounds. Pulmonary:      Effort: Pulmonary effort is normal. No accessory muscle usage or respiratory distress. Breath sounds: Normal breath sounds. Chest:      Chest wall: No deformity. Abdominal:      General: There is no distension. Musculoskeletal:         General: No deformity. Cervical back: He exhibits normal range of motion, no tenderness and no deformity. Lymphadenopathy:      Cervical:      Right cervical: No superficial cervical adenopathy. Left cervical: No superficial cervical adenopathy. Upper Body:      Right upper body: No supraclavicular adenopathy. Left upper body: No supraclavicular adenopathy. Skin:     General: Skin is warm and dry. Findings: No bruising or rash. Nails: There is no clubbing. Neurological:      Mental Status: He is alert. Cranial Nerves: Cranial nerve deficit: CN II-XII GI without obvious deficit. Sensory: Sensory deficit: grossly intact for hands. Motor: No tremor. Atrophy: B UE and LE without atrophy. Abnormal muscle tone: B UE and LE have normal tone. Coordination: Coordination normal.      Gait: Gait normal.      Comments: Pt diabetic foot exam performed with the use of the Medline monofilament. Normal exam      Psychiatric:         Attention and Perception: He is attentive. Mood and Affect: Mood is not anxious. Affect is not inappropriate. Speech: Speech normal.         Behavior: Behavior is not agitated. Behavior is cooperative.          Judgment: Judgment normal.         No results found for this visit on 03/23/21.     Recent Results (from the past 2016 hour(s))   POCT glycosylated hemoglobin (Hb A1C)    Collection Time: 03/03/21  4:01 PM   Result Value Ref Range    Hemoglobin A1C 5.6 %   Comprehensive Metabolic Panel    Collection Time: 03/03/21  4:22 PM   Result Value Ref Range    Sodium 138 135 - 144 mEq/L    Potassium 4.0 3.4 - 4.9 mEq/L    Chloride 102 95 - 107 mEq/L    CO2 25 20 - 31 mEq/L    Anion Gap 11 9 - 15 mEq/L    Glucose 84 70 - 99 mg/dL    BUN 14 8 - 23 mg/dL    CREATININE 0.98 0.70 - 1.20 mg/dL    GFR Non-African American >60.0 >60    GFR  >60.0 >60    Calcium 8.7 8.5 - 9.9 mg/dL    Total Protein 6.6 6.3 - 8.0 g/dL    Albumin 3.8 3.5 - 4.6 g/dL    Total Bilirubin 0.3 0.2 - 0.7 mg/dL    Alkaline Phosphatase 94 35 - 104 U/L    ALT 12 0 - 41 U/L    AST 20 0 - 40 U/L    Globulin 2.8 2.3 - 3.5 g/dL   TSH without Reflex    Collection Time: 03/03/21  4:22 PM   Result Value Ref Range    TSH 1.380 0.440 - 3.860 uIU/mL   CBC Auto Differential    Collection Time: 03/03/21  4:22 PM   Result Value Ref Range    WBC 9.1 4.8 - 10.8 K/uL    RBC 4.42 (L) 4.70 - 6.10 M/uL    Hemoglobin 13.0 (L) 14.0 - 18.0 g/dL    Hematocrit 39.2 (L) 42.0 - 52.0 %    MCV 88.7 80.0 - 100.0 fL    MCH 29.3 27.0 - 31.3 pg    MCHC 33.0 33.0 - 37.0 %    RDW 13.6 11.5 - 14.5 %    Platelets 255 166 - 324 K/uL    Neutrophils % 69.5 %    Lymphocytes % 15.9 %    Monocytes % 10.0 %    Eosinophils % 3.7 %    Basophils % 0.9 %    Neutrophils Absolute 6.3 1.4 - 6.5 K/uL    Lymphocytes Absolute 1.4 1.0 - 4.8 K/uL    Monocytes Absolute 0.9 (H) 0.2 - 0.8 K/uL    Eosinophils Absolute 0.3 0.0 - 0.7 K/uL    Basophils Absolute 0.1 0.0 - 0.2 K/uL   Blood Occult Stool Screen #1    Collection Time: 03/05/21  6:14 PM   Result Value Ref Range    OCCULT BLOOD FECAL Negative  Normal Range:Negative      Gastrointestinal Panel, Molecular    Collection Time: 03/05/21  6:14 PM    Specimen: Stool Result Value Ref Range    GI Bacterial Pathogens By PCR       DNA of organisms below NOT DETECTED  The following organisms have been tested using nucleic acid  testing technology. Campylobacter species  Salmonella species  Shigella species  Vibrio species  Yersinia enterocolitica  Shigatoxin 1 and 2 (STEC)  Norovirus  Rotavirus  Normal Range: Not Detected     Giardia antigen    Collection Time: 03/05/21  6:14 PM    Specimen: Stool   Result Value Ref Range    Giardia Ag, Stl Negative  Normal Range: Negative      Clostridium Difficile Toxin/Antigen    Collection Time: 03/05/21  6:14 PM    Specimen: Stool   Result Value Ref Range    C.diff Toxin/Antigen       Negative for Clostridium difficile antigen and toxin  Normal Range: Negative             Assessment:       Diagnosis Orders   1. Medicare annual wellness visit, subsequent     2. Type 2 diabetes mellitus without complication, without long-term current use of insulin (HCC)  Microalbumin / Creatinine Urine Ratio     DIABETES FOOT EXAM     DIABETES EYE EXAM   3. Screening for depression           Orders Placed This Encounter   Procedures    Microalbumin / Creatinine Urine Ratio     DIABETES FOOT EXAM     DIABETES EYE EXAM         Plan:   Return for Medicare Annual Wellness Visit in 1 year. Patient Instructions       Personalized Preventive Plan for Cindie Gilford - 3/23/2021  Medicare offers a range of preventive health benefits. Some of the tests and screenings are paid in full while other may be subject to a deductible, co-insurance, and/or copay. Some of these benefits include a comprehensive review of your medical history including lifestyle, illnesses that may run in your family, and various assessments and screenings as appropriate. After reviewing your medical record and screening and assessments performed today your provider may have ordered immunizations, labs, imaging, and/or referrals for you.   A list of these orders (if applicable) as well as your Preventive Care list are included within your After Visit Summary for your review. Other Preventive Recommendations:    · A preventive eye exam performed by an eye specialist is recommended every 1-2 years to screen for glaucoma; cataracts, macular degeneration, and other eye disorders. · A preventive dental visit is recommended every 6 months. · Try to get at least 150 minutes of exercise per week or 10,000 steps per day on a pedometer . · Order or download the FREE \"Exercise & Physical Activity: Your Everyday Guide\" from [employer]. Call 1-534.632.4256 or search [employer] online. · You need 4122-0085 mg of calcium and 3704-3249 IU of vitamin D per day. It is possible to meet your calcium requirement with diet alone, but a vitamin D supplement is usually necessary to meet this goal.  · When exposed to the sun, use a sunscreen that protects against both UVA and UVB radiation with an SPF of 30 or greater. Reapply every 2 to 3 hours or after sweating, drying off with a towel, or swimming. · Always wear a seat belt when traveling in a car. Always wear a helmet when riding a bicycle or motorcycle. Patient Education        Learning About Meal Planning for Diabetes  Why plan your meals? Meal planning can be a key part of managing diabetes. Planning meals and snacks with the right balance of carbohydrate, protein, and fat can help you keep your blood sugar at the target level you set with your doctor. You don't have to eat special foods. You can eat what your family eats, including sweets once in a while. But you do have to pay attention to how often you eat and how much you eat of certain foods. You may want to work with a dietitian or a certified diabetes educator. He or she can give you tips and meal ideas and can answer your questions about meal planning.  This health professional can also help you reach a healthy weight if that is one of your goals.  What plan is right for you? Your dietitian or diabetes educator may suggest that you start with the plate format or carbohydrate counting. The plate format  The plate format is a simple way to help you manage how you eat. You plan meals by learning how much space each food should take on a plate. Using the plate format helps you spread carbohydrate throughout the day. It can make it easier to keep your blood sugar level within your target range. It also helps you see if you're eating healthy portion sizes. To use the plate format, you put non-starchy vegetables on half your plate. Add meat or meat substitutes on one-quarter of the plate. Put a grain or starchy vegetable (such as brown rice or a potato) on the final quarter of the plate. You can add a small piece of fruit and some low-fat or fat-free milk or yogurt, depending on your carbohydrate goal for each meal.  Here are some tips for using the plate format:  · Make sure that you are not using an oversized plate. A 9-inch plate is best. Many restaurants use larger plates. · Get used to using the plate format at home. Then you can use it when you eat out. · Write down your questions about using the plate format. Talk to your doctor, a dietitian, or a diabetes educator about your concerns. Carbohydrate counting  With carbohydrate counting, you plan meals based on the amount of carbohydrate in each food. Carbohydrate raises blood sugar higher and more quickly than any other nutrient. It is found in desserts, breads and cereals, and fruit. It's also found in starchy vegetables such as potatoes and corn, grains such as rice and pasta, and milk and yogurt. Spreading carbohydrate throughout the day helps keep your blood sugar levels within your target range. Your daily amount depends on several things, including your weight, how active you are, which diabetes medicines you take, and what your goals are for your blood sugar levels.  A registered dietitian or diabetes educator can help you plan how much carbohydrate to include in each meal and snack. A guideline for your daily amount of carbohydrate is:  · 45 to 60 grams at each meal. That's about the same as 3 to 4 carbohydrate servings. · 15 to 20 grams at each snack. That's about the same as 1 carbohydrate serving. The Nutrition Facts label on packaged foods tells you how much carbohydrate is in a serving of the food. First, look at the serving size on the food label. Is that the amount you eat in a serving? All of the nutrition information on a food label is based on that serving size. So if you eat more or less than that, you'll need to adjust the other numbers. Total carbohydrate is the next thing you need to look for on the label. If you count carbohydrate servings, one serving of carbohydrate is 15 grams. For foods that don't come with labels, such as fresh fruits and vegetables, you'll need a guide that lists carbohydrate in these foods. Ask your doctor, dietitian, or diabetes educator about books or other nutrition guides you can use. If you take insulin, you need to know how many grams of carbohydrate are in a meal. This lets you know how much rapid-acting insulin to take before you eat. If you use an insulin pump, you get a constant rate of insulin during the day. So the pump must be programmed at meals to give you extra insulin to cover the rise in blood sugar after meals. When you know how much carbohydrate you will eat, you can take the right amount of insulin. Or, if you always use the same amount of insulin, you need to make sure that you eat the same amount of carbohydrate at meals. If you need more help to understand carbohydrate counting and food labels, ask your doctor, dietitian, or diabetes educator. How can you plan healthy meals? Here are some tips to get started:  · Plan your meals a week at a time. Don't forget to include snacks too.   · Use cookbooks or online recipes to plan several main meals. Plan some quick meals for busy nights. You also can double some recipes that freeze well. Then you can save half for other busy nights when you don't have time to cook. · Make sure you have the ingredients you need for your recipes. If you're running low on basic items, put these items on your shopping list too. · List foods that you use to make breakfasts, lunches, and snacks. List plenty of fruits and vegetables. · Post this list on the refrigerator. Add to it as you think of more things you need. · Take the list to the store to do your weekly shopping. Follow-up care is a key part of your treatment and safety. Be sure to make and go to all appointments, and call your doctor if you are having problems. It's also a good idea to know your test results and keep a list of the medicines you take. Where can you learn more? Go to https://Anal[employer] EnginespeYast.Xamarin. org and sign in to your Battlepro account. Enter E229 in the Wireless Safety box to learn more about \"Learning About Meal Planning for Diabetes. \"     If you do not have an account, please click on the \"Sign Up Now\" link. Current as of: August 31, 2020               Content Version: 12.8  © 2006-2021 Healthwise, Incorporated. Care instructions adapted under license by [facility] ([facility]). If you have questions about a medical condition or this instruction, always ask your healthcare professional. [place] [address] any warranty or liability for your use of this information. Patient Education         Diabetes: How to Build Your Plate (39:98)  Your health professional recommends that you watch this short online health video. Learn how to build a healthy meal using the plate method to help manage your diabetes. How to watch the video    Scan the QR code   OR Visit the website    https://hwi. se/r/Jrou5feibo9a1   Current as of: August 31, 2020               Content Version: 12.8  © 2006-2021 Healthwise, Incorporated. Care instructions adapted under license by [facility] ([facility]). If you have questions about a medical condition or this instruction, always ask your healthcare professional. [address] any warranty or liability for your use of this information. This note was partially created with the assistance of dictation. This may lead to grammatical or spelling errors. Sher Joyce M.D. Medicare Annual Wellness Visit  Name: Valery Robledo Date: 3/23/2021   MRN: [id number] Sex: Male   Age: 79 y.o. Ethnicity: Non-/Non    : 1950 Race: Praveena Franco is here for Medicare AWV, Skin Exam, and Advance Care Planning    Screenings for behavioral, psychosocial and functional/safety risks, and cognitive dysfunction are all negative except as indicated below. These results, as well as other patient data from the [address] form, are documented in Flowsheets linked to this Encounter. Advance Care Planning     Advance Care Planning (ACP) Physician/NP/PA Conversation    Date of Conversation: 3/23/2021  Conducted with: Patient with Decision Making Capacity    Healthcare Decision Maker:  Updated in contacts for chart      Click here to [address] including selection of the Healthcare Decision Maker Relationship (ie \"Primary\")  Today we documented Decision Maker(s) consistent with Legal Next of Kin hierarchy. Care Preferences:    Hospitalization: \"If your health worsens and it becomes clear that your chance of recovery is unlikely, what would be your preference regarding hospitalization? \"  The patient would prefer hospitalization. Ventilation: \"If you were unable to breath on your own and your chance of recovery was unlikely, what would be your preference about the use of a ventilator (breathing machine) if it was available to you? \"  The patient would desire the use of a ventilator. Resuscitation: \"In the event your heart stopped as a result of an underlying serious health condition, would you want attempts made to restart your heart, or would you prefer a natural death? \"  Yes, attempt to resuscitate. treatment goals    Conversation Outcomes / Follow-Up Plan:  ACP complete - no further action today  Reviewed DNR/DNI and patient elects Full Code (Attempt Resuscitation)    Length of Voluntary ACP Conversation in minutes:  16 minutes    Sher Laverne               Allergies   Allergen Reactions    Alemtuzumab      Low grade fever  Other reaction(s): Other: See Comments  Low grade fever    Glycopyrrolate-Formoterol Other (See Comments)     Dizzy and felt like heart rate sped up  Other reaction(s): Other: See Comments  Dizzy and felt like heart rate sped up    Mercaptopurine Other (See Comments)    Moxifloxacin Other (See Comments)     Pt states He gets sores in his mouth         Prior to Visit Medications    Medication Sig Taking? Authorizing Provider   gabapentin (NEURONTIN) 300 MG capsule TAKE 1 CAPSULE DAILY Yes Hardin Ahumada, MD   doxazosin (CARDURA) 4 MG tablet TAKE ONE-HALF (1/2) TABLET TWICE A DAY Yes Hardin Ahumada, MD   CPAP Machine MISC by Does not apply route New CPAP with 14 cm Yes Bard Dana MD   ciclopirox (PENLAC) 8 % solution Apply topically nightly. Yes Hardin Ahumada, MD   lisinopril (PRINIVIL;ZESTRIL) 20 MG tablet Take 1 tablet by mouth daily Yes Hardin Ahumada, MD   metoprolol tartrate (LOPRESSOR) 50 MG tablet Take 1.5 po bid Yes Sanjay Jenog DO   pravastatin (PRAVACHOL) 40 MG tablet TAKE 1 TABLET NIGHTLY Yes Hardin Ahumada, MD   nystatin-triamcinolone [facility]) 789170-1.4 UNIT/GM-% cream Apply topically 2 times daily.  Yes Hardin Ahumada, MD   SPIRIVA HANDIHALER 18 MCG inhalation capsule INHALE THE CONTENTS OF 1 CAPSULE DAILY Yes Bard Dana MD   furosemide (LASIX) 20 MG tablet Take 1 tablet by mouth daily Yes Alexandra Burgos MD Laver[place]   cetirizine (ZYRTEC) 5 MG tablet TAKE ONE TABLET BY MOUTH  PRN Yes Historical Provider, MD   inFLIXimab (REMICADE) 100 MG injection Infuse 5 mg/kg intravenously See Admin Instructions Yes Historical Provider, MD   montelukast (SINGULAIR) 10 MG tablet Take 1 tablet by mouth nightly TAKE 1 TABLET NIGHTLY Yes Priyanka Barrera MD   albuterol (PROVENTIL) (2.5 MG/3ML) 0.083% nebulizer solution Take 3 mLs by nebulization every 6 hours as needed for Wheezing Yes Priyanka Barrera MD   budesonide-formoterol (SYMBICORT) 160-4.5 MCG/ACT AERO Inhale 2 puffs into the lungs 2 times daily 2 each LOT [id number] Yes Shaggy Jo MD   albuterol sulfate  (90 Base) MCG/ACT inhaler Inhale 2 puffs into the lungs every 6 hours as needed for Wheezing or Shortness of Breath Yes Shaggy Jo MD   nitroGLYCERIN (NITROSTAT) 0.4 MG SL tablet DISSOLVE 1 TABLET UNDER THE TONGUE EVERY 5 MINUTES AS NEEDED FOR CHEST PAIN. MAXIMUM OF 3 TABLETS Yes Priyanka Barrera MD   fluticasone (FLONASE) 50 MCG/ACT nasal spray 1 spray by Each Nare route daily 1 Spray in each nostril Yes STARLA Saez CNP   CPAP Machine MISC New cpap supplies mask hose filters etc Yes Shaggy Jo MD   Saccharomyces boulardii (PROBIOTIC) 250 MG CAPS Take 1 tablet by mouth daily Yes Historical Provider, MD   esomeprazole Magnesium (NEXIUM) 20 MG PACK Take 20 mg by mouth daily Yes Historical Provider, MD   aspirin 81 MG tablet Take 81 mg by mouth daily. Yes Historical Provider, MD   mesalamine (PENTASA) 250 MG CR capsule Take 500 mg by mouth 4 times daily.  Yes Historical Provider, MD   testosterone (ANDROGEL; TESTIM) 50 MG/5GM (1%) GEL 1% gel Alternate one packet daily with 2 packets daily every other day  STARLA Saez CNP         Past Medical History:   Diagnosis Date    A-fib [facility])     Abnormal EMG 12/09/2015    Actinic keratoses     Actinic keratoses     Acute diastolic congestive heart failure ([facility] [address] 1/6/2021    Allergic rhinitis     Anemia 11/18/2014    Arthritis     Chronic back pain     COPD (chronic obstructive pulmonary disease) [facility] 08/09/2012    COPD (chronic obstructive pulmonary disease) ([facility]     Crohns disease     Degenerative disc disease, lumbar     Dermatitis     Diabetes ([facility] [address] 03/19/2015    diet controlled    Gastrointestinal problem     GERD (gastroesophageal reflux disease)     History of atrial fibrillation 2006    Dr. Bryson Vitale History of throat cancer 2004     cleared for reoccurence years ago    Hyperlipidemia 1/21/2014    Hypertension     Hypogonadism male     Lung disease     Mononeuritis multiplex     Mononeuritis multiplex     Nondependent alcohol abuse, in remission 7/22/2020    Obesity (BMI 30-39. 9) 7/24/2019    Osteoarthritis of lumbar spine     Pain of right heel     Paresthesia of foot, bilateral     Prediabetes 12/3/2014    Restless leg syndrome     Seborrheic dermatitis     Seborrheic keratoses, inflamed     Throat cancer ([facility])     throat, had surgery, sees Dr Sanchez Appl yearly    Tinea cruris     Tinea pedis     Tinea unguium        Past Surgical History:   Procedure Laterality Date    CARDIAC CATHETERIZATION      CARPAL TUNNEL RELEASE      CATARACT REMOVAL WITH IMPLANT Right 09/09/2019    CATARACT REMOVAL WITH IMPLANT Left 07/22/2019    COLON SURGERY      COLONOSCOPY  04/15/2015    KNEE ARTHROSCOPY      LARYNGECTOMY  2004    UPPER GASTROINTESTINAL ENDOSCOPY  03/24/13    Dr. Gurwinder Tucker W/NAPOLEON RODRIGUEZ  2002         Family History   Problem Relation Age of Onset    Heart Disease Mother     Liver Disease Mother     High Blood Pressure Mother     Heart Disease Father     Arthritis Father     Cancer Sister         lung       CareTeam (Including outside providers/suppliers regularly involved in providing care):   Patient Care Team:  Pretty Rivera MD as PCP - General (Family Medicine)  Pretty Rivera MD as PCP - [facility]  Beatris Terrell MD (Cardiac Electrophysiology)  Alexsandra Horne MD as Consulting Physician (Gastroenterology)  Mario Cordova MD as Consulting Physician (Pulmonology)    Wt Readings from Last 3 Encounters:   03/23/21 259 lb 9.6 oz (117.8 kg)   03/10/21 258 lb (117 kg)   03/03/21 258 lb (117 kg)     Vitals:    03/23/21 1236   BP: 108/66   Site: Left Upper Arm   Position: Sitting   Cuff Size: Large Adult   Pulse: 56   Temp: 98.2 °F (36.8 °C)   SpO2: 97%   Weight: 259 lb 9.6 oz (117.8 kg)   Height: 5' 9\" (1.753 m)     Body mass index is 38.34 kg/m². Based upon direct observation of the patient, evaluation of cognition reveals recent and remote memory intact. Patient's complete Health Risk Assessment and screening values have been reviewed and are found in Flowsheets. The following problems were reviewed today and where indicated follow up appointments were made and/or referrals ordered. Positive Risk Factor Screenings with Interventions:            General Health and ACP:  General  In general, how would you say your health is?: Good  In the past 7 days, have you experienced any of the following?  New or Increased Pain, New or Increased Fatigue, Loneliness, Social Isolation, Stress or Anger?: (!) Loneliness  Do you get the social and emotional support that you need?: Yes  Do you have a Living Will?: Yes  Advance Directives     Power of [address] [place] [address] Will ACP-Advance Directive ACP-Power of     Not on File Not on File Not on File Not on File      General Health Risk Interventions:  · pt will get onfile    Health Habits/Nutrition:  Health Habits/Nutrition  Do you exercise for at least 20 minutes 2-3 times per week?: Yes  Have you lost any weight without trying in the past 3 months?: No  Do you eat only one meal per day?: (!) Yes(munches in between)  Have you seen the dentist within the past year?: (!) No  Body mass index: (!) 38.33  Health Habits/Nutrition Interventions:  · pt knows he needs to get walking       Personalized Preventive Plan   Current Health Maintenance Status  Immunization History   Administered Date(s) Administered    DTaP 04/21/2016    DTaP vaccine 12/08/1988, 02/09/1989, 04/13/1989, 04/05/1990, 04/16/1993, 04/21/2016    Hepatitis B 08/24/2001, 10/12/2001, 03/08/2002    Hepatitis B Ped/Adol (Engerix-B, Recombivax HB) 08/24/2001, 10/12/2001, 03/08/2002    Hib vaccine 04/16/1993    Influenza A ([id number]) Vaccine PF IM 11/13/2009    Influenza Vaccine, unspecified formulation 11/05/2005    Influenza Virus Vaccine 11/20/2006, 11/13/2009, 09/06/2013, 10/20/2016    Influenza, High Dose (Fluzone 65 yrs and older) 10/20/2016, 11/27/2017, 11/07/2018    Influenza, Quadv, adjuvanted, 65 yrs +, IM, PF (Fluad) 09/16/2020    Influenza, Triv, inactivated, subunit, adjuvanted, IM (Fluad 65 yrs and older) 10/09/2019    MMR 04/05/1990, 08/24/2001    Measles/Rubella 04/05/1990, 08/24/2001    Pneumococcal Conjugate 13-valent ([id number]) 11/14/2016    Pneumococcal Polysaccharide ([id number]) 11/27/2017    Polio OPV 12/08/1988, 02/09/1989, 04/05/1990, 04/16/1993    Zoster Live (Zostavax) 09/24/2013    Zoster Recombinant (Shingrix) 08/13/2018, 01/26/2019        Health Maintenance   Topic Date Due    Annual Wellness Visit (AWV)  Never done    Diabetic microalbuminuria test  02/21/2021    Lipid screen  07/15/2021    Colon cancer screen colonoscopy  02/12/2022    A1C test (Diabetic or Prediabetic)  03/03/2022    Potassium monitoring  03/03/2022    Creatinine monitoring  03/03/2022    Diabetic foot exam  03/23/2022    Diabetic retinal exam  03/23/2022    DTaP/Tdap/Td vaccine (8 - Tdap) 04/21/2026    Flu vaccine  Completed    Shingles Vaccine  Completed    Pneumococcal 65+ years Vaccine  Completed    COVID-19 Vaccine  Completed    AAA screen  Completed    Hepatitis C screen  Completed    Hepatitis A vaccine  Aged Out    Hib vaccine  Aged Out    Meningococcal (ACWY) vaccine  Aged Out     Recommendations for [employer] Due: see orders and patient instructions/AVS.  . Recommended screening schedule for the next 5-10 years is provided to the patient in written form: see Patient Instructions/AVS.    Anaya Cerrato was seen today for medicare awv, skin exam and advance care planning.     Diagnoses and all orders for this visit:    Medicare annual wellness visit, subsequent    Type 2 diabetes mellitus without complication, without long-term current use of insulin ([facility] [address]  -     Microalbumin / Creatinine Urine Ratio  -      DIABETES FOOT EXAM  -      DIABETES EYE EXAM    Screening for depression

## 2021-03-31 DIAGNOSIS — J44.1 COPD WITH EXACERBATION (HCC): ICD-10-CM

## 2021-03-31 RX ORDER — ALBUTEROL SULFATE 2.5 MG/3ML
2.5 SOLUTION RESPIRATORY (INHALATION) EVERY 6 HOURS PRN
Qty: 120 EACH | Refills: 3 | Status: SHIPPED | OUTPATIENT
Start: 2021-03-31 | End: 2022-06-16 | Stop reason: SDUPTHER

## 2021-04-12 ENCOUNTER — HOSPITAL ENCOUNTER (OUTPATIENT)
Dept: GENERAL RADIOLOGY | Age: 71
Discharge: HOME | End: 2021-04-14
Payer: MEDICARE

## 2021-04-12 ENCOUNTER — HOSPITAL ENCOUNTER (OUTPATIENT)
Dept: PULMONOLOGY | Age: 71
Discharge: HOME | End: 2021-04-12
Payer: MEDICARE

## 2021-04-12 DIAGNOSIS — J44.9 CHRONIC OBSTRUCTIVE PULMONARY DISEASE, UNSPECIFIED COPD TYPE (HCC): ICD-10-CM

## 2021-04-12 PROCEDURE — 94726 PLETHYSMOGRAPHY LUNG VOLUMES: CPT

## 2021-04-12 PROCEDURE — 94729 DIFFUSING CAPACITY: CPT

## 2021-04-12 PROCEDURE — 94010 BREATHING CAPACITY TEST: CPT

## 2021-04-12 PROCEDURE — 71046 X-RAY EXAM CHEST 2 VIEWS: CPT

## 2021-04-17 PROCEDURE — 94060 EVALUATION OF WHEEZING: CPT | Performed by: INTERNAL MEDICINE

## 2021-04-17 PROCEDURE — 94726 PLETHYSMOGRAPHY LUNG VOLUMES: CPT | Performed by: INTERNAL MEDICINE

## 2021-04-17 PROCEDURE — 94729 DIFFUSING CAPACITY: CPT | Performed by: INTERNAL MEDICINE

## 2021-04-18 NOTE — PROCEDURES
[address]                      [facility], [address] [facility]                               PULMONARY FUNCTION    PATIENT NAME: Casey Martinez                      :        1950  MED REC NO:   [id number]                            ROOM:  ACCOUNT NO:   [de-identified]                           ADMIT DATE: 2021  PROVIDER:     Dominique Quiles MD    DATE OF PROCEDURE:  2021    REQUESTING PROVIDER:  Beverly Cagle. Julio Rubio MD    INTERPRETING PROVIDER:  Beverly Cagle. Julio Rubio MD    REASON FOR STUDY:  Shortness of breath and wheezing. INTERPRETATION:  FVC is 3.66, 86% of predicted. FEV1 is 2.69, 87% of  predicted. FEV1/FVC is 73%. FEF 25-75% is 1.99, 85% of predicted. Lung volume study shows residual volume is 2.34, 96% of predicted. TLC  is 6.32, 92% of predicted. RV to TLC ratio is 37.06, 102% of predicted. Diffusion capacity is 19.20, 82% of predicted. Airway resistance is  1.46, 100% of predicted. SUMMARY:  Normal spirometry. Static lung volume within normal range. Diffusion capacity is normal.  Airway resistance is normal.  Flow volume  loop suggest possibility of obstructive pulmonary disease. Clinical  correlation is requested.         Meaghan Riggins MD    D: 2021 11:52:10       T: 2021 16:51:57     [email or web address]  Job#: [id number]     Doc#: [id number]    CC:

## 2021-04-29 ENCOUNTER — OFFICE VISIT (OUTPATIENT)
Dept: PULMONOLOGY | Age: 71
End: 2021-04-29
Payer: MEDICARE

## 2021-04-29 VITALS
TEMPERATURE: 97.5 F | OXYGEN SATURATION: 97 % | SYSTOLIC BLOOD PRESSURE: 122 MMHG | HEIGHT: 70 IN | BODY MASS INDEX: 37.08 KG/M2 | DIASTOLIC BLOOD PRESSURE: 72 MMHG | WEIGHT: 259 LBS | HEART RATE: 63 BPM

## 2021-04-29 DIAGNOSIS — G47.33 OSA ON CPAP: Primary | ICD-10-CM

## 2021-04-29 DIAGNOSIS — Z99.89 OSA ON CPAP: Primary | ICD-10-CM

## 2021-04-29 DIAGNOSIS — J44.1 COPD WITH EXACERBATION (HCC): ICD-10-CM

## 2021-04-29 DIAGNOSIS — E66.9 OBESITY (BMI 30-39.9): ICD-10-CM

## 2021-04-29 PROCEDURE — 99214 OFFICE O/P EST MOD 30 MIN: CPT | Performed by: INTERNAL MEDICINE

## 2021-04-29 RX ORDER — BUDESONIDE AND FORMOTEROL FUMARATE DIHYDRATE 160; 4.5 UG/1; UG/1
2 AEROSOL RESPIRATORY (INHALATION) 2 TIMES DAILY
Qty: 3 INHALER | Refills: 1 | Status: SHIPPED | OUTPATIENT
Start: 2021-04-29

## 2021-04-29 RX ORDER — ALBUTEROL SULFATE 90 UG/1
2 AEROSOL, METERED RESPIRATORY (INHALATION) EVERY 6 HOURS PRN
Qty: 3 INHALER | Refills: 1 | Status: SHIPPED | OUTPATIENT
Start: 2021-04-29 | End: 2021-05-25 | Stop reason: SDUPTHER

## 2021-04-29 NOTE — PROGRESS NOTES
fibrillation 2006    Dr. Mariela Wells History of throat cancer 2004     cleared for reoccurence years ago    Hyperlipidemia 2014    Hypertension     Hypogonadism male     Lung disease     Mononeuritis multiplex     Mononeuritis multiplex     Nondependent alcohol abuse, in remission 2020    Obesity (BMI 30-39. 9) 2019    Osteoarthritis of lumbar spine     Pain of right heel     Paresthesia of foot, bilateral     Prediabetes 12/3/2014    Restless leg syndrome     Seborrheic dermatitis     Seborrheic keratoses, inflamed     Throat cancer (HCC)     throat, had surgery, sees Dr Bradly Roberto yearly    Tinea cruris     Tinea pedis     Tinea unguium      Past Surgical History:   Procedure Laterality Date    CARDIAC CATHETERIZATION      CARPAL TUNNEL RELEASE      CATARACT REMOVAL WITH IMPLANT Right 2019    CATARACT REMOVAL WITH IMPLANT Left 2019    COLON SURGERY      COLONOSCOPY  04/15/2015    KNEE ARTHROSCOPY      LARYNGECTOMY  2004    UPPER GASTROINTESTINAL ENDOSCOPY  13    Dr. Maribel Pleitez W/NAPOLEON RODRIGUEZ       Family History   Problem Relation Age of Onset    Heart Disease Mother     Liver Disease Mother     High Blood Pressure Mother     Heart Disease Father     Arthritis Father     Cancer Sister         lung     Social History     Socioeconomic History    Marital status:      Spouse name: Not on file    Number of children: 3    Years of education: Not on file    Highest education level: Not on file   Occupational History    Not on file   Social Needs    Financial resource strain: Not on file    Food insecurity     Worry: Not on file     Inability: Not on file   [employer] needs     Medical: Not on file     Non-medical: Not on file   Tobacco Use    Smoking status: Former Smoker     Packs/day: 1.00     Years: 25.00     Pack years: 25.00     Types: Cigarettes     Quit date: 10/21/1990     Years since quittin.5    Smokeless tobacco: Never Used   Substance and Sexual Activity    Alcohol use: No     Comment: Attends [employer] 25 years    Drug use: No    Sexual activity: Not on file   Lifestyle    Physical activity     Days per week: Not on file     Minutes per session: Not on file    Stress: Not on file   Relationships    Social connections     Talks on phone: Not on file     Gets together: Not on file     Attends Orthodoxy service: Not on file     Active member of club or organization: Not on file     Attends meetings of clubs or organizations: Not on file     Relationship status: Not on file    Intimate partner violence     Fear of current or ex partner: Not on file     Emotionally abused: Not on file     Physically abused: Not on file     Forced sexual activity: Not on file   Other Topics Concern    Not on file   Social History Narrative    Lives alone.          Review of Systems      :     Vitals:    04/29/21 1509   BP: 122/72   Pulse: 63   Temp: 97.5 °F (36.4 °C)   SpO2: 97%   Weight: 259 lb (117.5 kg)   Height: 5' 10\" (1.778 m)     Wt Readings from Last 3 Encounters:   04/29/21 259 lb (117.5 kg)   03/23/21 259 lb 9.6 oz (117.8 kg)   03/10/21 258 lb (117 kg)         Physical Exam    Current Outpatient Medications   Medication Sig Dispense Refill    budesonide-formoterol (SYMBICORT) 160-4.5 MCG/ACT AERO Inhale 2 puffs into the lungs 2 times daily 2 each LOT [id number] EXP [id number] 3 Inhaler 1    albuterol sulfate  (90 Base) MCG/ACT inhaler Inhale 2 puffs into the lungs every 6 hours as needed for Wheezing or Shortness of Breath 3 Inhaler 1    albuterol (PROVENTIL) (2.5 MG/3ML) 0.083% nebulizer solution Take 3 mLs by nebulization every 6 hours as needed for Wheezing 120 each 3    gabapentin (NEURONTIN) 300 MG capsule TAKE 1 CAPSULE DAILY 90 capsule 3    doxazosin (CARDURA) 4 MG tablet TAKE ONE-HALF (1/2) TABLET TWICE A DAY 90 tablet 3    CPAP Machine MISC by Does not apply route New CPAP with 14 cm 1 each 0  ciclopirox (PENLAC) 8 % solution Apply topically nightly. 1 Bottle 2    lisinopril (PRINIVIL;ZESTRIL) 20 MG tablet Take 1 tablet by mouth daily 90 tablet 3    metoprolol tartrate (LOPRESSOR) 50 MG tablet Take 1.5 po bid 15 tablet 0    pravastatin (PRAVACHOL) 40 MG tablet TAKE 1 TABLET NIGHTLY 90 tablet 3    nystatin-triamcinolone (MYCOLOG II) 602035-4.1 UNIT/GM-% cream Apply topically 2 times daily. 15 g 1    SPIRIVA HANDIHALER 18 MCG inhalation capsule INHALE THE CONTENTS OF 1 CAPSULE DAILY 90 capsule 3    furosemide (LASIX) 20 MG tablet Take 1 tablet by mouth daily 30 tablet 2    cetirizine (ZYRTEC) 5 MG tablet TAKE ONE TABLET BY MOUTH  PRN      inFLIXimab (REMICADE) 100 MG injection Infuse 5 mg/kg intravenously See Admin Instructions      montelukast (SINGULAIR) 10 MG tablet Take 1 tablet by mouth nightly TAKE 1 TABLET NIGHTLY 90 tablet 3    nitroGLYCERIN (NITROSTAT) 0.4 MG SL tablet DISSOLVE 1 TABLET UNDER THE TONGUE EVERY 5 MINUTES AS NEEDED FOR CHEST PAIN. MAXIMUM OF 3 TABLETS 25 tablet 2    fluticasone (FLONASE) 50 MCG/ACT nasal spray 1 spray by Each Nare route daily 1 Spray in each nostril 3 Bottle 1    CPAP Machine MISC New cpap supplies mask hose filters etc 1 each 0    Saccharomyces boulardii (PROBIOTIC) 250 MG CAPS Take 1 tablet by mouth daily      esomeprazole Magnesium (NEXIUM) 20 MG PACK Take 20 mg by mouth daily      aspirin 81 MG tablet Take 81 mg by mouth daily.  mesalamine (PENTASA) 250 MG CR capsule Take 500 mg by mouth 4 times daily.  testosterone (ANDROGEL; TESTIM) 50 MG/5GM (1%) GEL 1% gel Alternate one packet daily with 2 packets daily every other day 135 Package 0     No current facility-administered medications for this visit. Results for orders placed during the hospital encounter of 04/12/21   XR CHEST (2 VW)    Narrative EXAMINATION: XR CHEST (2 VW)    CLINICAL HISTORY: COPD    COMPARISONS: FEBRUARY 21, 2020. FINDINGS: Osseous structures are intact. Cardiopericardial silhouette is normal. Pulmonary vasculature is normal. Lungs are clear and hyperinflated. Impression NO ACUTE CARDIOPULMONARY DISEASE. EMPHYSEMA.   ]  Results for orders placed during the hospital encounter of 19   XR CHEST PORTABLE    Narrative Patient MRN: [id number]    : 1950    Age:  71 years    Gender: Male    Order Date: 2019 4:45 AM.     Exam: XR CHEST PORTABLE    Number of Views: 1     Indication:   Chest Pain, rapid heart beat per pt. Comparison: 3/15/2019    Findings: Stable, enlarged cardiomediastinal silhouette with thoracic aortic vascular calcifications. No pneumonia, pneumothorax or pleural effusion. Impression Impression:  Stable, enlarged cardiomediastinal silhouette with thoracic aortic vascular calcifications         Assessment/Plan:     1. COPD with exacerbation ([address]  He is using  symbicort and spiriva, albuterol HFA prn C/o shortness of breath , worse with exertion. Occasional Wheezing . Cough with  Sputum. Continue bronchodilator therapy as before    - budesonide-formoterol (SYMBICORT) 160-4.5 MCG/ACT AERO; Inhale 2 puffs into the lungs 2 times daily 2 each LOT [id number] EXP [id number]  Dispense: 3 Inhaler; Refill: 1    2. JARRELL on CPAP  He is using CPAP with 14  centimeters of H2O with heated humidity. He is using CPAP for about  7-8 hours every night. He is using CPAP with   Nasal pillow Mask. He said  sleep is restful with the CPAP use. He is compliant with CPAP therapy and benefiting with CPAP use. No snoring with CPAP use. Continue CPAP therapy as before    I reviewed compliance report with patient regarding CPAP therapy. He is using  CPAP for 30 days out of 30 days    Average usage of days used is 7 hours and 43min , average AHI 1.0  with CPAP use. Counseling: CPAP/BiPAP uses, He advised to use CPAP at least 5-6 hours every night.     Driving: He is advised for extreme caution when driving or operating machinery if there is a feeling of drowsiness, especially while driving it is preferable to stop driving and take a brief nap. Sleep hygiene:Avoid supine sleep, sleep on  sides. Avoid  sleep deprivation. Explained sleep hygiene. Advice to avoid Alcohol and sedative    3. Obesity (BMI 30-39. 9)  He is advised try to lose weight. obesity related risk explained to the patient ,  Current weight:  259 lb (117.5 kg) Lbs. BMI:  Body mass index is 37.16 kg/m². Suggested weight control approaches, including dietary changes , exercise, behavioral modification. Return in about 4 months (around 8/29/2021) for rhona, COPD.       Jhony Andrews MD

## 2021-05-14 ENCOUNTER — TELEPHONE (OUTPATIENT)
Dept: FAMILY MEDICINE CLINIC | Age: 71
End: 2021-05-14

## 2021-05-14 DIAGNOSIS — E29.1 HYPOGONADISM MALE: ICD-10-CM

## 2021-05-14 RX ORDER — TESTOSTERONE GEL, 1% 10 MG/G
GEL TRANSDERMAL
Qty: 135 PACKAGE | Refills: 0 | Status: SHIPPED | OUTPATIENT
Start: 2021-05-14 | End: 2021-08-11

## 2021-05-14 RX ORDER — TESTOSTERONE GEL, 1% 10 MG/G
GEL TRANSDERMAL
Qty: 15 PACKAGE | Refills: 0 | Status: SHIPPED | OUTPATIENT
Start: 2021-05-14 | End: 2021-05-25

## 2021-05-14 NOTE — TELEPHONE ENCOUNTER
Short supply sent to [facility] full prescription going to [facility].   Thank you

## 2021-05-21 NOTE — TELEPHONE ENCOUNTER
Pt states he was told by pharmacy [facility] ...    PA needs to be completed for pt. Ph. [phone]    Pt states he is out of the testosterone gel.

## 2021-05-25 ENCOUNTER — TELEPHONE (OUTPATIENT)
Dept: FAMILY MEDICINE CLINIC | Age: 71
End: 2021-05-25

## 2021-05-25 ENCOUNTER — OFFICE VISIT (OUTPATIENT)
Dept: FAMILY MEDICINE CLINIC | Age: 71
End: 2021-05-25
Payer: MEDICARE

## 2021-05-25 VITALS — WEIGHT: 259 LBS | BODY MASS INDEX: 37.08 KG/M2 | RESPIRATION RATE: 12 BRPM | HEIGHT: 70 IN

## 2021-05-25 DIAGNOSIS — J30.2 CHRONIC SEASONAL ALLERGIC RHINITIS: ICD-10-CM

## 2021-05-25 DIAGNOSIS — E29.1 HYPOGONADISM MALE: Primary | ICD-10-CM

## 2021-05-25 DIAGNOSIS — L82.0 SEBORRHEIC KERATOSES, INFLAMED: ICD-10-CM

## 2021-05-25 DIAGNOSIS — B35.1 ONYCHOMYCOSIS: ICD-10-CM

## 2021-05-25 DIAGNOSIS — E29.1 HYPOGONADISM MALE: ICD-10-CM

## 2021-05-25 DIAGNOSIS — I50.31 ACUTE DIASTOLIC CONGESTIVE HEART FAILURE (HCC): ICD-10-CM

## 2021-05-25 DIAGNOSIS — J41.0 SIMPLE CHRONIC BRONCHITIS (HCC): ICD-10-CM

## 2021-05-25 DIAGNOSIS — B35.4 TINEA CORPORIS: ICD-10-CM

## 2021-05-25 DIAGNOSIS — L91.8 ACROCHORDON: ICD-10-CM

## 2021-05-25 DIAGNOSIS — I10 ESSENTIAL HYPERTENSION: ICD-10-CM

## 2021-05-25 LAB
ALT SERPL-CCNC: 10 U/L (ref 0–41)
AST SERPL-CCNC: 16 U/L (ref 0–40)
BASOPHILS ABSOLUTE: 0 K/UL (ref 0–0.2)
BASOPHILS RELATIVE PERCENT: 0.6 %
EOSINOPHILS ABSOLUTE: 0.4 K/UL (ref 0–0.7)
EOSINOPHILS RELATIVE PERCENT: 4.9 %
HCT VFR BLD CALC: 39.1 % (ref 42–52)
HEMOGLOBIN: 13.1 G/DL (ref 14–18)
LYMPHOCYTES ABSOLUTE: 1.6 K/UL (ref 1–4.8)
LYMPHOCYTES RELATIVE PERCENT: 18.4 %
MCH RBC QN AUTO: 29 PG (ref 27–31.3)
MCHC RBC AUTO-ENTMCNC: 33.6 % (ref 33–37)
MCV RBC AUTO: 86.5 FL (ref 80–100)
MONOCYTES ABSOLUTE: 0.8 K/UL (ref 0.2–0.8)
MONOCYTES RELATIVE PERCENT: 9.3 %
NEUTROPHILS ABSOLUTE: 5.7 K/UL (ref 1.4–6.5)
NEUTROPHILS RELATIVE PERCENT: 66.8 %
PDW BLD-RTO: 14.1 % (ref 11.5–14.5)
PLATELET # BLD: 198 K/UL (ref 130–400)
RBC # BLD: 4.52 M/UL (ref 4.7–6.1)
WBC # BLD: 8.6 K/UL (ref 4.8–10.8)

## 2021-05-25 PROCEDURE — 99214 OFFICE O/P EST MOD 30 MIN: CPT | Performed by: FAMILY MEDICINE

## 2021-05-25 PROCEDURE — 17110 DESTRUCTION B9 LES UP TO 14: CPT | Performed by: FAMILY MEDICINE

## 2021-05-25 RX ORDER — ALBUTEROL SULFATE 90 UG/1
2 AEROSOL, METERED RESPIRATORY (INHALATION) EVERY 6 HOURS PRN
Qty: 3 INHALER | Refills: 1 | Status: SHIPPED | OUTPATIENT
Start: 2021-05-25 | End: 2021-11-29 | Stop reason: SDUPTHER

## 2021-05-25 RX ORDER — TERBINAFINE HYDROCHLORIDE 250 MG/1
250 TABLET ORAL DAILY
Qty: 84 TABLET | Refills: 0 | Status: SHIPPED | OUTPATIENT
Start: 2021-05-25 | End: 2021-08-17

## 2021-05-25 RX ORDER — FUROSEMIDE 20 MG/1
20 TABLET ORAL DAILY
Qty: 30 TABLET | Refills: 2 | Status: SHIPPED | OUTPATIENT
Start: 2021-05-25 | End: 2022-08-02 | Stop reason: SDUPTHER

## 2021-05-25 RX ORDER — MONTELUKAST SODIUM 10 MG/1
10 TABLET ORAL NIGHTLY
Qty: 90 TABLET | Refills: 3 | Status: SHIPPED | OUTPATIENT
Start: 2021-05-25 | End: 2021-09-13

## 2021-05-25 ASSESSMENT — ENCOUNTER SYMPTOMS
ABDOMINAL PAIN: 0
PHOTOPHOBIA: 0
ABDOMINAL DISTENTION: 0
COLOR CHANGE: 1
WHEEZING: 0
COUGH: 0
SHORTNESS OF BREATH: 0
CHEST TIGHTNESS: 0

## 2021-05-25 NOTE — TELEPHONE ENCOUNTER
Pt is out of his testosterone gel, [facility] has sent out his mail today, not sure when he will be receiving this, pt is asking a small script to be sent to [facility] . Please advise. Pt is in the waiting room.

## 2021-05-25 NOTE — PROGRESS NOTES
Diagnosis Orders   1. Hypogonadism male  Testosterone Free and Total Male    CBC Auto Differential    Transferrin   2. Onychomycosis  ALT    AST    terbinafine (LAMISIL) 250 MG tablet   3. Seborrheic keratoses, inflamed  MO DESTRUCTION BENIGN LESIONS UP TO 14   4. Acrochordon  MO DESTRUCTION BENIGN LESIONS UP TO 14   5. Acute diastolic congestive heart failure (HCC)  furosemide (LASIX) 20 MG tablet   6. Chronic seasonal allergic rhinitis  montelukast (SINGULAIR) 10 MG tablet   7. Simple chronic bronchitis (HCC)  albuterol sulfate  (90 Base) MCG/ACT inhaler    montelukast (SINGULAIR) 10 MG tablet   8. Essential hypertension  furosemide (LASIX) 20 MG tablet   9. Tinea corporis  nystatin-triamcinolone (MYCOLOG II) 801605-0.1 UNIT/GM-% cream       Return in about 6 months (around 11/25/2021) for 15 min skin check. Orders Placed This Encounter   Procedures    ALT     Standing Status:   Future     Standing Expiration Date:   5/25/2022    AST     Standing Status:   Future     Standing Expiration Date:   5/25/2022    Testosterone Free and Total Male     Standing Status:   Future     Standing Expiration Date:   5/25/2022    CBC Auto Differential     Standing Status:   Future     Standing Expiration Date:   5/25/2022    Transferrin     Standing Status:   Future     Standing Expiration Date:   5/25/2022    MO DESTRUCTION BENIGN LESIONS UP TO 14       Diagnoses and all orders for this visit:    Hypogonadism male  -     Testosterone Free and Total Male; Future  -     CBC Auto Differential; Future  -     Transferrin; Future    Onychomycosis  -     ALT; Future  -     AST; Future  -     terbinafine (LAMISIL) 250 MG tablet; Take 1 tablet by mouth daily    Seborrheic keratoses, inflamed  -     MO DESTRUCTION BENIGN LESIONS UP TO 14    Acrochordon  -     MO DESTRUCTION BENIGN LESIONS UP TO 14    Acute diastolic congestive heart failure (HCC)  -     furosemide (LASIX) 20 MG tablet;  Take 1 tablet by mouth daily    Chronic seasonal allergic rhinitis  -     montelukast (SINGULAIR) 10 MG tablet; Take 1 tablet by mouth nightly TAKE 1 TABLET NIGHTLY    Simple chronic bronchitis (HCC)  -     albuterol sulfate  (90 Base) MCG/ACT inhaler; Inhale 2 puffs into the lungs every 6 hours as needed for Wheezing or Shortness of Breath  -     montelukast (SINGULAIR) 10 MG tablet; Take 1 tablet by mouth nightly TAKE 1 TABLET NIGHTLY    Essential hypertension  -     furosemide (LASIX) 20 MG tablet; Take 1 tablet by mouth daily    Tinea corporis  -     nystatin-triamcinolone (MYCOLOG II) 550230-0.1 UNIT/GM-% cream; Apply topically 2 times daily. Return in about 6 months (around 11/25/2021) for 15 min skin check. Patient Instructions   Cryotherapy instructions    Post op instructions given. A printed copy provided. It is best to leave blisters alone if they form for the first 1-3 days to allow the desired damaged tissue (precancer lesion, wart, or whatever lesion is being removed) to separate from healthy tissue. They should be covered with a bandage to prevent blister breakage and dirt exposure. The wounds should remain dry while there is a blister, therefore if this is a sweaty location like the foot you may need to change socks multiple times per day. When the blister(s) pop or patient removes the top as instructed between day 3-5, apply antibiotic (NOT triple antibiotic, one brand is Neosporin) ointment and a bandage to affected area(s). The ointment should be applied to the open area as long as it is not covered with skin. Exposed tissue is meant to be moist.  Once a scab is formed the patient may stop applying ointment. The scab may appear yellow while moist, don't confuse this with infection. If the wound is infection pus will drain from the site. If this treatment was for a large wart you may note that a plug of skin may fall out of the area that was treated.   That is the center of the wart and it is appropriate for it to come out. If exposed skin remains, treat that area as you would a ruptured blister as mentioned above. Bacitracin sample supplied                    Patient states he is just about out of his testosterone gel. He has not had labs done long time. The gel works well for him. Breathing is well controlled at this time with current medications needs refills. Toenails are slowly improving needs a refill of medication. Has not had his labs rechecked for this yet. Patient has a couple skin lesions he would like to have removed. The one on his left side/hip gets irritated by his pants line frequently. Needs a refill of his yeast cream.  Works very well on his body folds symptoms. Current Outpatient Medications on File Prior to Visit   Medication Sig Dispense Refill    testosterone (ANDROGEL; TESTIM) 50 MG/5GM (1%) GEL 1% gel Alternate one packet daily with 2 packets daily every other day 135 Package 0    budesonide-formoterol (SYMBICORT) 160-4.5 MCG/ACT AERO Inhale 2 puffs into the lungs 2 times daily 2 each LOT [id number] EXP [id number] 3 Inhaler 1    albuterol (PROVENTIL) (2.5 MG/3ML) 0.083% nebulizer solution Take 3 mLs by nebulization every 6 hours as needed for Wheezing 120 each 3    gabapentin (NEURONTIN) 300 MG capsule TAKE 1 CAPSULE DAILY 90 capsule 3    doxazosin (CARDURA) 4 MG tablet TAKE ONE-HALF (1/2) TABLET TWICE A DAY 90 tablet 3    CPAP Machine MISC by Does not apply route New CPAP with 14 cm 1 each 0    ciclopirox (PENLAC) 8 % solution Apply topically nightly.  1 Bottle 2    lisinopril (PRINIVIL;ZESTRIL) 20 MG tablet Take 1 tablet by mouth daily 90 tablet 3    metoprolol tartrate (LOPRESSOR) 50 MG tablet Take 1.5 po bid 15 tablet 0    pravastatin (PRAVACHOL) 40 MG tablet TAKE 1 TABLET NIGHTLY 90 tablet 3    SPIRIVA HANDIHALER 18 MCG inhalation capsule INHALE THE CONTENTS OF 1 CAPSULE DAILY 90 capsule 3    cetirizine (ZYRTEC) 5 MG tablet TAKE ONE TABLET BY MOUTH  PRN      inFLIXimab (REMICADE) 100 MG injection Infuse 5 mg/kg intravenously See Admin Instructions      nitroGLYCERIN (NITROSTAT) 0.4 MG SL tablet DISSOLVE 1 TABLET UNDER THE TONGUE EVERY 5 MINUTES AS NEEDED FOR CHEST PAIN. MAXIMUM OF 3 TABLETS 25 tablet 2    fluticasone (FLONASE) 50 MCG/ACT nasal spray 1 spray by Each Nare route daily 1 Spray in each nostril 3 Bottle 1    CPAP Machine MISC New cpap supplies mask hose filters etc 1 each 0    Saccharomyces boulardii (PROBIOTIC) 250 MG CAPS Take 1 tablet by mouth daily      esomeprazole Magnesium (NEXIUM) 20 MG PACK Take 20 mg by mouth daily      aspirin 81 MG tablet Take 81 mg by mouth daily.  mesalamine (PENTASA) 250 MG CR capsule Take 500 mg by mouth 4 times daily. No current facility-administered medications on file prior to visit. Alemtuzumab, Glycopyrrolate-formoterol, Mercaptopurine, and Moxifloxacin    Review of Systems   Constitutional: Negative for activity change, appetite change, diaphoresis and unexpected weight change. Eyes: Negative for photophobia and visual disturbance. Respiratory: Negative for cough, chest tightness, shortness of breath and wheezing. No orthopnea   Cardiovascular: Negative for chest pain, palpitations and leg swelling. Gastrointestinal: Negative for abdominal distention and abdominal pain. Genitourinary: Negative for flank pain and frequency. Musculoskeletal: Negative for gait problem and joint swelling. Skin: Positive for color change. Negative for rash and wound. Neurological: Negative for dizziness, weakness, light-headedness and headaches. Psychiatric/Behavioral: Negative for confusion. Resp 12   Ht 5' 10\" (1.778 m)   Wt 259 lb (117.5 kg)   BMI 37.16 kg/m²   Physical Exam  Constitutional:       General: He is not in acute distress. Appearance: Normal appearance. He is well-developed. He is not toxic-appearing. HENT:      Head: Normocephalic and atraumatic. Right Ear: Hearing and tympanic membrane normal.      Left Ear: Hearing and tympanic membrane normal.      Nose: Nose normal. No nasal deformity. Eyes:      General: Lids are normal.         Right eye: No discharge. Left eye: No discharge. Conjunctiva/sclera: Conjunctivae normal.      Pupils: Pupils are equal, round, and reactive to light. Neck:      Thyroid: No thyroid mass or thyromegaly. Vascular: No JVD. Trachea: Trachea and phonation normal.   Cardiovascular:      Rate and Rhythm: Normal rate and regular rhythm. Pulmonary:      Effort: No accessory muscle usage or respiratory distress. Musculoskeletal:      Cervical back: Full passive range of motion without pain. Comments: FROM all large muscle groups and joints as witnessed when walking to exam table, getting on, and getting off the exam table. Skin:     General: Skin is warm and dry. Findings: No rash. Comments: Some toenails have cleared. Some are still very thickened with irregular texture. Left neck has a 1 mm pedunculated fleshy colored lesion. Left hip on the side of his body has a excoriated oval stuck on appearing lesion   Neurological:      Mental Status: He is alert. Motor: No tremor or atrophy. Gait: Gait normal.   Psychiatric:         Speech: Speech normal.         Behavior: Behavior normal.         Thought Content: Thought content normal.           Procedure consent  The procedure was discussed with the patient. All questions were answered and alternative options discussed. The patient is aware of the risks of bleeding, infection, unsatisfactory scar result. Informed consent paperwork was signed by the patient. Liquid nitrogen was applied for 2-6 seconds to affected areas with thaw allowed between dosing for a total of 2 applications. Applied to 2 lesion(s). The patient tolerated the procedure well. Diagnosis Orders   1.  Hypogonadism male  Testosterone Free and Total Male    CBC Auto Differential    Transferrin   2. Onychomycosis  ALT    AST    terbinafine (LAMISIL) 250 MG tablet   3. Seborrheic keratoses, inflamed  IA DESTRUCTION BENIGN LESIONS UP TO 14   4. Acrochordon  IA DESTRUCTION BENIGN LESIONS UP TO 14   5. Acute diastolic congestive heart failure (HCC)  furosemide (LASIX) 20 MG tablet   6. Chronic seasonal allergic rhinitis  montelukast (SINGULAIR) 10 MG tablet   7. Simple chronic bronchitis (HCC)  albuterol sulfate  (90 Base) MCG/ACT inhaler    montelukast (SINGULAIR) 10 MG tablet   8. Essential hypertension  furosemide (LASIX) 20 MG tablet   9. Tinea corporis  nystatin-triamcinolone (MYCOLOG II) 790047-5.1 UNIT/GM-% cream       Return in about 6 months (around 11/25/2021) for 15 min skin check. Orders Placed This Encounter   Procedures    ALT     Standing Status:   Future     Standing Expiration Date:   5/25/2022    AST     Standing Status:   Future     Standing Expiration Date:   5/25/2022    Testosterone Free and Total Male     Standing Status:   Future     Standing Expiration Date:   5/25/2022    CBC Auto Differential     Standing Status:   Future     Standing Expiration Date:   5/25/2022    Transferrin     Standing Status:   Future     Standing Expiration Date:   5/25/2022    IA DESTRUCTION BENIGN LESIONS UP TO 14               Patient Instructions   Cryotherapy instructions    Post op instructions given. A printed copy provided. It is best to leave blisters alone if they form for the first 1-3 days to allow the desired damaged tissue (precancer lesion, wart, or whatever lesion is being removed) to separate from healthy tissue. They should be covered with a bandage to prevent blister breakage and dirt exposure. The wounds should remain dry while there is a blister, therefore if this is a sweaty location like the foot you may need to change socks multiple times per day.    When the blister(s) pop or patient removes the top as instructed between day 3-5, apply antibiotic (NOT triple antibiotic, one brand is Neosporin) ointment and a bandage to affected area(s). The ointment should be applied to the open area as long as it is not covered with skin. Exposed tissue is meant to be moist.  Once a scab is formed the patient may stop applying ointment. The scab may appear yellow while moist, don't confuse this with infection. If the wound is infection pus will drain from the site. If this treatment was for a large wart you may note that a plug of skin may fall out of the area that was treated. That is the center of the wart and it is appropriate for it to come out. If exposed skin remains, treat that area as you would a ruptured blister as mentioned above. Bacitracin sample supplied                DO NOT USE TRIPLE ANTIBIOTIC ON THE WOUND    It is best to leave blisters alone if they form. They should be covered with a bandage to prevent blister breakage and dirt exposure. The wounds should remain dry while there is a blister, therefore if this is a sweaty location like the foot you may need to change socks multiple times per day. If blister(s) pop or patient pops with a sterilized needle, apply antibiotic (NOT triple antibiotic, one brand is Neosporin) ointment and a bandage to affected area(s). The ointment should be applied to the open area as long as it is not covered with skin. Exposed tissue is meant to be moist.  Once a scab formed she may stop applying ointment. If this treatment was for a large wart you may note that a plug of skin may fall out of the area that was treated. That is the center of the wart and it is appropriate for it to come out. If exposed skin remains, treat that area as you would a ruptured blister as mentioned above.

## 2021-05-26 LAB — TRANSFERRIN: 271 MG/DL (ref 200–400)

## 2021-05-27 LAB
SEX HORMONE BINDING GLOBULIN: 48 NMOL/L (ref 11–80)
TESTOSTERONE FREE PERCENT: 1.3 % (ref 1.6–2.9)
TESTOSTERONE FREE, CALC: 7 PG/ML (ref 47–244)
TESTOSTERONE TOTAL-MALE: 54 NG/DL (ref 300–720)

## 2021-06-01 ENCOUNTER — OFFICE VISIT (OUTPATIENT)
Dept: FAMILY MEDICINE CLINIC | Age: 71
End: 2021-06-01
Payer: MEDICARE

## 2021-06-01 VITALS
HEART RATE: 64 BPM | DIASTOLIC BLOOD PRESSURE: 60 MMHG | WEIGHT: 259.2 LBS | TEMPERATURE: 97.7 F | SYSTOLIC BLOOD PRESSURE: 110 MMHG | OXYGEN SATURATION: 98 % | BODY MASS INDEX: 36.29 KG/M2 | HEIGHT: 71 IN

## 2021-06-01 DIAGNOSIS — B35.1 FUNGAL INFECTION OF TOENAIL: Primary | ICD-10-CM

## 2021-06-01 DIAGNOSIS — L30.9 DERMATITIS: ICD-10-CM

## 2021-06-01 DIAGNOSIS — G89.29 CHRONIC PAIN OF RIGHT KNEE: ICD-10-CM

## 2021-06-01 DIAGNOSIS — E11.9 TYPE 2 DIABETES MELLITUS WITHOUT COMPLICATION, WITHOUT LONG-TERM CURRENT USE OF INSULIN (HCC): ICD-10-CM

## 2021-06-01 DIAGNOSIS — M25.561 CHRONIC PAIN OF RIGHT KNEE: ICD-10-CM

## 2021-06-01 PROCEDURE — 99214 OFFICE O/P EST MOD 30 MIN: CPT | Performed by: NURSE PRACTITIONER

## 2021-06-01 RX ORDER — TRIAMCINOLONE ACETONIDE 0.25 MG/G
CREAM TOPICAL
Qty: 1 TUBE | Refills: 0 | Status: SHIPPED | OUTPATIENT
Start: 2021-06-01 | End: 2022-02-16 | Stop reason: DRUGHIGH

## 2021-06-01 ASSESSMENT — ENCOUNTER SYMPTOMS
CHEST TIGHTNESS: 0
COUGH: 0
ABDOMINAL PAIN: 0
EYE PAIN: 0
TROUBLE SWALLOWING: 0
DIARRHEA: 0
CONSTIPATION: 0
SHORTNESS OF BREATH: 0

## 2021-06-01 NOTE — PROGRESS NOTES
Subjective  Chief Complaint   Patient presents with    Knee Pain     RT knee, X2 mos. pt states that he is strggling on and off. pain a 5 at it's worse when present. pt states that he is elevating when he can     Discuss Medications     would like to ask you about Lasix and Lamisil     Diabetes     pt would like to be tested?  Skin Problem     ears drying, asking for cream?        HPI    Patient here for intermittent right medial/inner knee pain x 1 to 2 months. Does not radiate Rates the pain about a 5/10. Notices the pain with movement, walking. Pain not bad right now. Not taking anything for the pain. Pain is tolerable at this time. dneiss  Any injury to the knee or swelling. Patient states he was out of his testosterone when he had labs drawn on 5/25/21. States he got the prescription now and is taking it. Had hgA1C, and cmp done in March. WNL. Has been getting edema in bilateral lower extremeities. Takes lasix daily. Has been on lamisil for fungal nail infection, notices toenails turning \"black. \"    Reports drying of the ears, would like a cream to put on them to stop the drying/flaking/itching.        Past Medical History:   Diagnosis Date    A-fib [facility])     Abnormal EMG 12/09/2015    Actinic keratoses     Actinic keratoses     Acute diastolic congestive heart failure (HCC) 1/6/2021    Allergic rhinitis     Anemia 11/18/2014    Arthritis     Chronic back pain     COPD [facility]chronic obstructive pulmonary disease) ([facility] 08/09/2012    COPD (chronic obstructive pulmonary disease) ([facility])     Crohns disease     Degenerative disc disease, lumbar     Dermatitis     Diabetes ([facility] [address] 03/19/2015    diet controlled    Gastrointestinal problem     GERD (gastroesophageal reflux disease)     History of atrial fibrillation 2006    Dr. Nery Reyes History of throat cancer 2004     cleared for reoccurence years ago    Hyperlipidemia 1/21/2014    Hypertension     Hypogonadism male     Lung disease  Mononeuritis multiplex     Mononeuritis multiplex     Nondependent alcohol abuse, in remission 7/22/2020    Obesity (BMI 30-39. 9) 7/24/2019    Osteoarthritis of lumbar spine     Pain of right heel     Paresthesia of foot, bilateral     Prediabetes 12/3/2014    Restless leg syndrome     Seborrheic dermatitis     Seborrheic keratoses, inflamed     Throat cancer (HCC)     throat, had surgery, sees Dr Terrell [place] yearly    Tinea cruris     Tinea pedis     Tinea unguium      Patient Active Problem List    Diagnosis Date Noted    Chronic obstructive pulmonary disease ([address]     Acute diastolic congestive heart failure ([address] 01/06/2021    Type 2 diabetes mellitus without complication, without long-term current use of insulin ([address] 01/06/2021    Nondependent alcohol abuse, in remission 07/22/2020    Blood glucose abnormal 07/22/2020    Personal history of tobacco use, presenting hazards to health 07/22/2020    Paroxysmal A-fib ([address] 07/22/2020    Morbidly obese ([address] 07/22/2020    Paresthesia 02/21/2020    Peripheral nerve disease 12/01/2019    Acute bronchitis 11/27/2019    Chest pain 10/02/2019    Obesity (BMI 30-39.9) 07/24/2019    History of cataract extraction 07/23/2019    JARRELL on CPAP 06/06/2018    Actinic keratoses     MACIEL (dyspnea on exertion)     Pain of right heel     Tinea unguium     Chronic back pain     Degenerative disc disease, lumbar     Osteoarthritis of lumbar spine     Mononeuritis multiplex     Seborrheic dermatitis     Hypogonadism male     Allergic rhinitis     Other hyperlipidemia 01/21/2014    Crohn's disease ([address] failed remicade and humira 2021 01/21/2014    Chronic bronchitis ([address] 08/09/2012    GERD (gastroesophageal reflux disease)     Essential hypertension 10/21/2011     Past Surgical History:   Procedure Laterality Date    CARDIAC CATHETERIZATION      CARPAL TUNNEL RELEASE      CATARACT REMOVAL WITH IMPLANT Right 09/09/2019    CATARACT REMOVAL WITH IMPLANT Left 2019    COLON SURGERY      COLONOSCOPY  04/15/2015    KNEE ARTHROSCOPY      LARYNGECTOMY  2004    UPPER GASTROINTESTINAL ENDOSCOPY  13    Dr. Eugenio Dangelo W/NAPOLEON RODRIGUEZ       Family History   Problem Relation Age of Onset    Heart Disease Mother     Liver Disease Mother     High Blood Pressure Mother     Heart Disease Father     Arthritis Father     Cancer Sister         lung     Social History     Socioeconomic History    Marital status:      Spouse name: None    Number of children: 3    Years of education: None    Highest education level: None   Occupational History    None   Tobacco Use    Smoking status: Former Smoker     Packs/day: 1.00     Years: 25.00     Pack years: 25.00     Types: Cigarettes     Quit date: 10/21/1990     Years since quittin.6    Smokeless tobacco: Never Used   Substance and Sexual Activity    Alcohol use: No     Comment: Attends AA, 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    Drug use: No    Sexual activity: None   Other Topics Concern    None   Social History Narrative    Lives alone. Social Determinants of Health     Financial Resource Strain:     Difficulty of Paying Living Expenses:    Food Insecurity:     Worried About Running Out of Food in the Last Year:     [address] in the Last Year:    Transportation Needs:     Lack of Transportation (Medical):      Lack of Transportation (Non-Medical):    Physical Activity:     Days of Exercise per Week:     Minutes of Exercise per Session:    Stress:     Feeling of Stress :    Social Connections:     Frequency of Communication with Friends and Family:     Frequency of Social Gatherings with Friends and Family:     Attends Muslim Services:     Active Member of Clubs or Organizations:     Attends Club or Organization Meetings:     Marital Status:    Intimate Partner Violence:     Fear of Current or Ex-Partner:     Emotionally Abused:     Physically mouth daily      esomeprazole Magnesium (NEXIUM) 20 MG PACK Take 20 mg by mouth daily      aspirin 81 MG tablet Take 81 mg by mouth daily.  mesalamine (PENTASA) 250 MG CR capsule Take 500 mg by mouth 4 times daily.  inFLIXimab (REMICADE) 100 MG injection Infuse 5 mg/kg intravenously See Admin Instructions (Patient not taking: Reported on 6/1/2021)       No current facility-administered medications on file prior to visit. Allergies   Allergen Reactions    Alemtuzumab      Low grade fever  Other reaction(s): Other: See Comments  Low grade fever    Glycopyrrolate-Formoterol Other (See Comments)     Dizzy and felt like heart rate sped up  Other reaction(s): Other: See Comments  Dizzy and felt like heart rate sped up    Mercaptopurine Other (See Comments)    Moxifloxacin Other (See Comments)     Pt states He gets sores in his mouth       Review of Systems   Constitutional: Negative for activity change, appetite change and fatigue. HENT: Negative for ear pain, hearing loss and trouble swallowing. Eyes: Negative for pain and visual disturbance. Respiratory: Negative for cough, chest tightness and shortness of breath. Cardiovascular: Positive for palpitations. Negative for chest pain and leg swelling. Gastrointestinal: Negative for abdominal pain, constipation and diarrhea. Genitourinary: Negative for difficulty urinating. Musculoskeletal: Negative. Skin:        Toenails \"black\" thick   Neurological: Negative for dizziness and light-headedness. Psychiatric/Behavioral: Negative. Objective  Vitals:    06/01/21 1506   BP: 110/60   Pulse: 64   Temp: 97.7 °F (36.5 °C)   SpO2: 98%   Weight: 259 lb 3.2 oz (117.6 kg)   Height: 5' 11\" (1.803 m)     Physical Exam  Vitals and nursing note reviewed. Constitutional:       General: He is not in acute distress. Appearance: Normal appearance. He is well-developed. He is obese. He is not diaphoretic.    HENT:      Head: Normocephalic and atraumatic. Right Ear: Hearing, tympanic membrane, ear canal and external ear normal.      Left Ear: Hearing, tympanic membrane, ear canal and external ear normal.      Nose: Nose normal. No congestion or rhinorrhea. Mouth/Throat:      Mouth: Mucous membranes are moist.      Pharynx: Oropharynx is clear. No oropharyngeal exudate or posterior oropharyngeal erythema. Eyes:      General:         Right eye: No discharge. Left eye: No discharge. Conjunctiva/sclera: Conjunctivae normal.      Pupils: Pupils are equal, round, and reactive to light. Neck:      Thyroid: No thyromegaly. Cardiovascular:      Rate and Rhythm: Normal rate and regular rhythm. Pulses: Normal pulses. Heart sounds: Normal heart sounds. No murmur heard. Pulmonary:      Effort: Pulmonary effort is normal. No respiratory distress. Breath sounds: Normal breath sounds. No stridor. No wheezing, rhonchi or rales. Chest:      Chest wall: No tenderness. Musculoskeletal:         General: Tenderness present. Normal range of motion. Cervical back: Normal range of motion and neck supple. No tenderness. No muscular tenderness. Right lower leg: No edema. Left lower leg: No edema. Comments: Right knee tenderness with movement   Feet:      Right foot:      Toenail Condition: Right toenails are abnormally thick. Fungal disease present. Left foot:      Toenail Condition: Left toenails are abnormally thick. Fungal disease present. Lymphadenopathy:      Cervical: No cervical adenopathy. Skin:     General: Skin is warm and dry. Capillary Refill: Capillary refill takes less than 2 seconds. Coloration: Skin is not jaundiced or pale. Findings: No bruising, erythema, lesion or rash. Neurological:      General: No focal deficit present. Mental Status: He is alert and oriented to person, place, and time. Mental status is at baseline.       Cranial Nerves: No cranial nerve deficit. Coordination: Coordination normal.      Gait: Gait normal.   Psychiatric:         Mood and Affect: Mood normal.         Speech: Speech normal.         Behavior: Behavior normal.         Thought Content: Thought content normal.         Judgment: Judgment normal.         Assessment& Plan     Diagnosis Orders   1. Fungal infection of toenail  CARLY Pelletier DPM, Podiatry, [place]   2. Type 2 diabetes mellitus without complication, without long-term current use of insulin ([facility])  Microalbumin / Creatinine Urine Ratio   3. Dermatitis  triamcinolone (KENALOG) 0.025 % cream   4. Chronic pain of right knee  XR KNEE RIGHT (3 VIEWS)    [facility] Physical Therapy - [place]         Orders Placed This Encounter   Procedures    XR KNEE RIGHT (3 VIEWS)     Standing Status:   Future     Number of Occurrences:   1     Standing Expiration Date:   6/1/2022     Order Specific Question:   Reason for exam:     Answer:   right knee pain x 1-2 months    Microalbumin / Creatinine Urine Ratio     Standing Status:   Future     Standing Expiration Date:   6/1/2022    CARLY Pelletier DPM, Podiatry, [place]     Referral Priority:   Routine     Referral Type:   Eval and Treat     Referral Reason:   Specialty Services Required     Referred to Provider:   Alo Larose DPM     Requested Specialty:   Podiatry     Number of Visits Requested:   1    [facility] Physical Therapy - [place]     Referral Priority:   Routine     Referral Type:   Eval and Treat     Referral Reason:   Specialty Services Required     Requested Specialty:   Physical Therapy     Number of Visits Requested:   1       Orders Placed This Encounter   Medications    triamcinolone (KENALOG) 0.025 % cream     Sig: Apply topically 2 times daily. Dispense:  1 Tube     Refill:  0     Apply triamcinolone cream to affected areas on the ears twice daily as needed for dryness, itching. Use for  afew days then take a break.   Obtain xray of right

## 2021-06-04 ENCOUNTER — TELEPHONE (OUTPATIENT)
Dept: FAMILY MEDICINE CLINIC | Age: 71
End: 2021-06-04

## 2021-06-04 DIAGNOSIS — M25.561 CHRONIC PAIN OF RIGHT KNEE: Primary | ICD-10-CM

## 2021-06-04 DIAGNOSIS — G89.29 CHRONIC PAIN OF RIGHT KNEE: Primary | ICD-10-CM

## 2021-06-15 ENCOUNTER — HOSPITAL ENCOUNTER (OUTPATIENT)
Dept: PHYSICAL THERAPY | Age: 71
Setting detail: THERAPIES SERIES
Discharge: HOME | End: 2021-06-15
Payer: MEDICARE

## 2021-06-15 PROCEDURE — 97110 THERAPEUTIC EXERCISES: CPT

## 2021-06-15 PROCEDURE — 97161 PT EVAL LOW COMPLEX 20 MIN: CPT

## 2021-06-15 ASSESSMENT — PAIN SCALES - GENERAL: PAINLEVEL_OUTOF10: 1

## 2021-06-15 ASSESSMENT — PAIN DESCRIPTION - ORIENTATION: ORIENTATION: RIGHT

## 2021-06-15 ASSESSMENT — PAIN DESCRIPTION - DESCRIPTORS: DESCRIPTORS: ACHING

## 2021-06-15 ASSESSMENT — PAIN DESCRIPTION - LOCATION: LOCATION: KNEE

## 2021-06-15 ASSESSMENT — PAIN DESCRIPTION - FREQUENCY: FREQUENCY: INTERMITTENT

## 2021-06-15 ASSESSMENT — PAIN DESCRIPTION - DIRECTION: RADIATING_TOWARDS: DENIES

## 2021-06-15 NOTE — PROGRESS NOTES
[address]  PHYSICAL THERAPY EVALUATION    Date: 6/15/2021  Patient Name: Víctor Minor       MRN: [id number]   Account: [de-identified]   : 1950  (75 y.o.)   Gender: male   Referring Practitioner: NICHOLE Scott                 Diagnosis: chronic pain of right knee  Treatment Diagnosis: increased R knee pain with squat and amb, decreased activity tolerance for amb due to pain, decreased proximal hip strength  Additional Pertinent Hx: acid reflux, COPD, HTN, bronchitis, pneumonia, hearing loss, head injury, throat CA, OA, Crohns, a-fib, GERD, anemia, DM, paresthesia foot, DDD lumbar spine, chronic back pain, pain R heel, obesity, restless leg syndrome, CHF        Past Medical History:  has a past medical history of A-fib ([address] Abnormal EMG, Actinic keratoses, Actinic keratoses, Acute diastolic congestive heart failure ([address] Allergic rhinitis, Anemia, Arthritis, Chronic back pain, COPD (chronic obstructive pulmonary disease) ([address] COPD (chronic obstructive pulmonary disease) ([address] Crohns disease, Degenerative disc disease, lumbar, Dermatitis, Diabetes ([address] Gastrointestinal problem, GERD (gastroesophageal reflux disease), History of atrial fibrillation, History of throat cancer , Hyperlipidemia, Hypertension, Hypogonadism male, Lung disease, Mononeuritis multiplex, Mononeuritis multiplex, Nondependent alcohol abuse, in remission, Obesity (BMI 30-39.9), Osteoarthritis of lumbar spine, Pain of right heel, Paresthesia of foot, bilateral, Prediabetes, Restless leg syndrome, Seborrheic dermatitis, Seborrheic keratoses, inflamed, Throat cancer ([address] Tinea cruris, Tinea pedis, and Tinea unguium. Past Surgical History:   has a past surgical history that includes Vocal Cord Augmentation W/Radiesse Inj (); Laryngectomy (); Upper gastrointestinal endoscopy (13); Colonoscopy (04/15/2015); Cardiac catheterization; Knee arthroscopy;  Cataract removal with implant (Right, 09/09/2019); Cataract removal with implant (Left, 07/22/2019); Carpal tunnel release; and Colon surgery. Vital Signs  Patient Currently in Pain: Yes   Pain Screening  Patient Currently in Pain: Yes  Pain Assessment  Pain Assessment: 0-10  Pain Level: 1 (pain range 0-5/10)  Pain Location: Knee (medial)  Pain Orientation: Right  Pain Radiating Towards: denies  Pain Descriptors: Aching  Pain Frequency: Intermittent     Lives With: Alone  Type of Home: House  Home Layout: Two level; Laundry in basement  Home Access: Stairs to enter without rails  Entrance Stairs - Number of Steps: 1  Ambulation Assistance: Independent  Transfer Assistance: Independent  Active : Yes  Occupation: Retired  Type of occupation: [employer]        Subjective:  Subjective: Pt with c/o increasing R medial knee pain which began approx 3 weeks ago- incidious onset. Pt with increased pain c/o during stair negotiation. Not taking pain medication. Pt tried heat- unsure if it decreased his symptoms. \"I do not walk enough at home\"  Comments: xray R knee 6/1/2021: Evaluation of the right knee with plain films reveals the bone adequately mineralized and intact. Moderate diffuse degenerative changes overlie the right knee joint with narrowing of the medial compartment and spurring along the trochlear groove and at the patellofemoral compartment. No intrinsic bony lesion. No fracture or dislocation or bony destructive process. Objective:   Sensation  Overall Sensation Status: Impaired (B plantar aspect of feet)    Ambulation 1  Surface: carpet  Device: No Device  Assistance: Independent  Quality of Gait: mild to absent antalgic pattern R knee  Distance: 75 ft X 2  Stairs  # Steps : 4  Stairs Height: 6\"  Rails: None  Device: No Device  Assistance: Independent  Comment: increased pain and quick descend with R LE     Transfers  Sit to Stand: Independent  Stand to sit:  Independent  Comment: able to stand from 18 in seat height without UEs and without difficulty; however, increased R knee pain    Strength RLE  Strength RLE: Exception  R Hip Flexion: 4-/5  R Hip Extension: 4-/5  R Hip ABduction: 4-/5  R Knee Flexion: 4+/5  R Knee Extension: 4+/5  R Ankle Dorsiflexion: 5/5  Strength LLE  Strength LLE: Exception  L Hip Flexion: 4-/5  L Hip Extension: 4-/5  L Hip ABduction: 4-/5  L Knee Flexion: 4+/5  L Knee Extension: 4+/5  L Ankle Dorsiflexion: 5/5       AROM RLE (degrees)  RLE General AROM: 0-130     AROM LLE (degrees)  LLE General AROM: 0-130    Observation/Palpation  Palpation: no tenderness with palpation R knee  Edema: none present  Bed mobility  Supine to Sit: Independent  Sit to Supine: Independent    Additional Measures  Special Tests: ligamentous stability and patellar mobility WNL    Exercises:   Exercises  Exercise 1: standing hip abd/ ext X 10 b/l  Exercise 2: hamstring stretch 20 sec X 3 b/l  Exercise 3: TG squats*  Exercise 4: bike*  Exercise 5: lunges on bosu*  Exercise 6: step ups*  Exercise 7: step downs*  Exercise 20: HEP: hip abd, hip ext, hamstring stretch, increase walking at home     Modalities:  Modalities  Moist heat: R knee*  *Indicates exercise,modality, or manual techniques to be initiated when appropriate    Assessment: Body structures, Functions, Activity limitations: Decreased functional mobility , Increased pain, Decreased endurance, Decreased strength  Assessment: Pt is 70 y.o. male with c/o increasing R knee pain since May without incident. X-ray completed on 6/1/2021 and showed degenerative changes to R knee. Pt exhibits impairments of increased R knee pain with squat and amb, decreased activity tolerance for amb due to pain, decreased proximal hip strength which decreases pt quality of life. PT is required to address pt impairments, progress strength and ROM, and provide pt with HEP for self management of knee pain and for improved quality of life.   Prognosis: Excellent  Discharge Recommendations: Continue to assess pending progress     Decision Making: Low Complexity  History: high- acid reflux, COPD, HTN, bronchitis, pneumonia, hearing loss, head injury, throat CA, OA, Crohns, a-fib, GERD, anemia, DM, paresthesia foot, DDD lumbar spine, chronic back pain, pain R heel, obesity, restless leg syndrome, CHF  Exam: med- LEFS 37/80  Clinical Presentation: stable- low      Plan  Frequency/Duration:  Plan  Times per week: 1  Plan weeks: 4  Current Treatment Recommendations: Strengthening, Neuromuscular Re-education, Home Exercise Program, ROM, Manual Therapy - Soft Tissue Mobilization, Safety Education & Training, Aquatics, Balance Training, Endurance Training, Patient/Caregiver Education & Training, Functional Mobility Training, Equipment Evaluation, Education, & procurement, Gait Training, Transfer Training, Modalities, Stair training, Pain Management, Positioning  Plan Comment: can add aquatic sessions if appropriate         Patient Education  New Education Provided: PT Education: Goals;PT Role;Plan of Care;Home Exercise Program    POST-PAIN     Pain Rating (0-10 pain scale):   1/10  Location and pain description same as pre-treatment unless indicated. Action: [x] NA  [] Call Physician  [] Perform HEP  [] Meds as prescribed    Evaluation and patient rights have been reviewed and patient agrees with plan of care. Yes  [x]  No  []   Explain:       Pretty Fall Risk Assessment  Risk Factor Scale  Score   History of Falls [] Yes  [x] No 25  0 0   Secondary Diagnosis [x] Yes  [] No 15  0 15   Ambulatory Aid [] Furniture  [] Crutches/cane/walker  [x] None/bedrest/wheelchair/nurse 30  15  0 0   IV/Heparin Lock [] Yes  [x] No 20  0 0   Gait/Transferring [] Impaired  [] Weak  [x] Normal/bedrest/immobile 20  10  0 0   Mental Status [] Forgets limitations  [x] Oriented to own ability 15  0 0      Total: 15     Based on the Assessment score: check the appropriate box.   [x]  No intervention needed   Low =   Score of 0-24  []  Use standard prevention interventions Moderate =  Score of 24-44   [] Discuss fall prevention strategies   [] Indicate moderate falls risk on eval  []  Use high risk prevention interventions High = Score of 45 and higher   [] Discuss fall prevention strategies   [] Provide supervision during treatment time    Goals  Short term goals  Time Frame for Short term goals: 2 wks  Short term goal 1: The pt will demo improved B LE strength >/= 4+/5 in order to safely ambulate with decreased pain/limitations  Long term goals  Time Frame for Long term goals : 4 wks  Long term goal 1: Pt will demonstrate indep with % of the time for self management of knee pain. Long term goal 2: Pt will demonstrate improved score on LEFS >/= 70/80 indicating minimal distability for improved pt quality of life. Long term goal 3: The pt will report decreased pain </=2/10 at worst in order to increase ease with amb during mowing the lawn.     PT Individual Minutes  Time In: 2708  Time Out: 1540  Minutes: 37  Timed Code Treatment Minutes: 10 Minutes  Procedure Minutes: eval 27 min     Timed Activity Minutes Units   Ther Ex 10 1       Electronically signed by Ramírez Win, PT on 6/15/21 at 4:08 PM EDT

## 2021-06-20 ENCOUNTER — OFFICE VISIT (OUTPATIENT)
Dept: FAMILY MEDICINE CLINIC | Age: 71
End: 2021-06-20
Payer: MEDICARE

## 2021-06-20 VITALS
BODY MASS INDEX: 35.9 KG/M2 | DIASTOLIC BLOOD PRESSURE: 60 MMHG | WEIGHT: 257.4 LBS | RESPIRATION RATE: 16 BRPM | HEART RATE: 72 BPM | TEMPERATURE: 98.4 F | OXYGEN SATURATION: 98 % | SYSTOLIC BLOOD PRESSURE: 118 MMHG

## 2021-06-20 DIAGNOSIS — R05.9 COUGH: Primary | ICD-10-CM

## 2021-06-20 DIAGNOSIS — J01.00 ACUTE MAXILLARY SINUSITIS, RECURRENCE NOT SPECIFIED: ICD-10-CM

## 2021-06-20 PROCEDURE — 99213 OFFICE O/P EST LOW 20 MIN: CPT | Performed by: NURSE PRACTITIONER

## 2021-06-20 RX ORDER — DOXAZOSIN MESYLATE 4 MG/1
4 TABLET ORAL DAILY
Qty: 7 TABLET | Refills: 0 | Status: SHIPPED | OUTPATIENT
Start: 2021-06-20 | End: 2021-10-01 | Stop reason: SDUPTHER

## 2021-06-20 RX ORDER — AMOXICILLIN 875 MG/1
875 TABLET, COATED ORAL 2 TIMES DAILY
Qty: 14 TABLET | Refills: 0 | Status: SHIPPED | OUTPATIENT
Start: 2021-06-20 | End: 2021-06-27

## 2021-06-20 SDOH — ECONOMIC STABILITY: TRANSPORTATION INSECURITY
IN THE PAST 12 MONTHS, HAS THE LACK OF TRANSPORTATION KEPT YOU FROM MEDICAL APPOINTMENTS OR FROM GETTING MEDICATIONS?: NO

## 2021-06-20 SDOH — ECONOMIC STABILITY: FOOD INSECURITY: WITHIN THE PAST 12 MONTHS, THE FOOD YOU BOUGHT JUST DIDN'T LAST AND YOU DIDN'T HAVE MONEY TO GET MORE.: NEVER TRUE

## 2021-06-20 SDOH — ECONOMIC STABILITY: FOOD INSECURITY: WITHIN THE PAST 12 MONTHS, YOU WORRIED THAT YOUR FOOD WOULD RUN OUT BEFORE YOU GOT MONEY TO BUY MORE.: NEVER TRUE

## 2021-06-20 SDOH — ECONOMIC STABILITY: TRANSPORTATION INSECURITY
IN THE PAST 12 MONTHS, HAS LACK OF TRANSPORTATION KEPT YOU FROM MEETINGS, WORK, OR FROM GETTING THINGS NEEDED FOR DAILY LIVING?: NO

## 2021-06-20 ASSESSMENT — ENCOUNTER SYMPTOMS
CHEST TIGHTNESS: 0
EYE DISCHARGE: 0
SORE THROAT: 0
WHEEZING: 0
ABDOMINAL DISTENTION: 0
NAUSEA: 0
ABDOMINAL PAIN: 0
SINUS PRESSURE: 1
EYE ITCHING: 0
COUGH: 1
RHINORRHEA: 1
TROUBLE SWALLOWING: 0
FACIAL SWELLING: 0
SINUS PAIN: 1
VOMITING: 0
DIARRHEA: 1
EYE REDNESS: 0
EYE PAIN: 0
SHORTNESS OF BREATH: 0
VOICE CHANGE: 1

## 2021-06-20 ASSESSMENT — SOCIAL DETERMINANTS OF HEALTH (SDOH): HOW HARD IS IT FOR YOU TO PAY FOR THE VERY BASICS LIKE FOOD, HOUSING, MEDICAL CARE, AND HEATING?: NOT HARD AT ALL

## 2021-06-20 NOTE — PATIENT INSTRUCTIONS
nose.  · Put a hot, wet towel or a warm gel pack on your face 3 or 4 times a day for 5 to 10 minutes each time. · Try a decongestant nasal spray like oxymetazoline (Afrin). Do not use it for more than 3 days in a row. Using it for more than 3 days can make your congestion worse. When should you call for help? Call your doctor now or seek immediate medical care if:    · You have new or worse swelling or redness in your face or around your eyes.     · You have a new or higher fever. Watch closely for changes in your health, and be sure to contact your doctor if:    · You have new or worse facial pain.     · The mucus from your nose becomes thicker (like pus) or has new blood in it.     · You are not getting better as expected. Where can you learn more? Go to [email or web address]://[email or web address]Star. org and sign in to your [employer] account. Enter S948 in the [employer] box to learn more about \"Sinusitis: Care Instructions. \"     If you do not have an account, please click on the \"Sign Up Now\" link. Current as of: December 2, 2020               Content Version: 12.9  © 3074-9642 [employer]. Care instructions adapted under license by [facility] ([facility]). If you have questions about a medical condition or this instruction, always ask your healthcare professional. [address] any warranty or liability for your use of this information.        We'll call with COVID-19 results  Results will also be available on your [facility] account, if activated  Things to Know:   - Do not leave home except to get medical care while waiting for your results  - Testing does not change treatment--> there is no medication that treats COVID-19  - Drink plenty of fluids and rest as much as needed  - May take over the counter medicine such as ibuprofen (aka Motrin/ Advil) or acetaminophen (aka Tylenol) for pain or fever, follow directions on label  - Continually monitor symptoms + contact a medical provider if symptoms are worsening, such as difficulty breathing  - Avoid exposure to environmental irritants/ allergens where possible    - Practice social distancing and wear a face mask when leaving home is necessary  - Symptoms may develop 2 days to 2 weeks following exposure to the virus- if you are exposed after testing, or if you were in the incubation period when tested, you could become ill with COVID-19 later    Keep a low threshold to go to ER or call 9-1-1 for new or suddenly worsening symptoms --> trouble breathing, high fevers that are unresponsive to fever-reducing medications, difficulty staying awake, vomiting/ unable to keep food or fluids down, any other symptoms that you think could be an emergency.

## 2021-06-20 NOTE — PROGRESS NOTES
Subjective:      Patient ID: Natividad Atwood is a 70 y.o. male who presents today for:  Chief Complaint   Patient presents with    Cough     x3 days; productive cough w/green phlegm       HPI  Patient is here with c/o sinus pressure and cough for the last week, worse the last 3 days. Says it started to get even worse yesterday and now getting green phlegm off and on all day. He does not have increase SOB or wheezing. He fees that the illness started in his head but now it is dropping into his chest. He denies sore throat, fever, chills,or ear pain. He has some mild diarrhea the last few days as well. Denies vomiting or nausea. He is unsure about sense of smell or taste. No loss of appetite. He has some underlying COPD and asthma. He is taking all his normal inhalers. He has not had to use rescue inhaler more often. He has his Covid vaccine over a month ago. He has not had Covid that he is aware of. He has not been around anyone ill and says he is careful in public. Past Medical History:   Diagnosis Date    A-fib [facility])     Abnormal EMG 12/09/2015    Actinic keratoses     Actinic keratoses     Acute diastolic congestive heart failure (HCC) 1/6/2021    Allergic rhinitis     Anemia 11/18/2014    Arthritis     Chronic back pain     [facility] 08/09/2012    COPD (chronic obstructive pulmonary disease) ([facility])     Crohns disease     Degenerative disc disease, lumbar     Dermatitis     Diabetes ([facility] [address] 03/19/2015    diet controlled    Gastrointestinal problem     GERD (gastroesophageal reflux disease)     History of atrial fibrillation 2006    Dr. 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 9) 7/24/2019    Osteoarthritis of lumbar spine     Pain of right heel     Paresthesia of foot, bilateral     Prediabetes 12/3/2014    Restless leg syndrome     Seborrheic dermatitis     Seborrheic keratoses, inflamed     Throat cancer (HCC)     throat, had surgery, sees Dr Marleen Yeboah yearly    Tinea cruris     Tinea pedis     Tinea unguium      Past Surgical History:   Procedure Laterality Date    CARDIAC CATHETERIZATION      CARPAL TUNNEL RELEASE      CATARACT REMOVAL WITH IMPLANT Right 2019    CATARACT REMOVAL WITH IMPLANT Left 2019    COLON SURGERY      COLONOSCOPY  04/15/2015    KNEE ARTHROSCOPY      LARYNGECTOMY  2004    UPPER GASTROINTESTINAL ENDOSCOPY  13    Dr. Huma HOWARD/NAPOLEON RODRIGUEZ       Social History     Socioeconomic History    Marital status:      Spouse name: Not on file    Number of children: 3    Years of education: Not on file    Highest education level: Not on file   Occupational History    Not on file   Tobacco Use    Smoking status: Former Smoker     Packs/day: 1.00     Years: 25.00     Pack years: 25.00     Types: Cigarettes     Quit date: 10/21/1990     Years since quittin.6    Smokeless tobacco: Never Used   Substance and Sexual Activity    Alcohol use: No     Comment: Attends , 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    Drug use: No    Sexual activity: Not on file   Other Topics Concern    Not on file   Social History Narrative    Lives alone. Social Determinants of Health     Financial Resource Strain: Low Risk     Difficulty of Paying Living Expenses: Not hard at all   Food Insecurity: No Food Insecurity    Worried About Running Out of Food in the Last Year: Never true    Dacia of Food in the Last Year: Never true   Transportation Needs: No Transportation Needs    Lack of Transportation (Medical): No    Lack of Transportation (Non-Medical):  No   Physical Activity:     Days of Exercise per Week:     Minutes of Exercise per Apply topically 2 times daily. 15 g 1    testosterone (ANDROGEL; TESTIM) 50 MG/5GM (1%) GEL 1% gel Alternate one packet daily with 2 packets daily every other day 135 Package 0    budesonide-formoterol (SYMBICORT) 160-4.5 MCG/ACT AERO Inhale 2 puffs into the lungs 2 times daily 2 each LOT [id number] EXP [id number] 3 Inhaler 1    albuterol (PROVENTIL) (2.5 MG/3ML) 0.083% nebulizer solution Take 3 mLs by nebulization every 6 hours as needed for Wheezing 120 each 3    gabapentin (NEURONTIN) 300 MG capsule TAKE 1 CAPSULE DAILY 90 capsule 3    doxazosin (CARDURA) 4 MG tablet TAKE ONE-HALF (1/2) TABLET TWICE A DAY 90 tablet 3    lisinopril (PRINIVIL;ZESTRIL) 20 MG tablet Take 1 tablet by mouth daily 90 tablet 3    metoprolol tartrate (LOPRESSOR) 50 MG tablet Take 1.5 po bid 15 tablet 0    pravastatin (PRAVACHOL) 40 MG tablet TAKE 1 TABLET NIGHTLY 90 tablet 3    SPIRIVA HANDIHALER 18 MCG inhalation capsule INHALE THE CONTENTS OF 1 CAPSULE DAILY 90 capsule 3    cetirizine (ZYRTEC) 5 MG tablet TAKE ONE TABLET BY MOUTH  PRN      nitroGLYCERIN (NITROSTAT) 0.4 MG SL tablet DISSOLVE 1 TABLET UNDER THE TONGUE EVERY 5 MINUTES AS NEEDED FOR CHEST PAIN. MAXIMUM OF 3 TABLETS 25 tablet 2    CPAP Machine MISC New cpap supplies mask hose filters etc 1 each 0    Saccharomyces boulardii (PROBIOTIC) 250 MG CAPS Take 1 tablet by mouth daily      esomeprazole Magnesium (NEXIUM) 20 MG PACK Take 20 mg by mouth daily      aspirin 81 MG tablet Take 81 mg by mouth daily.  mesalamine (PENTASA) 250 MG CR capsule Take 500 mg by mouth 4 times daily.  inFLIXimab (REMICADE) 100 MG injection Infuse 5 mg/kg intravenously See Admin Instructions  (Patient not taking: Reported on 6/20/2021)       No current facility-administered medications for this visit. Review of Systems   Constitutional: Positive for fatigue. Negative for activity change, appetite change, chills, diaphoresis, fever and unexpected weight change.    HENT: Positive for congestion, postnasal drip, rhinorrhea, sinus pressure, sinus pain and voice change (chronic from vocal cord surgery in the 1990s). Negative for ear discharge, ear pain, facial swelling, sneezing, sore throat and trouble swallowing. Eyes: Negative for pain, discharge, redness and itching. Respiratory: Positive for cough (green phlegm all day long). Negative for chest tightness, shortness of breath and wheezing. Cardiovascular: Negative for chest pain. Gastrointestinal: Positive for diarrhea. Negative for abdominal distention, abdominal pain, nausea and vomiting. Musculoskeletal: Negative for arthralgias and myalgias. Skin: Negative for rash. Allergic/Immunologic: Positive for environmental allergies. Neurological: Positive for headaches. Negative for dizziness, weakness and light-headedness. Objective:   /60 (Site: Right Upper Arm, Position: Sitting, Cuff Size: Medium Adult)   Pulse 72   Temp 98.4 °F (36.9 °C) (Tympanic)   Resp 16   Wt 257 lb 6.4 oz (116.8 kg)   SpO2 98%   BMI 35.90 kg/m²     Physical Exam  Vitals reviewed. Constitutional:       General: He is awake. Appearance: Normal appearance. He is well-developed, well-groomed and overweight. HENT:      Head: Normocephalic and atraumatic. Jaw: There is normal jaw occlusion. Right Ear: Hearing, tympanic membrane, ear canal and external ear normal.      Left Ear: Hearing, tympanic membrane, ear canal and external ear normal.      Nose: Congestion and rhinorrhea present. Rhinorrhea is purulent. Right Turbinates: Swollen. Left Turbinates: Swollen. Right Sinus: Maxillary sinus tenderness and frontal sinus tenderness present. Left Sinus: Maxillary sinus tenderness and frontal sinus tenderness present. Mouth/Throat:      Lips: Pink. Mouth: Mucous membranes are moist.      Pharynx: Oropharynx is clear. No oropharyngeal exudate or posterior oropharyngeal erythema. Comments: PND noted. Eyes:      General: Lids are normal.      Extraocular Movements: Extraocular movements intact. Conjunctiva/sclera: Conjunctivae normal.   Neck:      Trachea: Trachea normal.   Cardiovascular:      Rate and Rhythm: Normal rate and regular rhythm. Pulses: Normal pulses. Heart sounds: Normal heart sounds, S1 normal and S2 normal.   Pulmonary:      Effort: Pulmonary effort is normal.      Breath sounds: Normal breath sounds and air entry. Musculoskeletal:         General: Normal range of motion. Cervical back: Full passive range of motion without pain and neck supple. Lymphadenopathy:      Cervical: No cervical adenopathy. Skin:     General: Skin is warm and dry. Capillary Refill: Capillary refill takes less than 2 seconds. Neurological:      General: No focal deficit present. Mental Status: He is alert and oriented to person, place, and time. Mental status is at baseline. Psychiatric:         Attention and Perception: Attention and perception normal.         Mood and Affect: Mood and affect normal.         Speech: Speech normal.         Behavior: Behavior normal. Behavior is cooperative. Thought Content: Thought content normal.         Cognition and Memory: Cognition and memory normal.         Judgment: Judgment normal.         Assessment:       Diagnosis Orders   1. Cough  Covid-19 Ambulatory    amoxicillin (AMOXIL) 875 MG tablet   2. Acute maxillary sinusitis, recurrence not specified  amoxicillin (AMOXIL) 875 MG tablet     No results found for this visit on 06/20/21. Plan:     Assessment & Plan   There are no diagnoses linked to this encounter. Discussed with patient will get Covid test to r/o although unlikely as he has had Covid vaccine well over 2 weeks ago. Advised will call with results. Discussed given symptoms are worsening will treat sinuses with ANTB. Advised to cont normal inhalers and allergy medications.  Advised to take ANTB with food and consider probiotic while he is on ANTB. Advised patient to monitor breathing sx and call with any SOB/wheezing/new onset fever. Advised to take rescue inhaler if he has wheezing or feel SOB, although cough is coming from PND related to sinuses. Advised on administration and SE of ANTB. Advised to call with any worsening sx or concerns. Patient also asked for short script of Cardura as he ran out and it has not come from mail in pharmacy yet- week supply sent to local pharmacy. Orders Placed This Encounter   Procedures    Covid-19 Ambulatory     Standing Status:   Future     Standing Expiration Date:   6/20/2022     Scheduling Instructions:      Saline media preferred given current shortage of viral transport media but both acceptable     Order Specific Question:   Is this test for diagnosis or screening? Answer:   Diagnosis of ill patient     Order Specific Question:   Symptomatic for COVID-19 as defined by CDC? Answer:   Yes     Order Specific Question:   Date of Symptom Onset     Answer:   6/18/2021     Order Specific Question:   Hospitalized for COVID-19? Answer:   No     Order Specific Question:   Admitted to ICU for COVID-19? Answer:   No     Order Specific Question:   Employed in healthcare setting? Answer:   No     Order Specific Question:   Resident in a congregate (group) care setting? Answer:   No     Order Specific Question:   Pregnant: Answer:   No     Order Specific Question:   Previously tested for COVID-19? Answer:   Yes     Orders Placed This Encounter   Medications    amoxicillin (AMOXIL) 875 MG tablet     Sig: Take 1 tablet by mouth 2 times daily for 7 days     Dispense:  14 tablet     Refill:  0    doxazosin (CARDURA) 4 MG tablet     Sig: Take 1 tablet by mouth daily for 7 days Take 1/2 tab BID     Dispense:  7 tablet     Refill:  0     There are no discontinued medications. Return for if symptoms do not improve in 3-5 days.         Reviewed with the patient/family: current clinical status & medications. Side effects of the medication prescribed today, as well as treatment plan/rationale and result expectations have been discussed with the patient/family who expresses understanding. Patient will be discharged home in stable condition. Follow up with PCP to evaluate treatment results or return if symptoms worsen or fail to improve. Discussed signs and symptoms which require immediate follow-up in ED/call to 911. Understanding verbalized. I have reviewed the patient's medical history in detail and updated the computerized patient record.     Lm Howard, APRN - CNP

## 2021-06-21 DIAGNOSIS — R05.9 COUGH: ICD-10-CM

## 2021-06-21 LAB — SARS-COV-2, PCR: NOT DETECTED

## 2021-06-22 ENCOUNTER — HOSPITAL ENCOUNTER (OUTPATIENT)
Dept: PHYSICAL THERAPY | Age: 71
Setting detail: THERAPIES SERIES
Discharge: HOME | End: 2021-06-22
Payer: MEDICARE

## 2021-06-22 PROCEDURE — 97110 THERAPEUTIC EXERCISES: CPT

## 2021-06-22 ASSESSMENT — PAIN DESCRIPTION - ORIENTATION: ORIENTATION: RIGHT

## 2021-06-22 ASSESSMENT — PAIN SCALES - GENERAL: PAINLEVEL_OUTOF10: 2

## 2021-06-22 ASSESSMENT — PAIN DESCRIPTION - LOCATION: LOCATION: KNEE

## 2021-06-22 ASSESSMENT — PAIN DESCRIPTION - PAIN TYPE: TYPE: ACUTE PAIN

## 2021-06-22 ASSESSMENT — PAIN DESCRIPTION - FREQUENCY: FREQUENCY: INTERMITTENT

## 2021-06-22 ASSESSMENT — PAIN DESCRIPTION - DESCRIPTORS: DESCRIPTORS: ACHING;SORE

## 2021-06-22 NOTE — PROGRESS NOTES
step downs 4\"x10 B  Exercise 20: HEP: hip abd, hip ext, hamstring stretch, increase walking at home    *Indicates exercise, modality, or manual techniques to be initiated when appropriate    Assessment: Body structures, Functions, Activity limitations: Decreased functional mobility , Increased pain, Decreased endurance, Decreased strength  Assessment: initiated exercises per POC for bilateral LE strength to improve quality and safety of gait. Patient demonstrates several LOB during step ups/downs. corrects without assistance. Provided verbal cues to improve posture/alignment during lunges. Patient demonstrates good carryover. Denies pain during session. Treatment Diagnosis: increased R knee pain with squat and amb, decreased activity tolerance for amb due to pain, decreased proximal hip strength  Prognosis: Excellent    Goals:  Short term goals  Time Frame for Short term goals: 2 wks  Short term goal 1: The pt will demo improved B LE strength >/= 4+/5 in order to safely ambulate with decreased pain/limitations    Long term goals  Time Frame for Long term goals : 4 wks  Long term goal 1: Pt will demonstrate indep with % of the time for self management of knee pain. Long term goal 2: Pt will demonstrate improved score on LEFS >/= 70/80 indicating minimal distability for improved pt quality of life. Long term goal 3: The pt will report decreased pain </=2/10 at worst in order to increase ease with amb during mowing the lawn. Progress toward goals:continue towards all     POST-PAIN       Pain Rating (0-10 pain scale):0   /10   Location and pain description same as pre-treatment unless indicated.    Action: [] NA   [] Perform HEP  [] Meds as prescribed  [] Modalities as prescribed   [] Call Physician     Frequency/Duration:  Plan  Times per week: 1  Plan weeks: 4  Current Treatment Recommendations: Strengthening, Neuromuscular Re-education, Home Exercise Program, ROM, Manual Therapy - Soft Tissue Mobilization, Safety Education & Training, Aquatics, Balance Training, Endurance Training, Patient/Caregiver Education & Training, Functional Mobility Training, Equipment Evaluation, Education, & procurement, Gait Training, Transfer Training, Modalities, Stair training, Pain Management, Positioning  Plan Comment: can add aquatic sessions if appropriate     Pt to continue current HEP. See objective section for any therapeutic exercise changes, additions or modifications this date.     PT Individual Minutes  Time In: 1575 (patient arrived late for appt)  Time Out: 1540  Minutes: 31  Timed Code Treatment Minutes: 31 Minutes  Procedure Minutes:0     Timed Activity Minutes Units   Ther Ex 31 2     Signature:  Electronically signed by Leslee Koyanagi, PTA on 6/22/21 at 3:40 PM EDT

## 2021-06-24 ENCOUNTER — OFFICE VISIT (OUTPATIENT)
Dept: ORTHOPEDIC SURGERY | Age: 71
End: 2021-06-24
Payer: MEDICARE

## 2021-06-24 VITALS
TEMPERATURE: 97.5 F | OXYGEN SATURATION: 94 % | HEART RATE: 63 BPM | HEIGHT: 71 IN | WEIGHT: 257 LBS | BODY MASS INDEX: 35.98 KG/M2

## 2021-06-24 DIAGNOSIS — M17.11 PRIMARY OSTEOARTHRITIS OF RIGHT KNEE: Primary | ICD-10-CM

## 2021-06-24 PROCEDURE — 99204 OFFICE O/P NEW MOD 45 MIN: CPT | Performed by: ORTHOPAEDIC SURGERY

## 2021-06-24 RX ORDER — USTEKINUMAB 45 MG/.5ML
45 INJECTION, SOLUTION SUBCUTANEOUS ONCE
COMMUNITY
End: 2022-06-20 | Stop reason: SDUPTHER

## 2021-06-24 NOTE — PROGRESS NOTES
Subjective:      Patient ID: Shae Acosta is a 70 y.o. male who presents today for:  Chief Complaint   Patient presents with    Knee Pain     right knee pain x 2-3 weeks. he had an xray done 06/01/21       HPI  Gentleman spontaneous onset of pain in his right knee a few weeks ago. Not associate any trauma. Localized medially. Difficulty with walking. No rest pain. No popping snapping or mechanical symptoms. No remote trauma. He is attending physical therapy for 2 sets with little improvement. Over-the-counter analgesics have not resulted in a significant improvement. Contralateral knee is asymptomatic. Past Medical History:   Diagnosis Date    A-fib [facility])     Abnormal EMG 12/09/2015    Actinic keratoses     Actinic keratoses     Acute diastolic congestive heart failure ([facility]) 1/6/2021    Allergic rhinitis     Anemia 11/18/2014    Arthritis     Chronic back pain     COPD (chronic obstructive pulmonary disease) ([facility] 08/09/2012    COPD (chronic obstructive pulmonary disease) ([facility]     Crohns disease     Degenerative disc disease, lumbar     Dermatitis     Diabetes ([address] 03/19/2015    diet controlled    Gastrointestinal problem     GERD (gastroesophageal reflux disease)     History of atrial fibrillation 2006    Dr. Mariela Wells History of throat cancer 2004     cleared for reoccurence years ago    Hyperlipidemia 1/21/2014    Hypertension     Hypogonadism male     Lung disease     Mononeuritis multiplex     Mononeuritis multiplex     Nondependent alcohol abuse, in remission 7/22/2020    Obesity (BMI 30-39. 9) 7/24/2019    Osteoarthritis of lumbar spine     Pain of right heel     Paresthesia of foot, bilateral     Prediabetes 12/3/2014    Restless leg syndrome     Seborrheic dermatitis     Seborrheic keratoses, inflamed     Throat cancer ([facility])     throat, had surgery, sees Dr Bradly Roberto yearly    Tinea cruris     Tinea pedis     Tinea unguium      Past Surgical History:   Procedure Laterality Date    CARDIAC CATHETERIZATION      CARPAL TUNNEL RELEASE      CATARACT REMOVAL WITH IMPLANT Right 2019    CATARACT REMOVAL WITH IMPLANT Left 2019    COLON SURGERY      COLONOSCOPY  04/15/2015    KNEE ARTHROSCOPY      LARYNGECTOMY  2004    UPPER GASTROINTESTINAL ENDOSCOPY  13    Dr. Natasha HOWARD/NAPOLEON RODRIGUEZ       Social History     Socioeconomic History    Marital status:      Spouse name: Not on file    Number of children: 3    Years of education: Not on file    Highest education level: Not on file   Occupational History    Not on file   Tobacco Use    Smoking status: Former Smoker     Packs/day: 1.00     Years: 25.00     Pack years: 25.00     Types: Cigarettes     Quit date: 10/21/1990     Years since quittin.6    Smokeless tobacco: Never Used   Substance and Sexual Activity    Alcohol use: No     Comment: Attends [employer], 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    Drug use: No    Sexual activity: Not on file   Other Topics Concern    Not on file   Social History Narrative    Lives alone. Social Determinants of Health     Financial Resource Strain: Low Risk     Difficulty of Paying Living Expenses: Not hard at all   Food Insecurity: No Food Insecurity    Worried About Running Out of Food in the Last Year: Never true    Dacia of Food in the Last Year: Never true   Transportation Needs: No Transportation Needs    Lack of Transportation (Medical): No    Lack of Transportation (Non-Medical):  No   Physical Activity:     Days of Exercise per Week:     Minutes of Exercise per Session:    Stress:     Feeling of Stress :    Social Connections:     Frequency of Communication with Friends and Family:     Frequency of Social Gatherings with Friends and Family:     Attends Voodoo Services:     Active Member of Clubs or Organizations:     Attends Club or Organization Meetings:     Marital Status:    Intimate Partner Violence:     Fear of Current or Ex-Partner:     Emotionally Abused:     Physically Abused:     Sexually Abused:      Family History   Problem Relation Age of Onset    Heart Disease Mother     Liver Disease Mother     High Blood Pressure Mother     Heart Disease Father     Arthritis Father     Cancer Sister         lung     Allergies   Allergen Reactions    Alemtuzumab      Low grade fever  Other reaction(s): Other: See Comments  Low grade fever    Glycopyrrolate-Formoterol Other (See Comments)     Dizzy and felt like heart rate sped up  Other reaction(s): Other: See Comments  Dizzy and felt like heart rate sped up    Mercaptopurine Other (See Comments)    Moxifloxacin Other (See Comments)     Pt states He gets sores in his mouth         Review of Systems  No fever chills. No paresthesias. No hip pain. Objective:   Pulse 63   Temp 97.5 °F (36.4 °C) (Temporal)   Ht 5' 11\" (1.803 m)   Wt 257 lb (116.6 kg)   SpO2 94%   BMI 35.84 kg/m²     ORTHO EXAM  Pleasant moderately overweight gentleman with slight right antalgic gait. Normal alignment of the right knee is noted. No effusion, warmth, no erythema. Tenderness in the medial joint line is noted. Full range of motion. No instability. Pain with Angus's testing. No patellofemoral symptoms. Full range of motion hip without discomfort. Contralateral knee is asymptomatic. Radiographs and Laboratory Studies:     Diagnostic Imaging Studies:    Prior x-rays reviewed. There is chondrocalcinosis in the medial compartment and very slight narrowing. Laboratory Studies:   Lab Results   Component Value Date    WBC 8.6 05/25/2021    HGB 13.1 (L) 05/25/2021    HCT 39.1 (L) 05/25/2021    MCV 86.5 05/25/2021     05/25/2021     Lab Results   Component Value Date    SEDRATE 42 (H) 03/19/2013     No results found for: CRP    Assessment:       Diagnosis Orders   1. Primary osteoarthritis of right knee       Impression: Mild osteoarthritis of the right knee.   No

## 2021-06-29 ENCOUNTER — HOSPITAL ENCOUNTER (OUTPATIENT)
Dept: PHYSICAL THERAPY | Age: 71
Setting detail: THERAPIES SERIES
Discharge: HOME | End: 2021-06-29
Payer: MEDICARE

## 2021-06-29 PROCEDURE — 97110 THERAPEUTIC EXERCISES: CPT

## 2021-06-29 ASSESSMENT — PAIN DESCRIPTION - DESCRIPTORS: DESCRIPTORS: ACHING

## 2021-06-29 ASSESSMENT — PAIN SCALES - GENERAL: PAINLEVEL_OUTOF10: 2

## 2021-06-29 ASSESSMENT — PAIN DESCRIPTION - ORIENTATION: ORIENTATION: RIGHT

## 2021-06-29 ASSESSMENT — PAIN DESCRIPTION - LOCATION: LOCATION: KNEE

## 2021-06-29 NOTE — PROGRESS NOTES
[address] [address]  Outpatient Physical Therapy    Treatment Note        Date: 2021  Patient: Zain Harding  : 1950  ACCT #: [de-identified]  Referring Practitioner: NICHOLE Bernard  Diagnosis: chronic pain of right knee  Treatment Diagnosis: increased R knee pain with squat and amb, decreased activity tolerance for amb due to pain, decreased proximal hip strength    Visit Information:  PT Visit Information  Onset Date:  (approx 3 wks ago)  PT Insurance Information: [employer]  Total # of Visits Approved: 06 (BMN)  Total # of Visits to Date: 3  Plan of Care/Certification Expiration Date: 21  No Show: 0  Canceled Appointment: 0  Progress Note Counter: 3/4 (PN due 2021)    Subjective: Pt reports 2/10 sitting or standing, sit to stand 7-8/10. Comments: xray R knee 2021: Evaluation of the right knee with plain films reveals the bone adequately mineralized and intact. Moderate diffuse degenerative changes overlie the right knee joint with narrowing of the medial compartment and spurring along the trochlear groove and at the patellofemoral compartment. No intrinsic bony lesion. No fracture or dislocation or bony destructive process.   HEP Compliance:  [x] Good [x] Fair [] Poor [] Reports not doing due to:    Vital Signs  Patient Currently in Pain: Yes   Pain Screening  Patient Currently in Pain: Yes  Pain Assessment  Pain Level: 2  Pain Location: Knee  Pain Orientation: Right  Pain Descriptors: Aching    OBJECTIVE:   Exercises  Exercise 1: Sink ex x 15  Exercise 2: hamstring stretch 30 sec X 3 b/l  Exercise 3: TG squats L9 x20  Exercise 4: SF L 1.6 x 5 minutes  Exercise 6: step ups forward/lateral 4\"x15 B  Exercise 7: step downs 4\"x15 B  Exercise 20: HEP: hip abd, hip ext, hamstring stretch, increase walking at home, Sink ex       Strength: [x] NT  [] MMT completed:     ROM: [x] NT  [] ROM measurements:     *Indicates exercise, modality, or manual techniques to be initiated when appropriate    Assessment: Body structures, Functions, Activity limitations: Decreased functional mobility , Increased pain, Decreased endurance, Decreased strength  Assessment: Continue to progress current exercises with focus on LE strengthening. Good tolerance to treatment. No episodes of LOB with stepups states 35-50 % better. Treatment Diagnosis: increased R knee pain with squat and amb, decreased activity tolerance for amb due to pain, decreased proximal hip strength          Goals:  Short term goals  Time Frame for Short term goals: 2 wks  Short term goal 1: The pt will demo improved B LE strength >/= 4+/5 in order to safely ambulate with decreased pain/limitations    Long term goals  Time Frame for Long term goals : 4 wks  Long term goal 1: Pt will demonstrate indep with % of the time for self management of knee pain. Long term goal 2: Pt will demonstrate improved score on LEFS >/= 70/80 indicating minimal distability for improved pt quality of life. Long term goal 3: The pt will report decreased pain </=2/10 at worst in order to increase ease with amb during mowing the lawn. Progress toward goals: Progressing towards goals. POST-PAIN       Pain Rating (0-10 pain scale):  0 /10   Location and pain description same as pre-treatment unless indicated.    Action: [] NA   [x] Perform HEP  [] Meds as prescribed  [] Modalities as prescribed   [] Call Physician     Frequency/Duration:  Plan  Times per week: 1  Plan weeks: 4  Current Treatment Recommendations: Strengthening, Neuromuscular Re-education, Home Exercise Program, ROM, Manual Therapy - Soft Tissue Mobilization, Safety Education & Training, Aquatics, Balance Training, Endurance Training, Patient/Caregiver Education & Training, Functional Mobility Training, Equipment Evaluation, Education, & procurement, Gait Training, Transfer Training, Modalities, Stair training, Pain Management, Positioning  Plan Comment: can add aquatic sessions if appropriate     Pt to continue current HEP. See objective section for any therapeutic exercise changes, additions or modifications this date.          PT Individual Minutes  Time In: 5104  Time Out: 9262  Minutes: 38  Timed Code Treatment Minutes: 38 Minutes  Procedure Minutes: 0     Timed Activity Minutes Units   Ther Ex 38 3     Signature:  Electronically signed by Kostas Cooley PTA on 6/29/21 at 4:18 PM EDT

## 2021-07-01 ENCOUNTER — OFFICE VISIT (OUTPATIENT)
Dept: FAMILY MEDICINE CLINIC | Age: 71
End: 2021-07-01
Payer: MEDICARE

## 2021-07-01 VITALS
DIASTOLIC BLOOD PRESSURE: 72 MMHG | TEMPERATURE: 97.8 F | WEIGHT: 257 LBS | HEART RATE: 69 BPM | HEIGHT: 71 IN | OXYGEN SATURATION: 97 % | SYSTOLIC BLOOD PRESSURE: 134 MMHG | BODY MASS INDEX: 35.98 KG/M2

## 2021-07-01 DIAGNOSIS — E75.5 XANTHOMA: ICD-10-CM

## 2021-07-01 DIAGNOSIS — J40 BRONCHITIS: Primary | ICD-10-CM

## 2021-07-01 PROBLEM — I48.91 UNSPECIFIED ATRIAL FIBRILLATION (HCC): Status: ACTIVE | Noted: 2020-07-22

## 2021-07-01 PROBLEM — E66.2 MORBID (SEVERE) OBESITY WITH ALVEOLAR HYPOVENTILATION (HCC): Status: ACTIVE | Noted: 2020-07-22

## 2021-07-01 PROBLEM — I11.0 HYPERTENSIVE HEART DISEASE WITH HEART FAILURE (HCC): Status: ACTIVE | Noted: 2020-11-13

## 2021-07-01 PROBLEM — J44.9 CHRONIC OBSTRUCTIVE PULMONARY DISEASE, UNSPECIFIED (HCC): Status: ACTIVE | Noted: 2020-10-01

## 2021-07-01 PROBLEM — K21.9 GASTRO-ESOPHAGEAL REFLUX DISEASE WITHOUT ESOPHAGITIS: Status: ACTIVE | Noted: 2020-10-01

## 2021-07-01 PROCEDURE — 99214 OFFICE O/P EST MOD 30 MIN: CPT | Performed by: FAMILY MEDICINE

## 2021-07-01 RX ORDER — DOXYCYCLINE HYCLATE 100 MG
100 TABLET ORAL 2 TIMES DAILY
Qty: 14 TABLET | Refills: 0 | Status: SHIPPED | OUTPATIENT
Start: 2021-07-01 | End: 2021-07-08

## 2021-07-01 ASSESSMENT — ENCOUNTER SYMPTOMS
COUGH: 1
CHEST TIGHTNESS: 0
WHEEZING: 0
PHOTOPHOBIA: 0
ABDOMINAL DISTENTION: 0
ABDOMINAL PAIN: 0
SHORTNESS OF BREATH: 1

## 2021-07-01 NOTE — PATIENT INSTRUCTIONS
Patient will utilize albuterol nebulizer 3 times a day today and tomorrow. Then decrease to twice a day for couple days. May discontinue once cough is resolved. Switch to doxycycline 100 mg twice a day. 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

## 2021-07-01 NOTE — PROGRESS NOTES
congestive heart failure ([facility] 1/6/2021    Allergic rhinitis     Anemia 11/18/2014    Arthritis     Chronic back pain     COPD (chronic obstructive pulmonary disease) ([facility]) 08/09/2012    COPD (chronic obstructive pulmonary disease) ([facility])     Crohns disease     Degenerative disc disease, lumbar     Dermatitis     Diabetes ([facility] 03/19/2015    diet controlled    Gastrointestinal problem     GERD (gastroesophageal reflux disease)     History of atrial fibrillation 2006    Dr. Geraldo Rincon History of throat cancer 2004     cleared for reoccurence years ago    Hyperlipidemia 1/21/2014    Hypertension     Hypogonadism male     Lung disease     Mononeuritis multiplex     Mononeuritis multiplex     Nondependent alcohol abuse, in remission 7/22/2020    Obesity (BMI 30-39. 9) 7/24/2019    Osteoarthritis of lumbar spine     Pain of right heel     Paresthesia of foot, bilateral     Prediabetes 12/3/2014    Restless leg syndrome     Seborrheic dermatitis     Seborrheic keratoses, inflamed     Throat cancer ([facility])     throat, had surgery, sees Dr Collins Askew yearly    Tinea cruris     Tinea pedis     Tinea unguium      Past Surgical History:   Procedure Laterality Date    CARDIAC CATHETERIZATION      CARPAL TUNNEL RELEASE      CATARACT REMOVAL WITH IMPLANT Right 09/09/2019    CATARACT REMOVAL WITH IMPLANT Left 07/22/2019    COLON SURGERY      COLONOSCOPY  04/15/2015    KNEE ARTHROSCOPY      LARYNGECTOMY  2004    UPPER GASTROINTESTINAL ENDOSCOPY  03/24/13    Dr. Roger HOWARD/NAPOLEON RODRIGUEZ  2002     Social History     Socioeconomic History    Marital status:      Spouse name: Not on file    Number of children: 3    Years of education: Not on file    Highest education level: Not on file   Occupational History    Not on file   Tobacco Use    Smoking status: Former Smoker     Packs/day: 1.00     Years: 25.00     Pack years: 25.00     Types: Cigarettes     Quit date: 10/21/1990     Years since quittin.7    Smokeless tobacco: Never Used   Substance and Sexual Activity    Alcohol use: No     Comment: Attends AA, 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    Drug use: No    Sexual activity: Not on file   Other Topics Concern    Not on file   Social History Narrative    Lives alone. Social Determinants of Health     Financial Resource Strain: Low Risk     Difficulty of Paying Living Expenses: Not hard at all   Food Insecurity: No Food Insecurity    Worried About Running Out of Food in the Last Year: Never true    Dacia of Food in the Last Year: Never true   Transportation Needs: No Transportation Needs    Lack of Transportation (Medical): No    Lack of Transportation (Non-Medical): No   Physical Activity:     Days of Exercise per Week:     Minutes of Exercise per Session:    Stress:     Feeling of Stress :    Social Connections:     Frequency of Communication with Friends and Family:     Frequency of Social Gatherings with Friends and Family:     Attends Adventism Services:     Active Member of Clubs or Organizations:     Attends Club or Organization Meetings:     Marital Status:    Intimate Partner Violence:     Fear of Current or Ex-Partner:     Emotionally Abused:     Physically Abused:     Sexually Abused:      Family History   Problem Relation Age of Onset    Heart Disease Mother     Liver Disease Mother     High Blood Pressure Mother     Heart Disease Father     Arthritis Father     Cancer Sister         lung     Allergies:  Alemtuzumab, Glycopyrrolate-formoterol, Mercaptopurine, and Moxifloxacin    Review of Systems   Constitutional: Negative for activity change, appetite change, diaphoresis and unexpected weight change. Eyes: Negative for photophobia and visual disturbance. Respiratory: Positive for cough and shortness of breath. Negative for chest tightness and wheezing.          No orthopnea   Cardiovascular: Negative for chest pain, palpitations and leg swelling. Gastrointestinal: Negative for abdominal distention and abdominal pain. Genitourinary: Negative for flank pain and frequency. Musculoskeletal: Negative for gait problem and joint swelling. Neurological: Negative for dizziness, weakness, light-headedness and headaches. Psychiatric/Behavioral: Negative for confusion. Objective:   /72   Pulse 69   Temp 97.8 °F (36.6 °C)   Ht 5' 11\" (1.803 m)   Wt 257 lb (116.6 kg)   SpO2 97%   BMI 35.84 kg/m²     Physical Exam  Constitutional:       Appearance: Normal appearance. He is well-developed. He is not ill-appearing or diaphoretic. Comments: Vitals signs reviewed   HENT:      Head: Normocephalic and atraumatic. Right Ear: Hearing and external ear normal.      Left Ear: Hearing and external ear normal.      Nose: No nasal deformity or rhinorrhea. Eyes:      General: Lids are normal.         Right eye: No discharge. Left eye: No discharge. Extraocular Movements:      Right eye: No nystagmus. Left eye: No nystagmus. Conjunctiva/sclera: Conjunctivae normal.      Right eye: No hemorrhage. Left eye: No hemorrhage. Pupils: Pupils are equal, round, and reactive to light. Neck:      Thyroid: No thyroid mass or thyromegaly. Vascular: Normal carotid pulses. No carotid bruit or JVD. Trachea: No tracheal tenderness or tracheal deviation. Cardiovascular:      Rate and Rhythm: Normal rate and regular rhythm. Chest Wall: PMI displaced: normal location PMI. Pulses:           Carotid pulses are 2+ on the right side and 2+ on the left side. Radial pulses are 2+ on the right side and 2+ on the left side. Heart sounds: S1 normal and S2 normal. No murmur heard. No friction rub. No gallop. No S3 sounds. Pulmonary:      Effort: Pulmonary effort is normal. No accessory muscle usage or respiratory distress. Breath sounds: No wheezing or rales.    Chest:      Chest wall: No deformity. Abdominal:      General: There is no distension. Musculoskeletal:         General: No deformity. Cervical back: Full passive range of motion without pain and neck supple. No deformity or tenderness. Normal range of motion. Lymphadenopathy:      Cervical:      Right cervical: No superficial cervical adenopathy. Left cervical: No superficial cervical adenopathy. Upper Body:      Right upper body: No supraclavicular adenopathy. Left upper body: No supraclavicular adenopathy. Skin:     General: Skin is warm and dry. Findings: No bruising or rash. Nails: There is no clubbing. Comments: Fleshy covered yellowish lesions center forehead between the eyebrows each 2 to 3 mm in diameter   Neurological:      Mental Status: He is alert. Cranial Nerves: Cranial nerve deficit: CN II-XII GI without obvious deficit. Sensory: Sensory deficit: grossly intact for hands. Motor: No tremor. Atrophy: B UE and LE without atrophy. Abnormal muscle tone: B UE and LE have normal tone. Coordination: Coordination normal.      Gait: Gait normal.   Psychiatric:         Attention and Perception: He is attentive. Mood and Affect: Mood is not anxious. Affect is not inappropriate. Speech: Speech normal.         Behavior: Behavior is not agitated. Behavior is cooperative. Judgment: Judgment normal.         No results found for this visit on 07/01/21.     Recent Results (from the past 2016 hour(s))   Testosterone Free and Total Male    Collection Time: 05/25/21 12:49 PM   Result Value Ref Range    Testosterone Free, Calc 7 (L) 47 - 244 pg/mL    Testosterone % Free 1.3 (L) 1.6 - 2.9 %    Total Testosterone 54 (L) 300 - 720 ng/dL    Sex Hormone Binding 48 11 - 80 nmol/L   Transferrin    Collection Time: 05/25/21 12:49 PM   Result Value Ref Range    Transferrin 271 200 - 400 mg/dL   CBC Auto Differential    Collection Time: 05/25/21 12:49 PM   Result Value Ref Range    WBC 8.6 4.8 - 10.8 K/uL    RBC 4.52 (L) 4.70 - 6.10 M/uL    Hemoglobin 13.1 (L) 14.0 - 18.0 g/dL    Hematocrit 39.1 (L) 42.0 - 52.0 %    MCV 86.5 80.0 - 100.0 fL    MCH 29.0 27.0 - 31.3 pg    MCHC 33.6 33.0 - 37.0 %    RDW 14.1 11.5 - 14.5 %    Platelets 078 211 - 759 K/uL    Neutrophils % 66.8 %    Lymphocytes % 18.4 %    Monocytes % 9.3 %    Eosinophils % 4.9 %    Basophils % 0.6 %    Neutrophils Absolute 5.7 1.4 - 6.5 K/uL    Lymphocytes Absolute 1.6 1.0 - 4.8 K/uL    Monocytes Absolute 0.8 0.2 - 0.8 K/uL    Eosinophils Absolute 0.4 0.0 - 0.7 K/uL    Basophils Absolute 0.0 0.0 - 0.2 K/uL   AST    Collection Time: 05/25/21 12:49 PM   Result Value Ref Range    AST 16 0 - 40 U/L   ALT    Collection Time: 05/25/21 12:49 PM   Result Value Ref Range    ALT 10 0 - 41 U/L   COVID-19    Collection Time: 06/21/21  7:44 AM   Result Value Ref Range    SARS-CoV-2, PCR Not Detected Not Detected           Assessment:       Diagnosis Orders   1. Bronchitis  doxycycline hyclate (VIBRA-TABS) 100 MG tablet   2. Xanthoma  Lipid, Fasting         Orders Placed This Encounter   Procedures    Lipid, Fasting     Standing Status:   Future     Standing Expiration Date:   7/1/2022         Plan:   Return if symptoms worsen or fail to improve, for keep next planned appointment. Patient Instructions   Patient will utilize albuterol nebulizer 3 times a day today and tomorrow. Then decrease to twice a day for couple days. May discontinue once cough is resolved. Switch to doxycycline 100 mg twice a day. 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 This note was partially created with the assistance of dictation. This may lead to grammatical or spelling errors. Sher Buenrostro M.D.

## 2021-07-07 DIAGNOSIS — E11.9 TYPE 2 DIABETES MELLITUS WITHOUT COMPLICATION, WITHOUT LONG-TERM CURRENT USE OF INSULIN (HCC): ICD-10-CM

## 2021-07-07 DIAGNOSIS — E75.5 XANTHOMA: ICD-10-CM

## 2021-07-07 LAB
CHOLESTEROL, FASTING: 108 MG/DL (ref 0–199)
CREATININE URINE: 342.2 MG/DL
HDLC SERPL-MCNC: 35 MG/DL (ref 40–59)
LDL CHOLESTEROL CALCULATED: 53 MG/DL (ref 0–129)
MICROALBUMIN UR-MCNC: 1.3 MG/DL
MICROALBUMIN/CREAT UR-RTO: 3.8 MG/G (ref 0–30)
TRIGLYCERIDE, FASTING: 102 MG/DL (ref 0–150)

## 2021-07-08 ENCOUNTER — OFFICE VISIT (OUTPATIENT)
Dept: ORTHOPEDIC SURGERY | Age: 71
End: 2021-07-08
Payer: MEDICARE

## 2021-07-08 VITALS
TEMPERATURE: 97.9 F | HEART RATE: 61 BPM | BODY MASS INDEX: 35.56 KG/M2 | WEIGHT: 254 LBS | OXYGEN SATURATION: 96 % | HEIGHT: 71 IN

## 2021-07-08 DIAGNOSIS — M17.11 PRIMARY OSTEOARTHRITIS OF RIGHT KNEE: Primary | ICD-10-CM

## 2021-07-08 PROCEDURE — 99213 OFFICE O/P EST LOW 20 MIN: CPT | Performed by: ORTHOPAEDIC SURGERY

## 2021-07-08 RX ORDER — CELECOXIB 200 MG/1
200 CAPSULE ORAL DAILY
Qty: 30 CAPSULE | Refills: 1 | Status: SHIPPED | OUTPATIENT
Start: 2021-07-08 | End: 2021-11-02 | Stop reason: ALTCHOICE

## 2021-07-08 NOTE — PROGRESS NOTES
Subjective:      Patient ID: Carlton Lozoya is a 70 y.o. male who presents today for:  Chief Complaint   Patient presents with    Follow-up     f/u right knee; pt missed PT appt yesterday; rates his pain today 2/10       HPI  Rolin Rubinstein was seen about 2 weeks ago for osteoarthritis of his right knee. He has just started PT and his exercises. He did not  this prescription. Continues with mild symptoms. Past Medical History:   Diagnosis Date    A-fib [facility])     Abnormal EMG 12/09/2015    Actinic keratoses     Actinic keratoses     Acute diastolic congestive heart failure ([facility]) 1/6/2021    Allergic rhinitis     Anemia 11/18/2014    Arthritis     Chronic back pain     COPD (chronic obstructive pulmonary disease) ([facility]) 08/09/2012    COPD (chronic obstructive pulmonary disease) ([facility])     Crohns disease     Degenerative disc disease, lumbar     Dermatitis     Diabetes ([facility] 03/19/2015    diet controlled    Gastrointestinal problem     GERD (gastroesophageal reflux disease)     History of atrial fibrillation 2006    Dr. Siddhartha Robles History of throat cancer 2004     cleared for reoccurence years ago    Hyperlipidemia 1/21/2014    Hypertension     Hypogonadism male     Lung disease     Mononeuritis multiplex     Mononeuritis multiplex     Nondependent alcohol abuse, in remission 7/22/2020    Obesity (BMI 30-39. 9) 7/24/2019    Osteoarthritis of lumbar spine     Pain of right heel     Paresthesia of foot, bilateral     Prediabetes 12/3/2014    Restless leg syndrome     Seborrheic dermatitis     Seborrheic keratoses, inflamed     Throat cancer ([facility])     throat, had surgery, sees Dr Freeman Haley yearly    Tinea cruris     Tinea pedis     Tinea unguium      Past Surgical History:   Procedure Laterality Date    CARDIAC CATHETERIZATION      CARPAL TUNNEL RELEASE      CATARACT REMOVAL WITH IMPLANT Right 09/09/2019    CATARACT REMOVAL WITH IMPLANT Left 07/22/2019    COLON SURGERY      COLONOSCOPY  04/15/2015    KNEE ARTHROSCOPY      LARYNGECTOMY  2004    UPPER GASTROINTESTINAL ENDOSCOPY  13    Dr. Andry Diallo W/NAPOLEON INJ       Social History     Socioeconomic History    Marital status:      Spouse name: Not on file    Number of children: 3    Years of education: Not on file    Highest education level: Not on file   Occupational History    Not on file   Tobacco Use    Smoking status: Former Smoker     Packs/day: 1.00     Years: 25.00     Pack years: 25.00     Types: Cigarettes     Quit date: 10/21/1990     Years since quittin.7    Smokeless tobacco: Never Used   Substance and Sexual Activity    Alcohol use: No     Comment: Attends [employer], 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    Drug use: No    Sexual activity: Not on file   Other Topics Concern    Not on file   Social History Narrative    Lives alone. Social Determinants of Health     Financial Resource Strain: Low Risk     Difficulty of Paying Living Expenses: Not hard at all   Food Insecurity: No Food Insecurity    Worried About Running Out of Food in the Last Year: Never true    Dacia of Food in the Last Year: Never true   Transportation Needs: No Transportation Needs    Lack of Transportation (Medical): No    Lack of Transportation (Non-Medical):  No   Physical Activity:     Days of Exercise per Week:     Minutes of Exercise per Session:    Stress:     Feeling of Stress :    Social Connections:     Frequency of Communication with Friends and Family:     Frequency of Social Gatherings with Friends and Family:     Attends Anabaptism Services:     Active Member of Clubs or Organizations:     Attends Club or Organization Meetings:     Marital Status:    Intimate Partner Violence:     Fear of Current or Ex-Partner:     Emotionally Abused:     Physically Abused:     Sexually Abused:      Family History   Problem Relation Age of Onset    Heart Disease Mother     Liver Disease Mother    [facility]

## 2021-07-13 ENCOUNTER — HOSPITAL ENCOUNTER (OUTPATIENT)
Dept: PHYSICAL THERAPY | Age: 71
Setting detail: THERAPIES SERIES
Discharge: HOME | End: 2021-07-13
Payer: MEDICARE

## 2021-07-13 PROCEDURE — 97140 MANUAL THERAPY 1/> REGIONS: CPT

## 2021-07-13 NOTE — PROGRESS NOTES
[address]     Ph: [phone]  Fax: [phone]    [] Certification  [] Recertification []  Plan of Care  [x] Progress Note [] Discharge      To:  STARLA Alcocer-CNP      From:  Breanna Carlson, PT  Patient: Yannick Branham     : 1950  Diagnosis: chronic pain of right knee     Date: 2021  Treatment Diagnosis: increased R knee pain with squat and amb, decreased activity tolerance for amb due to pain, decreased proximal hip strength    Plan of Care/Certification Expiration Date: 21  Progress Report Period from:   6/15/2021 to 2021    Total # of Visits to Date: 4   No Show: 2    Canceled Appointment: 0     OBJECTIVE:   Short Term Goals - Time Frame for Short term goals: 2 wks    Goals Current/Discharge status  Met   Short term goal 1: The pt will demo improved B LE strength >/= 4+/5 in order to safely ambulate with decreased pain/limitations  Strength RLE  R Hip Flexion: 4/5, 4+/5  R Hip Extension: 4+/5, 5/5  R Hip ABduction: 4+/5, 5/5  R Knee Flexion: 5/5  R Knee Extension: 5/5  R Ankle Dorsiflexion: 5/5  Strength LLE  L Hip Flexion: 4/5, 4+/5  L Hip Extension: 4+/5, 5/5  L Hip ABduction: 4+/5, 5/5  L Knee Flexion: 5/5  L Knee Extension: 5/5  L Ankle Dorsiflexion: 5/5        Pt can ambulate safely with decreased pain. [x] yes  [] no     Long Term Goals - Time Frame for Long term goals : 4 wks  Goals Current/ Discharge status Met   Long term goal 1: Pt will demonstrate indep with % of the time for self management of knee pain. Pt stated he's been busy and hasn't been performing his HEP that much. [] yes  [x] no   Long term goal 2: Pt will demonstrate improved score on LEFS >/= 70/80 indicating minimal distability for improved pt quality of life.  63/80 [] yes  [x] no   Long term goal 3: The pt will report decreased pain </=2/10 at worst in order to increase ease with amb during mowing the lawn. Pt stated his pain is about 2-3/10 at it's worst. [x] yes  [x] no       Body structures, Functions, Activity limitations: Decreased functional mobility , Increased pain, Decreased endurance, Decreased strength  Assessment: Pt has made signifcant progress towards goals with meeting some strength goals. Pt is requesting to be put on a two week hold to perform HEP and self manage himself. Encouarged pt to perform HEP more consistantly to recievinbg full benefirts from therapy with pt understanding. Pt is going to doctors on Thursday and is going to talk to doctor about recieving medication for arthritis. Discharge Recommendations: Continue to assess pending progress     PLAN: [x]   Frequency/Duration:  Plan  Times per week: 1  Plan weeks: 4  Current Treatment Recommendations: Strengthening, Neuromuscular Re-education, Home Exercise Program, ROM, Manual Therapy - Soft Tissue Mobilization, Safety Education & Training, Aquatics, Balance Training, Endurance Training, Patient/Caregiver Education & Training, Functional Mobility Training, Equipment Evaluation, Education, & procurement, Gait Training, Transfer Training, Modalities, Stair training, Pain Management, Positioning  Plan Comment: can add aquatic sessions if appropriate     Precautions:       none                     Patient Status:[x] Continue/ Initiate plan of Care (hold X 2 weeks- per pt request)    [] Discharge PT. Recommend pt continue with HEP. [] Additional visits requested, Please re-certify for additional visits:          Signature:   Objective Information Obtained By: Electronically signed by Jeremy Lugo PTA on 7/13/21 at 3:10 PM EDT  Electronically signed by Parrish Pat PT on 7/13/2021 at 4:21 PM        If you have any questions or concerns, please don't hesitate to call. Thank you for your referral.    I have reviewed this plan of care and certify a need for medically necessary rehabilitation services.     Physician Signature:__________________________________________________________  Date:  Please sign and return

## 2021-07-13 NOTE — PROGRESS NOTES
[address] [address]  Outpatient Physical Therapy    Treatment Note        Date: 2021  Patient: Severino Davison  : 1950  ACCT #: [de-identified]  Referring Practitioner: NICHOLE Ellington  Diagnosis: chronic pain of right knee  Treatment Diagnosis: increased R knee pain with squat and amb, decreased activity tolerance for amb due to pain, decreased proximal hip strength    Visit Information:  PT Visit Information  Onset Date:  (approx 3 wks ago)  PT Insurance Information: [employer]  Total # of Visits Approved: 80 (BMN)  Total # of Visits to Date: 4  Plan of Care/Certification Expiration Date: 21  No Show: 2  Canceled Appointment: 0  Progress Note Counter:  (PN due 2021)    Subjective: Pt stated he's okay today. HEP Compliance:  [] Good [] Fair [x] Poor [] Reports not doing due to:    Vital Signs  Patient Currently in Pain: Denies   Pain Screening  Patient Currently in Pain: Denies    OBJECTIVE:   Exercises  Exercise 4: SF L 2.0 x 5 minutes  Exercise 8: objective Measurements  Strength: [] NT  [x] MMT completed:  Strength RLE  R Hip Flexion: 4/5, 4+/5  R Hip Extension: 4+/5, 5/5  R Hip ABduction: 4+/5, 5/5  R Knee Flexion: 5/5  R Knee Extension: 5/5  R Ankle Dorsiflexion: 5/5  Strength LLE  L Hip Flexion: 4/5, 4+/5  L Hip Extension: 4+/5, 5/5  L Hip ABduction: 4+/5, 5/5  L Knee Flexion: 5/5  L Knee Extension: 5/5  L Ankle Dorsiflexion: 5/5    ROM: [x] NT  [] ROM measurements:    *Indicates exercise, modality, or manual techniques to be initiated when appropriate    Assessment: Body structures, Functions, Activity limitations: Decreased functional mobility , Increased pain, Decreased endurance, Decreased strength  Assessment: Pt has made signifcant progress towards goals with meeting some strength goals. Pt is requesting to be put on a two week hold to perform HEP and self manage himself.  Encouarged pt to perform HEP more consistantly to recievinbg full benefirts from therapy with pt understanding. Pt is going to doctors on Thursday and is going to talk to doctor about recieving medication for arthritis. Treatment Diagnosis: increased R knee pain with squat and amb, decreased activity tolerance for amb due to pain, decreased proximal hip strength    Goals:  Short term goals  Time Frame for Short term goals: 2 wks  Short term goal 1: The pt will demo improved B LE strength >/= 4+/5 in order to safely ambulate with decreased pain/limitations    Long term goals  Time Frame for Long term goals : 4 wks  Long term goal 1: Pt will demonstrate indep with % of the time for self management of knee pain. Long term goal 2: Pt will demonstrate improved score on LEFS >/= 70/80 indicating minimal distability for improved pt quality of life. Long term goal 3: The pt will report decreased pain </=2/10 at worst in order to increase ease with amb during mowing the lawn. Progress toward goals: incr strength and rom    POST-PAIN       Pain Rating (0-10 pain scale): 0  /10   Location and pain description same as pre-treatment unless indicated. Action: [] NA   [x] Perform HEP  [] Meds as prescribed  [] Modalities as prescribed   [] Call Physician     Frequency/Duration:  Plan  Times per week: 1  Plan weeks: 4  Current Treatment Recommendations: Strengthening, Neuromuscular Re-education, Home Exercise Program, ROM, Manual Therapy - Soft Tissue Mobilization, Safety Education & Training, Aquatics, Balance Training, Endurance Training, Patient/Caregiver Education & Training, Functional Mobility Training, Equipment Evaluation, Education, & procurement, Gait Training, Transfer Training, Modalities, Stair training, Pain Management, Positioning  Plan Comment: can add aquatic sessions if appropriate     Pt to continue current HEP. See objective section for any therapeutic exercise changes, additions or modifications this date.     PT Individual Minutes  Time In: 9434  Time Out: 7913  Minutes: 27  Timed Code Treatment Minutes: 27 Minutes  Procedure Minutes:0     Timed Activity Minutes Units   Ther Ex 5 0   Manual  22 2       Signature:  Electronically signed by Paul Montalvo PTA on 7/13/21 at 3:07 PM EDT

## 2021-07-14 ENCOUNTER — OFFICE VISIT (OUTPATIENT)
Dept: FAMILY MEDICINE CLINIC | Age: 71
End: 2021-07-14
Payer: MEDICARE

## 2021-07-14 VITALS
BODY MASS INDEX: 35.53 KG/M2 | DIASTOLIC BLOOD PRESSURE: 62 MMHG | TEMPERATURE: 97.8 F | OXYGEN SATURATION: 96 % | HEIGHT: 71 IN | HEART RATE: 68 BPM | SYSTOLIC BLOOD PRESSURE: 112 MMHG | WEIGHT: 253.8 LBS

## 2021-07-14 DIAGNOSIS — M17.11 PRIMARY OSTEOARTHRITIS OF RIGHT KNEE: ICD-10-CM

## 2021-07-14 DIAGNOSIS — B35.1 ONYCHOMYCOSIS: ICD-10-CM

## 2021-07-14 DIAGNOSIS — J41.1 MUCOPURULENT CHRONIC BRONCHITIS (HCC): ICD-10-CM

## 2021-07-14 DIAGNOSIS — L57.0 ACTINIC KERATOSES: Primary | ICD-10-CM

## 2021-07-14 PROCEDURE — 17003 DESTRUCT PREMALG LES 2-14: CPT | Performed by: FAMILY MEDICINE

## 2021-07-14 PROCEDURE — 17000 DESTRUCT PREMALG LESION: CPT | Performed by: FAMILY MEDICINE

## 2021-07-14 PROCEDURE — 99214 OFFICE O/P EST MOD 30 MIN: CPT | Performed by: FAMILY MEDICINE

## 2021-07-14 RX ORDER — PREDNISONE 10 MG/1
TABLET ORAL
Qty: 20 TABLET | Refills: 0 | Status: SHIPPED | OUTPATIENT
Start: 2021-07-14 | End: 2021-08-12 | Stop reason: ALTCHOICE

## 2021-07-14 ASSESSMENT — ENCOUNTER SYMPTOMS
COLOR CHANGE: 1
WHEEZING: 0
BACK PAIN: 0
SHORTNESS OF BREATH: 0
SORE THROAT: 0
COUGH: 1

## 2021-07-14 NOTE — PROGRESS NOTES
Diagnosis Orders   1. Actinic keratoses  IA DESTRUC PREMALIGNANT, FIRST LESION    IA DESTRUC PREMALIGNANT,2-14 LESIONS   2. Mucopurulent chronic bronchitis (HCC)  predniSONE (DELTASONE) 10 MG tablet   3. Primary osteoarthritis of right knee     4. Onychomycosis         Return for keep next planned appointment. Orders Placed This Encounter   Procedures     [address], FIRST LESION     [facility]       Bri Murrieta was seen today for skin problem and cough. Diagnoses and all orders for this visit:    Actinic keratoses  -     IA DESTRUC PREMALIGNANT, FIRST LESION  -     IA DESTRUC PREMALIGNANT,2-14 LESIONS    Mucopurulent chronic bronchitis ([address]  -     predniSONE (DELTASONE) 10 MG tablet; Take 3 tabs for 3 days, then 2 tabs for 3 days, then 1 tab for 3 days, then 1/2 tab for 3 days, then stop. Primary osteoarthritis of right knee    Onychomycosis        Return for keep next planned appointment. Patient Instructions   Patient will complete terbinafine for nails. No further treatments with this medication for at least 3 months. Patient can take Celebrex for his osteoarthritis is needed. Prednisone being added to complete the treatment for his bronchitis. Treatment for lesions with therapy cryotherapy instructions are below  Cryotherapy instructions    Post op instructions given. A printed copy provided. It is best to leave blisters alone if they form for the first 1-3 days to allow the desired damaged tissue (precancer lesion, wart, or whatever lesion is being removed) to separate from healthy tissue. The area should be covered with a bandage to prevent blister breakage and dirt exposure. The wounds should remain dry while there is a blister, therefore if this is a sweaty location like the foot you may need to change socks multiple times per day.        When the blister(s) pop or patient removes the top as instructed between day 3-5, apply antibiotic (NOT triple antibiotic, one brand is Neosporin) ointment and a bandage to affected area(s). The ointment should be applied to the open area as long as it is not covered with skin. Exposed tissue is meant to be moist.      Once a scab is formed the patient may stop applying ointment. The scab may appear yellow while moist, don't confuse this with infection. If the wound is infection pus will drain from the site. If this treatment was for a large wart you may note that a plug of skin may fall out of the area that was treated. That is the center of the wart and it is appropriate for it to come out. If exposed skin remains, treat that area as you would a ruptured blister as mentioned above. Bacitracin sample supplied        Cryotherapy instructions    Post op instructions given. A printed copy provided. It is best to leave blisters alone if they form for the first 1-3 days to allow the desired damaged tissue (precancer lesion, wart, or whatever lesion is being removed) to separate from healthy tissue. The area should be covered with a bandage to prevent blister breakage and dirt exposure. The wounds should remain dry while there is a blister, therefore if this is a sweaty location like the foot you may need to change socks multiple times per day. When the blister(s) pop or patient removes the top as instructed between day 3-5, apply antibiotic (NOT triple antibiotic, one brand is Neosporin) ointment and a bandage to affected area(s). The ointment should be applied to the open area as long as it is not covered with skin. Exposed tissue is meant to be moist.      Once a scab is formed the patient may stop applying ointment. The scab may appear yellow while moist, don't confuse this with infection. If the wound is infection pus will drain from the site. If this treatment was for a large wart you may note that a plug of skin may fall out of the area that was treated.   That is the center of the wart puffs into the lungs 2 times daily 2 each LOT [id number] [id number] Inhaler 1    albuterol (PROVENTIL) (2.5 MG/3ML) 0.083% nebulizer solution Take 3 mLs by nebulization every 6 hours as needed for Wheezing 120 each 3    gabapentin (NEURONTIN) 300 MG capsule TAKE 1 CAPSULE DAILY 90 capsule 3    doxazosin (CARDURA) 4 MG tablet TAKE ONE-HALF (1/2) TABLET TWICE A DAY 90 tablet 3    lisinopril (PRINIVIL;ZESTRIL) 20 MG tablet Take 1 tablet by mouth daily 90 tablet 3    metoprolol tartrate (LOPRESSOR) 50 MG tablet Take 1.5 po bid 15 tablet 0    pravastatin (PRAVACHOL) 40 MG tablet TAKE 1 TABLET NIGHTLY 90 tablet 3    SPIRIVA HANDIHALER 18 MCG inhalation capsule INHALE THE CONTENTS OF 1 CAPSULE DAILY 90 capsule 3    cetirizine (ZYRTEC) 5 MG tablet TAKE ONE TABLET BY MOUTH  PRN      inFLIXimab (REMICADE) 100 MG injection Infuse 5 mg/kg intravenously See Admin Instructions       nitroGLYCERIN (NITROSTAT) 0.4 MG SL tablet DISSOLVE 1 TABLET UNDER THE TONGUE EVERY 5 MINUTES AS NEEDED FOR CHEST PAIN. MAXIMUM OF 3 TABLETS 25 tablet 2    CPAP Machine MISC New cpap supplies mask hose filters etc 1 each 0    Saccharomyces boulardii (PROBIOTIC) 250 MG CAPS Take 1 tablet by mouth daily      esomeprazole Magnesium (NEXIUM) 20 MG PACK Take 20 mg by mouth daily      aspirin 81 MG tablet Take 81 mg by mouth daily.  mesalamine (PENTASA) 250 MG CR capsule Take 500 mg by mouth 4 times daily.  doxazosin (CARDURA) 4 MG tablet Take 1 tablet by mouth daily for 7 days Take 1/2 tab BID 7 tablet 0     No current facility-administered medications on file prior to visit. Alemtuzumab, Glycopyrrolate-formoterol, Mercaptopurine, and Moxifloxacin    Review of Systems   Constitutional: Negative for chills, diaphoresis, fatigue and fever. HENT: Positive for congestion. Negative for sore throat. Respiratory: Positive for cough. Negative for shortness of breath and wheezing. Musculoskeletal: Positive for arthralgias. Negative for back pain. Skin: Positive for color change. Negative for rash and wound. /62   Pulse 68   Temp 97.8 °F (36.6 °C)   Ht 5' 11\" (1.803 m)   Wt 253 lb 12.8 oz (115.1 kg)   SpO2 96%   BMI 35.40 kg/m²   Physical Exam  Vitals reviewed. Constitutional:       General: He is not in acute distress. Appearance: He is well-developed. HENT:      Head: Normocephalic and atraumatic. Right Ear: External ear normal.      Left Ear: External ear normal.      Nose: Nose normal.   Eyes:      General:         Right eye: No discharge. Left eye: No discharge. Conjunctiva/sclera: Conjunctivae normal.      Pupils: Pupils are equal, round, and reactive to light. Neck:      Thyroid: No thyromegaly. Cardiovascular:      Rate and Rhythm: Normal rate and regular rhythm. Pulmonary:      Effort: Pulmonary effort is normal. No respiratory distress. Breath sounds: Normal breath sounds. No stridor. No wheezing, rhonchi or rales. Comments: Egophony negative bilaterally  Abdominal:      General: There is no distension. Musculoskeletal:      Cervical back: Neck supple. Skin:     General: Skin is warm and dry. Comments: Forehead has 2 lesions erythematous base rough white flake noted. Neurological:      Mental Status: He is alert and oriented to person, place, and time. Coordination: Coordination normal.   Psychiatric:         Thought Content: Thought content normal.         Judgment: Judgment normal.           Procedure consent  The procedure was discussed with the patient. All questions were answered and alternative options discussed. The patient is aware of the risks of bleeding, infection, unsatisfactory scar result. Informed consent paperwork was signed by the patient. Liquid nitrogen was applied for 2-6 seconds to affected areas with thaw allowed between dosing for a total of 2 applications. Applied to 2 lesion(s).      The patient tolerated the procedure well.        Diagnosis Orders   1. Actinic keratoses  CT DESTRUC PREMALIGNANT, FIRST LESION    CT DESTRUC PREMALIGNANT,2-14 LESIONS   2. Mucopurulent chronic bronchitis (HCC)  predniSONE (DELTASONE) 10 MG tablet   3. Primary osteoarthritis of right knee     4. Onychomycosis         Return for keep next planned appointment. Orders Placed This Encounter   Procedures     [facility], FIRST LESION     [facility]               Patient Instructions   Patient will complete terbinafine for nails. No further treatments with this medication for at least 3 months. Patient can take Celebrex for his osteoarthritis is needed. Prednisone being added to complete the treatment for his bronchitis. Treatment for lesions with therapy cryotherapy instructions are below  Cryotherapy instructions    Post op instructions given. A printed copy provided. It is best to leave blisters alone if they form for the first 1-3 days to allow the desired damaged tissue (precancer lesion, wart, or whatever lesion is being removed) to separate from healthy tissue. The area should be covered with a bandage to prevent blister breakage and dirt exposure. The wounds should remain dry while there is a blister, therefore if this is a sweaty location like the foot you may need to change socks multiple times per day. When the blister(s) pop or patient removes the top as instructed between day 3-5, apply antibiotic (NOT triple antibiotic, one brand is Neosporin) ointment and a bandage to affected area(s). The ointment should be applied to the open area as long as it is not covered with skin. Exposed tissue is meant to be moist.      Once a scab is formed the patient may stop applying ointment. The scab may appear yellow while moist, don't confuse this with infection. If the wound is infection pus will drain from the site.  If this treatment was for a large wart you may note that a plug of skin may fall out of the area that was treated. That is the center of the wart and it is appropriate for it to come out. If exposed skin remains, treat that area as you would a ruptured blister as mentioned above. Bacitracin sample supplied        Cryotherapy instructions    Post op instructions given. A printed copy provided. It is best to leave blisters alone if they form for the first 1-3 days to allow the desired damaged tissue (precancer lesion, wart, or whatever lesion is being removed) to separate from healthy tissue. The area should be covered with a bandage to prevent blister breakage and dirt exposure. The wounds should remain dry while there is a blister, therefore if this is a sweaty location like the foot you may need to change socks multiple times per day. When the blister(s) pop or patient removes the top as instructed between day 3-5, apply antibiotic (NOT triple antibiotic, one brand is Neosporin) ointment and a bandage to affected area(s). The ointment should be applied to the open area as long as it is not covered with skin. Exposed tissue is meant to be moist.      Once a scab is formed the patient may stop applying ointment. The scab may appear yellow while moist, don't confuse this with infection. If the wound is infection pus will drain from the site. If this treatment was for a large wart you may note that a plug of skin may fall out of the area that was treated. That is the center of the wart and it is appropriate for it to come out. If exposed skin remains, treat that area as you would a ruptured blister as mentioned above. Bacitracin sample supplied                DO NOT USE TRIPLE ANTIBIOTIC ON THE WOUND    It is best to leave blisters alone if they form. They should be covered with a bandage to prevent blister breakage and dirt exposure.   The wounds should remain dry while there is a blister, therefore if this is a sweaty location like the foot you may need to change socks multiple times per day. If blister(s) pop or patient pops with a sterilized needle, apply antibiotic (NOT triple antibiotic, one brand is Neosporin) ointment and a bandage to affected area(s). The ointment should be applied to the open area as long as it is not covered with skin. Exposed tissue is meant to be moist.  Once a scab formed she may stop applying ointment. If this treatment was for a large wart you may note that a plug of skin may fall out of the area that was treated. That is the center of the wart and it is appropriate for it to come out. If exposed skin remains, treat that area as you would a ruptured blister as mentioned above.

## 2021-07-14 NOTE — PATIENT INSTRUCTIONS
being removed) to separate from healthy tissue. The area should be covered with a bandage to prevent blister breakage and dirt exposure. The wounds should remain dry while there is a blister, therefore if this is a sweaty location like the foot you may need to change socks multiple times per day. When the blister(s) pop or patient removes the top as instructed between day 3-5, apply antibiotic (NOT triple antibiotic, one brand is Neosporin) ointment and a bandage to affected area(s). The ointment should be applied to the open area as long as it is not covered with skin. Exposed tissue is meant to be moist.      Once a scab is formed the patient may stop applying ointment. The scab may appear yellow while moist, don't confuse this with infection. If the wound is infection pus will drain from the site. If this treatment was for a large wart you may note that a plug of skin may fall out of the area that was treated. That is the center of the wart and it is appropriate for it to come out. If exposed skin remains, treat that area as you would a ruptured blister as mentioned above.     Bacitracin sample supplied

## 2021-07-28 ENCOUNTER — NURSE TRIAGE (OUTPATIENT)
Dept: OTHER | Facility: CLINIC | Age: 71
End: 2021-07-28

## 2021-07-29 NOTE — TELEPHONE ENCOUNTER
History of COPD. Son discovered father with Emesis x 3 today. Shaky, sweating, chills, he did have arm pain earlier that has resolved. History of Diastolic HF. Son states between prediabetic and diabetic. Reason for Disposition   High-risk adult (e.g., diabetes mellitus, brain tumor, V-P shunt, hernia)    Answer Assessment - Initial Assessment Questions  1. VOMITING SEVERITY: \"How many times have you vomited in the past 24 hours? \"         Moderate - 3 times in the last few hours. 2. ONSET: \"When did the vomiting begin? \"       Last few hours. 3. FLUIDS: \"What fluids or food have you vomited up today? \" \"Have you been able to keep any fluids down? \"       NA    4. ABDOMINAL PAIN: \"Are your having any abdominal pain? \" If yes : \"How bad is it and what does it feel like? \" (e.g., crampy, dull, intermittent, constant)        Denies. 5. DIARRHEA: \"Is there any diarrhea? \" If so, ask: \"How many times today? \"        Denies. 6. CONTACTS: \"Is there anyone else in the family with the same symptoms? \"       Son is calling for his father. Son on the line while with his dad    9. CAUSE: \"What do you think is causing your vomiting? \"      Son thinks he had [place] [employer] x 2 today, chocolate etc..    Son add's he has been between pre-diabetic and diabetic but does not check sugars or take any medications for DM. 8. HYDRATION STATUS: \"Any signs of dehydration? \" (e.g., dry mouth [not only dry lips], too weak to stand) \"When did you last urinate? \"      Son states urinating more frequentlyl. 9. OTHER SYMPTOMS: \"Do you have any other symptoms? \" (e.g., fever, headache, vertigo, vomiting blood or coffee grounds, recent head injury)      Chills, vomiting - son states there is some dark substance in the emesis. Protocols used: [facility]    Brief description of triage: See above    Triage indicates for patient to Go to ED now. Son will drive him to the ED.     Care advice provided, patient verbalizes understanding; denies any other questions or concerns; instructed to call back for any new or worsening symptoms. This triage is a result of a call to [address] [place] [address]. Please do not respond to the triage nurse through this encounter. Any subsequent communication should be directly with the patient.

## 2021-08-03 ENCOUNTER — TELEPHONE (OUTPATIENT)
Dept: FAMILY MEDICINE CLINIC | Age: 71
End: 2021-08-03

## 2021-08-03 NOTE — TELEPHONE ENCOUNTER
----- Message from Radha Christopher sent at 8/2/2021  1:30 PM EDT -----  Subject: Hospital Follow Up    QUESTIONS  What hospital was the Patient Discharged from? [facility]  Date of Discharge? 2021-07-31  Discharge Location? Home  Reason for hospitalization as patient stated? patient was having breathing   problems and went to the hospital and later found out that he had   pneumonia. What question does the patient have, if applicable?   ---------------------------------------------------------------------------  --------------  CALL BACK INFO  What is the best way for the office to contact you? OK to leave message on   voicemail  Preferred Call Back Phone Number? [phone]  ---------------------------------------------------------------------------  --------------  SCRIPT ANSWERS  Relationship to Patient?  Self

## 2021-08-04 ENCOUNTER — OFFICE VISIT (OUTPATIENT)
Dept: FAMILY MEDICINE CLINIC | Age: 71
End: 2021-08-04
Payer: MEDICARE

## 2021-08-04 VITALS
TEMPERATURE: 98.2 F | SYSTOLIC BLOOD PRESSURE: 110 MMHG | BODY MASS INDEX: 35.56 KG/M2 | HEIGHT: 71 IN | HEART RATE: 62 BPM | DIASTOLIC BLOOD PRESSURE: 66 MMHG | WEIGHT: 254 LBS | OXYGEN SATURATION: 97 %

## 2021-08-04 DIAGNOSIS — I50.9 CONGESTIVE HEART FAILURE, UNSPECIFIED HF CHRONICITY, UNSPECIFIED HEART FAILURE TYPE (HCC): ICD-10-CM

## 2021-08-04 DIAGNOSIS — J18.9 PNEUMONIA DUE TO INFECTIOUS ORGANISM, UNSPECIFIED LATERALITY, UNSPECIFIED PART OF LUNG: Primary | ICD-10-CM

## 2021-08-04 DIAGNOSIS — D72.829 LEUKOCYTOSIS, UNSPECIFIED TYPE: ICD-10-CM

## 2021-08-04 PROCEDURE — 1111F DSCHRG MED/CURRENT MED MERGE: CPT | Performed by: FAMILY MEDICINE

## 2021-08-04 PROCEDURE — 99215 OFFICE O/P EST HI 40 MIN: CPT | Performed by: FAMILY MEDICINE

## 2021-08-04 ASSESSMENT — ENCOUNTER SYMPTOMS
WHEEZING: 0
CHEST TIGHTNESS: 0
ABDOMINAL DISTENTION: 0
COUGH: 0
ABDOMINAL PAIN: 0
SHORTNESS OF BREATH: 1
STRIDOR: 0
PHOTOPHOBIA: 0

## 2021-08-04 NOTE — PROGRESS NOTES
Post-Discharge Transitional Care Management Services or Hospital Follow Up  Hospitalization for possible pneumonia    Marina Zamora   YOB: 1950    Date of Office Visit:  8/4/2021  Date of Hospital Admission: 9/23/19  Date of Hospital Discharge: 9/23/19  Readmission Risk Score(high >=14%.  Medium >=10%):No data recorded    Care management risk score Rising risk (score 2-5) and Complex Care (Scores >=6): 8     Non face to face  following discharge, date last encounter closed (first attempt may have been earlier): *No documented post hospital discharge outreach found in the last 14 days *No documented post hospital discharge outreach found in the last 14 days    Call initiated 2 business days of discharge: *No response recorded in the last 14 days     Patient Active Problem List   Diagnosis    Essential (primary) hypertension    Gastro-esophageal reflux disease without esophagitis    Chronic bronchitis ([address]    Hyperlipidemia, unspecified    Crohn's disease, unspecified, without complications ([address]    Hypogonadism male    Allergic rhinitis    Seborrheic dermatitis    Mononeuritis multiplex    Degenerative disc disease, lumbar    Osteoarthritis of lumbar spine    Chronic back pain    Tinea unguium    Pain of right heel    MACIEL (dyspnea on exertion)    Actinic keratoses    JARRELL on CPAP    Body mass index (BMI) 38.0-38.9, adult    Chest pain    History of cataract extraction    Acute bronchitis    Peripheral nerve disease    Paresthesia    Nondependent alcohol abuse, in remission    Blood glucose abnormal    Personal history of tobacco use, presenting hazards to health    Unspecified atrial fibrillation ([address]    Morbid (severe) obesity with alveolar hypoventilation ([address]    Hypertensive heart disease with heart failure ([address]    Type 2 diabetes mellitus without complication, without long-term current use of insulin ([address]    Chronic obstructive pulmonary disease, unspecified ([address]  Primary osteoarthritis of right knee       Allergies   Allergen Reactions    Alemtuzumab      Low grade fever  Other reaction(s): Other: See Comments  Low grade fever    Glycopyrrolate-Formoterol Other (See Comments)     Dizzy and felt like heart rate sped up  Other reaction(s): Other: See Comments  Dizzy and felt like heart rate sped up    Mercaptopurine Other (See Comments)    Moxifloxacin Other (See Comments)     Pt states He gets sores in his mouth       Medications listed as ordered at the time of discharge from [facility] Medication Instructions TRAVIS:    Printed on:08/04/21 8047   Medication Information                      albuterol (PROVENTIL) (2.5 MG/3ML) 0.083% nebulizer solution  Take 3 mLs by nebulization every 6 hours as needed for Wheezing             albuterol sulfate  (90 Base) MCG/ACT inhaler  Inhale 2 puffs into the lungs every 6 hours as needed for Wheezing or Shortness of Breath             aspirin 81 MG tablet  Take 81 mg by mouth daily.              budesonide-formoterol (SYMBICORT) 160-4.5 MCG/ACT AERO  Inhale 2 puffs into the lungs 2 times daily 2 each LOT [id number] EXP [id number]             celecoxib (CELEBREX) 200 MG capsule  Take 1 capsule by mouth daily             cetirizine (ZYRTEC) 5 MG tablet  TAKE ONE TABLET BY MOUTH  PRN             CPAP Machine MISC  New cpap supplies mask hose filters etc             doxazosin (CARDURA) 4 MG tablet  TAKE ONE-HALF (1/2) TABLET TWICE A DAY             doxazosin (CARDURA) 4 MG tablet  Take 1 tablet by mouth daily for 7 days Take 1/2 tab BID             esomeprazole Magnesium (NEXIUM) 20 MG PACK  Take 20 mg by mouth daily             furosemide (LASIX) 20 MG tablet  Take 1 tablet by mouth daily             gabapentin (NEURONTIN) 300 MG capsule  TAKE 1 CAPSULE DAILY             inFLIXimab (REMICADE) 100 MG injection  Infuse 5 mg/kg intravenously See Admin Instructions              lisinopril (PRINIVIL;ZESTRIL) 20 MG tablet  Take 1 tablet by mouth daily             mesalamine (PENTASA) 250 MG CR capsule  Take 500 mg by mouth 4 times daily. metoprolol tartrate (LOPRESSOR) 50 MG tablet  Take 1.5 po bid             montelukast (SINGULAIR) 10 MG tablet  Take 1 tablet by mouth nightly TAKE 1 TABLET NIGHTLY             nitroGLYCERIN (NITROSTAT) 0.4 MG SL tablet  DISSOLVE 1 TABLET UNDER THE TONGUE EVERY 5 MINUTES AS NEEDED FOR CHEST PAIN. MAXIMUM OF 3 TABLETS             nystatin-triamcinolone (MYCOLOG II) 826826-2.1 UNIT/GM-% cream  Apply topically 2 times daily. pravastatin (PRAVACHOL) 40 MG tablet  TAKE 1 TABLET NIGHTLY             predniSONE (DELTASONE) 10 MG tablet  Take 3 tabs for 3 days, then 2 tabs for 3 days, then 1 tab for 3 days, then 1/2 tab for 3 days, then stop. Saccharomyces boulardii (PROBIOTIC) 250 MG CAPS  Take 1 tablet by mouth daily             SPIRIVA HANDIHALER 18 MCG inhalation capsule  INHALE THE CONTENTS OF 1 CAPSULE DAILY             terbinafine (LAMISIL) 250 MG tablet  Take 1 tablet by mouth daily             testosterone (ANDROGEL; TESTIM) 50 MG/5GM (1%) GEL 1% gel  Alternate one packet daily with 2 packets daily every other day             triamcinolone (KENALOG) 0.025 % cream  Apply topically 2 times daily. ustekinumab (STELARA) 45 MG/0.5ML SOSY prefilled syringe  Inject 45 mg into the skin once                   Medications marked \"taking\" at this time  Outpatient Medications Marked as Taking for the 8/4/21 encounter (Office Visit) with Ismael Torres MD   Medication Sig Dispense Refill    predniSONE (DELTASONE) 10 MG tablet Take 3 tabs for 3 days, then 2 tabs for 3 days, then 1 tab for 3 days, then 1/2 tab for 3 days, then stop.  20 tablet 0    celecoxib (CELEBREX) 200 MG capsule Take 1 capsule by mouth daily 30 capsule 1    ustekinumab (STELARA) 45 MG/0.5ML SOSY prefilled syringe Inject 45 mg into the skin once      terbinafine (LAMISIL) 250 MG tablet Take 1 tablet by mouth daily 84 tablet 0    albuterol sulfate  (90 Base) MCG/ACT inhaler Inhale 2 puffs into the lungs every 6 hours as needed for Wheezing or Shortness of Breath 3 Inhaler 1    furosemide (LASIX) 20 MG tablet Take 1 tablet by mouth daily 30 tablet 2    montelukast (SINGULAIR) 10 MG tablet Take 1 tablet by mouth nightly TAKE 1 TABLET NIGHTLY 90 tablet 3    nystatin-triamcinolone (MYCOLOG II) 495564-2.1 UNIT/GM-% cream Apply topically 2 times daily. 15 g 1    testosterone (ANDROGEL; TESTIM) 50 MG/5GM (1%) GEL 1% gel Alternate one packet daily with 2 packets daily every other day 135 Package 0    budesonide-formoterol (SYMBICORT) 160-4.5 MCG/ACT AERO Inhale 2 puffs into the lungs 2 times daily 2 each LOT [id number] EXP [id number] 3 Inhaler 1    albuterol (PROVENTIL) (2.5 MG/3ML) 0.083% nebulizer solution Take 3 mLs by nebulization every 6 hours as needed for Wheezing 120 each 3    gabapentin (NEURONTIN) 300 MG capsule TAKE 1 CAPSULE DAILY 90 capsule 3    doxazosin (CARDURA) 4 MG tablet TAKE ONE-HALF (1/2) TABLET TWICE A DAY 90 tablet 3    lisinopril (PRINIVIL;ZESTRIL) 20 MG tablet Take 1 tablet by mouth daily 90 tablet 3    metoprolol tartrate (LOPRESSOR) 50 MG tablet Take 1.5 po bid 15 tablet 0    pravastatin (PRAVACHOL) 40 MG tablet TAKE 1 TABLET NIGHTLY 90 tablet 3    SPIRIVA HANDIHALER 18 MCG inhalation capsule INHALE THE CONTENTS OF 1 CAPSULE DAILY 90 capsule 3    cetirizine (ZYRTEC) 5 MG tablet TAKE ONE TABLET BY MOUTH  PRN      nitroGLYCERIN (NITROSTAT) 0.4 MG SL tablet DISSOLVE 1 TABLET UNDER THE TONGUE EVERY 5 MINUTES AS NEEDED FOR CHEST PAIN. MAXIMUM OF 3 TABLETS 25 tablet 2    CPAP Machine MISC New cpap supplies mask hose filters etc 1 each 0    Saccharomyces boulardii (PROBIOTIC) 250 MG CAPS Take 1 tablet by mouth daily      esomeprazole Magnesium (NEXIUM) 20 MG PACK Take 20 mg by mouth daily      aspirin 81 MG tablet Take 81 mg by mouth daily.       mesalamine (PENTASA) 250 MG CR capsule Take 500 mg by mouth 4 times daily. Medications patient taking as of now reconciled against medications ordered at time of hospital discharge: Yes    Chief Complaint   Patient presents with    Follow-Up from [address] [place] [address] - Pneu     Skin Problem     few bumps on left cheek        Patient is noting continued short of breath and low energy. He is not doing much around the house. He no longer feels like he is infected. No fever or chills. Reviewed medications. Hospital course. Patient brings his phone in with access to some of the additional testing he has had done. We reviewed his chest x-ray. Inpatient course: Discharge summary reviewed- see chart. Interval history/Current status: improved still symptomatic      Review of Systems   Constitutional: Positive for fatigue. Negative for activity change, appetite change, diaphoresis and unexpected weight change. Eyes: Negative for photophobia and visual disturbance. Respiratory: Positive for shortness of breath. Negative for cough, chest tightness, wheezing and stridor. No orthopnea   Cardiovascular: Positive for leg swelling. Negative for chest pain and palpitations. Gastrointestinal: Negative for abdominal distention and abdominal pain. Genitourinary: Negative for flank pain and frequency. Musculoskeletal: Negative for gait problem and joint swelling. Neurological: Negative for dizziness, weakness, light-headedness and headaches. Psychiatric/Behavioral: Negative for confusion. Vitals:    08/04/21 1627   BP: 110/66   Pulse: 62   Temp: 98.2 °F (36.8 °C)   SpO2: 97%   Weight: 254 lb (115.2 kg)   Height: 5' 11\" (1.803 m)     Body mass index is 35.43 kg/m².    Wt Readings from Last 3 Encounters:   08/04/21 254 lb (115.2 kg)   07/14/21 253 lb 12.8 oz (115.1 kg)   07/08/21 254 lb (115.2 kg)     BP Readings from Last 3 Encounters:   08/04/21 110/66   07/14/21 112/62   07/01/21 134/72       Physical Exam  Vitals reviewed. Constitutional:       General: He is not in acute distress. Appearance: He is well-developed. HENT:      Head: Normocephalic and atraumatic. Right Ear: External ear normal.      Left Ear: External ear normal.      Nose: Nose normal.   Eyes:      General:         Right eye: No discharge. Left eye: No discharge. Conjunctiva/sclera: Conjunctivae normal.      Pupils: Pupils are equal, round, and reactive to light. Neck:      Thyroid: No thyromegaly. Cardiovascular:      Rate and Rhythm: Normal rate and regular rhythm. Heart sounds: No murmur heard. No friction rub. Gallop (S4 gallop) present. Pulmonary:      Effort: Pulmonary effort is normal. No respiratory distress. Abdominal:      General: There is no distension. Musculoskeletal:      Cervical back: Neck supple. Right lower leg: Edema present. Left lower leg: Edema present. Skin:     General: Skin is warm and dry. Neurological:      Mental Status: He is alert and oriented to person, place, and time. Coordination: Coordination normal.   Psychiatric:         Thought Content: Thought content normal.         Judgment: Judgment normal.         Reviewed hospital records [facility] as well as records patient has available on his phone did demonstrate a recent chest x-ray noting curly B-lines consistent with congestive heart failure. Assessment/Plan:  1. Pneumonia due to infectious organism, unspecified laterality, unspecified part of lung  Continue current treatment. 2. Congestive heart failure, unspecified HF chronicity, unspecified heart failure type (HCC)  Addition of increased doses of Lasix.  Recheck exam tomorrow consider further testing        Medical Decision Making: high complexity

## 2021-08-04 NOTE — PATIENT INSTRUCTIONS
Pt will take 20 mg furosemide today and tomorrow morning he will take 2-20 mg furosemide tablets and f/u in office. Consider BMP and other labs tomorrow.  As well as ordering 2D echo

## 2021-08-04 NOTE — PROGRESS NOTES
Diagnosis Orders   1. Pneumonia due to infectious organism, unspecified laterality, unspecified part of lung     2. Congestive heart failure, unspecified HF chronicity, unspecified heart failure type ([facility] [address]       Return in about 1 day (around 8/5/2021) for add in 10:15am.  Patient Instructions   Pt will take 20 mg furosemide today and tomorrow morning he will take 2-20 mg furosemide tablets and f/u in office. Subjective:      Patient ID: Jos Byrne is a 70 y.o. male who presents for:  Chief Complaint   Patient presents with   [address] from [address] [place] [address] - Pneu     Skin Problem     few bumps on left cheek        HPI    Current Outpatient Medications on File Prior to Visit   Medication Sig Dispense Refill    predniSONE (DELTASONE) 10 MG tablet Take 3 tabs for 3 days, then 2 tabs for 3 days, then 1 tab for 3 days, then 1/2 tab for 3 days, then stop. 20 tablet 0    celecoxib (CELEBREX) 200 MG capsule Take 1 capsule by mouth daily 30 capsule 1    ustekinumab (STELARA) 45 MG/0.5ML SOSY prefilled syringe Inject 45 mg into the skin once      terbinafine (LAMISIL) 250 MG tablet Take 1 tablet by mouth daily 84 tablet 0    albuterol sulfate  (90 Base) MCG/ACT inhaler Inhale 2 puffs into the lungs every 6 hours as needed for Wheezing or Shortness of Breath 3 Inhaler 1    furosemide (LASIX) 20 MG tablet Take 1 tablet by mouth daily 30 tablet 2    montelukast (SINGULAIR) 10 MG tablet Take 1 tablet by mouth nightly TAKE 1 TABLET NIGHTLY 90 tablet 3    nystatin-triamcinolone (MYCOLOG II) 083405-7.1 UNIT/GM-% cream Apply topically 2 times daily.  15 g 1    testosterone (ANDROGEL; TESTIM) 50 MG/5GM (1%) GEL 1% gel Alternate one packet daily with 2 packets daily every other day 135 Package 0    budesonide-formoterol (SYMBICORT) 160-4.5 MCG/ACT AERO Inhale 2 puffs into the lungs 2 times daily 2 each LOT [id number] [id number] 3 Inhaler 1    albuterol (PROVENTIL) (2.5 MG/3ML) 0.083% nebulizer solution Take 3 mLs by nebulization every 6 hours as needed for Wheezing 120 each 3    gabapentin (NEURONTIN) 300 MG capsule TAKE 1 CAPSULE DAILY 90 capsule 3    doxazosin (CARDURA) 4 MG tablet TAKE ONE-HALF (1/2) TABLET TWICE A DAY 90 tablet 3    lisinopril (PRINIVIL;ZESTRIL) 20 MG tablet Take 1 tablet by mouth daily 90 tablet 3    metoprolol tartrate (LOPRESSOR) 50 MG tablet Take 1.5 po bid 15 tablet 0    pravastatin (PRAVACHOL) 40 MG tablet TAKE 1 TABLET NIGHTLY 90 tablet 3    SPIRIVA HANDIHALER 18 MCG inhalation capsule INHALE THE CONTENTS OF 1 CAPSULE DAILY 90 capsule 3    cetirizine (ZYRTEC) 5 MG tablet TAKE ONE TABLET BY MOUTH  PRN      nitroGLYCERIN (NITROSTAT) 0.4 MG SL tablet DISSOLVE 1 TABLET UNDER THE TONGUE EVERY 5 MINUTES AS NEEDED FOR CHEST PAIN. MAXIMUM OF 3 TABLETS 25 tablet 2    CPAP Machine MISC New cpap supplies mask hose filters etc 1 each 0    Saccharomyces boulardii (PROBIOTIC) 250 MG CAPS Take 1 tablet by mouth daily      esomeprazole Magnesium (NEXIUM) 20 MG PACK Take 20 mg by mouth daily      aspirin 81 MG tablet Take 81 mg by mouth daily.  mesalamine (PENTASA) 250 MG CR capsule Take 500 mg by mouth 4 times daily.  doxazosin (CARDURA) 4 MG tablet Take 1 tablet by mouth daily for 7 days Take 1/2 tab BID 7 tablet 0    triamcinolone (KENALOG) 0.025 % cream Apply topically 2 times daily. 1 Tube 0    inFLIXimab (REMICADE) 100 MG injection Infuse 5 mg/kg intravenously See Admin Instructions        No current facility-administered medications on file prior to visit.      Past Medical History:   Diagnosis Date    A-fib [facility])     Abnormal EMG 12/09/2015    Actinic keratoses     Actinic keratoses     Acute diastolic congestive heart failure (HCC) 1/6/2021    Allergic rhinitis     Anemia 11/18/2014    Arthritis     Chronic back pain     [facility] obstructive pulmonary disease) ([facility] 08/09/2012    COPD [facility]chronic obstructive pulmonary disease) ([facility]     Crohns disease     Degenerative disc disease, lumbar     Dermatitis     Diabetes ([facility] [address] 2015    diet controlled    Gastrointestinal problem     GERD (gastroesophageal reflux disease)     History of atrial fibrillation     Dr. Andrews Horvath History of throat cancer 2004     cleared for reoccurence years ago    Hyperlipidemia 2014    Hypertension     Hypogonadism male     Lung disease     Mononeuritis multiplex     Mononeuritis multiplex     Nondependent alcohol abuse, in remission 2020    Obesity (BMI 30-39. 9) 2019    Osteoarthritis of lumbar spine     Pain of right heel     Paresthesia of foot, bilateral     Prediabetes 12/3/2014    Restless leg syndrome     Seborrheic dermatitis     Seborrheic keratoses, inflamed     Throat cancer (HCC)     throat, had surgery, sees Dr Leonard Lynch yearly    Tinea cruris     Tinea pedis     Tinea unguium      Past Surgical History:   Procedure Laterality Date    CARDIAC CATHETERIZATION      CARPAL TUNNEL RELEASE      CATARACT REMOVAL WITH IMPLANT Right 2019    CATARACT REMOVAL WITH IMPLANT Left 2019    COLON SURGERY      COLONOSCOPY  04/15/2015    KNEE ARTHROSCOPY      LARYNGECTOMY  2004    UPPER GASTROINTESTINAL ENDOSCOPY  13    Dr. Terrance Aguilar W/NAPOLEON RODRIGUEZ  2002     Social History     Socioeconomic History    Marital status:      Spouse name: Not on file    Number of children: 3    Years of education: Not on file    Highest education level: Not on file   Occupational History    Not on file   Tobacco Use    Smoking status: Former Smoker     Packs/day: 1.00     Years: 25.00     Pack years: 25.00     Types: Cigarettes     Quit date: 10/21/1990     Years since quittin.8    Smokeless tobacco: Never Used   Substance and Sexual Activity    Alcohol use: No     Comment: Attends AA, 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    Drug use: No    Sexual activity: Not on file   Other Topics Concern    Not on file   Social History Narrative    Lives alone. Social Determinants of Health     Financial Resource Strain: Low Risk     Difficulty of Paying Living Expenses: Not hard at all   Food Insecurity: No Food Insecurity    Worried About Running Out of Food in the Last Year: Never true    Dacia of Food in the Last Year: Never true   Transportation Needs: No Transportation Needs    Lack of Transportation (Medical): No    Lack of Transportation (Non-Medical): No   Physical Activity:     Days of Exercise per Week:     Minutes of Exercise per Session:    Stress:     Feeling of Stress :    Social Connections:     Frequency of Communication with Friends and Family:     Frequency of Social Gatherings with Friends and Family:     Attends Zoroastrian Services:     Active Member of Clubs or Organizations:     Attends Club or Organization Meetings:     Marital Status:    Intimate Partner Violence:     Fear of Current or Ex-Partner:     Emotionally Abused:     Physically Abused:     Sexually Abused:      Family History   Problem Relation Age of Onset    Heart Disease Mother     Liver Disease Mother     High Blood Pressure Mother     Heart Disease Father     Arthritis Father     Cancer Sister         lung     Allergies:  Alemtuzumab, Glycopyrrolate-formoterol, Mercaptopurine, and Moxifloxacin    Review of Systems    Objective:   /66   Pulse 62   Temp 98.2 °F (36.8 °C)   Ht 5' 11\" (1.803 m)   Wt 254 lb (115.2 kg)   SpO2 97%   BMI 35.43 kg/m²     Physical Exam    No results found for this visit on 08/04/21.     Recent Results (from the past 2016 hour(s))   Testosterone Free and Total Male    Collection Time: 05/25/21 12:49 PM   Result Value Ref Range    Testosterone Free, Calc 7 (L) 47 - 244 pg/mL    Testosterone % Free 1.3 (L) 1.6 - 2.9 %    Total Testosterone 54 (L) 300 - 720 ng/dL    Sex Hormone Binding 48 11 - 80 nmol/L   Transferrin    Collection Time: 05/25/21 12:49 PM   Result Value Ref Range Transferrin 271 200 - 400 mg/dL   CBC Auto Differential    Collection Time: 05/25/21 12:49 PM   Result Value Ref Range    WBC 8.6 4.8 - 10.8 K/uL    RBC 4.52 (L) 4.70 - 6.10 M/uL    Hemoglobin 13.1 (L) 14.0 - 18.0 g/dL    Hematocrit 39.1 (L) 42.0 - 52.0 %    MCV 86.5 80.0 - 100.0 fL    MCH 29.0 27.0 - 31.3 pg    MCHC 33.6 33.0 - 37.0 %    RDW 14.1 11.5 - 14.5 %    Platelets 448 103 - 644 K/uL    Neutrophils % 66.8 %    Lymphocytes % 18.4 %    Monocytes % 9.3 %    Eosinophils % 4.9 %    Basophils % 0.6 %    Neutrophils Absolute 5.7 1.4 - 6.5 K/uL    Lymphocytes Absolute 1.6 1.0 - 4.8 K/uL    Monocytes Absolute 0.8 0.2 - 0.8 K/uL    Eosinophils Absolute 0.4 0.0 - 0.7 K/uL    Basophils Absolute 0.0 0.0 - 0.2 K/uL   AST    Collection Time: 05/25/21 12:49 PM   Result Value Ref Range    AST 16 0 - 40 U/L   ALT    Collection Time: 05/25/21 12:49 PM   Result Value Ref Range    ALT 10 0 - 41 U/L   COVID-19    Collection Time: 06/21/21  7:44 AM   Result Value Ref Range    SARS-CoV-2, PCR Not Detected Not Detected   Lipid, Fasting    Collection Time: 07/07/21 12:24 PM   Result Value Ref Range    Cholesterol, Fasting 108 0 - 199 mg/dL    Triglyceride, Fasting 102 0 - 150 mg/dL    HDL 35 (L) 40 - 59 mg/dL    LDL Calculated 53 0 - 129 mg/dL   Microalbumin / Creatinine Urine Ratio    Collection Time: 07/07/21 12:27 PM   Result Value Ref Range    Microalbumin, Random Urine 1.30 Not Established mg/dL    Creatinine, Ur 342.2 Not Established mg/dL    Microalbumin Creatinine Ratio 3.8 0.0 - 30.0 mg/G   CBC with Differential    Collection Time: 07/28/21 11:20 PM   Result Value Ref Range    WBC 18.7 (H) 4.4 - 11.3 x10E9/L    RBC 4.26 (L) 4.50 - 5.9 x10E12/L    Hemoglobin 12.3 (L) 13.5 - 17 g/dL    Hematocrit 37.8 (L) 41.0 - 52 %    MCV 89 80 - 100 fL    MCHC 32.5 32.0 - 36 g/dL    Platelets 799 612 - 029 x10E9/L    RDW-CV 13.8 11.5 - 14 %    Neutrophils % 85.4 40.0 - 80 %    Immature Granulocytes % 0.7 0.0 - 0.9 %    Lymphocytes 6.9 13.0 - 44 %    Monocytes % 6.1 2.0 - 10.0 %    Eosinophils % 0.6 0.0 - 6.0 %    Basophils % 0.3 0.0 - 2.0 %    Neutrophils 15.98 (H) 1.60 - 5.5 x10E9/L    Lymphocytes % 1.29 0.80 - 3.0 x10E9/L    Monocytes 1.14 (H) 0.05 - 0.8 x10E9/L    Eosinophils 0.12 0.00 - 0.4 x10E9/L    Basophils 0.06 0.00 - 0.1 x10E9/L   LACTATE, SERUM    Collection Time: 07/28/21 11:20 PM   Result Value Ref Range    Lactate 1.0 0.4 - 2.0 mmol/L   Basic Metabolic Panel    Collection Time: 07/28/21 11:20 PM   Result Value Ref Range    Glucose 106 (H) 74 - 99 mg/dL    Sodium 135 (L) 136 - 145 mmol/L    Potassium 3.7 3.5 - 5.3 mmol/L    Chloride 102 98 - 107 mmol/L    HCO3 24 21 - 32 mmol/L    Anion Gap 13 10 - 20 mmol/L    Urea Nitrogen 28 (H) 6 - 23 mg/dL    CREATININE 0.86 0.50 - 1.3 mg/dL    GFR Non-African American >60 >60 mL/min/1.73m2    GFR African American >60 >60 mL/min/1.73m2    Calcium 8.5 (L) 8.6 - 10.3 mg/dL   Hepatic Function Panel    Collection Time: 07/28/21 11:20 PM   Result Value Ref Range    Albumin 3.7 3.4 - 5.0 g/dL    Total Bilirubin 0.5 0.0 - 1.2 mg/dL    Bilirubin, Direct 0.1 0.0 - 0.3 mg/dL    Alkaline Phosphatase 70 33 - 136 U/L    ALT 10 10 - 52 U/L    AST 15 9 - 39 U/L    Total Protein 6.4 6.4 - 8.2 g/dL   Brain Natriuretic Peptide    Collection Time: 07/28/21 11:20 PM   Result Value Ref Range     (H) 0 - 99 pg/mL   COVID-19    Collection Time: 07/28/21 11:50 PM   Result Value Ref Range    SARS-CoV-2, PCR NOT DETECTED Not Detect    Specimen Source Nasal, Nasopharyngeal    PROTHROMBIN TIME (PT)    Collection Time: 07/29/21  3:25 AM   Result Value Ref Range    Protime 13.2 10.1 - 13 sec    INR 1.1 0.9 - 1.1   VTE Exclusion D-Dimer    Collection Time: 07/29/21  3:25 AM   Result Value Ref Range    VTE Exclusion D-Dimer 1325 (A) < or = 500 ng/mL FEU   S. PNEUMONIAE AG, UR    Collection Time: 07/29/21  5:58 AM   Result Value Ref Range    STREP PNEUMONIAE ANTIGEN, URINE NEGATIVE Negative   Legionella antigen, urine Collection Time: 07/29/21  5:58 AM   Result Value Ref Range    Legionella Urinary Ag NEGATIVE Negative    Specimen Source     LACTATE, SERUM    Collection Time: 07/29/21  6:13 AM   Result Value Ref Range    Lactate 1.1 0.4 - 2.0 mmol/L   Troponin    Collection Time: 07/29/21  6:14 AM   Result Value Ref Range    Troponin I <0.02 0.00 - 0.0 ng/mL   Procalcitonin    Collection Time: 07/29/21  6:14 AM   Result Value Ref Range    Procalcitonin 1.44 (A) <=0.07 ng/mL   CBC    Collection Time: 07/30/21  6:31 AM   Result Value Ref Range    WBC 17.0 (H) 4.4 - 11.3 x10E9/L    RBC 4.06 (L) 4.50 - 5.9 x10E12/L    Hemoglobin 11.6 (L) 13.5 - 17 g/dL    Hematocrit 37.0 (L) 41.0 - 52 %    MCV 91 80 - 100 fL    MCHC 31.4 (L) 32.0 - 36 g/dL    Platelets 817 900 - 570 x10E9/L    RDW-CV 13.6 11.5 - 14 %   Basic Metabolic Panel    Collection Time: 07/30/21  6:32 AM   Result Value Ref Range    Glucose 198 (H) 74 - 99 mg/dL    Sodium 139 136 - 145 mmol/L    Potassium 4.5 3.5 - 5.3 mmol/L    Chloride 105 98 - 107 mmol/L    HCO3 25 21 - 32 mmol/L    Anion Gap 14 10 - 20 mmol/L    Urea Nitrogen 19 6 - 23 mg/dL    CREATININE 0.77 0.50 - 1.3 mg/dL    GFR Non-African American >60 >60 mL/min/1.73m2    GFR African American >60 >60 mL/min/1.73m2    Calcium 9.0 8.6 - 10.3 mg/dL   CBC    Collection Time: 07/31/21  5:44 AM   Result Value Ref Range    WBC 16.6 (H) 4.4 - 11.3 x10E9/L    RBC 4.10 (L) 4.50 - 5.9 x10E12/L    Hemoglobin 11.8 (L) 13.5 - 17 g/dL    Hematocrit 36.9 (L) 41.0 - 52 %    MCV 90 80 - 100 fL    MCHC 32.0 32.0 - 36 g/dL    Platelets 619 518 - 530 x10E9/L    RDW-CV 13.9 11.5 - 14 %   Basic Metabolic Panel    Collection Time: 07/31/21  5:44 AM   Result Value Ref Range    Glucose 151 (H) 74 - 99 mg/dL    Sodium 141 136 - 145 mmol/L    Potassium 4.2 3.5 - 5.3 mmol/L    Chloride 106 98 - 107 mmol/L    HCO3 25 21 - 32 mmol/L    Anion Gap 14 10 - 20 mmol/L    Urea Nitrogen 22 6 - 23 mg/dL    CREATININE 0.81 0.50 - 1.3 mg/dL    GFR Non-African American >60 >60 mL/min/1.73m2    GFR African American >60 >60 mL/min/1.73m2    Calcium 9.1 8.6 - 10.3 mg/dL           Assessment:       Diagnosis Orders   1. Pneumonia due to infectious organism, unspecified laterality, unspecified part of lung     2. Congestive heart failure, unspecified HF chronicity, unspecified heart failure type ([facility] [address]           No orders of the defined types were placed in this encounter. Plan:   Return in about 1 day (around 8/5/2021) for add in 10:15am.    Patient Instructions   Pt will take 20 mg furosemide today and tomorrow morning he will take 2-20 mg furosemide tablets and f/u in office. This note was partially created with the assistance of dictation. This may lead to grammatical or spelling errors. Sher Garcia M.D.

## 2021-08-05 ENCOUNTER — HOSPITAL ENCOUNTER (OUTPATIENT)
Dept: NON INVASIVE DIAGNOSTICS | Age: 71
Discharge: HOME | End: 2021-08-05
Payer: MEDICARE

## 2021-08-05 ENCOUNTER — OFFICE VISIT (OUTPATIENT)
Dept: FAMILY MEDICINE CLINIC | Age: 71
End: 2021-08-05
Payer: MEDICARE

## 2021-08-05 ENCOUNTER — OFFICE VISIT (OUTPATIENT)
Dept: ORTHOPEDIC SURGERY | Age: 71
End: 2021-08-05
Payer: MEDICARE

## 2021-08-05 VITALS
TEMPERATURE: 97.4 F | OXYGEN SATURATION: 96 % | BODY MASS INDEX: 35.7 KG/M2 | WEIGHT: 255 LBS | HEIGHT: 71 IN | HEART RATE: 57 BPM

## 2021-08-05 VITALS
DIASTOLIC BLOOD PRESSURE: 70 MMHG | WEIGHT: 255 LBS | SYSTOLIC BLOOD PRESSURE: 120 MMHG | BODY MASS INDEX: 35.7 KG/M2 | HEART RATE: 57 BPM | OXYGEN SATURATION: 97 % | TEMPERATURE: 96.6 F | HEIGHT: 71 IN

## 2021-08-05 DIAGNOSIS — R01.2 ABNORMAL HEART SOUNDS: ICD-10-CM

## 2021-08-05 DIAGNOSIS — I50.9 CONGESTIVE HEART FAILURE, UNSPECIFIED HF CHRONICITY, UNSPECIFIED HEART FAILURE TYPE (HCC): ICD-10-CM

## 2021-08-05 DIAGNOSIS — R00.8 GALLOP RHYTHM: Primary | ICD-10-CM

## 2021-08-05 DIAGNOSIS — R00.8 GALLOP RHYTHM: ICD-10-CM

## 2021-08-05 DIAGNOSIS — D72.829 LEUKOCYTOSIS, UNSPECIFIED TYPE: ICD-10-CM

## 2021-08-05 DIAGNOSIS — M17.11 PRIMARY OSTEOARTHRITIS OF RIGHT KNEE: Primary | ICD-10-CM

## 2021-08-05 LAB
LV EF: 53 %
LVEF MODALITY: NORMAL

## 2021-08-05 PROCEDURE — 93306 TTE W/DOPPLER COMPLETE: CPT

## 2021-08-05 PROCEDURE — 99214 OFFICE O/P EST MOD 30 MIN: CPT | Performed by: FAMILY MEDICINE

## 2021-08-05 PROCEDURE — 99213 OFFICE O/P EST LOW 20 MIN: CPT | Performed by: ORTHOPAEDIC SURGERY

## 2021-08-05 ASSESSMENT — ENCOUNTER SYMPTOMS
ABDOMINAL PAIN: 0
PHOTOPHOBIA: 0
SHORTNESS OF BREATH: 0
CHEST TIGHTNESS: 0
ABDOMINAL DISTENTION: 0

## 2021-08-05 NOTE — PATIENT INSTRUCTIONS
Pt will take 2- 20 mg furosemide again tomorrow morning, 8/6, and then resume usual starting Saturday. 2 d echo and labs ordered. F/u appt one week. Pt will look for salt substitutes with \"no sodium\"    Cardiac gallop did resolve with diuresis. However this is not a long-term solution without further investigation which is why the above testing is being ordered.

## 2021-08-05 NOTE — PROGRESS NOTES
unable to determine functional outcomes d/t unexpected D/C. Pt called 8/4/2021 and requested d/c. PLAN: [x]   Frequency/Duration:  Plan  D/c                     Patient Status:[] Continue/ Initiate plan of Care    [x] Discharge PT. Recommend pt continue with HEP. [] Additional visits requested, Please re-certify for additional visits:          Signature: Electronically signed by Steve Aguilar PT on 8/5/21 at 10:41 AM EDT      If you have any questions or concerns, please don't hesitate to call. Thank you for your referral.    I have reviewed this plan of care and certify a need for medically necessary rehabilitation services.     Physician Signature:__________________________________________________________  Date:  Please sign and return

## 2021-08-05 NOTE — PROGRESS NOTES
Diagnosis Orders   1. Gallop rhythm  ECHO 2D WO Color Doppler Complete    Brain Natriuretic Peptide   2. Abnormal heart sounds  ECHO 2D WO Color Doppler Complete    Brain Natriuretic Peptide   3. Leukocytosis, unspecified type  CBC Auto Differential   4. Congestive heart failure, unspecified HF chronicity, unspecified heart failure type [facility])  Comprehensive Metabolic Panel     Return in about 1 week (around 8/12/2021) for for review of outcome of today's recommendation. Patient Instructions   Pt will take 2- 20 mg furosemide again tomorrow morning, 8/6, and then resume usual starting Saturday. 2 d echo and labs ordered. F/u appt one week. Pt will look for salt substitutes with \"no sodium\"    Cardiac gallop did resolve with diuresis. However this is not a long-term solution without further investigation which is why the above testing is being ordered. Subjective:      Patient ID: Alvaro Sandoval is a 70 y.o. male who presents for:  Chief Complaint   Patient presents with    Discuss Medications     x 1 day follow up , states that edema is better       Patient states he slept better last night. Only woke up once to urinate. Feeling better today. No noted short of breath with activity. Current Outpatient Medications on File Prior to Visit   Medication Sig Dispense Refill    predniSONE (DELTASONE) 10 MG tablet Take 3 tabs for 3 days, then 2 tabs for 3 days, then 1 tab for 3 days, then 1/2 tab for 3 days, then stop.  20 tablet 0    celecoxib (CELEBREX) 200 MG capsule Take 1 capsule by mouth daily 30 capsule 1    ustekinumab (STELARA) 45 MG/0.5ML SOSY prefilled syringe Inject 45 mg into the skin once      doxazosin (CARDURA) 4 MG tablet Take 1 tablet by mouth daily for 7 days Take 1/2 tab BID 7 tablet 0    terbinafine (LAMISIL) 250 MG tablet Take 1 tablet by mouth daily 84 tablet 0    albuterol sulfate  (90 Base) MCG/ACT inhaler Inhale 2 puffs into the lungs every 6 hours as needed for Wheezing or Shortness of Breath 3 Inhaler 1    furosemide (LASIX) 20 MG tablet Take 1 tablet by mouth daily 30 tablet 2    montelukast (SINGULAIR) 10 MG tablet Take 1 tablet by mouth nightly TAKE 1 TABLET NIGHTLY 90 tablet 3    nystatin-triamcinolone (MYCOLOG II) 852141-7.1 UNIT/GM-% cream Apply topically 2 times daily. 15 g 1    testosterone (ANDROGEL; TESTIM) 50 MG/5GM (1%) GEL 1% gel Alternate one packet daily with 2 packets daily every other day 135 Package 0    budesonide-formoterol (SYMBICORT) 160-4.5 MCG/ACT AERO Inhale 2 puffs into the lungs 2 times daily 2 each LOT [id number] [id number] 3 Inhaler 1    albuterol (PROVENTIL) (2.5 MG/3ML) 0.083% nebulizer solution Take 3 mLs by nebulization every 6 hours as needed for Wheezing 120 each 3    gabapentin (NEURONTIN) 300 MG capsule TAKE 1 CAPSULE DAILY 90 capsule 3    doxazosin (CARDURA) 4 MG tablet TAKE ONE-HALF (1/2) TABLET TWICE A DAY 90 tablet 3    lisinopril (PRINIVIL;ZESTRIL) 20 MG tablet Take 1 tablet by mouth daily 90 tablet 3    metoprolol tartrate (LOPRESSOR) 50 MG tablet Take 1.5 po bid 15 tablet 0    pravastatin (PRAVACHOL) 40 MG tablet TAKE 1 TABLET NIGHTLY 90 tablet 3    SPIRIVA HANDIHALER 18 MCG inhalation capsule INHALE THE CONTENTS OF 1 CAPSULE DAILY 90 capsule 3    cetirizine (ZYRTEC) 5 MG tablet TAKE ONE TABLET BY MOUTH  PRN      nitroGLYCERIN (NITROSTAT) 0.4 MG SL tablet DISSOLVE 1 TABLET UNDER THE TONGUE EVERY 5 MINUTES AS NEEDED FOR CHEST PAIN. MAXIMUM OF 3 TABLETS 25 tablet 2    CPAP Machine MISC New cpap supplies mask hose filters etc 1 each 0    Saccharomyces boulardii (PROBIOTIC) 250 MG CAPS Take 1 tablet by mouth daily      esomeprazole Magnesium (NEXIUM) 20 MG PACK Take 20 mg by mouth daily      aspirin 81 MG tablet Take 81 mg by mouth daily.  mesalamine (PENTASA) 250 MG CR capsule Take 500 mg by mouth 4 times daily.  triamcinolone (KENALOG) 0.025 % cream Apply topically 2 times daily.  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 Social Determinants of Health     Financial Resource Strain: Low Risk     Difficulty of Paying Living Expenses: Not hard at all   Food Insecurity: No Food Insecurity    Worried About Running Out of Food in the Last Year: Never true    Dacia of Food in the Last Year: Never true   Transportation Needs: No Transportation Needs    Lack of Transportation (Medical): No    Lack of Transportation (Non-Medical):  No   Physical Activity:     Days of Exercise per Week:     Minutes of Exercise per Session:    Stress:     Feeling of Stress :    Social Connections:     Frequency of Communication with Friends and Family:     Frequency of Social Gatherings with Friends and Family:     Attends Worship Services:     Active Member of Clubs or Organizations:     Attends Club or Organization Meetings:     Marital Status:    Intimate Partner Violence:     Fear of Current or Ex-Partner:     Emotionally Abused:     Physically Abused:     Sexually Abused:      Family History   Problem Relation Age of Onset    Heart Disease Mother     Liver Disease Mother     High Blood Pressure Mother     Heart Disease Father     Arthritis Father     Cancer Sister         lung     Allergies:  Alemtuzumab, Glycopyrrolate-formoterol, Mercaptopurine, and Moxifloxacin    Review of Systems   Constitutional: Negative for activity change, appetite change, diaphoresis and unexpected weight change. Eyes: Negative for photophobia and visual disturbance. Respiratory: Negative for chest tightness and shortness of breath. No orthopnea   Cardiovascular: Negative for chest pain, palpitations and leg swelling. Gastrointestinal: Negative for abdominal distention and abdominal pain. Genitourinary: Negative for flank pain and frequency. Musculoskeletal: Negative for gait problem and joint swelling. Neurological: Negative for dizziness, weakness, light-headedness and headaches. Psychiatric/Behavioral: Negative for confusion. Objective:   /70 (Site: Left Upper Arm, Position: Sitting, Cuff Size: Medium Adult)   Pulse 57   Temp 96.6 °F (35.9 °C)   Ht 5' 11\" (1.803 m)   Wt 255 lb (115.7 kg)   SpO2 97%   BMI 35.57 kg/m²     Physical Exam  Vitals reviewed. Constitutional:       General: He is not in acute distress. Appearance: He is well-developed. HENT:      Head: Normocephalic and atraumatic. Right Ear: External ear normal.      Left Ear: External ear normal.      Nose: Nose normal.   Eyes:      General:         Right eye: No discharge. Left eye: No discharge. Conjunctiva/sclera: Conjunctivae normal.      Pupils: Pupils are equal, round, and reactive to light. Neck:      Thyroid: No thyromegaly. Cardiovascular:      Rate and Rhythm: Normal rate and regular rhythm. Heart sounds: Normal heart sounds. No murmur heard. No friction rub. No gallop. Pulmonary:      Effort: Pulmonary effort is normal. No respiratory distress. Abdominal:      General: There is no distension. Musculoskeletal:      Cervical back: Neck supple. Skin:     General: Skin is warm and dry. Neurological:      Mental Status: He is alert and oriented to person, place, and time. Coordination: Coordination normal.   Psychiatric:         Thought Content:  Thought content normal.         Judgment: Judgment normal.         No results found for this visit on 08/05/21.     Recent Results (from the past 2016 hour(s))   Testosterone Free and Total Male    Collection Time: 05/25/21 12:49 PM   Result Value Ref Range    Testosterone Free, Calc 7 (L) 47 - 244 pg/mL    Testosterone % Free 1.3 (L) 1.6 - 2.9 %    Total Testosterone 54 (L) 300 - 720 ng/dL    Sex Hormone Binding 48 11 - 80 nmol/L   Transferrin    Collection Time: 05/25/21 12:49 PM   Result Value Ref Range    Transferrin 271 200 - 400 mg/dL   CBC Auto Differential    Collection Time: 05/25/21 12:49 PM   Result Value Ref Range    WBC 8.6 4.8 - 10.8 K/uL    RBC 4.52 (L) 4.70 - 6.10 M/uL    Hemoglobin 13.1 (L) 14.0 - 18.0 g/dL    Hematocrit 39.1 (L) 42.0 - 52.0 %    MCV 86.5 80.0 - 100.0 fL    MCH 29.0 27.0 - 31.3 pg    MCHC 33.6 33.0 - 37.0 %    RDW 14.1 11.5 - 14.5 %    Platelets 298 124 - 072 K/uL    Neutrophils % 66.8 %    Lymphocytes % 18.4 %    Monocytes % 9.3 %    Eosinophils % 4.9 %    Basophils % 0.6 %    Neutrophils Absolute 5.7 1.4 - 6.5 K/uL    Lymphocytes Absolute 1.6 1.0 - 4.8 K/uL    Monocytes Absolute 0.8 0.2 - 0.8 K/uL    Eosinophils Absolute 0.4 0.0 - 0.7 K/uL    Basophils Absolute 0.0 0.0 - 0.2 K/uL   AST    Collection Time: 05/25/21 12:49 PM   Result Value Ref Range    AST 16 0 - 40 U/L   ALT    Collection Time: 05/25/21 12:49 PM   Result Value Ref Range    ALT 10 0 - 41 U/L   COVID-19    Collection Time: 06/21/21  7:44 AM   Result Value Ref Range    SARS-CoV-2, PCR Not Detected Not Detected   Lipid, Fasting    Collection Time: 07/07/21 12:24 PM   Result Value Ref Range    Cholesterol, Fasting 108 0 - 199 mg/dL    Triglyceride, Fasting 102 0 - 150 mg/dL    HDL 35 (L) 40 - 59 mg/dL    LDL Calculated 53 0 - 129 mg/dL   Microalbumin / Creatinine Urine Ratio    Collection Time: 07/07/21 12:27 PM   Result Value Ref Range    Microalbumin, Random Urine 1.30 Not Established mg/dL    Creatinine, Ur 342.2 Not Established mg/dL    Microalbumin Creatinine Ratio 3.8 0.0 - 30.0 mg/G   CBC with Differential    Collection Time: 07/28/21 11:20 PM   Result Value Ref Range    WBC 18.7 (H) 4.4 - 11.3 x10E9/L    RBC 4.26 (L) 4.50 - 5.9 x10E12/L    Hemoglobin 12.3 (L) 13.5 - 17 g/dL    Hematocrit 37.8 (L) 41.0 - 52 %    MCV 89 80 - 100 fL    MCHC 32.5 32.0 - 36 g/dL    Platelets 295 602 - 972 x10E9/L    RDW-CV 13.8 11.5 - 14 %    Neutrophils % 85.4 40.0 - 80 %    Immature Granulocytes % 0.7 0.0 - 0.9 %    Lymphocytes 6.9 13.0 - 44 %    Monocytes % 6.1 2.0 - 10.0 %    Eosinophils % 0.6 0.0 - 6.0 %    Basophils % 0.3 0.0 - 2.0 %    Neutrophils 15.98 (H) 1.60 - 5.5 x10E9/L    Lymphocytes % 1.29 0.80 - 3.0 x10E9/L    Monocytes 1.14 (H) 0.05 - 0.8 x10E9/L    Eosinophils 0.12 0.00 - 0.4 x10E9/L    Basophils 0.06 0.00 - 0.1 x10E9/L   LACTATE, SERUM    Collection Time: 07/28/21 11:20 PM   Result Value Ref Range    Lactate 1.0 0.4 - 2.0 mmol/L   Basic Metabolic Panel    Collection Time: 07/28/21 11:20 PM   Result Value Ref Range    Glucose 106 (H) 74 - 99 mg/dL    Sodium 135 (L) 136 - 145 mmol/L    Potassium 3.7 3.5 - 5.3 mmol/L    Chloride 102 98 - 107 mmol/L    HCO3 24 21 - 32 mmol/L    Anion Gap 13 10 - 20 mmol/L    Urea Nitrogen 28 (H) 6 - 23 mg/dL    CREATININE 0.86 0.50 - 1.3 mg/dL    GFR Non-African American >60 >60 mL/min/1.73m2    GFR African American >60 >60 mL/min/1.73m2    Calcium 8.5 (L) 8.6 - 10.3 mg/dL   Hepatic Function Panel    Collection Time: 07/28/21 11:20 PM   Result Value Ref Range    Albumin 3.7 3.4 - 5.0 g/dL    Total Bilirubin 0.5 0.0 - 1.2 mg/dL    Bilirubin, Direct 0.1 0.0 - 0.3 mg/dL    Alkaline Phosphatase 70 33 - 136 U/L    ALT 10 10 - 52 U/L    AST 15 9 - 39 U/L    Total Protein 6.4 6.4 - 8.2 g/dL   Brain Natriuretic Peptide    Collection Time: 07/28/21 11:20 PM   Result Value Ref Range     (H) 0 - 99 pg/mL   COVID-19    Collection Time: 07/28/21 11:50 PM   Result Value Ref Range    SARS-CoV-2, PCR NOT DETECTED Not Detect    Specimen Source Nasal, Nasopharyngeal    PROTHROMBIN TIME (PT)    Collection Time: 07/29/21  3:25 AM   Result Value Ref Range    Protime 13.2 10.1 - 13 sec    INR 1.1 0.9 - 1.1   VTE Exclusion D-Dimer    Collection Time: 07/29/21  3:25 AM   Result Value Ref Range    VTE Exclusion D-Dimer 1325 (A) < or = 500 ng/mL FEU   S. PNEUMONIAE AG, UR    Collection Time: 07/29/21  5:58 AM   Result Value Ref Range    STREP PNEUMONIAE ANTIGEN, URINE NEGATIVE Negative   Legionella antigen, urine    Collection Time: 07/29/21  5:58 AM   Result Value Ref Range    Legionella Urinary Ag NEGATIVE Negative    Specimen Source     LACTATE, SERUM    Collection Time: 07/29/21  6:13 AM   Result Value Ref Range    Lactate 1.1 0.4 - 2.0 mmol/L   Troponin    Collection Time: 07/29/21  6:14 AM   Result Value Ref Range    Troponin I <0.02 0.00 - 0.0 ng/mL   Procalcitonin    Collection Time: 07/29/21  6:14 AM   Result Value Ref Range    Procalcitonin 1.44 (A) <=0.07 ng/mL   CBC    Collection Time: 07/30/21  6:31 AM   Result Value Ref Range    WBC 17.0 (H) 4.4 - 11.3 x10E9/L    RBC 4.06 (L) 4.50 - 5.9 x10E12/L    Hemoglobin 11.6 (L) 13.5 - 17 g/dL    Hematocrit 37.0 (L) 41.0 - 52 %    MCV 91 80 - 100 fL    MCHC 31.4 (L) 32.0 - 36 g/dL    Platelets 798 039 - 378 x10E9/L    RDW-CV 13.6 11.5 - 14 %   Basic Metabolic Panel    Collection Time: 07/30/21  6:32 AM   Result Value Ref Range    Glucose 198 (H) 74 - 99 mg/dL    Sodium 139 136 - 145 mmol/L    Potassium 4.5 3.5 - 5.3 mmol/L    Chloride 105 98 - 107 mmol/L    HCO3 25 21 - 32 mmol/L    Anion Gap 14 10 - 20 mmol/L    Urea Nitrogen 19 6 - 23 mg/dL    CREATININE 0.77 0.50 - 1.3 mg/dL    GFR Non-African American >60 >60 mL/min/1.73m2    GFR African American >60 >60 mL/min/1.73m2    Calcium 9.0 8.6 - 10.3 mg/dL   CBC    Collection Time: 07/31/21  5:44 AM   Result Value Ref Range    WBC 16.6 (H) 4.4 - 11.3 x10E9/L    RBC 4.10 (L) 4.50 - 5.9 x10E12/L    Hemoglobin 11.8 (L) 13.5 - 17 g/dL    Hematocrit 36.9 (L) 41.0 - 52 %    MCV 90 80 - 100 fL    MCHC 32.0 32.0 - 36 g/dL    Platelets 933 466 - 755 x10E9/L    RDW-CV 13.9 11.5 - 14 %   Basic Metabolic Panel    Collection Time: 07/31/21  5:44 AM   Result Value Ref Range    Glucose 151 (H) 74 - 99 mg/dL    Sodium 141 136 - 145 mmol/L    Potassium 4.2 3.5 - 5.3 mmol/L    Chloride 106 98 - 107 mmol/L    HCO3 25 21 - 32 mmol/L    Anion Gap 14 10 - 20 mmol/L    Urea Nitrogen 22 6 - 23 mg/dL    CREATININE 0.81 0.50 - 1.3 mg/dL    GFR Non-African American >60 >60 mL/min/1.73m2    GFR African American >60 >60 mL/min/1.73m2    Calcium 9.1 8.6 - 10.3 mg/dL           Assessment:       Diagnosis Orders   1. Gallop rhythm  ECHO 2D WO Color Doppler Complete    Brain Natriuretic Peptide   2. Abnormal heart sounds  ECHO 2D WO Color Doppler Complete    Brain Natriuretic Peptide   3. Leukocytosis, unspecified type  CBC Auto Differential   4. Congestive heart failure, unspecified HF chronicity, unspecified heart failure type ([address]  Comprehensive Metabolic Panel         Orders Placed This Encounter   Procedures    Brain Natriuretic Peptide     Standing Status:   Future     Standing Expiration Date:   8/5/2022    CBC Auto Differential     Standing Status:   Future     Standing Expiration Date:   8/5/2022    Comprehensive Metabolic Panel     Standing Status:   Future     Standing Expiration Date:   8/5/2022    ECHO 2D WO Color Doppler Complete     Standing Status:   Future     Standing Expiration Date:   8/5/2022     Order Specific Question:   Reason for exam:     Answer:   sob after appropriate treatment for pneumonia and new onset S4 gallop improved with increased diuretic         Plan:   Return in about 1 week (around 8/12/2021) for for review of outcome of today's recommendation. Patient Instructions   Pt will take 2- 20 mg furosemide again tomorrow morning, 8/6, and then resume usual starting Saturday. 2 d echo and labs ordered. F/u appt one week.      Pt will look for salt

## 2021-08-05 NOTE — PROGRESS NOTES
Subjective:      Patient ID: Carlton Lozoya is a 70 y.o. male who presents today for:  Chief Complaint   Patient presents with    Follow-up     for Primary osteoarthritis of right knee. he has been taking celebrex but not regularly. He feels his \"twinge\" is back in his knee. HPI Rolin Rubinstein has a history of osteoarthritis of his right knee. Localized medially. He has has had improvement from the Celebrex. No locking or mechanical symptoms. No giving way. Past Medical History:   Diagnosis Date    A-fib [facility])     Abnormal EMG 12/09/2015    Actinic keratoses     Actinic keratoses     Acute diastolic congestive heart failure ([facility]) 1/6/2021    Allergic rhinitis     Anemia 11/18/2014    Arthritis     Chronic back pain     COPD (chronic obstructive pulmonary disease) ([facility]) 08/09/2012    COPD (chronic obstructive pulmonary disease) ([facility])     Crohns disease     Degenerative disc disease, lumbar     Dermatitis     Diabetes ([facility] [address] 03/19/2015    diet controlled    Gastrointestinal problem     GERD (gastroesophageal reflux disease)     History of atrial fibrillation 2006    Dr. Siddhartha Robles History of throat cancer 2004     cleared for reoccurence years ago    Hyperlipidemia 1/21/2014    Hypertension     Hypogonadism male     Lung disease     Mononeuritis multiplex     Mononeuritis multiplex     Nondependent alcohol abuse, in remission 7/22/2020    Obesity (BMI 30-39. 9) 7/24/2019    Osteoarthritis of lumbar spine     Pain of right heel     Paresthesia of foot, bilateral     Prediabetes 12/3/2014    Restless leg syndrome     Seborrheic dermatitis     Seborrheic keratoses, inflamed     Throat cancer ([facility])     throat, had surgery, sees Dr Freeman Haley yearly    Tinea cruris     Tinea pedis     Tinea unguium      Past Surgical History:   Procedure Laterality Date    CARDIAC CATHETERIZATION      CARPAL TUNNEL RELEASE      CATARACT REMOVAL WITH IMPLANT Right 09/09/2019    CATARACT REMOVAL WITH IMPLANT Left 2019    COLON SURGERY      COLONOSCOPY  04/15/2015    KNEE ARTHROSCOPY      LARYNGECTOMY  2004    UPPER GASTROINTESTINAL ENDOSCOPY  13    Dr. 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 Social Determinants of Health     Financial Resource Strain: Low Risk     Difficulty of Paying Living Expenses: Not hard at all   Food Insecurity: No Food Insecurity    Worried About Running Out of Food in the Last Year: Never true    Dacia of Food in the Last Year: Never true   Transportation Needs: No Transportation Needs    Lack of Transportation (Medical): No    Lack of Transportation (Non-Medical):  No   Physical Activity:     Days of Exercise per Week:     Minutes of Exercise per Session:    Stress:     Feeling of Stress :    Social Connections:     Frequency of Communication with Friends and Family:     Frequency of Social Gatherings with Friends and Family:     Attends [employer] Services:     Active Member of Clubs or Organizations:     Attends Club or Organization Meetings:     Marital Status:    Intimate Partner Violence:     Fear of Current or Ex-Partner:     Emotionally Abused:     Physically Abused:     Sexually Abused:      Family History   Problem Relation Age of Onset    Heart Disease Mother     Liver Disease Mother     High Blood Pressure Mother     Heart Disease Father     Arthritis Father     Cancer Sister         lung     Allergies   Allergen Reactions    Alemtuzumab      Low grade fever  Other reaction(s): Other: See Comments  Low grade fever    Glycopyrrolate-Formoterol Other (See Comments)     Dizzy and felt like heart rate sped up  Other reaction(s): Other: See Comments  Dizzy and felt like heart rate sped up    Mercaptopurine Other (See Comments)    Moxifloxacin Other (See Comments)     Pt states He gets sores in his mouth         Review of Systems  Unremarkable. Objective:   Pulse 57   Temp 97.4 °F (36.3 °C) (Temporal)   Ht 5' 11\" (1.803 m)   Wt 255 lb (115.7 kg)   SpO2 96%   BMI 35.57 kg/m²     ORTHO EXAM  Full range of motion of the right knee. Tenderness along the medial joint line. No effusion, warmth, erythema nor crepitus. No extensor lag. No instability. Radiographs and Laboratory Studies:     Diagnostic Imaging Studies:          Laboratory Studies:   Lab Results   Component Value Date    WBC 16.6 (H) 07/31/2021    HGB 11.8 (L) 07/31/2021    HCT 36.9 (L) 07/31/2021    MCV 90 07/31/2021     07/31/2021     Lab Results   Component Value Date    SEDRATE 42 (H) 03/19/2013     No results found for: CRP    Assessment:       Diagnosis Orders   1. Primary osteoarthritis of right knee       Impression: Right knee arthritis manageable at this time. Plan: We will continue with the Celebrex on a as needed basis. Voltaren gel topically was recommended. Follow-up as needed    No orders of the defined types were placed in this encounter. No orders of the defined types were placed in this encounter. Return if symptoms worsen or fail to improve.       Shelly Bianchi MD

## 2021-08-11 DIAGNOSIS — E29.1 HYPOGONADISM MALE: ICD-10-CM

## 2021-08-11 RX ORDER — TESTOSTERONE GEL, 1% 10 MG/G
GEL TRANSDERMAL
Qty: 675 G | Refills: 0 | Status: SHIPPED | OUTPATIENT
Start: 2021-08-11 | End: 2021-11-04 | Stop reason: HOSPADM

## 2021-08-12 ENCOUNTER — OFFICE VISIT (OUTPATIENT)
Dept: FAMILY MEDICINE CLINIC | Age: 71
End: 2021-08-12
Payer: MEDICARE

## 2021-08-12 VITALS
TEMPERATURE: 98.3 F | BODY MASS INDEX: 35.84 KG/M2 | HEIGHT: 71 IN | WEIGHT: 256 LBS | HEART RATE: 60 BPM | SYSTOLIC BLOOD PRESSURE: 106 MMHG | DIASTOLIC BLOOD PRESSURE: 64 MMHG | OXYGEN SATURATION: 97 %

## 2021-08-12 DIAGNOSIS — R01.2 ABNORMAL HEART SOUNDS: Primary | ICD-10-CM

## 2021-08-12 DIAGNOSIS — I11.0 HYPERTENSIVE HEART DISEASE WITH HEART FAILURE (HCC): ICD-10-CM

## 2021-08-12 DIAGNOSIS — I10 ESSENTIAL (PRIMARY) HYPERTENSION: ICD-10-CM

## 2021-08-12 PROCEDURE — 99213 OFFICE O/P EST LOW 20 MIN: CPT | Performed by: FAMILY MEDICINE

## 2021-08-12 ASSESSMENT — ENCOUNTER SYMPTOMS
PHOTOPHOBIA: 0
SHORTNESS OF BREATH: 0
ABDOMINAL PAIN: 0
ABDOMINAL DISTENTION: 0
CHEST TIGHTNESS: 0

## 2021-08-12 NOTE — PROGRESS NOTES
Return in about 1 week (around 8/12/2021) for for review of outcome of today's recommendation. Patient Instructions   Pt will take 2- 20 mg furosemide again tomorrow morning, 8/6, and then resume usual starting Saturday. 2 d echo and labs ordered. F/u appt one week. Pt will look for salt substitutes with \"no sodium\"     Cardiac gallop did resolve with diuresis. However this is not a long-term solution without further investigation which is why the above testing is being ordered.

## 2021-08-12 NOTE — PATIENT INSTRUCTIONS
Patient Education        Heart Failure: Care Instructions  Your Care Instructions     Heart failure occurs when your heart does not pump as much blood as the body needs. Failure does not mean that the heart has stopped pumping but rather that it is not pumping as well as it should. Over time, this causes fluid buildup in your lungs and other parts of your body. Fluid buildup can cause shortness of breath, fatigue, swollen ankles, and other problems. By taking medicines regularly, reducing sodium (salt) in your diet, checking your weight every day, and making lifestyle changes, you can feel better and live longer. Follow-up care is a key part of your treatment and safety. Be sure to make and go to all appointments, and call your doctor if you are having problems. It's also a good idea to know your test results and keep a list of the medicines you take. How can you care for yourself at home? Medicines    · Be safe with medicines. Take your medicines exactly as prescribed. Call your doctor if you think you are having a problem with your medicine.     · Do not take any vitamins, over-the-counter medicine, or herbal products without talking to your doctor first. [place] not take ibuprofen (Advil or Motrin) and naproxen (Aleve) without talking to your doctor first. They could make your heart failure worse.     · You may take some of the following medicine. ? Angiotensin-converting enzyme inhibitors (ACEIs) or angiotensin II receptor blockers (ARBs) reduce the heart's workload, lower blood pressure, and reduce swelling. Taking an ACEI or ARB may lower your chance of needing to be hospitalized. ? Beta-blockers can slow heart rate, decrease blood pressure, and improve your condition. Taking a beta-blocker may lower your chance of needing to be hospitalized. ? Diuretics, also called water pills, reduce swelling. You will get more details on the specific medicines your doctor prescribes.   Diet    · Your doctor may suggest that you limit sodium. Your doctor can tell you how much sodium is right for you. An example is less than 3,000 mg a day. This includes all the salt you eat in cooking or in packaged foods. People get most of their sodium from processed foods. Fast food and restaurant meals also tend to be very high in sodium.     · Ask your doctor how much liquid you can drink each day. You may have to limit liquids. Weight    · Weigh yourself without clothing at the same time each day. Record your weight. Call your doctor if you have a sudden weight gain, such as more than 2 to 3 pounds in a day or 5 pounds in a week. (Your doctor may suggest a different range of weight gain.) A sudden weight gain may mean that your heart failure is getting worse. Activity level    · Start light exercise (if your doctor says it is okay). Even if you can only do a small amount, exercise will help you get stronger, have more energy, and manage your weight and your stress. Walking is an easy way to get exercise. Start out by walking a little more than you did before. Bit by bit, increase the amount you walk.     · When you exercise, watch for signs that your heart is working too hard. You are pushing yourself too hard if you cannot talk while you are exercising. If you become short of breath or dizzy or have chest pain, stop, sit down, and rest.     · If you feel \"wiped out\" the day after you exercise, walk slower or for a shorter distance until you can work up to a better pace.     · Get enough rest at night. Sleeping with 1 or 2 pillows under your upper body and head may help you breathe easier. Lifestyle changes    · Do not smoke. Smoking can make a heart condition worse. If you need help quitting, talk to your doctor about stop-smoking programs and medicines. These can increase your chances of quitting for good.  Quitting smoking may be the most important step you can take to protect your heart.     · Limit alcohol to 2 drinks a day for men and 1 drink a day for women. Too much alcohol can cause health problems.     · Avoid getting sick from colds and the flu. Get a pneumococcal vaccine shot. If you have had one before, ask your doctor whether you need another dose. Get a flu shot each year. If you must be around people with colds or the flu, wash your hands often. When should you call for help? Call 911  if you have symptoms of sudden heart failure such as:    · You have severe trouble breathing.     · You cough up pink, foamy mucus.     · You have a new irregular or rapid heartbeat. Call your doctor now or seek immediate medical care if:    · You have new or increased shortness of breath.     · You are dizzy or lightheaded, or you feel like you may faint.     · You have sudden weight gain, such as more than 2 to 3 pounds in a day or 5 pounds in a week. (Your doctor may suggest a different range of weight gain.)     · You have increased swelling in your legs, ankles, or feet.     · You are suddenly so tired or weak that you cannot do your usual activities. Watch closely for changes in your health, and be sure to contact your doctor if you develop new symptoms. Where can you learn more? Go to https://[email or web address].[email or web address] org and sign in to your [employer] account. Enter P495 in the [employer] box to learn more about \"Heart Failure: Care Instructions. \"     If you do not have an account, please click on the \"Sign Up Now\" link. Current as of: August 31, 2020               Content Version: 12.9  © 2006-2021 [employer]. Care instructions adapted under license by [facility] ([facility]). If you have questions about a medical condition or this instruction, always ask your healthcare professional. [place] [address] any warranty or liability for your use of this information. Patient Education        Learning About Heart Failure Zones  What are heart failure zones?      Heart failure zones give you an easy way to see changes in your heart failure symptoms. They also tell you when you need to get help. Check every day to see which zone you are in. Green zone. You are doing well. This is where you want to be. · Your weight is stable. It's not going up or down. · You breathe easily. · You are sleeping well. You are able to lie flat without shortness of breath. · You can do your usual activities. Yellow zone. Be careful. Your symptoms are changing. Call your doctor. · You have new or increased shortness of breath. · You are dizzy or lightheaded, or you feel like you may faint. · You have sudden weight gain, such as more than 2 to 3 pounds in a day or 5 pounds in a week. (Your doctor may suggest a different range of weight gain.)  · You have increased swelling in your legs, ankles, or feet. · You are so tired or weak that you can't do your usual activities. · You are not sleeping well. Shortness of breath wakes you up at night. You need extra pillows. Red zone. 911  This is an emergency. Call . You have symptoms of sudden heart failure. For example:  · You have severe trouble breathing. · You cough up pink, foamy mucus. · You have a new irregular or fast heartbeat. You have symptoms of a heart attack. These may include:  · Chest pain or pressure, or a strange feeling in the chest.  · Sweating. · Shortness of breath. · Nausea or vomiting. · Pain, pressure, or a strange feeling in the back, neck, jaw, or upper belly or in one or both shoulders or arms. · Lightheadedness or sudden weakness. · A fast or irregular heartbeat. If you have symptoms of a heart attack 911 : After you call , the  may tell you to chew 1 adult-strength or 2 to 4 low-dose aspirin. Wait for an ambulance. Do not try to drive yourself. Follow-up care is a key part of your treatment and safety. Be sure to make and go to all appointments, and call your doctor if you are having problems.  It's also a good idea to know your test results and keep a list of the medicines you take. Where can you learn more? Go to [email or web address]://[email or web address] and sign in to your [employer] account. Enter T174 in the [facility] box to learn more about \"Learning About Heart Failure Zones. \"     If you do not have an account, please click on the \"Sign Up Now\" link. Current as of: August 31, 2020               Content Version: 12.9  © 2006-2021 [employer]. Care instructions adapted under license by [facility] ([facility]). If you have questions about a medical condition or this instruction, always ask your healthcare professional. [place] [address] any warranty or liability for your use of this information. Patient Education        Avoiding Triggers With Heart Failure: Care Instructions  Your Care Instructions     Triggers are anything that make your heart failure flare up. A flare-up is also called \"sudden heart failure\" or \"acute heart failure. \" When you have a flare-up, fluid builds up in your lungs, and you have problems breathing. You might need to go to the hospital. By watching for changes in your condition and avoiding triggers, you can prevent heart failure flare-ups. Follow-up care is a key part of your treatment and safety. Be sure to make and go to all appointments, and call your doctor if you are having problems. It's also a good idea to know your test results and keep a list of the medicines you take. How can you care for yourself at home? Watch for changes in your weight and condition  · Weigh yourself without clothing at the same time each day. Record your weight. Call your doctor if you have sudden weight gain, such as more than 2 to 3 pounds in a day or 5 pounds in a week. (Your doctor may suggest a different range of weight gain.) A sudden weight gain may mean that your heart failure is getting worse. · Keep a daily record of your symptoms.  Write down any changes in how you feel, such as new shortness of breath, cough, or problems eating. Also record if your ankles are more swollen than usual and if you feel more tired than usual. Note anything that you ate or did that could have triggered these changes. Limit sodium  Sodium causes your body to hold on to extra water. This may cause your heart failure symptoms to get worse. People get most of their sodium from processed foods. Fast food and restaurant meals also tend to be very high in sodium. · Your doctor may suggest that you limit sodium. Your doctor can tell you how much sodium is right for you. This includes limiting sodium in cooked and packaged foods. · Read food labels on cans and food packages. They tell you how much sodium you get in one serving. Check the serving size. If you eat more than one serving, you are getting more sodium. · Be aware that sodium can come in forms other than salt, including monosodium glutamate (MSG), sodium citrate, and sodium bicarbonate (baking soda). MSG is often added to Asian food. You can sometimes ask for food without MSG or salt. · Slowly reducing salt will help you adjust to the taste. Take the salt shaker off the table. · Flavor your food with garlic, lemon juice, onion, vinegar, herbs, and spices instead of salt. Do not use soy sauce, steak sauce, onion salt, garlic salt, mustard, or ketchup on your food, unless it is labeled \"low-sodium\" or \"low-salt. \"  · Make your own salad dressings, sauces, and ketchup without adding salt. · Use fresh or frozen ingredients, instead of canned ones, whenever you can. Choose low-sodium canned goods. · Eat less processed food and food from restaurants, including fast food. Exercise as directed  Moderate, regular exercise is very good for your heart. It improves your blood flow and helps control your weight. But too much exercise can stress your heart and cause a heart failure flare-up.   · Check with your doctor before you start an exercise program.  · Walking is an easy way to get exercise. Start out slowly. Gradually increase the length and pace of your walk. Swimming, riding a bike, and using a treadmill are also good forms of exercise. · When you exercise, watch for signs that your heart is working too hard. You are pushing yourself too hard if you cannot talk while you are exercising. If you become short of breath or dizzy or have chest pain, stop, sit down, and rest.  · Do not exercise when you do not feel well. Take medicines correctly  · Take your medicines exactly as prescribed. Call your doctor if you think you are having a problem with your medicine. · Make a list of all the medicines you take. Include those prescribed to you by other doctors and any over-the-counter medicines, vitamins, or supplements you take. Take this list with you when you go to any doctor. · Take your medicines at the same time every day. It may help you to post a list of all the medicines you take every day and what time of day you take them. · Make taking your medicine as simple as you can. Plan times to take your medicines when you are doing other things, such as eating a meal or getting ready for bed. This will make it easier to remember to take your medicines. · Get organized. Use helpful tools, such as daily or weekly pill containers. When should you call for help? Call 911  if you have symptoms of sudden heart failure such as:    · You have severe trouble breathing.     · You cough up pink, foamy mucus.     · You have a new irregular or rapid heartbeat. Call your doctor now or seek immediate medical care if:    · You have new or increased shortness of breath.     · You are dizzy or lightheaded, or you feel like you may faint.     · You have sudden weight gain, such as more than 2 to 3 pounds in a day or 5 pounds in a week.  (Your doctor may suggest a different range of weight gain.)     · You have increased swelling in your legs, ankles, or feet.     · You are suddenly so tired or weak that you cannot do your usual activities. Watch closely for changes in your health, and be sure to contact your doctor if you develop new symptoms. Where can you learn more? Go to https://Credit [email or web address].[email or web address] org and sign in to your [employer] account. Enter M240 in the [employer] box to learn more about \"Avoiding Triggers With Heart Failure: Care Instructions. \"     If you do not have an account, please click on the \"Sign Up Now\" link. Current as of: August 31, 2020               Content Version: 12.9  © 9470-3929 [employer]. Care instructions adapted under license by [facility] ([facility]). If you have questions about a medical condition or this instruction, always ask your healthcare professional. [address] any warranty or liability for your use of this information.

## 2021-08-12 NOTE — PROGRESS NOTES
Diagnosis Orders   1. Abnormal heart sounds     2. Essential (primary) hypertension     3. Hypertensive heart disease with heart failure ([facility] [address]       Return in about 3 months (around 11/12/2021) for for routine major medical condition management and ski check. Patient Instructions       Patient Education        Heart Failure: Care Instructions  Your Care Instructions     Heart failure occurs when your heart does not pump as much blood as the body needs. Failure does not mean that the heart has stopped pumping but rather that it is not pumping as well as it should. Over time, this causes fluid buildup in your lungs and other parts of your body. Fluid buildup can cause shortness of breath, fatigue, swollen ankles, and other problems. By taking medicines regularly, reducing sodium (salt) in your diet, checking your weight every day, and making lifestyle changes, you can feel better and live longer. Follow-up care is a key part of your treatment and safety. Be sure to make and go to all appointments, and call your doctor if you are having problems. It's also a good idea to know your test results and keep a list of the medicines you take. How can you care for yourself at home? Medicines    · Be safe with medicines. Take your medicines exactly as prescribed. Call your doctor if you think you are having a problem with your medicine.     · Do not take any vitamins, over-the-counter medicine, or herbal products without talking to your doctor first. Loren Bishops not take ibuprofen (Advil or Motrin) and naproxen (Aleve) without talking to your doctor first. They could make your heart failure worse.     · You may take some of the following medicine. ? Angiotensin-converting enzyme inhibitors (ACEIs) or angiotensin II receptor blockers (ARBs) reduce the heart's workload, lower blood pressure, and reduce swelling. Taking an ACEI or ARB may lower your chance of needing to be hospitalized. ?  Beta-blockers can slow heart rate, decrease condition worse. If you need help quitting, talk to your doctor about stop-smoking programs and medicines. These can increase your chances of quitting for good. Quitting smoking may be the most important step you can take to protect your heart.     · Limit alcohol to 2 drinks a day for men and 1 drink a day for women. Too much alcohol can cause health problems.     · Avoid getting sick from colds and the flu. Get a pneumococcal vaccine shot. If you have had one before, ask your doctor whether you need another dose. Get a flu shot each year. If you must be around people with colds or the flu, wash your hands often. When should you call for help? Call 911  if you have symptoms of sudden heart failure such as:    · You have severe trouble breathing.     · You cough up pink, foamy mucus.     · You have a new irregular or rapid heartbeat. Call your doctor now or seek immediate medical care if:    · You have new or increased shortness of breath.     · You are dizzy or lightheaded, or you feel like you may faint.     · You have sudden weight gain, such as more than 2 to 3 pounds in a day or 5 pounds in a week. (Your doctor may suggest a different range of weight gain.)     · You have increased swelling in your legs, ankles, or feet.     · You are suddenly so tired or weak that you cannot do your usual activities. Watch closely for changes in your health, and be sure to contact your doctor if you develop new symptoms. Where can you learn more? Go to https://VoicendonayeRehabtics.InfiniDB. org and sign in to your [employer] account. Enter U656 in the [facility] box to learn more about \"Heart Failure: Care Instructions. \"     If you do not have an account, please click on the \"Sign Up Now\" link. Current as of: August 31, 2020               Content Version: 12.9  © 1521-7074 Healthwise, Incorporated. Care instructions adapted under license by [facility] ([facility]).  If you have questions about a medical condition or this instruction, always ask your healthcare professional. [place] [address] any warranty or liability for your use of this information. Patient Education        Learning About Heart Failure Zones  What are heart failure zones? Heart failure zones give you an easy way to see changes in your heart failure symptoms. They also tell you when you need to get help. Check every day to see which zone you are in. Green zone. You are doing well. This is where you want to be. · Your weight is stable. It's not going up or down. · You breathe easily. · You are sleeping well. You are able to lie flat without shortness of breath. · You can do your usual activities. Yellow zone. Be careful. Your symptoms are changing. Call your doctor. · You have new or increased shortness of breath. · You are dizzy or lightheaded, or you feel like you may faint. · You have sudden weight gain, such as more than 2 to 3 pounds in a day or 5 pounds in a week. (Your doctor may suggest a different range of weight gain.)  · You have increased swelling in your legs, ankles, or feet. · You are so tired or weak that you can't do your usual activities. · You are not sleeping well. Shortness of breath wakes you up at night. You need extra pillows. Red zone. 911  This is an emergency. Call . You have symptoms of sudden heart failure. For example:  · You have severe trouble breathing. · You cough up pink, foamy mucus. · You have a new irregular or fast heartbeat. You have symptoms of a heart attack. These may include:  · Chest pain or pressure, or a strange feeling in the chest.  · Sweating. · Shortness of breath. · Nausea or vomiting. · Pain, pressure, or a strange feeling in the back, neck, jaw, or upper belly or in one or both shoulders or arms. · Lightheadedness or sudden weakness. · A fast or irregular heartbeat. If you have symptoms of a heart attack 911 :  After you call , the  may tell you to chew 1 adult-strength or 2 to 4 low-dose aspirin. Wait for an ambulance. Do not try to drive yourself. Follow-up care is a key part of your treatment and safety. Be sure to make and go to all appointments, and call your doctor if you are having problems. It's also a good idea to know your test results and keep a list of the medicines you take. Where can you learn more? Go to https://Hermes IQpePlaceVineew"MedDiary, [email or web address].".AdsIt. org and sign in to your [employer] account. Enter T174 in the [employer] box to learn more about \"Learning About Heart Failure Zones. \"     If you do not have an account, please click on the \"Sign Up Now\" link. Current as of: August 31, 2020               Content Version: 12.9  © 2006-2021 Helios. Care instructions adapted under license by [facility] ([facility]). If you have questions about a medical condition or this instruction, always ask your healthcare professional. [place] [address] any warranty or liability for your use of this information. Patient Education        Avoiding Triggers With Heart Failure: Care Instructions  Your Care Instructions     Triggers are anything that make your heart failure flare up. A flare-up is also called \"sudden heart failure\" or \"acute heart failure. \" When you have a flare-up, fluid builds up in your lungs, and you have problems breathing. You might need to go to the hospital. By watching for changes in your condition and avoiding triggers, you can prevent heart failure flare-ups. Follow-up care is a key part of your treatment and safety. Be sure to make and go to all appointments, and call your doctor if you are having problems. It's also a good idea to know your test results and keep a list of the medicines you take. How can you care for yourself at home? Watch for changes in your weight and condition  · Weigh yourself without clothing at the same time each day. Record your weight.  Call your doctor if you have sudden weight gain, such as more than 2 to 3 pounds in a day or 5 pounds in a week. (Your doctor may suggest a different range of weight gain.) A sudden weight gain may mean that your heart failure is getting worse. · Keep a daily record of your symptoms. Write down any changes in how you feel, such as new shortness of breath, cough, or problems eating. Also record if your ankles are more swollen than usual and if you feel more tired than usual. Note anything that you ate or did that could have triggered these changes. Limit sodium  Sodium causes your body to hold on to extra water. This may cause your heart failure symptoms to get worse. People get most of their sodium from processed foods. Fast food and restaurant meals also tend to be very high in sodium. · Your doctor may suggest that you limit sodium. Your doctor can tell you how much sodium is right for you. This includes limiting sodium in cooked and packaged foods. · Read food labels on cans and food packages. They tell you how much sodium you get in one serving. Check the serving size. If you eat more than one serving, you are getting more sodium. · Be aware that sodium can come in forms other than salt, including monosodium glutamate (MSG), sodium citrate, and sodium bicarbonate (baking soda). MSG is often added to Asian food. You can sometimes ask for food without MSG or salt. · Slowly reducing salt will help you adjust to the taste. Take the salt shaker off the table. · Flavor your food with garlic, lemon juice, onion, vinegar, herbs, and spices instead of salt. Do not use soy sauce, steak sauce, onion salt, garlic salt, mustard, or ketchup on your food, unless it is labeled \"low-sodium\" or \"low-salt. \"  · Make your own salad dressings, sauces, and ketchup without adding salt. · Use fresh or frozen ingredients, instead of canned ones, whenever you can. Choose low-sodium canned goods.   · Eat less processed food and food from restaurants, including fast food.  Exercise as directed  Moderate, regular exercise is very good for your heart. It improves your blood flow and helps control your weight. But too much exercise can stress your heart and cause a heart failure flare-up. · Check with your doctor before you start an exercise program.  · Walking is an easy way to get exercise. Start out slowly. Gradually increase the length and pace of your walk. Swimming, riding a bike, and using a treadmill are also good forms of exercise. · When you exercise, watch for signs that your heart is working too hard. You are pushing yourself too hard if you cannot talk while you are exercising. If you become short of breath or dizzy or have chest pain, stop, sit down, and rest.  · Do not exercise when you do not feel well. Take medicines correctly  · Take your medicines exactly as prescribed. Call your doctor if you think you are having a problem with your medicine. · Make a list of all the medicines you take. Include those prescribed to you by other doctors and any over-the-counter medicines, vitamins, or supplements you take. Take this list with you when you go to any doctor. · Take your medicines at the same time every day. It may help you to post a list of all the medicines you take every day and what time of day you take them. · Make taking your medicine as simple as you can. Plan times to take your medicines when you are doing other things, such as eating a meal or getting ready for bed. This will make it easier to remember to take your medicines. · Get organized. Use helpful tools, such as daily or weekly pill containers. When should you call for help? Call 911  if you have symptoms of sudden heart failure such as:    · You have severe trouble breathing.     · You cough up pink, foamy mucus.     · You have a new irregular or rapid heartbeat.    Call your doctor now or seek immediate medical care if:    · You have new or increased shortness of breath.     · You are dizzy or lightheaded, or you feel like you may faint.     · You have sudden weight gain, such as more than 2 to 3 pounds in a day or 5 pounds in a week. (Your doctor may suggest a different range of weight gain.)     · You have increased swelling in your legs, ankles, or feet.     · You are suddenly so tired or weak that you cannot do your usual activities. Watch closely for changes in your health, and be sure to contact your doctor if you develop new symptoms. Where can you learn more? Go to https://FliplingopeOutdoor Water Solutions.Project Talents. org and sign in to your [employer] account. Enter V713 in the [employer] box to learn more about \"Avoiding Triggers With Heart Failure: Care Instructions. \"     If you do not have an account, please click on the \"Sign Up Now\" link. Current as of: August 31, 2020               Content Version: 12.9  © 4290-1397 Solutionary. Care instructions adapted under license by [facility] ([facility]). If you have questions about a medical condition or this instruction, always ask your healthcare professional. [place] [address] any warranty or liability for your use of this information. Subjective:      Patient ID: Bijal Pierre is a 70 y.o. male who presents for:  Chief Complaint   Patient presents with    Discuss Medications     x 1 week follow up        Patient states he feels better than before. He is able to do basic activities around the house without shortness of breath. Carrying a laundry basket around the house is not a problem. Had his 2D echo done we discussed the result. No longer taking extra Lasix.       Current Outpatient Medications on File Prior to Visit   Medication Sig Dispense Refill    testosterone (ANDROGEL; TESTIM) 50 MG/5GM (1%) GEL 1% gel APPLY 1 PACKET ALTERNATING WITH 2 PACKETS DAILY EVERY OTHER  g 0    celecoxib (CELEBREX) 200 MG capsule Take 1 capsule by mouth daily 30 capsule 1    ustekinumab (STELARA) 45 MG/0.5ML SOSY prefilled syringe Inject 45 mg into the skin once      doxazosin (CARDURA) 4 MG tablet Take 1 tablet by mouth daily for 7 days Take 1/2 tab BID 7 tablet 0    terbinafine (LAMISIL) 250 MG tablet Take 1 tablet by mouth daily 84 tablet 0    albuterol sulfate  (90 Base) MCG/ACT inhaler Inhale 2 puffs into the lungs every 6 hours as needed for Wheezing or Shortness of Breath 3 Inhaler 1    furosemide (LASIX) 20 MG tablet Take 1 tablet by mouth daily 30 tablet 2    montelukast (SINGULAIR) 10 MG tablet Take 1 tablet by mouth nightly TAKE 1 TABLET NIGHTLY 90 tablet 3    nystatin-triamcinolone (MYCOLOG II) 355564-3.1 UNIT/GM-% cream Apply topically 2 times daily. 15 g 1    budesonide-formoterol (SYMBICORT) 160-4.5 MCG/ACT AERO Inhale 2 puffs into the lungs 2 times daily 2 each LOT [id number] EXP [id number] 3 Inhaler 1    albuterol (PROVENTIL) (2.5 MG/3ML) 0.083% nebulizer solution Take 3 mLs by nebulization every 6 hours as needed for Wheezing 120 each 3    gabapentin (NEURONTIN) 300 MG capsule TAKE 1 CAPSULE DAILY 90 capsule 3    doxazosin (CARDURA) 4 MG tablet TAKE ONE-HALF (1/2) TABLET TWICE A DAY 90 tablet 3    lisinopril (PRINIVIL;ZESTRIL) 20 MG tablet Take 1 tablet by mouth daily 90 tablet 3    metoprolol tartrate (LOPRESSOR) 50 MG tablet Take 1.5 po bid 15 tablet 0    pravastatin (PRAVACHOL) 40 MG tablet TAKE 1 TABLET NIGHTLY 90 tablet 3    SPIRIVA HANDIHALER 18 MCG inhalation capsule INHALE THE CONTENTS OF 1 CAPSULE DAILY 90 capsule 3    cetirizine (ZYRTEC) 5 MG tablet TAKE ONE TABLET BY MOUTH  PRN      nitroGLYCERIN (NITROSTAT) 0.4 MG SL tablet DISSOLVE 1 TABLET UNDER THE TONGUE EVERY 5 MINUTES AS NEEDED FOR CHEST PAIN.  MAXIMUM OF 3 TABLETS 25 tablet 2    CPAP Machine MISC New cpap supplies mask hose filters etc 1 each 0    Saccharomyces boulardii (PROBIOTIC) 250 MG CAPS Take 1 tablet by mouth daily      esomeprazole Magnesium (NEXIUM) 20 MG PACK Take 20 mg by mouth daily      aspirin 81 MG tablet Take 81 mg by mouth daily.  mesalamine (PENTASA) 250 MG CR capsule Take 500 mg by mouth 4 times daily.  triamcinolone (KENALOG) 0.025 % cream Apply topically 2 times daily. 1 Tube 0    inFLIXimab (REMICADE) 100 MG injection Infuse 5 mg/kg intravenously See Admin Instructions        No current facility-administered medications on file prior to visit. Past Medical History:   Diagnosis Date    A-fib [facility])     Abnormal EMG 12/09/2015    Actinic keratoses     Actinic keratoses     Acute diastolic congestive heart failure ([facility]) 1/6/2021    Allergic rhinitis     Anemia 11/18/2014    Arthritis     Chronic back pain     COPD (chronic obstructive pulmonary disease) ([facility]) 08/09/2012    COPD (chronic obstructive pulmonary disease) ([facility])     Crohns disease     Degenerative disc disease, lumbar     Dermatitis     Diabetes ([facility] [address] 03/19/2015    diet controlled    Gastrointestinal problem     GERD (gastroesophageal reflux disease)     History of atrial fibrillation 2006    Dr. Chary Lee History of throat cancer 2004     cleared for reoccurence years ago    Hyperlipidemia 1/21/2014    Hypertension     Hypogonadism male     Lung disease     Mononeuritis multiplex     Mononeuritis multiplex     Nondependent alcohol abuse, in remission 7/22/2020    Obesity (BMI 30-39. 9) 7/24/2019    Osteoarthritis of lumbar spine     Pain of right heel     Paresthesia of foot, bilateral     Prediabetes 12/3/2014    Restless leg syndrome     Seborrheic dermatitis     Seborrheic keratoses, inflamed     Throat cancer ([facility])     throat, had surgery, sees Dr Glynn Putnam yearly    Tinea cruris     Tinea pedis     Tinea unguium      Past Surgical History:   Procedure Laterality Date    CARDIAC CATHETERIZATION      CARPAL TUNNEL RELEASE      CATARACT REMOVAL WITH IMPLANT Right 09/09/2019    CATARACT REMOVAL WITH IMPLANT Left 07/22/2019    COLON SURGERY      COLONOSCOPY  04/15/2015    KNEE ARTHROSCOPY Father     Arthritis Father     Cancer Sister         lung     Allergies:  Alemtuzumab, Glycopyrrolate-formoterol, Mercaptopurine, and Moxifloxacin    Review of Systems   Constitutional: Negative for activity change, appetite change, diaphoresis and unexpected weight change. Eyes: Negative for photophobia and visual disturbance. Respiratory: Negative for chest tightness and shortness of breath. No orthopnea   Cardiovascular: Negative for chest pain, palpitations and leg swelling. Gastrointestinal: Negative for abdominal distention and abdominal pain. Genitourinary: Negative for flank pain and frequency. Musculoskeletal: Negative for gait problem and joint swelling. Neurological: Negative for dizziness, weakness, light-headedness and headaches. Psychiatric/Behavioral: Negative for confusion. Objective:   /64   Pulse 60   Temp 98.3 °F (36.8 °C)   Ht 5' 11\" (1.803 m)   Wt 256 lb (116.1 kg)   SpO2 97%   BMI 35.70 kg/m²     Physical Exam  Vitals reviewed. Constitutional:       General: He is not in acute distress. Appearance: He is well-developed. HENT:      Head: Normocephalic and atraumatic. Right Ear: External ear normal.      Left Ear: External ear normal.      Nose: Nose normal.   Eyes:      General:         Right eye: No discharge. Left eye: No discharge. Conjunctiva/sclera: Conjunctivae normal.      Pupils: Pupils are equal, round, and reactive to light. Neck:      Thyroid: No thyromegaly. Cardiovascular:      Rate and Rhythm: Normal rate and regular rhythm. Heart sounds: Normal heart sounds. Pulmonary:      Effort: Pulmonary effort is normal. No respiratory distress. Breath sounds: Normal breath sounds. Abdominal:      General: There is no distension. Musculoskeletal:      Cervical back: Neck supple. Skin:     General: Skin is warm and dry.    Neurological:      Mental Status: He is alert and oriented to person, place, and time.      Coordination: Coordination normal.   Psychiatric:         Thought Content: Thought content normal.         Judgment: Judgment normal.         No results found for this visit on 08/12/21.     Recent Results (from the past 2016 hour(s))   Testosterone Free and Total Male    Collection Time: 05/25/21 12:49 PM   Result Value Ref Range    Testosterone Free, Calc 7 (L) 47 - 244 pg/mL    Testosterone % Free 1.3 (L) 1.6 - 2.9 %    Total Testosterone 54 (L) 300 - 720 ng/dL    Sex Hormone Binding 48 11 - 80 nmol/L   Transferrin    Collection Time: 05/25/21 12:49 PM   Result Value Ref Range    Transferrin 271 200 - 400 mg/dL   CBC Auto Differential    Collection Time: 05/25/21 12:49 PM   Result Value Ref Range    WBC 8.6 4.8 - 10.8 K/uL    RBC 4.52 (L) 4.70 - 6.10 M/uL    Hemoglobin 13.1 (L) 14.0 - 18.0 g/dL    Hematocrit 39.1 (L) 42.0 - 52.0 %    MCV 86.5 80.0 - 100.0 fL    MCH 29.0 27.0 - 31.3 pg    MCHC 33.6 33.0 - 37.0 %    RDW 14.1 11.5 - 14.5 %    Platelets 311 722 - 421 K/uL    Neutrophils % 66.8 %    Lymphocytes % 18.4 %    Monocytes % 9.3 %    Eosinophils % 4.9 %    Basophils % 0.6 %    Neutrophils Absolute 5.7 1.4 - 6.5 K/uL    Lymphocytes Absolute 1.6 1.0 - 4.8 K/uL    Monocytes Absolute 0.8 0.2 - 0.8 K/uL    Eosinophils Absolute 0.4 0.0 - 0.7 K/uL    Basophils Absolute 0.0 0.0 - 0.2 K/uL   AST    Collection Time: 05/25/21 12:49 PM   Result Value Ref Range    AST 16 0 - 40 U/L   ALT    Collection Time: 05/25/21 12:49 PM   Result Value Ref Range    ALT 10 0 - 41 U/L   COVID-19    Collection Time: 06/21/21  7:44 AM   Result Value Ref Range    SARS-CoV-2, PCR Not Detected Not Detected   Lipid, Fasting    Collection Time: 07/07/21 12:24 PM   Result Value Ref Range    Cholesterol, Fasting 108 0 - 199 mg/dL    Triglyceride, Fasting 102 0 - 150 mg/dL    HDL 35 (L) 40 - 59 mg/dL    LDL Calculated 53 0 - 129 mg/dL   Microalbumin / Creatinine Urine Ratio    Collection Time: 07/07/21 12:27 PM   Result Value Ref Range Microalbumin, Random Urine 1.30 Not Established mg/dL    Creatinine, Ur 342.2 Not Established mg/dL    Microalbumin Creatinine Ratio 3.8 0.0 - 30.0 mg/G   CBC with Differential    Collection Time: 07/28/21 11:20 PM   Result Value Ref Range    WBC 18.7 (H) 4.4 - 11.3 x10E9/L    RBC 4.26 (L) 4.50 - 5.9 x10E12/L    Hemoglobin 12.3 (L) 13.5 - 17 g/dL    Hematocrit 37.8 (L) 41.0 - 52 %    MCV 89 80 - 100 fL    MCHC 32.5 32.0 - 36 g/dL    Platelets 278 088 - 366 x10E9/L    RDW-CV 13.8 11.5 - 14 %    Neutrophils % 85.4 40.0 - 80 %    Immature Granulocytes % 0.7 0.0 - 0.9 %    Lymphocytes 6.9 13.0 - 44 %    Monocytes % 6.1 2.0 - 10.0 %    Eosinophils % 0.6 0.0 - 6.0 %    Basophils % 0.3 0.0 - 2.0 %    Neutrophils 15.98 (H) 1.60 - 5.5 x10E9/L    Lymphocytes % 1.29 0.80 - 3.0 x10E9/L    Monocytes 1.14 (H) 0.05 - 0.8 x10E9/L    Eosinophils 0.12 0.00 - 0.4 x10E9/L    Basophils 0.06 0.00 - 0.1 x10E9/L   LACTATE, SERUM    Collection Time: 07/28/21 11:20 PM   Result Value Ref Range    Lactate 1.0 0.4 - 2.0 mmol/L   Basic Metabolic Panel    Collection Time: 07/28/21 11:20 PM   Result Value Ref Range    Glucose 106 (H) 74 - 99 mg/dL    Sodium 135 (L) 136 - 145 mmol/L    Potassium 3.7 3.5 - 5.3 mmol/L    Chloride 102 98 - 107 mmol/L    HCO3 24 21 - 32 mmol/L    Anion Gap 13 10 - 20 mmol/L    Urea Nitrogen 28 (H) 6 - 23 mg/dL    CREATININE 0.86 0.50 - 1.3 mg/dL    GFR Non-African American >60 >60 mL/min/1.73m2    GFR African American >60 >60 mL/min/1.73m2    Calcium 8.5 (L) 8.6 - 10.3 mg/dL   Hepatic Function Panel    Collection Time: 07/28/21 11:20 PM   Result Value Ref Range    Albumin 3.7 3.4 - 5.0 g/dL    Total Bilirubin 0.5 0.0 - 1.2 mg/dL    Bilirubin, Direct 0.1 0.0 - 0.3 mg/dL    Alkaline Phosphatase 70 33 - 136 U/L    ALT 10 10 - 52 U/L    AST 15 9 - 39 U/L    Total Protein 6.4 6.4 - 8.2 g/dL   Brain Natriuretic Peptide    Collection Time: 07/28/21 11:20 PM   Result Value Ref Range     (H) 0 - 99 pg/mL   COVID-19 Collection Time: 07/28/21 11:50 PM   Result Value Ref Range    SARS-CoV-2, PCR NOT DETECTED Not Detect    Specimen Source Nasal, Nasopharyngeal    PROTHROMBIN TIME (PT)    Collection Time: 07/29/21  3:25 AM   Result Value Ref Range    Protime 13.2 10.1 - 13 sec    INR 1.1 0.9 - 1.1   VTE Exclusion D-Dimer    Collection Time: 07/29/21  3:25 AM   Result Value Ref Range    VTE Exclusion D-Dimer 1325 (A) < or = 500 ng/mL FEU   S. PNEUMONIAE AG, UR    Collection Time: 07/29/21  5:58 AM   Result Value Ref Range    STREP PNEUMONIAE ANTIGEN, URINE NEGATIVE Negative   Legionella antigen, urine    Collection Time: 07/29/21  5:58 AM   Result Value Ref Range    Legionella Urinary Ag NEGATIVE Negative    Specimen Source     LACTATE, SERUM    Collection Time: 07/29/21  6:13 AM   Result Value Ref Range    Lactate 1.1 0.4 - 2.0 mmol/L   Troponin    Collection Time: 07/29/21  6:14 AM   Result Value Ref Range    Troponin I <0.02 0.00 - 0.0 ng/mL   Procalcitonin    Collection Time: 07/29/21  6:14 AM   Result Value Ref Range    Procalcitonin 1.44 (A) <=0.07 ng/mL   CBC    Collection Time: 07/30/21  6:31 AM   Result Value Ref Range    WBC 17.0 (H) 4.4 - 11.3 x10E9/L    RBC 4.06 (L) 4.50 - 5.9 x10E12/L    Hemoglobin 11.6 (L) 13.5 - 17 g/dL    Hematocrit 37.0 (L) 41.0 - 52 %    MCV 91 80 - 100 fL    MCHC 31.4 (L) 32.0 - 36 g/dL    Platelets 266 873 - 458 x10E9/L    RDW-CV 13.6 11.5 - 14 %   Basic Metabolic Panel    Collection Time: 07/30/21  6:32 AM   Result Value Ref Range    Glucose 198 (H) 74 - 99 mg/dL    Sodium 139 136 - 145 mmol/L    Potassium 4.5 3.5 - 5.3 mmol/L    Chloride 105 98 - 107 mmol/L    HCO3 25 21 - 32 mmol/L    Anion Gap 14 10 - 20 mmol/L    Urea Nitrogen 19 6 - 23 mg/dL    CREATININE 0.77 0.50 - 1.3 mg/dL    GFR Non-African American >60 >60 mL/min/1.73m2    GFR African American >60 >60 mL/min/1.73m2    Calcium 9.0 8.6 - 10.3 mg/dL   CBC    Collection Time: 07/31/21  5:44 AM   Result Value Ref Range    WBC 16.6 (H) 4.4 - 11.3 x10E9/L    RBC 4.10 (L) 4.50 - 5.9 x10E12/L    Hemoglobin 11.8 (L) 13.5 - 17 g/dL    Hematocrit 36.9 (L) 41.0 - 52 %    MCV 90 80 - 100 fL    MCHC 32.0 32.0 - 36 g/dL    Platelets 474 277 - 560 x10E9/L    RDW-CV 13.9 11.5 - 14 %   Basic Metabolic Panel    Collection Time: 07/31/21  5:44 AM   Result Value Ref Range    Glucose 151 (H) 74 - 99 mg/dL    Sodium 141 136 - 145 mmol/L    Potassium 4.2 3.5 - 5.3 mmol/L    Chloride 106 98 - 107 mmol/L    HCO3 25 21 - 32 mmol/L    Anion Gap 14 10 - 20 mmol/L    Urea Nitrogen 22 6 - 23 mg/dL    CREATININE 0.81 0.50 - 1.3 mg/dL    GFR Non-African American >60 >60 mL/min/1.73m2    GFR African American >60 >60 mL/min/1.73m2    Calcium 9.1 8.6 - 10.3 mg/dL   Brain Natriuretic Peptide    Collection Time: 08/05/21  2:44 PM   Result Value Ref Range    Pro- pg/mL   CBC Auto Differential    Collection Time: 08/05/21  2:44 PM   Result Value Ref Range    WBC 11.5 (H) 4.8 - 10.8 K/uL    RBC 4.43 (L) 4.70 - 6.10 M/uL    Hemoglobin 12.6 (L) 14.0 - 18.0 g/dL    Hematocrit 39.1 (L) 42.0 - 52.0 %    MCV 88.3 80.0 - 100.0 fL    MCH 28.5 27.0 - 31.3 pg    MCHC 32.3 (L) 33.0 - 37.0 %    RDW 14.8 (H) 11.5 - 14.5 %    Platelets 026 927 - 644 K/uL    Neutrophils % 79.6 %    Lymphocytes % 15.2 %    Monocytes % 4.7 %    Eosinophils % 0.4 %    Basophils % 0.1 %    Neutrophils Absolute 9.1 (H) 1.4 - 6.5 K/uL    Lymphocytes Absolute 1.7 1.0 - 4.8 K/uL    Monocytes Absolute 0.5 0.2 - 0.8 K/uL    Eosinophils Absolute 0.0 0.0 - 0.7 K/uL    Basophils Absolute 0.0 0.0 - 0.2 K/uL   Comprehensive Metabolic Panel    Collection Time: 08/05/21  2:44 PM   Result Value Ref Range    Sodium 136 135 - 144 mEq/L    Potassium 4.5 3.4 - 4.9 mEq/L    Chloride 97 95 - 107 mEq/L    CO2 31 20 - 31 mEq/L    Anion Gap 8 (L) 9 - 15 mEq/L    Glucose 117 (H) 70 - 99 mg/dL    BUN 19 8 - 23 mg/dL    CREATININE 0.78 0.70 - 1.20 mg/dL    GFR Non-African American >60.0 >60    GFR  >60.0 >60    Calcium 9.0 8.5 - 9.9 mg/dL    Total Protein 6.5 6.3 - 8.0 g/dL    Albumin 3.8 3.5 - 4.6 g/dL    Total Bilirubin 0.4 0.2 - 0.7 mg/dL    Alkaline Phosphatase 75 35 - 104 U/L    ALT 13 0 - 41 U/L    AST 12 0 - 40 U/L    Globulin 2.7 2.3 - 3.5 g/dL           Assessment:       Diagnosis Orders   1. Abnormal heart sounds     2. Essential (primary) hypertension     3. Hypertensive heart disease with heart failure ([facility] [address]           No orders of the defined types were placed in this encounter. Plan:   Return in about 3 months (around 11/12/2021) for for routine major medical condition management and ski check. Patient Instructions       Patient Education        Heart Failure: Care Instructions  Your Care Instructions     Heart failure occurs when your heart does not pump as much blood as the body needs. Failure does not mean that the heart has stopped pumping but rather that it is not pumping as well as it should. Over time, this causes fluid buildup in your lungs and other parts of your body. Fluid buildup can cause shortness of breath, fatigue, swollen ankles, and other problems. By taking medicines regularly, reducing sodium (salt) in your diet, checking your weight every day, and making lifestyle changes, you can feel better and live longer. Follow-up care is a key part of your treatment and safety. Be sure to make and go to all appointments, and call your doctor if you are having problems. It's also a good idea to know your test results and keep a list of the medicines you take. How can you care for yourself at home? Medicines    · Be safe with medicines. Take your medicines exactly as prescribed.  Call your doctor if you think you are having a problem with your medicine.     · Do not take any vitamins, over-the-counter medicine, or herbal products without talking to your doctor first. Kathy Kubas not take ibuprofen (Advil or Motrin) and naproxen (Aleve) without talking to your doctor first. They could make your heart failure worse.     · You may take some of the following medicine. ? Angiotensin-converting enzyme inhibitors (ACEIs) or angiotensin II receptor blockers (ARBs) reduce the heart's workload, lower blood pressure, and reduce swelling. Taking an ACEI or ARB may lower your chance of needing to be hospitalized. ? Beta-blockers can slow heart rate, decrease blood pressure, and improve your condition. Taking a beta-blocker may lower your chance of needing to be hospitalized. ? Diuretics, also called water pills, reduce swelling. You will get more details on the specific medicines your doctor prescribes. Diet    · Your doctor may suggest that you limit sodium. Your doctor can tell you how much sodium is right for you. An example is less than 3,000 mg a day. This includes all the salt you eat in cooking or in packaged foods. People get most of their sodium from processed foods. Fast food and restaurant meals also tend to be very high in sodium.     · Ask your doctor how much liquid you can drink each day. You may have to limit liquids. Weight    · Weigh yourself without clothing at the same time each day. Record your weight. Call your doctor if you have a sudden weight gain, such as more than 2 to 3 pounds in a day or 5 pounds in a week. (Your doctor may suggest a different range of weight gain.) A sudden weight gain may mean that your heart failure is getting worse. Activity level    · Start light exercise (if your doctor says it is okay). Even if you can only do a small amount, exercise will help you get stronger, have more energy, and manage your weight and your stress. Walking is an easy way to get exercise. Start out by walking a little more than you did before. Bit by bit, increase the amount you walk.     · When you exercise, watch for signs that your heart is working too hard. You are pushing yourself too hard if you cannot talk while you are exercising.  If you become short of breath or dizzy or have chest pain, stop, sit down, and rest.     · If you feel \"wiped out\" the day after you exercise, walk slower or for a shorter distance until you can work up to a better pace.     · Get enough rest at night. Sleeping with 1 or 2 pillows under your upper body and head may help you breathe easier. Lifestyle changes    · Do not smoke. Smoking can make a heart condition worse. If you need help quitting, talk to your doctor about stop-smoking programs and medicines. These can increase your chances of quitting for good. Quitting smoking may be the most important step you can take to protect your heart.     · Limit alcohol to 2 drinks a day for men and 1 drink a day for women. Too much alcohol can cause health problems.     · Avoid getting sick from colds and the flu. Get a pneumococcal vaccine shot. If you have had one before, ask your doctor whether you need another dose. Get a flu shot each year. If you must be around people with colds or the flu, wash your hands often. When should you call for help? Call 911  if you have symptoms of sudden heart failure such as:    · You have severe trouble breathing.     · You cough up pink, foamy mucus.     · You have a new irregular or rapid heartbeat. Call your doctor now or seek immediate medical care if:    · You have new or increased shortness of breath.     · You are dizzy or lightheaded, or you feel like you may faint.     · You have sudden weight gain, such as more than 2 to 3 pounds in a day or 5 pounds in a week. (Your doctor may suggest a different range of weight gain.)     · You have increased swelling in your legs, ankles, or feet.     · You are suddenly so tired or weak that you cannot do your usual activities. Watch closely for changes in your health, and be sure to contact your doctor if you develop new symptoms. Where can you learn more? Go to https://sunshine.Picatcha. org and sign in to your Buzz360 account.  Enter E424 in the [employer]ata box to learn more about \"Heart Failure: Care Instructions. \"     If you do not have an account, please click on the \"Sign Up Now\" link. Current as of: August 31, 2020               Content Version: 12.9  © 2006-2021 [employer]. Care instructions adapted under license by [facility] ([facility]). If you have questions about a medical condition or this instruction, always ask your healthcare professional. [place] [address] any warranty or liability for your use of this information. Patient Education        Learning About Heart Failure Zones  What are heart failure zones? Heart failure zones give you an easy way to see changes in your heart failure symptoms. They also tell you when you need to get help. Check every day to see which zone you are in. Green zone. You are doing well. This is where you want to be. · Your weight is stable. It's not going up or down. · You breathe easily. · You are sleeping well. You are able to lie flat without shortness of breath. · You can do your usual activities. Yellow zone. Be careful. Your symptoms are changing. Call your doctor. · You have new or increased shortness of breath. · You are dizzy or lightheaded, or you feel like you may faint. · You have sudden weight gain, such as more than 2 to 3 pounds in a day or 5 pounds in a week. (Your doctor may suggest a different range of weight gain.)  · You have increased swelling in your legs, ankles, or feet. · You are so tired or weak that you can't do your usual activities. · You are not sleeping well. Shortness of breath wakes you up at night. You need extra pillows. Red zone. 911  This is an emergency. Call . You have symptoms of sudden heart failure. For example:  · You have severe trouble breathing. · You cough up pink, foamy mucus. · You have a new irregular or fast heartbeat. You have symptoms of a heart attack.  These may include:  · Chest pain or pressure, or a strange feeling in the chest.  · Sweating. · Shortness of breath. · Nausea or vomiting. · Pain, pressure, or a strange feeling in the back, neck, jaw, or upper belly or in one or both shoulders or arms. · Lightheadedness or sudden weakness. · A fast or irregular heartbeat. If you have symptoms of a heart attack 911 : After you call , the  may tell you to chew 1 adult-strength or 2 to 4 low-dose aspirin. Wait for an ambulance. Do not try to drive yourself. Follow-up care is a key part of your treatment and safety. Be sure to make and go to all appointments, and call your doctor if you are having problems. It's also a good idea to know your test results and keep a list of the medicines you take. Where can you learn more? Go to https://KeVitapeFiber Options.GreenLink Networks. org and sign in to your Pressable account. Enter T174 in the [employer] box to learn more about \"Learning About Heart Failure Zones. \"     If you do not have an account, please click on the \"Sign Up Now\" link. Current as of: August 31, 2020               Content Version: 12.9  © 4536-8117 [employer]. Care instructions adapted under license by [employer] [address] If you have questions about a medical condition or this instruction, always ask your healthcare professional. [place] [address] any warranty or liability for your use of this information. Patient Education        Avoiding Triggers With Heart Failure: Care Instructions  Your Care Instructions     Triggers are anything that make your heart failure flare up. A flare-up is also called \"sudden heart failure\" or \"acute heart failure. \" When you have a flare-up, fluid builds up in your lungs, and you have problems breathing. You might need to go to the hospital. By watching for changes in your condition and avoiding triggers, you can prevent heart failure flare-ups. Follow-up care is a key part of your treatment and safety.  Be sure to make and go to all appointments, and call your doctor if you are having problems. It's also a good idea to know your test results and keep a list of the medicines you take. How can you care for yourself at home? Watch for changes in your weight and condition  · Weigh yourself without clothing at the same time each day. Record your weight. Call your doctor if you have sudden weight gain, such as more than 2 to 3 pounds in a day or 5 pounds in a week. (Your doctor may suggest a different range of weight gain.) A sudden weight gain may mean that your heart failure is getting worse. · Keep a daily record of your symptoms. Write down any changes in how you feel, such as new shortness of breath, cough, or problems eating. Also record if your ankles are more swollen than usual and if you feel more tired than usual. Note anything that you ate or did that could have triggered these changes. Limit sodium  Sodium causes your body to hold on to extra water. This may cause your heart failure symptoms to get worse. People get most of their sodium from processed foods. Fast food and restaurant meals also tend to be very high in sodium. · Your doctor may suggest that you limit sodium. Your doctor can tell you how much sodium is right for you. This includes limiting sodium in cooked and packaged foods. · Read food labels on cans and food packages. They tell you how much sodium you get in one serving. Check the serving size. If you eat more than one serving, you are getting more sodium. · Be aware that sodium can come in forms other than salt, including monosodium glutamate (MSG), sodium citrate, and sodium bicarbonate (baking soda). MSG is often added to Asian food. You can sometimes ask for food without MSG or salt. · Slowly reducing salt will help you adjust to the taste. Take the salt shaker off the table. · Flavor your food with garlic, lemon juice, onion, vinegar, herbs, and spices instead of salt.  Do not use soy sauce, steak sauce, onion salt, garlic salt, mustard, or ketchup on your food, unless it is labeled \"low-sodium\" or \"low-salt. \"  · Make your own salad dressings, sauces, and ketchup without adding salt. · Use fresh or frozen ingredients, instead of canned ones, whenever you can. Choose low-sodium canned goods. · Eat less processed food and food from restaurants, including fast food. Exercise as directed  Moderate, regular exercise is very good for your heart. It improves your blood flow and helps control your weight. But too much exercise can stress your heart and cause a heart failure flare-up. · Check with your doctor before you start an exercise program.  · Walking is an easy way to get exercise. Start out slowly. Gradually increase the length and pace of your walk. Swimming, riding a bike, and using a treadmill are also good forms of exercise. · When you exercise, watch for signs that your heart is working too hard. You are pushing yourself too hard if you cannot talk while you are exercising. If you become short of breath or dizzy or have chest pain, stop, sit down, and rest.  · Do not exercise when you do not feel well. Take medicines correctly  · Take your medicines exactly as prescribed. Call your doctor if you think you are having a problem with your medicine. · Make a list of all the medicines you take. Include those prescribed to you by other doctors and any over-the-counter medicines, vitamins, or supplements you take. Take this list with you when you go to any doctor. · Take your medicines at the same time every day. It may help you to post a list of all the medicines you take every day and what time of day you take them. · Make taking your medicine as simple as you can. Plan times to take your medicines when you are doing other things, such as eating a meal or getting ready for bed. This will make it easier to remember to take your medicines. · Get organized. Use helpful tools, such as daily or weekly pill containers.   When should you call for help? Call 911  if you have symptoms of sudden heart failure such as:    · You have severe trouble breathing.     · You cough up pink, foamy mucus.     · You have a new irregular or rapid heartbeat. Call your doctor now or seek immediate medical care if:    · You have new or increased shortness of breath.     · You are dizzy or lightheaded, or you feel like you may faint.     · You have sudden weight gain, such as more than 2 to 3 pounds in a day or 5 pounds in a week. (Your doctor may suggest a different range of weight gain.)     · You have increased swelling in your legs, ankles, or feet.     · You are suddenly so tired or weak that you cannot do your usual activities. Watch closely for changes in your health, and be sure to contact your doctor if you develop new symptoms. Where can you learn more? Go to https://EnergySavvy.com.Sold. org and sign in to your [employer] account. Enter R686 in the Popps Apps box to learn more about \"Avoiding Triggers With Heart Failure: Care Instructions. \"     If you do not have an account, please click on the \"Sign Up Now\" link. Current as of: August 31, 2020               Content Version: 12.9  © 2006-2021 [employer]. Care instructions adapted under license by [facility] ([facility]). If you have questions about a medical condition or this instruction, always ask your healthcare professional. [place] [address] any warranty or liability for your use of this information. This note was partially created with the assistance of dictation. This may lead to grammatical or spelling errors. Sher Garcia M.D.

## 2021-08-30 ENCOUNTER — OFFICE VISIT (OUTPATIENT)
Dept: PULMONOLOGY | Age: 71
End: 2021-08-30
Payer: MEDICARE

## 2021-08-30 VITALS
HEIGHT: 71 IN | HEART RATE: 74 BPM | DIASTOLIC BLOOD PRESSURE: 77 MMHG | OXYGEN SATURATION: 98 % | BODY MASS INDEX: 35.98 KG/M2 | SYSTOLIC BLOOD PRESSURE: 136 MMHG | WEIGHT: 257 LBS | TEMPERATURE: 98 F

## 2021-08-30 DIAGNOSIS — Z99.89 OSA ON CPAP: ICD-10-CM

## 2021-08-30 DIAGNOSIS — J18.9 COMMUNITY ACQUIRED PNEUMONIA, UNSPECIFIED LATERALITY: ICD-10-CM

## 2021-08-30 DIAGNOSIS — G47.33 OSA ON CPAP: ICD-10-CM

## 2021-08-30 DIAGNOSIS — E66.9 OBESITY (BMI 30-39.9): ICD-10-CM

## 2021-08-30 DIAGNOSIS — J44.9 CHRONIC OBSTRUCTIVE PULMONARY DISEASE, UNSPECIFIED COPD TYPE (HCC): Primary | ICD-10-CM

## 2021-08-30 PROCEDURE — 99214 OFFICE O/P EST MOD 30 MIN: CPT | Performed by: INTERNAL MEDICINE

## 2021-08-30 RX ORDER — ALBUTEROL SULFATE 90 UG/1
2 AEROSOL, METERED RESPIRATORY (INHALATION) EVERY 6 HOURS PRN
Qty: 1 INHALER | Refills: 3 | Status: SHIPPED | OUTPATIENT
Start: 2021-08-30 | End: 2022-04-13

## 2021-08-30 ASSESSMENT — ENCOUNTER SYMPTOMS
CHEST TIGHTNESS: 0
VOICE CHANGE: 0
RHINORRHEA: 0
NAUSEA: 0
COUGH: 0
ABDOMINAL PAIN: 0
VOMITING: 0
SORE THROAT: 0
DIARRHEA: 0
WHEEZING: 1
SHORTNESS OF BREATH: 1
EYE ITCHING: 0

## 2021-08-30 NOTE — PROGRESS NOTES
Subjective:     Fernanda Bonilla is a 70 y.o. male who complains today of:     Chief Complaint   Patient presents with    Sleep Apnea     4 month f/u    COPD       HPI  He is using CPAP with  14  centimeters of H2O with heated humidity. He is using CPAP for about   8  hours every night. He is using CPAP with  Nasal pillow Mask. He said  sleep is restful with the CPAP use. He is compliant with CPAP therapy and benefiting with CPAP use. No snoring with CPAP use. No complaint of daytime sleepiness or tiredness with CPAP use. He denies taking naps. No sleepiness with driving. He denies difficulty falling asleep or staying asleep. I reviewed compliance report with patient regarding CPAP therapy. He is using  CPAP for 30 days out of 30 days  Average usage of days used is  7  hours and 42 min , average AHI 1.0  with CPAP use. He is using symbicort and spiriva, albuterol HFA prn   C/o shortness of breath  with exertion. Occasional Wheezing. No Cough  No Hemoptysis. No Chest tightness. No Chest pain with radiation  or pleuritic pain. No  leg edema. No orthopnea. No Fever or chills. No Rhinorrhea and postnasal drip. He was at [facility] for shortness of breath , he was told he has pneumonia and treated . No record available at this time.           Allergies:  Alemtuzumab, Glycopyrrolate-formoterol, Mercaptopurine, and Moxifloxacin  Past Medical History:   Diagnosis Date    [facility] [address]     Abnormal EMG 12/09/2015    Actinic keratoses     Actinic keratoses     Acute diastolic congestive heart failure (HCC) 1/6/2021    Allergic rhinitis     Anemia 11/18/2014    Arthritis     Chronic back pain     COPD [facility]ch[facility] obstructive pulmonary disease) ([facility]) 08/09/2012    COPD [facility]ch[facility] obstructive pulmonary disease) ([facility]     Crohns disease     Degenerative disc disease, lumbar     Dermatitis     Diabetes ([facility] [address] 03/19/2015    diet controlled    Gastrointestinal problem     GERD (gastroesophageal file   Other Topics Concern    Not on file   Social History Narrative    Lives alone. Social Determinants of Health     Financial Resource Strain: Low Risk     Difficulty of Paying Living Expenses: Not hard at all   Food Insecurity: No Food Insecurity    Worried About Running Out of Food in the Last Year: Never true    Dacia of Food in the Last Year: Never true   Transportation Needs: No Transportation Needs    Lack of Transportation (Medical): No    Lack of Transportation (Non-Medical): No   Physical Activity:     Days of Exercise per Week:     Minutes of Exercise per Session:    Stress:     Feeling of Stress :    Social Connections:     Frequency of Communication with Friends and Family:     Frequency of Social Gatherings with Friends and Family:     Attends [employer] Services:     Active Member of Clubs or Organizations:     Attends Club or Organization Meetings:     Marital Status:    Intimate Partner Violence:     Fear of Current or Ex-Partner:     Emotionally Abused:     Physically Abused:     Sexually Abused:          Review of Systems   Constitutional: Negative for chills, diaphoresis, fatigue and fever. HENT: Negative for congestion, mouth sores, nosebleeds, postnasal drip, rhinorrhea, sneezing, sore throat and voice change. Eyes: Negative for itching and visual disturbance. Respiratory: Positive for shortness of breath and wheezing. Negative for cough and chest tightness. Cardiovascular: Positive for leg swelling. Negative for chest pain and palpitations. Gastrointestinal: Negative for abdominal pain, diarrhea, nausea and vomiting. Genitourinary: Negative for difficulty urinating and hematuria. Musculoskeletal: Negative for arthralgias, joint swelling and myalgias. Skin: Negative for rash. Allergic/Immunologic: Negative for environmental allergies. Neurological: Negative for dizziness, tremors, weakness and headaches.    Psychiatric/Behavioral: Negative for behavioral problems and sleep disturbance.         :     Vitals:    08/30/21 1433   BP: 136/77   Pulse: 74   Temp: 98 °F (36.7 °C)   SpO2: 98%   Weight: 257 lb (116.6 kg)   Height: 5' 11\" (1.803 m)     Wt Readings from Last 3 Encounters:   08/30/21 257 lb (116.6 kg)   08/12/21 256 lb (116.1 kg)   08/05/21 255 lb (115.7 kg)         Physical Exam  Constitutional:       Appearance: He is well-developed. He is obese. HENT:      Head: Normocephalic and atraumatic. Nose: Nose normal.   Eyes:      Conjunctiva/sclera: Conjunctivae normal.      Pupils: Pupils are equal, round, and reactive to light. Neck:      Thyroid: No thyromegaly. Vascular: No JVD. Trachea: No tracheal deviation. Cardiovascular:      Rate and Rhythm: Normal rate and regular rhythm. Heart sounds: No murmur heard. No friction rub. No gallop. Pulmonary:      Effort: Pulmonary effort is normal. No respiratory distress. Breath sounds: Normal breath sounds. No wheezing or rales. Chest:      Chest wall: No tenderness. Abdominal:      General: There is no distension. Musculoskeletal:         General: Normal range of motion. Right lower leg: Edema present. Left lower leg: Edema present. Lymphadenopathy:      Cervical: No cervical adenopathy. Skin:     General: Skin is warm and dry. Findings: No rash. Neurological:      Mental Status: He is alert and oriented to person, place, and time. Cranial Nerves: No cranial nerve deficit.    Psychiatric:         Behavior: Behavior normal.         Current Outpatient Medications   Medication Sig Dispense Refill    albuterol sulfate HFA (PROAIR HFA) 108 (90 Base) MCG/ACT inhaler Inhale 2 puffs into the lungs every 6 hours as needed for Wheezing 1 Inhaler 3    testosterone (ANDROGEL; TESTIM) 50 MG/5GM (1%) GEL 1% gel APPLY 1 PACKET ALTERNATING WITH 2 PACKETS DAILY EVERY OTHER  g 0    ustekinumab (STELARA) 45 MG/0.5ML SOSY prefilled syringe Inject 45 mg (CARDURA) 4 MG tablet Take 1 tablet by mouth daily for 7 days Take 1/2 tab BID 7 tablet 0    triamcinolone (KENALOG) 0.025 % cream Apply topically 2 times daily. 1 Tube 0    inFLIXimab (REMICADE) 100 MG injection Infuse 5 mg/kg intravenously See Admin Instructions        No current facility-administered medications for this visit. Results for orders placed during the hospital encounter of 04/12/21    XR CHEST (2 VW)    Narrative  EXAMINATION: XR CHEST (2 VW)    CLINICAL HISTORY: COPD    COMPARISONS: FEBRUARY 21, 2020. FINDINGS: Osseous structures are intact. Cardiopericardial silhouette is normal. Pulmonary vasculature is normal. Lungs are clear and hyperinflated. Impression  NO ACUTE CARDIOPULMONARY DISEASE. EMPHYSEMA. Results for orders placed in visit on 02/21/20    XR CHEST STANDARD (2 VW)    Narrative  EXAMINATION: XR CHEST (2 VW)    DATE AND TIME:2/21/2020 11:08 AM    CLINICAL HISTORY: Shortness of breath  J44.1 COPD with exacerbation ([address] ICD10    COMPARISONS: November 7, 2019    FINDINGS: Allowing for inspiratory effort lungs are clear of any fresh infiltrate. No overt signs of pulmonary edema. No effusion. No pneumothorax. Bones intact. Impression  NO ACTIVE LUNG DISEASE. Results for orders placed in visit on 11/07/19    XR CHEST STANDARD (2 VW)    Narrative  EXAMINATION: CHEST X-RAY, PA AND LATERAL    CLINICAL HISTORY: HX OF COPD, PATIENT WITH EXERTIONAL DYSPNEA AND RESPIRATORY WHEEZING    COMPARISONS: 9/23/2019    FINDINGS: The heart is not enlarged. There is atherosclerotic calcification of the aortic arch. Lungs appear expanded and clear. There are no pulmonary infiltrates or pleural effusions. There is no pneumothorax. There is degenerative bone spurring  throughout the spine.  No radiographic evidence of active disease in the chest.    Impression  NO RADIOGRAPHIC EVIDENCE OF ACTIVE DISEASE IN THE CHEST AND NO SIGNIFICANT INTERVAL CHANGE SINCE THE 9/23/2019 PORTABLE CHEST X-RAY.  ]  Results for orders placed during the hospital encounter of 19    XR CHEST PORTABLE    Narrative  Patient MRN: [id number]    : 1950    Age:  71 years    Gender: Male    Order Date: 2019 4:45 AM.    Exam: XR CHEST PORTABLE    Number of Views: 1    Indication:   Chest Pain, rapid heart beat per pt. Comparison: 3/15/2019    Findings: Stable, enlarged cardiomediastinal silhouette with thoracic aortic vascular calcifications. No pneumonia, pneumothorax or pleural effusion. Impression  Impression:  Stable, enlarged cardiomediastinal silhouette with thoracic aortic vascular calcifications      Results for orders placed during the hospital encounter of 03/15/19    XR CHEST PORTABLE    Narrative  Portable chest radiograph    History: Palpitations    Technique: AP portable view of the chest obtained. Comparison: Chest radiograph from 2019    Findings:    Atherosclerotic calcification of the thoracic aorta. The cardiomediastinal silhouette is within normal limits. No pneumothorax, pleural effusion, or focal consolidation. Degenerative changes of the thoracic spine. Impression  No acute intrathoracic process. Assessment/Plan:     1. Chronic obstructive pulmonary disease, unspecified COPD type ([address]  He is using symbicort and spiriva, albuterol HFA prn C/o shortness of breath  with exertion. Occasional Wheezing. No Cough. He was at [facility] for shortness of breath , he was told he has pneumonia and treated . No record available at this time. Continue bronchodilator therapy as before. He said he will get records from [facility] regarding pneumonia    2. JARRELL on CPAP  He is using CPAP with  14  centimeters of H2O with heated humidity. He is using CPAP for about   8  hours every night. He is using CPAP with  Nasal pillow Mask. He said  sleep is restful with the CPAP use. He is compliant with CPAP therapy and benefiting with CPAP use. No snoring with CPAP use.      I reviewed compliance report with patient regarding CPAP therapy. He is using  CPAP for 30 days out of 30 days  Average usage of days used is  7  hours and 42 min , average AHI 1.0  with CPAP use. 3. Obesity (BMI 30-39. 9)  He is advised try to lose weight. obesity related risk explained to the patient ,  Current weight:  257 lb (116.6 kg) Lbs. BMI:  Body mass index is 35.84 kg/m². Suggested weight control approaches, including dietary changes , exercise, behavioral modification. Return in about 4 months (around 12/30/2021) for rhona, COPD.       Nyla Lorenz MD

## 2021-09-10 DIAGNOSIS — J30.2 CHRONIC SEASONAL ALLERGIC RHINITIS: ICD-10-CM

## 2021-09-10 DIAGNOSIS — J41.0 SIMPLE CHRONIC BRONCHITIS (HCC): ICD-10-CM

## 2021-09-10 DIAGNOSIS — E29.1 HYPOGONADISM MALE: ICD-10-CM

## 2021-09-10 RX ORDER — TESTOSTERONE GEL, 1% 10 MG/G
GEL TRANSDERMAL
Qty: 675 G | Refills: 0 | Status: CANCELLED | OUTPATIENT
Start: 2021-09-10 | End: 2021-12-11

## 2021-09-10 NOTE — TELEPHONE ENCOUNTER
Future Appointments     Provider   11/29/2021 Murray Alberts MD   Department: [facility] Primary Care   Appt Notes: six month follow up skin check   1/5/2022 Nyla Lorenz MD   Department: [facility] Pulmonology   Appt Notes: 4M F/U   3/24/2022 Murray Alberts MD   Department: [facility] Primary Care   Appt Notes: AWV   Recent Visits    08/30/2021 Chronic obstructive pulmonary disease, unspecified COPD type [facility])   [address] MD   08/12/2021 Abnormal heart sounds   [facility] Murray Alberts MD   08/05/2021 Gallop rhythm   [facility] Murray Alberts MD

## 2021-09-13 RX ORDER — MONTELUKAST SODIUM 10 MG/1
TABLET ORAL
Qty: 90 TABLET | Refills: 3 | Status: SHIPPED | OUTPATIENT
Start: 2021-09-13 | End: 2022-01-12 | Stop reason: SDUPTHER

## 2021-10-01 ENCOUNTER — VIRTUAL VISIT (OUTPATIENT)
Dept: FAMILY MEDICINE CLINIC | Age: 71
End: 2021-10-01
Payer: MEDICARE

## 2021-10-01 ENCOUNTER — NURSE ONLY (OUTPATIENT)
Dept: FAMILY MEDICINE CLINIC | Age: 71
End: 2021-10-01

## 2021-10-01 DIAGNOSIS — Z20.822 ENCOUNTER FOR LABORATORY TESTING FOR COVID-19 VIRUS: ICD-10-CM

## 2021-10-01 DIAGNOSIS — Z20.822 CLOSE EXPOSURE TO COVID-19 VIRUS: Primary | ICD-10-CM

## 2021-10-01 PROCEDURE — 99213 OFFICE O/P EST LOW 20 MIN: CPT | Performed by: NURSE PRACTITIONER

## 2021-10-01 RX ORDER — CYCLOSPORINE 0.5 MG/ML
1 EMULSION OPHTHALMIC DAILY
COMMUNITY
Start: 2021-08-23

## 2021-10-01 RX ORDER — MOMETASONE FUROATE 1 MG/G
CREAM TOPICAL
COMMUNITY
Start: 2021-09-08 | End: 2022-02-16 | Stop reason: DRUGHIGH

## 2021-10-01 ASSESSMENT — ENCOUNTER SYMPTOMS
ABDOMINAL PAIN: 0
SORE THROAT: 0
NAUSEA: 0
CHEST TIGHTNESS: 0
RHINORRHEA: 0
DIARRHEA: 1

## 2021-10-01 NOTE — PROGRESS NOTES
Review of Systems   Constitutional: Negative for activity change, appetite change, chills, diaphoresis and fatigue. Fever: unsure. not taking temp. HENT: Negative for congestion, ear pain, rhinorrhea and sore throat. Respiratory: Negative for chest tightness. Cough: chronic. COPD. Shortness of breath: chronic. COPD. Cardiovascular: Negative for chest pain and palpitations. Gastrointestinal: Positive for diarrhea (yesterday ). Negative for abdominal pain and nausea. Musculoskeletal: Negative for myalgias. Neurological: Negative for dizziness, light-headedness and headaches. Psychiatric/Behavioral: Negative for sleep disturbance. Prior to Visit Medications    Medication Sig Taking? Authorizing Provider   RESTASIS 0.05 % ophthalmic emulsion  Yes Historical Provider, MD   montelukast (SINGULAIR) 10 MG tablet TAKE 1 TABLET NIGHTLY Yes Paola Mathew MD   albuterol sulfate HFA (PROAIR HFA) 108 (90 Base) MCG/ACT inhaler Inhale 2 puffs into the lungs every 6 hours as needed for Wheezing Yes Willy Thomas MD   testosterone (ANDROGEL; TESTIM) 50 MG/5GM (1%) GEL 1% gel APPLY 1 PACKET ALTERNATING WITH 2 PACKETS DAILY EVERY OTHER DAY Yes Paola Mathew MD   ustekinumab (STELARA) 45 MG/0.5ML SOSY prefilled syringe Inject 45 mg into the skin once Yes Historical Provider, MD   albuterol sulfate  (90 Base) MCG/ACT inhaler Inhale 2 puffs into the lungs every 6 hours as needed for Wheezing or Shortness of Breath Yes Paola Mathew MD   furosemide (LASIX) 20 MG tablet Take 1 tablet by mouth daily Yes Paola Mathew MD   nystatin-triamcinolone [facility]) 743566-2.1 UNIT/GM-% cream Apply topically 2 times daily.  Yes Paola Mathew MD   budesonide-formoterol [facility]) 160-4.5 MCG/ACT AERO Inhale 2 puffs into the lungs 2 times daily 2 each LOT [id number] [id number] Yes Willy Thomas MD   albuterol (PROVENTIL) (2.5 MG/3ML) 0.083% nebulizer solution Take 3 mLs by nebulization every 6 hours as needed for Wheezing Yes Eloy Siddiqui MD   gabapentin (NEURONTIN) 300 MG capsule TAKE 1 CAPSULE DAILY Yes Sandra Neal MD   doxazosin (CARDURA) 4 MG tablet TAKE ONE-HALF (1/2) TABLET TWICE A DAY Yes Sandra Neal MD   lisinopril (PRINIVIL;ZESTRIL) 20 MG tablet Take 1 tablet by mouth daily Yes Sandra Neal MD   metoprolol tartrate (LOPRESSOR) 50 MG tablet Take 1.5 po bid Yes Louisa Saenz DO   pravastatin (PRAVACHOL) 40 MG tablet TAKE 1 TABLET NIGHTLY Yes Sandra Neal MD   SPIRIVA HANDIHALER 18 MCG inhalation capsule INHALE THE CONTENTS OF 1 CAPSULE DAILY Yes Eloy Siddiqui MD   cetirizine (ZYRTEC) 5 MG tablet TAKE ONE TABLET BY MOUTH  PRN Yes Historical Provider, MD   nitroGLYCERIN (NITROSTAT) 0.4 MG SL tablet DISSOLVE 1 TABLET UNDER THE TONGUE EVERY 5 MINUTES AS NEEDED FOR CHEST PAIN. MAXIMUM OF 3 TABLETS Yes Sandra Neal MD   CPAP Machine MISC New cpap supplies mask hose filters etc Yes Eloy Siddiqui MD   Saccharomyces boulardii (PROBIOTIC) 250 MG CAPS Take 1 tablet by mouth daily Yes Historical Provider, MD   esomeprazole Magnesium (NEXIUM) 20 MG PACK Take 20 mg by mouth daily Yes Historical Provider, MD   aspirin 81 MG tablet Take 81 mg by mouth daily. Yes Historical Provider, MD   mesalamine (PENTASA) 250 MG CR capsule Take 500 mg by mouth 4 times daily. Yes Historical Provider, MD   mometasone (ELOCON) 0.1 % cream APPLY to [address] Provider, MD   celecoxib (CELEBREX) 200 MG capsule Take 1 capsule by mouth daily  Karsten Garcia MD   triamcinolone (KENALOG) 0.025 % cream Apply topically 2 times daily.   Leeanne Lazo, APRN - CNP   inFLIXimab (REMICADE) 100 MG injection Infuse 5 mg/kg intravenously See Admin Instructions   Historical Provider, MD       Past Medical History:   Diagnosis Date    A-fib [facility])     Abnormal EMG 12/09/2015    Actinic keratoses     Actinic keratoses     Acute diastolic congestive heart failure ([facility] [address] 1/6/2021    Allergic rhinitis     Anemia 11/18/2014    Arthritis     Chronic back pain     COPD [facility]ch[facility] obstructive [facility] disease) [facility] 08/09/2012    COPD (chronic obstructive pulmonary disease) ([facility]     Crohns disease     Degenerative disc disease, lumbar     Dermatitis     Diabetes ([facility] [address] 03/19/2015    diet controlled    Gastrointestinal problem     GERD (gastroesophageal reflux disease)     History of atrial fibrillation 2006    Dr. Danielle Barclay History of throat cancer 2004     cleared for reoccurence years ago    Hyperlipidemia 1/21/2014    Hypertension     Hypogonadism male     Lung disease     Mononeuritis multiplex     Mononeuritis multiplex     Nondependent alcohol abuse, in remission 7/22/2020    Obesity (BMI 30-39. 9) 7/24/2019    Osteoarthritis of lumbar spine     Pain of right heel     Paresthesia of foot, bilateral     Prediabetes 12/3/2014    Restless leg syndrome     Seborrheic dermatitis     Seborrheic keratoses, inflamed     Throat cancer ([facility])     throat, had surgery, sees Dr Shelia Gauthier yearly    Tinea cruris     Tinea pedis     Tinea unguium      Past Surgical History:   Procedure Laterality Date    CARDIAC CATHETERIZATION      CARPAL TUNNEL RELEASE      CATARACT REMOVAL WITH IMPLANT Right 09/09/2019    CATARACT REMOVAL WITH IMPLANT Left 07/22/2019    COLON SURGERY      COLONOSCOPY  04/15/2015    KNEE ARTHROSCOPY      LARYNGECTOMY  2004    UPPER GASTROINTESTINAL ENDOSCOPY  03/24/13    Dr. Kayode HOWARD/NAPOLEON RODRIGUEZ  2002     Social History     Socioeconomic History    Marital status:      Spouse name: Not on file    Number of children: 3    Years of education: Not on file    Highest education level: Not on file   Occupational History    Not on file   Tobacco Use    Smoking status: Former Smoker     Packs/day: 1.00     Years: 25.00     Pack years: 25.00     Types: Cigarettes     Quit date: 10/21/1990     Years since quittin.9    Smokeless tobacco: Never Used   Substance and Sexual Activity    Alcohol use: No     Comment: Attends AA, 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    Drug use: No    Sexual activity: Not on file   Other Topics Concern    Not on file   Social History Narrative    Lives alone. Social Determinants of Health     Financial Resource Strain: Low Risk     Difficulty of Paying Living Expenses: Not hard at all   Food Insecurity: No Food Insecurity    Worried About Running Out of Food in the Last Year: Never true    Dacia of Food in the Last Year: Never true   Transportation Needs: No Transportation Needs    Lack of Transportation (Medical): No    Lack of Transportation (Non-Medical): No   Physical Activity:     Days of Exercise per Week:     Minutes of Exercise per Session:    Stress:     Feeling of Stress :    Social Connections:     Frequency of Communication with Friends and Family:     Frequency of Social Gatherings with Friends and Family:     Attends Yazdanism Services:     Active Member of Clubs or Organizations:     Attends Club or Organization Meetings:     Marital Status:    Intimate Partner Violence:     Fear of Current or Ex-Partner:     Emotionally Abused:     Physically Abused:     Sexually Abused:      Family History   Problem Relation Age of Onset    Heart Disease Mother     Liver Disease Mother     High Blood Pressure Mother     Heart Disease Father     Arthritis Father     Cancer Sister         lung     Allergies   Allergen Reactions    Alemtuzumab      Low grade fever  Other reaction(s): Other: See Comments  Low grade fever    Glycopyrrolate-Formoterol Other (See Comments)     Dizzy and felt like heart rate sped up  Other reaction(s):  Other: See Comments  Dizzy and felt like heart rate sped up    Mercaptopurine Other (See Comments)    Moxifloxacin Other (See Comments)     Pt states He gets sores in his mouth       PMH, Surgical Hx, Family Hx, and Social Hx reviewed and updated. PHYSICAL EXAMINATION: N/A. VV Audio    Oriented and conversant   No audible distress   No cough throughout visit           Other pertinent observable physical exam findings-   Results for orders placed or performed during the hospital encounter of 08/05/21   Echocardiogram complete 2D with doppler with color   Result Value Ref Range    Left Ventricular Ejection Fraction 53     LVEF MODALITY ECHO        ASSESSMENT/PLAN:  Assessment & Plan   Elaine Tinsley was seen today for concern for covid-19. Diagnoses and all orders for this visit:    Close exposure to COVID-19 virus  -     Covid-19 Ambulatory; Future    Encounter for laboratory testing for COVID-19 virus  -     Covid-19 Ambulatory; Future      Orders Placed This Encounter   Procedures    Covid-19 Ambulatory     Standing Status:   Future     Standing Expiration Date:   10/1/2022     Scheduling Instructions:      Saline media preferred given current shortage of viral transport media but both acceptable     Order Specific Question:   Is this test for diagnosis or screening? Answer:   Diagnosis of ill patient     Order Specific Question:   Symptomatic for COVID-19 as defined by CDC? Answer:   Yes     Order Specific Question:   Date of Symptom Onset     Answer:   9/30/2021     Order Specific Question:   Hospitalized for COVID-19? Answer:   No     Order Specific Question:   Admitted to ICU for COVID-19? Answer:   No     Order Specific Question:   Employed in healthcare setting? Answer:   No     Order Specific Question:   Resident in a congregate (group) care setting? Answer:   No     Order Specific Question:   Pregnant: Answer:   No     Order Specific Question:   Previously tested for COVID-19? Answer:   Yes     No orders of the defined types were placed in this encounter.     Medications Discontinued During This Encounter   Medication Reason    doxazosin (CARDURA) 4 MG tablet DUPLICATE         If you experience any of the red flag s/s, seek care at the ER        Reviewed with the patient: current clinical status. Discussed with patient COVID-19 s/s. Pt aware to remain home until he hears from us about the result. Pt instructed on red flag s/s to go to the ER for or to call 911. Pt verbalized understanding. Will update pt with result when it is available. When to call for help  Call 911 anytime you think you may need emergency care. For example, call if:  · You have severe trouble breathing. · You have severe dehydration. Patient identification was verified at the start of the visit: Yes  Total time spent on this encounter: 20 minutes  >50% of 20 minutes was spent spent on counseling, answering questions, instructions on meds & testing & coordinating the care based on my plan and assessment as noted. --STARLA Pate - NP on 10/1/2021 at 1:32 PM    An electronic signature was used to authenticate this note.

## 2021-10-06 ENCOUNTER — NURSE ONLY (OUTPATIENT)
Dept: FAMILY MEDICINE CLINIC | Age: 71
End: 2021-10-06
Payer: MEDICARE

## 2021-10-06 DIAGNOSIS — Z23 NEED FOR INFLUENZA VACCINATION: Primary | ICD-10-CM

## 2021-10-06 PROCEDURE — 90694 VACC AIIV4 NO PRSRV 0.5ML IM: CPT | Performed by: FAMILY MEDICINE

## 2021-10-06 PROCEDURE — G0008 ADMIN INFLUENZA VIRUS VAC: HCPCS | Performed by: FAMILY MEDICINE

## 2021-10-06 NOTE — PROGRESS NOTES
Vaccine Information Sheet, \"Influenza - Inactivated\"  given to Fan Alarcon, or parent/legal guardian of  Fan Alarcon and verbalized understanding. Patient responses:    Have you ever had a reaction to a flu vaccine? No  Are you able to eat eggs without adverse effects? Yes  Do you have any current illness? No  Have you ever had Guillian Townsend Syndrome? No    Flu vaccine given per order. Please see immunization tab.

## 2021-10-18 NOTE — TELEPHONE ENCOUNTER
Contact patient. He really should be getting this filled by GI. I am not sure if he supposed to be on this along with the Remicade? ?

## 2021-10-27 ENCOUNTER — HOSPITAL ENCOUNTER (EMERGENCY)
Age: 71
Discharge: HOME | End: 2021-10-28
Attending: EMERGENCY MEDICINE
Payer: MEDICARE

## 2021-10-27 DIAGNOSIS — I26.99 PULMONARY EMBOLISM, OTHER, UNSPECIFIED CHRONICITY, UNSPECIFIED WHETHER ACUTE COR PULMONALE PRESENT (HCC): Primary | ICD-10-CM

## 2021-10-27 PROCEDURE — 85379 FIBRIN DEGRADATION QUANT: CPT

## 2021-10-27 PROCEDURE — 93005 ELECTROCARDIOGRAM TRACING: CPT | Performed by: EMERGENCY MEDICINE

## 2021-10-27 PROCEDURE — 84484 ASSAY OF TROPONIN QUANT: CPT

## 2021-10-27 PROCEDURE — 85025 COMPLETE CBC W/AUTO DIFF WBC: CPT

## 2021-10-27 PROCEDURE — 99283 EMERGENCY DEPT VISIT LOW MDM: CPT

## 2021-10-27 PROCEDURE — 36415 COLL VENOUS BLD VENIPUNCTURE: CPT

## 2021-10-27 PROCEDURE — 80053 COMPREHEN METABOLIC PANEL: CPT

## 2021-10-27 ASSESSMENT — ENCOUNTER SYMPTOMS
CHEST TIGHTNESS: 0
COUGH: 0
ABDOMINAL PAIN: 0
WHEEZING: 0
DIARRHEA: 0
EYE ITCHING: 0
PHOTOPHOBIA: 0
EYE DISCHARGE: 0
SHORTNESS OF BREATH: 0
VOMITING: 0
SORE THROAT: 0
ABDOMINAL DISTENTION: 0
NAUSEA: 0

## 2021-10-27 ASSESSMENT — PAIN DESCRIPTION - LOCATION: LOCATION: CHEST

## 2021-10-27 ASSESSMENT — PAIN SCALES - GENERAL: PAINLEVEL_OUTOF10: 5

## 2021-10-27 ASSESSMENT — PAIN DESCRIPTION - PAIN TYPE: TYPE: ACUTE PAIN

## 2021-10-28 ENCOUNTER — OFFICE VISIT (OUTPATIENT)
Dept: FAMILY MEDICINE CLINIC | Age: 71
End: 2021-10-28
Payer: MEDICARE

## 2021-10-28 ENCOUNTER — APPOINTMENT (OUTPATIENT)
Dept: GENERAL RADIOLOGY | Age: 71
End: 2021-10-28
Payer: MEDICARE

## 2021-10-28 ENCOUNTER — APPOINTMENT (OUTPATIENT)
Dept: CT IMAGING | Age: 71
End: 2021-10-28
Payer: MEDICARE

## 2021-10-28 VITALS
SYSTOLIC BLOOD PRESSURE: 118 MMHG | HEART RATE: 66 BPM | TEMPERATURE: 97.2 F | OXYGEN SATURATION: 96 % | HEIGHT: 70 IN | WEIGHT: 262 LBS | BODY MASS INDEX: 37.51 KG/M2 | DIASTOLIC BLOOD PRESSURE: 62 MMHG

## 2021-10-28 VITALS
BODY MASS INDEX: 36.36 KG/M2 | RESPIRATION RATE: 18 BRPM | SYSTOLIC BLOOD PRESSURE: 129 MMHG | TEMPERATURE: 98.4 F | HEIGHT: 70 IN | WEIGHT: 254 LBS | OXYGEN SATURATION: 94 % | DIASTOLIC BLOOD PRESSURE: 75 MMHG | HEART RATE: 61 BPM

## 2021-10-28 DIAGNOSIS — I26.99 PULMONARY EMBOLISM, OTHER, UNSPECIFIED CHRONICITY, UNSPECIFIED WHETHER ACUTE COR PULMONALE PRESENT (HCC): Primary | ICD-10-CM

## 2021-10-28 DIAGNOSIS — E11.40 TYPE 2 DIABETES MELLITUS WITH DIABETIC NEUROPATHY, WITHOUT LONG-TERM CURRENT USE OF INSULIN (HCC): ICD-10-CM

## 2021-10-28 PROBLEM — I50.30 DIASTOLIC HEART FAILURE (HCC): Status: ACTIVE | Noted: 2020-11-13

## 2021-10-28 LAB
ALBUMIN SERPL-MCNC: 3.8 G/DL (ref 3.5–4.6)
ALP BLD-CCNC: 88 U/L (ref 35–104)
ALT SERPL-CCNC: 11 U/L (ref 0–41)
ANION GAP SERPL CALCULATED.3IONS-SCNC: 12 MEQ/L (ref 9–15)
AST SERPL-CCNC: 17 U/L (ref 0–40)
BASOPHILS ABSOLUTE: 0.1 K/UL (ref 0–0.2)
BASOPHILS RELATIVE PERCENT: 0.7 %
BILIRUB SERPL-MCNC: 0.5 MG/DL (ref 0.2–0.7)
BUN BLDV-MCNC: 20 MG/DL (ref 8–23)
CALCIUM SERPL-MCNC: 9.1 MG/DL (ref 8.5–9.9)
CHLORIDE BLD-SCNC: 101 MEQ/L (ref 95–107)
CO2: 26 MEQ/L (ref 20–31)
CREAT SERPL-MCNC: 0.9 MG/DL (ref 0.7–1.2)
D DIMER: 2.95 MG/L FEU (ref 0–0.5)
EKG ATRIAL RATE: 69 BPM
EKG P AXIS: 13 DEGREES
EKG P-R INTERVAL: 150 MS
EKG Q-T INTERVAL: 390 MS
EKG QRS DURATION: 96 MS
EKG QTC CALCULATION (BAZETT): 417 MS
EKG R AXIS: -5 DEGREES
EKG T AXIS: 6 DEGREES
EKG VENTRICULAR RATE: 69 BPM
EOSINOPHILS ABSOLUTE: 0.4 K/UL (ref 0–0.7)
EOSINOPHILS RELATIVE PERCENT: 3.4 %
GFR AFRICAN AMERICAN: >60
GFR NON-AFRICAN AMERICAN: >60
GLOBULIN: 2.8 G/DL (ref 2.3–3.5)
GLUCOSE BLD-MCNC: 133 MG/DL (ref 70–99)
HBA1C MFR BLD: 5.6 %
HCT VFR BLD CALC: 38.9 % (ref 42–52)
HEMOGLOBIN: 13.3 G/DL (ref 14–18)
LYMPHOCYTES ABSOLUTE: 2 K/UL (ref 1–4.8)
LYMPHOCYTES RELATIVE PERCENT: 18.6 %
MCH RBC QN AUTO: 29.8 PG (ref 27–31.3)
MCHC RBC AUTO-ENTMCNC: 34.2 % (ref 33–37)
MCV RBC AUTO: 86.9 FL (ref 80–100)
MONOCYTES ABSOLUTE: 1.2 K/UL (ref 0.2–0.8)
MONOCYTES RELATIVE PERCENT: 11.8 %
NEUTROPHILS ABSOLUTE: 6.9 K/UL (ref 1.4–6.5)
NEUTROPHILS RELATIVE PERCENT: 65.5 %
PDW BLD-RTO: 14.1 % (ref 11.5–14.5)
PLATELET # BLD: 181 K/UL (ref 130–400)
POTASSIUM SERPL-SCNC: 3.8 MEQ/L (ref 3.4–4.9)
RBC # BLD: 4.47 M/UL (ref 4.7–6.1)
SODIUM BLD-SCNC: 139 MEQ/L (ref 135–144)
TOTAL PROTEIN: 6.6 G/DL (ref 6.3–8)
TROPONIN: <0.01 NG/ML (ref 0–0.01)
WBC # BLD: 10.5 K/UL (ref 4.8–10.8)

## 2021-10-28 PROCEDURE — 6360000002 HC RX W HCPCS: Performed by: EMERGENCY MEDICINE

## 2021-10-28 PROCEDURE — 99214 OFFICE O/P EST MOD 30 MIN: CPT | Performed by: FAMILY MEDICINE

## 2021-10-28 PROCEDURE — 93010 ELECTROCARDIOGRAM REPORT: CPT | Performed by: INTERNAL MEDICINE

## 2021-10-28 PROCEDURE — 71046 X-RAY EXAM CHEST 2 VIEWS: CPT

## 2021-10-28 PROCEDURE — 96372 THER/PROPH/DIAG INJ SC/IM: CPT

## 2021-10-28 PROCEDURE — 83036 HEMOGLOBIN GLYCOSYLATED A1C: CPT | Performed by: FAMILY MEDICINE

## 2021-10-28 PROCEDURE — 71275 CT ANGIOGRAPHY CHEST: CPT

## 2021-10-28 PROCEDURE — 6360000004 HC RX CONTRAST MEDICATION: Performed by: EMERGENCY MEDICINE

## 2021-10-28 RX ADMIN — ENOXAPARIN SODIUM 120 MG: 120 INJECTION SUBCUTANEOUS at 02:01

## 2021-10-28 RX ADMIN — IOPAMIDOL 100 ML: 755 INJECTION, SOLUTION INTRAVENOUS at 01:07

## 2021-10-28 ASSESSMENT — ENCOUNTER SYMPTOMS
ABDOMINAL PAIN: 0
PHOTOPHOBIA: 0
SHORTNESS OF BREATH: 1
ABDOMINAL DISTENTION: 0
CHEST TIGHTNESS: 0

## 2021-10-28 NOTE — PROGRESS NOTES
and medicines. Check with your doctor about whether you should use hormone forms of birth control or hormone therapy. These may increase your risk of blood clots. Use compression stockings if your doctor prescribes them. These are specially fitted stockings that may prevent blood clots by keeping blood from pooling in your legs. Current as of: July 6, 2021               Content Version: 13.0  © 2006-2021 [employer]. Care instructions adapted under license by [facility] ([facility]). If you have questions about a medical condition or this instruction, always ask your healthcare professional. [place] [address] any warranty or liability for your use of this information. Patient Education        Pulmonary Embolism: Care Instructions  Overview     Pulmonary embolism is the sudden blockage of an artery in the lung. Blood clots in the deep veins of the leg or pelvis (deep vein thrombosis, or DVT) are the most common cause. These blood clots can travel to the lungs. Pulmonary embolism can be very serious. Because you have had one pulmonary embolism, you are at greater risk for having another one. But you can take steps to prevent another pulmonary embolism. You will probably take a prescription blood-thinning medicine to prevent blood clots. A blood thinner can stop a blood clot from growing larger and prevent new clots from forming. Follow-up care is a key part of your treatment and safety. Be sure to make and go to all appointments, and call your doctor if you are having problems. It's also a good idea to know your test results and keep a list of the medicines you take. How can you care for yourself at home? · Take your medicines exactly as prescribed. Call your doctor if you think you are having a problem with your medicine. You will get more details on the specific medicines your doctor prescribes.   · If you are taking a blood thinner, be sure you get instructions about how to take your medicine safely. Blood thinners can cause serious bleeding problems. · Try to walk several times a day. Walking helps keep blood moving in your legs. Before doing other types of exercise, ask your doctor what type and level of exercise is safe for you. · Take steps to help prevent blood clots in your legs. For example:  ? Exercise your lower leg muscles if you sit for long periods of time. Pump your feet up and down by pulling your toes up toward your knees then pointing them down. Repeat. ? After an illness or surgery, try to get up and out of bed often. If you can't get out of bed, flex your feet every hour to keep the blood moving through your legs. ? Take plenty of breaks when you travel. On long car trips, stop the car and walk around every hour or so. On the bus, plane, or train, get out of your seat and walk up and down the aisle every hour, if you can.  ? Wear compression stockings if your doctor recommends them. ? Check with your doctor about whether you should use hormonal forms of birth control or hormone therapy. These may increase your risk of blood clots. · Have a healthy lifestyle. This includes being active, staying at a healthy weight, and not smoking. When should you call for help? Call 911 anytime you think you may need emergency care. For example, call if:    · You have shortness of breath.     · You have chest pain.     · You passed out (lost consciousness).     · You cough up blood. Call your doctor now or seek immediate medical care if:    · You have new or worsening pain or swelling in your leg. Watch closely for changes in your health, and be sure to contact your doctor if:    · You do not get better as expected. Where can you learn more? Go to [email or web address] and sign in to your 3Leaf account. Enter N630 in the Se[employer] box to learn more about \"Pulmonary Embolism: Care Instructions. \"     If you do not have an account, please click on the \"Sign Up Now\" link. Current as of: July 6, 2021               Content Version: 13.0  © 2006-2021 Healthwise, [email or web address]. Care instructions adapted under license by [facility] ([facility]). If you have questions about a medical condition or this instruction, always ask your healthcare professional. [address] any warranty or liability for your use of this information. Subjective:      Patient ID: Tello Bills is a 70 y.o. male who presents for:  Chief Complaint   Patient presents with    Other     States he is prediabetic. Would like this checked.  Pulmonary Embolism       She has been having increased chest discomfort and strange breathing and opted to go to the emergency room last night. Was diagnosed with multiple small pulmonary emboli. Reviewed his past medical history he states he was on some sort of blood thinner many years ago by cardiology but does not recall why. His most recent 2D echo was in August 2021 and was essentially normal.  COPD and sleep apnea have been well controlled with medications and CPAP. No recent injuries or surgeries. Denies sedentary lifestyle. Denies recent long travel with outbreaks. Denies recent Covid. And he is also had a couple negative Covid tests.       Current Outpatient Medications on File Prior to Visit   Medication Sig Dispense Refill    apixaban (ELIQUIS) 5 MG TABS tablet Take 1 tablet by mouth 2 times daily 60 tablet 0    RESTASIS 0.05 % ophthalmic emulsion       mometasone (ELOCON) 0.1 % cream APPLY to EAR TWICE DAILY      montelukast (SINGULAIR) 10 MG tablet TAKE 1 TABLET NIGHTLY 90 tablet 3    albuterol sulfate HFA (PROAIR HFA) 108 (90 Base) MCG/ACT inhaler Inhale 2 puffs into the lungs every 6 hours as needed for Wheezing 1 Inhaler 3    testosterone (ANDROGEL; TESTIM) 50 MG/5GM (1%) GEL 1% gel APPLY 1 PACKET ALTERNATING WITH 2 PACKETS DAILY EVERY OTHER  g 0    celecoxib (CELEBREX) 200 MG capsule Take 1 capsule by mouth daily 30 capsule 1    ustekinumab (STELARA) 45 MG/0.5ML SOSY prefilled syringe Inject 45 mg into the skin once      triamcinolone (KENALOG) 0.025 % cream Apply topically 2 times daily. 1 Tube 0    albuterol sulfate  (90 Base) MCG/ACT inhaler Inhale 2 puffs into the lungs every 6 hours as needed for Wheezing or Shortness of Breath 3 Inhaler 1    furosemide (LASIX) 20 MG tablet Take 1 tablet by mouth daily 30 tablet 2    nystatin-triamcinolone (MYCOLOG II) 366229-8.1 UNIT/GM-% cream Apply topically 2 times daily. 15 g 1    budesonide-formoterol (SYMBICORT) 160-4.5 MCG/ACT AERO Inhale 2 puffs into the lungs 2 times daily 2 each LOT [id number] EXP [id number] 3 Inhaler 1    albuterol (PROVENTIL) (2.5 MG/3ML) 0.083% nebulizer solution Take 3 mLs by nebulization every 6 hours as needed for Wheezing 120 each 3    gabapentin (NEURONTIN) 300 MG capsule TAKE 1 CAPSULE DAILY 90 capsule 3    doxazosin (CARDURA) 4 MG tablet TAKE ONE-HALF (1/2) TABLET TWICE A DAY 90 tablet 3    lisinopril (PRINIVIL;ZESTRIL) 20 MG tablet Take 1 tablet by mouth daily 90 tablet 3    metoprolol tartrate (LOPRESSOR) 50 MG tablet Take 1.5 po bid 15 tablet 0    pravastatin (PRAVACHOL) 40 MG tablet TAKE 1 TABLET NIGHTLY 90 tablet 3    SPIRIVA HANDIHALER 18 MCG inhalation capsule INHALE THE CONTENTS OF 1 CAPSULE DAILY 90 capsule 3    cetirizine (ZYRTEC) 5 MG tablet TAKE ONE TABLET BY MOUTH  PRN      nitroGLYCERIN (NITROSTAT) 0.4 MG SL tablet DISSOLVE 1 TABLET UNDER THE TONGUE EVERY 5 MINUTES AS NEEDED FOR CHEST PAIN. MAXIMUM OF 3 TABLETS 25 tablet 2    CPAP Machine MISC New cpap supplies mask hose filters etc 1 each 0    Saccharomyces boulardii (PROBIOTIC) 250 MG CAPS Take 1 tablet by mouth daily      esomeprazole Magnesium (NEXIUM) 20 MG PACK Take 20 mg by mouth daily      aspirin 81 MG tablet Take 81 mg by mouth daily.  mesalamine (PENTASA) 250 MG CR capsule Take 500 mg by mouth 4 times daily. No current facility-administered medications on file prior to visit. Past Medical History:   Diagnosis Date    A-fib [facility])     Abnormal EMG 12/09/2015    Actinic keratoses     Actinic keratoses     Acute diastolic congestive heart failure (HCC) 1/6/2021    Allergic rhinitis     Anemia 11/18/2014    Arthritis     Chronic back pain     COPD [facility]chronic obstructive pulmonary disease) [facility]) 08/09/2012    COPD (chronic obstructive pulmonary disease) ([facility])     Crohns disease     Degenerative disc disease, lumbar     Dermatitis     Diabetes ([address] 03/19/2015    diet controlled    Gastrointestinal problem     GERD (gastroesophageal reflux disease)     History of atrial fibrillation 2006    Dr. Carol Schafer History of throat cancer 2004     cleared for reoccurence years ago    Hyperlipidemia 1/21/2014    Hypertension     Hypogonadism male     Lung disease     Mononeuritis multiplex     Mononeuritis multiplex     Nondependent alcohol abuse, in remission 7/22/2020    Obesity (BMI 30-39. 9) 7/24/2019    Osteoarthritis of lumbar spine     Pain of right heel     Paresthesia of foot, bilateral     Prediabetes 12/3/2014    Restless leg syndrome     Seborrheic dermatitis     Seborrheic keratoses, inflamed     Throat cancer ([facility])     throat, had surgery, sees Dr Valencia Begun yearly    Tinea cruris     Tinea pedis     Tinea unguium      Past Surgical History:   Procedure Laterality Date    CARDIAC CATHETERIZATION      CARPAL TUNNEL RELEASE      CATARACT REMOVAL WITH IMPLANT Right 09/09/2019    CATARACT REMOVAL WITH IMPLANT Left 07/22/2019    COLON SURGERY      COLONOSCOPY  04/15/2015    KNEE ARTHROSCOPY      LARYNGECTOMY  2004    UPPER GASTROINTESTINAL ENDOSCOPY  03/24/13    Dr. Lola Garcia W/NAPOLEON RODRIGUEZ  2002     Social History     Socioeconomic History    Marital status:      Spouse name: Not on file    Number of children: 3    Years of education: Not on file  Highest education level: Not on file   Occupational History    Not on file   Tobacco Use    Smoking status: Former Smoker     Packs/day: 1.00     Years: 25.00     Pack years: 25.00     Types: Cigarettes     Quit date: 10/21/1990     Years since quittin.0    Smokeless tobacco: Never Used   Substance and Sexual Activity    Alcohol use: No     Comment: Attends [employer], 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    Drug use: No    Sexual activity: Not on file   Other Topics Concern    Not on file   Social History Narrative    Lives alone. Social Determinants of Health     Financial Resource Strain: Low Risk     Difficulty of Paying Living Expenses: Not hard at all   Food Insecurity: No Food Insecurity    Worried About Running Out of Food in the Last Year: Never true    Dacia of Food in the Last Year: Never true   Transportation Needs: No Transportation Needs    Lack of Transportation (Medical): No    Lack of Transportation (Non-Medical): No   Physical Activity:     Days of Exercise per Week:     Minutes of Exercise per Session:    Stress:     Feeling of Stress :    Social Connections:     Frequency of Communication with Friends and Family:     Frequency of Social Gatherings with Friends and Family:     Attends Jain Services:     Active Member of Clubs or Organizations:     Attends Club or Organization Meetings:     Marital Status:    Intimate Partner Violence:     Fear of Current or Ex-Partner:     Emotionally Abused:     Physically Abused:     Sexually Abused:      Family History   Problem Relation Age of Onset    Heart Disease Mother     Liver Disease Mother     High Blood Pressure Mother     Heart Disease Father     Arthritis Father     Cancer Sister         lung     Allergies:  Alemtuzumab, Glycopyrrolate-formoterol, Mercaptopurine, and Moxifloxacin    Review of Systems   Constitutional: Negative for activity change, appetite change, diaphoresis and unexpected weight change.    Eyes: Negative for Hemoglobin A1C 5.6 %       Recent Results (from the past 2016 hour(s))   Covid-19 Ambulatory    Collection Time: 10/01/21  3:42 PM    Specimen: Nasopharyngeal Swab   Result Value Ref Range    SARS-CoV-2 Not Detected Not Detected    Source Anterior nares    EKG 12 Lead    Collection Time: 10/27/21 11:37 PM   Result Value Ref Range    Ventricular Rate 69 BPM    Atrial Rate 69 BPM    P-R Interval 150 ms    QRS Duration 96 ms    Q-T Interval 390 ms    QTc Calculation (Bazett) 417 ms    P Axis 13 degrees    R Axis -5 degrees    T Axis 6 degrees   CBC Auto Differential    Collection Time: 10/27/21 11:56 PM   Result Value Ref Range    WBC 10.5 4.8 - 10.8 K/uL    RBC 4.47 (L) 4.70 - 6.10 M/uL    Hemoglobin 13.3 (L) 14.0 - 18.0 g/dL    Hematocrit 38.9 (L) 42.0 - 52.0 %    MCV 86.9 80.0 - 100.0 fL    MCH 29.8 27.0 - 31.3 pg    MCHC 34.2 33.0 - 37.0 %    RDW 14.1 11.5 - 14.5 %    Platelets 640 272 - 181 K/uL    Neutrophils % 65.5 %    Lymphocytes % 18.6 %    Monocytes % 11.8 %    Eosinophils % 3.4 %    Basophils % 0.7 %    Neutrophils Absolute 6.9 (H) 1.4 - 6.5 K/uL    Lymphocytes Absolute 2.0 1.0 - 4.8 K/uL    Monocytes Absolute 1.2 (H) 0.2 - 0.8 K/uL    Eosinophils Absolute 0.4 0.0 - 0.7 K/uL    Basophils Absolute 0.1 0.0 - 0.2 K/uL   Comprehensive Metabolic Panel    Collection Time: 10/27/21 11:57 PM   Result Value Ref Range    Sodium 139 135 - 144 mEq/L    Potassium 3.8 3.4 - 4.9 mEq/L    Chloride 101 95 - 107 mEq/L    CO2 26 20 - 31 mEq/L    Anion Gap 12 9 - 15 mEq/L    Glucose 133 (H) 70 - 99 mg/dL    BUN 20 8 - 23 mg/dL    CREATININE 0.90 0.70 - 1.20 mg/dL    GFR Non-African American >60.0 >60    GFR  >60.0 >60    Calcium 9.1 8.5 - 9.9 mg/dL    Total Protein 6.6 6.3 - 8.0 g/dL    Albumin 3.8 3.5 - 4.6 g/dL    Total Bilirubin 0.5 0.2 - 0.7 mg/dL    Alkaline Phosphatase 88 35 - 104 U/L    ALT 11 0 - 41 U/L    AST 17 0 - 40 U/L    Globulin 2.8 2.3 - 3.5 g/dL   Troponin    Collection Time: 10/27/21 11:57 PM Result Value Ref Range    Troponin <0.010 0.000 - 0.010 ng/mL   D-Dimer, Quantitative    Collection Time: 10/27/21 11:57 PM   Result Value Ref Range    D-Dimer, Quant 2.95 (HH) 0.00 - 0.50 mg/L FEU   POCT glycosylated hemoglobin (Hb A1C)    Collection Time: 10/28/21  3:53 PM   Result Value Ref Range    Hemoglobin A1C 5.6 %       [] Pt was seen by provider for      Minutes  Counseling and coordination of care was done for all assessment diagnosis listed for today with patient and any family/friend present. More than 50% of this visit was spent coordinating cuurent care, obtaining information for prior records, and counseling for current plan of action. Assessment:       Diagnosis Orders   1. Pulmonary embolism, other, unspecified chronicity, unspecified whether acute cor pulmonale present ([facility]  Prince Anish MD, Tiffanie Rodney   2. Type 2 diabetes mellitus with diabetic neuropathy, without long-term current use of insulin ([facility])  POCT glycosylated hemoglobin (Hb A1C)         Orders Placed This Encounter   Procedures   Prince Anish MD, Tiffanie Rodney     Referral Priority:   Routine     Referral Type:   Eval and Treat     Referral Reason:   Specialty Services Required     Referred to Provider:   Anibal Bird MD     Requested Specialty:   Hematology and Oncology     Number of Visits Requested:   1    POCT glycosylated hemoglobin (Hb A1C)       No orders of the defined types were placed in this encounter. Medication List          Accurate as of October 28, 2021  4:04 PM. If you have any questions, ask your nurse or doctor.             CONTINUE taking these medications    * albuterol (2.5 MG/3ML) 0.083% nebulizer solution  Commonly known as: PROVENTIL  Take 3 mLs by nebulization every 6 hours as needed for Wheezing     * albuterol sulfate  (90 Base) MCG/ACT inhaler  Inhale 2 puffs into the lungs every 6 hours as needed for Wheezing or Shortness of Breath     * albuterol sulfate  (90 Base) MCG/ACT inhaler  Commonly known as: ProAir HFA  Inhale 2 puffs into the lungs every 6 hours as needed for Wheezing     apixaban 5 MG Tabs tablet  Commonly known as: Eliquis  Take 1 tablet by mouth 2 times daily     aspirin 81 MG tablet     budesonide-formoterol 160-4.5 MCG/ACT Aero  Commonly known as: Symbicort  Inhale 2 puffs into the lungs 2 times daily 2 each LOT [id number] EXP [id number]     celecoxib 200 MG capsule  Commonly known as: CELEBREX  Take 1 capsule by mouth daily     cetirizine 5 MG tablet  Commonly known as: ZYRTEC     CPAP Machine Misc  New cpap supplies mask hose filters etc     doxazosin 4 MG tablet  Commonly known as: CARDURA  TAKE ONE-HALF (1/2) TABLET TWICE A DAY     esomeprazole Magnesium 20 MG Pack  Commonly known as: NEXIUM     furosemide 20 MG tablet  Commonly known as: LASIX  Take 1 tablet by mouth daily     gabapentin 300 MG capsule  Commonly known as: NEURONTIN  TAKE 1 CAPSULE DAILY     lisinopril 20 MG tablet  Commonly known as: PRINIVIL;ZESTRIL  Take 1 tablet by mouth daily     mesalamine 250 MG extended release capsule  Commonly known as: PENTASA     metoprolol tartrate 50 MG tablet  Commonly known as: LOPRESSOR  Take 1.5 po bid     mometasone 0.1 % cream  Commonly known as: ELOCON     montelukast 10 MG tablet  Commonly known as: SINGULAIR  TAKE 1 TABLET NIGHTLY     nitroGLYCERIN 0.4 MG SL tablet  Commonly known as: NITROSTAT  DISSOLVE 1 TABLET UNDER THE TONGUE EVERY 5 MINUTES AS NEEDED FOR CHEST PAIN. MAXIMUM OF 3 TABLETS     nystatin-triamcinolone 547081-0.1 UNIT/GM-% cream  Commonly known as: MYCOLOG II  Apply topically 2 times daily.      pravastatin 40 MG tablet  Commonly known as: PRAVACHOL  TAKE 1 TABLET NIGHTLY     Probiotic 250 MG Caps     Restasis 0.05 % ophthalmic emulsion  Generic drug: cycloSPORINE     Spiriva HandiHaler 18 MCG inhalation capsule  Generic drug: tiotropium  INHALE THE CONTENTS OF 1 CAPSULE DAILY     Stelara 45 MG/0.5ML Sosy prefilled syringe  Generic drug: ustekinumab     testosterone 50 MG/5GM (1%) Gel 1% gel  Commonly known as: ANDROGEL; TESTIM  APPLY 1 PACKET ALTERNATING WITH 2 PACKETS DAILY EVERY OTHER DAY     triamcinolone 0.025 % cream  Commonly known as: KENALOG  Apply topically 2 times daily. * This list has 3 medication(s) that are the same as other medications prescribed for you. Read the directions carefully, and ask your doctor or other care provider to review them with you. STOP taking these medications    Remicade 100 MG injection  Generic drug: inFLIXimab  Stopped by: Hortencia Weiss MD              Plan:   Return for with specialist.    Patient Instructions     Unclear etiology as to why patient has had a pulmonary embolism which then also makes it difficult to determine how long he should be on treatment. Refer to hematology to help with evaluation for this. Patient Education        8 Things You Can Do to Help Prevent Blood Clots  A blood clot in a deep vein, usually in the legs, is called a deep vein thrombosis (DVT). A DVT can break loose and travel through the bloodstream to the lungs. Then the clot can block blood flow in the lungs (pulmonary embolism). This can be life-threatening. That's why it's important to take steps to prevent a clot. Take a blood-thinning medicine (called an anticoagulant), if prescribed. If your doctor prescribes medicine, take it as directed. Exercise your lower leg muscles. This helps keep the blood moving through your legs. Pump your feet up and down by pulling your toes up toward your knees then pointing them down. Repeat. This is a good exercise to do when you are sitting for long periods of time. Get up out of bed as soon as your doctor says it's okay. Being active is one of the best things you can do to prevent clots. Take plenty of breaks when you travel. On long car trips, stop the car and walk around every hour or so.  On the bus, plane, or train, get out of your seat and walk up and down the aisle every hour, if you can. Be active. Try to get 30 minutes or more of activity on most days of the week. Don't smoke. Smoking can increase your risk of blood clots. If you need help quitting, talk to your doctor about stop-smoking programs and medicines. Check with your doctor about whether you should use hormone forms of birth control or hormone therapy. These may increase your risk of blood clots. Use compression stockings if your doctor prescribes them. These are specially fitted stockings that may prevent blood clots by keeping blood from pooling in your legs. Current as of: July 6, 2021               Content Version: 13.0  © 2006-2021 [employer]. Care instructions adapted under license by [facility] ([facility]). If you have questions about a medical condition or this instruction, always ask your healthcare professional. [place] [address] any warranty or liability for your use of this information. Patient Education        Pulmonary Embolism: Care Instructions  Overview     Pulmonary embolism is the sudden blockage of an artery in the lung. Blood clots in the deep veins of the leg or pelvis (deep vein thrombosis, or DVT) are the most common cause. These blood clots can travel to the lungs. Pulmonary embolism can be very serious. Because you have had one pulmonary embolism, you are at greater risk for having another one. But you can take steps to prevent another pulmonary embolism. You will probably take a prescription blood-thinning medicine to prevent blood clots. A blood thinner can stop a blood clot from growing larger and prevent new clots from forming. Follow-up care is a key part of your treatment and safety. Be sure to make and go to all appointments, and call your doctor if you are having problems.  It's also a good idea to know your test results and keep a list of the medicines you contact your doctor if:    · You do not get better as expected. Where can you learn more? Go to [email or web address] and sign in to your [employer] account. Enter W058 in the [facility] box to learn more about \"Pulmonary Embolism: Care Instructions. \"     If you do not have an account, please click on the \"Sign Up Now\" link. Current as of: July 6, 2021               Content Version: 13.0  © 2467-7249 Healthwise, [facility]. Care instructions adapted under license by [facility] ([facility]). If you have questions about a medical condition or this instruction, always ask your healthcare professional. [address] any warranty or liability for your use of this information. This note was partially created with the assistance of dictation. This may lead to grammatical or spelling errors. Sher Sotelo M.D.

## 2021-10-28 NOTE — ED PROVIDER NOTES
[address] [facility] ED  eMERGENCY dEPARTMENT eNCOUnter      Pt Name: Ese Hodgson  MRN: [id number]  [place] 1950  Date of evaluation: 10/27/2021  Provider: Cesar Chambers MD    CHIEF COMPLAINT       Chief Complaint   Patient presents with    Chest Pain         HISTORY OF PRESENT ILLNESS   (Location/Symptom, Timing/Onset,Context/Setting, Quality, Duration, Modifying Factors, Severity)  Note limiting factors. Ese Hodgson is a 70 y.o. male who presents to the emergency department for evaluation of right-sided chest pain. Patient's had 24 history of right-sided chest pain that is nonradiating. Describes it as an ache type discomfort nonradiating as mentioned. No related shortness of breath. No nausea or vomiting. No fever chills. He has no history of MI or stents but he has had congestive heart failure in the past with a 55% ejection fraction seen on last echocardiogram in August.  Has chronic leg edema 1+ today that is chronic and consistent. No history of pulmonary embolism. No cough today. No fever. No nausea or vomiting    HPI    NursingNotes were reviewed. REVIEW OF SYSTEMS    (2-9 systems for level 4, 10 or more for level 5)     Review of Systems   Constitutional: Negative for chills and diaphoresis. HENT: Negative for congestion, ear pain, mouth sores and sore throat. Eyes: Negative for photophobia, discharge and itching. Respiratory: Negative for cough, chest tightness, shortness of breath and wheezing. Cardiovascular: Positive for chest pain. Negative for palpitations. Gastrointestinal: Negative for abdominal distention, abdominal pain, diarrhea, nausea and vomiting. Endocrine: Negative for cold intolerance. Genitourinary: Negative for difficulty urinating. Musculoskeletal: Negative for arthralgias. Skin: Negative for pallor and rash. Allergic/Immunologic: Negative for immunocompromised state. Neurological: Negative for dizziness and syncope. Hematological: Negative for adenopathy. Psychiatric/Behavioral: Negative for agitation and hallucinations. All other systems reviewed and are negative. Except as noted above the remainder of the review of systems was reviewed and negative. PAST MEDICAL HISTORY     Past Medical History:   Diagnosis Date    A-fib [facility])     Abnormal EMG 12/09/2015    Actinic keratoses     Actinic keratoses     Acute diastolic congestive heart failure (HCC) 1/6/2021    Allergic rhinitis     Anemia 11/18/2014    Arthritis     Chronic back pain     COPD [facility]chronic obstructive pulmonary disease) [facility] 08/09/2012    COPD (chronic obstructive pulmonary disease) ([facility])     Crohns disease     Degenerative disc disease, lumbar     Dermatitis     Diabetes ([facility] [address] 03/19/2015    diet controlled    Gastrointestinal problem     GERD (gastroesophageal reflux disease)     History of atrial fibrillation 2006    Dr. Ara Pennington History of throat cancer 2004     cleared for reoccurence years ago    Hyperlipidemia 1/21/2014    Hypertension     Hypogonadism male     Lung disease     Mononeuritis multiplex     Mononeuritis multiplex     Nondependent alcohol abuse, in remission 7/22/2020    Obesity (BMI 30-39. 9) 7/24/2019    Osteoarthritis of lumbar spine     Pain of right heel     Paresthesia of foot, bilateral     Prediabetes 12/3/2014    Restless leg syndrome     Seborrheic dermatitis     Seborrheic keratoses, inflamed     Throat cancer (HCC)     throat, had surgery, sees Dr Janeth Smith yearly    Tinea cruris     Tinea pedis     Tinea unguium          SURGICALHISTORY       Past Surgical History:   Procedure Laterality Date    CARDIAC CATHETERIZATION      CARPAL TUNNEL RELEASE      CATARACT REMOVAL WITH IMPLANT Right 09/09/2019    CATARACT REMOVAL WITH IMPLANT Left 07/22/2019    COLON SURGERY      COLONOSCOPY  04/15/2015    KNEE ARTHROSCOPY      LARYNGECTOMY  2004    UPPER GASTROINTESTINAL ENDOSCOPY  03/24/13 Dr. Hema Nguyễn       Previous Medications    ALBUTEROL (PROVENTIL) (2.5 MG/3ML) 0.083% NEBULIZER SOLUTION    Take 3 mLs by nebulization every 6 hours as needed for Wheezing    ALBUTEROL SULFATE HFA (PROAIR HFA) 108 (90 BASE) MCG/ACT INHALER    Inhale 2 puffs into the lungs every 6 hours as needed for Wheezing    ALBUTEROL SULFATE  (90 BASE) MCG/ACT INHALER    Inhale 2 puffs into the lungs every 6 hours as needed for Wheezing or Shortness of Breath    ASPIRIN 81 MG TABLET    Take 81 mg by mouth daily. BUDESONIDE-FORMOTEROL (SYMBICORT) 160-4.5 MCG/ACT AERO    Inhale 2 puffs into the lungs 2 times daily 2 each LOT [id number]    CELECOXIB (CELEBREX) 200 MG CAPSULE    Take 1 capsule by mouth daily    CETIRIZINE (ZYRTEC) 5 MG TABLET    TAKE ONE TABLET BY MOUTH  PRN    CPAP MACHINE MISC    New cpap supplies mask hose filters etc    DOXAZOSIN (CARDURA) 4 MG TABLET    TAKE ONE-HALF (1/2) TABLET TWICE A DAY    ESOMEPRAZOLE MAGNESIUM (NEXIUM) 20 MG PACK    Take 20 mg by mouth daily    FUROSEMIDE (LASIX) 20 MG TABLET    Take 1 tablet by mouth daily    GABAPENTIN (NEURONTIN) 300 MG CAPSULE    TAKE 1 CAPSULE DAILY    INFLIXIMAB (REMICADE) 100 MG INJECTION    Infuse 5 mg/kg intravenously See Admin Instructions     LISINOPRIL (PRINIVIL;ZESTRIL) 20 MG TABLET    Take 1 tablet by mouth daily    MESALAMINE (PENTASA) 250 MG CR CAPSULE    Take 500 mg by mouth 4 times daily. METOPROLOL TARTRATE (LOPRESSOR) 50 MG TABLET    Take 1.5 po bid    MOMETASONE (ELOCON) 0.1 % CREAM    APPLY to EAR TWICE DAILY    MONTELUKAST (SINGULAIR) 10 MG TABLET    TAKE 1 TABLET NIGHTLY    NITROGLYCERIN (NITROSTAT) 0.4 MG SL TABLET    DISSOLVE 1 TABLET UNDER THE TONGUE EVERY 5 MINUTES AS NEEDED FOR CHEST PAIN. MAXIMUM OF 3 TABLETS    NYSTATIN-TRIAMCINOLONE (MYCOLOG II) 340067-2.1 UNIT/GM-% CREAM    Apply topically 2 times daily.     PRAVASTATIN (PRAVACHOL) 40 MG TABLET    TAKE 1 TABLET NIGHTLY    RESTASIS 0.05 % OPHTHALMIC EMULSION        SACCHAROMYCES BOULARDII (PROBIOTIC) 250 MG CAPS    Take 1 tablet by mouth daily    SPIRIVA HANDIHALER 18 MCG INHALATION CAPSULE    INHALE THE CONTENTS OF 1 CAPSULE DAILY    TESTOSTERONE (ANDROGEL; TESTIM) 50 MG/5GM (1%) GEL 1% GEL    APPLY 1 PACKET ALTERNATING WITH 2 PACKETS DAILY EVERY OTHER DAY    TRIAMCINOLONE (KENALOG) 0.025 % CREAM    Apply topically 2 times daily. USTEKINUMAB (STELARA) 45 MG/0.5ML SOSY PREFILLED SYRINGE    Inject 45 mg into the skin once       ALLERGIES     Alemtuzumab, Glycopyrrolate-formoterol, Mercaptopurine, and Moxifloxacin    FAMILY HISTORY       Family History   Problem Relation Age of Onset    Heart Disease Mother     Liver Disease Mother     High Blood Pressure Mother     Heart Disease Father     Arthritis Father     Cancer Sister         lung          SOCIAL HISTORY       Social History     Socioeconomic History    Marital status:      Spouse name: None    Number of children: 3    Years of education: None    Highest education level: None   Occupational History    None   Tobacco Use    Smoking status: Former Smoker     Packs/day: 1.00     Years: 25.00     Pack years: 25.00     Types: Cigarettes     Quit date: 10/21/1990     Years since quittin.0    Smokeless tobacco: Never Used   Substance and Sexual Activity    Alcohol use: No     Comment: Attends AA, 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    Drug use: No    Sexual activity: None   Other Topics Concern    None   Social History Narrative    Lives alone. Social Determinants of Health     Financial Resource Strain: Low Risk     Difficulty of Paying Living Expenses: Not hard at all   Food Insecurity: No Food Insecurity    Worried About Running Out of Food in the Last Year: Never true    Dacia of Food in the Last Year: Never true   Transportation Needs: No Transportation Needs    Lack of Transportation (Medical):  No    Lack of Transportation (Non-Medical): No   Physical Activity:     Days of Exercise per Week:     Minutes of Exercise per Session:    Stress:     Feeling of Stress :    Social Connections:     Frequency of Communication with Friends and Family:     Frequency of Social Gatherings with Friends and Family:     Attends Confucianist Services:     Active Member of Clubs or Organizations:     Attends Club or Organization Meetings:     Marital Status:    Intimate Partner Violence:     Fear of Current or Ex-Partner:     Emotionally Abused:     Physically Abused:     Sexually Abused:        SCREENINGS      @FLOW([id number])@      PHYSICAL EXAM    (up to 7 for level 4, 8 or more for level 5)     ED Triage Vitals [10/27/21 2329]   BP Temp Temp Source Pulse Resp SpO2 Height Weight   (!) 148/81 98.4 °F (36.9 °C) Oral 76 18 94 % 5' 10\" (1.778 m) 254 lb (115.2 kg)       Physical Exam  Vitals and nursing note reviewed. Constitutional:       Appearance: He is well-developed. HENT:      Head: Normocephalic. Right Ear: Tympanic membrane normal.      Left Ear: Tympanic membrane normal.      Nose: Nose normal.      Mouth/Throat:      Mouth: Mucous membranes are moist.   Eyes:      Conjunctiva/sclera: Conjunctivae normal.      Pupils: Pupils are equal, round, and reactive to light. Cardiovascular:      Rate and Rhythm: Normal rate and regular rhythm. Heart sounds: Normal heart sounds. Pulmonary:      Effort: Pulmonary effort is normal.      Breath sounds: Normal breath sounds. Abdominal:      General: Bowel sounds are normal.      Palpations: Abdomen is soft. Tenderness: There is no abdominal tenderness. There is no guarding. Musculoskeletal:         General: Normal range of motion. Cervical back: Normal range of motion and neck supple. Right lower leg: Edema present. Left lower leg: Edema present. Skin:     General: Skin is warm and dry. Capillary Refill: Capillary refill takes less than 2 seconds. Neurological:      Mental Status: He is alert and oriented to person, place, and time. Mental status is at baseline. Psychiatric:         Mood and Affect: Mood normal.         DIAGNOSTIC RESULTS     EKG: All EKG's are interpreted by the Emergency Department Physician who either signs or Co-signsthis chart in the absence of a cardiologist.    EKG shows a normal sinus rhythm rate of 69 no acute ST-T wave changes. Normal axis. Normal EKG. RADIOLOGY:   Non-plain filmimages such as CT, Ultrasound and MRI are read by the radiologist. Plain radiographic images are visualized and preliminarily interpreted by the emergency physician with the below findings:      Interpretation per the Radiologist below, if available at the time ofthis note:    XR CHEST (2 VW)    (Results Pending)   CTA Chest W WO  (PE study)    (Results Pending)         ED BEDSIDE ULTRASOUND:   Performed by ED Physician - none    LABS:  Labs Reviewed   COMPREHENSIVE METABOLIC PANEL - Abnormal; Notable for the following components:       Result Value    Glucose 133 (*)     All other components within normal limits   CBC WITH AUTO DIFFERENTIAL - Abnormal; Notable for the following components:    RBC 4.47 (*)     Hemoglobin 13.3 (*)     Hematocrit 38.9 (*)     Neutrophils Absolute 6.9 (*)     Monocytes Absolute 1.2 (*)     All other components within normal limits   D-DIMER, QUANTITATIVE - Abnormal; Notable for the following components:    D-Dimer, Quant 2.95 (*)     All other components within normal limits    Narrative:     Christian Du tel. [id number],  DIMER  results called to and read back by Orquidea Patel to Dr. Kimberly Cheung, 10/28/2021  00:39, by [employer]   TROPONIN       All other labs were within normal range or not returned as of this dictation.     EMERGENCY DEPARTMENT COURSE and DIFFERENTIAL DIAGNOSIS/MDM:   Vitals:    Vitals:    10/27/21 2329 10/28/21 0000 10/28/21 0045   BP: (!) 148/81 113/65 129/75   Pulse: 76 68 61   Resp: 18     Temp: 98.4 °F (36.9 °C)     TempSrc: Oral     SpO2: 94% 94% 94%   Weight: 254 lb (115.2 kg)     Height: 5' 10\" (1.778 m)          MDM on recheck patient is feeling better. He is found to have small pulmonary emboli in the segmental pulmonary arteries of bilateral upper and right lower lobes. No signs of right heart strain. Based on the algorithm for treating pulmonary emboli the patient can be discharged home after receiving Lovenox with a prescription for Eliquis. He has an appointment to be seen by his primary care physician in 12 hours. I would like him to keep that appointment and discuss the short and long-term treatment plan. He is not hypoxic. No signs of right heart strain. Normal vitals. CONSULTS:  None    PROCEDURES:  Unless otherwise noted below, none     Procedures    FINAL IMPRESSION      1.  Pulmonary embolism, other, unspecified chronicity, unspecified whether acute cor pulmonale present [facility])          DISPOSITION/PLAN   DISPOSITION Decision To Discharge 10/28/2021 02:18:10 AM      PATIENT REFERRED TO:  Graham Victor MD  [address] [place] [address]  [phone]    Today  Keep appointment for later today      DISCHARGE MEDICATIONS:  New Prescriptions    APIXABAN (ELIQUIS) 5 MG TABS TABLET    Take 1 tablet by mouth 2 times daily          (Please note that portions of this note were completed with a voice recognition program.  Efforts were made to edit the dictations but occasionally words are mis-transcribed.)    Mushtaq López MD (electronically signed)  Attending Emergency Physician         Mushtaq López MD  10/28/21 0556

## 2021-10-28 NOTE — ED TRIAGE NOTES
Arrived to the ER for c/o CP. States began to have CP yesterday on the right side. Pain begins on the right side of chest at the base of ribs and radiates superiorly. Thought was having gas so took medication for gas relief without relief. Reports mild increase in SOB from baseline. Denies n/v, diarrhea.

## 2021-10-28 NOTE — PATIENT INSTRUCTIONS
Unclear etiology as to why patient has had a pulmonary embolism which then also makes it difficult to determine how long he should be on treatment. Refer to hematology to help with evaluation for this. Patient Education        8 Things You Can Do to Help Prevent Blood Clots  A blood clot in a deep vein, usually in the legs, is called a deep vein thrombosis (DVT). A DVT can break loose and travel through the bloodstream to the lungs. Then the clot can block blood flow in the lungs (pulmonary embolism). This can be life-threatening. That's why it's important to take steps to prevent a clot. Take a blood-thinning medicine (called an anticoagulant), if prescribed. If your doctor prescribes medicine, take it as directed. Exercise your lower leg muscles. This helps keep the blood moving through your legs. Pump your feet up and down by pulling your toes up toward your knees then pointing them down. Repeat. This is a good exercise to do when you are sitting for long periods of time. Get up out of bed as soon as your doctor says it's okay. Being active is one of the best things you can do to prevent clots. Take plenty of breaks when you travel. On long car trips, stop the car and walk around every hour or so. On the bus, plane, or train, get out of your seat and walk up and down the aisle every hour, if you can. Be active. Try to get 30 minutes or more of activity on most days of the week. Don't smoke. Smoking can increase your risk of blood clots. If you need help quitting, talk to your doctor about stop-smoking programs and medicines. Check with your doctor about whether you should use hormone forms of birth control or hormone therapy. These may increase your risk of blood clots. Use compression stockings if your doctor prescribes them. These are specially fitted stockings that may prevent blood clots by keeping blood from pooling in your legs.    Current as of: July 6, 2021               Content Version: 13.0  © 2006-2021 [employer]. Care instructions adapted under license by [facility] ([facility]). If you have questions about a medical condition or this instruction, always ask your healthcare professional. [address] any warranty or liability for your use of this information. Patient Education        Pulmonary Embolism: Care Instructions  Overview     Pulmonary embolism is the sudden blockage of an artery in the lung. Blood clots in the deep veins of the leg or pelvis (deep vein thrombosis, or DVT) are the most common cause. These blood clots can travel to the lungs. Pulmonary embolism can be very serious. Because you have had one pulmonary embolism, you are at greater risk for having another one. But you can take steps to prevent another pulmonary embolism. You will probably take a prescription blood-thinning medicine to prevent blood clots. A blood thinner can stop a blood clot from growing larger and prevent new clots from forming. Follow-up care is a key part of your treatment and safety. Be sure to make and go to all appointments, and call your doctor if you are having problems. It's also a good idea to know your test results and keep a list of the medicines you take. How can you care for yourself at home? · Take your medicines exactly as prescribed. Call your doctor if you think you are having a problem with your medicine. You will get more details on the specific medicines your doctor prescribes. · If you are taking a blood thinner, be sure you get instructions about how to take your medicine safely. Blood thinners can cause serious bleeding problems. · Try to walk several times a day. Walking helps keep blood moving in your legs. Before doing other types of exercise, ask your doctor what type and level of exercise is safe for you. · Take steps to help prevent blood clots in your legs.  For example:  ? Exercise your lower leg muscles if you sit for long periods of time. Pump your feet up and down by pulling your toes up toward your knees then pointing them down. Repeat. ? After an illness or surgery, try to get up and out of bed often. If you can't get out of bed, flex your feet every hour to keep the blood moving through your legs. ? Take plenty of breaks when you travel. On long car trips, stop the car and walk around every hour or so. On the bus, plane, or train, get out of your seat and walk up and down the aisle every hour, if you can.  ? Wear compression stockings if your doctor recommends them. ? Check with your doctor about whether you should use hormonal forms of birth control or hormone therapy. These may increase your risk of blood clots. · Have a healthy lifestyle. This includes being active, staying at a healthy weight, and not smoking. When should you call for help? Call 911 anytime you think you may need emergency care. For example, call if:    · You have shortness of breath.     · You have chest pain.     · You passed out (lost consciousness).     · You cough up blood. Call your doctor now or seek immediate medical care if:    · You have new or worsening pain or swelling in your leg. Watch closely for changes in your health, and be sure to contact your doctor if:    · You do not get better as expected. Where can you learn more? Go to https://[email or web address].[employer]. org and sign in to your [employer] account. Enter I331 in the [facility] box to learn more about \"Pulmonary Embolism: Care Instructions. \"     If you do not have an account, please click on the \"Sign Up Now\" link. Current as of: July 6, 2021               Content Version: 13.0  © 0771-9534 [employer]. Care instructions adapted under license by [facility] ([facility]).  If you have questions about a medical condition or this instruction, always ask your healthcare professional. [employer] disclaims any warranty or liability for your use of this information.

## 2021-10-31 ENCOUNTER — APPOINTMENT (OUTPATIENT)
Dept: GENERAL RADIOLOGY | Age: 71
End: 2021-10-31
Payer: MEDICARE

## 2021-10-31 ENCOUNTER — HOSPITAL ENCOUNTER (EMERGENCY)
Age: 71
Discharge: HOME | End: 2021-10-31
Attending: FAMILY MEDICINE
Payer: MEDICARE

## 2021-10-31 VITALS
TEMPERATURE: 97.8 F | OXYGEN SATURATION: 95 % | HEIGHT: 70 IN | DIASTOLIC BLOOD PRESSURE: 74 MMHG | HEART RATE: 63 BPM | BODY MASS INDEX: 36.36 KG/M2 | SYSTOLIC BLOOD PRESSURE: 150 MMHG | WEIGHT: 254 LBS | RESPIRATION RATE: 20 BRPM

## 2021-10-31 DIAGNOSIS — M54.50 CHRONIC RIGHT-SIDED LOW BACK PAIN WITHOUT SCIATICA: ICD-10-CM

## 2021-10-31 DIAGNOSIS — I26.99 BILATERAL PULMONARY EMBOLISM (HCC): Primary | ICD-10-CM

## 2021-10-31 DIAGNOSIS — G89.29 CHRONIC RIGHT-SIDED LOW BACK PAIN WITHOUT SCIATICA: ICD-10-CM

## 2021-10-31 LAB
ALBUMIN SERPL-MCNC: 3.8 G/DL (ref 3.5–4.6)
ALP BLD-CCNC: 80 U/L (ref 35–104)
ALT SERPL-CCNC: 12 U/L (ref 0–41)
ANION GAP SERPL CALCULATED.3IONS-SCNC: 11 MEQ/L (ref 9–15)
AST SERPL-CCNC: 16 U/L (ref 0–40)
BASOPHILS ABSOLUTE: 0.1 K/UL (ref 0–0.2)
BASOPHILS RELATIVE PERCENT: 0.6 %
BILIRUB SERPL-MCNC: 0.5 MG/DL (ref 0.2–0.7)
BUN BLDV-MCNC: 17 MG/DL (ref 8–23)
CALCIUM SERPL-MCNC: 9 MG/DL (ref 8.5–9.9)
CHLORIDE BLD-SCNC: 100 MEQ/L (ref 95–107)
CO2: 28 MEQ/L (ref 20–31)
CREAT SERPL-MCNC: 0.76 MG/DL (ref 0.7–1.2)
EOSINOPHILS ABSOLUTE: 0.4 K/UL (ref 0–0.7)
EOSINOPHILS RELATIVE PERCENT: 4.2 %
GFR AFRICAN AMERICAN: >60
GFR NON-AFRICAN AMERICAN: >60
GLOBULIN: 2.3 G/DL (ref 2.3–3.5)
GLUCOSE BLD-MCNC: 110 MG/DL (ref 70–99)
HCT VFR BLD CALC: 36.2 % (ref 42–52)
HEMOGLOBIN: 12.3 G/DL (ref 14–18)
LACTIC ACID: 0.8 MMOL/L (ref 0.5–2.2)
LYMPHOCYTES ABSOLUTE: 1.7 K/UL (ref 1–4.8)
LYMPHOCYTES RELATIVE PERCENT: 17.2 %
MCH RBC QN AUTO: 29 PG (ref 27–31.3)
MCHC RBC AUTO-ENTMCNC: 34 % (ref 33–37)
MCV RBC AUTO: 85.4 FL (ref 80–100)
MONOCYTES ABSOLUTE: 1.2 K/UL (ref 0.2–0.8)
MONOCYTES RELATIVE PERCENT: 11.8 %
NEUTROPHILS ABSOLUTE: 6.6 K/UL (ref 1.4–6.5)
NEUTROPHILS RELATIVE PERCENT: 66.2 %
PDW BLD-RTO: 14.1 % (ref 11.5–14.5)
PLATELET # BLD: 193 K/UL (ref 130–400)
POTASSIUM SERPL-SCNC: 3.7 MEQ/L (ref 3.4–4.9)
PRO-BNP: 149 PG/ML
PROCALCITONIN: 0.04 NG/ML (ref 0–0.15)
RBC # BLD: 4.25 M/UL (ref 4.7–6.1)
SODIUM BLD-SCNC: 139 MEQ/L (ref 135–144)
TOTAL CK: 165 U/L (ref 0–190)
TOTAL PROTEIN: 6.1 G/DL (ref 6.3–8)
TROPONIN: <0.01 NG/ML (ref 0–0.01)
WBC # BLD: 10 K/UL (ref 4.8–10.8)

## 2021-10-31 PROCEDURE — 83880 ASSAY OF NATRIURETIC PEPTIDE: CPT

## 2021-10-31 PROCEDURE — 83605 ASSAY OF LACTIC ACID: CPT

## 2021-10-31 PROCEDURE — 80053 COMPREHEN METABOLIC PANEL: CPT

## 2021-10-31 PROCEDURE — 84145 PROCALCITONIN (PCT): CPT

## 2021-10-31 PROCEDURE — 85025 COMPLETE CBC W/AUTO DIFF WBC: CPT

## 2021-10-31 PROCEDURE — 99282 EMERGENCY DEPT VISIT SF MDM: CPT

## 2021-10-31 PROCEDURE — 82550 ASSAY OF CK (CPK): CPT

## 2021-10-31 PROCEDURE — 84484 ASSAY OF TROPONIN QUANT: CPT

## 2021-10-31 PROCEDURE — 36415 COLL VENOUS BLD VENIPUNCTURE: CPT

## 2021-10-31 PROCEDURE — 71045 X-RAY EXAM CHEST 1 VIEW: CPT

## 2021-10-31 PROCEDURE — 93005 ELECTROCARDIOGRAM TRACING: CPT | Performed by: FAMILY MEDICINE

## 2021-10-31 RX ORDER — LIDOCAINE 50 MG/G
1 PATCH TOPICAL DAILY
Qty: 10 PATCH | Refills: 0 | Status: SHIPPED | OUTPATIENT
Start: 2021-10-31 | End: 2021-11-10

## 2021-10-31 ASSESSMENT — ENCOUNTER SYMPTOMS
BACK PAIN: 1
SHORTNESS OF BREATH: 1

## 2021-10-31 ASSESSMENT — PAIN DESCRIPTION - LOCATION: LOCATION: FLANK

## 2021-10-31 ASSESSMENT — PAIN DESCRIPTION - DESCRIPTORS: DESCRIPTORS: ACHING

## 2021-10-31 ASSESSMENT — PAIN DESCRIPTION - ORIENTATION: ORIENTATION: RIGHT

## 2021-10-31 ASSESSMENT — PAIN SCALES - GENERAL: PAINLEVEL_OUTOF10: 6

## 2021-10-31 ASSESSMENT — PAIN DESCRIPTION - PAIN TYPE: TYPE: ACUTE PAIN

## 2021-10-31 NOTE — ED NOTES
Provider at bedside. Pt states pain is in lower right flank stabbing pain.      Bianka Mejia RN  10/31/21 0395

## 2021-10-31 NOTE — ED PROVIDER NOTES
[address] [facility] ED  eMERGENCY dEPARTMENT eNCOUnter      Pt Name: Bard Dukes  MRN: [id number]  [place] 1950  Date of evaluation: 10/31/2021  Provider: Sarahi Hernández MD    CHIEF COMPLAINT       Chief Complaint   Patient presents with    Back Pain         HISTORY OF PRESENT ILLNESS   (Location/Symptom, Timing/Onset,Context/Setting, Quality, Duration, Modifying Factors, Severity)  Note limiting factors. Bard Dukes is a 70 y.o. male who presents to the emergency department BACK PAIN    70years old male patient with known history of hypertension hyperlipidemia degenerative disc disease recent diagnosis of bilateral pulmonary embolism currently on Eliquis and tolerating well. Presented today states he have left lower back pain started yesterday and was worse this morning pain is worse with movement but denies any blood in the urine urinary urgency frequency denies any other complaint or concern denies any cough shortness of breath    The history is provided by the patient. NursingNotes were reviewed. REVIEW OF SYSTEMS    (2-9 systems for level 4, 10 or more for level 5)     Review of Systems   Constitutional: Negative. HENT: Negative. Respiratory: Positive for shortness of breath. Cardiovascular: Negative. Musculoskeletal: Positive for back pain. Skin: Negative. Psychiatric/Behavioral: Negative. Except as noted above the remainder of the review of systems was reviewed and negative.        PAST MEDICAL HISTORY     Past Medical History:   Diagnosis Date    A-fib [facility])     Abnormal EMG 12/09/2015    Actinic keratoses     Actinic keratoses     Acute diastolic congestive heart failure (HCC) 1/6/2021    Allergic rhinitis     Anemia 11/18/2014    Arthritis     Chronic back pain     COPD (chronic obstructive pulmonary disease) ([facility] [address] 08/09/2012    COPD (chronic obstructive pulmonary disease) (HCC)     Crohns disease     Degenerative disc disease, lumbar     Dermatitis     Diabetes ([facility] [address] 03/19/2015    diet controlled    Gastrointestinal problem     GERD (gastroesophageal reflux disease)     History of atrial fibrillation 2006    Dr. Rian Lombard History of throat cancer 2004     cleared for reoccurence years ago    Hyperlipidemia 1/21/2014    Hypertension     Hypogonadism male     Lung disease     Mononeuritis multiplex     Mononeuritis multiplex     Nondependent alcohol abuse, in remission 7/22/2020    Obesity (BMI 30-39. 9) 7/24/2019    Osteoarthritis of lumbar spine     Pain of right heel     Paresthesia of foot, bilateral     Prediabetes 12/3/2014    Restless leg syndrome     Seborrheic dermatitis     Seborrheic keratoses, inflamed     Throat cancer (HCC)     throat, had surgery, sees Dr Castro Holding yearly    Tinea cruris     Tinea pedis     Tinea unguium          SURGICALHISTORY       Past Surgical History:   Procedure Laterality Date    CARDIAC CATHETERIZATION      CARPAL TUNNEL RELEASE      CATARACT REMOVAL WITH IMPLANT Right 09/09/2019    CATARACT REMOVAL WITH IMPLANT Left 07/22/2019    COLON SURGERY      COLONOSCOPY  04/15/2015    KNEE ARTHROSCOPY      LARYNGECTOMY  2004    UPPER GASTROINTESTINAL ENDOSCOPY  03/24/13    Dr. Margarito Ernandez       Discharge Medication List as of 10/31/2021 12:52 PM      CONTINUE these medications which have NOT CHANGED    Details   apixaban (ELIQUIS) 5 MG TABS tablet Take 1 tablet by mouth 2 times daily, Disp-60 tablet, R-0Print      RESTASIS 0.05 % ophthalmic emulsion DAWHistorical Med      mometasone (ELOCON) 0.1 % cream APPLY to EAR TWICE DAILY, Historical Med      montelukast (SINGULAIR) 10 MG tablet TAKE 1 TABLET NIGHTLY, Disp-90 tablet, R-3Normal      !! albuterol sulfate HFA (PROAIR HFA) 108 (90 Base) MCG/ACT inhaler Inhale 2 puffs into the lungs every 6 hours as needed for Wheezing, Disp-1 Inhaler, R-3Normal      testosterone (ANDROGEL; TESTIM) 50 MG/5GM (1%) GEL 1% gel APPLY 1 PACKET ALTERNATING WITH 2 PACKETS DAILY EVERY OTHER DAY, Disp-675 g, R-0Normal      celecoxib (CELEBREX) 200 MG capsule Take 1 capsule by mouth daily, Disp-30 capsule, R-1Print      ustekinumab (STELARA) 45 MG/0.5ML SOSY prefilled syringe Inject 45 mg into the skin onceHistorical Med      triamcinolone (KENALOG) 0.025 % cream Apply topically 2 times daily. , Disp-1 Tube, R-0, Normal      !! albuterol sulfate  (90 Base) MCG/ACT inhaler Inhale 2 puffs into the lungs every 6 hours as needed for Wheezing or Shortness of Breath, Disp-3 Inhaler, R-1Normal      furosemide (LASIX) 20 MG tablet Take 1 tablet by mouth daily, Disp-30 tablet, R-2Normal      nystatin-triamcinolone (MYCOLOG II) 959666-0.1 UNIT/GM-% cream Apply topically 2 times daily. , Disp-15 g, R-1, Normal      budesonide-formoterol (SYMBICORT) 160-4.5 MCG/ACT AERO Inhale 2 puffs into the lungs 2 times daily 2 each LOT [id number] EXP [id number], Disp-3 Inhaler, R-1Normal      albuterol (PROVENTIL) (2.5 MG/3ML) 0.083% nebulizer solution Take 3 mLs by nebulization every 6 hours as needed for Wheezing, Disp-120 each, R-3Normal      gabapentin (NEURONTIN) 300 MG capsule TAKE 1 CAPSULE DAILY, Disp-90 capsule, R-3Normal      doxazosin (CARDURA) 4 MG tablet TAKE ONE-HALF (1/2) TABLET TWICE A DAY, Disp-90 tablet, R-3Normal      lisinopril (PRINIVIL;ZESTRIL) 20 MG tablet Take 1 tablet by mouth daily, Disp-90 tablet, R-3Normal      metoprolol tartrate (LOPRESSOR) 50 MG tablet Take 1.5 po bid, Disp-15 tablet, R-0Normal      pravastatin (PRAVACHOL) 40 MG tablet TAKE 1 TABLET NIGHTLY, Disp-90 tablet, R-3Normal      SPIRIVA HANDIHALER 18 MCG inhalation capsule INHALE THE CONTENTS OF 1 CAPSULE DAILY, Disp-90 capsule,R-3Normal      cetirizine (ZYRTEC) 5 MG tablet TAKE ONE TABLET BY MOUTH  PRNHistorical Med      nitroGLYCERIN (NITROSTAT) 0.4 MG SL tablet DISSOLVE 1 TABLET UNDER THE TONGUE EVERY 5 MINUTES AS NEEDED FOR CHEST PAIN. MAXIMUM OF 3 TABLETS, Disp-25 tablet, R-2Normal      CPAP Machine MISC Disp-1 each, R-0, PrintNew cpap supplies mask hose filters etc      Saccharomyces boulardii (PROBIOTIC) 250 MG CAPS Take 1 tablet by mouth daily      esomeprazole Magnesium (NEXIUM) 20 MG PACK Take 20 mg by mouth daily      aspirin 81 MG tablet Take 81 mg by mouth daily. mesalamine (PENTASA) 250 MG CR capsule Take 500 mg by mouth 4 times daily. !! - Potential duplicate medications found. Please discuss with provider. ALLERGIES     Alemtuzumab, Glycopyrrolate-formoterol, Mercaptopurine, and Moxifloxacin    FAMILY HISTORY       Family History   Problem Relation Age of Onset    Heart Disease Mother     Liver Disease Mother     High Blood Pressure Mother     Heart Disease Father     Arthritis Father     Cancer Sister         lung          SOCIAL HISTORY       Social History     Socioeconomic History    Marital status:      Spouse name: Not on file    Number of children: 1    Years of education: Not on file    Highest education level: Not on file   Occupational History    Not on file   Tobacco Use    Smoking status: Former Smoker     Packs/day: 1.00     Years: 25.00     Pack years: 25.00     Types: Cigarettes     Quit date: 10/21/1990     Years since quittin.0    Smokeless tobacco: Never Used   Substance and Sexual Activity    Alcohol use: No     Comment: Attends AA, 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    Drug use: No    Sexual activity: Not on file   Other Topics Concern    Not on file   Social History Narrative    Lives alone. Social Determinants of Health     Financial Resource Strain: Low Risk     Difficulty of Paying Living Expenses: Not hard at all   Food Insecurity: No Food Insecurity    Worried About Running Out of Food in the Last Year: Never true    Dacia of Food in the Last Year: Never true   Transportation Needs: No Transportation Needs    Lack of Transportation (Medical):  No    Lack of Transportation (Non-Medical): No   Physical Activity:     Days of Exercise per Week:     Minutes of Exercise per Session:    Stress:     Feeling of Stress :    Social Connections:     Frequency of Communication with Friends and Family:     Frequency of Social Gatherings with Friends and Family:     Attends Mandaeism Services:     Active Member of Clubs or Organizations:     Attends Club or Organization Meetings:     Marital Status:    Intimate Partner Violence:     Fear of Current or Ex-Partner:     Emotionally Abused:     Physically Abused:     Sexually Abused:        SCREENINGS    Evan Coma Scale  Eye Opening: Spontaneous  Best Verbal Response: Oriented  Best Motor Response: Obeys commands  [place]wick Coma Scale Score: 15 @FLOW(61158701)@      PHYSICAL EXAM    (up to 7 for level 4, 8 or more for level 5)     ED Triage Vitals [10/31/21 1018]   BP Temp Temp Source Pulse Resp SpO2 Height Weight   119/73 97.8 °F (36.6 °C) Oral 67 20 98 % 5' 10\" (1.778 m) 254 lb (115.2 kg)       Physical Exam  Vitals and nursing note reviewed. Constitutional:       Appearance: He is well-developed. HENT:      Head: Normocephalic and atraumatic. Right Ear: External ear normal.      Left Ear: External ear normal.      Nose: Nose normal.   Eyes:      Pupils: Pupils are equal, round, and reactive to light. Cardiovascular:      Rate and Rhythm: Normal rate and regular rhythm. Heart sounds: Normal heart sounds. Pulmonary:      Effort: Pulmonary effort is normal. No respiratory distress. Breath sounds: Normal breath sounds. No wheezing or rales. Chest:      Chest wall: No tenderness. Abdominal:      General: Bowel sounds are normal.      Palpations: Abdomen is soft. Musculoskeletal:         General: Normal range of motion. Cervical back: Normal range of motion and neck supple. Legs:    Skin:     General: Skin is warm and dry.    Neurological:      Mental Status: He is alert and oriented to person, place, and time. Cranial Nerves: No cranial nerve deficit. Sensory: No sensory deficit. Motor: No abnormal muscle tone. Coordination: Coordination normal.      Deep Tendon Reflexes: Reflexes normal.   Psychiatric:         Behavior: Behavior normal.         Thought Content: Thought content normal.         Judgment: Judgment normal.         DIAGNOSTIC RESULTS     EKG: All EKG's are interpreted by the Emergency Department Physician who either signs or Co-signsthis chart in the absence of a cardiologist.    EKG: Sinus bradycardia rate 58 no change from previous EKG. RADIOLOGY:   Non-plain filmimages such as CT, Ultrasound and MRI are read by the radiologist. Plain radiographic images are visualized and preliminarily interpreted by the emergency physician with the below findings:  Multiple bilateral acute pulmonary emboli.       Cardiomegaly.       Moderate hiatal hernia.       Cholelithiasis.         All CT scans at this facility use dose modulation, iterative reconstruction, and/or weight based dosing when appropriate to reduce radiation dose to as low as reasonably achievable.           Order History    Open Order Details   PACS Images     Show images for CTA Chest W WO (PE study)           Interpretation per the Radiologist below, if available at the time ofthis note:    XR CHEST PORTABLE   Final Result      No radiographic evidence of acute intrathoracic process.                   ED BEDSIDE ULTRASOUND:   Performed by ED Physician - none    LABS:  Labs Reviewed   COMPREHENSIVE METABOLIC PANEL - Abnormal; Notable for the following components:       Result Value    Glucose 110 (*)     Total Protein 6.1 (*)     All other components within normal limits   CBC WITH AUTO DIFFERENTIAL - Abnormal; Notable for the following components:    RBC 4.25 (*)     Hemoglobin 12.3 (*)     Hematocrit 36.2 (*)     Neutrophils Absolute 6.6 (*)     Monocytes Absolute 1.2 (*)     All other components within normal limits   LACTIC ACID, PLASMA   PROCALCITONIN   TROPONIN   BRAIN NATRIURETIC PEPTIDE   CK       All other labs were within normal range or not returned as of this dictation. EMERGENCY DEPARTMENT COURSE and DIFFERENTIAL DIAGNOSIS/MDM:   Vitals:    Vitals:    10/31/21 1037 10/31/21 1040 10/31/21 1210 10/31/21 1230   BP: 108/75 137/73 (!) 145/74 (!) 150/74   Pulse:       Resp:       Temp:       TempSrc:       SpO2: 94% 94%  (!) 89%   Weight:       Height:                  MDM  Number of Diagnoses or Management Options  Bilateral pulmonary embolism (HCC)  Chronic right-sided low back pain without sciatica  Diagnosis management comments: Patient with known history of hypertension degenerative disc disease hyperlipidemia GERD recent diagnosis of bilateral pulmonary embolism currently on Eliquis for anticoagulation. Presented to the ER for lower back pain pain found to be likely musculoskeletal patient was ambulating okay pain resolved during his stay in the ER patient remained hemodynamically stable with stenting 95% on room air blood pressure remained stable patient already took his Eliquis 10 mg this morning for his recent diagnosis of the ER. Patient was discharged home in stable condition was given a prescription for Lidoderm patch for the back pain advised close follow-up with primary care physician this week. Patient verbalized understanding and agreed with      CONSULTS:  None    PROCEDURES:  Unless otherwise noted below, none     Procedures    FINAL IMPRESSION      1. Bilateral pulmonary embolism ([address]    2.  Chronic right-sided low back pain without sciatica          DISPOSITION/PLAN   DISPOSITION Decision To Discharge 10/31/2021 12:51:03 PM      PATIENT REFERRED TO:  Brando Lorenz MD  [address] [phone]    In 1 week        DISCHARGE MEDICATIONS:  Discharge Medication List as of 10/31/2021 12:52 PM      START taking these medications    Details   lidocaine (LIDODERM) 5 % Place 1 patch onto the skin daily for 10 days 12 hours on, 12 hours off., Disp-10 patch, R-0Normal                (Please note thatportions of this note were completed with a voice recognition program.  Efforts were made to edit the dictations but occasionally words are mis-transcribed.)    Tania Yeboah MD (electronically signed)  Attending Emergency Physician          Sherren Mayans Dardir, MD  10/31/21 [phone]

## 2021-10-31 NOTE — ED TRIAGE NOTES
Patient to ER for right sided flank pain that began last night. He reports being diagnosed with a right sided PE three days prior and states that pain has resolved. He reports SOB, but it has not worsened. History of COPD without home O2 use. He denies any problems with urination.  No n/v/d. Dapsone Pregnancy And Lactation Text: This medication is Pregnancy Category C and is not considered safe during pregnancy or breast feeding.

## 2021-11-01 LAB
EKG ATRIAL RATE: 58 BPM
EKG P AXIS: 27 DEGREES
EKG P-R INTERVAL: 160 MS
EKG Q-T INTERVAL: 434 MS
EKG QRS DURATION: 98 MS
EKG QTC CALCULATION (BAZETT): 426 MS
EKG R AXIS: -4 DEGREES
EKG T AXIS: 3 DEGREES
EKG VENTRICULAR RATE: 58 BPM

## 2021-11-01 PROCEDURE — 93010 ELECTROCARDIOGRAM REPORT: CPT | Performed by: INTERNAL MEDICINE

## 2021-11-02 ENCOUNTER — TELEPHONE (OUTPATIENT)
Dept: PHARMACY | Facility: CLINIC | Age: 71
End: 2021-11-02

## 2021-11-02 ENCOUNTER — CARE COORDINATION (OUTPATIENT)
Dept: CARE COORDINATION | Age: 71
End: 2021-11-02

## 2021-11-02 ENCOUNTER — OFFICE VISIT (OUTPATIENT)
Dept: FAMILY MEDICINE CLINIC | Age: 71
End: 2021-11-02
Payer: MEDICARE

## 2021-11-02 VITALS
SYSTOLIC BLOOD PRESSURE: 148 MMHG | BODY MASS INDEX: 36.76 KG/M2 | OXYGEN SATURATION: 97 % | DIASTOLIC BLOOD PRESSURE: 70 MMHG | WEIGHT: 256.8 LBS | HEIGHT: 70 IN | HEART RATE: 72 BPM | TEMPERATURE: 97.1 F

## 2021-11-02 DIAGNOSIS — M25.551 RIGHT HIP PAIN: ICD-10-CM

## 2021-11-02 DIAGNOSIS — M54.50 ACUTE RIGHT-SIDED LOW BACK PAIN WITHOUT SCIATICA: Primary | ICD-10-CM

## 2021-11-02 PROCEDURE — 99214 OFFICE O/P EST MOD 30 MIN: CPT | Performed by: FAMILY MEDICINE

## 2021-11-02 ASSESSMENT — ENCOUNTER SYMPTOMS
SORE THROAT: 0
SHORTNESS OF BREATH: 1
COUGH: 0
BACK PAIN: 1

## 2021-11-02 ASSESSMENT — PATIENT HEALTH QUESTIONNAIRE - PHQ9
SUM OF ALL RESPONSES TO PHQ QUESTIONS 1-9: 4
SUM OF ALL RESPONSES TO PHQ QUESTIONS 1-9: 4

## 2021-11-02 NOTE — PATIENT INSTRUCTIONS
Patient will continue Eliquis for pulmonary embolism. Patient is no longer taking Celebrex. Patient will be sent for consultation with physical therapy for right low back pain and right hip pain.

## 2021-11-02 NOTE — PROGRESS NOTES
Diagnosis Orders   1. Acute right-sided low back pain without sciatica  [facility] [place]   2. Right hip pain  [facility] [place]     Return if symptoms worsen or fail to improve. Patient Instructions   Patient will continue Eliquis for pulmonary embolism. Patient is no longer taking Celebrex. Patient will be sent for consultation with physical therapy for right low back pain and right hip pain. Subjective:      Patient ID: Stepan Henao is a 70 y.o. male who presents for:  Chief Complaint   Patient presents with    Back Pain     concerned for blood clots        Patient states he is no longer taking Celebrex. He has increased pain in the right side of his back and into his hip. Sometimes it radiates around towards his groin. Denies any bulge or abnormal masses. Denies any recent obvious trauma or stressor to the back of the groin. Denies pain in the leg. Denies weakness. Current Outpatient Medications on File Prior to Visit   Medication Sig Dispense Refill    lidocaine (LIDODERM) 5 % Place 1 patch onto the skin daily for 10 days 12 hours on, 12 hours off. (Patient not taking: Reported on 11/2/2021) 10 patch 0    apixaban (ELIQUIS) 5 MG TABS tablet Take 1 tablet by mouth 2 times daily 60 tablet 0    RESTASIS 0.05 % ophthalmic emulsion Place 1 drop into both eyes daily       mometasone (ELOCON) 0.1 % cream APPLY to EAR TWICE DAILY      montelukast (SINGULAIR) 10 MG tablet TAKE 1 TABLET NIGHTLY 90 tablet 3    albuterol sulfate HFA (PROAIR HFA) 108 (90 Base) MCG/ACT inhaler Inhale 2 puffs into the lungs every 6 hours as needed for Wheezing 1 Inhaler 3    testosterone (ANDROGEL; TESTIM) 50 MG/5GM (1%) GEL 1% gel APPLY 1 PACKET ALTERNATING WITH 2 PACKETS DAILY EVERY OTHER  g 0    ustekinumab (STELARA) 45 MG/0.5ML SOSY prefilled syringe Inject 45 mg into the skin once      triamcinolone (KENALOG) 0.025 % cream Apply topically 2 times daily. (Patient not taking: Reported on 11/2/2021) 1 Tube 0    albuterol sulfate  (90 Base) MCG/ACT inhaler Inhale 2 puffs into the lungs every 6 hours as needed for Wheezing or Shortness of Breath 3 Inhaler 1    furosemide (LASIX) 20 MG tablet Take 1 tablet by mouth daily (Patient taking differently: Take 40 mg by mouth daily Indications: Taking 40 mg tablet daily ) 30 tablet 2    nystatin-triamcinolone (MYCOLOG II) 516769-9.1 UNIT/GM-% cream Apply topically 2 times daily. (Patient not taking: Reported on 11/2/2021) 15 g 1    budesonide-formoterol (SYMBICORT) 160-4.5 MCG/ACT AERO Inhale 2 puffs into the lungs 2 times daily 2 each LOT [id number] EXP [id number] 3 Inhaler 1    albuterol (PROVENTIL) (2.5 MG/3ML) 0.083% nebulizer solution Take 3 mLs by nebulization every 6 hours as needed for Wheezing (Patient not taking: Reported on 11/2/2021) 120 each 3    gabapentin (NEURONTIN) 300 MG capsule TAKE 1 CAPSULE DAILY 90 capsule 3    doxazosin (CARDURA) 4 MG tablet TAKE ONE-HALF (1/2) TABLET TWICE A DAY 90 tablet 3    lisinopril (PRINIVIL;ZESTRIL) 20 MG tablet Take 1 tablet by mouth daily 90 tablet 3    metoprolol tartrate (LOPRESSOR) 50 MG tablet Take 1.5 po bid 15 tablet 0    pravastatin (PRAVACHOL) 40 MG tablet TAKE 1 TABLET NIGHTLY 90 tablet 3    SPIRIVA HANDIHALER 18 MCG inhalation capsule INHALE THE CONTENTS OF 1 CAPSULE DAILY 90 capsule 3    cetirizine (ZYRTEC) 5 MG tablet TAKE ONE TABLET BY MOUTH  PRN (Patient not taking: Reported on 11/2/2021)      nitroGLYCERIN (NITROSTAT) 0.4 MG SL tablet DISSOLVE 1 TABLET UNDER THE TONGUE EVERY 5 MINUTES AS NEEDED FOR CHEST PAIN.  MAXIMUM OF 3 TABLETS (Patient not taking: Reported on 11/2/2021) 25 tablet 2    CPAP Machine MISC New cpap supplies mask hose filters etc 1 each 0    Saccharomyces boulardii (PROBIOTIC) 250 MG CAPS Take 1 tablet by mouth daily      esomeprazole Magnesium (NEXIUM) 20 MG PACK Take 20 mg by mouth daily      aspirin 81 MG tablet Take 81 mg by mouth daily.  mesalamine (PENTASA) 250 MG CR capsule Take 500 mg by mouth 4 times daily. No current facility-administered medications on file prior to visit. Past Medical History:   Diagnosis Date    A-fib [facility])     Abnormal EMG 12/09/2015    Actinic keratoses     Actinic keratoses     Acute diastolic congestive heart failure (HCC) 1/6/2021    Allergic rhinitis     Anemia 11/18/2014    Arthritis     Chronic back pain     COPD (chronic obstructive pulmonary disease) ([facility]) 08/09/2012    COPD (chronic obstructive pulmonary disease) ([facility])     Crohns disease     Degenerative disc disease, lumbar     Dermatitis     Diabetes ([facility] [address] 03/19/2015    diet controlled    Gastrointestinal problem     GERD (gastroesophageal reflux disease)     History of atrial fibrillation 2006    Dr. Jovani Trujillo History of throat cancer 2004     cleared for reoccurence years ago    Hyperlipidemia 1/21/2014    Hypertension     Hypogonadism male     Lung disease     Mononeuritis multiplex     Mononeuritis multiplex     Nondependent alcohol abuse, in remission 7/22/2020    Obesity (BMI 30-39. 9) 7/24/2019    Osteoarthritis of lumbar spine     Pain of right heel     Paresthesia of foot, bilateral     Prediabetes 12/3/2014    Restless leg syndrome     Seborrheic dermatitis     Seborrheic keratoses, inflamed     Throat cancer (HCC)     throat, had surgery, sees Dr Dara Estrellaestic yearly    Tinea cruris     Tinea pedis     Tinea unguium      Past Surgical History:   Procedure Laterality Date    CARDIAC CATHETERIZATION      CARPAL TUNNEL RELEASE      CATARACT REMOVAL WITH IMPLANT Right 09/09/2019    CATARACT REMOVAL WITH IMPLANT Left 07/22/2019    COLON SURGERY      COLONOSCOPY  04/15/2015    KNEE ARTHROSCOPY      LARYNGECTOMY  2004    UPPER GASTROINTESTINAL ENDOSCOPY  03/24/13    Dr. Jacque HOWARD/NAPOLEON RODRIGUEZ  2002     Social History     Socioeconomic History    Marital status:      Spouse name: Not on file    Number of children: 3    Years of education: Not on file    Highest education level: Not on file   Occupational History    Not on file   Tobacco Use    Smoking status: Former Smoker     Packs/day: 1.00     Years: 25.00     Pack years: 25.00     Types: Cigarettes     Quit date: 10/21/1990     Years since quittin.0    Smokeless tobacco: Never Used   Substance and Sexual Activity    Alcohol use: No     Comment: Attends AA, 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    Drug use: No    Sexual activity: Not on file   Other Topics Concern    Not on file   Social History Narrative    Lives alone. Social Determinants of Health     Financial Resource Strain: Low Risk     Difficulty of Paying Living Expenses: Not hard at all   Food Insecurity: No Food Insecurity    Worried About Running Out of Food in the Last Year: Never true    Dacia of Food in the Last Year: Never true   Transportation Needs: No Transportation Needs    Lack of Transportation (Medical): No    Lack of Transportation (Non-Medical):  No   Physical Activity:     Days of Exercise per Week:     Minutes of Exercise per Session:    Stress:     Feeling of Stress :    Social Connections:     Frequency of Communication with Friends and Family:     Frequency of Social Gatherings with Friends and Family:     Attends Orthodoxy Services:     Active Member of Clubs or Organizations:     Attends Club or Organization Meetings:     Marital Status:    Intimate Partner Violence:     Fear of Current or Ex-Partner:     Emotionally Abused:     Physically Abused:     Sexually Abused:      Family History   Problem Relation Age of Onset    Heart Disease Mother     Liver Disease Mother     High Blood Pressure Mother     Heart Disease Father     Arthritis Father     Cancer Sister         lung     Allergies:  Alemtuzumab, Glycopyrrolate-formoterol, Mercaptopurine, and Moxifloxacin    Review of Systems   Constitutional: Negative for PM   Result Value Ref Range    Ventricular Rate 69 BPM    Atrial Rate 69 BPM    P-R Interval 150 ms    QRS Duration 96 ms    Q-T Interval 390 ms    QTc Calculation (Bazett) 417 ms    P Axis 13 degrees    R Axis -5 degrees    T Axis 6 degrees   CBC Auto Differential    Collection Time: 10/27/21 11:56 PM   Result Value Ref Range    WBC 10.5 4.8 - 10.8 K/uL    RBC 4.47 (L) 4.70 - 6.10 M/uL    Hemoglobin 13.3 (L) 14.0 - 18.0 g/dL    Hematocrit 38.9 (L) 42.0 - 52.0 %    MCV 86.9 80.0 - 100.0 fL    MCH 29.8 27.0 - 31.3 pg    MCHC 34.2 33.0 - 37.0 %    RDW 14.1 11.5 - 14.5 %    Platelets 200 930 - 836 K/uL    Neutrophils % 65.5 %    Lymphocytes % 18.6 %    Monocytes % 11.8 %    Eosinophils % 3.4 %    Basophils % 0.7 %    Neutrophils Absolute 6.9 (H) 1.4 - 6.5 K/uL    Lymphocytes Absolute 2.0 1.0 - 4.8 K/uL    Monocytes Absolute 1.2 (H) 0.2 - 0.8 K/uL    Eosinophils Absolute 0.4 0.0 - 0.7 K/uL    Basophils Absolute 0.1 0.0 - 0.2 K/uL   Comprehensive Metabolic Panel    Collection Time: 10/27/21 11:57 PM   Result Value Ref Range    Sodium 139 135 - 144 mEq/L    Potassium 3.8 3.4 - 4.9 mEq/L    Chloride 101 95 - 107 mEq/L    CO2 26 20 - 31 mEq/L    Anion Gap 12 9 - 15 mEq/L    Glucose 133 (H) 70 - 99 mg/dL    BUN 20 8 - 23 mg/dL    CREATININE 0.90 0.70 - 1.20 mg/dL    GFR Non-African American >60.0 >60    GFR  >60.0 >60    Calcium 9.1 8.5 - 9.9 mg/dL    Total Protein 6.6 6.3 - 8.0 g/dL    Albumin 3.8 3.5 - 4.6 g/dL    Total Bilirubin 0.5 0.2 - 0.7 mg/dL    Alkaline Phosphatase 88 35 - 104 U/L    ALT 11 0 - 41 U/L    AST 17 0 - 40 U/L    Globulin 2.8 2.3 - 3.5 g/dL   Troponin    Collection Time: 10/27/21 11:57 PM   Result Value Ref Range    Troponin <0.010 0.000 - 0.010 ng/mL   D-Dimer, Quantitative    Collection Time: 10/27/21 11:57 PM   Result Value Ref Range    D-Dimer, Quant 2.95 (HH) 0.00 - 0.50 mg/L FEU   POCT glycosylated hemoglobin (Hb A1C)    Collection Time: 10/28/21  3:53 PM   Result Value Ref Range Hemoglobin A1C 5.6 %   Comprehensive Metabolic Panel    Collection Time: 10/31/21 10:45 AM   Result Value Ref Range    Sodium 139 135 - 144 mEq/L    Potassium 3.7 3.4 - 4.9 mEq/L    Chloride 100 95 - 107 mEq/L    CO2 28 20 - 31 mEq/L    Anion Gap 11 9 - 15 mEq/L    Glucose 110 (H) 70 - 99 mg/dL    BUN 17 8 - 23 mg/dL    CREATININE 0.76 0.70 - 1.20 mg/dL    GFR Non-African American >60.0 >60    GFR  >60.0 >60    Calcium 9.0 8.5 - 9.9 mg/dL    Total Protein 6.1 (L) 6.3 - 8.0 g/dL    Albumin 3.8 3.5 - 4.6 g/dL    Total Bilirubin 0.5 0.2 - 0.7 mg/dL    Alkaline Phosphatase 80 35 - 104 U/L    ALT 12 0 - 41 U/L    AST 16 0 - 40 U/L    Globulin 2.3 2.3 - 3.5 g/dL   CBC Auto Differential    Collection Time: 10/31/21 10:45 AM   Result Value Ref Range    WBC 10.0 4.8 - 10.8 K/uL    RBC 4.25 (L) 4.70 - 6.10 M/uL    Hemoglobin 12.3 (L) 14.0 - 18.0 g/dL    Hematocrit 36.2 (L) 42.0 - 52.0 %    MCV 85.4 80.0 - 100.0 fL    MCH 29.0 27.0 - 31.3 pg    MCHC 34.0 33.0 - 37.0 %    RDW 14.1 11.5 - 14.5 %    Platelets 632 948 - 031 K/uL    Neutrophils % 66.2 %    Lymphocytes % 17.2 %    Monocytes % 11.8 %    Eosinophils % 4.2 %    Basophils % 0.6 %    Neutrophils Absolute 6.6 (H) 1.4 - 6.5 K/uL    Lymphocytes Absolute 1.7 1.0 - 4.8 K/uL    Monocytes Absolute 1.2 (H) 0.2 - 0.8 K/uL    Eosinophils Absolute 0.4 0.0 - 0.7 K/uL    Basophils Absolute 0.1 0.0 - 0.2 K/uL   Lactic Acid, Plasma    Collection Time: 10/31/21 10:45 AM   Result Value Ref Range    Lactic Acid 0.8 0.5 - 2.2 mmol/L   PROCALCITONIN    Collection Time: 10/31/21 10:45 AM   Result Value Ref Range    Procalcitonin 0.04 0.00 - 0.15 ng/mL   Troponin    Collection Time: 10/31/21 10:45 AM   Result Value Ref Range    Troponin <0.010 0.000 - 0.010 ng/mL   Brain Natriuretic Peptide    Collection Time: 10/31/21 10:45 AM   Result Value Ref Range    Pro- pg/mL   CK    Collection Time: 10/31/21 10:45 AM   Result Value Ref Range    Total  0 - 190 U/L   EKG 12 Lead - Chest Pain    Collection Time: 10/31/21 10:52 AM   Result Value Ref Range    Ventricular Rate 58 BPM    Atrial Rate 58 BPM    P-R Interval 160 ms    QRS Duration 98 ms    Q-T Interval 434 ms    QTc Calculation (Bazett) 426 ms    P Axis 27 degrees    R Axis -4 degrees    T Axis 3 degrees       [] Pt was seen by provider for      Minutes  Counseling and coordination of care was done for all assessment diagnosis listed for today with patient and any family/friend present. More than 50% of this visit was spent coordinating cuurent care, obtaining information for prior records, and counseling for current plan of action. Assessment:       Diagnosis Orders   1. Acute right-sided low back pain without sciatica  [facility] [place]   2. Right hip pain  [facility] [place]         Orders Placed This Encounter   Procedures    [facility] [place]     Referral Priority:   Routine     Referral Type:   Eval and Treat     Referral Reason:   Specialty Services Required     Requested Specialty:   Physical Therapy     Number of Visits Requested:   1       No orders of the defined types were placed in this encounter. Medication List          Accurate as of November 2, 2021  1:31 PM. If you have any questions, ask your nurse or doctor.             CHANGE how you take these medications    furosemide 20 MG tablet  Commonly known as: LASIX  Take 1 tablet by mouth daily  What changed: how much to take        CONTINUE taking these medications    * albuterol (2.5 MG/3ML) 0.083% nebulizer solution  Commonly known as: PROVENTIL  Take 3 mLs by nebulization every 6 hours as needed for Wheezing     * albuterol sulfate  (90 Base) MCG/ACT inhaler  Inhale 2 puffs into the lungs every 6 hours as needed for Wheezing or Shortness of Breath     * albuterol sulfate  (90 Base) MCG/ACT inhaler  Commonly known as: ProAir HFA  Inhale 2 puffs into the lungs every 6 hours as needed for Wheezing     apixaban 5 MG Tabs tablet  Commonly known as: Eliquis  Take 1 tablet by mouth 2 times daily     aspirin 81 MG tablet     budesonide-formoterol 160-4.5 MCG/ACT Aero  Commonly known as: Symbicort  Inhale 2 puffs into the lungs 2 times daily 2 each LOT [id number] EXP [id number]     cetirizine 5 MG tablet  Commonly known as: ZYRTEC     CPAP Machine Misc  New cpap supplies mask hose filters etc     doxazosin 4 MG tablet  Commonly known as: CARDURA  TAKE ONE-HALF (1/2) TABLET TWICE A DAY     esomeprazole Magnesium 20 MG Pack  Commonly known as: NEXIUM     gabapentin 300 MG capsule  Commonly known as: NEURONTIN  TAKE 1 CAPSULE DAILY     lidocaine 5 %  Commonly known as: LIDODERM  Place 1 patch onto the skin daily for 10 days 12 hours on, 12 hours off.     lisinopril 20 MG tablet  Commonly known as: PRINIVIL;ZESTRIL  Take 1 tablet by mouth daily     mesalamine 250 MG extended release capsule  Commonly known as: PENTASA     metoprolol tartrate 50 MG tablet  Commonly known as: LOPRESSOR  Take 1.5 po bid     mometasone 0.1 % cream  Commonly known as: ELOCON     montelukast 10 MG tablet  Commonly known as: SINGULAIR  TAKE 1 TABLET NIGHTLY     nitroGLYCERIN 0.4 MG SL tablet  Commonly known as: NITROSTAT  DISSOLVE 1 TABLET UNDER THE TONGUE EVERY 5 MINUTES AS NEEDED FOR CHEST PAIN. MAXIMUM OF 3 TABLETS     nystatin-triamcinolone 991446-7.1 UNIT/GM-% cream  Commonly known as: MYCOLOG II  Apply topically 2 times daily.      pravastatin 40 MG tablet  Commonly known as: PRAVACHOL  TAKE 1 TABLET NIGHTLY     Probiotic 250 MG Caps     Restasis 0.05 % ophthalmic emulsion  Generic drug: cycloSPORINE     Spiriva HandiHaler 18 MCG inhalation capsule  Generic drug: tiotropium  INHALE THE CONTENTS OF 1 CAPSULE DAILY     Stelara 45 MG/0.5ML Sosy prefilled syringe  Generic drug: ustekinumab     testosterone 50 MG/5GM (1%) Gel 1% gel  Commonly known as: ANDROGEL; TESTIM  APPLY 1 PACKET ALTERNATING WITH 2 PACKETS DAILY EVERY OTHER DAY     triamcinolone 0.025 % cream  Commonly known as: KENALOG  Apply topically 2 times daily. * This list has 3 medication(s) that are the same as other medications prescribed for you. Read the directions carefully, and ask your doctor or other care provider to review them with you. STOP taking these medications    celecoxib 200 MG capsule  Commonly known as: CELEBREX  Stopped by: Corrina Hinton MD              Plan:   Return if symptoms worsen or fail to improve. Patient Instructions   Patient will continue Eliquis for pulmonary embolism. Patient is no longer taking Celebrex. Patient will be sent for consultation with physical therapy for right low back pain and right hip pain. This note was partially created with the assistance of dictation. This may lead to grammatical or spelling errors. Sher Kaur M.D.

## 2021-11-02 NOTE — TELEPHONE ENCOUNTER
Received a referral:  from Care Coordinator to review patients medications. Called patient to schedule a time to speak with a pharmacist over the telephone. Spoke to patient and advised them of the above message. Patient verified understanding and scheduled their appointment: tomorrow at 3:30pm    Jen Cagle CPhT.    [address] [place] [address] free: [phone]

## 2021-11-02 NOTE — CARE COORDINATION
Ambulatory Care Coordination Note  11/2/2021  CM Risk Score: 8  Charlson 10 Year Mortality Risk Score: 100%     ACC: Feliciano Braga RN    Summary Note:     I called to complete an ER follow up with Radha Singleton and to discuss care coordination. Radha Singleton spoke with my about his ER visits including the visit with PE  He is currently on Eliquis and is asking appropriated questions regarding interactions related to mesalamine and baby ASA and Eliquis and stelara  I will reach out to our pharmacy to obtain his input and advise. I have reviewed, current allergies, current medical history, falls screening, initiated CC protocol, depression screening, full medication review, travel screening, and ACP review. PHQ-9 = 4 no depression. He has had his COVID vaccines and booster as of 10/8/21 along with his flu vaccine 10/6/2021  He denies any issues with chest pain or SOB  I have provided him with my contact information and I have asked him to call me with any questions or concerns. Care Coordination Interventions    Program Enrollment: Complex Care  Referral from Primary Care Provider: No  Suggested Interventions and Community Resources         Goals Addressed    None         Prior to Admission medications    Medication Sig Start Date End Date Taking?  Authorizing Provider   apixaban (ELIQUIS) 5 MG TABS tablet Take 1 tablet by mouth 2 times daily 10/28/21 11/27/21 Yes Erika Alcaraz MD   RESTASIS 0.05 % ophthalmic emulsion Place 1 drop into both eyes daily  8/23/21  Yes Historical Provider, MD   mometasone (ELOCON) 0.1 % cream APPLY to EAR TWICE DAILY 9/8/21  Yes Historical Provider, MD   montelukast (SINGULAIR) 10 MG tablet TAKE 1 TABLET NIGHTLY 9/13/21  Yes Tila Rubio MD   albuterol sulfate HFA (PROAIR HFA) 108 (90 Base) MCG/ACT inhaler Inhale 2 puffs into the lungs every 6 hours as needed for Wheezing 8/30/21  Yes Natalya Miguel MD   testosterone (ANDROGEL; TESTIM) 50 MG/5GM (1%) GEL 1% gel APPLY 1 PACKET ALTERNATING WITH 2 PACKETS DAILY EVERY OTHER DAY 8/11/21 11/11/21 Yes Austin Berg MD   ustekinumab [facility]) 45 MG/0.5ML SOSY prefilled syringe Inject 45 mg into the skin once   Yes Historical Provider, MD   furosemide (LASIX) 20 MG tablet Take 1 tablet by mouth daily  Patient taking differently: Take 40 mg by mouth daily Indications: Taking 40 mg tablet daily  5/25/21  Yes Austin Berg MD   budesonide-formoterol [facility]) 160-4.5 MCG/ACT AERO Inhale 2 puffs into the lungs 2 times daily 2 each LOT [id number] EXP [id number] 4/29/21  Yes Marvine Blizzard, MD   gabapentin (NEURONTIN) 300 MG capsule TAKE 1 CAPSULE DAILY 3/12/21 3/12/22 Yes Austin Berg MD   doxazosin (CARDURA) 4 MG tablet TAKE ONE-HALF (1/2) TABLET TWICE A DAY 3/12/21  Yes Austin Berg MD   lisinopril (PRINIVIL;ZESTRIL) 20 MG tablet Take 1 tablet by mouth daily 1/6/21  Yes Austin Berg MD   metoprolol tartrate (LOPRESSOR) 50 MG tablet Take 1.5 po bid 12/28/20  Yes Irvin Ramirez,    pravastatin (PRAVACHOL) 40 MG tablet TAKE 1 TABLET NIGHTLY 12/23/20  Yes Austin Berg MD   SPIRIVA HANDIHALER 18 MCG inhalation capsule INHALE THE CONTENTS OF 1 CAPSULE DAILY 11/25/20  Yes Marvine Blizzard, MD   CPAP Machine MISC New cpap supplies mask hose filters etc 1/15/18  Yes Marvine Blizzard, MD   Saccharomyces boulardii (PROBIOTIC) 250 MG CAPS Take 1 tablet by mouth daily   Yes Historical Provider, MD   esomeprazole Magnesium (NEXIUM) 20 MG PACK Take 20 mg by mouth daily   Yes Historical Provider, MD   aspirin 81 MG tablet Take 81 mg by mouth daily. Yes Historical Provider, MD   mesalamine (PENTASA) 250 MG CR capsule Take 500 mg by mouth 4 times daily. Yes Historical Provider, MD   lidocaine (LIDODERM) 5 % Place 1 patch onto the skin daily for 10 days 12 hours on, 12 hours off. Patient not taking: Reported on 11/2/2021 10/31/21 11/10/21  Katie Hernandez MD   triamcinolone (KENALOG) 0.025 % cream Apply topically 2 times daily.   Patient not taking: Reported on 11/2/2021 6/1/21   Marina Holt APRN - CNP   albuterol sulfate  (90 Base) MCG/ACT inhaler Inhale 2 puffs into the lungs every 6 hours as needed for Wheezing or Shortness of Breath 5/25/21   Aurora Gonzalez MD   nystatin-triamcinolone [facility] II) 090941-9.1 UNIT/GM-% cream Apply topically 2 times daily. Patient not taking: Reported on 11/2/2021 5/25/21   Aurora Gonzalez MD   albuterol (PROVENTIL) (2.5 MG/3ML) 0.083% nebulizer solution Take 3 mLs by nebulization every 6 hours as needed for Wheezing  Patient not taking: Reported on 11/2/2021 3/31/21   Minerva Rdz MD   cetirizine (ZYRTEC) 5 MG tablet TAKE ONE TABLET BY MOUTH  PRN  Patient not taking: Reported on 11/2/2021    Historical Provider, MD   nitroGLYCERIN (NITROSTAT) 0.4 MG SL tablet DISSOLVE 1 TABLET UNDER THE TONGUE EVERY 5 MINUTES AS NEEDED FOR CHEST PAIN.  MAXIMUM OF 3 TABLETS  Patient not taking: Reported on 11/2/2021 2/21/20   Aurora Gonzalez MD       Future Appointments   Date Time Provider Chase Barrera   11/29/2021  3:15 PM Aurora Gonzalez MD [address]   1/5/2022  2:30 PM Minerva Rdz, [address] [facility]   3/24/2022  1:00 PM Aurora Gonzalez MD [address]

## 2021-11-02 NOTE — TELEPHONE ENCOUNTER
----- Message from Ana Venegas RN sent at 11/2/2021 12:07 PM EDT -----  Regarding: Pharmacy review  Hello,    Please help with medication reconciliation for Santiago Teran  Recently diagnosed with multiple PE started on Eliquis   He has complicated history with CA throat CHF, DM IBS   Started on Eliquis. Asking about interactions with stelara, mesalamine, baby ASA      Thank you for your assistance    Please call me with any questions or concerns.     Noni Aguilera RN BSN  Ambulatory Care Manager  [phone]

## 2021-11-03 ENCOUNTER — SCHEDULED TELEPHONE ENCOUNTER (OUTPATIENT)
Dept: PHARMACY | Facility: CLINIC | Age: 71
End: 2021-11-03

## 2021-11-03 RX ORDER — NITROGLYCERIN 0.4 MG/1
TABLET SUBLINGUAL
Qty: 25 TABLET | Refills: 2 | Status: SHIPPED | OUTPATIENT
Start: 2021-11-03 | End: 2022-08-02 | Stop reason: SDUPTHER

## 2021-11-03 NOTE — TELEPHONE ENCOUNTER
CLINICAL PHARMACY NOTE - Medication Review  Patient outreach to review medications - Spoke with patient. SUBJECTIVE/OBJECTIVE:   Darwin Bingham is a 70 y.o. male referred to a clinical pharmacy specialist by care coordination and/or given their history of recent hospitilizations. Medications:  Medication Sig Comment    nitroGLYCERIN (NITROSTAT) 0.4 MG SL tablet up to max of 3 total doses. If no relief after 1 dose, call 911. Rarely has to use, medication is . New script ready at pharmacy (pt aware)    lidocaine (LIDODERM) 5 % Place 1 patch onto the skin daily for 10 days 12 hours on, 12 hours off. Pt hasn't tried patch for skeletal muscle pain yet    apixaban (ELIQUIS) 5 MG TABS tablet Take 1 tablet by mouth 2 times daily     RESTASIS 0.05 % ophthalmic emulsion Place 1 drop into both eyes daily  Is helping his eyes    mometasone (ELOCON) 0.1 % cream APPLY to EAR TWICE DAILY     montelukast (SINGULAIR) 10 MG tablet TAKE 1 TABLET NIGHTLY     albuterol sulfate HFA (PROAIR HFA) 108 (90 Base) MCG/ACT inhaler Inhale 2 puffs into the lungs every 6 hours as needed for Wheezing     testosterone (ANDROGEL; TESTIM) 50 MG/5GM (1%) GEL 1% gel APPLY 1 PACKET ALTERNATING WITH 2 PACKETS DAILY EVERY OTHER DAY     ustekinumab (STELARA) 45 MG/0.5ML SOSY prefilled syringe Inject 45 mg into the skin once     triamcinolone (KENALOG) 0.025 % cream Apply topically 2 times daily.  albuterol sulfate  (90 Base) MCG/ACT inhaler Inhale 2 puffs into the lungs every 6 hours as needed for Wheezing or Shortness of Breath Using 10-12 times per week    furosemide (LASIX) 20 MG tablet Take 1 tablet by mouth daily (Patient taking differently: Take 40 mg by mouth daily Indications: Taking 40 mg tablet daily )     nystatin-triamcinolone (MYCOLOG II) 765608-8.1 UNIT/GM-% cream Apply topically 2 times daily.  (Patient not taking: Reported on 2021)     budesonide-formoterol (SYMBICORT) 160-4.5 MCG/ACT AERO Inhale 2 puffs into the lungs 2 times daily 2 each LOT [id number] [id number] Reports missing a dose every few days    albuterol (PROVENTIL) (2.5 MG/3ML) 0.083% nebulizer solution Take 3 mLs by nebulization every 6 hours as needed for Wheezing (Patient not taking: Reported on 11/2/2021) Reports using every day (BID)    gabapentin (NEURONTIN) 300 MG capsule TAKE 1 CAPSULE DAILY Reports still having Paresthesia    doxazosin (CARDURA) 4 MG tablet TAKE ONE-HALF (1/2) TABLET TWICE A DAY     lisinopril (PRINIVIL;ZESTRIL) 20 MG tablet Take 1 tablet by mouth daily     metoprolol tartrate (LOPRESSOR) 50 MG tablet Take 1.5 po bid     pravastatin (PRAVACHOL) 40 MG tablet TAKE 1 TABLET NIGHTLY     SPIRIVA HANDIHALER 18 MCG inhalation capsule INHALE THE CONTENTS OF 1 CAPSULE DAILY Reports missing a dose every few days    cetirizine (ZYRTEC) 5 MG tablet TAKE ONE TABLET BY MOUTH  PRN (Patient not taking: Reported on 11/2/2021)     CPAP Machine MISC New cpap supplies mask hose filters etc     Saccharomyces boulardii (PROBIOTIC) 250 MG CAPS Take 1 tablet by mouth daily     esomeprazole Magnesium (NEXIUM) 20 MG PACK Take 20 mg by mouth daily     aspirin 81 MG tablet Take 81 mg by mouth daily.  mesalamine (PENTASA) 250 MG CR capsule Take 500 mg by mouth 4 times daily. No current facility-administered medications for this visit. Additional Medications (including OTC/Herbal Supplements): · Vitamin C 500mg  · Ca-Mg-Zn  · Vitamin D3  · Quercetin     Allergies: Allergies   Allergen Reactions    Alemtuzumab      Low grade fever  Other reaction(s): Other: See Comments  Low grade fever    Glycopyrrolate-Formoterol Other (See Comments)     Dizzy and felt like heart rate sped up  Other reaction(s):  Other: See Comments  Dizzy and felt like heart rate sped up    Mercaptopurine Other (See Comments)    Moxifloxacin Other (See Comments)     Pt states He gets sores in his mouth     Pertinent Labs/Vitals:  BP Readings from Last 3 Encounters:   21 (!) 148/70   10/31/21 (!) 150/74   10/28/21 118/62     Lab Results   Component Value Date    LABMICR 1.30 2021     Lab Results   Component Value Date    LABA1C 5.6 10/28/2021    LABA1C 5.6 2021    LABA1C 5.4 2020     Lab Results   Component Value Date    CHOL 108 2019    TRIG 111 2019    HDL 35 (L) 2021    LDLCALC 53 2021     ALT   Date Value Ref Range Status   10/31/2021 12 0 - 41 U/L Final     AST   Date Value Ref Range Status   10/31/2021 16 0 - 40 U/L Final     The ASCVD Risk score (Jason Reynolds, et al., 2013) failed to calculate for the following reasons: The valid total cholesterol range is 130 to 320 mg/dL     Lab Results   Component Value Date    CREATININE 0.76 10/31/2021     Estimated Creatinine Clearance: 114 mL/min (based on SCr of 0.76 mg/dL).   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     Immunizations:   Most Recent Immunizations   Administered Date(s) Administered    DTaP 2016    DTaP vaccine 2016    Hepatitis B 2002    Hepatitis B Ped/Adol (Engerix-B, Recombivax HB) 2002    Hib vaccine 1993    Influenza A (P1R8-49) Vaccine PF IM 2009    Influenza Vaccine, unspecified formulation 2005    Influenza Virus Vaccine 10/20/2016    Influenza, High Dose (Fluzone 65 yrs and older) 2018    Influenza, Quadv, adjuvanted, 65 yrs +, IM, PF (Fluad) 10/06/2021    Influenza, Triv, inactivated, subunit, adjuvanted, IM (Fluad 65 yrs and older) 10/09/2019    MMR 2001    Measles/Rubella 2001    Pneumococcal Conjugate 13-valent (Kelly Monroy) 2016    Pneumococcal Polysaccharide ([id number]) 11/27/2017    Pneumococcal Vaccine 12/14/2012    Polio OPV 04/16/1993    Varicella (Varivax) 12/31/2012    Zoster Live (Zostavax) 09/24/2013    Zoster Recombinant (Shingrix) 01/26/2019     Spirometry/PFT results: 4/12/2021 by Dr. Charlie Smith  · FVC is 3.66, 86% of predicted. FEV1 is 2.69, 87% of predicted. FEV1/FVC is 73%. FEF 25-75% is 1.99, 85% of predicted. · Lung volume study shows residual volume is 2.34, 96% of predicted. TLC is 6.32, 92% of predicted. RV to TLC ratio is 37.06, 102% of predicted. · Diffusion capacity is 19.20, 82% of predicted. Airway resistance is 1.46, 100% of predicted. Last Echo: 8/5/2021 by Dr. Antonio Blake  · Normal tricuspid valve structure and function   · Mild TR  · RVSP  · Normal left ventricle structure and function   · Left ventricular ejection fraction is visually estimated at 50-55%  · Normal diastolic filling pattern    ASSESSMENT/PLAN:   - General Assessment:   · Adherence: Appears COPD maintain inhalers could posse an adherence opportunity   · Education provided:   · Importance of taking Symbicort and Spiriva regularly as prescribed   · Goal is to limit the need for albuterol treatments    · Cost: No current concerns except lidocaine patches not covered by insurance (pt has already picked them up)  · Informed pt to check prices at pharmacy for OTC lidocaine patches (might be cheaper then filling via script)   · Current pharmacy/pharmacies: [facility] ([facility]  · Drug interactions: No clinically significant interactions identified via Lexicomp Interaction Analysis as category D or higher. · Renal dosing: No renal adjustments necessary.   · Medication monitoring: Pt's main concern was taking aspirin and Eliquis   · Education provided:  · Signs of bleeding with a focus on GI bleeding   · Pt states he will be checking after BMs and will go to the ER ASAP if he notices any signs  · Pt is aware to stop taking celecoxib and other NSAIDs  · Pt is aware to discuss with PCP before starting any new medication, OTC medication, and herbal supplement   · Pain concern: Pt reported pain starting in right buttocks that has moved to right hip and right lower stomach area  · Pt states Dr. Jonathan Crawley is aware and prescribed lidocaine patches   · Pt reports not trying the patches yet but would apply one after our phone call   · Pt is aware to speak with Dr. Jonathan Crawley if pain does not resolve  · Immunizations: Up-to-date  · Smoking status: Quit in 1990  · Blood pressure: Is not at BP target of <140/90. but is on doxazosin, lisinopril, and metoprolol therapy. · Lipids: Patient is prescribed moderate-intensity statin therapy.    Colorectal cancer screening (50-74yo with FOBT in past 12 months, flex sig in past 5 years or colonoscopy in last 10 years): Last 4/15/2015   Nephropathy screening (18-74yo with diabetes; either on ACE-I/ARB or annual microalbumin): 7/7/21 microalbumin/creatinine ratio 3.8 mg/G     - Upcoming appointments:   Future Appointments   Date Time Provider Chase Barrera   11/3/2021  3:30 PM SCHEDULE, S CLINICAL PHARMACY S Clin Rx None   11/29/2021  3:15 PM Raysa Romo MD [facility]   1/5/2022  2:30 PM Dex Coffman MD  [address]   3/24/2022  1:00 PM Raysa Romo MD [facility] Philomena Kehr, PharmD Candidate 2022  [address] [facility]  Department, toll free: [phone], option 1

## 2021-11-04 ENCOUNTER — OFFICE VISIT (OUTPATIENT)
Dept: FAMILY MEDICINE CLINIC | Age: 71
End: 2021-11-04
Payer: MEDICARE

## 2021-11-04 VITALS
HEIGHT: 70 IN | HEART RATE: 72 BPM | TEMPERATURE: 97.6 F | BODY MASS INDEX: 36.65 KG/M2 | DIASTOLIC BLOOD PRESSURE: 62 MMHG | SYSTOLIC BLOOD PRESSURE: 100 MMHG | WEIGHT: 256 LBS | OXYGEN SATURATION: 98 %

## 2021-11-04 DIAGNOSIS — Z20.822 ENCOUNTER FOR LABORATORY TESTING FOR COVID-19 VIRUS: Primary | ICD-10-CM

## 2021-11-04 LAB
Lab: NORMAL
PERFORMING INSTRUMENT: NORMAL
QC PASS/FAIL: NORMAL
SARS-COV-2, POC: NORMAL

## 2021-11-04 PROCEDURE — 87426 SARSCOV CORONAVIRUS AG IA: CPT | Performed by: FAMILY MEDICINE

## 2021-11-04 PROCEDURE — 99212 OFFICE O/P EST SF 10 MIN: CPT | Performed by: FAMILY MEDICINE

## 2021-11-04 NOTE — PROGRESS NOTES
Discussed with patient the risk of blood clotting associated with testosterone injections. 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 Patient agrees. Patient also was recently exposed to Covid virus through his son. He does not feel symptomatic however he would like to be tested especially since he has a history of COPD.     Time spent with patient evaluation of chart and testing 15 minutes

## 2021-11-04 NOTE — TELEPHONE ENCOUNTER
Notify patient testosterone therapy has an incidence of increased blood clotting risk. Since he has had a recent blood clot we should discuss discontinuing testosterone. If he is comfortable discontinuing it now he may do so.   If he has further questions he should come to the office for discussion

## 2021-11-04 NOTE — TELEPHONE ENCOUNTER
Leena Shanks MD, medication review completed with patient at the request of care coordination:  · Testosterone replacement therapy has been linked to an increased risk of VTE events. Given his age, recent PE, and history of prostate cancer, it may be reasonable to reconsider this therapy. · Extensive education provided on Eliquis    Patient has appointment with you 11/29/21. Please discuss with patient at appointment      Thank you,  WZbigniew LainezD, [address] [facility]  Department, toll free: [phone]        =======================================================      I have discussed the care of this patient with the student and I agree with the assessment/plan as documented. Educated on a possible interactions and what to do if he experiences SANJUANA. Will send staff message to [facility] as fyi. Patient to followup with hematology to discuss Eliquis and possible duration. I will send message to provider about Testosterone replacement concern.     Lester Lee, PharmD, [address] [facility]  Phone: [phone] option-7    For Pharmacy [address] in place:  No   Recommendation Provided To: Provider: 1 via Note to Provider   Intervention Detail: Discontinued Rx: 1, reason: Unsafe Therapy   Gap Closed?: Yes    Intervention Accepted By: Provider: 1   Time Spent (min): 75

## 2021-11-09 ENCOUNTER — CARE COORDINATION (OUTPATIENT)
Dept: CARE COORDINATION | Age: 71
End: 2021-11-09

## 2021-11-09 ASSESSMENT — ENCOUNTER SYMPTOMS: DYSPNEA ASSOCIATED WITH: EXERTION

## 2021-11-09 NOTE — CARE COORDINATION
Ambulatory Care Coordination Note  11/9/2021  CM Risk Score: 8  Charlson 10 Year Mortality Risk Score: 100%     ACC: Rashad Campbell RN    Summary Note:     I called to complete care coordination enrollment. SDOH, general diabetes, COPD, CHF and education assessment completed. Goals established. Last A1C 5.6% Patient is WNL for age  He is not following any diabetic diet. He adds that he does eat out about 4-6 times per week  We discussed sodium levels in fast food along with frozen and canned foods  I spoke with him about limiting his sodium intake and will discuss this at length in the future. He does check his weight daily at various times of the day and I have asked him to try to complete this at the same time of day for more accuracy regarding his weight    PLAN:  Jaswant Angulo will review the CHF and COPD zone tool sheets to become familiar with the information. Jaswant Angulo will complete all scheduled appointments, testing, and procedures. Jaswant Angulo will take all medications as prescribed. Care Coordination Interventions    Program Enrollment: Complex Care  Referral from Primary Care Provider: No  Suggested Interventions and Community Resources  Disease Specific Clinic: In Process  Disease Association: In Process  Pharmacist: In Process  Registered Dietician: Declined  Zone Management Tools: In Process  Other Services or Interventions: Pharmacy referral intiated, Dietician referral declined, CHF zone education initiated, Walk in Clinic hours and usage reviewed. COVID [employer]) booster 10/8/21 Flu vaccine updated 10/6/2021         Goals Addressed                 This Visit's Progress     Conditions and Symptoms        I will schedule office visits, as directed by my provider. I will keep my appointment or reschedule if I have to cancel. I will notify my provider of any barriers to my plan of care. I will follow my Zone Management tool to seek urgent or emergent care.   I will notify my provider of any symptoms that indicate a worsening of my condition. Barriers: lack of support, medication side effects, and lack of education  Plan for overcoming my barriers: I will work with my ACM and health care team to increase my knowledge and self care management skills for my health  Confidence: 9/10  Anticipated Goal Completion Date: 5/15/2022              Prior to Admission medications    Medication Sig Start Date End Date Taking? Authorizing Provider   nitroGLYCERIN (NITROSTAT) 0.4 MG SL tablet up to max of 3 total doses. If no relief after 1 dose, call 911. 11/3/21   Sandra Neal MD   lidocaine (LIDODERM) 5 % Place 1 patch onto the skin daily for 10 days 12 hours on, 12 hours off. 10/31/21 11/10/21  Ángela Hernandez MD   apixaban (ELIQUIS) 5 MG TABS tablet Take 1 tablet by mouth 2 times daily 10/28/21 11/27/21  Missy Bradshaw MD   RESTASIS 0.05 % ophthalmic emulsion Place 1 drop into both eyes daily  8/23/21   Historical Provider, MD   mometasone (ELOCON) 0.1 % cream APPLY to [address] [place] [address] Ne 9/8/21   Historical Provider, MD   montelukast (SINGULAIR) 10 MG tablet TAKE 1 TABLET NIGHTLY 9/13/21   Sandra Neal MD   albuterol sulfate HFA (PROAIR HFA) 108 (90 Base) MCG/ACT inhaler Inhale 2 puffs into the lungs every 6 hours as needed for Wheezing 8/30/21   Cleave Given, MD   ustekinumab (STELARA) 45 MG/0.5ML SOSY prefilled syringe Inject 45 mg into the skin once    Historical Provider, MD   triamcinolone (KENALOG) 0.025 % cream Apply topically 2 times daily.  6/1/21   STARLA Najera - CNP   albuterol sulfate  (90 Base) MCG/ACT inhaler Inhale 2 puffs into the lungs every 6 hours as needed for Wheezing or Shortness of Breath 5/25/21   Sandra Neal MD   furosemide (LASIX) 20 MG tablet Take 1 tablet by mouth daily  Patient taking differently: Take 40 mg by mouth daily Indications: Taking 40 mg tablet daily  5/25/21   Sandra Neal MD   nystatin-triamcinolone [facility] II) 897325-3.1 UNIT/GM-% cream How often do you test your blood sugar?: No Testing   Do you have barriers with adherence to non-pharmacologic self-management interventions?  (Nutrition/Exercise/Self-Monitoring): No   Have you ever had to go to the ED for symptoms of low blood sugar?: No       No patient-reported symptoms      ,   Congestive Heart Failure Assessment    Are you currently restricting fluids?: No Restriction  Do you understand a low sodium diet?: Yes  Do you understand how to read food labels?: Yes  How many restaurant meals do you eat per week?: 3-4  Do you salt your food before tasting it?: No     No patient-reported symptoms      Symptoms:  None: Yes      Patient-reported weight (lb): 253  Weight trend: stable  Salt intake watch compared to last visit: stable      and   COPD Assessment    Does the patient understand envrionmental exposure?: Yes  Is the patient able to verbalize Rescue vs. Long Acting medications?: Yes  Does the patient have a nebulizer?: No  Does the patient use a space with inhaled medications?: No     No patient-reported symptoms         Symptoms:     Symptom course: stable  Breathlessness: exertion  Increase use of rapid acting/rescue inhaled medications?: No  Change in chronic cough?: No/At Baseline  Change in sputum?: No/At Baseline  Sputum characteristics: Clear  Self Monitoring - SaO2: No

## 2021-11-16 ENCOUNTER — CARE COORDINATION (OUTPATIENT)
Dept: CARE COORDINATION | Age: 71
End: 2021-11-16

## 2021-11-16 ENCOUNTER — HOSPITAL ENCOUNTER (OUTPATIENT)
Dept: PHYSICAL THERAPY | Age: 71
Setting detail: THERAPIES SERIES
Discharge: HOME | End: 2021-11-16
Payer: MEDICARE

## 2021-11-22 ENCOUNTER — CARE COORDINATION (OUTPATIENT)
Dept: CARE COORDINATION | Age: 71
End: 2021-11-22

## 2021-11-22 NOTE — CARE COORDINATION
Ambulatory Care Coordination Note  11/22/2021  CM Risk Score: 8  Charlson 10 Year Mortality Risk Score: 100%     ACC: Timothy Saenz RN    Summary Note: ACM spoke to patient. Patient states he is doing okay. He did state he has a blood clot and has been placed on Eliquis. ACM reviewed with patient the CHF holiday tools. Patient advised to go to his [facility] to have access to these tools to review at his own pace. Patient states his CHF is stable with no changes. Patient reports his COPD has been stable. Patient states he has no diabetic issues. Gurjit Weiner received counseling on the following healthy behaviors: SELF MANAGEMENT of chronic health conditions,with goal to improve quality of life and overall wellbeing. The patient is asked to make an attempt to improve diet and exercise patterns to aid in medical management. Patient given educational materials on CHF HOLIDAY TOOLS     I have instructed Gurjit Weiner to complete a self tracking handout on Blood Sugars , Blood Pressures  and Weights and instructed them to bring it with them to his next appointment. Discussed use, benefit, and side effects of prescribed medications. Barriers to medication compliance addressed. All patient questions answered. Pt voiced understanding. The importance of daily weights, dietary sodium restriction, and contact with cardiology if weight is increased more than 3 lbs in any 48 hour period was stressed. The patient has been advised to contact us if they experience progressive SOB, orthopnea, PND or progressive edema. Diabetes Assessment    Medic Alert ID: No  Meal Planning: None   How often do you test your blood sugar?: No Testing   Do you have barriers with adherence to non-pharmacologic self-management interventions?  (Nutrition/Exercise/Self-Monitoring): No   Have you ever had to go to the ED for symptoms of low blood sugar?: No       No patient-reported symptoms      ,   Congestive Heart Failure Assessment    Are you currently restricting fluids?: No Restriction  Do you understand a low sodium diet?: Yes  Do you understand how to read food labels?: Yes  How many restaurant meals do you eat per week?: 3-4  Do you salt your food before tasting it?: No     No patient-reported symptoms      Symptoms:  CHF associated angina: Neg, CHF associated dyspnea on exertion: Pos, CHF associated fatigue: Neg, CHF associated leg swelling: Neg, CHF associated orthostatic hypotension: Neg, CHF associated shortness of breath: Neg, CHF associated weakness: Neg      Symptom course: no change  Patient-reported weight (lb): 254  Weight trend: fluctuating minimally  Salt intake watch compared to last visit: stable      and   COPD Assessment    Does the patient understand envrionmental exposure?: Yes  Is the patient able to verbalize Rescue vs. Long Acting medications?: Yes  Does the patient have a nebulizer?: No  Does the patient use a space with inhaled medications?: No     No patient-reported symptoms         Symptoms:     Have you had a recent diagnosis of pneumonia either by PCP or at a hospital?: No             Care Coordination Interventions    Program Enrollment: Complex Care  Referral from Primary Care Provider: No  Suggested Interventions and Community Resources  Disease Specific Clinic: In Process  Disease Association: In Process  Pharmacist: In Process  Registered Dietician: Declined  Zone Management Tools: In Process  Other Services or Interventions: Pharmacy referral intiated, Dietician referral declined, CHF zone education initiated, Walk in Clinic hours and usage reviewed. COVID [employer]) booster 10/8/21 Flu vaccine updated 10/6/2021         Goals Addressed                 This Visit's Progress     Conditions and Symptoms   No change     I will schedule office visits, as directed by my provider. I will keep my appointment or reschedule if I have to cancel. I will notify my provider of any barriers to my plan of care.   I will follow my Paola Mathew MD   budesonide-formoterol [facility]) 160-4.5 MCG/ACT AERO Inhale 2 puffs into the lungs 2 times daily 2 each LOT [id number] [id number] 4/29/21   Willy Thomas MD   albuterol (PROVENTIL) (2.5 MG/3ML) 0.083% nebulizer solution Take 3 mLs by nebulization every 6 hours as needed for Wheezing 3/31/21   Willy Thomas MD   gabapentin (NEURONTIN) 300 MG capsule TAKE 1 CAPSULE DAILY 3/12/21 3/12/22  Paola Mathew MD   doxazosin (CARDURA) 4 MG tablet TAKE ONE-HALF (1/2) TABLET TWICE A DAY 3/12/21   Paola Mathew MD   lisinopril (PRINIVIL;ZESTRIL) 20 MG tablet Take 1 tablet by mouth daily 1/6/21   Paola Mathew MD   metoprolol tartrate (LOPRESSOR) 50 MG tablet Take 1.5 po bid 12/28/20   Lissa Lopez DO   pravastatin (PRAVACHOL) 40 MG tablet TAKE 1 TABLET NIGHTLY 12/23/20   Paola Mathew MD   SPIRIVA HANDIHALER 18 MCG inhalation capsule INHALE THE CONTENTS OF 1 CAPSULE DAILY 11/25/20   Willy Thomas MD   cetirizine (ZYRTEC) 5 MG tablet TAKE ONE TABLET BY MOUTH  PRN    Historical Provider, MD   CPAP Machine MISC New cpap supplies mask hose filters etc 1/15/18   Willy Thomas MD   Saccharomyces boulardii (PROBIOTIC) 250 MG CAPS Take 1 tablet by mouth daily    Historical Provider, MD   esomeprazole Magnesium (NEXIUM) 20 MG PACK Take 20 mg by mouth daily    Historical Provider, MD   aspirin 81 MG tablet Take 81 mg by mouth daily. Historical Provider, MD   mesalamine (PENTASA) 250 MG CR capsule Take 500 mg by mouth 4 times daily.     Historical Provider, MD       Future Appointments   Date Time Provider Chase Barrera   11/29/2021  3:15 PM Paola Mathew MD [facility]   1/5/2022  2:30 PM Willy Thomas, [address] [facility]   3/24/2022  1:00 PM Paola Mathew MD [facility]

## 2021-11-29 ENCOUNTER — OFFICE VISIT (OUTPATIENT)
Dept: FAMILY MEDICINE CLINIC | Age: 71
End: 2021-11-29
Payer: MEDICARE

## 2021-11-29 ENCOUNTER — CARE COORDINATION (OUTPATIENT)
Dept: CARE COORDINATION | Age: 71
End: 2021-11-29

## 2021-11-29 VITALS
WEIGHT: 258.4 LBS | SYSTOLIC BLOOD PRESSURE: 120 MMHG | HEIGHT: 70 IN | OXYGEN SATURATION: 98 % | DIASTOLIC BLOOD PRESSURE: 60 MMHG | TEMPERATURE: 98.2 F | BODY MASS INDEX: 36.99 KG/M2 | HEART RATE: 64 BPM

## 2021-11-29 DIAGNOSIS — B35.6 TINEA CRURIS: ICD-10-CM

## 2021-11-29 DIAGNOSIS — R20.2 PARESTHESIA OF LEFT UPPER LIMB: Primary | ICD-10-CM

## 2021-11-29 DIAGNOSIS — I10 ESSENTIAL HYPERTENSION: ICD-10-CM

## 2021-11-29 DIAGNOSIS — L57.0 ACTINIC KERATOSES: ICD-10-CM

## 2021-11-29 DIAGNOSIS — I26.99 PULMONARY EMBOLISM, OTHER, UNSPECIFIED CHRONICITY, UNSPECIFIED WHETHER ACUTE COR PULMONALE PRESENT (HCC): ICD-10-CM

## 2021-11-29 DIAGNOSIS — H60.541 ECZEMA OF RIGHT EXTERNAL EAR: ICD-10-CM

## 2021-11-29 DIAGNOSIS — B35.1 ONYCHOMYCOSIS: ICD-10-CM

## 2021-11-29 PROCEDURE — 99214 OFFICE O/P EST MOD 30 MIN: CPT | Performed by: FAMILY MEDICINE

## 2021-11-29 RX ORDER — LISINOPRIL 20 MG/1
20 TABLET ORAL DAILY
Qty: 90 TABLET | Refills: 3 | Status: SHIPPED | OUTPATIENT
Start: 2021-11-29 | End: 2021-11-30 | Stop reason: SDUPTHER

## 2021-11-29 ASSESSMENT — ENCOUNTER SYMPTOMS: ROS SKIN COMMENTS: THICKENED TOENAILS

## 2021-11-29 NOTE — PATIENT INSTRUCTIONS
Nystatin triamcinolone cream is for yeast in the groin. Mometasone ointment or cream is for eczema in the ear or on the body. Also can be helpful for poison ivy. Triamcinolone cream helpful for eczema in the ear or the body and helpful with poison ivy.     See podiatry to get the toenail trimmed on the left first toe and continue Cicloprolix routinely

## 2021-11-29 NOTE — PROGRESS NOTES
Diagnosis Orders   1. Paresthesia of left upper limb     2. Onychomycosis     3. Eczema of right external ear     4. Tinea cruris     5. Actinic keratoses     6. Essential hypertension  lisinopril (PRINIVIL;ZESTRIL) 20 MG tablet   7. Pulmonary embolism, 11/2021 anticoag for 1 year  apixaban (ELIQUIS) 5 MG TABS tablet     Return in about 6 months (around 5/29/2022) for 15 min skin check. Patient Instructions   Nystatin triamcinolone cream is for yeast in the groin. Mometasone ointment or cream is for eczema in the ear or on the body. Also can be helpful for poison ivy. Triamcinolone cream helpful for eczema in the ear or the body and helpful with poison ivy. See podiatry to get the toenail trimmed on the left first toe and continue Cicloprolix routinely          Subjective:      Patient ID: Gail Woody is a 70 y.o. male who presents for:  Chief Complaint   Patient presents with    Skin Problem    6 Month Follow-Up    Nail Problem       Patient is here for follow-up of multiple skin conditions. He also is working on taking care of clarifying his medications    Patient states that the nystatin cream works very well for his groin. The mometasone cream looks very good for his ear      Current Outpatient Medications on File Prior to Visit   Medication Sig Dispense Refill    nitroGLYCERIN (NITROSTAT) 0.4 MG SL tablet up to max of 3 total doses.  If no relief after 1 dose, call 911. 25 tablet 2    RESTASIS 0.05 % ophthalmic emulsion Place 1 drop into both eyes daily       montelukast (SINGULAIR) 10 MG tablet TAKE 1 TABLET NIGHTLY 90 tablet 3    albuterol sulfate HFA (PROAIR HFA) 108 (90 Base) MCG/ACT inhaler Inhale 2 puffs into the lungs every 6 hours as needed for Wheezing 1 Inhaler 3    ustekinumab (STELARA) 45 MG/0.5ML SOSY prefilled syringe Inject 45 mg into the skin once      furosemide (LASIX) 20 MG tablet Take 1 tablet by mouth daily (Patient taking differently: Take 40 mg by mouth daily Indications: Taking 40 mg tablet daily ) 30 tablet 2    budesonide-formoterol (SYMBICORT) 160-4.5 MCG/ACT AERO Inhale 2 puffs into the lungs 2 times daily 2 each LOT [id number] EXP [id number] 3 Inhaler 1    albuterol (PROVENTIL) (2.5 MG/3ML) 0.083% nebulizer solution Take 3 mLs by nebulization every 6 hours as needed for Wheezing 120 each 3    gabapentin (NEURONTIN) 300 MG capsule TAKE 1 CAPSULE DAILY 90 capsule 3    doxazosin (CARDURA) 4 MG tablet TAKE ONE-HALF (1/2) TABLET TWICE A DAY 90 tablet 3    metoprolol tartrate (LOPRESSOR) 50 MG tablet Take 1.5 po bid 15 tablet 0    pravastatin (PRAVACHOL) 40 MG tablet TAKE 1 TABLET NIGHTLY 90 tablet 3    SPIRIVA HANDIHALER 18 MCG inhalation capsule INHALE THE CONTENTS OF 1 CAPSULE DAILY 90 capsule 3    CPAP Machine MISC New cpap supplies mask hose filters etc 1 each 0    Saccharomyces boulardii (PROBIOTIC) 250 MG CAPS Take 1 tablet by mouth daily      esomeprazole Magnesium (NEXIUM) 20 MG PACK Take 20 mg by mouth daily      aspirin 81 MG tablet Take 81 mg by mouth daily.  mesalamine (PENTASA) 250 MG CR capsule Take 500 mg by mouth 4 times daily.  mometasone (ELOCON) 0.1 % cream APPLY to EAR TWICE DAILY      triamcinolone (KENALOG) 0.025 % cream Apply topically 2 times daily. 1 Tube 0    nystatin-triamcinolone (MYCOLOG II) 335768-2.1 UNIT/GM-% cream Apply topically 2 times daily. 15 g 1    cetirizine (ZYRTEC) 5 MG tablet TAKE ONE TABLET BY MOUTH  PRN (Patient not taking: Reported on 11/29/2021)       No current facility-administered medications on file prior to visit.      Past Medical History:   Diagnosis Date    A-fib [facility])     Abnormal EMG 12/09/2015    Actinic keratoses     Actinic keratoses     Acute diastolic congestive heart failure (HCC) 1/6/2021    Allergic rhinitis     Anemia 11/18/2014    Arthritis     Chronic back pain     COPD [facility]chronic obstructive pulmonary disease) ([facility] 08/09/2012    [facility]     Crohns disease     Degenerative disc disease, lumbar     Dermatitis     Diabetes [facility] 2015    diet controlled    Gastrointestinal problem     GERD (gastroesophageal reflux disease)     History of atrial fibrillation     Dr. Rian Lombard History of throat cancer 2004     cleared for reoccurence years ago    Hyperlipidemia 2014    Hypertension     Hypogonadism male     Lung disease     Mononeuritis multiplex     Mononeuritis multiplex     Nondependent alcohol abuse, in remission 2020    Obesity (BMI 30-39. 9) 2019    Osteoarthritis of lumbar spine     Pain of right heel     Paresthesia of foot, bilateral     Prediabetes 12/3/2014    Restless leg syndrome     Seborrheic dermatitis     Seborrheic keratoses, inflamed     Throat cancer (HCC)     throat, had surgery, sees Dr Castro Holding yearly    Tinea cruris     Tinea pedis     Tinea unguium      Past Surgical History:   Procedure Laterality Date    CARDIAC CATHETERIZATION      CARPAL TUNNEL RELEASE      CATARACT REMOVAL WITH IMPLANT Right 2019    CATARACT REMOVAL WITH IMPLANT Left 2019    COLON SURGERY      COLONOSCOPY  04/15/2015    KNEE ARTHROSCOPY      LARYNGECTOMY  2004    UPPER GASTROINTESTINAL ENDOSCOPY  13    Dr. Breanne Garcia W/NAPOLEON RODRIGUEZ       Social History     Socioeconomic History    Marital status:      Spouse name: Not on file    Number of children: 3    Years of education: 15    Highest education level: High school graduate   Occupational History    Occupation:      Employer: [employer]   Tobacco Use    Smoking status: Former Smoker     Packs/day: 1.00     Years: 25.00     Pack years: 25.00     Types: Cigarettes     Quit date: 10/21/1990     Years since quittin.1    Smokeless tobacco: Never Used   Substance and Sexual Activity    Alcohol use: No     Comment: Attends AA, 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    Drug use: No    Sexual activity: Not Currently     Partners: Female   Other Topics Concern    Not on file   Social History Narrative    Lives alone. 2 story home     1 step to get in house and 7 steps to get upstairs    Full basement    2 children live nearby and 1 son lives in [facility]    No pets     Social Determinants of Health     Financial Resource Strain: Low Risk     Difficulty of Paying Living Expenses: Not hard at all   Food Insecurity: No Food Insecurity    Worried About [facility] in the Last Year: Never true    Dacia of Food in the Last Year: Never true   Transportation Needs: No Transportation Needs    Lack of Transportation (Medical): No    Lack of Transportation (Non-Medical):  No   Physical Activity:     Days of Exercise per Week: Not on file    Minutes of Exercise per Session: Not on file   Stress:     Feeling of Stress : Not on file   Social Connections:     Frequency of Communication with Friends and Family: Not on file    Frequency of Social Gatherings with Friends and Family: Not on file    Attends Protestant Services: Not on file    Active Member of [address] [place] [address] or Organizations: Not on file    Attends Club or Organization Meetings: Not on file    Marital Status: Not on file   Intimate Partner Violence:     Fear of Current or Ex-Partner: Not on file    Emotionally Abused: Not on file    Physically Abused: Not on file    Sexually Abused: Not on file   Housing Stability:     Unable to Pay for Housing in the Last Year: Not on file    Number of [place] in the Last Year: Not on file    Unstable Housing in the Last Year: Not on file     Family History   Problem Relation Age of Onset    Heart Disease Mother     Liver Disease Mother     High Blood Pressure Mother     Heart Disease Father     Arthritis Father     Cancer Sister         lung     Allergies:  Alemtuzumab, Glycopyrrolate-formoterol, Mercaptopurine, and Moxifloxacin    Review of Systems   Constitutional: Negative for chills and Speech: Speech normal.         Behavior: Behavior normal.         Thought Content: Thought content normal.         No results found for this visit on 11/29/21.     Recent Results (from the past 2016 hour(s))   Covid-19 Ambulatory    Collection Time: 10/01/21  3:42 PM    Specimen: Nasopharyngeal Swab   Result Value Ref Range    SARS-CoV-2 Not Detected Not Detected    Source Anterior nares    EKG 12 Lead    Collection Time: 10/27/21 11:37 PM   Result Value Ref Range    Ventricular Rate 69 BPM    Atrial Rate 69 BPM    P-R Interval 150 ms    QRS Duration 96 ms    Q-T Interval 390 ms    QTc Calculation (Bazett) 417 ms    P Axis 13 degrees    R Axis -5 degrees    T Axis 6 degrees   CBC Auto Differential    Collection Time: 10/27/21 11:56 PM   Result Value Ref Range    WBC 10.5 4.8 - 10.8 K/uL    RBC 4.47 (L) 4.70 - 6.10 M/uL    Hemoglobin 13.3 (L) 14.0 - 18.0 g/dL    Hematocrit 38.9 (L) 42.0 - 52.0 %    MCV 86.9 80.0 - 100.0 fL    MCH 29.8 27.0 - 31.3 pg    MCHC 34.2 33.0 - 37.0 %    RDW 14.1 11.5 - 14.5 %    Platelets 796 819 - 178 K/uL    Neutrophils % 65.5 %    Lymphocytes % 18.6 %    Monocytes % 11.8 %    Eosinophils % 3.4 %    Basophils % 0.7 %    Neutrophils Absolute 6.9 (H) 1.4 - 6.5 K/uL    Lymphocytes Absolute 2.0 1.0 - 4.8 K/uL    Monocytes Absolute 1.2 (H) 0.2 - 0.8 K/uL    Eosinophils Absolute 0.4 0.0 - 0.7 K/uL    Basophils Absolute 0.1 0.0 - 0.2 K/uL   Comprehensive Metabolic Panel    Collection Time: 10/27/21 11:57 PM   Result Value Ref Range    Sodium 139 135 - 144 mEq/L    Potassium 3.8 3.4 - 4.9 mEq/L    Chloride 101 95 - 107 mEq/L    CO2 26 20 - 31 mEq/L    Anion Gap 12 9 - 15 mEq/L    Glucose 133 (H) 70 - 99 mg/dL    BUN 20 8 - 23 mg/dL    CREATININE 0.90 0.70 - 1.20 mg/dL    GFR Non-African American >60.0 >60    GFR  >60.0 >60    Calcium 9.1 8.5 - 9.9 mg/dL    Total Protein 6.6 6.3 - 8.0 g/dL    Albumin 3.8 3.5 - 4.6 g/dL    Total Bilirubin 0.5 0.2 - 0.7 mg/dL    Alkaline Phosphatase 88 PROCALCITONIN    Collection Time: 10/31/21 10:45 AM   Result Value Ref Range    Procalcitonin 0.04 0.00 - 0.15 ng/mL   Troponin    Collection Time: 10/31/21 10:45 AM   Result Value Ref Range    Troponin <0.010 0.000 - 0.010 ng/mL   Brain Natriuretic Peptide    Collection Time: 10/31/21 10:45 AM   Result Value Ref Range    Pro- pg/mL   CK    Collection Time: 10/31/21 10:45 AM   Result Value Ref Range    Total  0 - 190 U/L   EKG 12 Lead - Chest Pain    Collection Time: 10/31/21 10:52 AM   Result Value Ref Range    Ventricular Rate 58 BPM    Atrial Rate 58 BPM    P-R Interval 160 ms    QRS Duration 98 ms    Q-T Interval 434 ms    QTc Calculation (Bazett) 426 ms    P Axis 27 degrees    R Axis -4 degrees    T Axis 3 degrees   POCT COVID-19, Antigen    Collection Time: 11/04/21  2:36 PM   Result Value Ref Range    SARS-COV-2, POC Not-Detected Not Detected    Lot Number [id number]     QC Pass/Fail ok     Performing Instrument BD Veritor        [] Pt was seen by provider for      Minutes  Counseling and coordination of care was done for all assessment diagnosis listed for today with patient and any family/friend present. More than 50% of this visit was spent coordinating cuurent care, obtaining information for prior records, and counseling for current plan of action. Assessment:       Diagnosis Orders   1. Paresthesia of left upper limb     2. Onychomycosis     3. Eczema of right external ear     4. Tinea cruris     5. Actinic keratoses     6. Essential hypertension  lisinopril (PRINIVIL;ZESTRIL) 20 MG tablet   7. Pulmonary embolism, 11/2021 anticoag for 1 year  apixaban (ELIQUIS) 5 MG TABS tablet         No orders of the defined types were placed in this encounter.       Orders Placed This Encounter   Medications    apixaban (ELIQUIS) 5 MG TABS tablet     Sig: Take 1 tablet by mouth 2 times daily     Dispense:  180 tablet     Refill:  3    lisinopril (PRINIVIL;ZESTRIL) 20 MG tablet     Sig: Take 1 tablet by mouth daily     Dispense:  90 tablet     Refill:  3          Medication List          Accurate as of November 29, 2021  4:26 PM. If you have any questions, ask your nurse or doctor. CHANGE how you take these medications    apixaban 5 MG Tabs tablet  Commonly known as: Eliquis  Take 1 tablet by mouth 2 times daily  What changed: how much to take  Changed by: Oumou Perkins MD     furosemide 20 MG tablet  Commonly known as: LASIX  Take 1 tablet by mouth daily  What changed: how much to take        CONTINUE taking these medications    * albuterol (2.5 MG/3ML) 0.083% nebulizer solution  Commonly known as: PROVENTIL  Take 3 mLs by nebulization every 6 hours as needed for Wheezing     * albuterol sulfate  (90 Base) MCG/ACT inhaler  Commonly known as: ProAir HFA  Inhale 2 puffs into the lungs every 6 hours as needed for Wheezing     aspirin 81 MG tablet     budesonide-formoterol 160-4.5 MCG/ACT Aero  Commonly known as: Symbicort  Inhale 2 puffs into the lungs 2 times daily 2 each LOT [id number] EXP [id number]     cetirizine 5 MG tablet  Commonly known as: ZYRTEC     CPAP Machine Misc  New cpap supplies mask hose filters etc     doxazosin 4 MG tablet  Commonly known as: CARDURA  TAKE ONE-HALF (1/2) TABLET TWICE A DAY     esomeprazole Magnesium 20 MG Pack  Commonly known as: NEXIUM     gabapentin 300 MG capsule  Commonly known as: NEURONTIN  TAKE 1 CAPSULE DAILY     lisinopril 20 MG tablet  Commonly known as: PRINIVIL;ZESTRIL  Take 1 tablet by mouth daily     mesalamine 250 MG extended release capsule  Commonly known as: PENTASA     metoprolol tartrate 50 MG tablet  Commonly known as: LOPRESSOR  Take 1.5 po bid     mometasone 0.1 % cream  Commonly known as: ELOCON     montelukast 10 MG tablet  Commonly known as: SINGULAIR  TAKE 1 TABLET NIGHTLY     nitroGLYCERIN 0.4 MG SL tablet  Commonly known as: NITROSTAT  up to max of 3 total doses. If no relief after 1 dose, call 911. nystatin-triamcinolone 292415-3.8 UNIT/GM-% cream  Commonly known as: MYCOLOG II  Apply topically 2 times daily. pravastatin 40 MG tablet  Commonly known as: PRAVACHOL  TAKE 1 TABLET NIGHTLY     Probiotic 250 MG Caps     Restasis 0.05 % ophthalmic emulsion  Generic drug: cycloSPORINE     Spiriva HandiHaler 18 MCG inhalation capsule  Generic drug: tiotropium  INHALE THE CONTENTS OF 1 CAPSULE DAILY     Stelara 45 MG/0.5ML Sosy prefilled syringe  Generic drug: ustekinumab     triamcinolone 0.025 % cream  Commonly known as: KENALOG  Apply topically 2 times daily. * This list has 2 medication(s) that are the same as other medications prescribed for you. Read the directions carefully, and ask your doctor or other care provider to review them with you. Where to Get Your Medications      These medications were sent to [address] [place] [address] [address] [place] [id number]    Phone: [phone]   · apixaban 5 MG Tabs tablet  · lisinopril 20 MG tablet           Plan:   Return in about 6 months (around 5/29/2022) for 15 min skin check. Patient Instructions   Nystatin triamcinolone cream is for yeast in the groin. Mometasone ointment or cream is for eczema in the ear or on the body. Also can be helpful for poison ivy. Triamcinolone cream helpful for eczema in the ear or the body and helpful with poison ivy. See podiatry to get the toenail trimmed on the left first toe and continue Cicloprolix routinely          This note was partially created with the assistance of dictation. This may lead to grammatical or spelling errors. Sher Trejo M.D.

## 2021-11-30 DIAGNOSIS — I10 ESSENTIAL HYPERTENSION: ICD-10-CM

## 2021-11-30 DIAGNOSIS — I26.99 PULMONARY EMBOLISM, OTHER, UNSPECIFIED CHRONICITY, UNSPECIFIED WHETHER ACUTE COR PULMONALE PRESENT (HCC): ICD-10-CM

## 2021-11-30 RX ORDER — LISINOPRIL 20 MG/1
20 TABLET ORAL DAILY
Qty: 90 TABLET | Refills: 3 | Status: SHIPPED | OUTPATIENT
Start: 2021-11-30 | End: 2022-08-05 | Stop reason: SDUPTHER

## 2021-12-06 ENCOUNTER — CARE COORDINATION (OUTPATIENT)
Dept: CARE COORDINATION | Age: 71
End: 2021-12-06

## 2021-12-06 ASSESSMENT — ENCOUNTER SYMPTOMS: DYSPNEA ASSOCIATED WITH: EXERTION

## 2021-12-06 NOTE — CARE COORDINATION
Ambulatory Care Coordination Note  12/6/2021  CM Risk Score: 8  Charlson 10 Year Mortality Risk Score: 100%     ACC: Chuck Beckham, CARLIE    Summary Note:     Leeann Quinones tells me that he feels that he is doing well  I spoke with him about his pulmonary emboli  Educated him around symptoms treatment and the importance of taking his eliquis  He verbalizes understanding. COPD  He states that he is SOB with exertion only  He states that if he walks his normal pace he is asymptomatic  If he pushes himself or walks too much he will become SOB  He is able to sleep comfortably in bed with one pillow  He denies fatigue, decreased appetite, fever, or chills  He feels that he is in the green zone for COPD    CHF  Leeann Quinones tells me that his weight today is 252 pounds and he is weighing daily. He adds that he does have edema around his ankles that he has noticed over the last week  He adds that it usually goes down at night and then gradually increases over the day  He is \"watching his salt intake. \"  He denies any issues with chest pain, lightheadedness or dizziness. He tells me that he does not have the zone tools and I will resend these today     PLAN:  Leeann Quinones will weigh himself daily  He will call me if he has any weight gain > 3 pounds in one day or 5 pounds in two days  He will review the COPD and CHF zone tool sheets to become familiar with the material  Leeann Quinones will limit his sodium to no more than 2 grams everyday. Care Coordination Interventions    Program Enrollment: Complex Care  Referral from Primary Care Provider: No  Suggested Interventions and Community Resources  Disease Specific Clinic: In Process  Disease Association: In Process  Pharmacist: In Process  Registered Dietician: Declined  Zone Management Tools: In Process  Other Services or Interventions: Pharmacy referral intiated, Dietician referral declined, CHF zone education initiated, Walk in Clinic hours and usage reviewed.   COVID ArvJolly) booster 10/8/21 Flu vaccine updated 10/6/2021         Goals Addressed    None         Prior to Admission medications    Medication Sig Start Date End Date Taking? Authorizing Provider   lisinopril (PRINIVIL;ZESTRIL) 20 MG tablet Take 1 tablet by mouth daily 11/30/21   Vinie Dakin, MD   apixaban (ELIQUIS) 5 MG TABS tablet Take 1 tablet by mouth 2 times daily 11/30/21   Vinie Dakin, MD   nitroGLYCERIN (NITROSTAT) 0.4 MG SL tablet up to max of 3 total doses. If no relief after 1 dose, call 911. 11/3/21   Vinie Dakin, MD   RESTASIS 0.05 % ophthalmic emulsion Place 1 drop into both eyes daily  8/23/21   Historical Provider, MD   mometasone (ELOCON) 0.1 % cream APPLY to [address] [facility] 9/8/21   Historical Provider, MD   montelukast (SINGULAIR) 10 MG tablet TAKE 1 TABLET NIGHTLY 9/13/21   Vinie Dakin, MD   albuterol sulfate HFA (PROAIR HFA) 108 (90 Base) MCG/ACT inhaler Inhale 2 puffs into the lungs every 6 hours as needed for Wheezing 8/30/21   Kathleen Hendrickson MD   ustekinumab (STELARA) 45 MG/0.5ML SOSY prefilled syringe Inject 45 mg into the skin once    Historical Provider, MD   triamcinolone (KENALOG) 0.025 % cream Apply topically 2 times daily. 6/1/21   STARLA Wise - CNP   furosemide (LASIX) 20 MG tablet Take 1 tablet by mouth daily  Patient taking differently: Take 40 mg by mouth daily Indications: Taking 40 mg tablet daily  5/25/21   Vinie Dakin, MD   nystatin-triamcinolone [facility]) 350939-8.1 UNIT/GM-% cream Apply topically 2 times daily.  5/25/21   Vinie Dakin, MD   budesonide-formoterol [facility]) 160-4.5 MCG/ACT AERO Inhale 2 puffs into the lungs 2 times daily 2 each LOT [id number] EXP [id number] 4/29/21   Kathleen Hendrickson MD   albuterol (PROVENTIL) (2.5 MG/3ML) 0.083% nebulizer solution Take 3 mLs by nebulization every 6 hours as needed for Wheezing 3/31/21   Kathleen Hendrickson MD   gabapentin (NEURONTIN) 300 MG capsule TAKE 1 CAPSULE DAILY 3/12/21 3/12/22  Vinie Dakin, MD   doxazosin (CARDURA) 4 MG tablet TAKE ONE-HALF (1/2) TABLET TWICE A DAY 3/12/21   Paola Mathew MD   metoprolol tartrate (LOPRESSOR) 50 MG tablet Take 1.5 po bid 12/28/20   Lissa Lopez DO   pravastatin (PRAVACHOL) 40 MG tablet TAKE 1 TABLET NIGHTLY 12/23/20   Paola Mathew MD   SPIRIVA HANDIHALER 18 MCG inhalation capsule INHALE THE CONTENTS OF 1 CAPSULE DAILY 11/25/20   Willy Thomas MD   cetirizine (ZYRTEC) 5 MG tablet TAKE ONE TABLET BY MOUTH  PRN  Patient not taking: Reported on 11/29/2021    Historical Provider, MD   CPAP Machine MISC New cpap supplies mask hose filters etc 1/15/18   Willy Thomas MD   Saccharomyces boulardii (PROBIOTIC) 250 MG CAPS Take 1 tablet by mouth daily    Historical Provider, MD   esomeprazole Magnesium (NEXIUM) 20 MG PACK Take 20 mg by mouth daily    Historical Provider, MD   aspirin 81 MG tablet Take 81 mg by mouth daily. Historical Provider, MD   mesalamine (PENTASA) 250 MG CR capsule Take 500 mg by mouth 4 times daily.     Historical Provider, MD       Future Appointments   Date Time Provider Chase Barrera   1/5/2022  2:30 PM Willy Thomas, [address] [facility] [facility]   3/24/2022  1:00 PM Paola Mathew MD [facility]   6/6/2022  1:00 PM Paola Mathew MD [facility]     ,   Congestive Heart Failure Assessment    Are you currently restricting fluids?: No Restriction  Do you understand a low sodium diet?: Yes  Do you understand how to read food labels?: Yes  How many restaurant meals do you eat per week?: 3-4  Do you salt your food before tasting it?: No     Swelling (worse than baseline) in hands, feet/legs or around abdomen, No patient-reported symptoms      Symptoms:  CHF associated dyspnea on exertion: Pos, CHF associated leg swelling: Pos      Symptom course: no change  Patient-reported weight (lb): 252  Weight trend: stable  Salt intake watch compared to last visit: stable      and   COPD Assessment    Does the patient understand envrionmental exposure?: Yes  Is the patient able to verbalize Rescue vs. Long Acting medications?: Yes  Does the patient have a nebulizer?: No  Does the patient use a space with inhaled medications?: No     No patient-reported symptoms         Symptoms:     Symptom course: stable  Breathlessness: exertion  Increase use of rapid acting/rescue inhaled medications?: No  Change in chronic cough?: No/At Baseline  Change in sputum?: No/At Baseline  Sputum characteristics: Clear  Self Monitoring - SaO2: No

## 2021-12-20 ENCOUNTER — TELEPHONE (OUTPATIENT)
Dept: FAMILY MEDICINE CLINIC | Age: 71
End: 2021-12-20

## 2021-12-20 NOTE — TELEPHONE ENCOUNTER
Pt calling he is in [place] forgot his Eliquis and  Lisinopril 20 mg, Metoprolol 50 mg and Cardura asking for a 3 day script sent to [facility] in [place] on   [place] [address] [address]

## 2021-12-21 NOTE — TELEPHONE ENCOUNTER
Spoke with pt, he said that if the script wasn't ordered when I was talking to him for us to cancel it since he was already driving out of town.

## 2021-12-22 ENCOUNTER — OFFICE VISIT (OUTPATIENT)
Dept: FAMILY MEDICINE CLINIC | Age: 71
End: 2021-12-22
Payer: MEDICARE

## 2021-12-22 ENCOUNTER — TELEPHONE (OUTPATIENT)
Dept: FAMILY MEDICINE CLINIC | Age: 71
End: 2021-12-22

## 2021-12-22 VITALS
OXYGEN SATURATION: 97 % | HEART RATE: 82 BPM | DIASTOLIC BLOOD PRESSURE: 64 MMHG | WEIGHT: 265 LBS | HEIGHT: 70 IN | BODY MASS INDEX: 37.94 KG/M2 | SYSTOLIC BLOOD PRESSURE: 136 MMHG | TEMPERATURE: 99 F

## 2021-12-22 DIAGNOSIS — R05.9 COUGH: Primary | ICD-10-CM

## 2021-12-22 LAB
Lab: NORMAL
PERFORMING INSTRUMENT: NORMAL
QC PASS/FAIL: NORMAL
SARS-COV-2, POC: NORMAL

## 2021-12-22 PROCEDURE — 99214 OFFICE O/P EST MOD 30 MIN: CPT | Performed by: FAMILY MEDICINE

## 2021-12-22 PROCEDURE — 87426 SARSCOV CORONAVIRUS AG IA: CPT | Performed by: FAMILY MEDICINE

## 2021-12-22 RX ORDER — AZITHROMYCIN 250 MG/1
TABLET, FILM COATED ORAL
Qty: 6 TABLET | Refills: 0 | Status: SHIPPED | OUTPATIENT
Start: 2021-12-22 | End: 2022-01-04 | Stop reason: ALTCHOICE

## 2021-12-22 RX ORDER — PREDNISONE 10 MG/1
TABLET ORAL
Qty: 20 TABLET | Refills: 0 | Status: SHIPPED | OUTPATIENT
Start: 2021-12-22 | End: 2022-01-04 | Stop reason: ALTCHOICE

## 2021-12-22 ASSESSMENT — ENCOUNTER SYMPTOMS
COUGH: 1
ABDOMINAL PAIN: 0
ABDOMINAL DISTENTION: 0
CHEST TIGHTNESS: 0
SORE THROAT: 0
WHEEZING: 1
PHOTOPHOBIA: 0
SHORTNESS OF BREATH: 0

## 2021-12-22 NOTE — PATIENT INSTRUCTIONS
Patient has been initiated Zithromax and prednisone taper. He will use his nebulizer machine at home every 4-6 hours to keep the airway open and clear. He is can add Mucinex to his medication regimen and stay well-hydrated.

## 2021-12-22 NOTE — PROGRESS NOTES
Diagnosis Orders   1. Cough  POCT COVID-19, Antigen    azithromycin (ZITHROMAX) 250 MG tablet    predniSONE (DELTASONE) 10 MG tablet     Return in about 5 days (around 12/27/2021) for for routine major medical condition management. Patient Instructions   Patient has been initiated Zithromax and prednisone taper. He will use his nebulizer machine at home every 4-6 hours to keep the airway open and clear. He is can add Mucinex to his medication regimen and stay well-hydrated. Subjective:      Patient ID: Melissa Vázquez is a 70 y.o. male who presents for:  Chief Complaint   Patient presents with    Cough     x 4 days     Congestion       Patient is using all his respiratory medications and noting the nebulizer to be helpful with his current symptoms. Wet cough. Green color. No fevers yet. Current Outpatient Medications on File Prior to Visit   Medication Sig Dispense Refill    lisinopril (PRINIVIL;ZESTRIL) 20 MG tablet Take 1 tablet by mouth daily 90 tablet 3    apixaban (ELIQUIS) 5 MG TABS tablet Take 1 tablet by mouth 2 times daily 180 tablet 3    nitroGLYCERIN (NITROSTAT) 0.4 MG SL tablet up to max of 3 total doses. If no relief after 1 dose, call 911. 25 tablet 2    RESTASIS 0.05 % ophthalmic emulsion Place 1 drop into both eyes daily       mometasone (ELOCON) 0.1 % cream APPLY to EAR TWICE DAILY      montelukast (SINGULAIR) 10 MG tablet TAKE 1 TABLET NIGHTLY 90 tablet 3    albuterol sulfate HFA (PROAIR HFA) 108 (90 Base) MCG/ACT inhaler Inhale 2 puffs into the lungs every 6 hours as needed for Wheezing 1 Inhaler 3    ustekinumab (STELARA) 45 MG/0.5ML SOSY prefilled syringe Inject 45 mg into the skin once      triamcinolone (KENALOG) 0.025 % cream Apply topically 2 times daily.  1 Tube 0    furosemide (LASIX) 20 MG tablet Take 1 tablet by mouth daily (Patient taking differently: Take 40 mg by mouth daily Indications: Taking 40 mg tablet daily ) 30 tablet 2    nystatin-triamcinolone (MYCOLOG II) 536670-4.1 UNIT/GM-% cream Apply topically 2 times daily. 15 g 1    budesonide-formoterol (SYMBICORT) 160-4.5 MCG/ACT AERO Inhale 2 puffs into the lungs 2 times daily 2 each LOT [id number] EXP [id number] 3 Inhaler 1    albuterol (PROVENTIL) (2.5 MG/3ML) 0.083% nebulizer solution Take 3 mLs by nebulization every 6 hours as needed for Wheezing 120 each 3    gabapentin (NEURONTIN) 300 MG capsule TAKE 1 CAPSULE DAILY 90 capsule 3    doxazosin (CARDURA) 4 MG tablet TAKE ONE-HALF (1/2) TABLET TWICE A DAY 90 tablet 3    metoprolol tartrate (LOPRESSOR) 50 MG tablet Take 1.5 po bid 15 tablet 0    pravastatin (PRAVACHOL) 40 MG tablet TAKE 1 TABLET NIGHTLY 90 tablet 3    SPIRIVA HANDIHALER 18 MCG inhalation capsule INHALE THE CONTENTS OF 1 CAPSULE DAILY 90 capsule 3    cetirizine (ZYRTEC) 5 MG tablet TAKE ONE TABLET BY MOUTH  PRN      CPAP Machine MISC New cpap supplies mask hose filters etc 1 each 0    Saccharomyces boulardii (PROBIOTIC) 250 MG CAPS Take 1 tablet by mouth daily      esomeprazole Magnesium (NEXIUM) 20 MG PACK Take 20 mg by mouth daily      aspirin 81 MG tablet Take 81 mg by mouth daily.  mesalamine (PENTASA) 250 MG CR capsule Take 500 mg by mouth 4 times daily. No current facility-administered medications on file prior to visit.      Past Medical History:   Diagnosis Date    A-fib [facility])     Abnormal EMG 12/09/2015    Actinic keratoses     Actinic keratoses     Acute diastolic congestive heart failure (HCC) 1/6/2021    Allergic rhinitis     Anemia 11/18/2014    Arthritis     Chronic back pain     [facility] 08/09/2012    COPD [facility]chronic obstructive pulmonary disease) ([facility])     Crohns disease     Degenerative disc disease, lumbar     Dermatitis     Diabetes ([facility] [address] 03/19/2015    diet controlled    Gastrointestinal problem     GERD (gastroesophageal reflux disease)     History of atrial fibrillation 2006    Dr. Tellez Flatness History of throat cancer 2004     cleared for reoccurence years ago    Hyperlipidemia 2014    Hypertension     Hypogonadism male     Lung disease     Mononeuritis multiplex     Mononeuritis multiplex     Nondependent alcohol abuse, in remission 2020    Obesity (BMI 30-39. 9) 2019    Osteoarthritis of lumbar spine     Pain of right heel     Paresthesia of foot, bilateral     Prediabetes 12/3/2014    Restless leg syndrome     Seborrheic dermatitis     Seborrheic keratoses, inflamed     Throat cancer (HCC)     throat, had surgery, sees Dr Brittanie Sommers yearly    Tinea cruris     Tinea pedis     Tinea unguium      Past Surgical History:   Procedure Laterality Date    CARDIAC CATHETERIZATION      CARPAL TUNNEL RELEASE      CATARACT REMOVAL WITH IMPLANT Right 2019    CATARACT REMOVAL WITH IMPLANT Left 2019    COLON SURGERY      COLONOSCOPY  04/15/2015    KNEE ARTHROSCOPY      LARYNGECTOMY  2004    UPPER GASTROINTESTINAL ENDOSCOPY  13    Dr. 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     2 story home     1 step to get in house and 7 steps to get upstairs    Full basement    2 children live nearby and 1 son lives in [place]    No pets Range    SARS-CoV-2 Not Detected Not Detected    Source Anterior nares    EKG 12 Lead    Collection Time: 10/27/21 11:37 PM   Result Value Ref Range    Ventricular Rate 69 BPM    Atrial Rate 69 BPM    P-R Interval 150 ms    QRS Duration 96 ms    Q-T Interval 390 ms    QTc Calculation (Bazett) 417 ms    P Axis 13 degrees    R Axis -5 degrees    T Axis 6 degrees   CBC Auto Differential    Collection Time: 10/27/21 11:56 PM   Result Value Ref Range    WBC 10.5 4.8 - 10.8 K/uL    RBC 4.47 (L) 4.70 - 6.10 M/uL    Hemoglobin 13.3 (L) 14.0 - 18.0 g/dL    Hematocrit 38.9 (L) 42.0 - 52.0 %    MCV 86.9 80.0 - 100.0 fL    MCH 29.8 27.0 - 31.3 pg    MCHC 34.2 33.0 - 37.0 %    RDW 14.1 11.5 - 14.5 %    Platelets 775 667 - 884 K/uL    Neutrophils % 65.5 %    Lymphocytes % 18.6 %    Monocytes % 11.8 %    Eosinophils % 3.4 %    Basophils % 0.7 %    Neutrophils Absolute 6.9 (H) 1.4 - 6.5 K/uL    Lymphocytes Absolute 2.0 1.0 - 4.8 K/uL    Monocytes Absolute 1.2 (H) 0.2 - 0.8 K/uL    Eosinophils Absolute 0.4 0.0 - 0.7 K/uL    Basophils Absolute 0.1 0.0 - 0.2 K/uL   Comprehensive Metabolic Panel    Collection Time: 10/27/21 11:57 PM   Result Value Ref Range    Sodium 139 135 - 144 mEq/L    Potassium 3.8 3.4 - 4.9 mEq/L    Chloride 101 95 - 107 mEq/L    CO2 26 20 - 31 mEq/L    Anion Gap 12 9 - 15 mEq/L    Glucose 133 (H) 70 - 99 mg/dL    BUN 20 8 - 23 mg/dL    CREATININE 0.90 0.70 - 1.20 mg/dL    GFR Non-African American >60.0 >60    GFR  >60.0 >60    Calcium 9.1 8.5 - 9.9 mg/dL    Total Protein 6.6 6.3 - 8.0 g/dL    Albumin 3.8 3.5 - 4.6 g/dL    Total Bilirubin 0.5 0.2 - 0.7 mg/dL    Alkaline Phosphatase 88 35 - 104 U/L    ALT 11 0 - 41 U/L    AST 17 0 - 40 U/L    Globulin 2.8 2.3 - 3.5 g/dL   Troponin    Collection Time: 10/27/21 11:57 PM   Result Value Ref Range    Troponin <0.010 0.000 - 0.010 ng/mL   D-Dimer, Quantitative    Collection Time: 10/27/21 11:57 PM   Result Value Ref Range    D-Dimer, Quant 2.95 (HH) 0.00 - 0.50 mg/L FEU   POCT glycosylated hemoglobin (Hb A1C)    Collection Time: 10/28/21  3:53 PM   Result Value Ref Range    Hemoglobin A1C 5.6 %   Comprehensive Metabolic Panel    Collection Time: 10/31/21 10:45 AM   Result Value Ref Range    Sodium 139 135 - 144 mEq/L    Potassium 3.7 3.4 - 4.9 mEq/L    Chloride 100 95 - 107 mEq/L    CO2 28 20 - 31 mEq/L    Anion Gap 11 9 - 15 mEq/L    Glucose 110 (H) 70 - 99 mg/dL    BUN 17 8 - 23 mg/dL    CREATININE 0.76 0.70 - 1.20 mg/dL    GFR Non-African American >60.0 >60    GFR  >60.0 >60    Calcium 9.0 8.5 - 9.9 mg/dL    Total Protein 6.1 (L) 6.3 - 8.0 g/dL    Albumin 3.8 3.5 - 4.6 g/dL    Total Bilirubin 0.5 0.2 - 0.7 mg/dL    Alkaline Phosphatase 80 35 - 104 U/L    ALT 12 0 - 41 U/L    AST 16 0 - 40 U/L    Globulin 2.3 2.3 - 3.5 g/dL   CBC Auto Differential    Collection Time: 10/31/21 10:45 AM   Result Value Ref Range    WBC 10.0 4.8 - 10.8 K/uL    RBC 4.25 (L) 4.70 - 6.10 M/uL    Hemoglobin 12.3 (L) 14.0 - 18.0 g/dL    Hematocrit 36.2 (L) 42.0 - 52.0 %    MCV 85.4 80.0 - 100.0 fL    MCH 29.0 27.0 - 31.3 pg    MCHC 34.0 33.0 - 37.0 %    RDW 14.1 11.5 - 14.5 %    Platelets 281 710 - 487 K/uL    Neutrophils % 66.2 %    Lymphocytes % 17.2 %    Monocytes % 11.8 %    Eosinophils % 4.2 %    Basophils % 0.6 %    Neutrophils Absolute 6.6 (H) 1.4 - 6.5 K/uL    Lymphocytes Absolute 1.7 1.0 - 4.8 K/uL    Monocytes Absolute 1.2 (H) 0.2 - 0.8 K/uL    Eosinophils Absolute 0.4 0.0 - 0.7 K/uL    Basophils Absolute 0.1 0.0 - 0.2 K/uL   Lactic Acid, Plasma    Collection Time: 10/31/21 10:45 AM   Result Value Ref Range    Lactic Acid 0.8 0.5 - 2.2 mmol/L   PROCALCITONIN    Collection Time: 10/31/21 10:45 AM   Result Value Ref Range    Procalcitonin 0.04 0.00 - 0.15 ng/mL   Troponin    Collection Time: 10/31/21 10:45 AM   Result Value Ref Range    Troponin <0.010 0.000 - 0.010 ng/mL   Brain Natriuretic Peptide    Collection Time: 10/31/21 10:45 AM   Result Value Ref Range Pro- pg/mL   CK    Collection Time: 10/31/21 10:45 AM   Result Value Ref Range    Total  0 - 190 U/L   EKG 12 Lead - Chest Pain    Collection Time: 10/31/21 10:52 AM   Result Value Ref Range    Ventricular Rate 58 BPM    Atrial Rate 58 BPM    P-R Interval 160 ms    QRS Duration 98 ms    Q-T Interval 434 ms    QTc Calculation (Bazett) 426 ms    P Axis 27 degrees    R Axis -4 degrees    T Axis 3 degrees   POCT COVID-19, Antigen    Collection Time: 11/04/21  2:36 PM   Result Value Ref Range    SARS-COV-2, POC Not-Detected Not Detected    Lot Number [id number]     QC Pass/Fail ok     Performing Instrument BD Veritor    POCT COVID-19, Antigen    Collection Time: 12/22/21 11:42 AM   Result Value Ref Range    SARS-COV-2, POC Not-Detected Not Detected    Lot Number [id number]     QC Pass/Fail pass     Performing Instrument BD Veritor        [] Pt was seen by provider for      Minutes  Counseling and coordination of care was done for all assessment diagnosis listed for today with patient and any family/friend present. More than 50% of this visit was spent coordinating cuurent care, obtaining information for prior records, and counseling for current plan of action. Assessment:       Diagnosis Orders   1. Cough  POCT COVID-19, Antigen    azithromycin (ZITHROMAX) 250 MG tablet    predniSONE (DELTASONE) 10 MG tablet         Orders Placed This Encounter   Procedures    POCT COVID-19, Antigen     Order Specific Question:   Is this test for diagnosis or screening? Answer:   Diagnosis of ill patient     Order Specific Question:   Symptomatic for COVID-19 as defined by CDC? Answer:   No     Order Specific Question:   Date of Symptom Onset     Answer:   N/A     Order Specific Question:   Hospitalized for COVID-19? Answer:   No     Order Specific Question:   Admitted to ICU for COVID-19? Answer:   No     Order Specific Question:   Employed in healthcare setting?      Answer:   No     Order Specific Question:   Resident in a congregate (group) care setting? Answer:   No     Order Specific Question:   Pregnant: Answer:   No     Order Specific Question:   Previously tested for COVID-19? Answer:   Unknown       Orders Placed This Encounter   Medications    azithromycin (ZITHROMAX) 250 MG tablet     Si tabs by mouth first day. 1 tab daily thereafter until gone. Dispense:  6 tablet     Refill:  0    predniSONE (DELTASONE) 10 MG tablet     Sig: Take 3 tabs for 3 days, then 2 tabs for 3 days, then 1 tab for 3 days, then 1/2 tab for 3 days, then stop. Dispense:  20 tablet     Refill:  0          Medication List          Accurate as of 2021  2:43 PM. If you have any questions, ask your nurse or doctor. START taking these medications    azithromycin 250 MG tablet  Commonly known as: ZITHROMAX  2 tabs by mouth first day. 1 tab daily thereafter until gone. Started by: Sandra Neal MD     predniSONE 10 MG tablet  Commonly known as: DELTASONE  Take 3 tabs for 3 days, then 2 tabs for 3 days, then 1 tab for 3 days, then 1/2 tab for 3 days, then stop.   Started by: Sandra Neal MD        CHANGE how you take these medications    furosemide 20 MG tablet  Commonly known as: LASIX  Take 1 tablet by mouth daily  What changed: how much to take        CONTINUE taking these medications    * albuterol (2.5 MG/3ML) 0.083% nebulizer solution  Commonly known as: PROVENTIL  Take 3 mLs by nebulization every 6 hours as needed for Wheezing     * albuterol sulfate  (90 Base) MCG/ACT inhaler  Commonly known as: ProAir HFA  Inhale 2 puffs into the lungs every 6 hours as needed for Wheezing     apixaban 5 MG Tabs tablet  Commonly known as: Eliquis  Take 1 tablet by mouth 2 times daily     aspirin 81 MG tablet     budesonide-formoterol 160-4.5 MCG/ACT Aero  Commonly known as: Symbicort  Inhale 2 puffs into the lungs 2 times daily 2 each LOT [id number] EXP [id number]     cetirizine 5 MG tablet  Commonly known as: ZYRTEC     CPAP Machine Misc  New cpap supplies mask hose filters etc     doxazosin 4 MG tablet  Commonly known as: CARDURA  TAKE ONE-HALF (1/2) TABLET TWICE A DAY     esomeprazole Magnesium 20 MG Pack  Commonly known as: NEXIUM     gabapentin 300 MG capsule  Commonly known as: NEURONTIN  TAKE 1 CAPSULE DAILY     lisinopril 20 MG tablet  Commonly known as: PRINIVIL;ZESTRIL  Take 1 tablet by mouth daily     mesalamine 250 MG extended release capsule  Commonly known as: PENTASA     metoprolol tartrate 50 MG tablet  Commonly known as: LOPRESSOR  Take 1.5 po bid     mometasone 0.1 % cream  Commonly known as: ELOCON     montelukast 10 MG tablet  Commonly known as: SINGULAIR  TAKE 1 TABLET NIGHTLY     nitroGLYCERIN 0.4 MG SL tablet  Commonly known as: NITROSTAT  up to max of 3 total doses. If no relief after 1 dose, call 911.     nystatin-triamcinolone 502711-4.1 UNIT/GM-% cream  Commonly known as: MYCOLOG II  Apply topically 2 times daily. pravastatin 40 MG tablet  Commonly known as: PRAVACHOL  TAKE 1 TABLET NIGHTLY     Probiotic 250 MG Caps     Restasis 0.05 % ophthalmic emulsion  Generic drug: cycloSPORINE     Spiriva HandiHaler 18 MCG inhalation capsule  Generic drug: tiotropium  INHALE THE CONTENTS OF 1 CAPSULE DAILY     Stelara 45 MG/0.5ML Sosy prefilled syringe  Generic drug: ustekinumab     triamcinolone 0.025 % cream  Commonly known as: KENALOG  Apply topically 2 times daily. * This list has 2 medication(s) that are the same as other medications prescribed for you. Read the directions carefully, and ask your doctor or other care provider to review them with you.                Where to Get Your Medications      These medications were sent to [address] [place] [address] [id number] - [address]  [address] [place] [id number]    Phone: [phone]   · azithromycin 250 MG tablet  · predniSONE 10 MG tablet           Plan: Return in about 5 days (around 12/27/2021) for for routine major medical condition management. Patient Instructions   Patient has been initiated Zithromax and prednisone taper. He will use his nebulizer machine at home every 4-6 hours to keep the airway open and clear. He is can add Mucinex to his medication regimen and stay well-hydrated. This note was partially created with the assistance of dictation. This may lead to grammatical or spelling errors. Sher Cochran M.D.

## 2021-12-27 ENCOUNTER — OFFICE VISIT (OUTPATIENT)
Dept: FAMILY MEDICINE CLINIC | Age: 71
End: 2021-12-27
Payer: MEDICARE

## 2021-12-27 VITALS
TEMPERATURE: 96.7 F | DIASTOLIC BLOOD PRESSURE: 60 MMHG | SYSTOLIC BLOOD PRESSURE: 126 MMHG | HEART RATE: 67 BPM | HEIGHT: 70 IN | OXYGEN SATURATION: 96 % | WEIGHT: 265 LBS | BODY MASS INDEX: 37.94 KG/M2

## 2021-12-27 DIAGNOSIS — B35.6 TINEA CRURIS: Primary | ICD-10-CM

## 2021-12-27 DIAGNOSIS — R05.9 COUGH: ICD-10-CM

## 2021-12-27 DIAGNOSIS — R41.3 MEMORY LOSS: Primary | ICD-10-CM

## 2021-12-27 PROCEDURE — 99214 OFFICE O/P EST MOD 30 MIN: CPT | Performed by: FAMILY MEDICINE

## 2021-12-27 RX ORDER — NYSTATIN 100000 U/G
CREAM TOPICAL 2 TIMES DAILY
Qty: 15 G | Refills: 0 | Status: SHIPPED | OUTPATIENT
Start: 2021-12-27 | End: 2022-08-02 | Stop reason: SDUPTHER

## 2021-12-27 RX ORDER — PREDNISONE 10 MG/1
TABLET ORAL
Qty: 30 TABLET | Refills: 0 | Status: SHIPPED | OUTPATIENT
Start: 2021-12-27 | End: 2022-02-07

## 2021-12-27 ASSESSMENT — ENCOUNTER SYMPTOMS
SHORTNESS OF BREATH: 0
COUGH: 1
ABDOMINAL DISTENTION: 0
PHOTOPHOBIA: 0
CHEST TIGHTNESS: 0
ABDOMINAL PAIN: 0

## 2021-12-27 NOTE — PATIENT INSTRUCTIONS
Patient will take 20 mg of prednisone tomorrow and Wednesday morning. Thursday morning he will switch to 10 mg a day until next week when he will be reevaluated    Patient will restart nystatin cream twice a day to the groin. He is also aware of dryness is a big part of the key of improving the rash.     Follow-up appointment 1 week

## 2021-12-27 NOTE — PROGRESS NOTES
Diagnosis Orders   1. Cough  POCT COVID-19, Antigen     azithromycin (ZITHROMAX) 250 MG tablet     predniSONE (DELTASONE) 10 MG tablet      Return in about 5 days (around 12/27/2021) for for routine major medical condition management. Patient Instructions   Patient has been initiated Zithromax and prednisone taper. He will use his nebulizer machine at home every 4-6 hours to keep the airway open and clear. He is can add Mucinex to his medication regimen and stay well-hydrated.

## 2021-12-27 NOTE — PROGRESS NOTES
Diagnosis Orders   1. Tinea cruris  nystatin (MYCOSTATIN) 772372 UNIT/GM cream   2. Cough       Return in about 1 week (around 1/3/2022). Patient Instructions   Patient will take 20 mg of prednisone tomorrow and Wednesday morning. Thursday morning he will switch to 10 mg a day until next week when he will be reevaluated    Patient will restart nystatin cream twice a day to the groin. He is also aware of dryness is a big part of the key of improving the rash. Follow-up appointment 1 week      Subjective:      Patient ID: Sonu Mays is a 70 y.o. male who presents for:  Chief Complaint   Patient presents with    Hypertension     address hcc's        Patient is down to 20 mg of prednisone daily and noticing control of the cough but not absence. Feeling better. Noticing red rash in his groin reoccurring with the current medication changes. Would like a refill of his medication for this      Current Outpatient Medications on File Prior to Visit   Medication Sig Dispense Refill    azithromycin (ZITHROMAX) 250 MG tablet 2 tabs by mouth first day. 1 tab daily thereafter until gone. 6 tablet 0    predniSONE (DELTASONE) 10 MG tablet Take 3 tabs for 3 days, then 2 tabs for 3 days, then 1 tab for 3 days, then 1/2 tab for 3 days, then stop. 20 tablet 0    lisinopril (PRINIVIL;ZESTRIL) 20 MG tablet Take 1 tablet by mouth daily 90 tablet 3    apixaban (ELIQUIS) 5 MG TABS tablet Take 1 tablet by mouth 2 times daily 180 tablet 3    nitroGLYCERIN (NITROSTAT) 0.4 MG SL tablet up to max of 3 total doses.  If no relief after 1 dose, call 911. 25 tablet 2    RESTASIS 0.05 % ophthalmic emulsion Place 1 drop into both eyes daily       mometasone (ELOCON) 0.1 % cream APPLY to EAR TWICE DAILY      montelukast (SINGULAIR) 10 MG tablet TAKE 1 TABLET NIGHTLY 90 tablet 3    albuterol sulfate HFA (PROAIR HFA) 108 (90 Base) MCG/ACT inhaler Inhale 2 puffs into the lungs every 6 hours as needed for Wheezing 1 Inhaler 3    ustekinumab (STELARA) 45 MG/0.5ML SOSY prefilled syringe Inject 45 mg into the skin once      triamcinolone (KENALOG) 0.025 % cream Apply topically 2 times daily. 1 Tube 0    furosemide (LASIX) 20 MG tablet Take 1 tablet by mouth daily (Patient taking differently: Take 40 mg by mouth daily Indications: Taking 40 mg tablet daily ) 30 tablet 2    nystatin-triamcinolone (MYCOLOG II) 620731-5.1 UNIT/GM-% cream Apply topically 2 times daily. 15 g 1    budesonide-formoterol (SYMBICORT) 160-4.5 MCG/ACT AERO Inhale 2 puffs into the lungs 2 times daily 2 each LOT [id number] [id number] 3 Inhaler 1    albuterol (PROVENTIL) (2.5 MG/3ML) 0.083% nebulizer solution Take 3 mLs by nebulization every 6 hours as needed for Wheezing 120 each 3    gabapentin (NEURONTIN) 300 MG capsule TAKE 1 CAPSULE DAILY 90 capsule 3    doxazosin (CARDURA) 4 MG tablet TAKE ONE-HALF (1/2) TABLET TWICE A DAY 90 tablet 3    metoprolol tartrate (LOPRESSOR) 50 MG tablet Take 1.5 po bid 15 tablet 0    pravastatin (PRAVACHOL) 40 MG tablet TAKE 1 TABLET NIGHTLY 90 tablet 3    SPIRIVA HANDIHALER 18 MCG inhalation capsule INHALE THE CONTENTS OF 1 CAPSULE DAILY 90 capsule 3    cetirizine (ZYRTEC) 5 MG tablet TAKE ONE TABLET BY MOUTH  PRN      CPAP Machine MISC New cpap supplies mask hose filters etc 1 each 0    Saccharomyces boulardii (PROBIOTIC) 250 MG CAPS Take 1 tablet by mouth daily      esomeprazole Magnesium (NEXIUM) 20 MG PACK Take 20 mg by mouth daily      aspirin 81 MG tablet Take 81 mg by mouth daily.  mesalamine (PENTASA) 250 MG CR capsule Take 500 mg by mouth 4 times daily. No current facility-administered medications on file prior to visit.      Past Medical History:   Diagnosis Date    A-fib [facility])     Abnormal EMG 12/09/2015    Actinic keratoses     Actinic keratoses     Acute diastolic congestive heart failure (HCC) 1/6/2021    Allergic rhinitis     Anemia 11/18/2014    Arthritis     Chronic back pain     [facility] obstructive pulmonary disease) ([facility] 2012    [facility]     Crohns disease     Degenerative disc disease, lumbar     Dermatitis     Diabetes ([facility] 2015    diet controlled    Gastrointestinal problem     GERD (gastroesophageal reflux disease)     History of atrial fibrillation     Dr. Breanne Meadows History of throat cancer 2004     cleared for reoccurence years ago    Hyperlipidemia 2014    Hypertension     Hypogonadism male     Lung disease     Mononeuritis multiplex     Mononeuritis multiplex     Nondependent alcohol abuse, in remission 2020    Obesity (BMI 30-39. 9) 2019    Osteoarthritis of lumbar spine     Pain of right heel     Paresthesia of foot, bilateral     Prediabetes 12/3/2014    Restless leg syndrome     Seborrheic dermatitis     Seborrheic keratoses, inflamed     Throat cancer ([facility])     throat, had surgery, sees Dr Dana Lilly yearly    Tinea cruris     Tinea pedis     Tinea unguium      Past Surgical History:   Procedure Laterality Date    CARDIAC CATHETERIZATION      CARPAL TUNNEL RELEASE      CATARACT REMOVAL WITH IMPLANT Right 2019    CATARACT REMOVAL WITH IMPLANT Left 2019    COLON SURGERY      COLONOSCOPY  04/15/2015    KNEE ARTHROSCOPY      LARYNGECTOMY      UPPER GASTROINTESTINAL ENDOSCOPY  13    Dr. Chela Mejia W/[facility]       Social History     Socioeconomic History    Marital status:      Spouse name: Not on file    Number of children: 3    Years of education: 12    Highest education level: High school graduate   Occupational History    Occupation:      Employer: [employer]   Tobacco Use    Smoking status: Former Smoker     Packs/day: 1.00     Years: 25.00     Pack years: 25.00     Types: Cigarettes     Quit date: 10/21/1990     Years since quittin.2    Smokeless tobacco: Never Used   Substance and Sexual Activity    Alcohol use: No     Comment: Attends [employer] 25 years    Drug use: No    Sexual activity: Not Currently     Partners: Female   Other Topics Concern    Not on file   Social History Narrative    Lives alone. 2 story home     1 step to get in house and 7 steps to get upstairs    Full basement    2 children live nearby and 1 son lives in [place]    No pets     Social Determinants of Health     Financial Resource Strain: Low Risk     Difficulty of Paying Living Expenses: Not hard at all   Food Insecurity: No Food Insecurity    Worried About [facility] in the Last Year: Never true    Dacia of Food in the Last Year: Never true   Transportation Needs: No Transportation Needs    Lack of Transportation (Medical): No    Lack of Transportation (Non-Medical):  No   Physical Activity:     Days of Exercise per Week: Not on file    Minutes of Exercise per Session: Not on file   Stress:     Feeling of Stress : Not on file   Social Connections:     Frequency of Communication with Friends and Family: Not on file    Frequency of Social Gatherings with Friends and Family: Not on file    Attends Oriental orthodox Services: Not on file    Active Member of [address] [place] [address] [employer] or Organizations: Not on file    Attends Club or Organization Meetings: Not on file    Marital Status: Not on file   Intimate Partner Violence:     Fear of Current or Ex-Partner: Not on file    Emotionally Abused: Not on file    Physically Abused: Not on file    Sexually Abused: Not on file   Housing Stability:     Unable to Pay for Housing in the Last Year: Not on file    Number of [place] in the Last Year: Not on file    Unstable Housing in the Last Year: Not on file     Family History   Problem Relation Age of Onset    Heart Disease Mother     Liver Disease Mother     High Blood Pressure Mother     Heart Disease Father     Arthritis Father     Cancer Sister         lung     Allergies:  Alemtuzumab, Glycopyrrolate-formoterol, Mercaptopurine, and Moxifloxacin    Review of Systems   Constitutional: Negative for activity change, appetite change, diaphoresis and unexpected weight change. Eyes: Negative for photophobia and visual disturbance. Respiratory: Positive for cough. Negative for chest tightness and shortness of breath. No orthopnea   Cardiovascular: Negative for chest pain, palpitations and leg swelling. Gastrointestinal: Negative for abdominal distention and abdominal pain. Genitourinary: Negative for flank pain and frequency. Musculoskeletal: Negative for gait problem and joint swelling. Skin: Positive for rash. Neurological: Negative for dizziness, weakness, light-headedness and headaches. Psychiatric/Behavioral: Negative for confusion. Objective:   /60   Pulse 67   Temp 96.7 °F (35.9 °C)   Ht 5' 10\" (1.778 m)   Wt 265 lb (120.2 kg)   SpO2 96%   BMI 38.02 kg/m²     Physical Exam  Vitals reviewed. Constitutional:       General: He is not in acute distress. Appearance: He is well-developed. HENT:      Head: Normocephalic and atraumatic. Right Ear: External ear normal.      Left Ear: External ear normal.      Nose: Nose normal.   Eyes:      General:         Right eye: No discharge. Left eye: No discharge. Conjunctiva/sclera: Conjunctivae normal.      Pupils: Pupils are equal, round, and reactive to light. Neck:      Thyroid: No thyromegaly. Cardiovascular:      Rate and Rhythm: Normal rate and regular rhythm. Heart sounds: Normal heart sounds. Pulmonary:      Effort: Pulmonary effort is normal. No respiratory distress. Breath sounds: Normal breath sounds. Abdominal:      General: There is no distension. Musculoskeletal:      Cervical back: Neck supple. Skin:     General: Skin is warm and dry. Neurological:      Mental Status: He is alert and oriented to person, place, and time.       Coordination: Coordination normal. Psychiatric:         Thought Content: Thought content normal.         Judgment: Judgment normal.         No results found for this visit on 12/27/21.     Recent Results (from the past 2016 hour(s))   EKG 12 Lead    Collection Time: 10/27/21 11:37 PM   Result Value Ref Range    Ventricular Rate 69 BPM    Atrial Rate 69 BPM    P-R Interval 150 ms    QRS Duration 96 ms    Q-T Interval 390 ms    QTc Calculation (Bazett) 417 ms    P Axis 13 degrees    R Axis -5 degrees    T Axis 6 degrees   CBC Auto Differential    Collection Time: 10/27/21 11:56 PM   Result Value Ref Range    WBC 10.5 4.8 - 10.8 K/uL    RBC 4.47 (L) 4.70 - 6.10 M/uL    Hemoglobin 13.3 (L) 14.0 - 18.0 g/dL    Hematocrit 38.9 (L) 42.0 - 52.0 %    MCV 86.9 80.0 - 100.0 fL    MCH 29.8 27.0 - 31.3 pg    MCHC 34.2 33.0 - 37.0 %    RDW 14.1 11.5 - 14.5 %    Platelets 854 078 - 361 K/uL    Neutrophils % 65.5 %    Lymphocytes % 18.6 %    Monocytes % 11.8 %    Eosinophils % 3.4 %    Basophils % 0.7 %    Neutrophils Absolute 6.9 (H) 1.4 - 6.5 K/uL    Lymphocytes Absolute 2.0 1.0 - 4.8 K/uL    Monocytes Absolute 1.2 (H) 0.2 - 0.8 K/uL    Eosinophils Absolute 0.4 0.0 - 0.7 K/uL    Basophils Absolute 0.1 0.0 - 0.2 K/uL   Comprehensive Metabolic Panel    Collection Time: 10/27/21 11:57 PM   Result Value Ref Range    Sodium 139 135 - 144 mEq/L    Potassium 3.8 3.4 - 4.9 mEq/L    Chloride 101 95 - 107 mEq/L    CO2 26 20 - 31 mEq/L    Anion Gap 12 9 - 15 mEq/L    Glucose 133 (H) 70 - 99 mg/dL    BUN 20 8 - 23 mg/dL    CREATININE 0.90 0.70 - 1.20 mg/dL    GFR Non-African American >60.0 >60    GFR  >60.0 >60    Calcium 9.1 8.5 - 9.9 mg/dL    Total Protein 6.6 6.3 - 8.0 g/dL    Albumin 3.8 3.5 - 4.6 g/dL    Total Bilirubin 0.5 0.2 - 0.7 mg/dL    Alkaline Phosphatase 88 35 - 104 U/L    ALT 11 0 - 41 U/L    AST 17 0 - 40 U/L    Globulin 2.8 2.3 - 3.5 g/dL   Troponin    Collection Time: 10/27/21 11:57 PM   Result Value Ref Range    Troponin <0.010 0.000 - 0.010 ng/mL   D-Dimer, Quantitative    Collection Time: 10/27/21 11:57 PM   Result Value Ref Range    D-Dimer, Quant 2.95 (HH) 0.00 - 0.50 mg/L FEU   POCT glycosylated hemoglobin (Hb A1C)    Collection Time: 10/28/21  3:53 PM   Result Value Ref Range    Hemoglobin A1C 5.6 %   Comprehensive Metabolic Panel    Collection Time: 10/31/21 10:45 AM   Result Value Ref Range    Sodium 139 135 - 144 mEq/L    Potassium 3.7 3.4 - 4.9 mEq/L    Chloride 100 95 - 107 mEq/L    CO2 28 20 - 31 mEq/L    Anion Gap 11 9 - 15 mEq/L    Glucose 110 (H) 70 - 99 mg/dL    BUN 17 8 - 23 mg/dL    CREATININE 0.76 0.70 - 1.20 mg/dL    GFR Non-African American >60.0 >60    GFR  >60.0 >60    Calcium 9.0 8.5 - 9.9 mg/dL    Total Protein 6.1 (L) 6.3 - 8.0 g/dL    Albumin 3.8 3.5 - 4.6 g/dL    Total Bilirubin 0.5 0.2 - 0.7 mg/dL    Alkaline Phosphatase 80 35 - 104 U/L    ALT 12 0 - 41 U/L    AST 16 0 - 40 U/L    Globulin 2.3 2.3 - 3.5 g/dL   CBC Auto Differential    Collection Time: 10/31/21 10:45 AM   Result Value Ref Range    WBC 10.0 4.8 - 10.8 K/uL    RBC 4.25 (L) 4.70 - 6.10 M/uL    Hemoglobin 12.3 (L) 14.0 - 18.0 g/dL    Hematocrit 36.2 (L) 42.0 - 52.0 %    MCV 85.4 80.0 - 100.0 fL    MCH 29.0 27.0 - 31.3 pg    MCHC 34.0 33.0 - 37.0 %    RDW 14.1 11.5 - 14.5 %    Platelets 031 375 - 965 K/uL    Neutrophils % 66.2 %    Lymphocytes % 17.2 %    Monocytes % 11.8 %    Eosinophils % 4.2 %    Basophils % 0.6 %    Neutrophils Absolute 6.6 (H) 1.4 - 6.5 K/uL    Lymphocytes Absolute 1.7 1.0 - 4.8 K/uL    Monocytes Absolute 1.2 (H) 0.2 - 0.8 K/uL    Eosinophils Absolute 0.4 0.0 - 0.7 K/uL    Basophils Absolute 0.1 0.0 - 0.2 K/uL   Lactic Acid, Plasma    Collection Time: 10/31/21 10:45 AM   Result Value Ref Range    Lactic Acid 0.8 0.5 - 2.2 mmol/L   PROCALCITONIN    Collection Time: 10/31/21 10:45 AM   Result Value Ref Range    Procalcitonin 0.04 0.00 - 0.15 ng/mL   Troponin    Collection Time: 10/31/21 10:45 AM   Result Value Ref Range Troponin <0.010 0.000 - 0.010 ng/mL   Brain Natriuretic Peptide    Collection Time: 10/31/21 10:45 AM   Result Value Ref Range    Pro- pg/mL   CK    Collection Time: 10/31/21 10:45 AM   Result Value Ref Range    Total  0 - 190 U/L   EKG 12 Lead - Chest Pain    Collection Time: 10/31/21 10:52 AM   Result Value Ref Range    Ventricular Rate 58 BPM    Atrial Rate 58 BPM    P-R Interval 160 ms    QRS Duration 98 ms    Q-T Interval 434 ms    QTc Calculation (Bazett) 426 ms    P Axis 27 degrees    R Axis -4 degrees    T Axis 3 degrees   POCT COVID-19, Antigen    Collection Time: 11/04/21  2:36 PM   Result Value Ref Range    SARS-COV-2, POC Not-Detected Not Detected    Lot Number [id number]     QC Pass/Fail ok     Performing Instrument BD Veritor    POCT COVID-19, Antigen    Collection Time: 12/22/21 11:42 AM   Result Value Ref Range    SARS-COV-2, POC Not-Detected Not Detected    Lot Number [id number]     QC Pass/Fail pass     Performing Instrument BD Veritor        [] Pt was seen by provider for      Minutes  Counseling and coordination of care was done for all assessment diagnosis listed for today with patient and any family/friend present. More than 50% of this visit was spent coordinating cuurent care, obtaining information for prior records, and counseling for current plan of action. Assessment:       Diagnosis Orders   1. Tinea cruris  nystatin (MYCOSTATIN) 178308 UNIT/GM cream   2. Cough           No orders of the defined types were placed in this encounter. Orders Placed This Encounter   Medications    predniSONE (DELTASONE) 10 MG tablet     Sig: Take as directed     Dispense:  30 tablet     Refill:  0    nystatin (MYCOSTATIN) 183956 UNIT/GM cream     Sig: Apply topically 2 times daily Apply topically 2 times daily. Dispense:  15 g     Refill:  0          Medication List          Accurate as of December 27, 2021  2:47 PM. If you have any questions, ask your nurse or doctor. START taking these medications    nystatin [id number] UNIT/GM cream  Commonly known as: MYCOSTATIN  Apply topically 2 times daily Apply topically 2 times daily. Started by: Murphy Dennis MD        CHANGE how you take these medications    furosemide 20 MG tablet  Commonly known as: LASIX  Take 1 tablet by mouth daily  What changed: how much to take     * predniSONE 10 MG tablet  Commonly known as: DELTASONE  Take 3 tabs for 3 days, then 2 tabs for 3 days, then 1 tab for 3 days, then 1/2 tab for 3 days, then stop. What changed: Another medication with the same name was added. Make sure you understand how and when to take each. Changed by: Murphy Dennis MD     * predniSONE 10 MG tablet  Commonly known as: [place]  Take as directed  What changed: You were already taking a medication with the same name, and this prescription was added. Make sure you understand how and when to take each. Changed by: Murphy Dennis MD         * This list has 2 medication(s) that are the same as other medications prescribed for you. Read the directions carefully, and ask your doctor or other care provider to review them with you. CONTINUE taking these medications    * albuterol (2.5 MG/3ML) 0.083% nebulizer solution  Commonly known as: PROVENTIL  Take 3 mLs by nebulization every 6 hours as needed for Wheezing     * albuterol sulfate  (90 Base) MCG/ACT inhaler  Commonly known as: ProAir HFA  Inhale 2 puffs into the lungs every 6 hours as needed for Wheezing     apixaban 5 MG Tabs tablet  Commonly known as: Eliquis  Take 1 tablet by mouth 2 times daily     aspirin 81 MG tablet     azithromycin 250 MG tablet  Commonly known as: ZITHROMAX  2 tabs by mouth first day. 1 tab daily thereafter until gone.      budesonide-formoterol 160-4.5 MCG/ACT Aero  Commonly known as: Symbicort  Inhale 2 puffs into the lungs 2 times daily 2 each LOT [id number] [id number]     cetirizine 5 MG tablet  Commonly known as: ZYRTEC CPAP Machine Misc  New cpap supplies mask hose filters etc     doxazosin 4 MG tablet  Commonly known as: CARDURA  TAKE ONE-HALF (1/2) TABLET TWICE A DAY     esomeprazole Magnesium 20 MG Pack  Commonly known as: NEXIUM     gabapentin 300 MG capsule  Commonly known as: NEURONTIN  TAKE 1 CAPSULE DAILY     lisinopril 20 MG tablet  Commonly known as: PRINIVIL;ZESTRIL  Take 1 tablet by mouth daily     mesalamine 250 MG extended release capsule  Commonly known as: PENTASA     metoprolol tartrate 50 MG tablet  Commonly known as: LOPRESSOR  Take 1.5 po bid     mometasone 0.1 % cream  Commonly known as: ELOCON     montelukast 10 MG tablet  Commonly known as: SINGULAIR  TAKE 1 TABLET NIGHTLY     nitroGLYCERIN 0.4 MG SL tablet  Commonly known as: NITROSTAT  up to max of 3 total doses. If no relief after 1 dose, call 911.     nystatin-triamcinolone 096116-0.1 UNIT/GM-% cream  Commonly known as: MYCOLOG II  Apply topically 2 times daily. pravastatin 40 MG tablet  Commonly known as: PRAVACHOL  TAKE 1 TABLET NIGHTLY     Probiotic 250 MG Caps     Restasis 0.05 % ophthalmic emulsion  Generic drug: cycloSPORINE     Spiriva HandiHaler 18 MCG inhalation capsule  Generic drug: tiotropium  INHALE THE CONTENTS OF 1 CAPSULE DAILY     Stelara 45 MG/0.5ML Sosy prefilled syringe  Generic drug: ustekinumab     triamcinolone 0.025 % cream  Commonly known as: KENALOG  Apply topically 2 times daily. * This list has 2 medication(s) that are the same as other medications prescribed for you. Read the directions carefully, and ask your doctor or other care provider to review them with you.                Where to Get Your Medications      These medications were sent to [address] [place] [address] [address]  [facility], [address]  [address] [place] [id number]    Phone: [phone]   · nystatin [id number] UNIT/GM cream  · predniSONE 10 MG tablet           Plan:   Return in about 1 week (around 1/3/2022). Patient Instructions   Patient will take 20 mg of prednisone tomorrow and Wednesday morning. Thursday morning he will switch to 10 mg a day until next week when he will be reevaluated    Patient will restart nystatin cream twice a day to the groin. He is also aware of dryness is a big part of the key of improving the rash. Follow-up appointment 1 week      This note was partially created with the assistance of dictation. This may lead to grammatical or spelling errors. Sher Agosto M.D.

## 2022-01-04 ENCOUNTER — OFFICE VISIT (OUTPATIENT)
Dept: FAMILY MEDICINE CLINIC | Age: 72
End: 2022-01-04
Payer: MEDICARE

## 2022-01-04 VITALS
SYSTOLIC BLOOD PRESSURE: 90 MMHG | HEIGHT: 70 IN | WEIGHT: 262 LBS | DIASTOLIC BLOOD PRESSURE: 60 MMHG | BODY MASS INDEX: 37.51 KG/M2

## 2022-01-04 DIAGNOSIS — E11.40 TYPE 2 DIABETES MELLITUS WITH DIABETIC NEUROPATHY, WITHOUT LONG-TERM CURRENT USE OF INSULIN (HCC): ICD-10-CM

## 2022-01-04 DIAGNOSIS — I48.0 PAROXYSMAL A-FIB (HCC): ICD-10-CM

## 2022-01-04 DIAGNOSIS — I26.99 PULMONARY EMBOLISM, OTHER, UNSPECIFIED CHRONICITY, UNSPECIFIED WHETHER ACUTE COR PULMONALE PRESENT (HCC): ICD-10-CM

## 2022-01-04 DIAGNOSIS — I11.0 HYPERTENSIVE HEART DISEASE WITH HEART FAILURE (HCC): ICD-10-CM

## 2022-01-04 DIAGNOSIS — R05.9 COUGH: Primary | ICD-10-CM

## 2022-01-04 DIAGNOSIS — K50.80 CROHN'S DISEASE OF BOTH SMALL AND LARGE INTESTINE WITHOUT COMPLICATION (HCC): ICD-10-CM

## 2022-01-04 DIAGNOSIS — R41.3 MEMORY LOSS: ICD-10-CM

## 2022-01-04 DIAGNOSIS — J44.9 CHRONIC OBSTRUCTIVE PULMONARY DISEASE, UNSPECIFIED COPD TYPE (HCC): ICD-10-CM

## 2022-01-04 PROCEDURE — 99214 OFFICE O/P EST MOD 30 MIN: CPT | Performed by: FAMILY MEDICINE

## 2022-01-04 ASSESSMENT — ENCOUNTER SYMPTOMS
COUGH: 0
ABDOMINAL DISTENTION: 0
ABDOMINAL PAIN: 0
CHEST TIGHTNESS: 0
PHOTOPHOBIA: 0
SHORTNESS OF BREATH: 0

## 2022-01-04 NOTE — PROGRESS NOTES
Diagnosis Orders   1. Cough     2. Memory loss     3. Pulmonary embolism, other, unspecified chronicity, unspecified whether acute cor pulmonale present ([facility]     4. Chronic obstructive pulmonary disease, unspecified COPD type ([facility]     5. Hypertensive heart disease with heart failure ([facility]     6. Crohn's disease of both small and large intestine without complication ([facility]     7. Type 2 diabetes mellitus with diabetic neuropathy, without long-term current use of insulin ([facility]     8. Paroxysmal A-fib ([facility]       Return in about 3 months (around 4/4/2022) for for routine major medical condition management. Patient Instructions   Patient has been to continue 10 mg of prednisone daily for 7 more days and then switch to half of a tablet equaling 5 mg daily until prescription gone    Patient will come back sometime this week with his blood pressure cuff to have it compared versus our office cuff. Blood pressure in office today is low however patient stated blood pressures at home are normal.  If the home cuff is accurate we will go by that. Subjective:      Patient ID: Flower Horvath is a 70 y.o. male who presents for:  No chief complaint on file. Patient states COPD and breathing has been much better since he has been on the prednisone. He is on 10 mg daily right now. His stools remain soft at times and then formula at times seems to be responding well to GI medications for his Crohn's disease. Still on Eliquis for pulmonary embolism would like to know how long he needs to stay on that. Denies any cardiovascular symptoms from his hypertension or side effect of medications. He would like to stop 1 or 2 medications.   Says his blood pressure at home on his home cuff usually reads systolic of [id number]      Current Outpatient Medications on File Prior to Visit   Medication Sig Dispense Refill    predniSONE (DELTASONE) 10 MG tablet Take as directed 30 tablet 0    nystatin (MYCOSTATIN) 519100 UNIT/GM cream Apply topically 2 times daily Apply topically 2 times daily. 15 g 0    lisinopril (PRINIVIL;ZESTRIL) 20 MG tablet Take 1 tablet by mouth daily 90 tablet 3    apixaban (ELIQUIS) 5 MG TABS tablet Take 1 tablet by mouth 2 times daily 180 tablet 3    nitroGLYCERIN (NITROSTAT) 0.4 MG SL tablet up to max of 3 total doses. If no relief after 1 dose, call 911. 25 tablet 2    RESTASIS 0.05 % ophthalmic emulsion Place 1 drop into both eyes daily       mometasone (ELOCON) 0.1 % cream APPLY to EAR TWICE DAILY      montelukast (SINGULAIR) 10 MG tablet TAKE 1 TABLET NIGHTLY 90 tablet 3    albuterol sulfate HFA (PROAIR HFA) 108 (90 Base) MCG/ACT inhaler Inhale 2 puffs into the lungs every 6 hours as needed for Wheezing 1 Inhaler 3    ustekinumab (STELARA) 45 MG/0.5ML SOSY prefilled syringe Inject 45 mg into the skin once      triamcinolone (KENALOG) 0.025 % cream Apply topically 2 times daily. 1 Tube 0    nystatin-triamcinolone (MYCOLOG II) 378132-9.1 UNIT/GM-% cream Apply topically 2 times daily.  15 g 1    budesonide-formoterol (SYMBICORT) 160-4.5 MCG/ACT AERO Inhale 2 puffs into the lungs 2 times daily 2 each LOT [id number] EXP [id number] 3 Inhaler 1    albuterol (PROVENTIL) (2.5 MG/3ML) 0.083% nebulizer solution Take 3 mLs by nebulization every 6 hours as needed for Wheezing 120 each 3    gabapentin (NEURONTIN) 300 MG capsule TAKE 1 CAPSULE DAILY 90 capsule 3    doxazosin (CARDURA) 4 MG tablet TAKE ONE-HALF (1/2) TABLET TWICE A DAY 90 tablet 3    metoprolol tartrate (LOPRESSOR) 50 MG tablet Take 1.5 po bid 15 tablet 0    pravastatin (PRAVACHOL) 40 MG tablet TAKE 1 TABLET NIGHTLY 90 tablet 3    SPIRIVA HANDIHALER 18 MCG inhalation capsule INHALE THE CONTENTS OF 1 CAPSULE DAILY 90 capsule 3    cetirizine (ZYRTEC) 5 MG tablet TAKE ONE TABLET BY MOUTH  PRN      CPAP Machine MISC New cpap supplies mask hose filters etc 1 each 0    Saccharomyces boulardii (PROBIOTIC) 250 MG CAPS Take 1 tablet by mouth daily  esomeprazole Magnesium (NEXIUM) 20 MG PACK Take 20 mg by mouth daily      aspirin 81 MG tablet Take 81 mg by mouth daily.  mesalamine (PENTASA) 250 MG CR capsule Take 500 mg by mouth 4 times daily.  furosemide (LASIX) 20 MG tablet Take 1 tablet by mouth daily (Patient taking differently: Take 40 mg by mouth daily Indications: Taking 40 mg tablet daily ) 30 tablet 2     No current facility-administered medications on file prior to visit. Past Medical History:   Diagnosis Date    A-fib [facility])     Abnormal EMG 12/09/2015    Actinic keratoses     Actinic keratoses     Acute diastolic congestive heart failure (HCC) 1/6/2021    Allergic rhinitis     Anemia 11/18/2014    Arthritis     Chronic back pain     COPD (chronic obstructive pulmonary disease) ([facility] 08/09/2012    COPD (chronic obstructive pulmonary disease) ([facility])     Crohns disease     Degenerative disc disease, lumbar     Dermatitis     Diabetes ([address] 03/19/2015    diet controlled    Gastrointestinal problem     GERD (gastroesophageal reflux disease)     History of atrial fibrillation 2006    Dr. Vivi Montalvo History of throat cancer 2004     cleared for reoccurence years ago    Hyperlipidemia 1/21/2014    Hypertension     Hypogonadism male     Lung disease     Mononeuritis multiplex     Mononeuritis multiplex     Nondependent alcohol abuse, in remission 7/22/2020    Obesity (BMI 30-39. 9) 7/24/2019    Osteoarthritis of lumbar spine     Pain of right heel     Paresthesia of foot, bilateral     Prediabetes 12/3/2014    Restless leg syndrome     Seborrheic dermatitis     Seborrheic keratoses, inflamed     Throat cancer ([facility])     throat, had surgery, sees Dr Marleen Yeboah yearly    Tinea cruris     Tinea pedis     Tinea unguium      Past Surgical History:   Procedure Laterality Date    CARDIAC CATHETERIZATION      CARPAL TUNNEL RELEASE      CATARACT REMOVAL WITH IMPLANT Right 09/09/2019    CATARACT REMOVAL WITH IMPLANT Left 2019    COLON SURGERY      COLONOSCOPY  04/15/2015    KNEE ARTHROSCOPY      LARYNGECTOMY  2004    UPPER GASTROINTESTINAL ENDOSCOPY  13    Dr. Nicole HOWARD/NAPOLEON RODRIGUEZ       Social History     Socioeconomic History    Marital status:      Spouse name: Not on file    Number of children: 3    Years of education: 15    Highest education level: High school graduate   Occupational History    Occupation:      Employer: [employer]   Tobacco Use    Smoking status: Former Smoker     Packs/day: 1.00     Years: 25.00     Pack years: 25.00     Types: Cigarettes     Quit date: 10/21/1990     Years since quittin.2    Smokeless tobacco: Never Used   Substance and Sexual Activity    Alcohol use: No     Comment: Attends AA, 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    Drug use: No    Sexual activity: Not Currently     Partners: Female   Other Topics Concern    Not on file   Social History Narrative    Lives alone. 2 story home     1 step to get in house and 7 steps to get upstairs    Full basement    2 children live nearby and 1 son lives in [place]    No pets     Social Determinants of Health     Financial Resource Strain: Low Risk     Difficulty of Paying Living Expenses: Not hard at all   Food Insecurity: No Food Insecurity    Worried About [facility] in the Last Year: Never true    Dacia of Food in the Last Year: Never true   Transportation Needs: No Transportation Needs    Lack of Transportation (Medical): No    Lack of Transportation (Non-Medical):  No   Physical Activity:     Days of Exercise per Week: Not on file    Minutes of Exercise per Session: Not on file   Stress:     Feeling of Stress : Not on file   Social Connections:     Frequency of Communication with Friends and Family: Not on file    Frequency of Social Gatherings with Friends and Family: Not on file    Attends [employer]ism Services: Not on file    Active Member of Clubs or Organizations: Not on file    Attends Club or Organization Meetings: Not on file    Marital Status: Not on file   Intimate Partner Violence:     Fear of Current or Ex-Partner: Not on file    Emotionally Abused: Not on file    Physically Abused: Not on file    Sexually Abused: Not on file   Housing Stability:     Unable to Pay for Housing in the Last Year: Not on file    Number of Places Lived in the Last Year: Not on file    Unstable Housing in the Last Year: Not on file     Family History   Problem Relation Age of Onset    Heart Disease Mother     Liver Disease Mother     High Blood Pressure Mother     Heart Disease Father     Arthritis Father     Cancer Sister         lung     Allergies:  Alemtuzumab, Glycopyrrolate-formoterol, Mercaptopurine, and Moxifloxacin    Review of Systems   Constitutional: Negative for activity change, appetite change, diaphoresis and unexpected weight change. Eyes: Negative for photophobia and visual disturbance. Respiratory: Negative for cough, chest tightness and shortness of breath. No orthopnea   Cardiovascular: Negative for chest pain, palpitations and leg swelling. Gastrointestinal: Negative for abdominal distention and abdominal pain. Genitourinary: Negative for flank pain and frequency. Musculoskeletal: Negative for gait problem and joint swelling. Neurological: Negative for dizziness, weakness, light-headedness and headaches. Psychiatric/Behavioral: Negative for confusion. Objective:   BP 90/60 (Site: Left Upper Arm, Position: Sitting, Cuff Size: Medium Adult)   Ht 5' 10\" (1.778 m)   Wt 262 lb (118.8 kg)   BMI 37.59 kg/m²     Physical Exam  Vitals reviewed. Constitutional:       General: He is not in acute distress. Appearance: He is well-developed. HENT:      Head: Normocephalic and atraumatic.       Right Ear: External ear normal.      Left Ear: External ear normal.      Nose: Nose normal.   Eyes:      General:         Right eye: No discharge. Left eye: No discharge. Conjunctiva/sclera: Conjunctivae normal.      Pupils: Pupils are equal, round, and reactive to light. Neck:      Thyroid: No thyromegaly. Cardiovascular:      Rate and Rhythm: Normal rate and regular rhythm. Heart sounds: Normal heart sounds. Pulmonary:      Effort: Pulmonary effort is normal. No respiratory distress. Breath sounds: Normal breath sounds. Abdominal:      General: There is no distension. Musculoskeletal:      Cervical back: Neck supple. Skin:     General: Skin is warm and dry. Neurological:      Mental Status: He is alert and oriented to person, place, and time. Coordination: Coordination normal.   Psychiatric:         Thought Content: Thought content normal.         Judgment: Judgment normal.         No results found for this visit on 01/04/22.     Recent Results (from the past 2016 hour(s))   EKG 12 Lead    Collection Time: 10/27/21 11:37 PM   Result Value Ref Range    Ventricular Rate 69 BPM    Atrial Rate 69 BPM    P-R Interval 150 ms    QRS Duration 96 ms    Q-T Interval 390 ms    QTc Calculation (Bazett) 417 ms    P Axis 13 degrees    R Axis -5 degrees    T Axis 6 degrees   CBC Auto Differential    Collection Time: 10/27/21 11:56 PM   Result Value Ref Range    WBC 10.5 4.8 - 10.8 K/uL    RBC 4.47 (L) 4.70 - 6.10 M/uL    Hemoglobin 13.3 (L) 14.0 - 18.0 g/dL    Hematocrit 38.9 (L) 42.0 - 52.0 %    MCV 86.9 80.0 - 100.0 fL    MCH 29.8 27.0 - 31.3 pg    MCHC 34.2 33.0 - 37.0 %    RDW 14.1 11.5 - 14.5 %    Platelets 217 090 - 248 K/uL    Neutrophils % 65.5 %    Lymphocytes % 18.6 %    Monocytes % 11.8 %    Eosinophils % 3.4 %    Basophils % 0.7 %    Neutrophils Absolute 6.9 (H) 1.4 - 6.5 K/uL    Lymphocytes Absolute 2.0 1.0 - 4.8 K/uL    Monocytes Absolute 1.2 (H) 0.2 - 0.8 K/uL    Eosinophils Absolute 0.4 0.0 - 0.7 K/uL    Basophils Absolute 0.1 0.0 - 0.2 K/uL   Comprehensive Metabolic Panel    Collection Time: 10/27/21 11:57 PM   Result Value Ref Range    Sodium 139 135 - 144 mEq/L    Potassium 3.8 3.4 - 4.9 mEq/L    Chloride 101 95 - 107 mEq/L    CO2 26 20 - 31 mEq/L    Anion Gap 12 9 - 15 mEq/L    Glucose 133 (H) 70 - 99 mg/dL    BUN 20 8 - 23 mg/dL    CREATININE 0.90 0.70 - 1.20 mg/dL    GFR Non-African American >60.0 >60    GFR  >60.0 >60    Calcium 9.1 8.5 - 9.9 mg/dL    Total Protein 6.6 6.3 - 8.0 g/dL    Albumin 3.8 3.5 - 4.6 g/dL    Total Bilirubin 0.5 0.2 - 0.7 mg/dL    Alkaline Phosphatase 88 35 - 104 U/L    ALT 11 0 - 41 U/L    AST 17 0 - 40 U/L    Globulin 2.8 2.3 - 3.5 g/dL   Troponin    Collection Time: 10/27/21 11:57 PM   Result Value Ref Range    Troponin <0.010 0.000 - 0.010 ng/mL   D-Dimer, Quantitative    Collection Time: 10/27/21 11:57 PM   Result Value Ref Range    D-Dimer, Quant 2.95 (HH) 0.00 - 0.50 mg/L FEU   POCT glycosylated hemoglobin (Hb A1C)    Collection Time: 10/28/21  3:53 PM   Result Value Ref Range    Hemoglobin A1C 5.6 %   Comprehensive Metabolic Panel    Collection Time: 10/31/21 10:45 AM   Result Value Ref Range    Sodium 139 135 - 144 mEq/L    Potassium 3.7 3.4 - 4.9 mEq/L    Chloride 100 95 - 107 mEq/L    CO2 28 20 - 31 mEq/L    Anion Gap 11 9 - 15 mEq/L    Glucose 110 (H) 70 - 99 mg/dL    BUN 17 8 - 23 mg/dL    CREATININE 0.76 0.70 - 1.20 mg/dL    GFR Non-African American >60.0 >60    GFR  >60.0 >60    Calcium 9.0 8.5 - 9.9 mg/dL    Total Protein 6.1 (L) 6.3 - 8.0 g/dL    Albumin 3.8 3.5 - 4.6 g/dL    Total Bilirubin 0.5 0.2 - 0.7 mg/dL    Alkaline Phosphatase 80 35 - 104 U/L    ALT 12 0 - 41 U/L    AST 16 0 - 40 U/L    Globulin 2.3 2.3 - 3.5 g/dL   CBC Auto Differential    Collection Time: 10/31/21 10:45 AM   Result Value Ref Range    WBC 10.0 4.8 - 10.8 K/uL    RBC 4.25 (L) 4.70 - 6.10 M/uL    Hemoglobin 12.3 (L) 14.0 - 18.0 g/dL    Hematocrit 36.2 (L) 42.0 - 52.0 %    MCV 85.4 80.0 - 100.0 fL    MCH 29.0 27.0 - 31.3 pg    MCHC 34.0 33.0 - 37.0 %    RDW 14.1 11.5 - 14.5 %    Platelets 379 725 - 867 K/uL    Neutrophils % 66.2 %    Lymphocytes % 17.2 %    Monocytes % 11.8 %    Eosinophils % 4.2 %    Basophils % 0.6 %    Neutrophils Absolute 6.6 (H) 1.4 - 6.5 K/uL    Lymphocytes Absolute 1.7 1.0 - 4.8 K/uL    Monocytes Absolute 1.2 (H) 0.2 - 0.8 K/uL    Eosinophils Absolute 0.4 0.0 - 0.7 K/uL    Basophils Absolute 0.1 0.0 - 0.2 K/uL   Lactic Acid, Plasma    Collection Time: 10/31/21 10:45 AM   Result Value Ref Range    Lactic Acid 0.8 0.5 - 2.2 mmol/L   PROCALCITONIN    Collection Time: 10/31/21 10:45 AM   Result Value Ref Range    Procalcitonin 0.04 0.00 - 0.15 ng/mL   Troponin    Collection Time: 10/31/21 10:45 AM   Result Value Ref Range    Troponin <0.010 0.000 - 0.010 ng/mL   Brain Natriuretic Peptide    Collection Time: 10/31/21 10:45 AM   Result Value Ref Range    Pro- pg/mL   CK    Collection Time: 10/31/21 10:45 AM   Result Value Ref Range    Total  0 - 190 U/L   EKG 12 Lead - Chest Pain    Collection Time: 10/31/21 10:52 AM   Result Value Ref Range    Ventricular Rate 58 BPM    Atrial Rate 58 BPM    P-R Interval 160 ms    QRS Duration 98 ms    Q-T Interval 434 ms    QTc Calculation (Bazett) 426 ms    P Axis 27 degrees    R Axis -4 degrees    T Axis 3 degrees   POCT COVID-19, Antigen    Collection Time: 11/04/21  2:36 PM   Result Value Ref Range    SARS-COV-2, POC Not-Detected Not Detected    Lot Number [id number]     QC Pass/Fail ok     Performing Instrument BD Veritor    POCT COVID-19, Antigen    Collection Time: 12/22/21 11:42 AM   Result Value Ref Range    SARS-COV-2, POC Not-Detected Not Detected    Lot Number [id number]     QC Pass/Fail pass     Performing Instrument BD Veritor        [] Pt was seen by provider for      Minutes  Counseling and coordination of care was done for all assessment diagnosis listed for today with patient and any family/friend present.    More than 50% of this visit was spent coordinating cuurent care, obtaining information for prior records, and counseling for current plan of action. Assessment:       Diagnosis Orders   1. Cough     2. Memory loss     3. Pulmonary embolism, other, unspecified chronicity, unspecified whether acute cor pulmonale present ([facility]     4. Chronic obstructive pulmonary disease, unspecified COPD type ([facility]     5. Hypertensive heart disease with heart failure ([facility]     6. Crohn's disease of both small and large intestine without complication ([facility]     7. Type 2 diabetes mellitus with diabetic neuropathy, without long-term current use of insulin ([facility]     8. Paroxysmal A-fib (HCC)           No orders of the defined types were placed in this encounter. No orders of the defined types were placed in this encounter. Medication List          Accurate as of January 4, 2022  2:07 PM. If you have any questions, ask your nurse or doctor. CHANGE how you take these medications    furosemide 20 MG tablet  Commonly known as: LASIX  Take 1 tablet by mouth daily  What changed: how much to take     predniSONE 10 MG tablet  Commonly known as: Cande Amy  Take as directed  What changed: Another medication with the same name was removed. Continue taking this medication, and follow the directions you see here.   Changed by: Long Galindo MD        CONTINUE taking these medications    * albuterol (2.5 MG/3ML) 0.083% nebulizer solution  Commonly known as: PROVENTIL  Take 3 mLs by nebulization every 6 hours as needed for Wheezing     * albuterol sulfate  (90 Base) MCG/ACT inhaler  Commonly known as: ProAir HFA  Inhale 2 puffs into the lungs every 6 hours as needed for Wheezing     apixaban 5 MG Tabs tablet  Commonly known as: Eliquis  Take 1 tablet by mouth 2 times daily     aspirin 81 MG tablet     budesonide-formoterol 160-4.5 MCG/ACT Aero  Commonly known as: Symbicort  Inhale 2 puffs into the lungs 2 times daily 2 each LOT [id number] [id number]     cetirizine 5 MG tablet  Commonly known as: major medical condition management. Patient Instructions   Patient has been to continue 10 mg of prednisone daily for 7 more days and then switch to half of a tablet equaling 5 mg daily until prescription gone    Patient will come back sometime this week with his blood pressure cuff to have it compared versus our office cuff. Blood pressure in office today is low however patient stated blood pressures at home are normal.  If the home cuff is accurate we will go by that. This note was partially created with the assistance of dictation. This may lead to grammatical or spelling errors. Michele L. Rance Cabot, M.D.

## 2022-01-04 NOTE — PATIENT INSTRUCTIONS
Patient has been to continue 10 mg of prednisone daily for 7 more days and then switch to half of a tablet equaling 5 mg daily until prescription gone    Patient will come back sometime this week with his blood pressure cuff to have it compared versus our office cuff. Blood pressure in office today is low however patient stated blood pressures at home are normal.  If the home cuff is accurate we will go by that.

## 2022-01-10 DIAGNOSIS — E78.5 HYPERLIPIDEMIA, UNSPECIFIED HYPERLIPIDEMIA TYPE: ICD-10-CM

## 2022-01-10 RX ORDER — PRAVASTATIN SODIUM 40 MG
TABLET ORAL
Qty: 90 TABLET | Refills: 3 | Status: SHIPPED | OUTPATIENT
Start: 2022-01-10

## 2022-01-10 NOTE — TELEPHONE ENCOUNTER
Future Appointments    Encounter Information    Provider Department Appt Notes   1/25/2022 Francisco Javier Reyes, [facility] 4M F/U   3/24/2022 Raad Antunez MD [facility] AWV/Return in about 3 months (around 4/4/2022) for for routine major medical condition management. 6/6/2022 Raad Antunez MD [facility] Return in about 6 months (around 5/29/2022) for 15 min skin check.        Recent Visits    01/04/2022 Cough   [address], MD   12/27/2021 Tinea cruris   [facility] Raad Antunez MD   12/22/2021 Cough   [facility] Raad Antunez MD   11/29/2021 Paresthesia of left upper limb   [facility] Raad Antunez MD   11/04/2021 Encounter for laboratory testing for COVID-19 virus   [facility] Raad Antunez MD

## 2022-01-12 DIAGNOSIS — J30.2 CHRONIC SEASONAL ALLERGIC RHINITIS: ICD-10-CM

## 2022-01-12 DIAGNOSIS — I10 ESSENTIAL HYPERTENSION: ICD-10-CM

## 2022-01-12 DIAGNOSIS — J41.0 SIMPLE CHRONIC BRONCHITIS (HCC): ICD-10-CM

## 2022-01-12 RX ORDER — MONTELUKAST SODIUM 10 MG/1
TABLET ORAL
Qty: 7 TABLET | Refills: 0 | Status: SHIPPED | OUTPATIENT
Start: 2022-01-12 | End: 2022-10-19 | Stop reason: SDUPTHER

## 2022-01-12 RX ORDER — METOPROLOL TARTRATE 50 MG/1
TABLET, FILM COATED ORAL
Qty: 10 TABLET | Refills: 0 | Status: SHIPPED | OUTPATIENT
Start: 2022-01-12 | End: 2022-08-05 | Stop reason: SDUPTHER

## 2022-01-12 RX ORDER — DOXAZOSIN MESYLATE 4 MG/1
4 TABLET ORAL NIGHTLY
Qty: 7 TABLET | Refills: 0 | Status: SHIPPED | OUTPATIENT
Start: 2022-01-12 | End: 2022-04-14

## 2022-01-12 NOTE — TELEPHONE ENCOUNTER
Pt [facility] will[facility] not be arriving until at least Sunday. Wanting to know if a short supply can be sent to [facility].      Pt out of these meds

## 2022-01-21 ENCOUNTER — OFFICE VISIT (OUTPATIENT)
Dept: FAMILY MEDICINE CLINIC | Age: 72
End: 2022-01-21
Payer: MEDICARE

## 2022-01-21 VITALS
WEIGHT: 258 LBS | DIASTOLIC BLOOD PRESSURE: 64 MMHG | HEIGHT: 71 IN | BODY MASS INDEX: 36.12 KG/M2 | HEART RATE: 61 BPM | SYSTOLIC BLOOD PRESSURE: 96 MMHG | OXYGEN SATURATION: 98 % | TEMPERATURE: 98 F

## 2022-01-21 DIAGNOSIS — M54.50 ACUTE BILATERAL LOW BACK PAIN WITHOUT SCIATICA: Primary | ICD-10-CM

## 2022-01-21 PROCEDURE — 99213 OFFICE O/P EST LOW 20 MIN: CPT | Performed by: NURSE PRACTITIONER

## 2022-01-21 RX ORDER — TIZANIDINE 2 MG/1
2 TABLET ORAL EVERY 8 HOURS PRN
Qty: 10 TABLET | Refills: 0 | Status: SHIPPED | OUTPATIENT
Start: 2022-01-21 | End: 2022-06-20

## 2022-01-21 ASSESSMENT — ENCOUNTER SYMPTOMS
DIARRHEA: 0
BOWEL INCONTINENCE: 0
SHORTNESS OF BREATH: 0
TROUBLE SWALLOWING: 0
CONSTIPATION: 0
CHEST TIGHTNESS: 0
EYE PAIN: 0
ABDOMINAL PAIN: 0
BACK PAIN: 1
COUGH: 0

## 2022-01-21 ASSESSMENT — PATIENT HEALTH QUESTIONNAIRE - PHQ9
SUM OF ALL RESPONSES TO PHQ9 QUESTIONS 1 & 2: 0
2. FEELING DOWN, DEPRESSED OR HOPELESS: 0
SUM OF ALL RESPONSES TO PHQ QUESTIONS 1-9: 0
1. LITTLE INTEREST OR PLEASURE IN DOING THINGS: 0
SUM OF ALL RESPONSES TO PHQ QUESTIONS 1-9: 0

## 2022-01-21 NOTE — PATIENT INSTRUCTIONS

## 2022-01-21 NOTE — PROGRESS NOTES
Subjective  Chief Complaint   Patient presents with    Back Pain     ongoing 3 days, no injury       Back Pain  This is a new problem. Episode onset: 3 days ago. The problem occurs constantly. The problem has been gradually improving since onset. The pain is present in the lumbar spine. The quality of the pain is described as aching. Radiates to: can feel it in his left leg occasionally. The pain is at a severity of 10/10 (pain will be a 10 at times, other times a 3). The pain is mild. Associated symptoms include numbness (intermittent). Pertinent negatives include no abdominal pain, bladder incontinence, bowel incontinence, chest pain, fever, leg pain, paresthesias, pelvic pain or weakness. Treatments tried: asa. The treatment provided no relief. Past Medical History:   Diagnosis Date    A-fib [facility])     Abnormal EMG 12/09/2015    Actinic keratoses     Actinic keratoses     Acute diastolic congestive heart failure ([facility]) 1/6/2021    Allergic rhinitis     Anemia 11/18/2014    Arthritis     Chronic back pain     COPD (chronic obstructive pulmonary disease) ([facility]) 08/09/2012    COPD (chronic obstructive pulmonary disease) ([facility])     Crohns disease     Degenerative disc disease, lumbar     Dermatitis     Diabetes ([facility] [address] 03/19/2015    diet controlled    Gastrointestinal problem     GERD (gastroesophageal reflux disease)     History of atrial fibrillation 2006    Dr. Adriane Terry History of throat cancer 2004     cleared for reoccurence years ago    Hyperlipidemia 1/21/2014    Hypertension     Hypogonadism male     Lung disease     Mononeuritis multiplex     Mononeuritis multiplex     Nondependent alcohol abuse, in remission 7/22/2020    Obesity (BMI 30-39. 9) 7/24/2019    Osteoarthritis of lumbar spine     Pain of right heel     Paresthesia of foot, bilateral     Prediabetes 12/3/2014    Restless leg syndrome     Seborrheic dermatitis     Seborrheic keratoses, inflamed     Throat cancer ([address]     throat, had surgery, sees Dr Pat Musa yearly    Tinea cruris     Tinea pedis     Tinea unguium      Patient Active Problem List    Diagnosis Date Noted    Pulmonary embolism ([address] 11/29/2021    Type 2 diabetes mellitus with diabetic neuropathy 10/28/2021    Primary osteoarthritis of right knee 06/24/2021    Type 2 diabetes mellitus without complication, without long-term current use of insulin ([address] 42/69/9088    Diastolic heart failure ([address] 11/13/2020    Gastro-esophageal reflux disease without esophagitis 10/01/2020    Chronic obstructive pulmonary disease ([address] 10/01/2020    Nondependent alcohol abuse, in remission 07/22/2020    Blood glucose abnormal 07/22/2020    Personal history of tobacco use, presenting hazards to health 07/22/2020    Paroxysmal atrial fibrillation ([address] 07/22/2020    Morbid obesity ([address] 07/22/2020    Paresthesia 02/21/2020    Peripheral nerve disease 12/01/2019    Acute bronchitis 11/27/2019    Chest pain 10/02/2019    Body mass index (BMI) 38.0-38.9, adult 07/24/2019    History of cataract extraction 07/23/2019    Sleep apnea 06/06/2018    Actinic keratoses     MACIEL (dyspnea on exertion)     Pain of right heel     Tinea unguium     Chronic back pain     Degenerative disc disease, lumbar     Osteoarthritis of lumbar spine     Mononeuritis multiplex     Seborrheic dermatitis     Hypogonadism male     Allergic rhinitis     Hyperlipidemia 01/21/2014    Crohn's disease, unspecified, without complications ([address] 33/82/3129    Chronic otitis externa 08/09/2012    Hypertension 10/21/2011     Past Surgical History:   Procedure Laterality Date    CARDIAC CATHETERIZATION      CARPAL TUNNEL RELEASE      CATARACT REMOVAL WITH IMPLANT Right 09/09/2019    CATARACT REMOVAL WITH IMPLANT Left 07/22/2019    COLON SURGERY      COLONOSCOPY  04/15/2015    KNEE ARTHROSCOPY      LARYNGECTOMY  2004    UPPER GASTROINTESTINAL ENDOSCOPY  03/24/13    Dr. Narciso Ruiz  VOCAL CORD AUGMENTATION W/RADIESSE INJ  2002     Family History   Problem Relation Age of Onset    Heart Disease Mother     Liver Disease Mother     High Blood Pressure Mother     Heart Disease Father     Arthritis Father     Cancer Sister         lung     Social History     Socioeconomic History    Marital status:      Spouse name: None    Number of children: 3    Years of education: 15    Highest education level: High school graduate   Occupational History    Occupation:      Employer: [employer]   Tobacco Use    Smoking status: Former Smoker     Packs/day: 1.00     Years: 25.00     Pack years: 25.00     Types: Cigarettes     Quit date: 10/21/1990     Years since quittin.2    Smokeless tobacco: Never Used   Substance and Sexual Activity    Alcohol use: No     Comment: Attends [employer], 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    Drug use: No    Sexual activity: Not Currently     Partners: Female   Other Topics Concern    None   Social History Narrative    Lives alone. 2 story home     1 step to get in house and 7 steps to get upstairs    Full basement    2 children live nearby and 1 son lives in [place]    No pets     Social Determinants of Health     Financial Resource Strain: Low Risk     Difficulty of Paying Living Expenses: Not hard at all   Food Insecurity: No Food Insecurity    Worried About [address] [place] [address] in the Last Year: Never true    Dacia of Food in the Last Year: Never true   Transportation Needs: No Transportation Needs    Lack of Transportation (Medical): No    Lack of Transportation (Non-Medical):  No   Physical Activity:     Days of Exercise per Week: Not on file    Minutes of Exercise per Session: Not on file   Stress:     Feeling of Stress : Not on file   Social Connections:     Frequency of Communication with Friends and Family: Not on file    Frequency of Social Gatherings with Friends and Family: Not on file    Attends Jewish Services: Not on file   Zana Michelle Member of Clubs or Organizations: Not on file    Attends Club or Organization Meetings: Not on file    Marital Status: Not on file   Intimate Partner Violence:     Fear of Current or Ex-Partner: Not on file    Emotionally Abused: Not on file    Physically Abused: Not on file    Sexually Abused: Not on file   Housing Stability:     Unable to Pay for Housing in the Last Year: Not on file    Number of Nurismotoshia in the Last Year: Not on file    Unstable Housing in the Last Year: Not on file     Current Outpatient Medications on File Prior to Visit   Medication Sig Dispense Refill    montelukast (SINGULAIR) 10 MG tablet TAKE 1 TABLET NIGHTLY 7 tablet 0    doxazosin (CARDURA) 4 MG tablet Take 1 tablet by mouth nightly 7 tablet 0    metoprolol tartrate (LOPRESSOR) 50 MG tablet Take 1.5 po bid 10 tablet 0    pravastatin (PRAVACHOL) 40 MG tablet TAKE 1 TABLET NIGHTLY 90 tablet 3    predniSONE (DELTASONE) 10 MG tablet Take as directed 30 tablet 0    nystatin (MYCOSTATIN) 750259 UNIT/GM cream Apply topically 2 times daily Apply topically 2 times daily. 15 g 0    lisinopril (PRINIVIL;ZESTRIL) 20 MG tablet Take 1 tablet by mouth daily 90 tablet 3    apixaban (ELIQUIS) 5 MG TABS tablet Take 1 tablet by mouth 2 times daily 180 tablet 3    nitroGLYCERIN (NITROSTAT) 0.4 MG SL tablet up to max of 3 total doses. If no relief after 1 dose, call 911. 25 tablet 2    RESTASIS 0.05 % ophthalmic emulsion Place 1 drop into both eyes daily       mometasone (ELOCON) 0.1 % cream APPLY to EAR TWICE DAILY      albuterol sulfate HFA (PROAIR HFA) 108 (90 Base) MCG/ACT inhaler Inhale 2 puffs into the lungs every 6 hours as needed for Wheezing 1 Inhaler 3    ustekinumab (STELARA) 45 MG/0.5ML SOSY prefilled syringe Inject 45 mg into the skin once      triamcinolone (KENALOG) 0.025 % cream Apply topically 2 times daily.  1 Tube 0    furosemide (LASIX) 20 MG tablet Take 1 tablet by mouth daily (Patient taking differently: Take 40 mg by mouth daily Indications: Taking 40 mg tablet daily ) 30 tablet 2    nystatin-triamcinolone (MYCOLOG II) 875281-2.1 UNIT/GM-% cream Apply topically 2 times daily. 15 g 1    budesonide-formoterol (SYMBICORT) 160-4.5 MCG/ACT AERO Inhale 2 puffs into the lungs 2 times daily 2 each LOT [id number] EXP [id number] 3 Inhaler 1    albuterol (PROVENTIL) (2.5 MG/3ML) 0.083% nebulizer solution Take 3 mLs by nebulization every 6 hours as needed for Wheezing 120 each 3    gabapentin (NEURONTIN) 300 MG capsule TAKE 1 CAPSULE DAILY 90 capsule 3    SPIRIVA HANDIHALER 18 MCG inhalation capsule INHALE THE CONTENTS OF 1 CAPSULE DAILY 90 capsule 3    cetirizine (ZYRTEC) 5 MG tablet TAKE ONE TABLET BY MOUTH  PRN      CPAP Machine MISC New cpap supplies mask hose filters etc 1 each 0    Saccharomyces boulardii (PROBIOTIC) 250 MG CAPS Take 1 tablet by mouth daily      esomeprazole Magnesium (NEXIUM) 20 MG PACK Take 20 mg by mouth daily      aspirin 81 MG tablet Take 81 mg by mouth daily.  mesalamine (PENTASA) 250 MG CR capsule Take 500 mg by mouth 4 times daily. No current facility-administered medications on file prior to visit. Allergies   Allergen Reactions    Alemtuzumab      Low grade fever  Other reaction(s): Other: See Comments  Low grade fever    Glycopyrrolate-Formoterol Other (See Comments)     Dizzy and felt like heart rate sped up  Other reaction(s): Other: See Comments  Dizzy and felt like heart rate sped up    Mercaptopurine Other (See Comments)    Moxifloxacin Other (See Comments)     Pt states He gets sores in his mouth       Review of Systems   Constitutional: Negative for activity change, appetite change, chills, diaphoresis, fatigue and fever. HENT: Negative for congestion, dental problem, ear pain, hearing loss and trouble swallowing. Eyes: Negative for pain and visual disturbance. Respiratory: Negative for cough, chest tightness and shortness of breath.     Cardiovascular: Negative for chest pain, palpitations and leg swelling. Gastrointestinal: Negative for abdominal pain, bowel incontinence, constipation and diarrhea. Genitourinary: Negative for bladder incontinence, difficulty urinating and pelvic pain. Musculoskeletal: Positive for back pain. Neurological: Positive for numbness (intermittent). Negative for dizziness, weakness, light-headedness and paresthesias. Psychiatric/Behavioral: Negative for dysphoric mood. The patient is not nervous/anxious. Objective  Vitals:    01/21/22 1505   BP: 96/64   Site: Left Upper Arm   Position: Sitting   Cuff Size: Large Adult   Pulse: 61   Temp: 98 °F (36.7 °C)   TempSrc: Temporal   SpO2: 98%   Weight: 258 lb (117 kg)   Height: 5' 11\" (1.803 m)     Physical Exam  Vitals reviewed. Constitutional:       General: He is not in acute distress. Appearance: Normal appearance. He is well-developed and normal weight. He is not diaphoretic. HENT:      Head: Normocephalic and atraumatic. Right Ear: Hearing, tympanic membrane, ear canal and external ear normal.      Left Ear: Hearing, tympanic membrane, ear canal and external ear normal.      Nose: Nose normal. No congestion or rhinorrhea. Mouth/Throat:      Mouth: Mucous membranes are moist.      Pharynx: Oropharynx is clear. No oropharyngeal exudate or posterior oropharyngeal erythema. Eyes:      General:         Right eye: No discharge. Left eye: No discharge. Conjunctiva/sclera: Conjunctivae normal.      Pupils: Pupils are equal, round, and reactive to light. Neck:      Thyroid: No thyromegaly. Cardiovascular:      Rate and Rhythm: Normal rate and regular rhythm. Pulses: Normal pulses. Heart sounds: Normal heart sounds. No murmur heard. Pulmonary:      Effort: Pulmonary effort is normal. No respiratory distress. Breath sounds: Normal breath sounds. No stridor. No wheezing, rhonchi or rales.    Chest:      Chest wall: No tenderness. Musculoskeletal:         General: Normal range of motion. Cervical back: Normal range of motion and neck supple. No muscular tenderness. Lumbar back: Normal. No spasms, tenderness or bony tenderness. Normal range of motion. Negative right straight leg raise test and negative left straight leg raise test.      Right lower leg: No edema. Left lower leg: No edema. Lymphadenopathy:      Cervical: No cervical adenopathy. Skin:     General: Skin is warm and dry. Capillary Refill: Capillary refill takes less than 2 seconds. Coloration: Skin is not jaundiced or pale. Findings: No bruising, erythema, lesion or rash. Neurological:      General: No focal deficit present. Mental Status: He is alert and oriented to person, place, and time. Mental status is at baseline. Cranial Nerves: No cranial nerve deficit. Coordination: Coordination normal.      Gait: Gait normal.   Psychiatric:         Mood and Affect: Mood normal.         Speech: Speech normal.         Behavior: Behavior normal.         Thought Content: Thought content normal.         Judgment: Judgment normal.         Assessment& Plan    1. Acute bilateral low back pain without sciatica  Pt will continue prednisone at current dose. Muscle relaxer PRN sparingly. Ice to area PRN. Stretching exercises. Avoid bedrest.  No heavy lifting. F/u if no improvement. - tiZANidine (ZANAFLEX) 2 MG tablet; Take 1 tablet by mouth every 8 hours as needed (back pain)  Dispense: 10 tablet; Refill: 0    Side effects, adverse effects of the medication prescribed today, as well as treatment plan/ rationale and result expectations have been discussed with the patient who expresses understanding and desires to proceed. Close follow up to evaluate treatment results and for coordination of care. I have reviewed the patient's medical history in detail and updated the computerized patient record.     As always, patient is advised that if symptoms worsen in any way they will proceed to the nearest emergency room. No orders of the defined types were placed in this encounter. Orders Placed This Encounter   Medications    tiZANidine (ZANAFLEX) 2 MG tablet     Sig: Take 1 tablet by mouth every 8 hours as needed (back pain)     Dispense:  10 tablet     Refill:  0       There are no discontinued medications. Return if symptoms worsen or fail to improve.     Mendel Mars, APRN - CNP

## 2022-01-24 ENCOUNTER — CARE COORDINATION (OUTPATIENT)
Dept: CARE COORDINATION | Age: 72
End: 2022-01-24

## 2022-01-25 ENCOUNTER — OFFICE VISIT (OUTPATIENT)
Dept: PULMONOLOGY | Age: 72
End: 2022-01-25
Payer: MEDICARE

## 2022-01-25 ENCOUNTER — CARE COORDINATION (OUTPATIENT)
Dept: CARE COORDINATION | Age: 72
End: 2022-01-25

## 2022-01-25 VITALS
DIASTOLIC BLOOD PRESSURE: 53 MMHG | OXYGEN SATURATION: 97 % | HEIGHT: 71 IN | BODY MASS INDEX: 35.98 KG/M2 | WEIGHT: 257 LBS | SYSTOLIC BLOOD PRESSURE: 104 MMHG | HEART RATE: 66 BPM | TEMPERATURE: 98.3 F

## 2022-01-25 DIAGNOSIS — Z99.89 OSA ON CPAP: ICD-10-CM

## 2022-01-25 DIAGNOSIS — E66.9 OBESITY (BMI 30-39.9): ICD-10-CM

## 2022-01-25 DIAGNOSIS — J44.9 CHRONIC OBSTRUCTIVE PULMONARY DISEASE, UNSPECIFIED COPD TYPE (HCC): Primary | ICD-10-CM

## 2022-01-25 DIAGNOSIS — G47.33 OSA ON CPAP: ICD-10-CM

## 2022-01-25 PROCEDURE — 99214 OFFICE O/P EST MOD 30 MIN: CPT | Performed by: INTERNAL MEDICINE

## 2022-01-25 ASSESSMENT — ENCOUNTER SYMPTOMS
SHORTNESS OF BREATH: 1
CHEST TIGHTNESS: 0
NAUSEA: 0
DYSPNEA ASSOCIATED WITH: EXERTION
DIARRHEA: 0
WHEEZING: 0
COUGH: 0
VOMITING: 0
SORE THROAT: 0
EYE ITCHING: 0
RHINORRHEA: 0
VOICE CHANGE: 0
ABDOMINAL PAIN: 0

## 2022-01-25 NOTE — CARE COORDINATION
medication side effects, and lack of education  Plan for overcoming my barriers: I will work with my ACM and health care team to increase my knowledge and self care management skills for my health  Confidence: 9/10  Anticipated Goal Completion Date: 5/15/2022              Prior to Admission medications    Medication Sig Start Date End Date Taking? Authorizing Provider   tiZANidine (ZANAFLEX) 2 MG tablet Take 1 tablet by mouth every 8 hours as needed (back pain) 1/21/22   Berlin Ash APRN - CNP   montelukast (SINGULAIR) 10 MG tablet TAKE 1 TABLET NIGHTLY 1/12/22   Raad Antunez MD   doxazosin (CARDURA) 4 MG tablet Take 1 tablet by mouth nightly 1/12/22   Raad Antunez MD   metoprolol tartrate (LOPRESSOR) 50 MG tablet Take 1.5 po bid 1/12/22   Raad Antunez MD   pravastatin (PRAVACHOL) 40 MG tablet TAKE 1 TABLET NIGHTLY 1/10/22   Raad Antunez MD   predniSONE (DELTASONE) 10 MG tablet Take as directed 12/27/21   Raad Antunez MD   nystatin (MYCOSTATIN) 342542 UNIT/GM cream Apply topically 2 times daily Apply topically 2 times daily. 12/27/21   Raad Antunez MD   lisinopril (PRINIVIL;ZESTRIL) 20 MG tablet Take 1 tablet by mouth daily 11/30/21   Raad Antunez MD   apixaban (ELIQUIS) 5 MG TABS tablet Take 1 tablet by mouth 2 times daily 11/30/21   Raad Antunez MD   nitroGLYCERIN (NITROSTAT) 0.4 MG SL tablet up to max of 3 total doses.  If no relief after 1 dose, call 911. 11/3/21   Raad Antunez MD   RESTASIS 0.05 % ophthalmic emulsion Place 1 drop into both eyes daily  8/23/21   Historical Provider, MD   mometasone (ELOCON) 0.1 % cream APPLY to [address] [facility] [address] 9/8/21   Historical Provider, MD   albuterol sulfate HFA (PROAIR HFA) 108 (90 Base) MCG/ACT inhaler Inhale 2 puffs into the lungs every 6 hours as needed for Wheezing 8/30/21   Francisco Javier Reyes MD   ustekinumab (STELARA) 45 MG/0.5ML SOSY prefilled syringe Inject 45 mg into the skin once    Historical Provider, MD triamcinolone (KENALOG) 0.025 % cream Apply topically 2 times daily. 6/1/21   STARLA Johnson - CNP   furosemide (LASIX) 20 MG tablet Take 1 tablet by mouth daily  Patient taking differently: Take 40 mg by mouth daily Indications: Taking 40 mg tablet daily  5/25/21   Cher Washington MD   nystatin-triamcinolone [facility]) 950193-1.1 UNIT/GM-% cream Apply topically 2 times daily. 5/25/21   Cher Washington MD   budesonide-formoterol [facility]) 160-4.5 MCG/ACT AERO Inhale 2 puffs into the lungs 2 times daily 2 each LOT [id number] EXP [id number] 4/29/21   Talia Abernathy MD   albuterol (PROVENTIL) (2.5 MG/3ML) 0.083% nebulizer solution Take 3 mLs by nebulization every 6 hours as needed for Wheezing 3/31/21   Talia Abernathy MD   gabapentin (NEURONTIN) 300 MG capsule TAKE 1 CAPSULE DAILY 3/12/21 3/12/22  Cher Washington MD   SPIRIVA HANDIHALER 18 MCG inhalation capsule INHALE THE CONTENTS OF 1 CAPSULE DAILY 11/25/20   Talia Abernathy MD   cetirizine (ZYRTEC) 5 MG tablet TAKE ONE TABLET BY MOUTH  PRN    Historical Provider, MD   CPAP Machine MISC New cpap supplies mask hose filters etc 1/15/18   Talia Abernathy MD   Saccharomyces boulardii (PROBIOTIC) 250 MG CAPS Take 1 tablet by mouth daily    Historical Provider, MD   esomeprazole Magnesium (NEXIUM) 20 MG PACK Take 20 mg by mouth daily    Historical Provider, MD   aspirin 81 MG tablet Take 81 mg by mouth daily. Historical Provider, MD   mesalamine (PENTASA) 250 MG CR capsule Take 500 mg by mouth 4 times daily.     Historical Provider, MD       Future Appointments   Date Time Provider Chase Barrera   1/25/2022  1:00 PM Talia Abernathy, [address] [facility]   2/17/2022  1:30 PM MD Lola Tuttle   3/24/2022  1:00 PM Cher Washington MD [facility] [place]   6/6/2022  1:00 PM Cher Washington MD [facility]     ,   Congestive Heart Failure Assessment    Are you currently restricting fluids?: No Restriction  Do you understand a low sodium diet?: Yes  Do you understand how to read food labels?: Yes  How many restaurant meals do you eat per week?: 3-4  Do you salt your food before tasting it?: No     No patient-reported symptoms      Symptoms:  CHF associated dyspnea on exertion: Pos, CHF associated fatigue: Pos, CHF associated leg swelling: Pos      Symptom course: no change  Patient-reported weight (lb): 252  Weight trend: stable  Salt intake watch compared to last visit: stable      and   COPD Assessment    Does the patient understand envrionmental exposure?: Yes  Is the patient able to verbalize Rescue vs. Long Acting medications?: Yes  Does the patient have a nebulizer?: No  Does the patient use a space with inhaled medications?: No            Symptoms:     Breathlessness: exertion  Change in chronic cough?: No/At Baseline  Change in sputum?: No/At Baseline

## 2022-01-25 NOTE — PROGRESS NOTES
Subjective:     Veronica Garcia is a 70 y.o. male who complains today of:     Chief Complaint   Patient presents with    Follow-up       HPI  He is using symbicort and spiriva, albuterol HFA prn   C/o shortness of breath  with exertion. No  Wheezing. No Cough  No Hemoptysis. No Chest tightness. No Chest pain with radiation  or pleuritic pain. No  leg edema. No orthopnea. No Fever or chills. No Rhinorrhea and postnasal drip. He is using CPAP with  14   centimeters of H2O with heated humidity. He is using CPAP for about  7-8   hours every night. He is using CPAP with Nasal pillow  Mask. He said  sleep is restful with the CPAP use. He is compliant with CPAP therapy and benefiting with CPAP use. No snoring with CPAP use. No complaint of daytime sleepiness or tiredness with CPAP use. He denies taking naps. No sleepiness with driving. He denies difficulty falling asleep or staying asleep. I reviewed compliance report with patient regarding CPAP therapy. He is using  CPAP for 30 days out of 30 days. Average usage of days used is 7 hours and 57 min , average AHI 0.9 with CPAP use.      Allergies:  Alemtuzumab, Glycopyrrolate-formoterol, Mercaptopurine, and Moxifloxacin  Past Medical History:   Diagnosis Date    [facility]     Abnormal EMG 12/09/2015    Actinic keratoses     Actinic keratoses     Acute diastolic congestive heart failure (HCC) 1/6/2021    Allergic rhinitis     Anemia 11/18/2014    Arthritis     Chronic back pain     COPD [facility]chronic obstructive pulmonary disease) ([facility]) 08/09/2012    COPD (chronic obstructive pulmonary disease) ([facility])     Crohns disease     Degenerative disc disease, lumbar     Dermatitis     Diabetes ([facility] 03/19/2015    diet controlled    Gastrointestinal problem     GERD (gastroesophageal reflux disease)     History of atrial fibrillation 2006    Dr. Mendy Woody History of throat cancer 2004     cleared for reoccurence years ago    Hyperlipidemia 2014    Hypertension     Hypogonadism male     Lung disease     Mononeuritis multiplex     Mononeuritis multiplex     Nondependent alcohol abuse, in remission 2020    Obesity (BMI 30-39. 9) 2019    Osteoarthritis of lumbar spine     Pain of right heel     Paresthesia of foot, bilateral     Prediabetes 12/3/2014    Restless leg syndrome     Seborrheic dermatitis     Seborrheic keratoses, inflamed     Throat cancer (HCC)     throat, had surgery, sees Dr Pat Musa yearly    Tinea cruris     Tinea pedis     Tinea unguium      Past Surgical History:   Procedure Laterality Date    CARDIAC CATHETERIZATION      CARPAL TUNNEL RELEASE      CATARACT REMOVAL WITH IMPLANT Right 2019    CATARACT REMOVAL WITH IMPLANT Left 2019    COLON SURGERY      COLONOSCOPY  04/15/2015    KNEE ARTHROSCOPY      LARYNGECTOMY  2004    UPPER GASTROINTESTINAL ENDOSCOPY  13    Dr. 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     2 story home     1 step to get in house and 7 steps to get upstairs    Full basement    2 children live nearby and 1 son lives in [place]    No pets     Social Determinants of Health     Financial Resource Strain: Low Risk     Difficulty of Paying Living Expenses: Not hard at all   Food Insecurity: No Food Insecurity    Worried About [facility] in the Last Year: Never true    [place] of Food in the Last Year: Never true   Transportation Needs: No Transportation Needs    Lack of Transportation (Medical): No    Lack of Transportation (Non-Medical): No   Physical Activity:     Days of Exercise per Week: Not on file    Minutes of Exercise per Session: Not on file   Stress:     Feeling of Stress : Not on file   Social Connections:     Frequency of Communication with Friends and Family: Not on file    Frequency of Social Gatherings with Friends and Family: Not on file    Attends Anabaptist Services: Not on file    Active Member of [address] [place] [address] or Organizations: Not on file    Attends Club or Organization Meetings: Not on file    Marital Status: Not on file   Intimate Partner Violence:     Fear of Current or Ex-Partner: Not on file    Emotionally Abused: Not on file    Physically Abused: Not on file    Sexually Abused: Not on file   Housing Stability:     Unable to Pay for Housing in the Last Year: Not on file    Number of [place] in the Last Year: Not on file    Unstable Housing in the Last Year: Not on file         Review of Systems   Constitutional: Negative for chills, diaphoresis, fatigue and fever. HENT: Negative for congestion, mouth sores, nosebleeds, postnasal drip, rhinorrhea, sneezing, sore throat and voice change. Eyes: Negative for itching and visual disturbance. Respiratory: Positive for shortness of breath. Negative for cough, chest tightness and wheezing. Cardiovascular: Negative. Negative for chest pain, palpitations and leg swelling.    Gastrointestinal: Negative for abdominal pain, diarrhea, nausea and vomiting. Genitourinary: Negative for difficulty urinating and hematuria. Musculoskeletal: Negative for arthralgias, joint swelling and myalgias. Skin: Negative for rash. Allergic/Immunologic: Negative for environmental allergies. Neurological: Negative for dizziness, tremors, weakness and headaches. Psychiatric/Behavioral: Positive for sleep disturbance. Negative for behavioral problems. :     Vitals:    01/25/22 1310   BP: (!) 104/53   Pulse: 66   Temp: 98.3 °F (36.8 °C)   SpO2: 97%   Weight: 257 lb (116.6 kg)   Height: 5' 11\" (1.803 m)     Wt Readings from Last 3 Encounters:   01/25/22 257 lb (116.6 kg)   01/21/22 258 lb (117 kg)   01/04/22 262 lb (118.8 kg)         Physical Exam  Constitutional:       Appearance: He is well-developed. He is obese. HENT:      Head: Normocephalic and atraumatic. Nose: Nose normal.   Eyes:      Conjunctiva/sclera: Conjunctivae normal.      Pupils: Pupils are equal, round, and reactive to light. Neck:      Thyroid: No thyromegaly. Vascular: No JVD. Trachea: No tracheal deviation. Cardiovascular:      Rate and Rhythm: Normal rate and regular rhythm. Heart sounds: No murmur heard. No friction rub. No gallop. Pulmonary:      Effort: Pulmonary effort is normal. No respiratory distress. Breath sounds: Normal breath sounds. No wheezing or rales. Comments: diminished Breath sound bilaterally. Chest:      Chest wall: No tenderness. Abdominal:      General: There is no distension. Musculoskeletal:         General: Normal range of motion. Lymphadenopathy:      Cervical: No cervical adenopathy. Skin:     General: Skin is warm and dry. Findings: No rash. Neurological:      Mental Status: He is alert and oriented to person, place, and time. Cranial Nerves: No cranial nerve deficit.    Psychiatric:         Behavior: Behavior normal.         Current Outpatient Medications   Medication Sig Dispense Refill    tiZANidine (ZANAFLEX) 2 MG tablet Take 1 tablet by mouth every 8 hours as needed (back pain) 10 tablet 0    montelukast (SINGULAIR) 10 MG tablet TAKE 1 TABLET NIGHTLY 7 tablet 0    doxazosin (CARDURA) 4 MG tablet Take 1 tablet by mouth nightly 7 tablet 0    metoprolol tartrate (LOPRESSOR) 50 MG tablet Take 1.5 po bid 10 tablet 0    pravastatin (PRAVACHOL) 40 MG tablet TAKE 1 TABLET NIGHTLY 90 tablet 3    nystatin (MYCOSTATIN) 840298 UNIT/GM cream Apply topically 2 times daily Apply topically 2 times daily. 15 g 0    lisinopril (PRINIVIL;ZESTRIL) 20 MG tablet Take 1 tablet by mouth daily 90 tablet 3    apixaban (ELIQUIS) 5 MG TABS tablet Take 1 tablet by mouth 2 times daily 180 tablet 3    nitroGLYCERIN (NITROSTAT) 0.4 MG SL tablet up to max of 3 total doses. If no relief after 1 dose, call 911. 25 tablet 2    RESTASIS 0.05 % ophthalmic emulsion Place 1 drop into both eyes daily       mometasone (ELOCON) 0.1 % cream APPLY to EAR TWICE DAILY      albuterol sulfate HFA (PROAIR HFA) 108 (90 Base) MCG/ACT inhaler Inhale 2 puffs into the lungs every 6 hours as needed for Wheezing 1 Inhaler 3    ustekinumab (STELARA) 45 MG/0.5ML SOSY prefilled syringe Inject 45 mg into the skin once      triamcinolone (KENALOG) 0.025 % cream Apply topically 2 times daily. 1 Tube 0    furosemide (LASIX) 20 MG tablet Take 1 tablet by mouth daily (Patient taking differently: Take 40 mg by mouth daily Indications: Taking 40 mg tablet daily ) 30 tablet 2    nystatin-triamcinolone (MYCOLOG II) 441182-7.1 UNIT/GM-% cream Apply topically 2 times daily.  15 g 1    budesonide-formoterol (SYMBICORT) 160-4.5 MCG/ACT AERO Inhale 2 puffs into the lungs 2 times daily 2 each LOT [id number] EXP [id number] 3 Inhaler 1    albuterol (PROVENTIL) (2.5 MG/3ML) 0.083% nebulizer solution Take 3 mLs by nebulization every 6 hours as needed for Wheezing 120 each 3    gabapentin (NEURONTIN) 300 MG capsule TAKE 1 CAPSULE DAILY 90 capsule 3    SPIRIVA HANDIHALER 18 MCG inhalation capsule INHALE THE CONTENTS OF 1 CAPSULE DAILY 90 capsule 3    cetirizine (ZYRTEC) 5 MG tablet TAKE ONE TABLET BY MOUTH  PRN      CPAP Machine MISC New cpap supplies mask hose filters etc 1 each 0    Saccharomyces boulardii (PROBIOTIC) 250 MG CAPS Take 1 tablet by mouth daily      esomeprazole Magnesium (NEXIUM) 20 MG PACK Take 20 mg by mouth daily      aspirin 81 MG tablet Take 81 mg by mouth daily.  mesalamine (PENTASA) 250 MG CR capsule Take 500 mg by mouth 4 times daily.  predniSONE (DELTASONE) 10 MG tablet Take as directed (Patient not taking: Reported on 1/25/2022) 30 tablet 0     No current facility-administered medications for this visit. Results for orders placed during the hospital encounter of 10/27/21    XR CHEST (2 VW)    Narrative  EXAMINATION: XR CHEST (2 VW)    CLINICAL HISTORY: RIGHT CHEST PAIN    COMPARISONS: APRIL 12, 2021    FINDINGS: Osseous structures intact. Cardiopericardial silhouette normal. Pulmonary vasculature normal. Lungs clear. Impression  NO ACUTE CARDIOPULMONARY DISEASE. Results for orders placed during the hospital encounter of 04/12/21    XR CHEST (2 VW)    Narrative  EXAMINATION: XR CHEST (2 VW)    CLINICAL HISTORY: COPD    COMPARISONS: FEBRUARY 21, 2020. FINDINGS: Osseous structures are intact. Cardiopericardial silhouette is normal. Pulmonary vasculature is normal. Lungs are clear and hyperinflated. Impression  NO ACUTE CARDIOPULMONARY DISEASE. EMPHYSEMA. Results for orders placed in visit on 02/21/20    XR CHEST STANDARD (2 VW)    Narrative  EXAMINATION: XR CHEST (2 VW)    DATE AND TIME:2/21/2020 11:08 AM    CLINICAL HISTORY: Shortness of breath  J44.1 COPD with exacerbation ([address] ICD10    COMPARISONS: November 7, 2019    FINDINGS: Allowing for inspiratory effort lungs are clear of any fresh infiltrate. No overt signs of pulmonary edema. No effusion. No pneumothorax.  Bones intact. Impression  NO ACTIVE LUNG DISEASE.  ]  Results for orders placed during the hospital encounter of 10/31/21    XR CHEST PORTABLE    Narrative  Exam: XR CHEST PORTABLE    History: Pulmonary embolism. Back pain. Technique: AP portable view of the chest obtained. Comparison: CT chest 2021    Findings:    Atherosclerotic calcification of the thoracic aorta. The cardiomediastinal silhouette is within normal limits. No pneumothorax, pleural effusion, or consolidation. Bones of the thorax appear intact. Impression  No radiographic evidence of acute intrathoracic process. Results for orders placed during the hospital encounter of 19    XR CHEST PORTABLE    Narrative  Patient MRN: [id number]    : 1950    Age:  71 years    Gender: Male    Order Date: 2019 4:45 AM.    Exam: XR CHEST PORTABLE    Number of Views: 1    Indication:   Chest Pain, rapid heart beat per pt. Comparison: 3/15/2019    Findings: Stable, enlarged cardiomediastinal silhouette with thoracic aortic vascular calcifications. No pneumonia, pneumothorax or pleural effusion. Impression  Impression:  Stable, enlarged cardiomediastinal silhouette with thoracic aortic vascular calcifications      Results for orders placed during the hospital encounter of 03/15/19    XR CHEST PORTABLE    Narrative  Portable chest radiograph    History: Palpitations    Technique: AP portable view of the chest obtained. Comparison: Chest radiograph from 2019    Findings:    Atherosclerotic calcification of the thoracic aorta. The cardiomediastinal silhouette is within normal limits. No pneumothorax, pleural effusion, or focal consolidation. Degenerative changes of the thoracic spine. Impression  No acute intrathoracic process. Assessment/Plan:     1.  Chronic obstructive pulmonary disease, unspecified COPD type ([address]  He is using symbicort and spiriva, albuterol HFA prn .C/o shortness of breath  with exertion. No  Wheezing. No Cough. Continue bronchodilator therapy as before    2. JARRELL on CPAP  He is using CPAP with  14   centimeters of H2O with heated humidity. He is using CPAP for about  7-8   hours every night. He is using CPAP with Nasal pillow  Mask. He said  sleep is restful with the CPAP use. He is compliant with CPAP therapy and benefiting with CPAP use. No snoring with CPAP use. I reviewed compliance report with patient regarding CPAP therapy. He is using  CPAP for 30 days out of 30 days. Average usage of days used is 7 hours and 57 min , average AHI 0.9 with CPAP use. 3. Obesity (BMI 30-39. 9)  He is advised try to lose weight. obesity related risk explained to the patient ,  Current weight:  257 lb (116.6 kg) Lbs. BMI:  Body mass index is 35.84 kg/m². Suggested weight control approaches, including dietary changes , exercise, behavioral modification. Return in about 4 months (around 5/25/2022) for jarrell, COPD.       Ger De La O MD

## 2022-02-07 ENCOUNTER — OFFICE VISIT (OUTPATIENT)
Dept: FAMILY MEDICINE CLINIC | Age: 72
End: 2022-02-07
Payer: MEDICARE

## 2022-02-07 VITALS — TEMPERATURE: 98.1 F | HEART RATE: 78 BPM | OXYGEN SATURATION: 98 % | WEIGHT: 250 LBS | BODY MASS INDEX: 34.87 KG/M2

## 2022-02-07 DIAGNOSIS — R41.3 MEMORY LOSS: ICD-10-CM

## 2022-02-07 DIAGNOSIS — J44.1 CHRONIC OBSTRUCTIVE PULMONARY DISEASE WITH ACUTE EXACERBATION (HCC): Primary | ICD-10-CM

## 2022-02-07 PROCEDURE — 96372 THER/PROPH/DIAG INJ SC/IM: CPT | Performed by: FAMILY MEDICINE

## 2022-02-07 PROCEDURE — 99214 OFFICE O/P EST MOD 30 MIN: CPT | Performed by: FAMILY MEDICINE

## 2022-02-07 RX ORDER — AMOXICILLIN AND CLAVULANATE POTASSIUM 875; 125 MG/1; MG/1
1 TABLET, FILM COATED ORAL 2 TIMES DAILY
Qty: 14 TABLET | Refills: 0 | Status: SHIPPED | OUTPATIENT
Start: 2022-02-07 | End: 2022-02-14

## 2022-02-07 RX ORDER — METHYLPREDNISOLONE ACETATE 80 MG/ML
80 INJECTION, SUSPENSION INTRA-ARTICULAR; INTRALESIONAL; INTRAMUSCULAR; SOFT TISSUE ONCE
Status: COMPLETED | OUTPATIENT
Start: 2022-02-07 | End: 2022-02-07

## 2022-02-07 RX ORDER — PREDNISONE 10 MG/1
TABLET ORAL
Qty: 20 TABLET | Refills: 0 | Status: SHIPPED | OUTPATIENT
Start: 2022-02-07 | End: 2022-03-17 | Stop reason: ALTCHOICE

## 2022-02-07 RX ORDER — AZITHROMYCIN 250 MG/1
TABLET, FILM COATED ORAL
Qty: 6 TABLET | Refills: 0 | Status: SHIPPED | OUTPATIENT
Start: 2022-02-07 | End: 2022-02-17

## 2022-02-07 RX ADMIN — METHYLPREDNISOLONE ACETATE 80 MG: 80 INJECTION, SUSPENSION INTRA-ARTICULAR; INTRALESIONAL; INTRAMUSCULAR; SOFT TISSUE at 17:55

## 2022-02-07 ASSESSMENT — ENCOUNTER SYMPTOMS
ABDOMINAL DISTENTION: 0
COUGH: 1
ABDOMINAL PAIN: 0
WHEEZING: 1
SHORTNESS OF BREATH: 1
PHOTOPHOBIA: 0
CHEST TIGHTNESS: 0

## 2022-02-07 NOTE — PROGRESS NOTES
After obtaining consent, and per orders of Dr. Rj Macias, injection of Depo Medrol given in Right upper quad. gluteus by Pete Jamil MA. Patient instructed to remain in clinic for 20 minutes afterwards, and to report any adverse reaction to me immediately.

## 2022-02-07 NOTE — PROGRESS NOTES
Diagnosis Orders   1. Chronic obstructive pulmonary disease with acute exacerbation (HCC)  amoxicillin-clavulanate (AUGMENTIN) 875-125 MG per tablet    azithromycin (ZITHROMAX) 250 MG tablet    predniSONE (DELTASONE) 10 MG tablet   2. Memory loss       Return in about 2 days (around 2/9/2022) for for review of outcome of today's recommendation. Patient Instructions   Patient due for appointment Dr. Joshua Yen for memory loss on February 17, 2022. Antibiotic x2 and prednisone ordered at the pharmacy. Depo-Medrol 80 mg given today. Follow-up appointment 2 days to recheck      Subjective:      Patient ID: Edwige Espinosa is a 70 y.o. male who presents for:  Chief Complaint   Patient presents with    COPD    Cough     X 4 days, productive green in color, thick like mud       Patient states couple days of increasing cough now bringing up thick yellow-green sputum. Denies fever or chills. States he has been using his nebulizer twice a day to try and help this but is getting worse. Current Outpatient Medications on File Prior to Visit   Medication Sig Dispense Refill    tiZANidine (ZANAFLEX) 2 MG tablet Take 1 tablet by mouth every 8 hours as needed (back pain) 10 tablet 0    montelukast (SINGULAIR) 10 MG tablet TAKE 1 TABLET NIGHTLY 7 tablet 0    doxazosin (CARDURA) 4 MG tablet Take 1 tablet by mouth nightly 7 tablet 0    metoprolol tartrate (LOPRESSOR) 50 MG tablet Take 1.5 po bid 10 tablet 0    pravastatin (PRAVACHOL) 40 MG tablet TAKE 1 TABLET NIGHTLY 90 tablet 3    nystatin (MYCOSTATIN) 950507 UNIT/GM cream Apply topically 2 times daily Apply topically 2 times daily. 15 g 0    lisinopril (PRINIVIL;ZESTRIL) 20 MG tablet Take 1 tablet by mouth daily 90 tablet 3    apixaban (ELIQUIS) 5 MG TABS tablet Take 1 tablet by mouth 2 times daily 180 tablet 3    nitroGLYCERIN (NITROSTAT) 0.4 MG SL tablet up to max of 3 total doses.  If no relief after 1 dose, call 911. 25 tablet 2    RESTASIS 0.05 % ophthalmic emulsion Place 1 drop into both eyes daily       mometasone (ELOCON) 0.1 % cream APPLY to EAR TWICE DAILY      albuterol sulfate HFA (PROAIR HFA) 108 (90 Base) MCG/ACT inhaler Inhale 2 puffs into the lungs every 6 hours as needed for Wheezing 1 Inhaler 3    ustekinumab (STELARA) 45 MG/0.5ML SOSY prefilled syringe Inject 45 mg into the skin once      triamcinolone (KENALOG) 0.025 % cream Apply topically 2 times daily. 1 Tube 0    furosemide (LASIX) 20 MG tablet Take 1 tablet by mouth daily (Patient taking differently: Take 40 mg by mouth daily Indications: Taking 40 mg tablet daily ) 30 tablet 2    nystatin-triamcinolone (MYCOLOG II) 074059-2.1 UNIT/GM-% cream Apply topically 2 times daily. 15 g 1    budesonide-formoterol (SYMBICORT) 160-4.5 MCG/ACT AERO Inhale 2 puffs into the lungs 2 times daily 2 each LOT [id number] EXP [id number] 3 Inhaler 1    albuterol (PROVENTIL) (2.5 MG/3ML) 0.083% nebulizer solution Take 3 mLs by nebulization every 6 hours as needed for Wheezing 120 each 3    gabapentin (NEURONTIN) 300 MG capsule TAKE 1 CAPSULE DAILY 90 capsule 3    SPIRIVA HANDIHALER 18 MCG inhalation capsule INHALE THE CONTENTS OF 1 CAPSULE DAILY 90 capsule 3    cetirizine (ZYRTEC) 5 MG tablet TAKE ONE TABLET BY MOUTH  PRN      CPAP Machine MISC New cpap supplies mask hose filters etc 1 each 0    Saccharomyces boulardii (PROBIOTIC) 250 MG CAPS Take 1 tablet by mouth daily      esomeprazole Magnesium (NEXIUM) 20 MG PACK Take 20 mg by mouth daily      aspirin 81 MG tablet Take 81 mg by mouth daily.  mesalamine (PENTASA) 250 MG CR capsule Take 500 mg by mouth 4 times daily. No current facility-administered medications on file prior to visit.      Past Medical History:   Diagnosis Date    A-fib [facility])     Abnormal EMG 12/09/2015    Actinic keratoses     Actinic keratoses     Acute diastolic congestive heart failure (HCC) 1/6/2021    Allergic rhinitis     Anemia 11/18/2014    Arthritis  Chronic back pain     [facility] obstructive [facility] disease[facility]) 2012    COPD [facility]     Crohns disease     Degenerative disc disease, lumbar     Dermatitis     Diabetes ([facility] [address] 2015    diet controlled    Gastrointestinal problem     GERD (gastroesophageal reflux disease)     History of atrial fibrillation     Dr. Rip Major History of throat cancer 2004     cleared for reoccurence years ago    Hyperlipidemia 2014    Hypertension     Hypogonadism male     Lung disease     Mononeuritis multiplex     Mononeuritis multiplex     Nondependent alcohol abuse, in remission 2020    Obesity (BMI 30-39. 9) 2019    Osteoarthritis of lumbar spine     Pain of right heel     Paresthesia of foot, bilateral     Prediabetes 12/3/2014    Restless leg syndrome     Seborrheic dermatitis     Seborrheic keratoses, inflamed     Throat cancer ([facility])     throat, had surgery, sees Dr Brittani Truong yearly    Tinea cruris     Tinea pedis     Tinea unguium      Past Surgical History:   Procedure Laterality Date    CARDIAC CATHETERIZATION      CARPAL TUNNEL RELEASE      CATARACT REMOVAL WITH IMPLANT Right 2019    CATARACT REMOVAL WITH IMPLANT Left 2019    COLON SURGERY      COLONOSCOPY  04/15/2015    KNEE ARTHROSCOPY      LARYNGECTOMY  2004    UPPER GASTROINTESTINAL ENDOSCOPY  13    Dr. Godwin Reyes W/NAPOLEON RODRIGUEZ       Social History     Socioeconomic History    Marital status:      Spouse name: Not on file    Number of children: 3    Years of education: 12    Highest education level: High school graduate   Occupational History    Occupation:      Employer: [employer]   Tobacco Use    Smoking status: Former Smoker     Packs/day: 1.00     Years: 25.00     Pack years: 25.00     Types: Cigarettes     Quit date: 10/21/1990     Years since quittin.3    Smokeless tobacco: Never Used   Substance and Sexual Activity    Alcohol use: No     Comment: Attends [employer] 25 years    Drug use: No    Sexual activity: Not Currently     Partners: Female   Other Topics Concern    Not on file   Social History Narrative    Lives alone. 2 story home     1 step to get in house and 7 steps to get upstairs    Full basement    2 children live nearby and 1 son lives in [place]    No pets     Social Determinants of Health     Financial Resource Strain: Low Risk     Difficulty of Paying Living Expenses: Not hard at all   Food Insecurity: No Food Insecurity    Worried About [address] [place] [address] in the Last Year: Never true    Dacia of Food in the Last Year: Never true   Transportation Needs: No Transportation Needs    Lack of Transportation (Medical): No    Lack of Transportation (Non-Medical):  No   Physical Activity:     Days of Exercise per Week: Not on file    Minutes of Exercise per Session: Not on file   Stress:     Feeling of Stress : Not on file   Social Connections:     Frequency of Communication with Friends and Family: Not on file    Frequency of Social Gatherings with Friends and Family: Not on file    Attends Spiritism Services: Not on file    Active Member of [address] [place] [address] or Organizations: Not on file    Attends Club or Organization Meetings: Not on file    Marital Status: Not on file   Intimate Partner Violence:     Fear of Current or Ex-Partner: Not on file    Emotionally Abused: Not on file    Physically Abused: Not on file    Sexually Abused: Not on file   Housing Stability:     Unable to Pay for Housing in the Last Year: Not on file    Number of [place] in the Last Year: Not on file    Unstable Housing in the Last Year: Not on file     Family History   Problem Relation Age of Onset    Heart Disease Mother     Liver Disease Mother     High Blood Pressure Mother     Heart Disease Father     Arthritis Father     Cancer Sister         lung     Allergies: Alemtuzumab, Glycopyrrolate-formoterol, Mercaptopurine, and Moxifloxacin    Review of Systems   Constitutional: Negative for activity change, appetite change, diaphoresis and unexpected weight change. Eyes: Negative for photophobia and visual disturbance. Respiratory: Positive for cough, shortness of breath and wheezing. Negative for chest tightness. No orthopnea   Cardiovascular: Negative for chest pain, palpitations and leg swelling. Gastrointestinal: Negative for abdominal distention and abdominal pain. Genitourinary: Negative for flank pain and frequency. Musculoskeletal: Negative for gait problem and joint swelling. Neurological: Negative for dizziness, weakness, light-headedness and headaches. Psychiatric/Behavioral: Negative for confusion. Objective:   Pulse 78   Temp 98.1 °F (36.7 °C)   Wt 250 lb (113.4 kg)   SpO2 98%   BMI 34.87 kg/m²     Physical Exam  Vitals reviewed. Constitutional:       General: He is not in acute distress. Appearance: He is well-developed. HENT:      Head: Normocephalic and atraumatic. Right Ear: External ear normal.      Left Ear: External ear normal.      Nose: Nose normal.   Eyes:      General:         Right eye: No discharge. Left eye: No discharge. Conjunctiva/sclera: Conjunctivae normal.      Pupils: Pupils are equal, round, and reactive to light. Neck:      Thyroid: No thyromegaly. Cardiovascular:      Rate and Rhythm: Normal rate and regular rhythm. Heart sounds: Normal heart sounds. Pulmonary:      Effort: Pulmonary effort is normal. No respiratory distress. Breath sounds: Wheezing present. No rhonchi or rales. Abdominal:      General: There is no distension. Musculoskeletal:      Cervical back: Neck supple. Skin:     General: Skin is warm and dry. Neurological:      Mental Status: He is alert and oriented to person, place, and time.       Coordination: Coordination normal.   Psychiatric: Thought Content: Thought content normal.         Judgment: Judgment normal.         No results found for this visit on 22. Recent Results (from the past 2016 hour(s))   POCT COVID-19, Antigen    Collection Time: 21 11:42 AM   Result Value Ref Range    SARS-COV-2, POC Not-Detected Not Detected    Lot Number [id number]     QC Pass/Fail pass     Performing Instrument BD Veritor        [] Pt was seen by provider for      Minutes  Counseling and coordination of care was done for all assessment diagnosis listed for today with patient and any family/friend present. More than 50% of this visit was spent coordinating cuurent care, obtaining information for prior records, and counseling for current plan of action. Assessment:       Diagnosis Orders   1. Chronic obstructive pulmonary disease with acute exacerbation (HCC)  amoxicillin-clavulanate (AUGMENTIN) 875-125 MG per tablet    azithromycin (ZITHROMAX) 250 MG tablet    predniSONE (DELTASONE) 10 MG tablet   2. Memory loss           No orders of the defined types were placed in this encounter. Orders Placed This Encounter   Medications    amoxicillin-clavulanate (AUGMENTIN) 875-125 MG per tablet     Sig: Take 1 tablet by mouth 2 times daily for 7 days     Dispense:  14 tablet     Refill:  0    azithromycin (ZITHROMAX) 250 MG tablet     Si tabs by mouth first day. 1 tab daily thereafter until gone. Dispense:  6 tablet     Refill:  0    predniSONE (DELTASONE) 10 MG tablet     Sig: Take 3 tabs for 3 days, then 2 tabs for 3 days, then 1 tab for 3 days, then 1/2 tab for 3 days, then stop. Dispense:  20 tablet     Refill:  0    methylPREDNISolone acetate (DEPO-MEDROL) injection 80 mg          Medication List          Accurate as of 2022  5:59 PM. If you have any questions, ask your nurse or doctor. START taking these medications    amoxicillin-clavulanate 875-125 MG per tablet  Commonly known as:  Augmentin  Take 1 tablet by mouth 2 times daily for 7 days  Started by: Merry Yeboah MD     azithromycin 250 MG tablet  Commonly known as: ZITHROMAX  2 tabs by mouth first day. 1 tab daily thereafter until gone. Started by: Merry Yeboah MD     predniSONE 10 MG tablet  Commonly known as: DELTASONE  Take 3 tabs for 3 days, then 2 tabs for 3 days, then 1 tab for 3 days, then 1/2 tab for 3 days, then stop.   Started by: Merry Yeboah MD        CHANGE how you take these medications    furosemide 20 MG tablet  Commonly known as: LASIX  Take 1 tablet by mouth daily  What changed: how much to take        CONTINUE taking these medications    * albuterol (2.5 MG/3ML) 0.083% nebulizer solution  Commonly known as: PROVENTIL  Take 3 mLs by nebulization every 6 hours as needed for Wheezing     * albuterol sulfate  (90 Base) MCG/ACT inhaler  Commonly known as: ProAir HFA  Inhale 2 puffs into the lungs every 6 hours as needed for Wheezing     apixaban 5 MG Tabs tablet  Commonly known as: Eliquis  Take 1 tablet by mouth 2 times daily     aspirin 81 MG tablet     budesonide-formoterol 160-4.5 MCG/ACT Aero  Commonly known as: Symbicort  Inhale 2 puffs into the lungs 2 times daily 2 each LOT [id number] EXP [id number]     cetirizine 5 MG tablet  Commonly known as: ZYRTEC     CPAP Machine Misc  New cpap supplies mask hose filters etc     doxazosin 4 MG tablet  Commonly known as: CARDURA  Take 1 tablet by mouth nightly     esomeprazole Magnesium 20 MG Pack  Commonly known as: NEXIUM     gabapentin 300 MG capsule  Commonly known as: NEURONTIN  TAKE 1 CAPSULE DAILY     lisinopril 20 MG tablet  Commonly known as: PRINIVIL;ZESTRIL  Take 1 tablet by mouth daily     mesalamine 250 MG extended release capsule  Commonly known as: PENTASA     metoprolol tartrate 50 MG tablet  Commonly known as: LOPRESSOR  Take 1.5 po bid     mometasone 0.1 % cream  Commonly known as: ELOCON     montelukast 10 MG tablet  Commonly known as: SINGULAIR  TAKE 1 TABLET NIGHTLY     nitroGLYCERIN 0.4 MG SL tablet  Commonly known as: NITROSTAT  up to max of 3 total doses. If no relief after 1 dose, call 911.     nystatin 352427 UNIT/GM cream  Commonly known as: MYCOSTATIN  Apply topically 2 times daily Apply topically 2 times daily. nystatin-triamcinolone 806899-3.3 UNIT/GM-% cream  Commonly known as: MYCOLOG II  Apply topically 2 times daily. pravastatin 40 MG tablet  Commonly known as: PRAVACHOL  TAKE 1 TABLET NIGHTLY     Probiotic 250 MG Caps     Restasis 0.05 % ophthalmic emulsion  Generic drug: cycloSPORINE     Spiriva HandiHaler 18 MCG inhalation capsule  Generic drug: tiotropium  INHALE THE CONTENTS OF 1 CAPSULE DAILY     Stelara 45 MG/0.5ML Sosy prefilled syringe  Generic drug: ustekinumab     tiZANidine 2 MG tablet  Commonly known as: ZANAFLEX  Take 1 tablet by mouth every 8 hours as needed (back pain)     triamcinolone 0.025 % cream  Commonly known as: KENALOG  Apply topically 2 times daily. * This list has 2 medication(s) that are the same as other medications prescribed for you. Read the directions carefully, and ask your doctor or other care provider to review them with you. Where to Get Your Medications      These medications were sent to [address] [place], [place] [id number] [facility], [address] [place] [id number]    Phone: [phone]   · amoxicillin-clavulanate 875-125 MG per tablet  · azithromycin 250 MG tablet  · predniSONE 10 MG tablet           Plan:   Return in about 2 days (around 2/9/2022) for for review of outcome of today's recommendation. Patient Instructions   Patient due for appointment Dr. Denisha Scales for memory loss on February 17, 2022. Antibiotic x2 and prednisone ordered at the pharmacy. Depo-Medrol 80 mg given today. Follow-up appointment 2 days to recheck      This note was partially created with the assistance of dictation.   This may lead to grammatical or spelling errors. Sher Garza M.D.

## 2022-02-07 NOTE — PATIENT INSTRUCTIONS
Patient due for appointment Dr. Wilfredo Thayer for memory loss on February 17, 2022. Antibiotic x2 and prednisone ordered at the pharmacy. Depo-Medrol 80 mg given today.     Follow-up appointment 2 days to recheck

## 2022-02-15 ENCOUNTER — CARE COORDINATION (OUTPATIENT)
Dept: CARE COORDINATION | Age: 72
End: 2022-02-15

## 2022-02-15 ASSESSMENT — ENCOUNTER SYMPTOMS: DYSPNEA ASSOCIATED WITH: EXERTION

## 2022-02-15 NOTE — CARE COORDINATION
Ambulatory Care Coordination Note  2/15/2022  CM Risk Score: 8  Charlson 10 Year Mortality Risk Score: 100%     ACC: Supriya Almaguer RN    Summary Note:     Attila Loera tells me that he is still having issues with his breathing only on exertion  He tells me that if he walks too fast or if he is carrying objects he will have issues with his breathing. He is using his rescue inhaler about two times daily and he is using his nebulizer as well  He denies fever, chills, change in mucus  He feels that his sleep and sleep pattern is unchanged  He denies any changes in his appetite  I did commend him on the 15 pound weight loss but he states that he really has not changed anything  He will be going back to the gym with his son \"just to walk on the treadmill. \"      PLAN:  Attila Loera will continue to monitor his symptoms utilizing the COPD and CHF zone tool sheets  He will call myself or the office with any worsening of symptoms for recommendations  Attila Loera will weigh himself at least 3 times per week  Attila Loera will take all medications as prescribed. Goals Addressed                 This Visit's Progress     Conditions and Symptoms   On track     I will schedule office visits, as directed by my provider. I will keep my appointment or reschedule if I have to cancel. I will notify my provider of any barriers to my plan of care. I will follow my Zone Management tool to seek urgent or emergent care. I will notify my provider of any symptoms that indicate a worsening of my condition. Barriers: lack of support, medication side effects, and lack of education  Plan for overcoming my barriers: I will work with my ACM and health care team to increase my knowledge and self care management skills for my health  Confidence: 9/10  Anticipated Goal Completion Date: 5/15/2022              Prior to Admission medications    Medication Sig Start Date End Date Taking?  Authorizing Provider   azithromycin (ZITHROMAX) 250 MG tablet 2 tabs by mouth first day. 1 tab daily thereafter until gone. 2/7/22   Ibeth Fernández MD   predniSONE (DELTASONE) 10 MG tablet Take 3 tabs for 3 days, then 2 tabs for 3 days, then 1 tab for 3 days, then 1/2 tab for 3 days, then stop. 2/7/22   Ibeth Fernández MD   tiZANidine (ZANAFLEX) 2 MG tablet Take 1 tablet by mouth every 8 hours as needed (back pain) 1/21/22   Ranny Holter, APRN - CNP   montelukast (SINGULAIR) 10 MG tablet TAKE 1 TABLET NIGHTLY 1/12/22   Ibeth Fernández MD   doxazosin (CARDURA) 4 MG tablet Take 1 tablet by mouth nightly 1/12/22   Ibeth Fernández MD   metoprolol tartrate (LOPRESSOR) 50 MG tablet Take 1.5 po bid 1/12/22   Ibeth Fernández MD   pravastatin (PRAVACHOL) 40 MG tablet TAKE 1 TABLET NIGHTLY 1/10/22   Ibeth Fernández MD   nystatin (MYCOSTATIN) 977267 UNIT/GM cream Apply topically 2 times daily Apply topically 2 times daily. 12/27/21   Ibeth Fernández MD   lisinopril (PRINIVIL;ZESTRIL) 20 MG tablet Take 1 tablet by mouth daily 11/30/21   Ibeth Fernández MD   apixaban (ELIQUIS) 5 MG TABS tablet Take 1 tablet by mouth 2 times daily 11/30/21   Ibeth Fernández MD   nitroGLYCERIN (NITROSTAT) 0.4 MG SL tablet up to max of 3 total doses. If no relief after 1 dose, call 911. 11/3/21   Ibeth Fernández MD   RESTASIS 0.05 % ophthalmic emulsion Place 1 drop into both eyes daily  8/23/21   Historical Provider, MD   mometasone (ELOCON) 0.1 % cream APPLY to [facility] [place] [facility] 9/8/21   Historical Provider, MD   albuterol sulfate HFA (PROAIR HFA) 108 (90 Base) MCG/ACT inhaler Inhale 2 puffs into the lungs every 6 hours as needed for Wheezing 8/30/21   García Gray MD   ustekinumab (STELARA) 45 MG/0.5ML SOSY prefilled syringe Inject 45 mg into the skin once    Historical Provider, MD   triamcinolone (KENALOG) 0.025 % cream Apply topically 2 times daily.  6/1/21   Ranny Holter, APRN - CNP   furosemide (LASIX) 20 MG tablet Take 1 tablet by mouth daily  Patient taking differently: Take 40 mg by mouth daily Indications: Taking 40 mg tablet daily  5/25/21   Luther Pacheco MD   nystatin-triamcinolone [facility]) 271558-0.1 UNIT/GM-% cream Apply topically 2 times daily. 5/25/21   Luther Pacheco MD   budesonide-formoterol [facility]) 160-4.5 MCG/ACT AERO Inhale 2 puffs into the lungs 2 times daily 2 each LOT [id number] EXP [id number] 4/29/21   Richi Edwards MD   albuterol (PROVENTIL) (2.5 MG/3ML) 0.083% nebulizer solution Take 3 mLs by nebulization every 6 hours as needed for Wheezing 3/31/21   Richi Edwards MD   gabapentin (NEURONTIN) 300 MG capsule TAKE 1 CAPSULE DAILY 3/12/21 3/12/22  Luther Pacheco MD   SPIRIVA HANDIHALER 18 MCG inhalation capsule INHALE THE CONTENTS OF 1 CAPSULE DAILY 11/25/20   Richi Edwards MD   cetirizine (ZYRTEC) 5 MG tablet TAKE ONE TABLET BY MOUTH  PRN    Historical Provider, MD   CPAP Machine MISC New cpap supplies mask hose filters etc 1/15/18   Richi Edwards MD   Saccharomyces boulardii (PROBIOTIC) 250 MG CAPS Take 1 tablet by mouth daily    Historical Provider, MD   esomeprazole Magnesium (NEXIUM) 20 MG PACK Take 20 mg by mouth daily    Historical Provider, MD   aspirin 81 MG tablet Take 81 mg by mouth daily. Historical Provider, MD   mesalamine (PENTASA) 250 MG CR capsule Take 500 mg by mouth 4 times daily.     Historical Provider, MD       Future Appointments   Date Time Provider Chase Barrera   2/16/2022 12:15 PM Luther Pacheco MD [facility]   2/17/2022  1:30 PM MD Lola Lees   3/24/2022  1:00 PM Luther Pacheco MD [facility]GENC[facility]   5/25/2022  1:15 PM Drea Mayo [facility] [facility]   6/6/2022  1:00 PM Luther Pacheco MD [facility]     ,   Congestive Heart Failure Assessment    Are you currently restricting fluids?: No Restriction  Do you understand a low sodium diet?: Yes  Do you understand how to read food labels?: Yes  How many restaurant meals do you eat per week?: 3-4  Do you salt your food before tasting it?: No     Shortness of breath (worse than baseline)      Symptoms:  CHF associated dyspnea on exertion: Pos      Symptom course: stable  Weight trend: stable  Salt intake watch compared to last visit: stable      and   COPD Assessment    Does the patient understand envrionmental exposure?: Yes  Is the patient able to verbalize Rescue vs. Long Acting medications?: Yes  Does the patient have a nebulizer?: No  Does the patient use a space with inhaled medications?: No     Shortness of breath (worse than baseline)         Symptoms:     Symptom course: no change  Breathlessness: exertion  Increase use of rapid acting/rescue inhaled medications?: Yes  Change in chronic cough?: No/At Baseline  Change in sputum?: No/At Baseline  Sputum characteristics: Clear, White  Self Monitoring - SaO2: No  Have you had a recent diagnosis of pneumonia either by PCP or at a hospital?: No

## 2022-02-16 ENCOUNTER — OFFICE VISIT (OUTPATIENT)
Dept: FAMILY MEDICINE CLINIC | Age: 72
End: 2022-02-16
Payer: MEDICARE

## 2022-02-16 VITALS
HEART RATE: 73 BPM | WEIGHT: 256 LBS | BODY MASS INDEX: 35.84 KG/M2 | DIASTOLIC BLOOD PRESSURE: 62 MMHG | SYSTOLIC BLOOD PRESSURE: 100 MMHG | HEIGHT: 71 IN | OXYGEN SATURATION: 96 % | TEMPERATURE: 97.8 F

## 2022-02-16 DIAGNOSIS — Z12.11 COLON CANCER SCREENING: ICD-10-CM

## 2022-02-16 DIAGNOSIS — K50.90 CROHN'S DISEASE WITHOUT COMPLICATION, UNSPECIFIED GASTROINTESTINAL TRACT LOCATION (HCC): ICD-10-CM

## 2022-02-16 DIAGNOSIS — I26.99 PULMONARY EMBOLISM, OTHER, UNSPECIFIED CHRONICITY, UNSPECIFIED WHETHER ACUTE COR PULMONALE PRESENT (HCC): ICD-10-CM

## 2022-02-16 DIAGNOSIS — E66.01 MORBID OBESITY (HCC): ICD-10-CM

## 2022-02-16 DIAGNOSIS — M54.12 CERVICAL RADICULITIS: ICD-10-CM

## 2022-02-16 DIAGNOSIS — I50.32 CHRONIC DIASTOLIC HEART FAILURE (HCC): ICD-10-CM

## 2022-02-16 DIAGNOSIS — J44.1 CHRONIC OBSTRUCTIVE PULMONARY DISEASE WITH ACUTE EXACERBATION (HCC): Primary | ICD-10-CM

## 2022-02-16 DIAGNOSIS — I48.0 PAROXYSMAL ATRIAL FIBRILLATION (HCC): ICD-10-CM

## 2022-02-16 DIAGNOSIS — E11.40 TYPE 2 DIABETES MELLITUS WITH DIABETIC NEUROPATHY, WITHOUT LONG-TERM CURRENT USE OF INSULIN (HCC): ICD-10-CM

## 2022-02-16 DIAGNOSIS — E11.9 TYPE 2 DIABETES MELLITUS WITHOUT COMPLICATION, WITHOUT LONG-TERM CURRENT USE OF INSULIN (HCC): ICD-10-CM

## 2022-02-16 DIAGNOSIS — L29.9 ITCHING: ICD-10-CM

## 2022-02-16 PROCEDURE — 99214 OFFICE O/P EST MOD 30 MIN: CPT | Performed by: FAMILY MEDICINE

## 2022-02-16 RX ORDER — TRIAMCINOLONE ACETONIDE 1 MG/G
CREAM TOPICAL
Qty: 30 G | Refills: 1 | Status: SHIPPED | OUTPATIENT
Start: 2022-02-16

## 2022-02-16 ASSESSMENT — ENCOUNTER SYMPTOMS
SHORTNESS OF BREATH: 0
ABDOMINAL PAIN: 0
ABDOMINAL DISTENTION: 0
CHEST TIGHTNESS: 0
PHOTOPHOBIA: 0

## 2022-02-16 NOTE — PROGRESS NOTES
Diagnosis Orders   1. Chronic obstructive pulmonary disease with acute exacerbation (HCC)  amoxicillin-clavulanate (AUGMENTIN) 875-125 MG per tablet     azithromycin (ZITHROMAX) 250 MG tablet     predniSONE (DELTASONE) 10 MG tablet   2. Memory loss         Return in about 2 days (around 2/9/2022) for for review of outcome of today's recommendation. Patient Instructions   Patient due for appointment Dr. Joshua Yen for memory loss on February 17, 2022. Antibiotic x2 and prednisone ordered at the pharmacy. Depo-Medrol 80 mg given today.      Follow-up appointment 2 days to recheck

## 2022-02-16 NOTE — PROGRESS NOTES
Diagnosis Orders   1. Chronic obstructive pulmonary disease with acute exacerbation ([address]     2. Itching  triamcinolone (KENALOG) 0.1 % cream   3. Cervical radiculitis  triamcinolone (KENALOG) 0.1 % cream   4. Chronic diastolic heart failure ([address]     5. Pulmonary embolism, other, unspecified chronicity, unspecified whether acute cor pulmonale present (HCC)     6. Paroxysmal atrial fibrillation ([address]     7. Crohn's disease without complication, unspecified gastrointestinal tract location ([facility]     8. Type 2 diabetes mellitus without complication, without long-term current use of insulin ([facility]     9. Type 2 diabetes mellitus with diabetic neuropathy, without long-term current use of insulin ([facility]     10. Morbid obesity ([facility]     11. Colon cancer screening  Fecal DNA Colorectal cancer screening ([facility])     Return in about 3 months (around 5/16/2022). Patient Instructions   COPD exacerbation resolved. No evidence of CHF or paroxysmal atrial fibrillation recurrence. Reoccurrence of the cervical radiculitis patient has had in the past causing itching in the upper extremity. Reeducated patient on neck position to avoid impingement of nerves and steroid cream to help resolve the itch and the rash      Subjective:      Patient ID: Denise Land is a 70 y.o. male who presents for:  Chief Complaint   Patient presents with    COPD       Patient sees Dr. Vipin Alexandra next week. Saw Dr. Ahsan Garcia and January. GI was in Oct.  Sees neurology tomorrow for change in memory. Up-to-date with his diabetic appointments in this office    Patient feeling significantly better. Feels like the Augmentin really did the trick. No longer coughing. No longer short of breath. Noticing an increase of the itching sensation in his left arm again. He is actually been scratching it significantly though there is no rash or other problems.   Just itches intermittently        Current Outpatient Medications on File Prior to Visit   Medication Sig Dispense Refill    azithromycin (ZITHROMAX) 250 MG tablet 2 tabs by mouth first day. 1 tab daily thereafter until gone. 6 tablet 0    predniSONE (DELTASONE) 10 MG tablet Take 3 tabs for 3 days, then 2 tabs for 3 days, then 1 tab for 3 days, then 1/2 tab for 3 days, then stop. 20 tablet 0    tiZANidine (ZANAFLEX) 2 MG tablet Take 1 tablet by mouth every 8 hours as needed (back pain) 10 tablet 0    montelukast (SINGULAIR) 10 MG tablet TAKE 1 TABLET NIGHTLY 7 tablet 0    doxazosin (CARDURA) 4 MG tablet Take 1 tablet by mouth nightly 7 tablet 0    metoprolol tartrate (LOPRESSOR) 50 MG tablet Take 1.5 po bid 10 tablet 0    pravastatin (PRAVACHOL) 40 MG tablet TAKE 1 TABLET NIGHTLY 90 tablet 3    nystatin (MYCOSTATIN) 953386 UNIT/GM cream Apply topically 2 times daily Apply topically 2 times daily. 15 g 0    lisinopril (PRINIVIL;ZESTRIL) 20 MG tablet Take 1 tablet by mouth daily 90 tablet 3    apixaban (ELIQUIS) 5 MG TABS tablet Take 1 tablet by mouth 2 times daily 180 tablet 3    nitroGLYCERIN (NITROSTAT) 0.4 MG SL tablet up to max of 3 total doses.  If no relief after 1 dose, call 911. 25 tablet 2    RESTASIS 0.05 % ophthalmic emulsion Place 1 drop into both eyes daily       albuterol sulfate HFA (PROAIR HFA) 108 (90 Base) MCG/ACT inhaler Inhale 2 puffs into the lungs every 6 hours as needed for Wheezing 1 Inhaler 3    ustekinumab (STELARA) 45 MG/0.5ML SOSY prefilled syringe Inject 45 mg into the skin once      furosemide (LASIX) 20 MG tablet Take 1 tablet by mouth daily (Patient taking differently: Take 40 mg by mouth daily Indications: Taking 40 mg tablet daily ) 30 tablet 2    budesonide-formoterol (SYMBICORT) 160-4.5 MCG/ACT AERO Inhale 2 puffs into the lungs 2 times daily 2 each LOT [id number] EXP [id number] 3 Inhaler 1    albuterol (PROVENTIL) (2.5 MG/3ML) 0.083% nebulizer solution Take 3 mLs by nebulization every 6 hours as needed for Wheezing 120 each 3    gabapentin (NEURONTIN) 300 MG capsule TAKE 1 CAPSULE DAILY 90 capsule 3    SPIRIVA HANDIHALER 18 MCG inhalation capsule INHALE THE CONTENTS OF 1 CAPSULE DAILY 90 capsule 3    cetirizine (ZYRTEC) 5 MG tablet TAKE ONE TABLET BY MOUTH  PRN      CPAP Machine MISC New cpap supplies mask hose filters etc 1 each 0    Saccharomyces boulardii (PROBIOTIC) 250 MG CAPS Take 1 tablet by mouth daily      esomeprazole Magnesium (NEXIUM) 20 MG PACK Take 20 mg by mouth daily      aspirin 81 MG tablet Take 81 mg by mouth daily.  mesalamine (PENTASA) 250 MG CR capsule Take 500 mg by mouth 4 times daily. No current facility-administered medications on file prior to visit. Past Medical History:   Diagnosis Date    A-fib [facility])     Abnormal EMG 12/09/2015    Actinic keratoses     Actinic keratoses     Acute diastolic congestive heart failure ([facility]) 1/6/2021    Allergic rhinitis     Anemia 11/18/2014    Arthritis     Chronic back pain     COPD (chronic obstructive pulmonary disease) ([facility]) 08/09/2012    COPD (chronic obstructive pulmonary disease) ([facility])     Crohns disease     Degenerative disc disease, lumbar     Dermatitis     Diabetes ([facility] 03/19/2015    diet controlled    Gastrointestinal problem     GERD (gastroesophageal reflux disease)     History of atrial fibrillation 2006    Dr. Miguel Ángel Rose History of throat cancer 2004     cleared for reoccurence years ago    Hyperlipidemia 1/21/2014    Hypertension     Hypogonadism male     Lung disease     Mononeuritis multiplex     Mononeuritis multiplex     Nondependent alcohol abuse, in remission 7/22/2020    Obesity (BMI 30-39. 9) 7/24/2019    Osteoarthritis of lumbar spine     Pain of right heel     Paresthesia of foot, bilateral     Prediabetes 12/3/2014    Restless leg syndrome     Seborrheic dermatitis     Seborrheic keratoses, inflamed     Throat cancer ([facility])     throat, had surgery, sees Dr Alvarez Settle yearly    Tinea cruris     Tinea pedis     Tinea unguium      Past Surgical History:   Procedure Laterality Date    CARDIAC CATHETERIZATION      CARPAL TUNNEL RELEASE      CATARACT REMOVAL WITH IMPLANT Right 2019    CATARACT REMOVAL WITH IMPLANT Left 2019    COLON SURGERY      COLONOSCOPY  04/15/2015    KNEE ARTHROSCOPY      LARYNGECTOMY  2004    UPPER GASTROINTESTINAL ENDOSCOPY  13    Dr. Rick HOWARD/RADIESSE INJ       Social History     Socioeconomic History    Marital status:      Spouse name: Not on file    Number of children: 3    Years of education: 15    Highest education level: High school graduate   Occupational History    Occupation:      Employer: [employer]   Tobacco Use    Smoking status: Former Smoker     Packs/day: 1.00     Years: 25.00     Pack years: 25.00     Types: Cigarettes     Quit date: 10/21/1990     Years since quittin.3    Smokeless tobacco: Never Used   Substance and Sexual Activity    Alcohol use: No     Comment: Attends [employer], 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    Drug use: No    Sexual activity: Not Currently     Partners: Female   Other Topics Concern    Not on file   Social History Narrative    Lives alone. 2 story home     1 step to get in house and 7 steps to get upstairs    Full basement    2 children live nearby and 1 son lives in [address] [place] [address] [address]    No pets     Social Determinants of Health     Financial Resource Strain: Low Risk     Difficulty of Paying Living Expenses: Not hard at all   Food Insecurity: No Food Insecurity    Worried About [facility] in the Last Year: Never true    [place] of Food in the Last Year: Never true   Transportation Needs: No Transportation Needs    Lack of Transportation (Medical): No    Lack of Transportation (Non-Medical):  No   Physical Activity:     Days of Exercise per Week: Not on file    Minutes of Exercise per Session: Not on file   Stress:     Feeling of Stress : Not on file   Social Connections:     Frequency of Communication with Friends and Family: Not on file    Frequency of Social Gatherings with Friends and Family: Not on file    Attends Rastafari Services: Not on file    Active Member of Clubs or Organizations: Not on file    Attends Club or Organization Meetings: Not on file    Marital Status: Not on file   Intimate Partner Violence:     Fear of Current or Ex-Partner: Not on file    Emotionally Abused: Not on file    Physically Abused: Not on file    Sexually Abused: Not on file   Housing Stability:     Unable to Pay for Housing in the Last Year: Not on file    Number of [place] in the Last Year: Not on file    Unstable Housing in the Last Year: Not on file     Family History   Problem Relation Age of Onset    Heart Disease Mother     Liver Disease Mother     High Blood Pressure Mother     Heart Disease Father     Arthritis Father     Cancer Sister         lung     Allergies:  Alemtuzumab, Glycopyrrolate-formoterol, Mercaptopurine, and Moxifloxacin    Review of Systems   Constitutional: Negative for activity change, appetite change, diaphoresis and unexpected weight change. Eyes: Negative for photophobia and visual disturbance. Respiratory: Negative for chest tightness and shortness of breath. No orthopnea   Cardiovascular: Negative for chest pain, palpitations and leg swelling. Gastrointestinal: Negative for abdominal distention and abdominal pain. Genitourinary: Negative for flank pain and frequency. Musculoskeletal: Negative for gait problem and joint swelling. Skin: Positive for rash. Neurological: Negative for dizziness, weakness, light-headedness and headaches. Psychiatric/Behavioral: Negative for confusion. Objective:   /62   Pulse 73   Temp 97.8 °F (36.6 °C)   Ht 5' 11\" (1.803 m)   Wt 256 lb (116.1 kg)   SpO2 96%   BMI 35.70 kg/m²     Physical Exam  Constitutional:       Appearance: Normal appearance. He is well-developed. He is not ill-appearing or diaphoretic. Comments: Vitals signs reviewed   HENT:      Head: Normocephalic and atraumatic. Right Ear: Hearing and external ear normal.      Left Ear: Hearing and external ear normal.      Nose: No nasal deformity or rhinorrhea. Eyes:      General: Lids are normal.         Right eye: No discharge. Left eye: No discharge. Extraocular Movements:      Right eye: No nystagmus. Left eye: No nystagmus. Conjunctiva/sclera: Conjunctivae normal.      Right eye: No hemorrhage. Left eye: No hemorrhage. Pupils: Pupils are equal, round, and reactive to light. Neck:      Thyroid: No thyroid mass or thyromegaly. Vascular: Normal carotid pulses. No carotid bruit or JVD. Trachea: No tracheal tenderness or tracheal deviation. Cardiovascular:      Rate and Rhythm: Normal rate and regular rhythm. Chest Wall: PMI displaced: normal location PMI. Pulses:           Carotid pulses are 2+ on the right side and 2+ on the left side. Radial pulses are 2+ on the right side and 2+ on the left side. Heart sounds: Normal heart sounds, S1 normal and S2 normal. No murmur heard. No friction rub. No gallop. No S3 sounds. Pulmonary:      Effort: Pulmonary effort is normal. No accessory muscle usage or respiratory distress. Breath sounds: Normal breath sounds. No wheezing, rhonchi or rales. Chest:      Chest wall: No deformity. Breasts:      Right: No supraclavicular adenopathy. Left: No supraclavicular adenopathy. Abdominal:      General: There is no distension. Musculoskeletal:         General: No deformity. Cervical back: Full passive range of motion without pain and neck supple. No deformity or tenderness. Normal range of motion. Lymphadenopathy:      Cervical:      Right cervical: No superficial cervical adenopathy. Left cervical: No superficial cervical adenopathy. Upper Body:      Right upper body: No supraclavicular adenopathy.       Left upper body: No supraclavicular adenopathy. Skin:     General: Skin is warm and dry. Findings: No bruising or rash. Nails: There is no clubbing. Neurological:      Mental Status: He is alert. Cranial Nerves: Cranial nerve deficit: CN II-XII GI without obvious deficit. Sensory: Sensory deficit: grossly intact for hands. Motor: No tremor. Atrophy: B UE and LE without atrophy. Abnormal muscle tone: B UE and LE have normal tone. Coordination: Coordination normal.      Gait: Gait normal.   Psychiatric:         Attention and Perception: He is attentive. Mood and Affect: Mood is not anxious. Affect is not inappropriate. Speech: Speech normal.         Behavior: Behavior is not agitated. Behavior is cooperative. Judgment: Judgment normal.         No results found for this visit on 02/16/22. Recent Results (from the past 2016 hour(s))   POCT COVID-19, Antigen    Collection Time: 12/22/21 11:42 AM   Result Value Ref Range    SARS-COV-2, POC Not-Detected Not Detected    Lot Number [id number]     QC Pass/Fail pass     Performing Instrument BD Veritor        [] Pt was seen by provider for      Minutes  Counseling and coordination of care was done for all assessment diagnosis listed for today with patient and any family/friend present. More than 50% of this visit was spent coordinating cuurent care, obtaining information for prior records, and counseling for current plan of action. spoke with pt son and received history regarding memory related issues. Short term affected. Going to the wrong gym to meet his son to workout. Repeats himself more. Friends have commented. Assessment:       Diagnosis Orders   1. Chronic obstructive pulmonary disease with acute exacerbation ([address]     2. Itching  triamcinolone (KENALOG) 0.1 % cream   3. Cervical radiculitis  triamcinolone (KENALOG) 0.1 % cream   4. Chronic diastolic heart failure ([address]     5.  Pulmonary embolism, other, unspecified chronicity, unspecified whether acute cor pulmonale present (HCC)     6. Paroxysmal atrial fibrillation ([facility]     7. Crohn's disease without complication, unspecified gastrointestinal tract location ([facility]     8. Type 2 diabetes mellitus without complication, without long-term current use of insulin ([facility]     9. Type 2 diabetes mellitus with diabetic neuropathy, without long-term current use of insulin ([facility]     10. Morbid obesity ([facility]     11. Colon cancer screening  Fecal DNA Colorectal cancer screening ([facility])         Orders Placed This Encounter   Procedures    Fecal DNA Colorectal cancer screening ([facility])       Orders Placed This Encounter   Medications    triamcinolone (KENALOG) 0.1 % cream     Sig: Apply topically 2 times daily. Dispense:  30 g     Refill:  1          Medication List          Accurate as of February 16, 2022  2:59 PM. If you have any questions, ask your nurse or doctor. START taking these medications    triamcinolone 0.1 % cream  Commonly known as: KENALOG  Apply topically 2 times daily. Started by: Alma Hinojosa MD        CHANGE how you take these medications    furosemide 20 MG tablet  Commonly known as: LASIX  Take 1 tablet by mouth daily  What changed: how much to take        CONTINUE taking these medications    * albuterol (2.5 MG/3ML) 0.083% nebulizer solution  Commonly known as: PROVENTIL  Take 3 mLs by nebulization every 6 hours as needed for Wheezing     * albuterol sulfate  (90 Base) MCG/ACT inhaler  Commonly known as: ProAir HFA  Inhale 2 puffs into the lungs every 6 hours as needed for Wheezing     apixaban 5 MG Tabs tablet  Commonly known as: Eliquis  Take 1 tablet by mouth 2 times daily     aspirin 81 MG tablet     azithromycin 250 MG tablet  Commonly known as: ZITHROMAX  2 tabs by mouth first day. 1 tab daily thereafter until gone.      budesonide-formoterol 160-4.5 MCG/ACT Aero  Commonly known as: Symbicort  Inhale 2 puffs into the lungs 2 times daily 2 each LOT [id number] EXP [id number]     cetirizine 5 MG tablet  Commonly known as: ZYRTEC     CPAP Machine Misc  New cpap supplies mask hose filters etc     doxazosin 4 MG tablet  Commonly known as: CARDURA  Take 1 tablet by mouth nightly     esomeprazole Magnesium 20 MG Pack  Commonly known as: NEXIUM     gabapentin 300 MG capsule  Commonly known as: NEURONTIN  TAKE 1 CAPSULE DAILY     lisinopril 20 MG tablet  Commonly known as: PRINIVIL;ZESTRIL  Take 1 tablet by mouth daily     mesalamine 250 MG extended release capsule  Commonly known as: PENTASA     metoprolol tartrate 50 MG tablet  Commonly known as: LOPRESSOR  Take 1.5 po bid     montelukast 10 MG tablet  Commonly known as: SINGULAIR  TAKE 1 TABLET NIGHTLY     nitroGLYCERIN 0.4 MG SL tablet  Commonly known as: NITROSTAT  up to max of 3 total doses. If no relief after 1 dose, call 911.     nystatin [id number] UNIT/GM cream  Commonly known as: MYCOSTATIN  Apply topically 2 times daily Apply topically 2 times daily. pravastatin 40 MG tablet  Commonly known as: PRAVACHOL  TAKE 1 TABLET NIGHTLY     predniSONE 10 MG tablet  Commonly known as: DELTASONE  Take 3 tabs for 3 days, then 2 tabs for 3 days, then 1 tab for 3 days, then 1/2 tab for 3 days, then stop. Probiotic 250 MG Caps     Restasis 0.05 % ophthalmic emulsion  Generic drug: cycloSPORINE     Spiriva HandiHaler 18 MCG inhalation capsule  Generic drug: tiotropium  INHALE THE CONTENTS OF 1 CAPSULE DAILY     Stelara 45 MG/0.5ML Sosy prefilled syringe  Generic drug: ustekinumab     tiZANidine 2 MG tablet  Commonly known as: ZANAFLEX  Take 1 tablet by mouth every 8 hours as needed (back pain)         * This list has 2 medication(s) that are the same as other medications prescribed for you. Read the directions carefully, and ask your doctor or other care provider to review them with you.                Where to Get Your Medications      These medications were sent to [facility] [address] [facility], [address]  [address] [id number]    Phone: [phone]   · triamcinolone 0.1 % cream           Plan:   Return in about 3 months (around 5/16/2022). Patient Instructions   COPD exacerbation resolved. No evidence of CHF or paroxysmal atrial fibrillation recurrence. Reoccurrence of the cervical radiculitis patient has had in the past causing itching in the upper extremity. Reeducated patient on neck position to avoid impingement of nerves and steroid cream to help resolve the itch and the rash      This note was partially created with the assistance of dictation. This may lead to grammatical or spelling errors. Sher Guzman M.D.

## 2022-02-16 NOTE — PATIENT INSTRUCTIONS
COPD exacerbation resolved. No evidence of CHF or paroxysmal atrial fibrillation recurrence. Reoccurrence of the cervical radiculitis patient has had in the past causing itching in the upper extremity.   Reeducated patient on neck position to avoid impingement of nerves and steroid cream to help resolve the itch and the rash

## 2022-02-17 ENCOUNTER — OFFICE VISIT (OUTPATIENT)
Dept: NEUROLOGY | Age: 72
End: 2022-02-17
Payer: MEDICARE

## 2022-02-17 VITALS — SYSTOLIC BLOOD PRESSURE: 108 MMHG | DIASTOLIC BLOOD PRESSURE: 64 MMHG | HEART RATE: 66 BPM

## 2022-02-17 DIAGNOSIS — G47.31 PRIMARY CENTRAL SLEEP APNEA: ICD-10-CM

## 2022-02-17 DIAGNOSIS — G31.84 MCI (MILD COGNITIVE IMPAIRMENT): ICD-10-CM

## 2022-02-17 DIAGNOSIS — R41.3 MEMORY LOSS: Primary | ICD-10-CM

## 2022-02-17 PROCEDURE — 99204 OFFICE O/P NEW MOD 45 MIN: CPT | Performed by: PSYCHIATRY & NEUROLOGY

## 2022-02-17 RX ORDER — DONEPEZIL HYDROCHLORIDE 5 MG/1
5 TABLET, FILM COATED ORAL NIGHTLY
Qty: 30 TABLET | Refills: 3 | Status: SHIPPED | OUTPATIENT
Start: 2022-02-17 | End: 2022-06-20 | Stop reason: SDUPTHER

## 2022-02-17 ASSESSMENT — ENCOUNTER SYMPTOMS
NAUSEA: 0
PHOTOPHOBIA: 0
SHORTNESS OF BREATH: 0
TROUBLE SWALLOWING: 0
COLOR CHANGE: 0
VOMITING: 0
BACK PAIN: 0
CHOKING: 0

## 2022-02-17 NOTE — PROGRESS NOTES
Subjective:      Patient ID: Mendel Leriche is a 70 y.o. male who presents today for:  Chief Complaint   Patient presents with    New Patient     Pt states that he is having problems with short term memory. Pt son states that he has has gotten lost 3 times in the last 2.5year. Pts sons father in law past away 2 years ago and pt didnt remember he past away. HPI 79-year-old right-handed gentleman now with a history of memory issues. This appears to be short-term memory issues and may fluctuate. Patient has atrial fibrillation and continues anticoagulation and has multiple other risks such as sleep apnea. Also has lived alone for many years. His son is here and gives a good account of his short-term memory issues. 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 Examples are like going to a restaurant and giving orders for food and then 5 minutes later he does not have any recall of the order. The functions very well at home. He is independent his activities of daily living. Is not any falls injuries trauma no tremors no difficulty swallowing or choking.   He has good days and bad days and fluctuate    Past Medical History:   Diagnosis Date    A-fib [facility])     Abnormal EMG 12/09/2015    Actinic keratoses     Actinic keratoses     Acute diastolic congestive heart failure (HCC) 1/6/2021    Allergic rhinitis     Anemia 11/18/2014    Arthritis     Chronic back pain     COPD (chronic obstructive pulmonary disease) (HCC) 08/09/2012    [facility]     Crohns disease     Degenerative disc disease, lumbar     Dermatitis     Diabetes ([facility] [address] 03/19/2015    diet controlled    Gastrointestinal problem     GERD (gastroesophageal reflux disease)     History of atrial fibrillation 2006    Dr. Janell Guo History of throat cancer 2004     cleared for reoccurence years ago    Hyperlipidemia 1/21/2014    Hypertension     Hypogonadism male    Chandu Lung disease     Mononeuritis multiplex     Mononeuritis multiplex     Nondependent alcohol abuse, in remission 2020    Obesity (BMI 30-39. 9) 2019    Osteoarthritis of lumbar spine     Pain of right heel     Paresthesia of foot, bilateral     Prediabetes 12/3/2014    Restless leg syndrome     Seborrheic dermatitis     Seborrheic keratoses, inflamed     Throat cancer (HCC)     throat, had surgery, sees Dr Carol Saba yearly    Tinea cruris     Tinea pedis     Tinea unguium      Past Surgical History:   Procedure Laterality Date    CARDIAC CATHETERIZATION      CARPAL TUNNEL RELEASE      CATARACT REMOVAL WITH IMPLANT Right 2019    CATARACT REMOVAL WITH IMPLANT Left 2019    COLON SURGERY      COLONOSCOPY  04/15/2015    KNEE ARTHROSCOPY      LARYNGECTOMY  2004    UPPER GASTROINTESTINAL ENDOSCOPY  13    Dr. Julia Rowland W/[facility]       Social History     Socioeconomic History    Marital status:      Spouse name: Not on file    Number of children: 3    Years of education: 15    Highest education level: High school graduate   Occupational History    Occupation:      Employer: [employer]   Tobacco Use    Smoking status: Former Smoker     Packs/day: 1.00     Years: 25.00     Pack years: 25.00     Types: Cigarettes     Quit date: 10/21/1990     Years since quittin.3    Smokeless tobacco: Never Used   Substance and Sexual Activity    Alcohol use: No     Comment: Attends AA, 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    Drug use: No    Sexual activity: Not Currently     Partners: Female   Other Topics Concern    Not on file   Social History Narrative    Lives alone.     2 story home     1 step to get in house and 7 steps to get upstairs    Full basement    2 children live nearby and 1 son lives in [facility] [address]    No pets     Social Determinants of Health     Financial Resource Strain: Low Risk     Difficulty of Paying Living Expenses: Not hard at all Food Insecurity: No Food Insecurity    Worried About Running Out of Food in the Last Year: Never true    Dacia of Food in the Last Year: Never true   Transportation Needs: No Transportation Needs    Lack of Transportation (Medical): No    Lack of Transportation (Non-Medical): No   Physical Activity:     Days of Exercise per Week: Not on file    Minutes of Exercise per Session: Not on file   Stress:     Feeling of Stress : Not on file   Social Connections:     Frequency of Communication with Friends and Family: Not on file    Frequency of Social Gatherings with Friends and Family: Not on file    Attends Sabianism Services: Not on file    Active Member of Clubs or Organizations: Not on file    Attends Club or Organization Meetings: Not on file    Marital Status: Not on file   Intimate Partner Violence:     Fear of Current or Ex-Partner: Not on file    Emotionally Abused: Not on file    Physically Abused: Not on file    Sexually Abused: Not on file   Housing Stability:     Unable to Pay for Housing in the Last Year: Not on file    Number of [place] in the Last Year: Not on file    Unstable Housing in the Last Year: Not on file     Family History   Problem Relation Age of Onset    Heart Disease Mother     Liver Disease Mother     High Blood Pressure Mother     Heart Disease Father     Arthritis Father     Cancer Sister         lung     Allergies   Allergen Reactions    Alemtuzumab      Low grade fever  Other reaction(s): Other: See Comments  Low grade fever    Glycopyrrolate-Formoterol Other (See Comments)     Dizzy and felt like heart rate sped up  Other reaction(s):  Other: See Comments  Dizzy and felt like heart rate sped up    Mercaptopurine Other (See Comments)    Moxifloxacin Other (See Comments)     Pt states He gets sores in his mouth       Current Outpatient Medications   Medication Sig Dispense Refill    donepezil (ARICEPT) 5 MG tablet Take 1 tablet by mouth nightly 30 tablet 3    triamcinolone (KENALOG) 0.1 % cream Apply topically 2 times daily. 30 g 1    tiZANidine (ZANAFLEX) 2 MG tablet Take 1 tablet by mouth every 8 hours as needed (back pain) 10 tablet 0    doxazosin (CARDURA) 4 MG tablet Take 1 tablet by mouth nightly 7 tablet 0    metoprolol tartrate (LOPRESSOR) 50 MG tablet Take 1.5 po bid 10 tablet 0    pravastatin (PRAVACHOL) 40 MG tablet TAKE 1 TABLET NIGHTLY 90 tablet 3    nystatin (MYCOSTATIN) 248752 UNIT/GM cream Apply topically 2 times daily Apply topically 2 times daily.  15 g 0    lisinopril (PRINIVIL;ZESTRIL) 20 MG tablet Take 1 tablet by mouth daily 90 tablet 3    apixaban (ELIQUIS) 5 MG TABS tablet Take 1 tablet by mouth 2 times daily 180 tablet 3    RESTASIS 0.05 % ophthalmic emulsion Place 1 drop into both eyes daily       albuterol sulfate HFA (PROAIR HFA) 108 (90 Base) MCG/ACT inhaler Inhale 2 puffs into the lungs every 6 hours as needed for Wheezing 1 Inhaler 3    ustekinumab (STELARA) 45 MG/0.5ML SOSY prefilled syringe Inject 45 mg into the skin once      furosemide (LASIX) 20 MG tablet Take 1 tablet by mouth daily (Patient taking differently: Take 40 mg by mouth daily Indications: Taking 40 mg tablet daily ) 30 tablet 2    budesonide-formoterol (SYMBICORT) 160-4.5 MCG/ACT AERO Inhale 2 puffs into the lungs 2 times daily 2 each LOT [id number] EXP [id number] 3 Inhaler 1    albuterol (PROVENTIL) (2.5 MG/3ML) 0.083% nebulizer solution Take 3 mLs by nebulization every 6 hours as needed for Wheezing 120 each 3    gabapentin (NEURONTIN) 300 MG capsule TAKE 1 CAPSULE DAILY 90 capsule 3    SPIRIVA HANDIHALER 18 MCG inhalation capsule INHALE THE CONTENTS OF 1 CAPSULE DAILY 90 capsule 3    CPAP Machine MISC New cpap supplies mask hose filters etc 1 each 0    Saccharomyces boulardii (PROBIOTIC) 250 MG CAPS Take 1 tablet by mouth daily      esomeprazole Magnesium (NEXIUM) 20 MG PACK Take 20 mg by mouth daily      aspirin 81 MG tablet Take 81 mg by mouth daily.  mesalamine (PENTASA) 250 MG CR capsule Take 500 mg by mouth 4 times daily.  predniSONE (DELTASONE) 10 MG tablet Take 3 tabs for 3 days, then 2 tabs for 3 days, then 1 tab for 3 days, then 1/2 tab for 3 days, then stop. (Patient not taking: Reported on 2/17/2022) 20 tablet 0    montelukast (SINGULAIR) 10 MG tablet TAKE 1 TABLET NIGHTLY (Patient not taking: Reported on 2/17/2022) 7 tablet 0    nitroGLYCERIN (NITROSTAT) 0.4 MG SL tablet up to max of 3 total doses. If no relief after 1 dose, call 911. (Patient not taking: Reported on 2/17/2022) 25 tablet 2    cetirizine (ZYRTEC) 5 MG tablet TAKE ONE TABLET BY MOUTH  PRN (Patient not taking: Reported on 2/17/2022)       No current facility-administered medications for this visit. Review of Systems   Constitutional: Negative for fever. HENT: Negative for ear pain, tinnitus and trouble swallowing. Eyes: Negative for photophobia and visual disturbance. Respiratory: Negative for choking and shortness of breath. Cardiovascular: Negative for chest pain and palpitations. Gastrointestinal: Negative for nausea and vomiting. Musculoskeletal: Negative for back pain, gait problem, joint swelling, myalgias, neck pain and neck stiffness. Skin: Negative for color change. Allergic/Immunologic: Negative for food allergies. Neurological: Negative for dizziness, tremors, seizures, syncope, facial asymmetry, speech difficulty, weakness, light-headedness, numbness and headaches. Psychiatric/Behavioral: Negative for behavioral problems, confusion, hallucinations and sleep disturbance. Objective:   /64 (Site: Left Upper Arm, Position: Sitting, Cuff Size: Medium Adult)   Pulse 66     Physical Exam  Vitals reviewed. Eyes:      Pupils: Pupils are equal, round, and reactive to light. Cardiovascular:      Rate and Rhythm: Normal rate and regular rhythm. Heart sounds: No murmur heard.       Pulmonary:      Effort: Pulmonary effort is normal.      Breath sounds: Normal breath sounds. Abdominal:      General: Bowel sounds are normal.   Musculoskeletal:         General: Normal range of motion. Cervical back: Normal range of motion. Skin:     General: Skin is warm. Neurological:      Mental Status: He is alert and oriented to person, place, and time. Cranial Nerves: No cranial nerve deficit. Sensory: No sensory deficit. Motor: No abnormal muscle tone. Coordination: Coordination normal.      Deep Tendon Reflexes: Reflexes are normal and symmetric. Babinski sign absent on the right side. Babinski sign absent on the left side. Psychiatric:         Mood and Affect: Mood normal.     MMSE 28    No results found.     Lab Results   Component Value Date    WBC 10.0 10/31/2021    RBC 4.25 10/31/2021    HGB 12.3 10/31/2021    HCT 36.2 10/31/2021    MCV 85.4 10/31/2021    MCH 29.0 10/31/2021    MCHC 34.0 10/31/2021    RDW 14.1 10/31/2021     10/31/2021    MPV 10.5 06/23/2015     Lab Results   Component Value Date     10/31/2021    K 3.7 10/31/2021     10/31/2021    CO2 28 10/31/2021    BUN 17 10/31/2021    CREATININE 0.76 10/31/2021    GFRAA >60.0 10/31/2021    LABGLOM >60.0 10/31/2021    GLUCOSE 110 10/31/2021    GLUCOSE 151 07/31/2021    PROT 6.1 10/31/2021    LABALBU 3.8 10/31/2021    LABALBU 3.7 07/28/2021    CALCIUM 9.0 10/31/2021    BILITOT 0.5 10/31/2021    ALKPHOS 80 10/31/2021    AST 16 10/31/2021    ALT 12 10/31/2021     Lab Results   Component Value Date    PROTIME 13.2 07/29/2021    INR 1.1 07/29/2021     Lab Results   Component Value Date    TSH 1.380 03/03/2021    RUKYXNLP70 292 12/22/2015    FOLATE 10.6 12/22/2015    IRON 74 04/21/2016    TIBC 337 04/21/2016     Lab Results   Component Value Date    TRIG 111 03/08/2019    HDL 35 07/07/2021    LDLCALC 53 07/07/2021     No results found for: Arthurine Found, [address] [place], [address] [place], PHENCYCLIDINESCREENURINE, PPXUR, ETOH  No results found for: LITHIUM, DILFRTOT, VALPROATE    Assessment:       Diagnosis Orders   1. Memory loss  MRI BRAIN WO CONTRAST   2. MCI (mild cognitive impairment)     3. Primary central sleep apnea       Memory loss going on for a few years. Patient still appears to be quite independent his active daily living. Patient has multiple risk factors for cerebrovascular disease more than likely that this is a vascular dementia given the fluctuating course. Again mixed dementias can occur though he is very functional in his activities of daily living pointing to his vascular dementia. Recommend MRI of the brain to see what the small vessel ischemic changes are in the atrophy. Recommend that he keep his mind active. We will start him on Aricept 5 mg which does of course not have a robust response. This is just to keep him where he is. Had a lengthy discussion of the diagnosis and expectations. Intractable 5 in a few months and earlier if any concerns. Plan:      Orders Placed This Encounter   Procedures    MRI BRAIN WO CONTRAST     Standing Status:   Future     Standing Expiration Date:   2/17/2023     Order Specific Question:   Reason for exam:     Answer:   vasculopathy     Orders Placed This Encounter   Medications    donepezil (ARICEPT) 5 MG tablet     Sig: Take 1 tablet by mouth nightly     Dispense:  30 tablet     Refill:  3       No follow-ups on file.       Brian Moura MD

## 2022-03-01 ENCOUNTER — CARE COORDINATION (OUTPATIENT)
Dept: CARE COORDINATION | Age: 72
End: 2022-03-01

## 2022-03-02 ENCOUNTER — CARE COORDINATION (OUTPATIENT)
Dept: CARE COORDINATION | Age: 72
End: 2022-03-02

## 2022-03-02 ASSESSMENT — ENCOUNTER SYMPTOMS: DYSPNEA ASSOCIATED WITH: EXERTION

## 2022-03-02 NOTE — CARE COORDINATION
Ambulatory Care Coordination Note  3/2/2022  CM Risk Score: 8  Charlson 10 Year Mortality Risk Score: 100%     ACC: Sophie Staley RN    Summary Note:     Brown Henry tells me that he is doing good  He says that he had an appointment with Dr Deya Reynolds  He is now on Aricept and will have an MRI of the brain next week  He feels that his memory is getting worse (not remembering dates, where he puts things). I have asked him to make sure that he takes his medication everyday at the same time. We spoke briefly about putting notes up to use as reminders. COPD  Brown Henry feels that he is doing good with his breathing  He feels that his breathing is good as long as he does not move too fast or exert himself  He adds he does get SOB when carrying items as well  He will use his nebulizer two times per day to help with his breathing  He is using his Symbicort    CHF  As noted above  Brown Henry denies any issues with chest pain, SOB, dizziness  He is not weighing himself at home  He does have a scale and I have spoken with him about the importance of close monitoring on his part to maintain his health  He is in agreement to complete this at least 3 times per week   I have asked him to log his weight on a calender near the scale  He is in agreement    DM  Janusz's A1C is well within normal limits  He does tell me that he loves his sweets and he knows that he needs to limit the amount he eats  He also goes out to restaurants at least 2-3 times per week    PLAN:  Brown Henry will take all medications as prescribed  Brown Henry will monitor his symptoms using the zone tools  If he has any worsening of symptoms or if he drops into the yellow zone he will call myself or the office for recommendations  Brown Henry will weigh himself at least three times per week. Goals Addressed                 This Visit's Progress     Conditions and Symptoms   On track     I will schedule office visits, as directed by my provider.   I will keep my appointment or reschedule if I have to cancel. I will notify my provider of any barriers to my plan of care. I will follow my Zone Management tool to seek urgent or emergent care. I will notify my provider of any symptoms that indicate a worsening of my condition. Barriers: lack of support, medication side effects, and lack of education  Plan for overcoming my barriers: I will work with my ACM and health care team to increase my knowledge and self care management skills for my health  Confidence: 9/10  Anticipated Goal Completion Date: 5/15/2022              Prior to Admission medications    Medication Sig Start Date End Date Taking? Authorizing Provider   mesalamine (PENTASA) 250 MG extended release capsule Take 2 capsules by mouth 4 times daily 2/28/22   Crystal Sánchez MD   donepezil (ARICEPT) 5 MG tablet Take 1 tablet by mouth nightly 2/17/22   Chandler Frey MD   triamcinolone (KENALOG) 0.1 % cream Apply topically 2 times daily. 2/16/22   Crystal Sánchez MD   predniSONE (DELTASONE) 10 MG tablet Take 3 tabs for 3 days, then 2 tabs for 3 days, then 1 tab for 3 days, then 1/2 tab for 3 days, then stop. Patient not taking: Reported on 2/17/2022 2/7/22   Crystal Sánchez MD   tiZANidine (ZANAFLEX) 2 MG tablet Take 1 tablet by mouth every 8 hours as needed (back pain) 1/21/22   STARLA Mchugh - CNP   montelukast (SINGULAIR) 10 MG tablet TAKE 1 TABLET NIGHTLY  Patient not taking: Reported on 2/17/2022 1/12/22   Crystal Sánchez MD   doxazosin (CARDURA) 4 MG tablet Take 1 tablet by mouth nightly 1/12/22   Crystal Sánchez MD   metoprolol tartrate (LOPRESSOR) 50 MG tablet Take 1.5 po bid 1/12/22   Crystal Sánchez MD   pravastatin (PRAVACHOL) 40 MG tablet TAKE 1 TABLET NIGHTLY 1/10/22   Crystal Sánchez MD   nystatin (MYCOSTATIN) 603203 UNIT/GM cream Apply topically 2 times daily Apply topically 2 times daily.  12/27/21   Crystal Sánchez MD   lisinopril (PRINIVIL;ZESTRIL) 20 MG tablet Take 1 tablet by mouth daily 11/30/21 Valdo Vasquez MD   apixaban Marikay Fess) 5 MG TABS tablet Take 1 tablet by mouth 2 times daily 11/30/21   Valdo Vasquez MD   nitroGLYCERIN (NITROSTAT) 0.4 MG SL tablet up to max of 3 total doses. If no relief after 1 dose, call 911. Patient not taking: Reported on 2/17/2022 11/3/21   Valdo Vasquez MD   RESTASIS 0.05 % ophthalmic emulsion Place 1 drop into both eyes daily  8/23/21   Historical Provider, MD   albuterol sulfate HFA (PROAIR HFA) 108 (90 Base) MCG/ACT inhaler Inhale 2 puffs into the lungs every 6 hours as needed for Wheezing 8/30/21   Barry Huff MD   ustekinumab (STELARA) 45 MG/0.5ML SOSY prefilled syringe Inject 45 mg into the skin once    Historical Provider, MD   furosemide (LASIX) 20 MG tablet Take 1 tablet by mouth daily  Patient taking differently: Take 40 mg by mouth daily Indications: Taking 40 mg tablet daily  5/25/21   Valdo Vasquez MD   budesonide-formoterol [facility]) 160-4.5 MCG/ACT AERO Inhale 2 puffs into the lungs 2 times daily 2 each LOT [id number] EXP [id number] 4/29/21   Barry Huff MD   albuterol (PROVENTIL) (2.5 MG/3ML) 0.083% nebulizer solution Take 3 mLs by nebulization every 6 hours as needed for Wheezing 3/31/21   Barry Huff MD   gabapentin (NEURONTIN) 300 MG capsule TAKE 1 CAPSULE DAILY 3/12/21 3/12/22  Valdo Vasquez MD   SPIRIVA HANDIHALER 18 MCG inhalation capsule INHALE THE CONTENTS OF 1 CAPSULE DAILY 11/25/20   Barry Huff MD   cetirizine (ZYRTEC) 5 MG tablet TAKE ONE TABLET BY MOUTH  PRN  Patient not taking: Reported on 2/17/2022    Historical Provider, MD   CPAP Machine MISC New cpap supplies mask hose filters etc 1/15/18   Barry Huff MD   Saccharomyces boulardii (PROBIOTIC) 250 MG CAPS Take 1 tablet by mouth daily    Historical Provider, MD   esomeprazole Magnesium (NEXIUM) 20 MG PACK Take 20 mg by mouth daily    Historical Provider, MD   aspirin 81 MG tablet Take 81 mg by mouth daily.     Historical Provider, MD       Future Appointments   Date Time Provider Chase Hopperi   3/9/2022  2:30 PM [place] [facility]OM 1 [facility] RAD   3/24/2022  1:00 PM Alma Hinojosa MD [facility]   5/25/2022  1:15 PM Shree Mustafa MD [address]   6/6/2022  1:00 PM Alma Hinojosa MD [facility]   6/20/2022  4:15 PM Darrell Chaves MD [facility]     ,   Diabetes Assessment    Medic Alert ID: No  Meal Planning: None   How often do you test your blood sugar?: No Testing   Do you have barriers with adherence to non-pharmacologic self-management interventions?  (Nutrition/Exercise/Self-Monitoring): No   Have you ever had to go to the ED for symptoms of low blood sugar?: No       No patient-reported symptoms   Do you have hyperglycemia symptoms?: No   Do you have hypoglycemia symptoms?: No   Blood Sugar Monitoring Regimen: Not Testing      ,   Congestive Heart Failure Assessment    Are you currently restricting fluids?: No Restriction  Do you understand a low sodium diet?: Yes  Do you understand how to read food labels?: Yes  How many restaurant meals do you eat per week?: 3-4  Do you salt your food before tasting it?: No     No patient-reported symptoms      Symptoms:     Symptom course: stable  Salt intake watch compared to last visit: stable      and   COPD Assessment    Does the patient understand envrionmental exposure?: Yes  Is the patient able to verbalize Rescue vs. Long Acting medications?: Yes  Does the patient have a nebulizer?: No  Does the patient use a space with inhaled medications?: No     No patient-reported symptoms         Symptoms:  None: Yes      Symptom course: stable  Breathlessness: exertion  Change in chronic cough?: No/At Baseline  Change in sputum?: No/At Baseline  Self Monitoring - SaO2: No  Have you had a recent diagnosis of pneumonia either by PCP or at a hospital?: No

## 2022-03-05 LAB — NONINV COLON CA DNA+OCC BLD SCRN STL QL: NEGATIVE

## 2022-03-09 ENCOUNTER — HOSPITAL ENCOUNTER (OUTPATIENT)
Dept: MRI IMAGING | Age: 72
Discharge: HOME | End: 2022-03-11
Payer: MEDICARE

## 2022-03-09 DIAGNOSIS — R41.3 MEMORY LOSS: ICD-10-CM

## 2022-03-09 PROCEDURE — 70551 MRI BRAIN STEM W/O DYE: CPT

## 2022-03-17 ENCOUNTER — TELEPHONE (OUTPATIENT)
Dept: NEUROLOGY | Age: 72
End: 2022-03-17

## 2022-03-17 ENCOUNTER — OFFICE VISIT (OUTPATIENT)
Dept: FAMILY MEDICINE CLINIC | Age: 72
End: 2022-03-17
Payer: MEDICARE

## 2022-03-17 VITALS
TEMPERATURE: 97.2 F | DIASTOLIC BLOOD PRESSURE: 68 MMHG | BODY MASS INDEX: 35.84 KG/M2 | WEIGHT: 256 LBS | SYSTOLIC BLOOD PRESSURE: 110 MMHG | HEIGHT: 71 IN | HEART RATE: 55 BPM | OXYGEN SATURATION: 98 %

## 2022-03-17 DIAGNOSIS — J44.1 CHRONIC OBSTRUCTIVE PULMONARY DISEASE WITH ACUTE EXACERBATION (HCC): Primary | ICD-10-CM

## 2022-03-17 DIAGNOSIS — H60.543 ECZEMA OF BOTH EXTERNAL EARS: ICD-10-CM

## 2022-03-17 PROCEDURE — 99214 OFFICE O/P EST MOD 30 MIN: CPT | Performed by: FAMILY MEDICINE

## 2022-03-17 RX ORDER — USTEKINUMAB 90 MG/ML
INJECTION, SOLUTION SUBCUTANEOUS
COMMUNITY
Start: 2022-02-28

## 2022-03-17 RX ORDER — TRIAMCINOLONE ACETONIDE 1 MG/G
CREAM TOPICAL
Qty: 15 G | Refills: 1 | Status: SHIPPED | OUTPATIENT
Start: 2022-03-17 | End: 2022-06-20 | Stop reason: SDUPTHER

## 2022-03-17 ASSESSMENT — ENCOUNTER SYMPTOMS
SHORTNESS OF BREATH: 0
CHEST TIGHTNESS: 0
PHOTOPHOBIA: 0
COUGH: 1
ABDOMINAL PAIN: 0
WHEEZING: 1
ABDOMINAL DISTENTION: 0

## 2022-03-17 NOTE — PROGRESS NOTES
Diagnosis Orders   1. Chronic obstructive pulmonary disease with acute exacerbation (HCC)     2. Eczema of both external ears  triamcinolone (KENALOG) 0.1 % cream     Return in about 1 week (around 3/24/2022) for for review of outcome of today's recommendation. Patient Instructions   Pt will use albuterol nebulizer machine in the morning and around dinner time. That does not mean he cannot use his albuterol inhaler, just should be used at different times    Pt will use triamcinolone cream in his ear canals twice a day as needed for up to 2 weeks at a time    Nystatin cream is only for his groin. Educated pt on cpap cleaning      Subjective:      Patient ID: Dallas Gongora is a 70 y.o. male who presents for:  Chief Complaint   Patient presents with    Drainage    Ear Problem       Patient states has been using his CPAP routinely. He does change his mask routinely and then he washes them out about once a week. But he is noticing increased mucus and coughing and drainage even though he does not feel ill and denies any ill exposures or seasonal variation. He actually noticed himself wheezing this morning. Also noticing a lot of dryness in both of his ears that make him itch a lot. Current Outpatient Medications on File Prior to Visit   Medication Sig Dispense Refill    mesalamine (PENTASA) 250 MG extended release capsule Take 2 capsules by mouth 4 times daily 120 capsule 0    donepezil (ARICEPT) 5 MG tablet Take 1 tablet by mouth nightly 30 tablet 3    triamcinolone (KENALOG) 0.1 % cream Apply topically 2 times daily.  30 g 1    tiZANidine (ZANAFLEX) 2 MG tablet Take 1 tablet by mouth every 8 hours as needed (back pain) 10 tablet 0    montelukast (SINGULAIR) 10 MG tablet TAKE 1 TABLET NIGHTLY 7 tablet 0    doxazosin (CARDURA) 4 MG tablet Take 1 tablet by mouth nightly 7 tablet 0    metoprolol tartrate (LOPRESSOR) 50 MG tablet Take 1.5 po bid 10 tablet 0    pravastatin (PRAVACHOL) 40 MG tablet TAKE 1 TABLET NIGHTLY 90 tablet 3    nystatin (MYCOSTATIN) 167022 UNIT/GM cream Apply topically 2 times daily Apply topically 2 times daily. 15 g 0    lisinopril (PRINIVIL;ZESTRIL) 20 MG tablet Take 1 tablet by mouth daily 90 tablet 3    apixaban (ELIQUIS) 5 MG TABS tablet Take 1 tablet by mouth 2 times daily 180 tablet 3    nitroGLYCERIN (NITROSTAT) 0.4 MG SL tablet up to max of 3 total doses. If no relief after 1 dose, call 911. 25 tablet 2    RESTASIS 0.05 % ophthalmic emulsion Place 1 drop into both eyes daily       albuterol sulfate HFA (PROAIR HFA) 108 (90 Base) MCG/ACT inhaler Inhale 2 puffs into the lungs every 6 hours as needed for Wheezing 1 Inhaler 3    furosemide (LASIX) 20 MG tablet Take 1 tablet by mouth daily (Patient taking differently: Take 40 mg by mouth daily Indications: Taking 40 mg tablet daily ) 30 tablet 2    budesonide-formoterol (SYMBICORT) 160-4.5 MCG/ACT AERO Inhale 2 puffs into the lungs 2 times daily 2 each LOT [id number] EXP [id number] 3 Inhaler 1    albuterol (PROVENTIL) (2.5 MG/3ML) 0.083% nebulizer solution Take 3 mLs by nebulization every 6 hours as needed for Wheezing 120 each 3    gabapentin (NEURONTIN) 300 MG capsule TAKE 1 CAPSULE DAILY 90 capsule 3    SPIRIVA HANDIHALER 18 MCG inhalation capsule INHALE THE CONTENTS OF 1 CAPSULE DAILY 90 capsule 3    cetirizine (ZYRTEC) 5 MG tablet TAKE ONE TABLET BY MOUTH  PRN      CPAP Machine MISC New cpap supplies mask hose filters etc 1 each 0    Saccharomyces boulardii (PROBIOTIC) 250 MG CAPS Take 1 tablet by mouth daily      esomeprazole Magnesium (NEXIUM) 20 MG PACK Take 20 mg by mouth daily      aspirin 81 MG tablet Take 81 mg by mouth daily.  STELARA 90 MG/ML SOSY prefilled syringe       ustekinumab (STELARA) 45 MG/0.5ML SOSY prefilled syringe Inject 45 mg into the skin once       No current facility-administered medications on file prior to visit.      Past Medical History:   Diagnosis Date    A-fib [facility])     Abnormal EMG 12/09/2015    Actinic keratoses     Actinic keratoses     Acute diastolic congestive heart failure (HCC) 1/6/2021    Allergic rhinitis     Anemia 11/18/2014    Arthritis     Chronic back pain     [facility] obstructive [facility] disease[facility] 08/09/2012    COPD [facility]chronic obstructive pulmonary disease) [facility])     Crohns disease     Degenerative disc disease, lumbar     Dermatitis     Diabetes ([address] 03/19/2015    diet controlled    Gastrointestinal problem     GERD (gastroesophageal reflux disease)     History of atrial fibrillation 2006    Dr. Chidi May History of throat cancer 2004     cleared for reoccurence years ago    Hyperlipidemia 1/21/2014    Hypertension     Hypogonadism male     Lung disease     Mononeuritis multiplex     Mononeuritis multiplex     Nondependent alcohol abuse, in remission 7/22/2020    Obesity (BMI 30-39. 9) 7/24/2019    Osteoarthritis of lumbar spine     Pain of right heel     Paresthesia of foot, bilateral     Prediabetes 12/3/2014    Restless leg syndrome     Seborrheic dermatitis     Seborrheic keratoses, inflamed     Throat cancer ([facility])     throat, had surgery, sees Dr Torres Ou yearly    Tinea cruris     Tinea pedis     Tinea unguium      Past Surgical History:   Procedure Laterality Date    CARDIAC CATHETERIZATION      CARPAL TUNNEL RELEASE      CATARACT REMOVAL WITH IMPLANT Right 09/09/2019    CATARACT REMOVAL WITH IMPLANT Left 07/22/2019    COLON SURGERY      COLONOSCOPY  04/15/2015    KNEE ARTHROSCOPY      LARYNGECTOMY  2004    UPPER GASTROINTESTINAL ENDOSCOPY  03/24/13    Dr. Shira HOWARD/NAPOLEON RODRIGUEZ  2002     Social History     Socioeconomic History    Marital status:      Spouse name: Not on file    Number of children: 3    Years of education: 12    Highest education level: High school graduate   Occupational History    Occupation:      Employer: [employer]   Tobacco Use    Smoking status: Former Smoker     Packs/day: 1.00     Years: 25.00     Pack years: 25.00     Types: Cigarettes     Quit date: 10/21/1990     Years since quittin.4    Smokeless tobacco: Never Used   Substance and Sexual Activity    Alcohol use: No     Comment: Attends [employer] 25 years    Drug use: No    Sexual activity: Not Currently     Partners: Female   Other Topics Concern    Not on file   Social History Narrative    Lives alone. 2 story home     1 step to get in house and 7 steps to get upstairs    Full basement    2 children live nearby and 1 son lives in [place]    No pets     Social Determinants of Health     Financial Resource Strain: Low Risk     Difficulty of Paying Living Expenses: Not hard at all   Food Insecurity: No Food Insecurity    Worried About [address] [place] [address] in the Last Year: Never true    Dacia of Food in the Last Year: Never true   Transportation Needs: No Transportation Needs    Lack of Transportation (Medical): No    Lack of Transportation (Non-Medical):  No   Physical Activity:     Days of Exercise per Week: Not on file    Minutes of Exercise per Session: Not on file   Stress:     Feeling of Stress : Not on file   Social Connections:     Frequency of Communication with Friends and Family: Not on file    Frequency of Social Gatherings with Friends and Family: Not on file    Attends [employer] Services: Not on file    Active Member of [address] [place] [address] or Organizations: Not on file    Attends Club or Organization Meetings: Not on file    Marital Status: Not on file   Intimate Partner Violence:     Fear of Current or Ex-Partner: Not on file    Emotionally Abused: Not on file    Physically Abused: Not on file    Sexually Abused: Not on file   Housing Stability:     Unable to Pay for Housing in the Last Year: Not on file    Number of [place] in the Last Year: Not on file    Unstable Housing in the Last Year: Not on file     Family History   Problem Relation Age of Onset    Heart Disease Mother     Liver Disease Mother     High Blood Pressure Mother     Heart Disease Father     Arthritis Father     Cancer Sister         lung     Allergies:  Alemtuzumab, Glycopyrrolate-formoterol, Mercaptopurine, and Moxifloxacin    Review of Systems   Constitutional: Negative for activity change, appetite change, diaphoresis and unexpected weight change. HENT: Positive for congestion and ear pain. Eyes: Negative for photophobia and visual disturbance. Respiratory: Positive for cough and wheezing. Negative for chest tightness and shortness of breath. No orthopnea   Cardiovascular: Negative for chest pain, palpitations and leg swelling. Gastrointestinal: Negative for abdominal distention and abdominal pain. Genitourinary: Negative for flank pain and frequency. Musculoskeletal: Negative for gait problem and joint swelling. Neurological: Negative for dizziness, weakness, light-headedness and headaches. Psychiatric/Behavioral: Negative for confusion. Objective:   /68   Pulse 55   Temp 97.2 °F (36.2 °C)   Ht 5' 11\" (1.803 m)   Wt 256 lb (116.1 kg)   SpO2 98%   BMI 35.70 kg/m²     Physical Exam  Vitals reviewed. Constitutional:       General: He is not in acute distress. Appearance: He is well-developed. HENT:      Head: Normocephalic and atraumatic. Right Ear: External ear normal.      Left Ear: External ear normal.      Ears:      Comments: Bilateral ear canals without erythema but somewhat flaky skin noted     Nose: Nose normal.   Eyes:      General:         Right eye: No discharge. Left eye: No discharge. Conjunctiva/sclera: Conjunctivae normal.      Pupils: Pupils are equal, round, and reactive to light. Neck:      Thyroid: No thyromegaly. Cardiovascular:      Rate and Rhythm: Normal rate and regular rhythm. Pulmonary:      Effort: Pulmonary effort is normal. No respiratory distress. Breath sounds:  No wheezing or rales. Comments: Mucus in large airways audible that clear with coughing  Abdominal:      General: There is no distension. Musculoskeletal:      Cervical back: Neck supple. Skin:     General: Skin is warm and dry. Neurological:      Mental Status: He is alert and oriented to person, place, and time. Coordination: Coordination normal.   Psychiatric:         Thought Content: Thought content normal.         Judgment: Judgment normal.         No results found for this visit on 03/17/22. Recent Results (from the past 2016 hour(s))   Fecal DNA Colorectal cancer screening ([facility])    Collection Time: 02/25/22  1:15 PM    Specimen: Stool   Result Value Ref Range    FIT-DNA ([facility]) Negative Negative       [] Pt was seen by provider for      Minutes  Counseling and coordination of care was done for all assessment diagnosis listed for today with patient and any family/friend present. More than 50% of this visit was spent coordinating cuurent care, obtaining information for prior records, and counseling for current plan of action. Assessment:       Diagnosis Orders   1. Chronic obstructive pulmonary disease with acute exacerbation (HCC)     2. Eczema of both external ears  triamcinolone (KENALOG) 0.1 % cream         No orders of the defined types were placed in this encounter. Orders Placed This Encounter   Medications    triamcinolone (KENALOG) 0.1 % cream     Sig: Apply topically 2 times daily. Dispense:  15 g     Refill:  1          Medication List          Accurate as of March 17, 2022  4:15 PM. If you have any questions, ask your nurse or doctor. CHANGE how you take these medications    furosemide 20 MG tablet  Commonly known as: LASIX  Take 1 tablet by mouth daily  What changed: how much to take     * triamcinolone 0.1 % cream  Commonly known as: KENALOG  Apply topically 2 times daily. What changed: Another medication with the same name was added.  Make sure you understand how and when to take each. Changed by: Leandro Mojica MD     * triamcinolone 0.1 % cream  Commonly known as: KENALOG  Apply topically 2 times daily. What changed: You were already taking a medication with the same name, and this prescription was added. Make sure you understand how and when to take each. Changed by: Leandro Mojica MD         * This list has 2 medication(s) that are the same as other medications prescribed for you. Read the directions carefully, and ask your doctor or other care provider to review them with you.             CONTINUE taking these medications    * albuterol (2.5 MG/3ML) 0.083% nebulizer solution  Commonly known as: PROVENTIL  Take 3 mLs by nebulization every 6 hours as needed for Wheezing     * albuterol sulfate  (90 Base) MCG/ACT inhaler  Commonly known as: ProAir HFA  Inhale 2 puffs into the lungs every 6 hours as needed for Wheezing     apixaban 5 MG Tabs tablet  Commonly known as: Eliquis  Take 1 tablet by mouth 2 times daily     aspirin 81 MG tablet     budesonide-formoterol 160-4.5 MCG/ACT Aero  Commonly known as: Symbicort  Inhale 2 puffs into the lungs 2 times daily 2 each LOT [id number] EXP [id number]     cetirizine 5 MG tablet  Commonly known as: ZYRTEC     CPAP Machine Misc  New cpap supplies mask hose filters etc     donepezil 5 MG tablet  Commonly known as: Aricept  Take 1 tablet by mouth nightly     doxazosin 4 MG tablet  Commonly known as: CARDURA  Take 1 tablet by mouth nightly     esomeprazole Magnesium 20 MG Pack  Commonly known as: NEXIUM     gabapentin 300 MG capsule  Commonly known as: NEURONTIN  TAKE 1 CAPSULE DAILY     lisinopril 20 MG tablet  Commonly known as: PRINIVIL;ZESTRIL  Take 1 tablet by mouth daily     mesalamine 250 MG extended release capsule  Commonly known as: PENTASA  Take 2 capsules by mouth 4 times daily     metoprolol tartrate 50 MG tablet  Commonly known as: LOPRESSOR  Take 1.5 po bid     montelukast 10 MG tablet  Commonly known as: SINGULAIR  TAKE 1 TABLET NIGHTLY     nitroGLYCERIN 0.4 MG SL tablet  Commonly known as: NITROSTAT  up to max of 3 total doses. If no relief after 1 dose, call 911.     nystatin 191219 UNIT/GM cream  Commonly known as: MYCOSTATIN  Apply topically 2 times daily Apply topically 2 times daily. pravastatin 40 MG tablet  Commonly known as: PRAVACHOL  TAKE 1 TABLET NIGHTLY     Probiotic 250 MG Caps     Restasis 0.05 % ophthalmic emulsion  Generic drug: cycloSPORINE     Spiriva HandiHaler 18 MCG inhalation capsule  Generic drug: tiotropium  INHALE THE CONTENTS OF 1 CAPSULE DAILY     * Stelara 45 MG/0.5ML Sosy prefilled syringe  Generic drug: ustekinumab     * Stelara 90 MG/ML Sosy prefilled syringe  Generic drug: ustekinumab     tiZANidine 2 MG tablet  Commonly known as: ZANAFLEX  Take 1 tablet by mouth every 8 hours as needed (back pain)         * This list has 4 medication(s) that are the same as other medications prescribed for you. Read the directions carefully, and ask your doctor or other care provider to review them with you. STOP taking these medications    predniSONE 10 MG tablet  Commonly known as: Raffi Rosado by: Merry Yeboah MD           Where to Get Your Medications      These medications were sent to [address] [place] [address] [address] [place] [id number]    Phone: [phone]   · triamcinolone 0.1 % cream           Plan:   Return in about 1 week (around 3/24/2022) for for review of outcome of today's recommendation. Patient Instructions   Pt will use albuterol nebulizer machine in the morning and around dinner time. That does not mean he cannot use his albuterol inhaler, just should be used at different times    Pt will use triamcinolone cream in his ear canals twice a day as needed for up to 2 weeks at a time    Nystatin cream is only for his groin.     Educated pt on cpap

## 2022-03-17 NOTE — PATIENT INSTRUCTIONS
Pt will use albuterol nebulizer machine in the morning and around dinner time. That does not mean he cannot use his albuterol inhaler, just should be used at different times    Pt will use triamcinolone cream in his ear canals twice a day as needed for up to 2 weeks at a time    Nystatin cream is only for his groin.     Educated pt on cpap cleaning

## 2022-03-24 ENCOUNTER — OFFICE VISIT (OUTPATIENT)
Dept: FAMILY MEDICINE CLINIC | Age: 72
End: 2022-03-24
Payer: MEDICARE

## 2022-03-24 VITALS
HEIGHT: 68 IN | DIASTOLIC BLOOD PRESSURE: 64 MMHG | SYSTOLIC BLOOD PRESSURE: 114 MMHG | BODY MASS INDEX: 38.55 KG/M2 | OXYGEN SATURATION: 98 % | TEMPERATURE: 97.2 F | WEIGHT: 254.4 LBS | HEART RATE: 57 BPM

## 2022-03-24 DIAGNOSIS — Z00.00 MEDICARE ANNUAL WELLNESS VISIT, SUBSEQUENT: Primary | ICD-10-CM

## 2022-03-24 DIAGNOSIS — R09.82 POST-NASAL DRAINAGE: ICD-10-CM

## 2022-03-24 PROCEDURE — 99213 OFFICE O/P EST LOW 20 MIN: CPT | Performed by: FAMILY MEDICINE

## 2022-03-24 PROCEDURE — G0439 PPPS, SUBSEQ VISIT: HCPCS | Performed by: FAMILY MEDICINE

## 2022-03-24 PROCEDURE — 99497 ADVNCD CARE PLAN 30 MIN: CPT | Performed by: FAMILY MEDICINE

## 2022-03-24 RX ORDER — IPRATROPIUM BROMIDE 42 UG/1
2 SPRAY, METERED NASAL 3 TIMES DAILY
Qty: 1 EACH | Refills: 1 | Status: SHIPPED | OUTPATIENT
Start: 2022-03-24

## 2022-03-24 ASSESSMENT — PATIENT HEALTH QUESTIONNAIRE - PHQ9
SUM OF ALL RESPONSES TO PHQ QUESTIONS 1-9: 0
1. LITTLE INTEREST OR PLEASURE IN DOING THINGS: 0
SUM OF ALL RESPONSES TO PHQ QUESTIONS 1-9: 0
SUM OF ALL RESPONSES TO PHQ QUESTIONS 1-9: 0
SUM OF ALL RESPONSES TO PHQ9 QUESTIONS 1 & 2: 0
SUM OF ALL RESPONSES TO PHQ QUESTIONS 1-9: 0
2. FEELING DOWN, DEPRESSED OR HOPELESS: 0

## 2022-03-24 ASSESSMENT — LIFESTYLE VARIABLES: HOW OFTEN DO YOU HAVE A DRINK CONTAINING ALCOHOL: NEVER

## 2022-03-24 NOTE — PROGRESS NOTES
Medicare Annual Wellness Visit    Nallely Radford is here for Medicare AWV    Assessment & Plan   Medicare annual wellness visit, subsequent  -     [facility] Referral to ACP Clinical Specialist  Post-nasal drainage  -     ipratropium (ATROVENT) 0.06 % nasal spray; 2 sprays by Each Nostril route 3 times daily, Disp-1 each, R-1Normal      Recommendations for Preventive Services Due: see orders and patient instructions/AVS.  Recommended screening schedule for the next 5-10 years is provided to the patient in written form: see Patient Instructions/AVS.     Return for Medicare Annual Wellness Visit in 1 year. Subjective   The following acute and/or chronic problems were also addressed today:  Postnasal drainage and throat clearing. No med tried for this. Denies fever, etc.  See ROS      Review of Systems   Constitutional: Negative for chills, diaphoresis, fatigue and fever. HENT: Positive for postnasal drip. Negative for congestion, facial swelling, nosebleeds, rhinorrhea and sore throat. Respiratory: Positive for cough. Negative for shortness of breath and wheezing. Patient's complete Health Risk Assessment and screening values have been reviewed and are found in Flowsheets. The following problems were reviewed today and where indicated follow up appointments were made and/or referrals ordered.     Positive Risk Factor Screenings with Interventions:               General Health and ACP:  General  In general, how would you say your health is?: Very Good  In the past 7 days, have you experienced any of the following: New or Increased Pain, New or Increased Fatigue, Loneliness, Social Isolation, Stress or Anger?: No  Do you get the social and emotional support that you need?: Yes  Do you have a Living Will?: Yes    Advance Directives     Power of  Living Will ACP-Advance Directive ACP-Power of     Not on File Not on File Not on File Not on File      General Health Risk Interventions:  · Poor self-assessment of health status: currently being scheduled for Neuro eval     Advance Care Planning     Advance Care Planning (ACP) Physician/NP/PA Conversation    Date of Conversation: 3/24/2022  Conducted with: Patient with Decision Making Capacity    Healthcare Decision Maker:    Primary Decision Maker: Liam Pulliam. - Child - [phone]    Secondary Decision Maker: Ricci Ashley - Brother/Sister - [phone]  Click here to complete Healthcare Decision Makers including selection of the Healthcare Decision Maker Relationship (ie \"Primary\"). Today we documented Decision Maker(s) consistent with Legal Next of Kin hierarchy. Care Preferences:    Hospitalization: \"If your health worsens and it becomes clear that your chance of recovery is unlikely, what would be your preference regarding hospitalization? \"  The patient would prefer hospitalization. Ventilation: \"If you were unable to breath on your own and your chance of recovery was unlikely, what would be your preference about the use of a ventilator (breathing machine) if it was available to you? \"  The patient would desire the use of a ventilator. Resuscitation: \"In the event your heart stopped as a result of an underlying serious health condition, would you want attempts made to restart your heart, or would you prefer a natural death? \"  Yes, attempt to resuscitate.     treatment goals    Conversation Outcomes / Follow-Up Plan:  ACP incomplete - refer to ACP Clinical Specialist  Reviewed DNR/DNI and patient elects Full Code (Attempt Resuscitation)    Length of Voluntary ACP Conversation in minutes:  16 minutes    Antonio Francis MD                     Health Habits/Nutrition:     Physical Activity: Insufficiently Active    Days of Exercise per Week: 1 day    Minutes of Exercise per Session: 20 min     Have you lost any weight without trying in the past 3 months?: No  Body mass index: (!) 39.25  Have you seen the dentist within the past year?: (!) No    Health Habits/Nutrition Interventions:  · Inadequate physical activity:  patient is not ready to increase his/her physical activity level at this time    Hearing/Vision:  Do you or your family notice any trouble with your hearing that hasn't been managed with hearing aids?: No  Do you have difficulty driving, watching TV, or doing any of your daily activities because of your eyesight?: (!) Yes (burning)  Have you had an eye exam within the past year?: (!) No  No exam data present    Hearing/Vision Interventions:  · Vision concerns:  patient encouraged to make appointment with his/her eye specialist            Objective   Vitals:    03/24/22 1308   BP: 114/64   Pulse: 57   Temp: 97.2 °F (36.2 °C)   SpO2: 98%   Weight: 254 lb 6.4 oz (115.4 kg)   Height: 5' 7.5\" (1.715 m)      Body mass index is 39.26 kg/m². Physical Exam  Vitals reviewed. Constitutional:       General: He is not in acute distress. Appearance: He is well-developed. HENT:      Head: Normocephalic and atraumatic. Right Ear: External ear normal.      Left Ear: External ear normal.      Nose: Nose normal.   Eyes:      General:         Right eye: No discharge. Left eye: No discharge. Conjunctiva/sclera: Conjunctivae normal.      Pupils: Pupils are equal, round, and reactive to light. Neck:      Thyroid: No thyromegaly. Cardiovascular:      Rate and Rhythm: Normal rate and regular rhythm. Pulmonary:      Effort: Pulmonary effort is normal. No respiratory distress. Abdominal:      General: There is no distension. Musculoskeletal:      Cervical back: Neck supple. Skin:     General: Skin is warm and dry. Neurological:      Mental Status: He is alert and oriented to person, place, and time. Coordination: Coordination normal.      Comments: Pt diabetic foot exam performed with the use of the Medline monofilament. Normal exam      Psychiatric:         Thought Content:  Thought content normal. Judgment: Judgment normal.             Allergies   Allergen Reactions    Alemtuzumab      Low grade fever  Other reaction(s): Other: See Comments  Low grade fever    Glycopyrrolate-Formoterol Other (See Comments)     Dizzy and felt like heart rate sped up  Other reaction(s): Other: See Comments  Dizzy and felt like heart rate sped up    Mercaptopurine Other (See Comments)    Moxifloxacin Other (See Comments)     Pt states He gets sores in his mouth     Prior to Visit Medications    Medication Sig Taking? Authorizing Provider   ipratropium (ATROVENT) 0.06 % nasal spray 2 sprays by Each Nostril route 3 times daily Yes Shashi James MD   STELARA 90 MG/ML SOSY prefilled syringe  Yes Historical Provider, MD   triamcinolone (KENALOG) 0.1 % cream Apply topically 2 times daily. Yes Shashi James MD   mesalamine (PENTASA) 250 MG extended release capsule Take 2 capsules by mouth 4 times daily Yes Shashi James MD   donepezil (ARICEPT) 5 MG tablet Take 1 tablet by mouth nightly Yes Darylene Conroy, MD   triamcinolone (KENALOG) 0.1 % cream Apply topically 2 times daily. Yes Shashi James MD   tiZANidine (ZANAFLEX) 2 MG tablet Take 1 tablet by mouth every 8 hours as needed (back pain) Yes STARLA Bush CNP   montelukast (SINGULAIR) 10 MG tablet TAKE 1 TABLET NIGHTLY Yes Shashi James MD   doxazosin (CARDURA) 4 MG tablet Take 1 tablet by mouth nightly Yes Shashi James MD   metoprolol tartrate (LOPRESSOR) 50 MG tablet Take 1.5 po bid Yes Shashi James MD   pravastatin (PRAVACHOL) 40 MG tablet TAKE 1 TABLET NIGHTLY Yes Shashi James MD   nystatin (MYCOSTATIN) 277109 UNIT/GM cream Apply topically 2 times daily Apply topically 2 times daily.  Yes Shashi James MD   lisinopril (PRINIVIL;ZESTRIL) 20 MG tablet Take 1 tablet by mouth daily Yes Shashi James MD   apixaban (ELIQUIS) 5 MG TABS tablet Take 1 tablet by mouth 2 times daily Yes Shashi James MD   nitroGLYCERIN (NITROSTAT) 0.4 MG SL tablet up to max of 3 total doses. If no relief after 1 dose, call 911. Yes Raad Antunez MD   RESTASIS 0.05 % ophthalmic emulsion Place 1 drop into both eyes daily  Yes Historical Provider, MD   albuterol sulfate HFA (PROAIR HFA) 108 (90 Base) MCG/ACT inhaler Inhale 2 puffs into the lungs every 6 hours as needed for Wheezing Yes Francisco Javier Reyes MD   ustekinumab (STELARA) 45 MG/0.5ML SOSY prefilled syringe Inject 45 mg into the skin once Yes Historical Provider, MD   furosemide (LASIX) 20 MG tablet Take 1 tablet by mouth daily  Patient taking differently: Take 40 mg by mouth daily Indications: Taking 40 mg tablet daily  Yes Raad Antunez MD   budesonide-formoterol (SYMBICORT) 160-4.5 MCG/ACT AERO Inhale 2 puffs into the lungs 2 times daily 2 each LOT [id number] EXP [id number] Yes Francisco Javier Reyes MD   albuterol (PROVENTIL) (2.5 MG/3ML) 0.083% nebulizer solution Take 3 mLs by nebulization every 6 hours as needed for Wheezing Yes Francisco Javier Reyes MD   gabapentin (NEURONTIN) 300 MG capsule TAKE 1 CAPSULE DAILY Yes Raad Antunez MD   SPIRIVA HANDIHALER 18 MCG inhalation capsule INHALE THE CONTENTS OF 1 CAPSULE DAILY Yes Francisco Javier Reyes MD   cetirizine (ZYRTEC) 5 MG tablet TAKE ONE TABLET BY MOUTH  PRN Yes Historical Provider, MD   CPAP Machine MISC New cpap supplies mask hose filters etc Yes Francisco Javier Reyes MD   Saccharomyces boulardii (PROBIOTIC) 250 MG CAPS Take 1 tablet by mouth daily Yes Historical Provider, MD   esomeprazole Magnesium (NEXIUM) 20 MG PACK Take 20 mg by mouth daily Yes Historical Provider, MD   aspirin 81 MG tablet Take 81 mg by mouth daily.  Yes Historical Provider, MD Parra (Including outside providers/suppliers regularly involved in providing care):   Patient Care Team:  Raad Antunez MD as PCP - General (Family Medicine)  Raad Antunez MD as PCP - [facility] Empaneled Provider  Fernando Oneill MD (Cardiac Electrophysiology)  Deniz Melissa MD as Consulting Physician (Gastroenterology)  Tisha Hernandez MD as Consulting Physician (Pulmonology)  Erica Garcia RN as [facility]  Huma Gaytan MD as Consulting Physician (Neurology)    Reviewed and updated this visit:  Tobacco  Allergies  Meds  Problems  Med Hx  Surg Hx  Soc Hx  Fam Hx

## 2022-03-24 NOTE — PATIENT INSTRUCTIONS
Personalized Preventive Plan for Florence Kinney - 3/24/2022  Medicare offers a range of preventive health benefits. Some of the tests and screenings are paid in full while other may be subject to a deductible, co-insurance, and/or copay. Some of these benefits include a comprehensive review of your medical history including lifestyle, illnesses that may run in your family, and various assessments and screenings as appropriate. After reviewing your medical record and screening and assessments performed today your provider may have ordered immunizations, labs, imaging, and/or referrals for you. A list of these orders (if applicable) as well as your Preventive Care list are included within your After Visit Summary for your review. Other Preventive Recommendations:    · A preventive eye exam performed by an eye specialist is recommended every 1-2 years to screen for glaucoma; cataracts, macular degeneration, and other eye disorders. · A preventive dental visit is recommended every 6 months. · Try to get at least 150 minutes of exercise per week or 10,000 steps per day on a pedometer . · Order or download the FREE \"Exercise & Physical Activity: Your Everyday Guide\" from [employer]. Call 3-397.980.4524 or search [employer] online. · You need 1363-6112 mg of calcium and 6504-0745 IU of vitamin D per day. It is possible to meet your calcium requirement with diet alone, but a vitamin D supplement is usually necessary to meet this goal.  · When exposed to the sun, use a sunscreen that protects against both UVA and UVB radiation with an SPF of 30 or greater. Reapply every 2 to 3 hours or after sweating, drying off with a towel, or swimming. · Always wear a seat belt when traveling in a car. Always wear a helmet when riding a bicycle or motorcycle.

## 2022-03-25 ENCOUNTER — CLINICAL DOCUMENTATION (OUTPATIENT)
Dept: SPIRITUAL SERVICES | Age: 72
End: 2022-03-25

## 2022-03-25 NOTE — ACP (ADVANCE CARE PLANNING)
Advance Care Planning   Ambulatory ACP Specialist Patient Outreach    Date:  3/25/2022  ACP Specialist:  Aydin Castañeda    Outreach call to patient in follow-up to ACP Specialist referral from: Deniz Guan MD    [x] PCP  [] Provider   [] Ambulatory Care Management [] Other for Reason:    [x] Advance Directive Assistance  [] Code Status Discussion  [] Complete Portable DNR Order  [] Discuss Goals of Care  [] Complete POST/MOST  [] Early ACP Decision-Making  [] Other    Date Referral Received:3/24/22    Today's Outreach:  [x] First   [] Second  [] Third                               First outreach made by [x]  phone  [] email []   [employer]     Intervention:  [x] Spoke with Patient  [] Left VM requesting return call      Outcome: Scheduled ACP Conversation Call for Friday April 8th between 9am and 12pm. ACP Packet to be Mailed. Next Step:   [x] ACP scheduled conversation  [] Outreach again in one week               [x] Email / Mail ACP Info Sheets  [x] Email / Mail Advance Directive            [] Close Referral. Routing closure to referring provider/staff and to ACP Specialist .      Thank you for this referral.

## 2022-03-30 ASSESSMENT — ENCOUNTER SYMPTOMS
COUGH: 1
RHINORRHEA: 0
FACIAL SWELLING: 0
WHEEZING: 0
SORE THROAT: 0
SHORTNESS OF BREATH: 0

## 2022-04-04 ENCOUNTER — CARE COORDINATION (OUTPATIENT)
Dept: CARE COORDINATION | Age: 72
End: 2022-04-04

## 2022-04-04 ENCOUNTER — TELEPHONE (OUTPATIENT)
Dept: FAMILY MEDICINE CLINIC | Age: 72
End: 2022-04-04

## 2022-04-04 ASSESSMENT — ENCOUNTER SYMPTOMS: DYSPNEA ASSOCIATED WITH: EXERTION

## 2022-04-04 NOTE — TELEPHONE ENCOUNTER
----- Message from John Fuchs sent at 4/4/2022 11:34 AM EDT -----  Subject: Appointment Request    Reason for Call: Urgent Cough Cold    QUESTIONS  Type of Appointment? Established Patient  Reason for appointment request? Other - phone lines down   Additional Information for Provider? Pt called in due to phlegm and cough   and he has COPD please follow up  ---------------------------------------------------------------------------  --------------  [address]  What is the best way for the office to contact you? OK to leave message on   voicemail  Preferred Call Back Phone Number?  [phone]  ---------------------------------------------------------------------------  --------------  SCRIPT ANSWERS

## 2022-04-04 NOTE — CARE COORDINATION
Ambulatory Care Coordination Note  4/4/2022  CM Risk Score: 4  Charlson 10 Year Mortality Risk Score: 100%     ACC: Tanmay Hunt RN    Summary Note:    Alejandro French tells me that he is doing well   He is having some issues with his sinuses  He tells me that he does have a lot of sinus drainage  He is uncertain if this could be related to the pollen and season change. He says that he was told that this is related to the care of his CPAP  He says that he has not been managing the CPAP machine as he had in the past  I did discuss how to clean and replace each piece of his CPAP on a weekly basis  He is in agreement that he needs to reestablish a cleaning schedule  He does not have a daily  or ionizer. CHF  Alejandro French says that he is doing well as far as his heart and CHF  He denies any issues with chest pain, dizziness, SOB, or edema. He tells me that he is weighing himself a couple of times per week and he is holding between 253-255 pounds  He is no longer exercising at the gym since the [place] outbreak. COPD  Alejandro French does have exertional SOB and this is unchanged  He denies fever, chills, or change in mucus  He will have an occasional cough in which he brings up thick green mucus. He is using his nebulizer bid   He feels that he is eating well and getting plenty of rest  Overall, he says that he is in the green zone for CHF and COPD. PLAN:  Alejandro French will establish a new routine for daily and weekly cleaning of his CPAP  Alejandro French will monitor his symptoms using the zone tool sheets  If he has any changes or worsening of symptoms or if he drops into the yellow zone he will call myself or the office for recommendations. Goals Addressed                 This Visit's Progress     Conditions and Symptoms   On track     I will schedule office visits, as directed by my provider. I will keep my appointment or reschedule if I have to cancel. I will notify my provider of any barriers to my plan of care.   I will follow my Zone Management tool to seek urgent or emergent care. I will notify my provider of any symptoms that indicate a worsening of my condition. Barriers: lack of support, medication side effects, and lack of education  Plan for overcoming my barriers: I will work with my ACM and health care team to increase my knowledge and self care management skills for my health  Confidence: 9/10  Anticipated Goal Completion Date: 5/15/2022              Prior to Admission medications    Medication Sig Start Date End Date Taking? Authorizing Provider   ipratropium (ATROVENT) 0.06 % nasal spray 2 sprays by Each Nostril route 3 times daily 3/24/22   Bret Quiet, MD   [facility] 90 MG/ML SOSY prefilled syringe  2/28/22   Historical Provider, MD   triamcinolone (KENALOG) 0.1 % cream Apply topically 2 times daily. 3/17/22   Bret Quiet, MD   mesalamine (PENTASA) 250 MG extended release capsule Take 2 capsules by mouth 4 times daily 2/28/22   Bret Quiet, MD   donepezil (ARICEPT) 5 MG tablet Take 1 tablet by mouth nightly 2/17/22   Sara Painting MD   triamcinolone (KENALOG) 0.1 % cream Apply topically 2 times daily. 2/16/22   Bretceleste Roa MD   tiZANidine (ZANAFLEX) 2 MG tablet Take 1 tablet by mouth every 8 hours as needed (back pain) 1/21/22   STARLA Walsh - CNP   montelukast (SINGULAIR) 10 MG tablet TAKE 1 TABLET NIGHTLY 1/12/22   Bret Quiet, MD   doxazosin (CARDURA) 4 MG tablet Take 1 tablet by mouth nightly 1/12/22   Bret Quiet, MD   metoprolol tartrate (LOPRESSOR) 50 MG tablet Take 1.5 po bid 1/12/22   Bret Quiet, MD   pravastatin (PRAVACHOL) 40 MG tablet TAKE 1 TABLET NIGHTLY 1/10/22   Bret Quiet, MD   nystatin (MYCOSTATIN) 465418 UNIT/GM cream Apply topically 2 times daily Apply topically 2 times daily.  12/27/21   Bret Quiet, MD   lisinopril (PRINIVIL;ZESTRIL) 20 MG tablet Take 1 tablet by mouth daily 11/30/21   Bret Quiet, MD   apixaban (ELIQUIS) 5 MG TABS tablet Take 1 tablet by mouth 2 times daily 11/30/21   Murray Alberts MD   nitroGLYCERIN (NITROSTAT) 0.4 MG SL tablet up to max of 3 total doses. If no relief after 1 dose, call 911. 11/3/21   Murray Alberts MD   RESTASIS 0.05 % ophthalmic emulsion Place 1 drop into both eyes daily  8/23/21   Dale Provider, MD   albuterol sulfate HFA (PROAIR HFA) 108 (90 Base) MCG/ACT inhaler Inhale 2 puffs into the lungs every 6 hours as needed for Wheezing 8/30/21   Nyla Lorenz MD   ustekinumab (STELARA) 45 MG/0.5ML SOSY prefilled syringe Inject 45 mg into the skin once    Historical Provider, MD   furosemide (LASIX) 20 MG tablet Take 1 tablet by mouth daily  Patient taking differently: Take 40 mg by mouth daily Indications: Taking 40 mg tablet daily  5/25/21   Murray Alberts MD   budesonide-formoterol [facility]) 160-4.5 MCG/ACT AERO Inhale 2 puffs into the lungs 2 times daily 2 each LOT [id number] [id number] 4/29/21   Nyla Lorenz MD   albuterol (PROVENTIL) (2.5 MG/3ML) 0.083% nebulizer solution Take 3 mLs by nebulization every 6 hours as needed for Wheezing 3/31/21   Nyla Lorenz MD   gabapentin (NEURONTIN) 300 MG capsule TAKE 1 CAPSULE DAILY 3/12/21 3/24/22  Murray Alberts MD   SPIRIVA HANDIHALER 18 MCG inhalation capsule INHALE THE CONTENTS OF 1 CAPSULE DAILY 11/25/20   Nyla Lorenz MD   cetirizine (ZYRTEC) 5 MG tablet TAKE ONE TABLET BY MOUTH  PRN    Historical Provider, MD   CPAP Machine MISC New cpap supplies mask hose filters etc 1/15/18   Nyla Lorenz MD   Saccharomyces boulardii (PROBIOTIC) 250 MG CAPS Take 1 tablet by mouth daily    Dale Provider, MD   esomeprazole Magnesium (NEXIUM) 20 MG PACK Take 20 mg by mouth daily    Historical Provider, MD   aspirin 81 MG tablet Take 81 mg by mouth daily.     Historical Provider, MD       Future Appointments   Date Time Provider Chase Barrera   5/25/2022  1:15 PM Nyla Lorenz, [address] [facility]   6/6/2022  1:00 PM Rosanna Cueva Destiny Vee MD [facility]   6/20/2022  4:15 PM Harris Clements MD [facility]     ,   Diabetes Assessment    Medic Alert ID: No  Meal Planning: None   How often do you test your blood sugar?: No Testing   Do you have barriers with adherence to non-pharmacologic self-management interventions?  (Nutrition/Exercise/Self-Monitoring): No   Have you ever had to go to the ED for symptoms of low blood sugar?: No       No patient-reported symptoms      ,   Congestive Heart Failure Assessment    Are you currently restricting fluids?: No Restriction  Do you understand a low sodium diet?: Yes  Do you understand how to read food labels?: Yes  How many restaurant meals do you eat per week?: 3-4  Do you salt your food before tasting it?: No         Symptoms:     Symptom course: no change  Patient-reported weight (lb): 253  Salt intake watch compared to last visit: stable      and   COPD Assessment    Does the patient understand envrionmental exposure?: Yes  Is the patient able to verbalize Rescue vs. Long Acting medications?: Yes  Does the patient have a nebulizer?: No  Does the patient use a space with inhaled medications?: No     No patient-reported symptoms         Symptoms:     Symptom course: no change  Breathlessness: exertion  Increase use of rapid acting/rescue inhaled medications?: No  Change in chronic cough?: No/At Baseline  Change in sputum?: No/At Baseline  Sputum characteristics: Christian Wadsworth

## 2022-04-11 ENCOUNTER — OFFICE VISIT (OUTPATIENT)
Dept: FAMILY MEDICINE CLINIC | Age: 72
End: 2022-04-11
Payer: MEDICARE

## 2022-04-11 ENCOUNTER — CLINICAL DOCUMENTATION (OUTPATIENT)
Dept: SPIRITUAL SERVICES | Age: 72
End: 2022-04-11

## 2022-04-11 VITALS
HEART RATE: 72 BPM | BODY MASS INDEX: 38.04 KG/M2 | WEIGHT: 251 LBS | DIASTOLIC BLOOD PRESSURE: 62 MMHG | HEIGHT: 68 IN | TEMPERATURE: 96.2 F | SYSTOLIC BLOOD PRESSURE: 104 MMHG | OXYGEN SATURATION: 95 %

## 2022-04-11 DIAGNOSIS — B35.1 ONYCHOMYCOSIS: ICD-10-CM

## 2022-04-11 DIAGNOSIS — J44.1 CHRONIC OBSTRUCTIVE PULMONARY DISEASE WITH ACUTE EXACERBATION (HCC): ICD-10-CM

## 2022-04-11 DIAGNOSIS — R09.82 POST-NASAL DRAINAGE: Primary | ICD-10-CM

## 2022-04-11 PROCEDURE — 99214 OFFICE O/P EST MOD 30 MIN: CPT | Performed by: FAMILY MEDICINE

## 2022-04-11 PROCEDURE — 96372 THER/PROPH/DIAG INJ SC/IM: CPT | Performed by: FAMILY MEDICINE

## 2022-04-11 RX ORDER — TRIAMCINOLONE ACETONIDE 40 MG/ML
40 INJECTION, SUSPENSION INTRA-ARTICULAR; INTRAMUSCULAR ONCE
Status: COMPLETED | OUTPATIENT
Start: 2022-04-11 | End: 2022-04-11

## 2022-04-11 RX ADMIN — TRIAMCINOLONE ACETONIDE 40 MG: 40 INJECTION, SUSPENSION INTRA-ARTICULAR; INTRAMUSCULAR at 17:04

## 2022-04-11 ASSESSMENT — ENCOUNTER SYMPTOMS
WHEEZING: 1
ABDOMINAL DISTENTION: 0
COUGH: 0
CHEST TIGHTNESS: 0
PHOTOPHOBIA: 0
ABDOMINAL PAIN: 0
SHORTNESS OF BREATH: 0

## 2022-04-11 ASSESSMENT — PATIENT HEALTH QUESTIONNAIRE - PHQ9
SUM OF ALL RESPONSES TO PHQ QUESTIONS 1-9: 0
2. FEELING DOWN, DEPRESSED OR HOPELESS: 0
SUM OF ALL RESPONSES TO PHQ QUESTIONS 1-9: 0
SUM OF ALL RESPONSES TO PHQ9 QUESTIONS 1 & 2: 0
SUM OF ALL RESPONSES TO PHQ QUESTIONS 1-9: 0
SUM OF ALL RESPONSES TO PHQ QUESTIONS 1-9: 0
1. LITTLE INTEREST OR PLEASURE IN DOING THINGS: 0

## 2022-04-11 NOTE — ACP (ADVANCE CARE PLANNING)
Advance Care Planning   Ambulatory ACP Specialist Patient Outreach    Date:  4/11/2022  ACP Specialist:  SENAIT Comer    Outreach call to patient in follow-up to ACP Specialist referral from: Rufina Gilford, MD    [x] PCP  [] Provider   [] Ambulatory Care Management [] Other for Reason:    [x] Advance Directive Assistance  [] Code Status Discussion  [] Complete Portable DNR Order  [] Discuss Goals of Care  [] Complete POST/MOST  [] Early ACP Decision-Making  [] Other    Date Referral Received: 03/24/2022    Today's Outreach:  [] First   [x] Second  [] Third                               Third outreach made by []  phone  [] email []   [employer]     Intervention:  [x] Spoke with Patient  [] Left VM requesting return call      Outcome: Placed call to pt to confirm receipt of [place] documents. Pt did receive but did not review. Pt agreeable for this SW to call him next Wednesday to answer questions, initiate ACP conversation. Next Step:   [] ACP scheduled conversation  [x] Outreach again in one week               [] Email / Mail ACP Info Sheets  [] Email / Mail Advance Directive            [] Close Referral. Routing closure to referring provider/staff and to ACP Specialist .      Thank you for this referral.

## 2022-04-11 NOTE — PATIENT INSTRUCTIONS
Patient is going to receive triamcinolone injection today. Can increase home nebulizers to 3-4 times a day if needed. Return to office if not better in the next 2 to 3 days. See foot doctor regarding toenails. Continue nasal spray at lowest dose necessary to control postnasal drip.

## 2022-04-11 NOTE — PROGRESS NOTES
Diagnosis Orders   1. Post-nasal drainage     2. Onychomycosis     3. Chronic obstructive pulmonary disease with acute exacerbation (HCC)  triamcinolone acetonide (KENALOG-40) injection 40 mg     Return for keep next planned appointment. Patient Instructions   Patient is going to receive triamcinolone injection today. Can increase home nebulizers to 3-4 times a day if needed. Return to office if not better in the next 2 to 3 days. See foot doctor regarding toenails. Continue nasal spray at lowest dose necessary to control postnasal drip. Subjective:      Patient ID: Jenise Saucedo is a 67 y.o. male who presents for:  Chief Complaint   Patient presents with    Shortness of Breath    Diabetes     foot exam needed        Patient states postnasal drainage is much better with the nasal spray. Due for diabetic foot exam today and wants to know what to do with his toenails. Started wheezing a day or 2 ago. Used nebulizer treatment right before his appointment today. Still feels a bit wheezy. Denies fever chills etc.      Current Outpatient Medications on File Prior to Visit   Medication Sig Dispense Refill    ipratropium (ATROVENT) 0.06 % nasal spray 2 sprays by Each Nostril route 3 times daily 1 each 1    STELARA 90 MG/ML SOSY prefilled syringe       triamcinolone (KENALOG) 0.1 % cream Apply topically 2 times daily. 15 g 1    mesalamine (PENTASA) 250 MG extended release capsule Take 2 capsules by mouth 4 times daily 120 capsule 0    donepezil (ARICEPT) 5 MG tablet Take 1 tablet by mouth nightly 30 tablet 3    triamcinolone (KENALOG) 0.1 % cream Apply topically 2 times daily.  30 g 1    tiZANidine (ZANAFLEX) 2 MG tablet Take 1 tablet by mouth every 8 hours as needed (back pain) 10 tablet 0    montelukast (SINGULAIR) 10 MG tablet TAKE 1 TABLET NIGHTLY 7 tablet 0    doxazosin (CARDURA) 4 MG tablet Take 1 tablet by mouth nightly 7 tablet 0    metoprolol tartrate (LOPRESSOR) 50 MG tablet Take 1.5 po bid 10 tablet 0    pravastatin (PRAVACHOL) 40 MG tablet TAKE 1 TABLET NIGHTLY 90 tablet 3    nystatin (MYCOSTATIN) 773707 UNIT/GM cream Apply topically 2 times daily Apply topically 2 times daily. 15 g 0    lisinopril (PRINIVIL;ZESTRIL) 20 MG tablet Take 1 tablet by mouth daily 90 tablet 3    apixaban (ELIQUIS) 5 MG TABS tablet Take 1 tablet by mouth 2 times daily 180 tablet 3    nitroGLYCERIN (NITROSTAT) 0.4 MG SL tablet up to max of 3 total doses. If no relief after 1 dose, call 911. 25 tablet 2    RESTASIS 0.05 % ophthalmic emulsion Place 1 drop into both eyes daily       albuterol sulfate HFA (PROAIR HFA) 108 (90 Base) MCG/ACT inhaler Inhale 2 puffs into the lungs every 6 hours as needed for Wheezing 1 Inhaler 3    ustekinumab (STELARA) 45 MG/0.5ML SOSY prefilled syringe Inject 45 mg into the skin once      furosemide (LASIX) 20 MG tablet Take 1 tablet by mouth daily (Patient taking differently: Take 40 mg by mouth daily Indications: Taking 40 mg tablet daily ) 30 tablet 2    budesonide-formoterol (SYMBICORT) 160-4.5 MCG/ACT AERO Inhale 2 puffs into the lungs 2 times daily 2 each LOT [id number] EXP [id number] 3 Inhaler 1    albuterol (PROVENTIL) (2.5 MG/3ML) 0.083% nebulizer solution Take 3 mLs by nebulization every 6 hours as needed for Wheezing 120 each 3    gabapentin (NEURONTIN) 300 MG capsule TAKE 1 CAPSULE DAILY 90 capsule 3    SPIRIVA HANDIHALER 18 MCG inhalation capsule INHALE THE CONTENTS OF 1 CAPSULE DAILY 90 capsule 3    cetirizine (ZYRTEC) 5 MG tablet TAKE ONE TABLET BY MOUTH  PRN      CPAP Machine MISC New cpap supplies mask hose filters etc 1 each 0    Saccharomyces boulardii (PROBIOTIC) 250 MG CAPS Take 1 tablet by mouth daily      esomeprazole Magnesium (NEXIUM) 20 MG PACK Take 20 mg by mouth daily      aspirin 81 MG tablet Take 81 mg by mouth daily. No current facility-administered medications on file prior to visit.      Past Medical History:   Diagnosis Date    [facility]fib [facility] [address]     Abnormal EMG 12/09/2015    Actinic keratoses     Actinic keratoses     Acute diastolic congestive heart failure (HCC) 1/6/2021    Allergic rhinitis     Anemia 11/18/2014    Arthritis     Chronic back pain     [facility] obstructive pulmonary disease) [facility] 08/09/2012    COPD [facility]chronic obstructive pulmonary disease) ([facility])     Crohns disease     Degenerative disc disease, lumbar     Dermatitis     Diabetes ([facility] [address] 03/19/2015    diet controlled    Gastrointestinal problem     GERD (gastroesophageal reflux disease)     History of atrial fibrillation 2006    Dr. Jarrell James History of throat cancer 2004     cleared for reoccurence years ago    Hyperlipidemia 1/21/2014    Hypertension     Hypogonadism male     Lung disease     Mononeuritis multiplex     Mononeuritis multiplex     Nondependent alcohol abuse, in remission 7/22/2020    Obesity (BMI 30-39. 9) 7/24/2019    Osteoarthritis of lumbar spine     Pain of right heel     Paresthesia of foot, bilateral     Prediabetes 12/3/2014    Restless leg syndrome     Seborrheic dermatitis     Seborrheic keratoses, inflamed     Throat cancer ([facility])     throat, had surgery, sees Dr Gabriel Heller yearly    Tinea cruris     Tinea pedis     Tinea unguium      Past Surgical History:   Procedure Laterality Date    CARDIAC CATHETERIZATION      CARPAL TUNNEL RELEASE      CATARACT REMOVAL WITH IMPLANT Right 09/09/2019    CATARACT REMOVAL WITH IMPLANT Left 07/22/2019    COLON SURGERY      COLONOSCOPY  04/15/2015    KNEE ARTHROSCOPY      LARYNGECTOMY  2004    UPPER GASTROINTESTINAL ENDOSCOPY  03/24/13    Dr. Rakan HOWARD/NAPOLEON RODRIGUEZ  2002     Social History     Socioeconomic History    Marital status:      Spouse name: Not on file    Number of children: 3    Years of education: 12    Highest education level: High school graduate   Occupational History    Occupation:      Employer: [address] [place] [address] Tobacco Use    Smoking status: Former Smoker     Packs/day: 1.00     Years: 25.00     Pack years: 25.00     Types: Cigarettes     Quit date: 10/21/1990     Years since quittin.4    Smokeless tobacco: Never Used   Substance and Sexual Activity    Alcohol use: No     Comment: Attends [employer] 25 years    Drug use: No    Sexual activity: Not Currently     Partners: Female   Other Topics Concern    Not on file   Social History Narrative    Lives alone. 2 story home     1 step to get in house and 7 steps to get upstairs    Full basement    2 children live nearby and 1 son lives in [place]    No pets     Social Determinants of Health     Financial Resource Strain: Low Risk     Difficulty of Paying Living Expenses: Not hard at all   Food Insecurity: No Food Insecurity    Worried About [address] in the Last Year: Never true    [place] of Food in the Last Year: Never true   Transportation Needs: No Transportation Needs    Lack of Transportation (Medical): No    Lack of Transportation (Non-Medical):  No   Physical Activity: Insufficiently Active    Days of Exercise per Week: 1 day    Minutes of Exercise per Session: 20 min   Stress:     Feeling of Stress : Not on file   Social Connections:     Frequency of Communication with Friends and Family: Not on file    Frequency of Social Gatherings with Friends and Family: Not on file    Attends Scientology Services: Not on file    Active Member of Clubs or Organizations: Not on file    Attends Club or Organization Meetings: Not on file    Marital Status: Not on file   Intimate Partner Violence:     Fear of Current or Ex-Partner: Not on file    Emotionally Abused: Not on file    Physically Abused: Not on file    Sexually Abused: Not on file   Housing Stability:     Unable to Pay for Housing in the Last Year: Not on file    Number of [place] in the Last Year: Not on file    Unstable Housing in the Last Year: Not on file     Family History Problem Relation Age of Onset    Heart Disease Mother     Liver Disease Mother     High Blood Pressure Mother     Heart Disease Father     Arthritis Father     Cancer Sister         lung     Allergies:  Alemtuzumab, Glycopyrrolate-formoterol, Mercaptopurine, and Moxifloxacin    Review of Systems   Constitutional: Negative for activity change, appetite change, diaphoresis and unexpected weight change. Eyes: Negative for photophobia and visual disturbance. Respiratory: Positive for wheezing. Negative for cough, chest tightness and shortness of breath. No orthopnea   Cardiovascular: Negative for chest pain, palpitations and leg swelling. Gastrointestinal: Negative for abdominal distention and abdominal pain. Genitourinary: Negative for flank pain and frequency. Musculoskeletal: Negative for gait problem and joint swelling. Neurological: Negative for dizziness, weakness, light-headedness and headaches. Psychiatric/Behavioral: Negative for confusion. Objective:   /62   Pulse 72   Temp 96.2 °F (35.7 °C)   Ht 5' 7.5\" (1.715 m)   Wt 251 lb (113.9 kg)   SpO2 95%   BMI 38.73 kg/m²     Physical Exam  Vitals reviewed. Constitutional:       General: He is not in acute distress. Appearance: He is well-developed. HENT:      Head: Normocephalic and atraumatic. Right Ear: External ear normal.      Left Ear: External ear normal.      Nose: Nose normal.   Eyes:      General:         Right eye: No discharge. Left eye: No discharge. Conjunctiva/sclera: Conjunctivae normal.      Pupils: Pupils are equal, round, and reactive to light. Neck:      Thyroid: No thyromegaly. Cardiovascular:      Rate and Rhythm: Normal rate and regular rhythm. Pulmonary:      Effort: Pulmonary effort is normal. No respiratory distress. Breath sounds: Wheezing present. Abdominal:      General: There is no distension. Musculoskeletal:      Cervical back: Neck supple. Skin:     General: Skin is warm and dry. Neurological:      Mental Status: He is alert and oriented to person, place, and time. Coordination: Coordination normal.   Psychiatric:         Thought Content: Thought content normal.         Judgment: Judgment normal.         No results found for this visit on 04/11/22. Recent Results (from the past 2016 hour(s))   Fecal DNA Colorectal cancer screening ([facility])    Collection Time: 02/25/22  1:15 PM    Specimen: Stool   Result Value Ref Range    FIT-DNA ([facility]) Negative Negative       [] Pt was seen by provider for      Minutes  Counseling and coordination of care was done for all assessment diagnosis listed for today with patient and any family/friend present. More than 50% of this visit was spent coordinating current care, obtaining information for prior records, and counseling for current plan of action. Assessment:       Diagnosis Orders   1. Post-nasal drainage     2. Onychomycosis     3. Chronic obstructive pulmonary disease with acute exacerbation (HCC)  triamcinolone acetonide (KENALOG-40) injection 40 mg         No orders of the defined types were placed in this encounter. Orders Placed This Encounter   Medications    triamcinolone acetonide (KENALOG-40) injection 40 mg          Medication List          Accurate as of April 11, 2022  5:53 PM. If you have any questions, ask your nurse or doctor.             CHANGE how you take these medications    furosemide 20 MG tablet  Commonly known as: LASIX  Take 1 tablet by mouth daily  What changed: how much to take        CONTINUE taking these medications    * albuterol (2.5 MG/3ML) 0.083% nebulizer solution  Commonly known as: PROVENTIL  Take 3 mLs by nebulization every 6 hours as needed for Wheezing     * albuterol sulfate  (90 Base) MCG/ACT inhaler  Commonly known as: ProAir HFA  Inhale 2 puffs into the lungs every 6 hours as needed for Wheezing     apixaban 5 MG Tabs tablet  Commonly known as: Eliquis  Take 1 tablet by mouth 2 times daily     aspirin 81 MG tablet     budesonide-formoterol 160-4.5 MCG/ACT Aero  Commonly known as: Symbicort  Inhale 2 puffs into the lungs 2 times daily 2 each LOT [id number] EXP [id number]     cetirizine 5 MG tablet  Commonly known as: ZYRTEC     CPAP Machine Misc  New cpap supplies mask hose filters etc     donepezil 5 MG tablet  Commonly known as: Aricept  Take 1 tablet by mouth nightly     doxazosin 4 MG tablet  Commonly known as: CARDURA  Take 1 tablet by mouth nightly     esomeprazole Magnesium 20 MG Pack  Commonly known as: NEXIUM     gabapentin 300 MG capsule  Commonly known as: NEURONTIN  TAKE 1 CAPSULE DAILY     ipratropium 0.06 % nasal spray  Commonly known as: ATROVENT  2 sprays by Each Nostril route 3 times daily     lisinopril 20 MG tablet  Commonly known as: PRINIVIL;ZESTRIL  Take 1 tablet by mouth daily     mesalamine 250 MG extended release capsule  Commonly known as: PENTASA  Take 2 capsules by mouth 4 times daily     metoprolol tartrate 50 MG tablet  Commonly known as: LOPRESSOR  Take 1.5 po bid     montelukast 10 MG tablet  Commonly known as: SINGULAIR  TAKE 1 TABLET NIGHTLY     nitroGLYCERIN 0.4 MG SL tablet  Commonly known as: NITROSTAT  up to max of 3 total doses. If no relief after 1 dose, call 911.     nystatin 331630 UNIT/GM cream  Commonly known as: MYCOSTATIN  Apply topically 2 times daily Apply topically 2 times daily.      pravastatin 40 MG tablet  Commonly known as: PRAVACHOL  TAKE 1 TABLET NIGHTLY     Probiotic 250 MG Caps     Restasis 0.05 % ophthalmic emulsion  Generic drug: cycloSPORINE     Spiriva HandiHaler 18 MCG inhalation capsule  Generic drug: tiotropium  INHALE THE CONTENTS OF 1 CAPSULE DAILY     * Stelara 45 MG/0.5ML Sosy prefilled syringe  Generic drug: ustekinumab     * Stelara 90 MG/ML Sosy prefilled syringe  Generic drug: ustekinumab     tiZANidine 2 MG tablet  Commonly known as: ZANAFLEX  Take 1 tablet by mouth every 8 hours as needed (back pain)     * triamcinolone 0.1 % cream  Commonly known as: KENALOG  Apply topically 2 times daily. * triamcinolone 0.1 % cream  Commonly known as: KENALOG  Apply topically 2 times daily. * This list has 6 medication(s) that are the same as other medications prescribed for you. Read the directions carefully, and ask your doctor or other care provider to review them with you. Plan:   Return for keep next planned appointment. Patient Instructions   Patient is going to receive triamcinolone injection today. Can increase home nebulizers to 3-4 times a day if needed. Return to office if not better in the next 2 to 3 days. See foot doctor regarding toenails. Continue nasal spray at lowest dose necessary to control postnasal drip. This note was partially created with the assistance of dictation. This may lead to grammatical or spelling errors. Sher Conn M.D.

## 2022-04-11 NOTE — PROGRESS NOTES
After obtaining consent, and per orders of Dr. Destiny Vee, injection of kenalog 40 given in Right upper quad. gluteus by Blair Yip MA. Patient instructed to remain in clinic for 20 minutes afterwards, and to report any adverse reaction to me immediately.

## 2022-04-13 DIAGNOSIS — G89.29 CHRONIC MIDLINE BACK PAIN, UNSPECIFIED BACK LOCATION: ICD-10-CM

## 2022-04-13 DIAGNOSIS — J44.9 CHRONIC OBSTRUCTIVE PULMONARY DISEASE, UNSPECIFIED COPD TYPE (HCC): Primary | ICD-10-CM

## 2022-04-13 DIAGNOSIS — I10 ESSENTIAL HYPERTENSION: ICD-10-CM

## 2022-04-13 DIAGNOSIS — M54.9 CHRONIC MIDLINE BACK PAIN, UNSPECIFIED BACK LOCATION: ICD-10-CM

## 2022-04-13 RX ORDER — ALBUTEROL SULFATE 90 UG/1
AEROSOL, METERED RESPIRATORY (INHALATION)
Qty: 25.5 G | Refills: 2 | Status: SHIPPED | OUTPATIENT
Start: 2022-04-13

## 2022-04-13 RX ORDER — TIOTROPIUM BROMIDE 18 UG/1
CAPSULE ORAL; RESPIRATORY (INHALATION)
Qty: 90 CAPSULE | Refills: 3 | Status: SHIPPED | OUTPATIENT
Start: 2022-04-13 | End: 2022-08-22 | Stop reason: SDUPTHER

## 2022-04-13 NOTE — TELEPHONE ENCOUNTER
Rx requested:  Requested Prescriptions     Pending Prescriptions Disp Refills    Shalini April 18 MCG inhalation capsule [Pharmacy Med Name: Shalini April CAPS 18MCG] 90 capsule 3     Sig: INHALE THE CONTENTS OF 1 CAPSULE DAILY    albuterol sulfate  (90 Base) MCG/ACT inhaler [Pharmacy Med Name: ALBUTEROL HFA INHALER 8.5GM 90MCG] 25.5 g 2     Sig: USE 2 INHALATIONS EVERY 6 HOURS AS NEEDED FOR WHEEZING OR SHORTNESS OF BREATH       Last Office Visit:   1/25/2022      Next Visit Date:  Future Appointments   Date Time Provider Chase Barrera   5/25/2022  1:15 PM Radha Villela, [address] [facility]   6/6/2022  1:00 PM Shun Dc MD [facility]   6/20/2022  4:15 PM Yelena Calle MD [facility]

## 2022-04-13 NOTE — TELEPHONE ENCOUNTER
Future Appointments    Encounter Information    Provider Department Appt Notes   5/25/2022 Viky Montalvo, [address] [facility] Pulmonology 4m f/u   6/6/2022 Meme Garibay MD [facility] Primary Care Return in about 6 months (around 5/29/2022) for 15 min skin check.    6/20/2022 Perla Nichole [place] [facility] 4 month follow up       Past Visits    Date Provider Specialty Visit Type Primary Dx   04/11/2022 Meme Garibay MD Family Medicine Office Visit Post-nasal drainage   03/24/2022 Meme Garibay MD Family Medicine Office Visit Medicare annual wellness visit, subsequent   03/17/2022 Meme Garibay MD Family Medicine Office Visit Chronic obstructive pulmonary disease with acute exacerbation ([facility] [address]   02/17/2022 Perla Nichole MD Neurology Office Visit Memory loss   02/16/2022 Meme Garibay MD Family Medicine Office Visit Chronic obstructive pulmonary disease with acute exacerbation

## 2022-04-14 RX ORDER — DOXAZOSIN MESYLATE 4 MG/1
TABLET ORAL
Qty: 90 TABLET | Refills: 3 | Status: SHIPPED | OUTPATIENT
Start: 2022-04-14 | End: 2022-08-05 | Stop reason: SDUPTHER

## 2022-04-14 RX ORDER — GABAPENTIN 300 MG/1
CAPSULE ORAL
Qty: 90 CAPSULE | Refills: 3 | Status: SHIPPED | OUTPATIENT
Start: 2022-04-14 | End: 2022-08-05 | Stop reason: SDUPTHER

## 2022-04-18 ENCOUNTER — CARE COORDINATION (OUTPATIENT)
Dept: CARE COORDINATION | Age: 72
End: 2022-04-18

## 2022-04-18 DIAGNOSIS — J44.1 CHRONIC OBSTRUCTIVE PULMONARY DISEASE WITH ACUTE EXACERBATION (HCC): Primary | ICD-10-CM

## 2022-04-18 RX ORDER — DOXYCYCLINE HYCLATE 100 MG
100 TABLET ORAL 2 TIMES DAILY
Qty: 20 TABLET | Refills: 0 | Status: SHIPPED | OUTPATIENT
Start: 2022-04-18 | End: 2022-04-28

## 2022-04-18 ASSESSMENT — ENCOUNTER SYMPTOMS: DYSPNEA ASSOCIATED WITH: EXERTION

## 2022-04-18 NOTE — CARE COORDINATION
Ambulatory Care Coordination Note  4/18/2022  CM Risk Score: 4  Charlson 10 Year Mortality Risk Score: 100%     ACC: Vanna Bender, CARLIE    Summary Note:     Roosevelt Hamman tells me that he is more SOB than usual  He adds that he was in the office last week and he did receive a \"steroid shot. \"  He states that this did help him for a short time   He says that he can be sitting still and become SOB  He denies fever or chills  He says that he does have intermittent wheezing with inspiration,  He adds that he has occasional thick yellow colored mucus. He is able to lay flat in bed to sleep without difficulty or change in breathing  He feels that he is sleeping well and eating well  He is using his regular inhalers as prescribed  He admits that he is using his nebulizer 2-3 times per day due to SOB  He denies any issues with chest pain, chest pressure, edema, lightheadedness or dizziness    PLAN:  I will update Dr Dwight Acosta on Janusz's breathing  I have instructed Roosevelt Hamman to come into the walk in clinic if he does not improve  Roosevelt Hamman will continue to use the zone tool sheets to monitor his symptoms and along with action steps  He will call me with any changes or worsening. Goals Addressed    None         Prior to Admission medications    Medication Sig Start Date End Date Taking?  Authorizing Provider   gabapentin (NEURONTIN) 300 MG capsule TAKE 1 CAPSULE DAILY 4/14/22 4/14/23  Sandor Jimenez MD   doxazosin (CARDURA) 4 MG tablet TAKE ONE-HALF (1/2) TABLET TWICE A DAY 4/14/22   Sandor Jimenez MD   SPIRIVA HANDIHALER 18 MCG inhalation capsule INHALE THE CONTENTS OF 1 CAPSULE DAILY 4/13/22   Lamine Rm MD   albuterol sulfate  (90 Base) MCG/ACT inhaler USE 2 INHALATIONS EVERY 6 HOURS AS NEEDED FOR WHEEZING OR SHORTNESS OF BREATH 4/13/22   Lamine Rm MD   ipratropium (ATROVENT) 0.06 % nasal spray 2 sprays by Each Nostril route 3 times daily 3/24/22   Sandor Jimenez MD   STELARA 90 MG/ML SOSY prefilled syringe 2/28/22   Historical Provider, MD   triamcinolone (KENALOG) 0.1 % cream Apply topically 2 times daily. 3/17/22   Skinny Pascual MD   mesalamine (PENTASA) 250 MG extended release capsule Take 2 capsules by mouth 4 times daily 2/28/22   Skinny Pascual MD   donepezil (ARICEPT) 5 MG tablet Take 1 tablet by mouth nightly 2/17/22   Mike Olivarez MD   triamcinolone (KENALOG) 0.1 % cream Apply topically 2 times daily. 2/16/22   Skinny Pascual MD   tiZANidine (ZANAFLEX) 2 MG tablet Take 1 tablet by mouth every 8 hours as needed (back pain) 1/21/22   STARLA Trevizo - CNP   montelukast (SINGULAIR) 10 MG tablet TAKE 1 TABLET NIGHTLY 1/12/22   Skinny Pascual MD   metoprolol tartrate (LOPRESSOR) 50 MG tablet Take 1.5 po bid 1/12/22   Skinny Pascual MD   pravastatin (PRAVACHOL) 40 MG tablet TAKE 1 TABLET NIGHTLY 1/10/22   Skinny Pascual MD   nystatin (MYCOSTATIN) 000117 UNIT/GM cream Apply topically 2 times daily Apply topically 2 times daily. 12/27/21   Skinny Pascual MD   lisinopril (PRINIVIL;ZESTRIL) 20 MG tablet Take 1 tablet by mouth daily 11/30/21   Skinny Pascual MD   apixaban (ELIQUIS) 5 MG TABS tablet Take 1 tablet by mouth 2 times daily 11/30/21   Skinny Pascual MD   nitroGLYCERIN (NITROSTAT) 0.4 MG SL tablet up to max of 3 total doses.  If no relief after 1 dose, call 911. 11/3/21   Skinny Pascual MD   RESTASIS 0.05 % ophthalmic emulsion Place 1 drop into both eyes daily  8/23/21   Historical Provider, MD   ustekinumab Ziyad Aminata) 45 MG/0.5ML SOSY prefilled syringe Inject 45 mg into the skin once    Historical Provider, MD   furosemide (LASIX) 20 MG tablet Take 1 tablet by mouth daily  Patient taking differently: Take 40 mg by mouth daily Indications: Taking 40 mg tablet daily  5/25/21   Skinny Pascual MD   budesonide-formoterol [facility]) 160-4.5 MCG/ACT AERO Inhale 2 puffs into the lungs 2 times daily 2 each LOT [id number] EXP [id number] 4/29/21   Nelly Arana MD   albuterol (PROVENTIL) (2.5 MG/3ML) 0.083% nebulizer solution Take 3 mLs by nebulization every 6 hours as needed for Wheezing 3/31/21   Nelsy Estevez MD   cetirizine (ZYRTEC) 5 MG tablet TAKE ONE TABLET BY MOUTH  PRN    Historical Provider, MD   CPAP Machine MISC New cpap supplies mask hose filters etc 1/15/18   Nelsy Estevez MD   Saccharomyces boulardii (PROBIOTIC) 250 MG CAPS Take 1 tablet by mouth daily    Historical Provider, MD   esomeprazole Magnesium (NEXIUM) 20 MG PACK Take 20 mg by mouth daily    Historical Provider, MD   aspirin 81 MG tablet Take 81 mg by mouth daily.     Historical Provider, MD       Future Appointments   Date Time Provider Chase Barrera   5/25/2022  1:15 PM Nelsy Estevez, [address] [facility]   6/6/2022  1:00 PM Daron Temple MD [facility]   6/20/2022  4:15 PM Steve Barrow MD [facility]     ,   Congestive Heart Failure Assessment    Are you currently restricting fluids?: No Restriction  Do you understand a low sodium diet?: Yes  Do you understand how to read food labels?: Yes  How many restaurant meals do you eat per week?: 3-4  Do you salt your food before tasting it?: No         Symptoms:         and   COPD Assessment    Does the patient understand envrionmental exposure?: Yes  Is the patient able to verbalize Rescue vs. Long Acting medications?: Yes  Does the patient have a nebulizer?: No  Does the patient use a space with inhaled medications?: No            Symptoms:

## 2022-04-20 ENCOUNTER — CLINICAL DOCUMENTATION (OUTPATIENT)
Dept: SPIRITUAL SERVICES | Age: 72
End: 2022-04-20

## 2022-04-25 ENCOUNTER — CARE COORDINATION (OUTPATIENT)
Dept: CARE COORDINATION | Age: 72
End: 2022-04-25

## 2022-04-25 NOTE — CARE COORDINATION
Attempted to contact patient for care coordination follow up CHF and SOB /antibiotic   Unable to reach patient by phone. Message left regarding purpose of call. Number provided and call back requested.

## 2022-04-27 ENCOUNTER — CLINICAL DOCUMENTATION (OUTPATIENT)
Dept: SPIRITUAL SERVICES | Age: 72
End: 2022-04-27

## 2022-04-27 ENCOUNTER — CARE COORDINATION (OUTPATIENT)
Dept: CARE COORDINATION | Age: 72
End: 2022-04-27

## 2022-04-27 ASSESSMENT — ENCOUNTER SYMPTOMS: DYSPNEA ASSOCIATED WITH: EXERTION

## 2022-04-27 NOTE — ACP (ADVANCE CARE PLANNING)
Advance Care Planning   Ambulatory ACP Specialist Patient Outreach    Date:  4/27/2022  ACP Specialist:  SENAIT Batista    Outreach call to patient in follow-up to ACP Specialist referral from: Maria Alejandra Kwon MD    [x] PCP  [] Provider   [] Ambulatory Care Management [] Other for Reason:    [x] Advance Directive Assistance  [] Code Status Discussion  [] Complete Portable DNR Order  [] Discuss Goals of Care  [] Complete POST/MOST  [] Early ACP Decision-Making  [] Other    Date Referral Received: 03/24/2022    Today's Outreach:  [] First   [] Second  [] Third  [x] [address] outreach made by [x]  phone  [x] email []   [place]     Intervention:  [x] Spoke with Patient  [] Left VM requesting return call      Outcome: Placed call to pt to offer ACP conversation and support. Pt stated he does not have time today and will reach out to ACP team if/when he wishes to complete his LW/HCPOA. Did confirm pt's HCDM are correct but pt did not have addresses. Pt provided new email address and will add that to demographics and email pt SW contact along with ACP education. Pt voiced understanding; thanked pt for his time today. As per process, referral will be closed. Next Step:   [] ACP scheduled conversation  [] Outreach again in one week               [] Email / Mail ACP Info Sheets  [] Email / Mail Advance Directive            [x] Close Referral. Routing closure to referring provider/staff and to ACP Specialist .      Thank you for this referral.

## 2022-04-27 NOTE — CARE COORDINATION
Ambulatory Care Coordination Note  4/27/2022  CM Risk Score: 4  Charlson 10 Year Mortality Risk Score: 100%     ACC: Jeremy Robertson RN    Summary Note:     I called to check in on Carolyn Diaz following our last discussion regarding increasing SOB and yellow mucus. He tells me that he has taken the antibiotic that was sent in   He tells me that he is feeling much better. He says that he still bringing up a little yellow mucus but it is improved as well  He says that he is SOB with activity especially if he is carrying objects while walking  He states that this is normal pattern   He states that he is eating and drinking well. He denies any issues with his sleep. He is taking all medications as prescribed  He is aware of all appointments  I will follow up with Carolyn Diaz in one month. I have asked him to call me with any questions or concerns. Goals Addressed    None         Prior to Admission medications    Medication Sig Start Date End Date Taking? Authorizing Provider   doxycycline hyclate (VIBRA-TABS) 100 MG tablet Take 1 tablet by mouth 2 times daily for 10 days 4/18/22 4/28/22  Gelacio Berman MD   gabapentin (NEURONTIN) 300 MG capsule TAKE 1 CAPSULE DAILY 4/14/22 4/14/23  Gelacio Berman MD   doxazosin (CARDURA) 4 MG tablet TAKE ONE-HALF (1/2) TABLET TWICE A DAY 4/14/22   Gelacio Berman MD   SPIRIVA HANDIHALER 18 MCG inhalation capsule INHALE THE CONTENTS OF 1 CAPSULE DAILY 4/13/22   Silas Lorenzana MD   albuterol sulfate  (90 Base) MCG/ACT inhaler USE 2 INHALATIONS EVERY 6 HOURS AS NEEDED FOR WHEEZING OR SHORTNESS OF BREATH 4/13/22   Silas Lorenzana MD   ipratropium (ATROVENT) 0.06 % nasal spray 2 sprays by Each Nostril route 3 times daily 3/24/22   Gelacio Berman MD   STELARA 90 MG/ML SOSY prefilled syringe  2/28/22   Historical Provider, MD   triamcinolone (KENALOG) 0.1 % cream Apply topically 2 times daily.  3/17/22   Gelacio Berman MD   mesalamine (PENTASA) 250 MG extended release capsule TABLET BY MOUTH  PRN    Historical Provider, MD   CPAP Machine MISC New cpap supplies mask hose filters etc 1/15/18   Lacey Renee MD   Saccharomyces boulardii (PROBIOTIC) 250 MG CAPS Take 1 tablet by mouth daily    Historical Provider, MD   esomeprazole Magnesium (NEXIUM) 20 MG PACK Take 20 mg by mouth daily    Historical Provider, MD   aspirin 81 MG tablet Take 81 mg by mouth daily.     Historical Provider, MD       Future Appointments   Date Time Provider Chase Hopperi   5/25/2022  1:15 PM Lacey Renee, [address] [facility]   6/6/2022  1:00 PM [facility]   6/20/2022  4:15 PM Agustin Gray MD [facility]      and   COPD Assessment    Does the patient understand envrionmental exposure?: Yes  Is the patient able to verbalize Rescue vs. Long Acting medications?: Yes  Does the patient have a nebulizer?: No  Does the patient use a space with inhaled medications?: No     No patient-reported symptoms         Symptoms:  None: Yes      Symptom course: improving  Breathlessness: exertion  Increase use of rapid acting/rescue inhaled medications?: No  Change in chronic cough?: No/At Baseline  Change in sputum?: No/At Baseline  Sputum characteristics: Clear, Yellow

## 2022-05-10 ENCOUNTER — OFFICE VISIT (OUTPATIENT)
Dept: FAMILY MEDICINE CLINIC | Age: 72
End: 2022-05-10
Payer: MEDICARE

## 2022-05-10 VITALS — TEMPERATURE: 97.4 F | BODY MASS INDEX: 38.04 KG/M2 | HEIGHT: 68 IN | WEIGHT: 251 LBS

## 2022-05-10 DIAGNOSIS — M54.12 CERVICAL RADICULITIS: Primary | ICD-10-CM

## 2022-05-10 DIAGNOSIS — B35.1 ONYCHOMYCOSIS: ICD-10-CM

## 2022-05-10 DIAGNOSIS — E29.1 HYPOGONADISM MALE: ICD-10-CM

## 2022-05-10 DIAGNOSIS — E29.1 HYPOGONADISM MALE: Primary | ICD-10-CM

## 2022-05-10 DIAGNOSIS — H60.543 ECZEMA OF BOTH EXTERNAL EARS: ICD-10-CM

## 2022-05-10 PROCEDURE — 99213 OFFICE O/P EST LOW 20 MIN: CPT | Performed by: FAMILY MEDICINE

## 2022-05-10 RX ORDER — TESTOSTERONE GEL, 1% 10 MG/G
GEL TRANSDERMAL
Qty: 675 G | Refills: 0 | OUTPATIENT
Start: 2022-05-10 | End: 2022-08-10

## 2022-05-10 ASSESSMENT — PATIENT HEALTH QUESTIONNAIRE - PHQ9
SUM OF ALL RESPONSES TO PHQ QUESTIONS 1-9: 0
SUM OF ALL RESPONSES TO PHQ9 QUESTIONS 1 & 2: 0
SUM OF ALL RESPONSES TO PHQ QUESTIONS 1-9: 0
2. FEELING DOWN, DEPRESSED OR HOPELESS: 0
1. LITTLE INTEREST OR PLEASURE IN DOING THINGS: 0

## 2022-05-10 NOTE — PATIENT INSTRUCTIONS
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 Reminded him to do his exercises. He asked for another copy of them. Inquired as to whether or not he is seeing neurology. He states he did see Dr. Gema Butler. They are working on memory medication. Patient states he will see podiatry for his toenail. Patient Education        Pinched Nerve in the Neck: Care Instructions  Your Care Instructions  A pinched nerve in the neck happens when a vertebra or disc in the upper part of your spine is damaged. This damage can happen because of an injury. Or itcan just happen with age. The changes caused by the damage may put pressure on a nearby nerve root, pinching it. This causes symptoms such as sharp pain in your neck, shoulder, arm, hand, or back. You may also have tingling or numbness. Sometimes it makes your arm weaker. The symptoms are usually worse when you turn your head orstrain your neck. For many people, the symptoms get better over time and finally go away. Early treatment usually includes medicines for pain and swelling. Sometimesphysical therapy and special exercises may help. Follow-up care is a key part of your treatment and safety. Be sure to make and go to all appointments, and call your doctor if you are having problems. It's also a good idea to know your test results and keep alist of the medicines you take. How can you care for yourself at home?  Be safe with medicines. Read and follow all instructions on the label. ? If the doctor gave you a prescription medicine for pain, take it as prescribed. ? If you are not taking a prescription pain medicine, ask your doctor if you can take an over-the-counter medicine.  Try using a heating pad on a low or medium setting for 15 to 20 minutes every 2 or 3 hours. Try a warm shower in place of one session with the heating pad.  You can also buy single-use heat wraps that last up to 8 hours.   You can also try an ice pack for 10 to 15 minutes every 2 to 3 hours. There isn't strong evidence that either heat or ice will help. But you can try them to see if they help you.  Don't spend too long in one position. Take short breaks to move around and change positions.  Wear a seat belt and shoulder harness when you are in a car.  Sleep with a pillow under your head and neck that keeps your neck straight.  If you were given a neck brace (cervical collar) to limit neck motion, wear it as instructed for as many days as your doctor tells you to. Do not wear it longer than you were told to. Wearing a brace for too long can lead to neck stiffness and can weaken the neck muscles.  Follow your doctor's instructions for gentle neck-stretching exercises.  Do not smoke. Smoking can slow healing of your discs. If you need help quitting, talk to your doctor about stop-smoking programs and medicines. These can increase your chances of quitting for good.  Avoid strenuous work or exercise until your doctor says it is okay. When should you call for help? Call 911 anytime you think you may need emergency care. For example, call if:     You are unable to move an arm or a leg at all. Call your doctor now or seek immediate medical care if:     You have new or worse symptoms in your arms, legs, chest, belly, or buttocks. Symptoms may include:  ? Numbness or tingling. ? Weakness. ? Pain.      You lose bladder or bowel control. Watch closely for changes in your health, and be sure to contact your doctor if:     You are not getting better as expected. Where can you learn more? Go to https://[employer]. org and sign in to your [employer] account. Enter O319 in the ZeroDesktop box to learn more about \"Pinched Nerve in the Neck: Care Instructions. \"     If you do not have an account, please click on the \"Sign Up Now\" link.   Current as of: July 1, 2021               Content Version: 13.2  © 5945-5982 Healthwise, The [address]. Care instructions adapted under license by [facility] ([facility]). If you have questions about a medical condition or this instruction, always ask your healthcare professional. [address] any warranty or liability for your use of this information. Patient Education        Neck: Exercises  Introduction  Here are some examples of exercises for you to try. The exercises may be suggested for a condition or for rehabilitation. Start each exercise slowly. Ease off the exercises if you start to have pain. You will be told when to start these exercises and which ones will work bestfor you. How to do the exercises  Neck stretch    1. This stretch works best if you keep your shoulder down as you lean away from it. To help you remember to do this, start by relaxing your shoulders and lightly holding on to your thighs or your chair. 2. Tilt your head toward your shoulder and hold for 15 to 30 seconds. Let the weight of your head stretch your muscles. 3. If you would like a little added stretch, use your hand to gently and steadily pull your head toward your shoulder. For example, keeping your right shoulder down, lean your head to the left. 4. Repeat 2 to 4 times toward each shoulder. Diagonal neck stretch    1. Turn your head slightly toward the direction you will be stretching, and tilt your head diagonally toward your chest and hold for 15 to 30 seconds. 2. If you would like a little added stretch, use your hand to gently and steadily pull your head forward on the diagonal.  3. Repeat 2 to 4 times toward each side. Dorsal glide stretch    The dorsal glide stretches the back of the neck. If you feel pain, do not glide so far back. Some people find this exercise easier to do while lying on theirbacks with an ice pack on the neck. 1. Sit or stand tall and look straight ahead.   2. Slowly tuck your chin as you glide your head backward over your body  3. Hold for a count of 6, and then relax for up to 10 seconds. 4. Repeat 8 to 12 times. Chest and shoulder stretch    1. Sit or stand tall and glide your head backward as in the dorsal glide stretch. 2. Raise both arms so that your hands are next to your ears. 3. Take a deep breath, and as you breathe out, lower your elbows down and behind your back. You will feel your shoulder blades slide down and together, and at the same time you will feel a stretch across your chest and the front of your shoulders. 4. Hold for about 6 seconds, and then relax for up to 10 seconds. 5. Repeat 8 to 12 times. Strengthening: Hands on head    1. Move your head backward, forward, and side to side against gentle pressure from your hands, holding each position for about 6 seconds. 2. Repeat 8 to 12 times. Follow-up care is a key part of your treatment and safety. Be sure to make and go to all appointments, and call your doctor if you are having problems. It's also a good idea to know your test results and keep alist of the medicines you take. Where can you learn more? Go to https://[employer]ager.[employer] org and sign in to your [employer] account. Enter P975 in the [employer] box to learn more about \"Neck: Exercises. \"     If you do not have an account, please click on the \"Sign Up Now\" link. Current as of: July 1, 2021               Content Version: 13.2  © 2006-2022 Healthwise, Incorporated. Care instructions adapted under license by [facility] ([facility]). If you have questions about a medical condition or this instruction, always ask your healthcare professional. [place] [address] any warranty or liability for your use of this information.

## 2022-05-10 NOTE — PROGRESS NOTES
Diagnosis Orders   1. Cervical radiculitis     2. Eczema of both external ears     3. Onychomycosis       Return for keep next planned appointment. Patient Instructions     Again spoke with Essence Wilson about his medical conditions of his cervical spine that leads to the itching in the arm. Reminded him to do his exercises. He asked for another copy of them. Inquired as to whether or not he is seeing neurology. He states he did see Dr. Gema Butler. They are working on memory medication. Patient states he will see podiatry for his toenail. Patient Education        Pinched Nerve in the Neck: Care Instructions  Your Care Instructions  A pinched nerve in the neck happens when a vertebra or disc in the upper part of your spine is damaged. This damage can happen because of an injury. Or itcan just happen with age. The changes caused by the damage may put pressure on a nearby nerve root, pinching it. This causes symptoms such as sharp pain in your neck, shoulder, arm, hand, or back. You may also have tingling or numbness. Sometimes it makes your arm weaker. The symptoms are usually worse when you turn your head orstrain your neck. For many people, the symptoms get better over time and finally go away. Early treatment usually includes medicines for pain and swelling. Sometimesphysical therapy and special exercises may help. Follow-up care is a key part of your treatment and safety. Be sure to make and go to all appointments, and call your doctor if you are having problems. It's also a good idea to know your test results and keep alist of the medicines you take. How can you care for yourself at home?  Be safe with medicines. Read and follow all instructions on the label. ? If the doctor gave you a prescription medicine for pain, take it as prescribed. ? If you are not taking a prescription pain medicine, ask your doctor if you can take an over-the-counter medicine.    Try using a heating pad on a low or medium setting for 15 to 20 minutes every 2 or 3 hours. Try a warm shower in place of one session with the heating pad. You can also buy single-use heat wraps that last up to 8 hours.  You can also try an ice pack for 10 to 15 minutes every 2 to 3 hours. There isn't strong evidence that either heat or ice will help. But you can try them to see if they help you.  Don't spend too long in one position. Take short breaks to move around and change positions.  Wear a seat belt and shoulder harness when you are in a car.  Sleep with a pillow under your head and neck that keeps your neck straight.  If you were given a neck brace (cervical collar) to limit neck motion, wear it as instructed for as many days as your doctor tells you to. Do not wear it longer than you were told to. Wearing a brace for too long can lead to neck stiffness and can weaken the neck muscles.  Follow your doctor's instructions for gentle neck-stretching exercises.  Do not smoke. Smoking can slow healing of your discs. If you need help quitting, talk to your doctor about stop-smoking programs and medicines. These can increase your chances of quitting for good.  Avoid strenuous work or exercise until your doctor says it is okay. When should you call for help? Call 911 anytime you think you may need emergency care. For example, call if:     You are unable to move an arm or a leg at all. Call your doctor now or seek immediate medical care if:     You have new or worse symptoms in your arms, legs, chest, belly, or buttocks. Symptoms may include:  ? Numbness or tingling. ? Weakness. ? Pain.      You lose bladder or bowel control. Watch closely for changes in your health, and be sure to contact your doctor if:     You are not getting better as expected. Where can you learn more? Go to https://sunshine.Upower. org and sign in to your [employer] account.  Enter V284 in the [employer] box to learn more about \"Pinched Nerve in the Neck: Care Instructions. \"     If you do not have an account, please click on the \"Sign Up Now\" link. Current as of: July 1, 2021               Content Version: 13.2  © 2006-2022 [employer]. Care instructions adapted under license by [facility] ([facility]). If you have questions about a medical condition or this instruction, always ask your healthcare professional. [address] any warranty or liability for your use of this information. Patient Education        Neck: Exercises  Introduction  Here are some examples of exercises for you to try. The exercises may be suggested for a condition or for rehabilitation. Start each exercise slowly. Ease off the exercises if you start to have pain. You will be told when to start these exercises and which ones will work bestfor you. How to do the exercises  Neck stretch    1. This stretch works best if you keep your shoulder down as you lean away from it. To help you remember to do this, start by relaxing your shoulders and lightly holding on to your thighs or your chair. 2. Tilt your head toward your shoulder and hold for 15 to 30 seconds. Let the weight of your head stretch your muscles. 3. If you would like a little added stretch, use your hand to gently and steadily pull your head toward your shoulder. For example, keeping your right shoulder down, lean your head to the left. 4. Repeat 2 to 4 times toward each shoulder. Diagonal neck stretch    1. Turn your head slightly toward the direction you will be stretching, and tilt your head diagonally toward your chest and hold for 15 to 30 seconds. 2. If you would like a little added stretch, use your hand to gently and steadily pull your head forward on the diagonal.  3. Repeat 2 to 4 times toward each side. Dorsal glide stretch    The dorsal glide stretches the back of the neck. If you feel pain, do not glide so far back.  Some people find this exercise easier to do while lying on theirbacks with an ice pack on the neck. 1. Sit or stand tall and look straight ahead. 2. Slowly tuck your chin as you glide your head backward over your body  3. Hold for a count of 6, and then relax for up to 10 seconds. 4. Repeat 8 to 12 times. Chest and shoulder stretch    1. Sit or stand tall and glide your head backward as in the dorsal glide stretch. 2. Raise both arms so that your hands are next to your ears. 3. Take a deep breath, and as you breathe out, lower your elbows down and behind your back. You will feel your shoulder blades slide down and together, and at the same time you will feel a stretch across your chest and the front of your shoulders. 4. Hold for about 6 seconds, and then relax for up to 10 seconds. 5. Repeat 8 to 12 times. Strengthening: Hands on head    1. Move your head backward, forward, and side to side against gentle pressure from your hands, holding each position for about 6 seconds. 2. Repeat 8 to 12 times. Follow-up care is a key part of your treatment and safety. Be sure to make and go to all appointments, and call your doctor if you are having problems. It's also a good idea to know your test results and keep alist of the medicines you take. Where can you learn more? Go to [email or web address] [email or web address] org and sign in to your [employer] account. Enter P975 in the [employer] box to learn more about \"Neck: Exercises. \"     If you do not have an account, please click on the \"Sign Up Now\" link. Current as of: July 1, 2021               Content Version: 13.2  © 9200-5848 Healthwise, Incorporated. Care instructions adapted under license by [facility] ([facility]). If you have questions about a medical condition or this instruction, always ask your healthcare professional. [address] any warranty or liability for your use of this information.              Subjective:      Patient ID: Jos Byrne is a 67 y.o. male who presents for:  Chief Complaint   Patient presents with    Rash     left forearm      Skin Exam     initial encounter        Patient states he is noticing some increased dryness in his ears. He does not know if he has anything at home to treat this. He is not currently treating it with anything. In addition he is noticing increasing itching on his left forearm. He now has a rash there because he scratches at it. He does state that the scratching came after. There were no skin changes prior to him scratching. We rediscussed the history he has of cervical radiculopathy and the symptoms he gets related to this. Patient states he thinks he has continued problem with his toenail and wonders if he should see his foot doctor      Current Outpatient Medications on File Prior to Visit   Medication Sig Dispense Refill    gabapentin (NEURONTIN) 300 MG capsule TAKE 1 CAPSULE DAILY 90 capsule 3    doxazosin (CARDURA) 4 MG tablet TAKE ONE-HALF (1/2) TABLET TWICE A DAY 90 tablet 3    SPIRIVA HANDIHALER 18 MCG inhalation capsule INHALE THE CONTENTS OF 1 CAPSULE DAILY 90 capsule 3    albuterol sulfate  (90 Base) MCG/ACT inhaler USE 2 INHALATIONS EVERY 6 HOURS AS NEEDED FOR WHEEZING OR SHORTNESS OF BREATH 25.5 g 2    ipratropium (ATROVENT) 0.06 % nasal spray 2 sprays by Each Nostril route 3 times daily 1 each 1    STELARA 90 MG/ML SOSY prefilled syringe       triamcinolone (KENALOG) 0.1 % cream Apply topically 2 times daily. 15 g 1    mesalamine (PENTASA) 250 MG extended release capsule Take 2 capsules by mouth 4 times daily 120 capsule 0    donepezil (ARICEPT) 5 MG tablet Take 1 tablet by mouth nightly 30 tablet 3    triamcinolone (KENALOG) 0.1 % cream Apply topically 2 times daily.  30 g 1    tiZANidine (ZANAFLEX) 2 MG tablet Take 1 tablet by mouth every 8 hours as needed (back pain) 10 tablet 0    montelukast (SINGULAIR) 10 MG tablet TAKE 1 TABLET NIGHTLY 7 tablet 0    metoprolol tartrate (LOPRESSOR) 50 MG tablet Take 1.5 po bid 10 tablet 0    pravastatin (PRAVACHOL) 40 MG tablet TAKE 1 TABLET NIGHTLY 90 tablet 3    nystatin (MYCOSTATIN) 929834 UNIT/GM cream Apply topically 2 times daily Apply topically 2 times daily. 15 g 0    lisinopril (PRINIVIL;ZESTRIL) 20 MG tablet Take 1 tablet by mouth daily 90 tablet 3    apixaban (ELIQUIS) 5 MG TABS tablet Take 1 tablet by mouth 2 times daily 180 tablet 3    nitroGLYCERIN (NITROSTAT) 0.4 MG SL tablet up to max of 3 total doses. If no relief after 1 dose, call 911. 25 tablet 2    RESTASIS 0.05 % ophthalmic emulsion Place 1 drop into both eyes daily       ustekinumab (STELARA) 45 MG/0.5ML SOSY prefilled syringe Inject 45 mg into the skin once      furosemide (LASIX) 20 MG tablet Take 1 tablet by mouth daily (Patient taking differently: Take 40 mg by mouth daily Indications: Taking 40 mg tablet daily ) 30 tablet 2    budesonide-formoterol (SYMBICORT) 160-4.5 MCG/ACT AERO Inhale 2 puffs into the lungs 2 times daily 2 each LOT [id number] EXP [id number] 3 Inhaler 1    albuterol (PROVENTIL) (2.5 MG/3ML) 0.083% nebulizer solution Take 3 mLs by nebulization every 6 hours as needed for Wheezing 120 each 3    cetirizine (ZYRTEC) 5 MG tablet TAKE ONE TABLET BY MOUTH  PRN      CPAP Machine MISC New cpap supplies mask hose filters etc 1 each 0    Saccharomyces boulardii (PROBIOTIC) 250 MG CAPS Take 1 tablet by mouth daily      esomeprazole Magnesium (NEXIUM) 20 MG PACK Take 20 mg by mouth daily      aspirin 81 MG tablet Take 81 mg by mouth daily. No current facility-administered medications on file prior to visit.      Past Medical History:   Diagnosis Date    A-fib [facility])     Abnormal EMG 12/09/2015    Actinic keratoses     Actinic keratoses     Acute diastolic congestive heart failure (HCC) 1/6/2021    Allergic rhinitis     Anemia 11/18/2014    Arthritis     Chronic back pain     COPD (chronic obstructive pulmonary disease) ([facility] [address] 08/09/2012    COPD (chronic obstructive pulmonary disease) [facility] [address]     Crohns disease     Degenerative disc disease, lumbar     Dermatitis     [facility] 2015    diet controlled    Gastrointestinal problem     GERD (gastroesophageal reflux disease)     History of atrial fibrillation     Dr. Ray Knowles History of throat cancer 2004     cleared for reoccurence years ago    Hyperlipidemia 2014    Hypertension     Hypogonadism male     Lung disease     Mononeuritis multiplex     Mononeuritis multiplex     Nondependent alcohol abuse, in remission 2020    Obesity (BMI 30-39. 9) 2019    Osteoarthritis of lumbar spine     Pain of right heel     Paresthesia of foot, bilateral     Prediabetes 12/3/2014    Restless leg syndrome     Seborrheic dermatitis     Seborrheic keratoses, inflamed     Throat cancer (HCC)     throat, had surgery, sees Dr Hector Thomas yearly    Tinea cruris     Tinea pedis     Tinea unguium      Past Surgical History:   Procedure Laterality Date    CARDIAC CATHETERIZATION      CARPAL TUNNEL RELEASE      CATARACT REMOVAL WITH IMPLANT Right 2019    CATARACT REMOVAL WITH IMPLANT Left 2019    COLON SURGERY      COLONOSCOPY  04/15/2015    KNEE ARTHROSCOPY      LARYNGECTOMY  2004    UPPER GASTROINTESTINAL ENDOSCOPY  13    Dr. Minnie Gonzalez W/NAPOLEON INJ  2002     Social History     Socioeconomic History    Marital status:      Spouse name: Not on file    Number of children: 3    Years of education: 15    Highest education level: High school graduate   Occupational History    Occupation:      Employer: [employer]   Tobacco Use    Smoking status: Former Smoker     Packs/day: 1.00     Years: 25.00     Pack years: 25.00     Types: Cigarettes     Quit date: 10/21/1990     Years since quittin.5    Smokeless tobacco: Never Used   Substance and Sexual Activity    Alcohol use: No     Comment: Attends AA, 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    Drug use: No    Sexual activity: Not Currently     Partners: Female   Other Topics Concern    Not on file   Social History Narrative    Lives alone. 2 story home     1 step to get in house and 7 steps to get upstairs    Full basement    2 children live nearby and 1 son lives in [place]    No pets     Social Determinants of Health     Financial Resource Strain: Low Risk     Difficulty of Paying Living Expenses: Not hard at all   Food Insecurity: No Food Insecurity    Worried About [address] Daily Deals in the Last Year: Never true    [place] of Food in the Last Year: Never true   Transportation Needs: No Transportation Needs    Lack of Transportation (Medical): No    Lack of Transportation (Non-Medical):  No   Physical Activity: Insufficiently Active    Days of Exercise per Week: 1 day    Minutes of Exercise per Session: 20 min   Stress:     Feeling of Stress : Not on file   Social Connections:     Frequency of Communication with Friends and Family: Not on file    Frequency of Social Gatherings with Friends and Family: Not on file    Attends Sikhism Services: Not on file    Active Member of Clubs or Organizations: Not on file    Attends Club or Organization Meetings: Not on file    Marital Status: Not on file   Intimate Partner Violence:     Fear of Current or Ex-Partner: Not on file    Emotionally Abused: Not on file    Physically Abused: Not on file    Sexually Abused: Not on file   Housing Stability:     Unable to Pay for Housing in the Last Year: Not on file    Number of [place] in the Last Year: Not on file    Unstable Housing in the Last Year: Not on file     Family History   Problem Relation Age of Onset    Heart Disease Mother     Liver Disease Mother     High Blood Pressure Mother     Heart Disease Father     Arthritis Father     Cancer Sister         lung     Allergies:  Alemtuzumab, Glycopyrrolate-formoterol, Mercaptopurine, and Moxifloxacin    Review of Systems   Constitutional: Negative for chills and fever. Allergic/Immunologic: Negative for environmental allergies, food allergies and immunocompromised state. Hematological: Negative for adenopathy. Does not bruise/bleed easily. Psychiatric/Behavioral: Negative for behavioral problems and sleep disturbance. Objective:   Temp 97.4 °F (36.3 °C)   Ht 5' 7.5\" (1.715 m)   Wt 251 lb (113.9 kg)   BMI 38.73 kg/m²     Physical Exam  Constitutional:       General: He is not in acute distress. Appearance: Normal appearance. He is well-developed. He is not toxic-appearing. HENT:      Head: Normocephalic and atraumatic. Right Ear: Hearing and tympanic membrane normal.      Left Ear: Hearing and tympanic membrane normal.      Nose: Nose normal. No nasal deformity. Eyes:      General: Lids are normal.         Right eye: No discharge. Left eye: No discharge. Conjunctiva/sclera: Conjunctivae normal.      Pupils: Pupils are equal, round, and reactive to light. Neck:      Thyroid: No thyroid mass or thyromegaly. Vascular: No JVD. Trachea: Trachea and phonation normal.   Cardiovascular:      Rate and Rhythm: Normal rate and regular rhythm. Pulmonary:      Effort: No accessory muscle usage or respiratory distress. Musculoskeletal:      Cervical back: Full passive range of motion without pain. Comments: FROM all large muscle groups and joints as witnessed when walking to exam table, getting on, and getting off the exam table. Skin:     General: Skin is warm and dry. Findings: No rash. Comments: Linear excoriations noted volar aspect left forearm    Thickened hyperkeratotic discolored toenail #1 and 2 of the left foot   Neurological:      Mental Status: He is alert. Motor: No tremor or atrophy. Gait: Gait normal.   Psychiatric:         Speech: Speech normal.         Behavior: Behavior normal.         Thought Content:  Thought content normal.         No results found for this visit on 05/10/22. Recent Results (from the past 2016 hour(s))   Fecal DNA Colorectal cancer screening (Cologuard)    Collection Time: 02/25/22  1:15 PM    Specimen: Stool   Result Value Ref Range    FIT-DNA (Cologuard) Negative Negative       [] Pt was seen by provider for      Minutes  Counseling and coordination of care was done for all assessment diagnosis listed for today with patient and any family/friend present. More than 50% of this visit was spent coordinating current care, obtaining information for prior records, and counseling for current plan of action. Assessment:       Diagnosis Orders   1. Cervical radiculitis     2. Eczema of both external ears     3. Onychomycosis           No orders of the defined types were placed in this encounter. No orders of the defined types were placed in this encounter. Medication List          Accurate as of May 10, 2022  2:02 PM. If you have any questions, ask your nurse or doctor.             CHANGE how you take these medications    furosemide 20 MG tablet  Commonly known as: LASIX  Take 1 tablet by mouth daily  What changed: how much to take        CONTINUE taking these medications    * albuterol (2.5 MG/3ML) 0.083% nebulizer solution  Commonly known as: PROVENTIL  Take 3 mLs by nebulization every 6 hours as needed for Wheezing     * albuterol sulfate  (90 Base) MCG/ACT inhaler  USE 2 INHALATIONS EVERY 6 HOURS AS NEEDED FOR WHEEZING OR SHORTNESS OF BREATH     apixaban 5 MG Tabs tablet  Commonly known as: Eliquis  Take 1 tablet by mouth 2 times daily     aspirin 81 MG tablet     budesonide-formoterol 160-4.5 MCG/ACT Aero  Commonly known as: Symbicort  Inhale 2 puffs into the lungs 2 times daily 2 each LOT [id number] EXP [id number]     cetirizine 5 MG tablet  Commonly known as: ZYRTEC     CPAP Machine Misc  New cpap supplies mask hose filters etc     donepezil 5 MG tablet  Commonly known as: Aricept  Take 1 tablet by mouth nightly     doxazosin 4 MG tablet  Commonly known as: CARDURA  TAKE ONE-HALF (1/2) TABLET TWICE A DAY     esomeprazole Magnesium 20 MG Pack  Commonly known as: NEXIUM     gabapentin 300 MG capsule  Commonly known as: NEURONTIN  TAKE 1 CAPSULE DAILY     ipratropium 0.06 % nasal spray  Commonly known as: ATROVENT  2 sprays by Each Nostril route 3 times daily     lisinopril 20 MG tablet  Commonly known as: PRINIVIL;ZESTRIL  Take 1 tablet by mouth daily     mesalamine 250 MG extended release capsule  Commonly known as: PENTASA  Take 2 capsules by mouth 4 times daily     metoprolol tartrate 50 MG tablet  Commonly known as: LOPRESSOR  Take 1.5 po bid     montelukast 10 MG tablet  Commonly known as: SINGULAIR  TAKE 1 TABLET NIGHTLY     nitroGLYCERIN 0.4 MG SL tablet  Commonly known as: NITROSTAT  up to max of 3 total doses. If no relief after 1 dose, call 911.     nystatin 610236 UNIT/GM cream  Commonly known as: MYCOSTATIN  Apply topically 2 times daily Apply topically 2 times daily. pravastatin 40 MG tablet  Commonly known as: PRAVACHOL  TAKE 1 TABLET NIGHTLY     Probiotic 250 MG Caps     Restasis 0.05 % ophthalmic emulsion  Generic drug: cycloSPORINE     Spiriva HandiHaler 18 MCG inhalation capsule  Generic drug: tiotropium  INHALE THE CONTENTS OF 1 CAPSULE DAILY     * Stelara 45 MG/0.5ML Sosy prefilled syringe  Generic drug: ustekinumab     * Stelara 90 MG/ML Sosy prefilled syringe  Generic drug: ustekinumab     tiZANidine 2 MG tablet  Commonly known as: ZANAFLEX  Take 1 tablet by mouth every 8 hours as needed (back pain)     * triamcinolone 0.1 % cream  Commonly known as: KENALOG  Apply topically 2 times daily. * triamcinolone 0.1 % cream  Commonly known as: KENALOG  Apply topically 2 times daily. * This list has 6 medication(s) that are the same as other medications prescribed for you. Read the directions carefully, and ask your doctor or other care provider to review them with you. Plan:   Return for keep next planned appointment. Patient Instructions     Again spoke with Alicia Vences about his medical conditions of his cervical spine that leads to the itching in the arm. Reminded him to do his exercises. He asked for another copy of them. Inquired as to whether or not he is seeing neurology. He states he did see Dr. Tyree Sams. They are working on memory medication. Patient states he will see podiatry for his toenail. Patient Education        Pinched Nerve in the Neck: Care Instructions  Your Care Instructions  A pinched nerve in the neck happens when a vertebra or disc in the upper part of your spine is damaged. This damage can happen because of an injury. Or itcan just happen with age. The changes caused by the damage may put pressure on a nearby nerve root, pinching it. This causes symptoms such as sharp pain in your neck, shoulder, arm, hand, or back. You may also have tingling or numbness. Sometimes it makes your arm weaker. The symptoms are usually worse when you turn your head orstrain your neck. For many people, the symptoms get better over time and finally go away. Early treatment usually includes medicines for pain and swelling. Sometimesphysical therapy and special exercises may help. Follow-up care is a key part of your treatment and safety. Be sure to make and go to all appointments, and call your doctor if you are having problems. It's also a good idea to know your test results and keep alist of the medicines you take. How can you care for yourself at home?  Be safe with medicines. Read and follow all instructions on the label. ? If the doctor gave you a prescription medicine for pain, take it as prescribed. ? If you are not taking a prescription pain medicine, ask your doctor if you can take an over-the-counter medicine.  Try using a heating pad on a low or medium setting for 15 to 20 minutes every 2 or 3 hours.  Try a warm shower in place of one session with the heating pad. You can also buy single-use heat wraps that last up to 8 hours.  You can also try an ice pack for 10 to 15 minutes every 2 to 3 hours. There isn't strong evidence that either heat or ice will help. But you can try them to see if they help you.  Don't spend too long in one position. Take short breaks to move around and change positions.  Wear a seat belt and shoulder harness when you are in a car.  Sleep with a pillow under your head and neck that keeps your neck straight.  If you were given a neck brace (cervical collar) to limit neck motion, wear it as instructed for as many days as your doctor tells you to. Do not wear it longer than you were told to. Wearing a brace for too long can lead to neck stiffness and can weaken the neck muscles.  Follow your doctor's instructions for gentle neck-stretching exercises.  Do not smoke. Smoking can slow healing of your discs. If you need help quitting, talk to your doctor about stop-smoking programs and medicines. These can increase your chances of quitting for good.  Avoid strenuous work or exercise until your doctor says it is okay. When should you call for help? Call 911 anytime you think you may need emergency care. For example, call if:     You are unable to move an arm or a leg at all. Call your doctor now or seek immediate medical care if:     You have new or worse symptoms in your arms, legs, chest, belly, or buttocks. Symptoms may include:  ? Numbness or tingling. ? Weakness. ? Pain.      You lose bladder or bowel control. Watch closely for changes in your health, and be sure to contact your doctor if:     You are not getting better as expected. Where can you learn more? Go to https://Exclusive Networkssara.CadenceMD. org and sign in to your [employer] account. Enter D044 in the [employer] box to learn more about \"Pinched Nerve in the Neck: Care Instructions. \"     If you do not have an account, please click on the \"Sign Up Now\" link. Current as of: July 1, 2021               Content Version: 13.2  © 2006-2022 [employer]. Care instructions adapted under license by [facility] ([facility]). If you have questions about a medical condition or this instruction, always ask your healthcare professional. [address] any warranty or liability for your use of this information. Patient Education        Neck: Exercises  Introduction  Here are some examples of exercises for you to try. The exercises may be suggested for a condition or for rehabilitation. Start each exercise slowly. Ease off the exercises if you start to have pain. You will be told when to start these exercises and which ones will work bestfor you. How to do the exercises  Neck stretch    5. This stretch works best if you keep your shoulder down as you lean away from it. To help you remember to do this, start by relaxing your shoulders and lightly holding on to your thighs or your chair. 6. Tilt your head toward your shoulder and hold for 15 to 30 seconds. Let the weight of your head stretch your muscles. 7. If you would like a little added stretch, use your hand to gently and steadily pull your head toward your shoulder. For example, keeping your right shoulder down, lean your head to the left. 8. Repeat 2 to 4 times toward each shoulder. Diagonal neck stretch    4. Turn your head slightly toward the direction you will be stretching, and tilt your head diagonally toward your chest and hold for 15 to 30 seconds. 5. If you would like a little added stretch, use your hand to gently and steadily pull your head forward on the diagonal.  6. Repeat 2 to 4 times toward each side. Dorsal glide stretch    The dorsal glide stretches the back of the neck. If you feel pain, do not glide so far back. Some people find this exercise easier to do while lying on theirbacks with an ice pack on the neck.   5. Sit or stand tall and look straight ahead.  6. Slowly tuck your chin as you glide your head backward over your body  7. Hold for a count of 6, and then relax for up to 10 seconds. 8. Repeat 8 to 12 times. Chest and shoulder stretch    6. Sit or stand tall and glide your head backward as in the dorsal glide stretch. 7. Raise both arms so that your hands are next to your ears. 8. Take a deep breath, and as you breathe out, lower your elbows down and behind your back. You will feel your shoulder blades slide down and together, and at the same time you will feel a stretch across your chest and the front of your shoulders. 9. Hold for about 6 seconds, and then relax for up to 10 seconds. 10. Repeat 8 to 12 times. Strengthening: Hands on head    3. Move your head backward, forward, and side to side against gentle pressure from your hands, holding each position for about 6 seconds. 4. Repeat 8 to 12 times. Follow-up care is a key part of your treatment and safety. Be sure to make and go to all appointments, and call your doctor if you are having problems. It's also a good idea to know your test results and keep alist of the medicines you take. Where can you learn more? Go to https://Applits.Increo Solutions. org and sign in to your [employer] account. Enter P975 in the [employer] box to learn more about \"Neck: Exercises. \"     If you do not have an account, please click on the \"Sign Up Now\" link. Current as of: July 1, 2021               Content Version: 13.2  © 2006-2022 Healthwise[employer] Incorporated. Care instructions adapted under license by [facility] ([facility]). If you have questions about a medical condition or this instruction, always ask your healthcare professional. [place] [address] any warranty or liability for your use of this information. This note was partially created with the assistance of dictation. This may lead to grammatical or spelling errors. Sher Kimball M.D.

## 2022-05-11 ENCOUNTER — HOSPITAL ENCOUNTER (EMERGENCY)
Age: 72
Discharge: HOME | End: 2022-05-12
Payer: MEDICARE

## 2022-05-11 ENCOUNTER — APPOINTMENT (OUTPATIENT)
Dept: CT IMAGING | Age: 72
End: 2022-05-11
Payer: MEDICARE

## 2022-05-11 DIAGNOSIS — S09.90XA INJURY OF HEAD, INITIAL ENCOUNTER: Primary | ICD-10-CM

## 2022-05-11 PROCEDURE — 12011 RPR F/E/E/N/L/M 2.5 CM/<: CPT

## 2022-05-11 PROCEDURE — 6360000002 HC RX W HCPCS: Performed by: STUDENT IN AN ORGANIZED HEALTH CARE EDUCATION/TRAINING PROGRAM

## 2022-05-11 PROCEDURE — 70450 CT HEAD/BRAIN W/O DYE: CPT

## 2022-05-11 PROCEDURE — 90471 IMMUNIZATION ADMIN: CPT | Performed by: STUDENT IN AN ORGANIZED HEALTH CARE EDUCATION/TRAINING PROGRAM

## 2022-05-11 PROCEDURE — 99285 EMERGENCY DEPT VISIT HI MDM: CPT

## 2022-05-11 PROCEDURE — 90715 TDAP VACCINE 7 YRS/> IM: CPT | Performed by: STUDENT IN AN ORGANIZED HEALTH CARE EDUCATION/TRAINING PROGRAM

## 2022-05-11 RX ADMIN — TETANUS TOXOID, REDUCED DIPHTHERIA TOXOID AND ACELLULAR PERTUSSIS VACCINE, ADSORBED 0.5 ML: 5; 2.5; 8; 8; 2.5 SUSPENSION INTRAMUSCULAR at 22:50

## 2022-05-11 ASSESSMENT — ENCOUNTER SYMPTOMS
BACK PAIN: 0
SORE THROAT: 0
NAUSEA: 0
SHORTNESS OF BREATH: 0
DIARRHEA: 0
EYE PAIN: 0
CHEST TIGHTNESS: 0

## 2022-05-11 ASSESSMENT — LIFESTYLE VARIABLES: HOW OFTEN DO YOU HAVE A DRINK CONTAINING ALCOHOL: NEVER

## 2022-05-11 ASSESSMENT — PAIN - FUNCTIONAL ASSESSMENT: PAIN_FUNCTIONAL_ASSESSMENT: NONE - DENIES PAIN

## 2022-05-12 VITALS
DIASTOLIC BLOOD PRESSURE: 72 MMHG | RESPIRATION RATE: 18 BRPM | OXYGEN SATURATION: 94 % | BODY MASS INDEX: 36.08 KG/M2 | SYSTOLIC BLOOD PRESSURE: 142 MMHG | HEART RATE: 66 BPM | HEIGHT: 70 IN | WEIGHT: 252 LBS | TEMPERATURE: 98.6 F

## 2022-05-12 ASSESSMENT — PAIN - FUNCTIONAL ASSESSMENT: PAIN_FUNCTIONAL_ASSESSMENT: NONE - DENIES PAIN

## 2022-05-12 NOTE — ED PROVIDER NOTES
[address] [facility] ED  eMERGENCYdEPARTMENT eNCOUnter      Pt Name: Radha Mckinney  MRN: [id number]  Armsjose guadalupegfjohn 1950  Date of evaluation: 5/11/2022  Provider:Jorge Kim PA-C    CHIEF COMPLAINT           HPI  Radha Mckinney is a 67 y.o. male per chart review has a h/o hypertension, GERD, Crohn's presents after a head injury. Patient reports sudden onset, mild, nonradiating, sharp pain to the right forehead which occurred after he had a mechanical fall tripping forward and hitting his head at the edge of the table. Patient is on blood thinners. Patient denies loss of consciousness, nausea, vomiting, altered mental status, family with the patient states he appears at baseline. Patient not having any symptoms of stroke at this time. Patient denies any neck pain. ROS  Review of Systems   Constitutional: Negative for chills, fatigue and fever. HENT: Negative for ear pain, hearing loss and sore throat. Eyes: Negative for pain and visual disturbance. Respiratory: Negative for chest tightness and shortness of breath. Cardiovascular: Negative for chest pain. Gastrointestinal: Negative for diarrhea and nausea. Endocrine: Negative for cold intolerance. Genitourinary: Negative for hematuria. Musculoskeletal: Negative for back pain. Skin: Positive for wound. Negative for rash. Neurological: Negative for dizziness and headaches. Psychiatric/Behavioral: Negative for behavioral problems and confusion. Except as noted above the remainder of the review of systems was reviewed and negative.        PAST MEDICAL HISTORY     Past Medical History:   Diagnosis Date    A-fib [facility])     Abnormal EMG 12/09/2015    Actinic keratoses     Actinic keratoses     Acute diastolic congestive heart failure (HCC) 1/6/2021    Allergic rhinitis     Anemia 11/18/2014    Arthritis     Chronic back pain     COPD (chronic obstructive pulmonary disease) ([facility] [address] 08/09/2012    COPD [facility]chronic obstructive pulmonary disease) [facility] [address]     Crohns disease     Degenerative disc disease, lumbar     Dermatitis     Diabetes ([facility] [address] 03/19/2015    diet controlled    Gastrointestinal problem     GERD (gastroesophageal reflux disease)     History of atrial fibrillation 2006    Dr. Margarita Rm History of throat cancer 2004     cleared for reoccurence years ago    Hyperlipidemia 1/21/2014    Hypertension     Hypogonadism male     Lung disease     Mononeuritis multiplex     Mononeuritis multiplex     Nondependent alcohol abuse, in remission 7/22/2020    Obesity (BMI 30-39. 9) 7/24/2019    Osteoarthritis of lumbar spine     Pain of right heel     Paresthesia of foot, bilateral     Prediabetes 12/3/2014    Restless leg syndrome     Seborrheic dermatitis     Seborrheic keratoses, inflamed     Throat cancer (HCC)     throat, had surgery, sees Dr Josafat Byers yearly    Tinea cruris     Tinea pedis     Tinea unguium          SURGICAL HISTORY       Past Surgical History:   Procedure Laterality Date    CARDIAC CATHETERIZATION      CARPAL TUNNEL RELEASE      CATARACT REMOVAL WITH IMPLANT Right 09/09/2019    CATARACT REMOVAL WITH IMPLANT Left 07/22/2019    COLON SURGERY      COLONOSCOPY  04/15/2015    KNEE ARTHROSCOPY      LARYNGECTOMY  2004    UPPER GASTROINTESTINAL ENDOSCOPY  03/24/13    Dr. Eloise Galicia  2002         [address]       Discharge Medication List as of 5/12/2022 12:46 AM      CONTINUE these medications which have NOT CHANGED    Details   gabapentin (NEURONTIN) 300 MG capsule TAKE 1 CAPSULE DAILY, Disp-90 capsule, R-3Normal      doxazosin (CARDURA) 4 MG tablet TAKE ONE-HALF (1/2) TABLET TWICE A DAY, Disp-90 tablet, R-3Normal      SPIRIVA HANDIHALER 18 MCG inhalation capsule INHALE THE CONTENTS OF 1 CAPSULE DAILY, Disp-90 capsule, R-3Normal      albuterol sulfate  (90 Base) MCG/ACT inhaler USE 2 INHALATIONS EVERY 6 HOURS AS NEEDED FOR WHEEZING OR SHORTNESS OF BREATH, Disp-25.5 g, R-2Normal      ipratropium (ATROVENT) 0.06 % nasal spray 2 sprays by Each Nostril route 3 times daily, Disp-1 each, R-1Normal      STELARA 90 MG/ML SOSY prefilled syringe DAWHistorical Med      !! triamcinolone (KENALOG) 0.1 % cream Apply topically 2 times daily. , Disp-15 g, R-1, Normal      mesalamine (PENTASA) 250 MG extended release capsule Take 2 capsules by mouth 4 times daily, Disp-120 capsule, R-0Normal      donepezil (ARICEPT) 5 MG tablet Take 1 tablet by mouth nightly, Disp-30 tablet, R-3Normal      !! triamcinolone (KENALOG) 0.1 % cream Apply topically 2 times daily. , Disp-30 g, R-1, Normal      tiZANidine (ZANAFLEX) 2 MG tablet Take 1 tablet by mouth every 8 hours as needed (back pain), Disp-10 tablet, R-0Normal      montelukast (SINGULAIR) 10 MG tablet TAKE 1 TABLET NIGHTLY, Disp-7 tablet, R-0Normal      metoprolol tartrate (LOPRESSOR) 50 MG tablet Take 1.5 po bid, Disp-10 tablet, R-0Normal      pravastatin (PRAVACHOL) 40 MG tablet TAKE 1 TABLET NIGHTLY, Disp-90 tablet, R-3Normal      nystatin (MYCOSTATIN) 544652 UNIT/GM cream Apply topically 2 times daily Apply topically 2 times daily. , Topical, 2 TIMES DAILY Starting Mon 12/27/2021, Disp-15 g, R-0, Normal      lisinopril (PRINIVIL;ZESTRIL) 20 MG tablet Take 1 tablet by mouth daily, Disp-90 tablet, R-3Normal      apixaban (ELIQUIS) 5 MG TABS tablet Take 1 tablet by mouth 2 times daily, Disp-180 tablet, R-3Normal      nitroGLYCERIN (NITROSTAT) 0.4 MG SL tablet up to max of 3 total doses.  If no relief after 1 dose, call 911., Disp-25 tablet, R-2Normal      RESTASIS 0.05 % ophthalmic emulsion Place 1 drop into both eyes daily , [facility]WR[facility] Hospital[facility]orical Med      ustekinumab (STELARA) 45 MG/0.5ML SOSY prefilled syringe Inject 45 mg into the skin onceHistorical Med      furosemide (LASIX) 20 MG tablet Take 1 tablet by mouth daily, Disp-30 tablet, R-2Normal      budesonide-formoterol (SYMBICORT) 160-4.5 MCG/ACT AERO Inhale 2 puffs into the lungs 2 times daily 2 each LOT [id number] EXP [id number], Disp-3 Inhaler, R-1Normal      albuterol (PROVENTIL) (2.5 MG/3ML) 0.083% nebulizer solution Take 3 mLs by nebulization every 6 hours as needed for Wheezing, Disp-120 each, R-3Normal      cetirizine (ZYRTEC) 5 MG tablet TAKE ONE TABLET BY MOUTH  PRNHistorical Med      CPAP Machine MISC Disp-1 each, R-0, PrintNew cpap supplies mask hose filters etc      Saccharomyces boulardii (PROBIOTIC) 250 MG CAPS Take 1 tablet by mouth daily      esomeprazole Magnesium (NEXIUM) 20 MG PACK Take 20 mg by mouth daily      aspirin 81 MG tablet Take 81 mg by mouth daily. !! - Potential duplicate medications found. Please discuss with provider. 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     2 story home     1 step to get in house and 7 steps to get upstairs    Full basement    2 children live nearby and 1 son lives in [place]    No pets     Social Determinants of Health     Financial Resource Strain: Low Risk     Difficulty of Paying Living Expenses: Not hard at all   Food Insecurity: No Food Insecurity    Worried About Running Out of Food in the Last Year: Never true    [place] of Food in the Last Year: Never true   Transportation Needs: No Transportation Needs    Lack of Transportation (Medical): No    Lack of Transportation (Non-Medical): No   Physical Activity: Insufficiently Active    Days of Exercise per Week: 1 day    Minutes of Exercise per Session: 20 min   Stress:     Feeling of Stress : Not on file   Social Connections:     Frequency of Communication with Friends and Family: Not on file    Frequency of Social Gatherings with Friends and Family: Not on file    Attends Gnosticist Services: Not on file    Active Member of Clubs or Organizations: Not on file    Attends Club or Organization Meetings: Not on file    Marital Status: Not on file   Intimate Partner Violence:     Fear of Current or Ex-Partner: Not on file    Emotionally Abused: Not on file    Physically Abused: Not on file    Sexually Abused: Not on file   Housing Stability:     Unable to Pay for Housing in the Last Year: Not on file    Number of [place] in the Last Year: Not on file    Unstable Housing in the Last Year: Not on file         PHYSICAL EXAM       ED Triage Vitals [05/11/22 2207]   BP Temp Temp Source Pulse Resp SpO2 Height Weight   124/71 98.6 °F (37 °C) Oral 62 18 94 % 5' 10\" (1.778 m) 252 lb (114.3 kg)       Physical Exam  Constitutional:       Appearance: Normal appearance. HENT:      Head: Normocephalic and atraumatic. Nose: Nose normal.      Mouth/Throat:      Mouth: Mucous membranes are moist.      Pharynx: No oropharyngeal exudate or posterior oropharyngeal erythema. Eyes:      Extraocular Movements: Extraocular movements intact. Conjunctiva/sclera: Conjunctivae normal.      Pupils: Pupils are equal, round, and reactive to light. Cardiovascular:      Rate and Rhythm: Normal rate and regular rhythm. Heart sounds: Normal heart sounds. Pulmonary:      Effort: Pulmonary effort is normal.      Breath sounds: Normal breath sounds. No wheezing or rhonchi. Abdominal:      General: Bowel sounds are normal.      Palpations: Abdomen is soft. Tenderness: There is no abdominal tenderness. There is no guarding. Musculoskeletal:         General: No deformity. Normal range of motion. Cervical back: Normal range of motion and neck supple. Skin:     General: Skin is warm and dry. Coloration: Skin is not jaundiced. Neurological:      General: No focal deficit present. Mental Status: He is alert and oriented to person, place, and time. Psychiatric:         Mood and Affect: Mood normal.         Behavior: Behavior normal.           MDM  This is a 70-year-old male presenting with head laceration. Patient is afebrile hemodynamically stable. Patient numbed with 1% lidocaine with epi. Wound repaired with three 4-0 Ethilon simple interrupted sutures in a sterile fashion after the wound was cleaned with Hibiclens solution. Patient tolerated the procedure well. Patient agreeable to discharge with wound care management instructions. CT negative for bleeds. Patient will follow up with primary care provider and return if symptoms change or worsen. FINAL IMPRESSION      1.  Injury of head, initial encounter          DISPOSITION/PLAN   DISPOSITION Decision To Discharge 05/12/2022 12:36:13 AM        DISCHARGE MEDICATIONS:  [unfilled]         Delano Salas PA-C(electronically signed)  Attending Emergency Physician           Delano Salas PA-C  05/12/22 [id number]

## 2022-05-12 NOTE — ED NOTES
Pt a&ox4, skin w/d/pink, 0 distress, 0 N&v, 0 pain. Will monitor.      Sofy Coleman RN  05/11/22 [id number]

## 2022-05-12 NOTE — ED TRIAGE NOTES
Presents to the ED after tripping and falling hitting head on a coffee table. Approximately 1cm laceration noted to forehead. Patient is taking Eliquis.

## 2022-05-18 ENCOUNTER — TELEPHONE (OUTPATIENT)
Dept: FAMILY MEDICINE CLINIC | Age: 72
End: 2022-05-18

## 2022-05-18 NOTE — TELEPHONE ENCOUNTER
----- Message from Javon Pantera sent at 5/18/2022 11:08 AM EDT -----  Subject: Message to Provider    QUESTIONS  Information for Provider? pt is calling in stating he needs to get his   stitched removed. pt was seen in [facility] on 5/11/2022 for injury   to the head. pt would like a call back with an appt. pt would also like to   get a covid boost shot also.   ---------------------------------------------------------------------------  --------------  CALL BACK INFO  What is the best way for the office to contact you? OK to leave message on   voicemail  Preferred Call Back Phone Number? [phone]  ---------------------------------------------------------------------------  --------------  SCRIPT ANSWERS  Relationship to Patient? Self  (Is the patient requesting to see the provider for a procedure?)?  Yes

## 2022-05-19 ENCOUNTER — OFFICE VISIT (OUTPATIENT)
Dept: FAMILY MEDICINE CLINIC | Age: 72
End: 2022-05-19
Payer: MEDICARE

## 2022-05-19 VITALS — HEIGHT: 70 IN | TEMPERATURE: 98.5 F | BODY MASS INDEX: 35.79 KG/M2 | WEIGHT: 250 LBS

## 2022-05-19 DIAGNOSIS — S01.01XD LACERATION OF SCALP WITHOUT FOREIGN BODY, SUBSEQUENT ENCOUNTER: Primary | ICD-10-CM

## 2022-05-19 DIAGNOSIS — B35.1 ONYCHOMYCOSIS: ICD-10-CM

## 2022-05-19 PROCEDURE — 99213 OFFICE O/P EST LOW 20 MIN: CPT | Performed by: FAMILY MEDICINE

## 2022-05-19 ASSESSMENT — PATIENT HEALTH QUESTIONNAIRE - PHQ9
SUM OF ALL RESPONSES TO PHQ QUESTIONS 1-9: 0
1. LITTLE INTEREST OR PLEASURE IN DOING THINGS: 0
2. FEELING DOWN, DEPRESSED OR HOPELESS: 0
SUM OF ALL RESPONSES TO PHQ QUESTIONS 1-9: 0
SUM OF ALL RESPONSES TO PHQ9 QUESTIONS 1 & 2: 0

## 2022-05-19 ASSESSMENT — ENCOUNTER SYMPTOMS: COLOR CHANGE: 1

## 2022-05-19 NOTE — PATIENT INSTRUCTIONS
Forehead wound stable. No further intervention needed. Toenails will definitely continue to benefit from Penlac. However advised patient the toenail of the left foot for the first digit should likely be removed by podiatry.   He has an established relationship

## 2022-05-19 NOTE — PROGRESS NOTES
Diagnosis Orders   1. Laceration of scalp without foreign body, subsequent encounter   - ME REMOVAL OF SUTURES   2. Onychomycosis  ciclopirox (PENLAC) 8 % solution     Return for keep next planned appointment. Patient Instructions   Forehead wound stable. No further intervention needed. Toenails will definitely continue to benefit from Penlac. However advised patient the toenail of the left foot for the first digit should likely be removed by podiatry. He has an established relationship      Subjective:      Patient ID: Nallely Post is a 67 y.o. male who presents for:  Chief Complaint   Patient presents with    Suture / Staple Removal     forehead- from ER        Patient here for follow-up of laceration of the head that happened at home. Was taken to the emergency room and sutured without complication. Patient feels wound is healing well. No redness. No fever. No drainage. Patient also has questions about the toenail fungus he has. He has been using Penlac on right toenails and they seem to be responding. Left first digit of the foot has very big toenail and he wonders if it will work on that. Current Outpatient Medications on File Prior to Visit   Medication Sig Dispense Refill    gabapentin (NEURONTIN) 300 MG capsule TAKE 1 CAPSULE DAILY 90 capsule 3    doxazosin (CARDURA) 4 MG tablet TAKE ONE-HALF (1/2) TABLET TWICE A DAY 90 tablet 3    SPIRIVA HANDIHALER 18 MCG inhalation capsule INHALE THE CONTENTS OF 1 CAPSULE DAILY 90 capsule 3    albuterol sulfate  (90 Base) MCG/ACT inhaler USE 2 INHALATIONS EVERY 6 HOURS AS NEEDED FOR WHEEZING OR SHORTNESS OF BREATH 25.5 g 2    ipratropium (ATROVENT) 0.06 % nasal spray 2 sprays by Each Nostril route 3 times daily 1 each 1    STELARA 90 MG/ML SOSY prefilled syringe       triamcinolone (KENALOG) 0.1 % cream Apply topically 2 times daily.  15 g 1    mesalamine (PENTASA) 250 MG extended release capsule Take 2 capsules by mouth 4 times daily 120 capsule 0    donepezil (ARICEPT) 5 MG tablet Take 1 tablet by mouth nightly 30 tablet 3    triamcinolone (KENALOG) 0.1 % cream Apply topically 2 times daily. 30 g 1    tiZANidine (ZANAFLEX) 2 MG tablet Take 1 tablet by mouth every 8 hours as needed (back pain) 10 tablet 0    montelukast (SINGULAIR) 10 MG tablet TAKE 1 TABLET NIGHTLY 7 tablet 0    metoprolol tartrate (LOPRESSOR) 50 MG tablet Take 1.5 po bid 10 tablet 0    pravastatin (PRAVACHOL) 40 MG tablet TAKE 1 TABLET NIGHTLY 90 tablet 3    nystatin (MYCOSTATIN) 921339 UNIT/GM cream Apply topically 2 times daily Apply topically 2 times daily. 15 g 0    lisinopril (PRINIVIL;ZESTRIL) 20 MG tablet Take 1 tablet by mouth daily 90 tablet 3    apixaban (ELIQUIS) 5 MG TABS tablet Take 1 tablet by mouth 2 times daily 180 tablet 3    nitroGLYCERIN (NITROSTAT) 0.4 MG SL tablet up to max of 3 total doses. If no relief after 1 dose, call 911. 25 tablet 2    RESTASIS 0.05 % ophthalmic emulsion Place 1 drop into both eyes daily       ustekinumab (STELARA) 45 MG/0.5ML SOSY prefilled syringe Inject 45 mg into the skin once      furosemide (LASIX) 20 MG tablet Take 1 tablet by mouth daily (Patient taking differently: Take 40 mg by mouth daily Indications: Taking 40 mg tablet daily ) 30 tablet 2    budesonide-formoterol (SYMBICORT) 160-4.5 MCG/ACT AERO Inhale 2 puffs into the lungs 2 times daily 2 each LOT [id number] EXP [id number] 3 Inhaler 1    albuterol (PROVENTIL) (2.5 MG/3ML) 0.083% nebulizer solution Take 3 mLs by nebulization every 6 hours as needed for Wheezing 120 each 3    CPAP Machine MISC New cpap supplies mask hose filters etc 1 each 0    Saccharomyces boulardii (PROBIOTIC) 250 MG CAPS Take 1 tablet by mouth daily      esomeprazole Magnesium (NEXIUM) 20 MG PACK Take 20 mg by mouth daily      aspirin 81 MG tablet Take 81 mg by mouth daily. No current facility-administered medications on file prior to visit.      Past Medical History: Diagnosis Date    A-fib [facility])     Abnormal EMG 12/09/2015    Actinic keratoses     Actinic keratoses     Acute diastolic congestive heart failure (HCC) 1/6/2021    Allergic rhinitis     Anemia 11/18/2014    Arthritis     Chronic back pain     COPD (chronic obstructive pulmonary disease) ([facility]) 08/09/2012    COPD (chronic obstructive pulmonary disease) ([facility])     Crohns disease     Degenerative disc disease, lumbar     Dermatitis     Diabetes ([address] 03/19/2015    diet controlled    Gastrointestinal problem     GERD (gastroesophageal reflux disease)     History of atrial fibrillation 2006    Dr. Brittni Montalvo History of throat cancer 2004     cleared for reoccurence years ago    Hyperlipidemia 1/21/2014    Hypertension     Hypogonadism male     Lung disease     Mononeuritis multiplex     Mononeuritis multiplex     Nondependent alcohol abuse, in remission 7/22/2020    Obesity (BMI 30-39. 9) 7/24/2019    Osteoarthritis of lumbar spine     Pain of right heel     Paresthesia of foot, bilateral     Prediabetes 12/3/2014    Restless leg syndrome     Seborrheic dermatitis     Seborrheic keratoses, inflamed     Throat cancer ([facility])     throat, had surgery, sees Dr Dottie Macario yearly    Tinea cruris     Tinea pedis     Tinea unguium      Past Surgical History:   Procedure Laterality Date    CARDIAC CATHETERIZATION      CARPAL TUNNEL RELEASE      CATARACT REMOVAL WITH IMPLANT Right 09/09/2019    CATARACT REMOVAL WITH IMPLANT Left 07/22/2019    COLON SURGERY      COLONOSCOPY  04/15/2015    KNEE ARTHROSCOPY      LARYNGECTOMY  2004    UPPER GASTROINTESTINAL ENDOSCOPY  03/24/13    Dr. Bakari Jackson W/NAPOLEON RODRIGUEZ  2002     Social History     Socioeconomic History    Marital status:      Spouse name: Not on file    Number of children: 3    Years of education: 12    Highest education level: High school graduate   Occupational History    Occupation:  Employer: [employer]   Tobacco Use    Smoking status: Former Smoker     Packs/day: 1.00     Years: 25.00     Pack years: 25.00     Types: Cigarettes     Quit date: 10/21/1990     Years since quittin.5    Smokeless tobacco: Never Used   Substance and Sexual Activity    Alcohol use: No     Comment: Attends [employer], 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    Drug use: No    Sexual activity: Not Currently     Partners: Female   Other Topics Concern    Not on file   Social History Narrative    Lives alone. 2 story home     1 step to get in house and 7 steps to get upstairs    Full basement    2 children live nearby and 1 son lives in [place]    No pets     Social Determinants of Health     Financial Resource Strain: Low Risk     Difficulty of Paying Living Expenses: Not hard at all   Food Insecurity: No Food Insecurity    Worried About [address] in the Last Year: Never true    [place] of Food in the Last Year: Never true   Transportation Needs: No Transportation Needs    Lack of Transportation (Medical): No    Lack of Transportation (Non-Medical):  No   Physical Activity: Insufficiently Active    Days of Exercise per Week: 1 day    Minutes of Exercise per Session: 20 min   Stress:     Feeling of Stress : Not on file   Social Connections:     Frequency of Communication with Friends and Family: Not on file    Frequency of Social Gatherings with Friends and Family: Not on file    Attends Synagogue Services: Not on file    Active Member of Clubs or Organizations: Not on file    Attends Club or Organization Meetings: Not on file    Marital Status: Not on file   Intimate Partner Violence:     Fear of Current or Ex-Partner: Not on file    Emotionally Abused: Not on file    Physically Abused: Not on file    Sexually Abused: Not on file   Housing Stability:     Unable to Pay for Housing in the Last Year: Not on file    Number of [place] in the Last Year: Not on file    Unstable Housing in the Last Year: Not on file     Family History   Problem Relation Age of Onset    Heart Disease Mother     Liver Disease Mother     High Blood Pressure Mother     Heart Disease Father     Arthritis Father     Cancer Sister         lung     Allergies:  Alemtuzumab, Glycopyrrolate-formoterol, Mercaptopurine, and Moxifloxacin    Review of Systems   Skin: Positive for color change and wound. Objective:   Temp 98.5 °F (36.9 °C)   Ht 5' 10\" (1.778 m)   Wt 250 lb (113.4 kg)   BMI 35.87 kg/m²     Physical Exam  Constitutional:       General: He is not in acute distress. Appearance: Normal appearance. He is well-developed. He is not toxic-appearing. HENT:      Head: Normocephalic and atraumatic. Right Ear: Hearing and tympanic membrane normal.      Left Ear: Hearing and tympanic membrane normal.      Nose: Nose normal. No nasal deformity. Eyes:      General: Lids are normal.         Right eye: No discharge. Left eye: No discharge. Conjunctiva/sclera: Conjunctivae normal.      Pupils: Pupils are equal, round, and reactive to light. Neck:      Thyroid: No thyroid mass or thyromegaly. Vascular: No JVD. Trachea: Trachea and phonation normal.   Cardiovascular:      Rate and Rhythm: Normal rate and regular rhythm. Pulmonary:      Effort: No accessory muscle usage or respiratory distress. Musculoskeletal:      Cervical back: Full passive range of motion without pain. Comments: FROM all large muscle groups and joints as witnessed when walking to exam table, getting on, and getting off the exam table. Skin:     General: Skin is warm and dry. Findings: No rash. Comments: Forehead lesion just above the eyebrow has well approximated wound edges without erythema or induration. No signs of exudate. Sutures removed without dehiscence.     Feet examined toenails of the right foot reveal healthy nail tissue at the base and only distal involvement of hyperkeratotic discolored tissue of the nail on the right #1 digit. Left foot reveals the left #1 digit very hyperkeratotic irregular misshapened and discolored lesion of the toenail   Neurological:      Mental Status: He is alert. Motor: No tremor or atrophy. Gait: Gait normal.   Psychiatric:         Speech: Speech normal.         Behavior: Behavior normal.         Thought Content: Thought content normal.         No results found for this visit on 05/19/22. Recent Results (from the past 2016 hour(s))   Fecal DNA Colorectal cancer screening (Cologuard)    Collection Time: 02/25/22  1:15 PM    Specimen: Stool   Result Value Ref Range    FIT-DNA (Cologuard) Negative Negative       [] Pt was seen by provider for      Minutes  Counseling and coordination of care was done for all assessment diagnosis listed for today with patient and any family/friend present. More than 50% of this visit was spent coordinating current care, obtaining information for prior records, and counseling for current plan of action. Assessment:       Diagnosis Orders   1. Laceration of scalp without foreign body, subsequent encounter   - [place] REMOVAL OF SUTURES   2. Onychomycosis  ciclopirox (PENLAC) 8 % solution         Orders Placed This Encounter   Procedures     - IA REMOVAL OF SUTURES       Orders Placed This Encounter   Medications    ciclopirox (PENLAC) 8 % solution     Sig: Apply topically nightly. Dispense:  6 mL     Refill:  1          Medication List          Accurate as of May 19, 2022  3:04 PM. If you have any questions, ask your nurse or doctor. START taking these medications    ciclopirox 8 % solution  Commonly known as: Penlac  Apply topically nightly.   Started by: Zeeshan Otto MD        CHANGE how you take these medications    furosemide 20 MG tablet  Commonly known as: LASIX  Take 1 tablet by mouth daily  What changed: how much to take        CONTINUE taking these medications    * albuterol (2.5 MG/3ML) 0.083% nebulizer solution  Commonly known as: PROVENTIL  Take 3 mLs by nebulization every 6 hours as needed for Wheezing     * albuterol sulfate  (90 Base) MCG/ACT inhaler  USE 2 INHALATIONS EVERY 6 HOURS AS NEEDED FOR WHEEZING OR SHORTNESS OF BREATH     apixaban 5 MG Tabs tablet  Commonly known as: Eliquis  Take 1 tablet by mouth 2 times daily     aspirin 81 MG tablet     budesonide-formoterol 160-4.5 MCG/ACT Aero  Commonly known as: Symbicort  Inhale 2 puffs into the lungs 2 times daily 2 each LOT [id number] [id number]     CPAP Machine Misc  New cpap supplies mask hose filters etc     donepezil 5 MG tablet  Commonly known as: Aricept  Take 1 tablet by mouth nightly     doxazosin 4 MG tablet  Commonly known as: CARDURA  TAKE ONE-HALF (1/2) TABLET TWICE A DAY     esomeprazole Magnesium 20 MG Pack  Commonly known as: NEXIUM     gabapentin 300 MG capsule  Commonly known as: NEURONTIN  TAKE 1 CAPSULE DAILY     ipratropium 0.06 % nasal spray  Commonly known as: ATROVENT  2 sprays by Each Nostril route 3 times daily     lisinopril 20 MG tablet  Commonly known as: PRINIVIL;ZESTRIL  Take 1 tablet by mouth daily     mesalamine 250 MG extended release capsule  Commonly known as: PENTASA  Take 2 capsules by mouth 4 times daily     metoprolol tartrate 50 MG tablet  Commonly known as: LOPRESSOR  Take 1.5 po bid     montelukast 10 MG tablet  Commonly known as: SINGULAIR  TAKE 1 TABLET NIGHTLY     nitroGLYCERIN 0.4 MG SL tablet  Commonly known as: NITROSTAT  up to max of 3 total doses. If no relief after 1 dose, call 911.     nystatin 432304 UNIT/GM cream  Commonly known as: MYCOSTATIN  Apply topically 2 times daily Apply topically 2 times daily.      pravastatin 40 MG tablet  Commonly known as: PRAVACHOL  TAKE 1 TABLET NIGHTLY     Probiotic 250 MG Caps     Restasis 0.05 % ophthalmic emulsion  Generic drug: cycloSPORINE     Spiriva HandiHaler 18 MCG inhalation capsule  Generic drug: tiotropium  INHALE THE CONTENTS OF 1 CAPSULE DAILY     * Stelara 45 MG/0.5ML Sosy prefilled syringe  Generic drug: ustekinumab     * Stelara 90 MG/ML Sosy prefilled syringe  Generic drug: ustekinumab     tiZANidine 2 MG tablet  Commonly known as: ZANAFLEX  Take 1 tablet by mouth every 8 hours as needed (back pain)     * triamcinolone 0.1 % cream  Commonly known as: KENALOG  Apply topically 2 times daily. * triamcinolone 0.1 % cream  Commonly known as: KENALOG  Apply topically 2 times daily. * This list has 6 medication(s) that are the same as other medications prescribed for you. Read the directions carefully, and ask your doctor or other care provider to review them with you. STOP taking these medications    cetirizine 5 MG tablet  Commonly known as: ZYRTEC  Stopped by: Pauly Rush MD           Where to Get Your Medications      These medications were sent to [address] [place] [address], [address] [place] [address] Road  [address] [place] [address]    Phone: [phone]   · ciclopirox 8 % solution           Plan:   Return for keep next planned appointment. Patient Instructions   Forehead wound stable. No further intervention needed. Toenails will definitely continue to benefit from Penlac. However advised patient the toenail of the left foot for the first digit should likely be removed by podiatry. He has an established relationship      This note was partially created with the assistance of dictation. This may lead to grammatical or spelling errors. Sher Mendez M.D.

## 2022-05-23 ENCOUNTER — APPOINTMENT (OUTPATIENT)
Dept: GENERAL RADIOLOGY | Age: 72
End: 2022-05-23
Payer: MEDICARE

## 2022-05-23 ENCOUNTER — HOSPITAL ENCOUNTER (EMERGENCY)
Age: 72
Discharge: HOME | End: 2022-05-23
Payer: MEDICARE

## 2022-05-23 ENCOUNTER — CARE COORDINATION (OUTPATIENT)
Dept: CARE COORDINATION | Age: 72
End: 2022-05-23

## 2022-05-23 ENCOUNTER — APPOINTMENT (OUTPATIENT)
Dept: CT IMAGING | Age: 72
End: 2022-05-23
Payer: MEDICARE

## 2022-05-23 ENCOUNTER — OFFICE VISIT (OUTPATIENT)
Dept: FAMILY MEDICINE CLINIC | Age: 72
End: 2022-05-23

## 2022-05-23 VITALS
HEIGHT: 70 IN | BODY MASS INDEX: 36.51 KG/M2 | RESPIRATION RATE: 19 BRPM | OXYGEN SATURATION: 98 % | SYSTOLIC BLOOD PRESSURE: 124 MMHG | TEMPERATURE: 96.6 F | DIASTOLIC BLOOD PRESSURE: 63 MMHG | HEART RATE: 59 BPM | WEIGHT: 255 LBS

## 2022-05-23 VITALS
TEMPERATURE: 97.6 F | WEIGHT: 256 LBS | OXYGEN SATURATION: 95 % | SYSTOLIC BLOOD PRESSURE: 126 MMHG | HEART RATE: 67 BPM | DIASTOLIC BLOOD PRESSURE: 78 MMHG | BODY MASS INDEX: 36.73 KG/M2

## 2022-05-23 DIAGNOSIS — M25.512 ACUTE PAIN OF LEFT SHOULDER: ICD-10-CM

## 2022-05-23 DIAGNOSIS — Z92.29 HX OF LONG-TERM (CURRENT) USE OF ANTICOAGULANTS: ICD-10-CM
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
## 2022-05-23 DIAGNOSIS — S00.81XA FACIAL ABRASION, INITIAL ENCOUNTER: ICD-10-CM

## 2022-05-23 DIAGNOSIS — S09.90XA TRAUMATIC INJURY OF HEAD, INITIAL ENCOUNTER: Primary | ICD-10-CM

## 2022-05-23 DIAGNOSIS — S09.90XA CLOSED HEAD INJURY, INITIAL ENCOUNTER: ICD-10-CM

## 2022-05-23 DIAGNOSIS — S02.2XXA CLOSED FRACTURE OF NASAL BONE, INITIAL ENCOUNTER: Primary | ICD-10-CM

## 2022-05-23 PROCEDURE — 99999 PR OFFICE/OUTPT VISIT,PROCEDURE ONLY: CPT | Performed by: NURSE PRACTITIONER

## 2022-05-23 PROCEDURE — 99285 EMERGENCY DEPT VISIT HI MDM: CPT

## 2022-05-23 PROCEDURE — 70486 CT MAXILLOFACIAL W/O DYE: CPT

## 2022-05-23 PROCEDURE — 73030 X-RAY EXAM OF SHOULDER: CPT

## 2022-05-23 PROCEDURE — 72125 CT NECK SPINE W/O DYE: CPT

## 2022-05-23 PROCEDURE — 70450 CT HEAD/BRAIN W/O DYE: CPT

## 2022-05-23 RX ORDER — AMOXICILLIN AND CLAVULANATE POTASSIUM 875; 125 MG/1; MG/1
1 TABLET, FILM COATED ORAL 2 TIMES DAILY
Qty: 14 TABLET | Refills: 0 | Status: SHIPPED | OUTPATIENT
Start: 2022-05-23 | End: 2022-05-30

## 2022-05-23 ASSESSMENT — ENCOUNTER SYMPTOMS
CONSTIPATION: 0
COLOR CHANGE: 0
ABDOMINAL DISTENTION: 0
RHINORRHEA: 0
SORE THROAT: 0
SHORTNESS OF BREATH: 0
ABDOMINAL PAIN: 0
EYE DISCHARGE: 0

## 2022-05-23 ASSESSMENT — PAIN - FUNCTIONAL ASSESSMENT: PAIN_FUNCTIONAL_ASSESSMENT: NONE - DENIES PAIN

## 2022-05-23 NOTE — CARE COORDINATION
Ambulatory Care Coordination Note  5/23/2022  CM Risk Score: 8  Charlson 10 Year Mortality Risk Score: 100%     ACC: Mer Dudley, CARLIE    Summary Note:     I called to complete an ER follow up and to discuss increased falls. Saba Sheikh has had two falls over the last week. He is also on eliquis and I spoke with him at length about this in regards to his falls. He asked about stopping the eliquis and I have asked him not to do this as it could affect his other issues related to his heart  I have asked him to monitor for any symptoms related to increased swelling bruising, nausea, vomiting, or changes in vision. He declines any symptoms. He feels that these falls were related to just coincidences and not related to any change in mentation or mobility. I have asked him to call me with any changes in symptoms or other concerns. Lab Results     None              Goals Addressed    None         Prior to Admission medications    Medication Sig Start Date End Date Taking? Authorizing Provider   amoxicillin-clavulanate (AUGMENTIN) 875-125 MG per tablet Take 1 tablet by mouth 2 times daily for 7 days 5/23/22 5/30/22  [facility] ROSANA Hooper   ciclopirox [facility]) 8 % solution Apply topically nightly.  5/19/22   Liban Wright MD   gabapentin (NEURONTIN) 300 MG capsule TAKE 1 CAPSULE DAILY 4/14/22 4/14/23  Liban Wright MD   doxazosin (CARDURA) 4 MG tablet TAKE ONE-HALF (1/2) TABLET TWICE A DAY 4/14/22   Liban Wright MD   SPIRIVA HANDIHALER 18 MCG inhalation capsule INHALE THE CONTENTS OF 1 CAPSULE DAILY 4/13/22   Gely Wolf MD   albuterol sulfate  (90 Base) MCG/ACT inhaler USE 2 INHALATIONS EVERY 6 HOURS AS NEEDED FOR WHEEZING OR SHORTNESS OF BREATH 4/13/22   Gely Wolf MD   ipratropium (ATROVENT) 0.06 % nasal spray 2 sprays by Each Nostril route 3 times daily 3/24/22   Liban Wright MD   STELARA 90 MG/ML SOSY prefilled syringe  2/28/22   Historical Provider, MD   triamcinolone (KENALOG) 0.1 % cream Apply topically 2 times daily. 3/17/22   Juan Nance MD   mesalamine (PENTASA) 250 MG extended release capsule Take 2 capsules by mouth 4 times daily 2/28/22   Juan Nance MD   donepezil (ARICEPT) 5 MG tablet Take 1 tablet by mouth nightly 2/17/22   Elinor To MD   triamcinolone (KENALOG) 0.1 % cream Apply topically 2 times daily. 2/16/22   Juan Nance MD   tiZANidine (ZANAFLEX) 2 MG tablet Take 1 tablet by mouth every 8 hours as needed (back pain) 1/21/22   Cindy Marcano, APRN - CNP   montelukast (SINGULAIR) 10 MG tablet TAKE 1 TABLET NIGHTLY 1/12/22   Juan Nance MD   metoprolol tartrate (LOPRESSOR) 50 MG tablet Take 1.5 po bid 1/12/22   Juan Nance MD   pravastatin (PRAVACHOL) 40 MG tablet TAKE 1 TABLET NIGHTLY 1/10/22   Juan Nance MD   nystatin (MYCOSTATIN) 657741 UNIT/GM cream Apply topically 2 times daily Apply topically 2 times daily. 12/27/21   Juan Nance MD   lisinopril (PRINIVIL;ZESTRIL) 20 MG tablet Take 1 tablet by mouth daily 11/30/21   Juan Nance MD   apixaban (ELIQUIS) 5 MG TABS tablet Take 1 tablet by mouth 2 times daily 11/30/21   Juan Nance MD   nitroGLYCERIN (NITROSTAT) 0.4 MG SL tablet up to max of 3 total doses.  If no relief after 1 dose, call 911. 11/3/21   Juan Nance MD   RESTASIS 0.05 % ophthalmic emulsion Place 1 drop into both eyes daily  8/23/21   Historical Provider, MD   ustekinumab Emily Batres) 45 MG/0.5ML SOSY prefilled syringe Inject 45 mg into the skin once    Historical Provider, MD   furosemide (LASIX) 20 MG tablet Take 1 tablet by mouth daily  Patient taking differently: Take 40 mg by mouth daily Indications: Taking 40 mg tablet daily  5/25/21   Juan Nance MD   budesonide-formoterol [facility]) 160-4.5 MCG/ACT AERO Inhale 2 puffs into the lungs 2 times daily 2 each LOT [id number] [id number] 4/29/21   Iban Wilson MD   albuterol (PROVENTIL) (2.5 MG/3ML) 0.083% nebulizer solution Take 3 mLs by nebulization every 6 hours as needed for Wheezing 3/31/21   Narciso Marrufo MD   CPAP Machine MISC New cpap supplies mask hose filters etc 1/15/18   Narciso Marrufo MD   Saccharomyces boulardii (PROBIOTIC) 250 MG CAPS Take 1 tablet by mouth daily    Historical Provider, MD   esomeprazole Magnesium (NEXIUM) 20 MG PACK Take 20 mg by mouth daily    Historical Provider, MD   aspirin 81 MG tablet Take 81 mg by mouth daily.     Historical Provider, MD       Future Appointments   Date Time Provider Chase Holly   5/25/2022  1:15 PM Narciso Marrufo, [address] [place] [address],[address]   6/6/2022  1:00 PM Jeimy Roman MD [facility]   6/20/2022  4:15 PM Jane Estrada MD [facility]

## 2022-05-23 NOTE — PROGRESS NOTES
Patient presents for accidental facial injury. He states that he was on the edge of the bed this morning when he began to drift back to sleep causing him to roll forward and strike his face on the nightstand next to the bed. He did slide further off the bed onto the floor. He reports bleeding on the right side of his nose, but this has stopped. Fall was just after 7am this morning. He was able to get himself off of the floor - he lives alone. He was able to wash the nose area and stop the bleeding with pressure. He was able to drive himself to the clinic w/o difficulty per his report. He denies head or neck pain, denies changes in vision or difficulty seeing. He confirm that he is on eliquis 5 mg currently. A&Ox4, follow directions well, well coordinated, perrla, no cervical tenderness, contusion noted to periorbital right eye, some old appearing. Small abrasion note to right upper eyelid as well as right side of nose, no active bleeding. Patient charts show that he recently had a fall on 5/11/22 - 12 days ago. He reports he tripped over grandchild's toy and fell from standing, was seen in the ER and had lac repair. Due to the multiple head injuries, eliquis use and risk factors patient needs higher level of care and stat head CT evaluation. He was offered ambulance transport twice but refused. He drove himself to the clinic, neuro check repeated with no acute findings. Patient refused 911 ambulance transport. He was advised of dangers of driving self, again A&Ox4. He verbally acknowledged risks, but agreed to go directly to the ER at [facility]. He has a working cell phone and stated he will stop and call 911 if he feels any different. Walked with patient to his car, ambulated with strong steady upright gait, well coordinated. Report called to [facility] ER.

## 2022-05-23 NOTE — ED TRIAGE NOTES
Pt states he fell out of bed and hit the right side of his head and eye on the bed side table. Pt is on Eliquis. Pt denies LOC, pt denies pain.

## 2022-05-23 NOTE — ED PROVIDER NOTES
[facility] [facility] ED  eMERGENCY dEPARTMENT eNCOUnter      Pt Name: Fernanda Bonilla  MRN: [id number]  [place] 1950  Date of evaluation: 5/23/2022  Provider: Trina Sunshine PA-C    CHIEF COMPLAINT       Chief Complaint   Patient presents with    Fall     fall out of bed, hit head and eye bed side table          HISTORY OF PRESENT ILLNESS   (Location/Symptom, Timing/Onset,Context/Setting, Quality, Duration, Modifying Factors, Severity)  Note limiting factors. Fernanda Bonilla is a 67 y.o. male who presents to the emergency department with complaint of right-sided facial pain, and left scapular pain secondary to fall. Patient states he was laying in bed this morning, he states he rolled out of bed, striking his face on a nightstand beside the bed. He denies any loss of consciousness, he states that he then fell onto the floor, he also complains of left scapular pain, does not believe he hit it on anything. He states there was no loss of consciousness. Patient had a fall approximately 11 days ago injuring right side of his face that time, he denies any fractures. He is currently on Eliquis, he states he took his last dose last evening, he has not taken his morning dose. There is no active or acute bleeding. Patient rates his overall pain as a 4 out of 10. Patient denies numbness or tingling, no bowel or bladder dysfunction, no paresthesias. Past medical history significant for hypertension, atrial fibrillation, COPD, diabetes, arthritis anemia chronic back pain gastric reflux Crohn's nondependent alcohol use    HPI    NursingNotes were reviewed. REVIEW OF SYSTEMS    (2-9 systems for level 4, 10 or more for level 5)     Review of Systems   Constitutional: Negative for activity change and appetite change. HENT: Negative for congestion, ear discharge, ear pain, nosebleeds, rhinorrhea and sore throat. Right side facial pain   Eyes: Negative for discharge.    Respiratory: Negative for shortness of breath. Cardiovascular: Negative for chest pain, palpitations and leg swelling. Gastrointestinal: Negative for abdominal distention, abdominal pain and constipation. Genitourinary: Negative for difficulty urinating and dysuria. Musculoskeletal: Negative for arthralgias. Left scapular pain   Skin: Negative for color change. Neurological: Negative for dizziness, tremors, syncope, weakness, numbness and headaches. Psychiatric/Behavioral: Negative for agitation and confusion. Except as noted above the remainder of the review of systems was reviewed and negative. PAST MEDICAL HISTORY     Past Medical History:   Diagnosis Date    A-fib [facility])     Abnormal EMG 12/09/2015    Actinic keratoses     Actinic keratoses     Acute diastolic congestive heart failure ([facility]) 1/6/2021    Allergic rhinitis     Anemia 11/18/2014    Arthritis     Chronic back pain     COPD (chronic obstructive pulmonary disease) ([facility]) 08/09/2012    COPD (chronic obstructive pulmonary disease) ([facility])     Crohns disease     Degenerative disc disease, lumbar     Dermatitis     Diabetes ([address] 03/19/2015    diet controlled    Gastrointestinal problem     GERD (gastroesophageal reflux disease)     History of atrial fibrillation 2006    Dr. Dariana Pradhan History of throat cancer 2004     cleared for reoccurence years ago    Hyperlipidemia 1/21/2014    Hypertension     Hypogonadism male     Lung disease     Mononeuritis multiplex     Mononeuritis multiplex     Nondependent alcohol abuse, in remission 7/22/2020    Obesity (BMI 30-39. 9) 7/24/2019    Osteoarthritis of lumbar spine     Pain of right heel     Paresthesia of foot, bilateral     Prediabetes 12/3/2014    Restless leg syndrome     Seborrheic dermatitis     Seborrheic keratoses, inflamed     Throat cancer ([facility])     throat, had surgery, sees Dr Abraham Sheriff yearly    Tinea cruris     Tinea pedis     Tinea unguium          SURGICALHISTORY Past Surgical History:   Procedure Laterality Date    CARDIAC CATHETERIZATION      CARPAL TUNNEL RELEASE      CATARACT REMOVAL WITH IMPLANT Right 09/09/2019    CATARACT REMOVAL WITH IMPLANT Left 07/22/2019    COLON SURGERY      COLONOSCOPY  04/15/2015    KNEE ARTHROSCOPY      LARYNGECTOMY  2004    UPPER GASTROINTESTINAL ENDOSCOPY  03/24/13    Dr. Stephanie Cantu       Previous Medications    ALBUTEROL (PROVENTIL) (2.5 MG/3ML) 0.083% NEBULIZER SOLUTION    Take 3 mLs by nebulization every 6 hours as needed for Wheezing    ALBUTEROL SULFATE  (90 BASE) MCG/ACT INHALER    USE 2 INHALATIONS EVERY 6 HOURS AS NEEDED FOR WHEEZING OR SHORTNESS OF BREATH    APIXABAN (ELIQUIS) 5 MG TABS TABLET    Take 1 tablet by mouth 2 times daily    ASPIRIN 81 MG TABLET    Take 81 mg by mouth daily. BUDESONIDE-FORMOTEROL (SYMBICORT) 160-4.5 MCG/ACT AERO    Inhale 2 puffs into the lungs 2 times daily 2 each LOT [id number] EXP [id number]    CICLOPIROX (PENLAC) 8 % SOLUTION    Apply topically nightly.     CPAP MACHINE MISC    New cpap supplies mask hose filters etc    DONEPEZIL (ARICEPT) 5 MG TABLET    Take 1 tablet by mouth nightly    DOXAZOSIN (CARDURA) 4 MG TABLET    TAKE ONE-HALF (1/2) TABLET TWICE A DAY    ESOMEPRAZOLE MAGNESIUM (NEXIUM) 20 MG PACK    Take 20 mg by mouth daily    FUROSEMIDE (LASIX) 20 MG TABLET    Take 1 tablet by mouth daily    GABAPENTIN (NEURONTIN) 300 MG CAPSULE    TAKE 1 CAPSULE DAILY    IPRATROPIUM (ATROVENT) 0.06 % NASAL SPRAY    2 sprays by Each Nostril route 3 times daily    LISINOPRIL (PRINIVIL;ZESTRIL) 20 MG TABLET    Take 1 tablet by mouth daily    MESALAMINE (PENTASA) 250 MG EXTENDED RELEASE CAPSULE    Take 2 capsules by mouth 4 times daily    METOPROLOL TARTRATE (LOPRESSOR) 50 MG TABLET    Take 1.5 po bid    MONTELUKAST (SINGULAIR) 10 MG TABLET    TAKE 1 TABLET NIGHTLY    NITROGLYCERIN (NITROSTAT) 0.4 MG SL TABLET    up to max of 3 total doses. If no relief after 1 dose, call 911. NYSTATIN (MYCOSTATIN) 371365 UNIT/GM CREAM    Apply topically 2 times daily Apply topically 2 times daily. PRAVASTATIN (PRAVACHOL) 40 MG TABLET    TAKE 1 TABLET NIGHTLY    RESTASIS 0.05 % OPHTHALMIC EMULSION    Place 1 drop into both eyes daily     SACCHAROMYCES BOULARDII (PROBIOTIC) 250 MG CAPS    Take 1 tablet by mouth daily    SPIRIVA HANDIHALER 18 MCG INHALATION CAPSULE    INHALE THE CONTENTS OF 1 CAPSULE DAILY    STELARA 90 MG/ML SOSY PREFILLED SYRINGE        TIZANIDINE (ZANAFLEX) 2 MG TABLET    Take 1 tablet by mouth every 8 hours as needed (back pain)    TRIAMCINOLONE (KENALOG) 0.1 % CREAM    Apply topically 2 times daily. TRIAMCINOLONE (KENALOG) 0.1 % CREAM    Apply topically 2 times daily.     USTEKINUMAB (STELARA) 45 MG/0.5ML SOSY PREFILLED SYRINGE    Inject 45 mg into the skin once       ALLERGIES     Alemtuzumab, Glycopyrrolate-formoterol, Mercaptopurine, and Moxifloxacin    FAMILY HISTORY       Family History   Problem Relation Age of Onset    Heart Disease Mother     Liver Disease Mother     High Blood Pressure Mother     Heart Disease Father     Arthritis Father     Cancer Sister         lung          SOCIAL HISTORY       Social History     Socioeconomic History    Marital status:      Spouse name: None    Number of children: 3    Years of education: 15    Highest education level: High school graduate   Occupational History    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 2 story home     1 step to get in house and 7 steps to get upstairs    Full basement    2 children live nearby and 1 son lives in [place]    No pets     Social Determinants of Health     Financial Resource Strain: Low Risk     Difficulty of Paying Living Expenses: Not hard at all   Food Insecurity: No Food Insecurity    Worried About [address] in the Last Year: Never true    Dacia of Food in the Last Year: Never true   Transportation Needs: No Transportation Needs    Lack of Transportation (Medical): No    Lack of Transportation (Non-Medical): No   Physical Activity: Insufficiently Active    Days of Exercise per Week: 1 day    Minutes of Exercise per Session: 20 min   Stress:     Feeling of Stress : Not on file   Social Connections:     Frequency of Communication with Friends and Family: Not on file    Frequency of Social Gatherings with Friends and Family: Not on file    Attends Buddhist Services: Not on file    Active Member of Clubs or Organizations: Not on file    Attends Club or Organization Meetings: Not on file    Marital Status: Not on file   Intimate Partner Violence:     Fear of Current or Ex-Partner: Not on file    Emotionally Abused: Not on file    Physically Abused: Not on file    Sexually Abused: Not on file   Housing Stability:     Unable to Pay for Housing in the Last Year: Not on file    Number of [place] in the Last Year: Not on file    Unstable Housing in the Last Year: Not on file       SCREENINGS   NIH Stroke Scale  Interval: Baseline  Level of Consciousness (1a): Alert  LOC Questions (1b): Answers both correctly  LOC Commands (1c): Performs both tasks correctly  Best Gaze (2): Normal  Visual (3): No visual loss  Facial Palsy (4): Normal symmetrical movement  Motor Arm, Left (5a): No drift  Motor Arm, Right (5b): No drift  Motor Leg, Left (6a): No drift  Motor Leg, Right (6b):  No drift  Limb Ataxia (7): Absent  Sensory (8): Normal  Best Language (9): No aphasia  Dysarthria (10): Normal  Extinction and Inattention (11): No abnormality  Total: 0Glasgow Coma Scale  Eye Opening: Spontaneous  Best Verbal Response: Oriented  Best Motor Response: Obeys commands  Evan Coma Scale Score: 15 @FLOW([id number])@      PHYSICAL EXAM    (up to 7 for level 4, 8 or more for level 5)     ED Triage Vitals [05/23/22 1023]   BP Temp Temp Source Pulse Resp SpO2 Height Weight   124/63 96.6 °F (35.9 °C) Temporal 59 19 98 % 5' 10\" (1.778 m) 255 lb (115.7 kg)       Physical Exam  Vitals and nursing note reviewed. Constitutional:       General: He is not in acute distress. Appearance: He is well-developed. He is not ill-appearing, toxic-appearing or diaphoretic. HENT:      Head: Normocephalic. Right Ear: Tympanic membrane normal.      Left Ear: Tympanic membrane normal.      Nose: Nose normal. No congestion. Comments: Patient has superficial laceration noted to right nose at lateral canthus. No deformity, no acute bleeding. Mouth/Throat:      Mouth: Mucous membranes are moist.      Pharynx: No oropharyngeal exudate or posterior oropharyngeal erythema. Eyes:      Extraocular Movements: Extraocular movements intact. Conjunctiva/sclera: Conjunctivae normal.      Pupils: Pupils are equal, round, and reactive to light. Comments: Pupils are equal round reactive to light at approximately 3 mm, no nystagmus   Neck:      Vascular: No JVD. Trachea: No tracheal deviation. Comments: Neck is supple, there is no midline tenderness, no step-offs, no crepitus, no deformity, full range of motion with no pain  Cardiovascular:      Rate and Rhythm: Normal rate. Pulses: Normal pulses. Heart sounds: Normal heart sounds. No murmur heard. No friction rub. No gallop. Pulmonary:      Effort: Pulmonary effort is normal. No tachypnea, accessory muscle usage, respiratory distress or retractions. Breath sounds: Normal breath sounds.  No the Emergency Department Physician who either signs or Co-signsthis chart in the absence of a cardiologist.        RADIOLOGY:   Radha Rosemary such as CT, Ultrasound and MRI are read by the radiologist. Plain radiographic images are visualized and preliminarily interpreted by the emergency physician with the below findings:        Interpretation per the Radiologist below, if available at the time ofthis note:    CT Head WO Contrast   Final Result   NO ACUTE INTRACRANIAL PATHOLOGY. CT FACIAL BONES WO CONTRAST   Final Result   UNDISPLACED RIGHT NASAL FRACTURE                       CT CERVICAL SPINE WO CONTRAST   Final Result   Degenerative changes of the cervical spine, no evidence of acute osseous abnormality is seen. .      XR SHOULDER LEFT (MIN 2 VIEWS)    (Results Pending)         ED BEDSIDE ULTRASOUND:   Performed by ED Physician - none    LABS:  Labs Reviewed - No data to display    All other labs were within normal range or not returned as of this dictation. EMERGENCY DEPARTMENT COURSE and DIFFERENTIAL DIAGNOSIS/MDM:   Vitals:    Vitals:    05/23/22 1023   BP: 124/63   Pulse: 59   Resp: 19   Temp: 96.6 °F (35.9 °C)   TempSrc: Temporal   SpO2: 98%   Weight: 255 lb (115.7 kg)   Height: 5' 10\" (1.778 m)          MDM  Number of Diagnoses or Management Options  Acute pain of left shoulder  Closed fracture of nasal bone, initial encounter  Closed head injury, initial encounter  Fall from bed, initial encounter  Diagnosis management comments: Patient presented ED with complaint of facial pain secondary to a fall which she states occurred this morning when he rolled out of his bed at home. He was able to get up, wash his face off, and then drove himself to the urgent care center. He was seen initially there and then redirected to the emergency department, CT of the head shows no acute intracranial process, CT facial bones shows a nondisplaced right-sided nasal fracture.   CT cervical spine shows chronic degenerative changes, no acute fracture or subluxation, x-ray of the left shoulder shows no acute fracture or subluxation. Patient was given a prescription for Augmentin, and advised to contact his regular family provider for follow-up, as well as follow-up with ear nose and throat. Should he have any worsening or change symptoms, he was advised to return to the ED. CRITICAL CARE TIME   Total Critical Care time was 0 minutes, excluding separately reportableprocedures. There was a high probability of clinicallysignificant/life threatening deterioration in the patient's condition which required my urgent intervention. CONSULTS:  None    PROCEDURES:  Unless otherwise noted below, none     Procedures    FINAL IMPRESSION      1. Closed fracture of nasal bone, initial encounter    2. Fall from bed, initial encounter    3. Closed head injury, initial encounter    4. Acute pain of left shoulder          DISPOSITION/PLAN   DISPOSITION Decision To Discharge 05/23/2022 11:40:18 AM      PATIENT REFERRED TO:  No follow-up provider specified.     DISCHARGE MEDICATIONS:  New Prescriptions    No medications on file          (Please note that portions of this note were completed with a voice recognition program.  Efforts were made to edit the dictations but occasionally words are mis-transcribed.)    Torey Rob PA-C (electronically signed)  Attending Emergency Physician         Torey Rob PA-C  05/23/22 1148

## 2022-05-24 ENCOUNTER — TELEPHONE (OUTPATIENT)
Dept: FAMILY MEDICINE CLINIC | Age: 72
End: 2022-05-24

## 2022-05-24 NOTE — TELEPHONE ENCOUNTER
Pt is calling to reschedule 5/26 hospital follow up with Dr Juli Turenr. Pt has another appt at 3:15 that day. Pt is requesting to see Dr Juli Turner only. Dr Juli Turner is booked this week. Any chance I can schedule pt another day/ time this week? Can pt wait until his 6/6 - 6 month follow up appt?      Pt # [phone]

## 2022-05-24 NOTE — TELEPHONE ENCOUNTER
Would you be available to come see ree in the office with us tomorrow. Decline and needs help.   Feel free to review the chart

## 2022-05-25 ENCOUNTER — OFFICE VISIT (OUTPATIENT)
Dept: FAMILY MEDICINE CLINIC | Age: 72
End: 2022-05-25
Payer: MEDICARE

## 2022-05-25 ENCOUNTER — OFFICE VISIT (OUTPATIENT)
Dept: PULMONOLOGY | Age: 72
End: 2022-05-25
Payer: MEDICARE

## 2022-05-25 ENCOUNTER — CARE COORDINATION (OUTPATIENT)
Dept: CARE COORDINATION | Age: 72
End: 2022-05-25

## 2022-05-25 VITALS
HEIGHT: 70 IN | TEMPERATURE: 98.2 F | OXYGEN SATURATION: 97 % | WEIGHT: 251 LBS | HEART RATE: 67 BPM | BODY MASS INDEX: 35.93 KG/M2 | SYSTOLIC BLOOD PRESSURE: 109 MMHG | DIASTOLIC BLOOD PRESSURE: 60 MMHG

## 2022-05-25 VITALS
TEMPERATURE: 98.4 F | DIASTOLIC BLOOD PRESSURE: 44 MMHG | WEIGHT: 251 LBS | SYSTOLIC BLOOD PRESSURE: 80 MMHG | HEART RATE: 72 BPM | HEIGHT: 70 IN | BODY MASS INDEX: 35.93 KG/M2 | OXYGEN SATURATION: 98 %

## 2022-05-25 DIAGNOSIS — E66.9 OBESITY (BMI 30-39.9): ICD-10-CM

## 2022-05-25 DIAGNOSIS — E11.40 TYPE 2 DIABETES MELLITUS WITH DIABETIC NEUROPATHY, WITHOUT LONG-TERM CURRENT USE OF INSULIN (HCC): ICD-10-CM

## 2022-05-25 DIAGNOSIS — Z99.89 OSA ON CPAP: ICD-10-CM

## 2022-05-25 DIAGNOSIS — I48.0 PAROXYSMAL ATRIAL FIBRILLATION (HCC): ICD-10-CM

## 2022-05-25 DIAGNOSIS — G47.33 OSA ON CPAP: ICD-10-CM

## 2022-05-25 DIAGNOSIS — I50.32 CHRONIC DIASTOLIC HEART FAILURE (HCC): ICD-10-CM

## 2022-05-25 DIAGNOSIS — J44.9 CHRONIC OBSTRUCTIVE PULMONARY DISEASE, UNSPECIFIED COPD TYPE (HCC): Primary | ICD-10-CM

## 2022-05-25 PROCEDURE — 99215 OFFICE O/P EST HI 40 MIN: CPT | Performed by: FAMILY MEDICINE

## 2022-05-25 PROCEDURE — 1123F ACP DISCUSS/DSCN MKR DOCD: CPT | Performed by: INTERNAL MEDICINE

## 2022-05-25 PROCEDURE — 99214 OFFICE O/P EST MOD 30 MIN: CPT | Performed by: INTERNAL MEDICINE

## 2022-05-25 PROCEDURE — 1123F ACP DISCUSS/DSCN MKR DOCD: CPT | Performed by: FAMILY MEDICINE

## 2022-05-25 ASSESSMENT — ENCOUNTER SYMPTOMS
VOICE CHANGE: 0
SORE THROAT: 0
DIARRHEA: 0
COUGH: 0
CHEST TIGHTNESS: 0
SHORTNESS OF BREATH: 1
RHINORRHEA: 0
EYE ITCHING: 0
WHEEZING: 0
VOMITING: 0
NAUSEA: 0
ABDOMINAL PAIN: 0

## 2022-05-25 NOTE — PROGRESS NOTES
Subjective:     Rajan Hobbs is a 67 y.o. male who complains today of:     Chief Complaint   Patient presents with    Sleep Apnea     4 month f/u    COPD       HPI  He is using symbicort and spiriva, albuterol HFA prn   C/o shortness of breath  with exertion. No  Wheezing. No  Cough  No Hemoptysis. No Chest tightness. No Chest pain with radiation  or pleuritic pain. No  leg edema. No Fever or chills. No Rhinorrhea and postnasal drip. He had fall and bruise on rt. Eye and left leg area with black and blue sherwin      He is using CPAP with  14 centimeters of H2O with heated humidity. He is using CPAP for about  7  hours every night. He is using CPAP with Nasal pillow  Mask. He said  sleep is restful with the CPAP use. He is compliant with CPAP therapy and benefiting with CPAP use. No snoring with CPAP use. No complaint of daytime sleepiness or tiredness with CPAP use. He denies taking naps. No sleepiness with driving.      I reviewed compliance report with patient regarding CPAP therapy. He is using  CPAP for 30 days out of 30 days. Average usage of days used is 7 hours and 33 min , average AHI 1.2 with CPAP use.      Allergies:  Alemtuzumab, Glycopyrrolate-formoterol, Mercaptopurine, and Moxifloxacin  Past Medical History:   Diagnosis Date    [facility]     Abnormal EMG 12/09/2015    Actinic keratoses     Actinic keratoses     Acute diastolic congestive heart failure ([facility]) 1/6/2021    Allergic rhinitis     Anemia 11/18/2014    Arthritis     Chronic back pain     COPD (chronic obstructive pulmonary disease) ([facility]) 08/09/2012    COPD [facility]chronic obstructive [facility] disease) ([facility]     Crohns disease     Degenerative disc disease, lumbar     Dermatitis     Diabetes ([facility] 03/19/2015    diet controlled    Gastrointestinal problem     GERD (gastroesophageal reflux disease)     History of atrial fibrillation 2006    Dr. Jessica Briggs History of throat cancer 2004     cleared for reoccurence years ago    Hyperlipidemia 2014    Hypertension     Hypogonadism male     Lung disease     Mononeuritis multiplex     Mononeuritis multiplex     Nondependent alcohol abuse, in remission 2020    Obesity (BMI 30-39. 9) 2019    Osteoarthritis of lumbar spine     Pain of right heel     Paresthesia of foot, bilateral     Prediabetes 12/3/2014    Restless leg syndrome     Seborrheic dermatitis     Seborrheic keratoses, inflamed     Throat cancer (HCC)     throat, had surgery, sees Dr Alvarez [place] yearly    Tinea cruris     Tinea pedis     Tinea unguium      Past Surgical History:   Procedure Laterality Date    CARDIAC CATHETERIZATION      CARPAL TUNNEL RELEASE      CATARACT REMOVAL WITH IMPLANT Right 2019    CATARACT REMOVAL WITH IMPLANT Left 2019    COLON SURGERY      COLONOSCOPY  04/15/2015    KNEE ARTHROSCOPY      LARYNGECTOMY  2004    UPPER GASTROINTESTINAL ENDOSCOPY  13    Dr. 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 2 story home     1 step to get in house and 7 steps to get upstairs    Full basement    2 children live nearby and 1 son lives in [place]    No pets     Social Determinants of Health     Financial Resource Strain: Low Risk     Difficulty of Paying Living Expenses: Not hard at all   Food Insecurity: No Food Insecurity    Worried About [employer] in the Last Year: Never true    [place] [employer] in the Last Year: Never true   Transportation Needs: No Transportation Needs    Lack of Transportation (Medical): No    Lack of Transportation (Non-Medical): No   Physical Activity: Insufficiently Active    Days of Exercise per Week: 1 day    Minutes of Exercise per Session: 20 min   Stress:     Feeling of Stress : Not on file   Social Connections:     Frequency of Communication with Friends and Family: Not on file    Frequency of Social Gatherings with Friends and Family: Not on file    Attends Voodoo Services: Not on file    Active Member of Clubs or Organizations: Not on file    Attends Club or Organization Meetings: Not on file    Marital Status: Not on file   Intimate Partner Violence:     Fear of Current or Ex-Partner: Not on file    Emotionally Abused: Not on file    Physically Abused: Not on file    Sexually Abused: Not on file   Housing Stability:     Unable to Pay for Housing in the Last Year: Not on file    Number of [place] in the Last Year: Not on file    Unstable Housing in the Last Year: Not on file         Review of Systems   Constitutional: Negative for chills, diaphoresis, fatigue and fever. HENT: Negative for congestion, mouth sores, nosebleeds, postnasal drip, rhinorrhea, sneezing, sore throat and voice change. Eyes: Negative for itching and visual disturbance. Respiratory: Positive for shortness of breath. Negative for cough, chest tightness and wheezing. Cardiovascular: Negative. Negative for chest pain, palpitations and leg swelling. Gastrointestinal: Negative for abdominal pain, diarrhea, nausea and vomiting. Genitourinary: Negative for difficulty urinating and hematuria. Musculoskeletal: Negative for arthralgias, joint swelling and myalgias. Skin: Negative for rash. Allergic/Immunologic: Negative for environmental allergies. Neurological: Negative for dizziness, tremors, weakness and headaches. Psychiatric/Behavioral: Positive for sleep disturbance. Negative for behavioral problems. :     Vitals:    05/25/22 1321   BP: 109/60   Pulse: 67   Temp: 98.2 °F (36.8 °C)   SpO2: 97%   Weight: 251 lb (113.9 kg)   Height: 5' 10\" (1.778 m)     Wt Readings from Last 3 Encounters:   05/25/22 251 lb (113.9 kg)   05/23/22 255 lb (115.7 kg)   05/23/22 256 lb (116.1 kg)         Physical Exam  Constitutional:       Appearance: He is well-developed. He is obese. HENT:      Head: Normocephalic and atraumatic. Nose: Nose normal.   Eyes:      Conjunctiva/sclera: Conjunctivae normal.      Pupils: Pupils are equal, round, and reactive to light. Comments: Rt. Eye lower eyelid swelling and ecchymosis   Neck:      Thyroid: No thyromegaly. Vascular: No JVD. Trachea: No tracheal deviation. Cardiovascular:      Rate and Rhythm: Normal rate and regular rhythm. Heart sounds: No murmur heard. No friction rub. No gallop. Pulmonary:      Effort: Pulmonary effort is normal. No respiratory distress. Breath sounds: Normal breath sounds. No wheezing or rales. Comments: diminished Breath sound bilaterally. Chest:      Chest wall: No tenderness. Abdominal:      General: There is no distension. Musculoskeletal:         General: Normal range of motion. Lymphadenopathy:      Cervical: No cervical adenopathy. Skin:     General: Skin is warm and dry. Findings: No rash. Neurological:      Mental Status: He is alert and oriented to person, place, and time. Cranial Nerves: No cranial nerve deficit. Psychiatric:         Behavior: Behavior normal.         Current Outpatient Medications   Medication Sig Dispense Refill    amoxicillin-clavulanate (AUGMENTIN) 875-125 MG per tablet Take 1 tablet by mouth 2 times daily for 7 days 14 tablet 0    ciclopirox (PENLAC) 8 % solution Apply topically nightly. 6 mL 1    gabapentin (NEURONTIN) 300 MG capsule TAKE 1 CAPSULE DAILY 90 capsule 3    doxazosin (CARDURA) 4 MG tablet TAKE ONE-HALF (1/2) TABLET TWICE A DAY 90 tablet 3    SPIRIVA HANDIHALER 18 MCG inhalation capsule INHALE THE CONTENTS OF 1 CAPSULE DAILY 90 capsule 3    albuterol sulfate  (90 Base) MCG/ACT inhaler USE 2 INHALATIONS EVERY 6 HOURS AS NEEDED FOR WHEEZING OR SHORTNESS OF BREATH 25.5 g 2    ipratropium (ATROVENT) 0.06 % nasal spray 2 sprays by Each Nostril route 3 times daily 1 each 1    STELARA 90 MG/ML SOSY prefilled syringe       triamcinolone (KENALOG) 0.1 % cream Apply topically 2 times daily. 15 g 1    mesalamine (PENTASA) 250 MG extended release capsule Take 2 capsules by mouth 4 times daily 120 capsule 0    donepezil (ARICEPT) 5 MG tablet Take 1 tablet by mouth nightly 30 tablet 3    triamcinolone (KENALOG) 0.1 % cream Apply topically 2 times daily. 30 g 1    tiZANidine (ZANAFLEX) 2 MG tablet Take 1 tablet by mouth every 8 hours as needed (back pain) 10 tablet 0    montelukast (SINGULAIR) 10 MG tablet TAKE 1 TABLET NIGHTLY 7 tablet 0    metoprolol tartrate (LOPRESSOR) 50 MG tablet Take 1.5 po bid 10 tablet 0    pravastatin (PRAVACHOL) 40 MG tablet TAKE 1 TABLET NIGHTLY 90 tablet 3    nystatin (MYCOSTATIN) 667715 UNIT/GM cream Apply topically 2 times daily Apply topically 2 times daily. 15 g 0    lisinopril (PRINIVIL;ZESTRIL) 20 MG tablet Take 1 tablet by mouth daily 90 tablet 3    apixaban (ELIQUIS) 5 MG TABS tablet Take 1 tablet by mouth 2 times daily 180 tablet 3    nitroGLYCERIN (NITROSTAT) 0.4 MG SL tablet up to max of 3 total doses.  If no relief after 1 dose, call 911. 25 tablet 2    RESTASIS 0.05 % ophthalmic emulsion Place 1 drop into both eyes daily       ustekinumab (STELARA) 45 MG/0.5ML SOSY prefilled syringe Inject 45 mg into the skin once      furosemide (LASIX) 20 MG tablet Take 1 tablet by mouth daily (Patient taking differently: Take 40 mg by mouth daily Indications: Taking 40 mg tablet daily ) 30 tablet 2    budesonide-formoterol (SYMBICORT) 160-4.5 MCG/ACT AERO Inhale 2 puffs into the lungs 2 times daily 2 each LOT [id number] [id number] 3 Inhaler 1    albuterol (PROVENTIL) (2.5 MG/3ML) 0.083% nebulizer solution Take 3 mLs by nebulization every 6 hours as needed for Wheezing 120 each 3    CPAP Machine MISC New cpap supplies mask hose filters etc 1 each 0    Saccharomyces boulardii (PROBIOTIC) 250 MG CAPS Take 1 tablet by mouth daily      esomeprazole Magnesium (NEXIUM) 20 MG PACK Take 20 mg by mouth daily      aspirin 81 MG tablet Take 81 mg by mouth daily. No current facility-administered medications for this visit. Results for orders placed during the hospital encounter of 10/27/21    XR CHEST (2 VW)    Narrative  EXAMINATION: XR CHEST (2 VW)    CLINICAL HISTORY: RIGHT CHEST PAIN    COMPARISONS: APRIL 12, 2021    FINDINGS: Osseous structures intact. Cardiopericardial silhouette normal. Pulmonary vasculature normal. Lungs clear. Impression  NO ACUTE CARDIOPULMONARY DISEASE. Results for orders placed during the hospital encounter of 04/12/21    XR CHEST (2 VW)    Narrative  EXAMINATION: XR CHEST (2 VW)    CLINICAL HISTORY: COPD    COMPARISONS: FEBRUARY 21, 2020. FINDINGS: Osseous structures are intact. Cardiopericardial silhouette is normal. Pulmonary vasculature is normal. Lungs are clear and hyperinflated. Impression  NO ACUTE CARDIOPULMONARY DISEASE. EMPHYSEMA.       Results for orders placed in visit on 02/21/20    XR CHEST STANDARD (2 VW)    Narrative  EXAMINATION: XR CHEST (2 VW)    DATE AND TIME:2/21/2020 11:08 AM    CLINICAL HISTORY: Shortness of breath  J44.1 COPD with exacerbation ([address] ICD10    COMPARISONS: 2019    FINDINGS: Allowing for inspiratory effort lungs are clear of any fresh infiltrate. No overt signs of pulmonary edema. No effusion. No pneumothorax. Bones intact. Impression  NO ACTIVE LUNG DISEASE.  ]  Results for orders placed during the hospital encounter of 10/31/21    XR CHEST PORTABLE    Narrative  Exam: XR CHEST PORTABLE    History: Pulmonary embolism. Back pain. Technique: AP portable view of the chest obtained. Comparison: CT chest 2021    Findings:    Atherosclerotic calcification of the thoracic aorta. The cardiomediastinal silhouette is within normal limits. No pneumothorax, pleural effusion, or consolidation. Bones of the thorax appear intact. Impression  No radiographic evidence of acute intrathoracic process. Results for orders placed during the hospital encounter of 19    XR CHEST PORTABLE    Narrative  Patient MRN: [id number]    : 1950    Age:  71 years    Gender: Male    Order Date: 2019 4:45 AM.    Exam: XR CHEST PORTABLE    Number of Views: 1    Indication:   Chest Pain, rapid heart beat per pt. Comparison: 3/15/2019    Findings: Stable, enlarged cardiomediastinal silhouette with thoracic aortic vascular calcifications. No pneumonia, pneumothorax or pleural effusion. Impression  Impression:  Stable, enlarged cardiomediastinal silhouette with thoracic aortic vascular calcifications      Results for orders placed during the hospital encounter of 03/15/19    XR CHEST PORTABLE    Narrative  Portable chest radiograph    History: Palpitations    Technique: AP portable view of the chest obtained. Comparison: Chest radiograph from 2019    Findings:    Atherosclerotic calcification of the thoracic aorta. The cardiomediastinal silhouette is within normal limits. No pneumothorax, pleural effusion, or focal consolidation. Degenerative changes of the thoracic spine. Impression  No acute intrathoracic process. Assessment/Plan:     1. Chronic obstructive pulmonary disease, unspecified COPD type (HCC)  He is using symbicort and spiriva, albuterol HFA prn   C/o shortness of breath  with exertion. No  Wheezing. No  Cough. Continue same . 2. JARRELL on CPAP  He is using CPAP with  14 centimeters of H2O with heated humidity. He is using CPAP for about  7  hours every night. He is using CPAP with Nasal pillow  Mask. He said  sleep is restful with the CPAP use. He is compliant with CPAP therapy and benefiting with CPAP use. No snoring with CPAP use. continue CPAP as before .      I reviewed compliance report with patient regarding CPAP therapy. He is using  CPAP for 30 days out of 30 days. Average usage of days used is 7 hours and 33 min , average AHI 1.2 with CPAP use. Counseling: CPAP/BiPAP uses, He advised to use CPAP at least 5-6 hours every night. Driving: He is advised for extreme caution when driving or operating machinery if there is a feeling of drowsiness, especially while driving it is preferable to stop driving and take a brief nap. Sleep hygiene:Avoid supine sleep, sleep on  sides. Avoid  sleep deprivation. Explained sleep hygiene. Advice to avoid Alcohol and sedative    3. Obesity (BMI 30-39. 9)  He is advised try to lose weight. obesity related risk explained to the patient ,  Current weight:  251 lb (113.9 kg) Lbs. BMI:  Body mass index is 36.01 kg/m². Suggested weight control approaches, including dietary changes , exercise, behavioral modification. Return in about 4 months (around 9/25/2022) for jarrell, COPD.       Godfrey Mendez MD

## 2022-05-25 NOTE — PATIENT INSTRUCTIONS
Labs to look for possible bleeding or chf that could be causing Low BP. Suspect extra dose of bp med this also can be causing problems with balance and falls. Working on assistance for potential moves for aging. Amie Failing in appt.

## 2022-05-25 NOTE — PROGRESS NOTES
Diagnosis Orders   1. Chronic diastolic heart failure (HCC)  CBC    Comprehensive Metabolic Panel    Urinalysis, Micro    Brain Natriuretic Peptide   2. Type 2 diabetes mellitus with diabetic neuropathy, without long-term current use of insulin (HCC)  CBC    Comprehensive Metabolic Panel    Urinalysis, Micro    Brain Natriuretic Peptide   3. Paroxysmal atrial fibrillation (HCC)  CBC    Comprehensive Metabolic Panel    Urinalysis, Micro    Brain Natriuretic Peptide     Return for Based on test results. Patient Instructions   Labs to look for possible bleeding or chf that could be causing Low BP. Suspect extra dose of bp med this also can be causing problems with balance and falls. Working on assistance for potential moves for aging. Jeremy Robertson in appt. Subjective:      Patient ID: Andrea Quiroga is a 67 y.o. male who presents for:  Chief Complaint   Patient presents with    Follow-up       Held family meeting with patient, his son, Jeremy Robertson our interventional RN and myself today. Discussing through what led to hospitalization, hospitalization, need for neurology input. Also looking at evaluation of living circumstances and safety possibilities. Including the option of moving to assisted living etc.      Current Outpatient Medications on File Prior to Visit   Medication Sig Dispense Refill    ciclopirox (PENLAC) 8 % solution Apply topically nightly.  6 mL 1    gabapentin (NEURONTIN) 300 MG capsule TAKE 1 CAPSULE DAILY 90 capsule 3    doxazosin (CARDURA) 4 MG tablet TAKE ONE-HALF (1/2) TABLET TWICE A DAY 90 tablet 3    SPIRIVA HANDIHALER 18 MCG inhalation capsule INHALE THE CONTENTS OF 1 CAPSULE DAILY 90 capsule 3    albuterol sulfate  (90 Base) MCG/ACT inhaler USE 2 INHALATIONS EVERY 6 HOURS AS NEEDED FOR WHEEZING OR SHORTNESS OF BREATH 25.5 g 2    ipratropium (ATROVENT) 0.06 % nasal spray 2 sprays by Each Nostril route 3 times daily 1 each 1    STELARA 90 MG/ML SOSY prefilled syringe       triamcinolone (KENALOG) 0.1 % cream Apply topically 2 times daily. 15 g 1    mesalamine (PENTASA) 250 MG extended release capsule Take 2 capsules by mouth 4 times daily 120 capsule 0    donepezil (ARICEPT) 5 MG tablet Take 1 tablet by mouth nightly 30 tablet 3    triamcinolone (KENALOG) 0.1 % cream Apply topically 2 times daily. 30 g 1    tiZANidine (ZANAFLEX) 2 MG tablet Take 1 tablet by mouth every 8 hours as needed (back pain) 10 tablet 0    montelukast (SINGULAIR) 10 MG tablet TAKE 1 TABLET NIGHTLY 7 tablet 0    metoprolol tartrate (LOPRESSOR) 50 MG tablet Take 1.5 po bid 10 tablet 0    pravastatin (PRAVACHOL) 40 MG tablet TAKE 1 TABLET NIGHTLY 90 tablet 3    nystatin (MYCOSTATIN) 528546 UNIT/GM cream Apply topically 2 times daily Apply topically 2 times daily. 15 g 0    lisinopril (PRINIVIL;ZESTRIL) 20 MG tablet Take 1 tablet by mouth daily 90 tablet 3    apixaban (ELIQUIS) 5 MG TABS tablet Take 1 tablet by mouth 2 times daily 180 tablet 3    nitroGLYCERIN (NITROSTAT) 0.4 MG SL tablet up to max of 3 total doses.  If no relief after 1 dose, call 911. 25 tablet 2    RESTASIS 0.05 % ophthalmic emulsion Place 1 drop into both eyes daily       ustekinumab (STELARA) 45 MG/0.5ML SOSY prefilled syringe Inject 45 mg into the skin once      furosemide (LASIX) 20 MG tablet Take 1 tablet by mouth daily (Patient taking differently: Take 40 mg by mouth daily Indications: Taking 40 mg tablet daily ) 30 tablet 2    budesonide-formoterol (SYMBICORT) 160-4.5 MCG/ACT AERO Inhale 2 puffs into the lungs 2 times daily 2 each LOT [id number] EXP [id number] 3 Inhaler 1    albuterol (PROVENTIL) (2.5 MG/3ML) 0.083% nebulizer solution Take 3 mLs by nebulization every 6 hours as needed for Wheezing 120 each 3    CPAP Machine MISC New cpap supplies mask hose filters etc 1 each 0    Saccharomyces boulardii (PROBIOTIC) 250 MG CAPS Take 1 tablet by mouth daily      esomeprazole Magnesium (NEXIUM) 20 MG PACK Take 20 mg by mouth daily      aspirin 81 MG tablet Take 81 mg by mouth daily. No current facility-administered medications on file prior to visit. Past Medical History:   Diagnosis Date    A-fib [facility])     Abnormal EMG 12/09/2015    Actinic keratoses     Actinic keratoses     Acute diastolic congestive heart failure (HCC) 1/6/2021    Allergic rhinitis     Anemia 11/18/2014    Arthritis     Chronic back pain     [facility] 08/09/2012    COPD (chronic obstructive pulmonary disease) ([facility])     Crohns disease     Degenerative disc disease, lumbar     Dermatitis     Diabetes ([facility] [address] 03/19/2015    diet controlled    Gastrointestinal problem     GERD (gastroesophageal reflux disease)     History of atrial fibrillation 2006    Dr. Palmer Cornea History of throat cancer 2004     cleared for reoccurence years ago    Hyperlipidemia 1/21/2014    Hypertension     Hypogonadism male     Lung disease     Mononeuritis multiplex     Mononeuritis multiplex     Nondependent alcohol abuse, in remission 7/22/2020    Obesity (BMI 30-39. 9) 7/24/2019    Osteoarthritis of lumbar spine     Pain of right heel     Paresthesia of foot, bilateral     Prediabetes 12/3/2014    Restless leg syndrome     Seborrheic dermatitis     Seborrheic keratoses, inflamed     Throat cancer (HCC)     throat, had surgery, sees Dr Shanique Vogt yearly    Tinea cruris     Tinea pedis     Tinea unguium      Past Surgical History:   Procedure Laterality Date    CARDIAC CATHETERIZATION      CARPAL TUNNEL RELEASE      CATARACT REMOVAL WITH IMPLANT Right 09/09/2019    CATARACT REMOVAL WITH IMPLANT Left 07/22/2019    COLON SURGERY      COLONOSCOPY  04/15/2015    KNEE ARTHROSCOPY      LARYNGECTOMY  2004    UPPER GASTROINTESTINAL ENDOSCOPY  03/24/13    Dr. Mae Meth W/RADIESSE INJ  2002     Social History     Socioeconomic History    Marital status:  Spouse name: Not on file    Number of children: 3    Years of education: 15    Highest education level: High school graduate   Occupational History    Occupation:      Employer: [employer]   Tobacco Use    Smoking status: Former Smoker     Packs/day: 1.00     Years: 25.00     Pack years: 25.00     Types: Cigarettes     Quit date: 10/21/1990     Years since quittin.6    Smokeless tobacco: Never Used   Substance and Sexual Activity    Alcohol use: No     Comment: Attends AA, 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    Drug use: No    Sexual activity: Not Currently     Partners: Female   Other Topics Concern    Not on file   Social History Narrative    Lives alone. 2 story home     1 step to get in house and 7 steps to get upstairs    Full basement    2 children live nearby and 1 son lives in [place]    No pets     Social Determinants of Health     Financial Resource Strain: Low Risk     Difficulty of Paying Living Expenses: Not hard at all   Food Insecurity: No Food Insecurity    Worried About [facility] [employer] in the Last Year: Never true    Dacia of Food in the Last Year: Never true   Transportation Needs: No Transportation Needs    Lack of Transportation (Medical): No    Lack of Transportation (Non-Medical):  No   Physical Activity: Insufficiently Active    Days of Exercise per Week: 1 day    Minutes of Exercise per Session: 20 min   Stress:     Feeling of Stress : Not on file   Social Connections:     Frequency of Communication with Friends and Family: Not on file    Frequency of Social Gatherings with Friends and Family: Not on file    Attends Denominational Services: Not on file    Active Member of Clubs or Organizations: Not on file    Attends Club or Organization Meetings: Not on file    Marital Status: Not on file   Intimate Partner Violence:     Fear of Current or Ex-Partner: Not on file    Emotionally Abused: Not on file    Physically Abused: Not on file    Sexually Abused: Not on file Housing Stability:     Unable to Pay for Housing in the Last Year: Not on file    Number of Places Lived in the Last Year: Not on file    Unstable Housing in the Last Year: Not on file     Family History   Problem Relation Age of Onset    Heart Disease Mother     Liver Disease Mother     High Blood Pressure Mother     Heart Disease Father     Arthritis Father     Cancer Sister         lung     Allergies:  Alemtuzumab, Glycopyrrolate-formoterol, Mercaptopurine, and Moxifloxacin        Objective:   BP (!) 80/44   Pulse 72   Temp 98.4 °F (36.9 °C)   Ht 5' 10\" (1.778 m)   Wt 251 lb (113.9 kg)   SpO2 98%   BMI 36.01 kg/m²       No results found for this visit on 05/25/22.     Recent Results (from the past 2016 hour(s))   Brain Natriuretic Peptide    Collection Time: 05/26/22 10:43 AM   Result Value Ref Range    Pro-BNP 52 pg/mL   Comprehensive Metabolic Panel    Collection Time: 05/26/22 10:43 AM   Result Value Ref Range    Sodium 138 135 - 144 mEq/L    Potassium 4.2 3.4 - 4.9 mEq/L    Chloride 103 95 - 107 mEq/L    CO2 22 20 - 31 mEq/L    Anion Gap 13 9 - 15 mEq/L    Glucose 108 (H) 70 - 99 mg/dL    BUN 16 8 - 23 mg/dL    CREATININE 0.70 0.70 - 1.20 mg/dL    GFR Non-African American >60.0 >60    GFR  >60.0 >60    Calcium 8.7 8.5 - 9.9 mg/dL    Total Protein 6.6 6.3 - 8.0 g/dL    Albumin 3.9 3.5 - 4.6 g/dL    Total Bilirubin 0.5 0.2 - 0.7 mg/dL    Alkaline Phosphatase 81 35 - 104 U/L    ALT 14 0 - 41 U/L    AST 20 0 - 40 U/L    Globulin 2.7 2.3 - 3.5 g/dL   CBC    Collection Time: 05/26/22 10:43 AM   Result Value Ref Range    WBC 8.2 4.8 - 10.8 K/uL    RBC 4.26 (L) 4.70 - 6.10 M/uL    Hemoglobin 12.5 (L) 14.0 - 18.0 g/dL    Hematocrit 37.7 (L) 42.0 - 52.0 %    MCV 88.5 80.0 - 100.0 fL    MCH 29.4 27.0 - 31.3 pg    MCHC 33.2 33.0 - 37.0 %    RDW 14.0 11.5 - 14.5 %    Platelets 134 992 - 815 K/uL   Testosterone, free, total    Collection Time: 05/26/22 10:43 AM   Result Value Ref Range Testosterone 216 (L) 220 - 1,000 ng/dL    Sex Hormone Binding 49 11 - 80 nmol/L    Testosterone, Free 32.0 (L) 47 - 244 pg/mL   Urinalysis    Collection Time: 05/26/22 11:19 AM   Result Value Ref Range    Color, UA Yellow Straw/Yellow    Clarity, UA Clear Clear    Glucose, Ur Negative Negative mg/dL    Bilirubin Urine Negative Negative    Ketones, Urine Negative Negative mg/dL    Specific Gravity, UA 1.023 1.005 - 1.030    Blood, Urine Negative Negative    pH, UA 7.0 5.0 - 9.0    Protein, UA Negative Negative mg/dL    Urobilinogen, Urine 0.2 <2.0 E.U./dL    Nitrite, Urine Negative Negative    Leukocyte Esterase, Urine Negative Negative       [x] Pt was seen by provider for 40     Minutes  Counseling and coordination of care was done for all assessment diagnosis listed for today with patient and any family/friend present. More than 50% of this visit was spent coordinating current care, obtaining information for prior records, and counseling for current plan of action. Assessment:       Diagnosis Orders   1. Chronic diastolic heart failure (HCC)  CBC    Comprehensive Metabolic Panel    Urinalysis, Micro    Brain Natriuretic Peptide   2. Type 2 diabetes mellitus with diabetic neuropathy, without long-term current use of insulin (HCC)  CBC    Comprehensive Metabolic Panel    Urinalysis, Micro    Brain Natriuretic Peptide   3.  Paroxysmal atrial fibrillation (HCC)  CBC    Comprehensive Metabolic Panel    Urinalysis, Micro    Brain Natriuretic Peptide         Orders Placed This Encounter   Procedures    CBC     Standing Status:   Future     Number of Occurrences:   1     Standing Expiration Date:   5/25/2023    Comprehensive Metabolic Panel     Standing Status:   Future     Number of Occurrences:   1     Standing Expiration Date:   5/25/2023    Urinalysis, Micro     Standing Status:   Future     Number of Occurrences:   1     Standing Expiration Date:   5/25/2023    Brain Natriuretic Peptide     Standing Status:   Future     Number of Occurrences:   1     Standing Expiration Date:   5/25/2023       No orders of the defined types were placed in this encounter. Medication List          Accurate as of May 25, 2022 11:59 PM. If you have any questions, ask your nurse or doctor. CHANGE how you take these medications    furosemide 20 MG tablet  Commonly known as: LASIX  Take 1 tablet by mouth daily  What changed: how much to take        CONTINUE taking these medications    * albuterol (2.5 MG/3ML) 0.083% nebulizer solution  Commonly known as: PROVENTIL  Take 3 mLs by nebulization every 6 hours as needed for Wheezing     * albuterol sulfate  (90 Base) MCG/ACT inhaler  USE 2 INHALATIONS EVERY 6 HOURS AS NEEDED FOR WHEEZING OR SHORTNESS OF BREATH     amoxicillin-clavulanate 875-125 MG per tablet  Commonly known as: AUGMENTIN  Take 1 tablet by mouth 2 times daily for 7 days     apixaban 5 MG Tabs tablet  Commonly known as: Eliquis  Take 1 tablet by mouth 2 times daily     aspirin 81 MG tablet     budesonide-formoterol 160-4.5 MCG/ACT Aero  Commonly known as: Symbicort  Inhale 2 puffs into the lungs 2 times daily 2 each LOT [id number] [id number]     ciclopirox 8 % solution  Commonly known as: Penlac  Apply topically nightly.      CPAP Machine Misc  New cpap supplies mask hose filters etc     donepezil 5 MG tablet  Commonly known as: Aricept  Take 1 tablet by mouth nightly     doxazosin 4 MG tablet  Commonly known as: CARDURA  TAKE ONE-HALF (1/2) TABLET TWICE A DAY     esomeprazole Magnesium 20 MG Pack  Commonly known as: NEXIUM     gabapentin 300 MG capsule  Commonly known as: NEURONTIN  TAKE 1 CAPSULE DAILY     ipratropium 0.06 % nasal spray  Commonly known as: ATROVENT  2 sprays by Each Nostril route 3 times daily     lisinopril 20 MG tablet  Commonly known as: PRINIVIL;ZESTRIL  Take 1 tablet by mouth daily     mesalamine 250 MG extended release capsule  Commonly known as: PENTASA  Take 2 capsules by mouth 4 times daily     metoprolol tartrate 50 MG tablet  Commonly known as: LOPRESSOR  Take 1.5 po bid     montelukast 10 MG tablet  Commonly known as: SINGULAIR  TAKE 1 TABLET NIGHTLY     nitroGLYCERIN 0.4 MG SL tablet  Commonly known as: NITROSTAT  up to max of 3 total doses. If no relief after 1 dose, call 911.     nystatin 206543 UNIT/GM cream  Commonly known as: MYCOSTATIN  Apply topically 2 times daily Apply topically 2 times daily. pravastatin 40 MG tablet  Commonly known as: PRAVACHOL  TAKE 1 TABLET NIGHTLY     Probiotic 250 MG Caps     Restasis 0.05 % ophthalmic emulsion  Generic drug: cycloSPORINE     Spiriva HandiHaler 18 MCG inhalation capsule  Generic drug: tiotropium  INHALE THE CONTENTS OF 1 CAPSULE DAILY     * Stelara 45 MG/0.5ML Sosy prefilled syringe  Generic drug: ustekinumab     * Stelara 90 MG/ML Sosy prefilled syringe  Generic drug: ustekinumab     tiZANidine 2 MG tablet  Commonly known as: ZANAFLEX  Take 1 tablet by mouth every 8 hours as needed (back pain)     * triamcinolone 0.1 % cream  Commonly known as: KENALOG  Apply topically 2 times daily. * triamcinolone 0.1 % cream  Commonly known as: KENALOG  Apply topically 2 times daily. * This list has 6 medication(s) that are the same as other medications prescribed for you. Read the directions carefully, and ask your doctor or other care provider to review them with you. Plan:   Return for Based on test results. Patient Instructions   Labs to look for possible bleeding or chf that could be causing Low BP. Suspect extra dose of bp med this also can be causing problems with balance and falls. Working on assistance for potential moves for aging. Darnell Nunez in appt. This note was partially created with the assistance of dictation. This may lead to grammatical or spelling errors. Sher Chang M.D.

## 2022-05-26 DIAGNOSIS — E29.1 HYPOGONADISM MALE: ICD-10-CM

## 2022-05-26 DIAGNOSIS — I50.32 CHRONIC DIASTOLIC HEART FAILURE (HCC): ICD-10-CM

## 2022-05-26 DIAGNOSIS — E11.40 TYPE 2 DIABETES MELLITUS WITH DIABETIC NEUROPATHY, WITHOUT LONG-TERM CURRENT USE OF INSULIN (HCC): ICD-10-CM

## 2022-05-26 DIAGNOSIS — I48.0 PAROXYSMAL ATRIAL FIBRILLATION (HCC): ICD-10-CM

## 2022-05-26 LAB
ALBUMIN SERPL-MCNC: 3.9 G/DL (ref 3.5–4.6)
ALP BLD-CCNC: 81 U/L (ref 35–104)
ALT SERPL-CCNC: 14 U/L (ref 0–41)
ANION GAP SERPL CALCULATED.3IONS-SCNC: 13 MEQ/L (ref 9–15)
AST SERPL-CCNC: 20 U/L (ref 0–40)
BILIRUB SERPL-MCNC: 0.5 MG/DL (ref 0.2–0.7)
BILIRUBIN URINE: NEGATIVE
BLOOD, URINE: NEGATIVE
BUN BLDV-MCNC: 16 MG/DL (ref 8–23)
CALCIUM SERPL-MCNC: 8.7 MG/DL (ref 8.5–9.9)
CHLORIDE BLD-SCNC: 103 MEQ/L (ref 95–107)
CLARITY: CLEAR
CO2: 22 MEQ/L (ref 20–31)
COLOR: YELLOW
CREAT SERPL-MCNC: 0.7 MG/DL (ref 0.7–1.2)
GFR AFRICAN AMERICAN: >60
GFR NON-AFRICAN AMERICAN: >60
GLOBULIN: 2.7 G/DL (ref 2.3–3.5)
GLUCOSE BLD-MCNC: 108 MG/DL (ref 70–99)
GLUCOSE URINE: NEGATIVE MG/DL
HCT VFR BLD CALC: 37.7 % (ref 42–52)
HEMOGLOBIN: 12.5 G/DL (ref 14–18)
KETONES, URINE: NEGATIVE MG/DL
LEUKOCYTE ESTERASE, URINE: NEGATIVE
MCH RBC QN AUTO: 29.4 PG (ref 27–31.3)
MCHC RBC AUTO-ENTMCNC: 33.2 % (ref 33–37)
MCV RBC AUTO: 88.5 FL (ref 80–100)
NITRITE, URINE: NEGATIVE
PDW BLD-RTO: 14 % (ref 11.5–14.5)
PH UA: 7 (ref 5–9)
PLATELET # BLD: 166 K/UL (ref 130–400)
POTASSIUM SERPL-SCNC: 4.2 MEQ/L (ref 3.4–4.9)
PRO-BNP: 52 PG/ML
PROTEIN UA: NEGATIVE MG/DL
RBC # BLD: 4.26 M/UL (ref 4.7–6.1)
SODIUM BLD-SCNC: 138 MEQ/L (ref 135–144)
SPECIFIC GRAVITY UA: 1.02 (ref 1–1.03)
TOTAL PROTEIN: 6.6 G/DL (ref 6.3–8)
UROBILINOGEN, URINE: 0.2 E.U./DL
WBC # BLD: 8.2 K/UL (ref 4.8–10.8)

## 2022-05-26 NOTE — CARE COORDINATION
Ambulatory Care Coordination Note  5/26/2022  CM Risk Score: 4  Charlson 10 Year Mortality Risk Score: 100%     ACC: Baldo Graham, RN    Summary Note:     I met with Dr Ashley Snyder and Janusz's son at Dr Ordaz Rising request.  Natali Randall has had increasing changes related to memory along with more frequent falls. Janusz's son is worried that he is not taking his medications correctly  His BP during this office visit 80/44. He does have multiple old bruises related to his falls over his right eye thighs and lower legs and ankle  There is concern that he may be taking too many of his blood pressure pills,  Natali Randall adds that the color and shapes of his medication change when he picks up his prescription and this does cause confusion for him. We spoke about the need for safety to avoid further injury or possibly worse. Natali Randall would like to remain as independent as possible  We spoke about long term facilities including, assisted living, senior living apartments, and 54 and over communities. Prior to moving into a facility of over 54 community Janusz's son feels it would be helpful for him to have additional help at home especially for medications and safety review of the home. Natali Randall is a . I will reach out to our social workers to have them help and assist with this case. Lab Results     None              Goals Addressed    None         Prior to Admission medications    Medication Sig Start Date End Date Taking? Authorizing Provider   amoxicillin-clavulanate (AUGMENTIN) 875-125 MG per tablet Take 1 tablet by mouth 2 times daily for 7 days 5/23/22 5/30/22  Nancy Marissailbby Hooper PA-C   ciclopirox [facility]) 8 % solution Apply topically nightly.  5/19/22   Meme Garibay MD   gabapentin (NEURONTIN) 300 MG capsule TAKE 1 CAPSULE DAILY 4/14/22 4/14/23  Meme Garibay MD   doxazosin (CARDURA) 4 MG tablet TAKE ONE-HALF (1/2) TABLET TWICE A DAY 4/14/22   Meme Garibay MD   SPIRIVA HANDIHALER 18 MCG inhalation capsule INHALE THE CONTENTS OF 1 CAPSULE DAILY 4/13/22   Christian Mo MD   albuterol sulfate  (90 Base) MCG/ACT inhaler USE 2 INHALATIONS EVERY 6 HOURS AS NEEDED FOR WHEEZING OR SHORTNESS OF BREATH 4/13/22   Christian Mo MD   ipratropium (ATROVENT) 0.06 % nasal spray 2 sprays by Each Nostril route 3 times daily 3/24/22   Colletta Murdoch, MD   STELARA 90 MG/ML SOSY prefilled syringe  2/28/22   Dale Martinez MD   triamcinolone (KENALOG) 0.1 % cream Apply topically 2 times daily. 3/17/22   Colletta Murdoch, MD   mesalamine (PENTASA) 250 MG extended release capsule Take 2 capsules by mouth 4 times daily 2/28/22   Colletta Murdoch, MD   donepezil (ARICEPT) 5 MG tablet Take 1 tablet by mouth nightly 2/17/22   Mildred James MD   triamcinolone (KENALOG) 0.1 % cream Apply topically 2 times daily. 2/16/22   Colletta Murdoch, MD   tiZANidine (ZANAFLEX) 2 MG tablet Take 1 tablet by mouth every 8 hours as needed (back pain) 1/21/22   STARLA Tucker CNP   montelukast (SINGULAIR) 10 MG tablet TAKE 1 TABLET NIGHTLY 1/12/22   Colletta Murdoch, MD   metoprolol tartrate (LOPRESSOR) 50 MG tablet Take 1.5 po bid 1/12/22   Colletta Murdoch, MD   pravastatin (PRAVACHOL) 40 MG tablet TAKE 1 TABLET NIGHTLY 1/10/22   Colletta Murdoch, MD   nystatin (MYCOSTATIN) 786812 UNIT/GM cream Apply topically 2 times daily Apply topically 2 times daily. 12/27/21   Colletta Murdoch, MD   lisinopril (PRINIVIL;ZESTRIL) 20 MG tablet Take 1 tablet by mouth daily 11/30/21   Colletta Murdoch, MD   apixaban (ELIQUIS) 5 MG TABS tablet Take 1 tablet by mouth 2 times daily 11/30/21   Colletta Murdoch, MD   nitroGLYCERIN (NITROSTAT) 0.4 MG SL tablet up to max of 3 total doses.  If no relief after 1 dose, call 911. 11/3/21   Colletta Murdoch, MD   RESTASIS 0.05 % ophthalmic emulsion Place 1 drop into both eyes daily  8/23/21   Historical Provider, MD   [facility]) 45 MG/0.5ML SOSY prefilled syringe Inject 45 mg into the skin once Historical Provider, MD   furosemide (LASIX) 20 MG tablet Take 1 tablet by mouth daily  Patient taking differently: Take 40 mg by mouth daily Indications: Taking 40 mg tablet daily  5/25/21   Armando Yoder MD   budesonide-formoterol [facility]) 160-4.5 MCG/ACT AERO Inhale 2 puffs into the lungs 2 times daily 2 each LOT [id number] EXP [id number] 4/29/21   Franklin Rondon MD   albuterol (PROVENTIL) (2.5 MG/3ML) 0.083% nebulizer solution Take 3 mLs by nebulization every 6 hours as needed for Wheezing 3/31/21   Franklin Rondon MD   CPAP Machine MISC New cpap supplies mask hose filters etc 1/15/18   Franklin Rondon MD   Saccharomyces boulardii (PROBIOTIC) 250 MG CAPS Take 1 tablet by mouth daily    Historical Provider, MD   esomeprazole Magnesium (NEXIUM) 20 MG PACK Take 20 mg by mouth daily    Historical Provider, MD   aspirin 81 MG tablet Take 81 mg by mouth daily.     Historical Provider, MD       Future Appointments   Date Time Provider Chase Barrera   6/6/2022  1:00 PM Armando Yoder MD [facility] EMERGENCY [facility] AT [place]   6/20/2022  4:15 PM Qasim Costa MD [facility]   9/27/2022  2:00 PM Franklin Rondon MD [facility]

## 2022-05-27 ENCOUNTER — CARE COORDINATION (OUTPATIENT)
Dept: CARE COORDINATION | Age: 72
End: 2022-05-27

## 2022-05-27 LAB
SEX HORMONE BINDING GLOBULIN: 49 NMOL/L (ref 11–80)
TESTOSTERONE FREE-NONMALE: 32 PG/ML (ref 47–244)
TESTOSTERONE TOTAL: 216 NG/DL (ref 220–1000)

## 2022-05-27 NOTE — CARE COORDINATION
Received referral from [facility] requesting to contact patient son Eagle Wolf who is looking for direction on finding patient Assisted Living. Phone call to patient son- Sav Villeda. To discuss referral for assistance with finding Assisted Living. Left message with phone number for call back.        Patricio, ESTEPHANIE  [phone]

## 2022-05-27 NOTE — CARE COORDINATION
Received call back from pt son- Mikala Mcmahon. Discussed Assisted Living plans. Mikala Mcmahon states that the family is in the \"information phase\" of the process. Mikala Mcmahon questions if insurance will cover living arrangements at an Assisted living/independent living facility. Informed Mikala Mcmahon that insurance dose not cover these expenses. Also informed Mikala Mcmahon that each facility has different fees and plans and mentioned that some facilities may also work with the [employer]  to help cover some of these expenses. Encouraged Mikala Mcmahon to connect with the [employer]  to get information on father's current benefits. Mikala Mcmahon verbalized understanding. Discussed getting help at home currently until family decides on a plan. Mikala Mcmahon request to send information by e-mail for faster review at [employer]. Will e-mail private pay options for help in home. Will also e-mail a list of [facility] places. Will follow up with Mikala Mcmahon in 2 weeks.

## 2022-06-03 ENCOUNTER — OFFICE VISIT (OUTPATIENT)
Dept: FAMILY MEDICINE CLINIC | Age: 72
End: 2022-06-03
Payer: MEDICARE

## 2022-06-03 VITALS
TEMPERATURE: 98.6 F | HEIGHT: 70 IN | HEART RATE: 84 BPM | WEIGHT: 251 LBS | DIASTOLIC BLOOD PRESSURE: 80 MMHG | BODY MASS INDEX: 35.93 KG/M2 | SYSTOLIC BLOOD PRESSURE: 128 MMHG | OXYGEN SATURATION: 96 %

## 2022-06-03 DIAGNOSIS — R19.7 DIARRHEA, UNSPECIFIED TYPE: Primary | ICD-10-CM

## 2022-06-03 LAB
Lab: NORMAL
PERFORMING INSTRUMENT: NORMAL
QC PASS/FAIL: NORMAL
SARS-COV-2, POC: NORMAL

## 2022-06-03 PROCEDURE — 87426 SARSCOV CORONAVIRUS AG IA: CPT | Performed by: NURSE PRACTITIONER

## 2022-06-03 PROCEDURE — 1123F ACP DISCUSS/DSCN MKR DOCD: CPT | Performed by: NURSE PRACTITIONER

## 2022-06-03 PROCEDURE — 99213 OFFICE O/P EST LOW 20 MIN: CPT | Performed by: NURSE PRACTITIONER

## 2022-06-03 RX ORDER — LOPERAMIDE HYDROCHLORIDE 2 MG/1
2 CAPSULE ORAL 4 TIMES DAILY PRN
Qty: 20 CAPSULE | Refills: 0 | Status: SHIPPED | OUTPATIENT
Start: 2022-06-03 | End: 2022-06-08

## 2022-06-03 RX ORDER — GREEN TEA/HOODIA GORDONII 315-12.5MG
1 CAPSULE ORAL 2 TIMES DAILY
Qty: 60 TABLET | Refills: 0 | Status: SHIPPED | OUTPATIENT
Start: 2022-06-03 | End: 2022-07-03

## 2022-06-03 ASSESSMENT — ENCOUNTER SYMPTOMS
SHORTNESS OF BREATH: 0
BLOATING: 0
RHINORRHEA: 0
ABDOMINAL PAIN: 0
DIARRHEA: 1
VOMITING: 0
ABDOMINAL DISTENTION: 0
BLOOD IN STOOL: 0
CONSTIPATION: 0
NAUSEA: 0
COLOR CHANGE: 0
FLATUS: 0
COUGH: 0
WHEEZING: 0
ANAL BLEEDING: 0
CHEST TIGHTNESS: 0
BACK PAIN: 0
RECTAL PAIN: 0
SINUS PAIN: 0
SINUS PRESSURE: 0
SORE THROAT: 0
TROUBLE SWALLOWING: 0

## 2022-06-03 NOTE — PATIENT INSTRUCTIONS
Patient Education        Diarrhea: Care Instructions  Overview     Diarrhea is loose, watery stools (bowel movements). The exact cause is often hard to find. Sometimes diarrhea is your body's way of getting rid of what caused an upset stomach. Viruses, food poisoning, and many medicines can cause diarrhea. Some people get diarrhea in response to emotional stress, anxiety, orcertain foods. Almost everyone has diarrhea now and then. It usually isn't serious, and your stools will return to normal soon. The important thing to do is replace thefluids you have lost, so you can prevent dehydration. The doctor has checked you carefully, but problems can develop later. If you notice any problems or new symptoms, get medical treatment right away. Follow-up care is a key part of your treatment and safety. Be sure to make and go to all appointments, and call your doctor if you are having problems. It's also a good idea to know your test results and keep alist of the medicines you take. How can you care for yourself at home?  Watch for signs of dehydration, which means your body has lost too much water. Dehydration is a serious condition and should be treated right away. Signs of dehydration are:  ? Increasing thirst and dry eyes and mouth. ? Feeling faint or lightheaded. ? A smaller amount of urine than normal.   To prevent dehydration, drink plenty of fluids. Choose water and other clear liquids until you feel better. If you have kidney, heart, or liver disease and have to limit fluids, talk with your doctor before you increase the amount of fluids you drink.  When you feel like eating, start with small amounts of food.  The doctor may recommend that you take over-the-counter medicine, such as loperamide (Imodium). Read and follow all instructions on the label. Do not use this medicine if you have bloody diarrhea, a high fever, or other signs of serious illness.  Call your doctor if you think you are having a problem with your medicine. When should you call for help? Call 911 anytime you think you may need emergency care. For example, call if:     You passed out (lost consciousness).      Your stools are maroon or very bloody. Call your doctor now or seek immediate medical care if:     You are dizzy or lightheaded, or you feel like you may faint.      Your stools are black and look like tar, or they have streaks of blood.      You have new or worse belly pain.      You have symptoms of dehydration, such as:  ? Dry eyes and a dry mouth. ? Passing only a little urine. ? Cannot keep fluids down.      You have a new or higher fever. Watch closely for changes in your health, and be sure to contact your doctor if:     Your diarrhea is getting worse.      You see pus in the diarrhea.      You are not getting better after 2 days (48 hours). Where can you learn more? Go to https://[employer]. org and sign in to your [employer] account. Enter Z631 in the [employer] box to learn more about \"Diarrhea: Care Instructions. \"     If you do not have an account, please click on the \"Sign Up Now\" link. Current as of: July 1, 2021               Content Version: 13.2  © 2006-2022 Healthwise, Incorporated. Care instructions adapted under license by [facility] ([facility]). If you have questions about a medical condition or this instruction, always ask your healthcare professional. [place] [address] any warranty or liability for your use of this information.

## 2022-06-03 NOTE — PROGRESS NOTES
Subjective  Leeanne Cortés, 67 y.o. male presents today with:  Chief Complaint   Patient presents with    Other     yesterday pt. got diarrhea and states he wants to know if it is covid. Diarrhea   This is a new problem. The current episode started yesterday. The problem occurs 2 to 4 times per day. The problem has been unchanged. The stool consistency is described as watery. The patient states that diarrhea does not awaken him from sleep. Pertinent negatives include no abdominal pain, arthralgias, bloating, chills, coughing, fever, headaches, increased  flatus, myalgias, sweats, URI, vomiting or weight loss. Nothing aggravates the symptoms. Risk factors include recent antibiotic use. He has tried nothing for the symptoms. There is no history of bowel resection, inflammatory bowel disease, irritable bowel syndrome, malabsorption, a recent abdominal surgery or short gut syndrome. Review of Systems   Constitutional: Negative for activity change, appetite change, chills, diaphoresis, fatigue, fever and weight loss. HENT: Negative for congestion, ear pain, postnasal drip, rhinorrhea, sinus pressure, sinus pain, sore throat and trouble swallowing. Eyes: Negative for visual disturbance. Respiratory: Negative for cough, chest tightness, shortness of breath and wheezing. Cardiovascular: Negative for chest pain and palpitations. Gastrointestinal: Positive for diarrhea. Negative for abdominal distention, abdominal pain, anal bleeding, bloating, blood in stool, constipation, flatus, nausea, rectal pain and vomiting. Genitourinary: Negative for difficulty urinating, dysuria, flank pain, frequency and urgency. Musculoskeletal: Negative for arthralgias, back pain, myalgias, neck pain and neck stiffness. Skin: Negative for color change and rash. Neurological: Negative for dizziness, tremors, seizures, syncope, speech difficulty, weakness, light-headedness, numbness and headaches.        Past Medical History:   Diagnosis Date    A-fib [facility])     Abnormal EMG 12/09/2015    Actinic keratoses     Actinic keratoses     Acute diastolic congestive heart failure (HCC) 1/6/2021    Allergic rhinitis     Anemia 11/18/2014    Arthritis     Chronic back pain     [facility] obstructive pulmonary disease) ([facility]) 08/09/2012    COPD [facility]chronic obstructive [facility] disease) [facility]     Crohns disease     Degenerative disc disease, lumbar     Dermatitis     Diabetes ([address] 03/19/2015    diet controlled    Gastrointestinal problem     GERD (gastroesophageal reflux disease)     History of atrial fibrillation 2006    Dr. Brittni Montalvo History of throat cancer 2004     cleared for reoccurence years ago    Hyperlipidemia 1/21/2014    Hypertension     Hypogonadism male     Lung disease     Mononeuritis multiplex     Mononeuritis multiplex     Nondependent alcohol abuse, in remission 7/22/2020    Obesity (BMI 30-39. 9) 7/24/2019    Osteoarthritis of lumbar spine     Pain of right heel     Paresthesia of foot, bilateral     Prediabetes 12/3/2014    Restless leg syndrome     Seborrheic dermatitis     Seborrheic keratoses, inflamed     Throat cancer ([facility])     throat, had surgery, sees Dr Dottie Macario yearly    Tinea cruris     Tinea pedis     Tinea unguium      Past Surgical History:   Procedure Laterality Date    CARDIAC CATHETERIZATION      CARPAL TUNNEL RELEASE      CATARACT REMOVAL WITH IMPLANT Right 09/09/2019    CATARACT REMOVAL WITH IMPLANT Left 07/22/2019    COLON SURGERY      COLONOSCOPY  04/15/2015    KNEE ARTHROSCOPY      LARYNGECTOMY  2004    UPPER GASTROINTESTINAL ENDOSCOPY  03/24/13    Dr. Bakari Jackson W/NAPOLEON RODRIGUEZ  2002     Social History     Socioeconomic History    Marital status:      Spouse name: Not on file    Number of children: 3    Years of education: 12    Highest education level: High school graduate   Occupational History    Occupation:      Employer: [employer]   Tobacco Use    Smoking status: Former Smoker     Packs/day: 1.00     Years: 25.00     Pack years: 25.00     Types: Cigarettes     Quit date: 10/21/1990     Years since quittin.6    Smokeless tobacco: Never Used   Substance and Sexual Activity    Alcohol use: No     Comment: Attends [employer], 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    Drug use: No    Sexual activity: Not Currently     Partners: Female   Other Topics Concern    Not on file   Social History Narrative    Lives alone. 2 story home     1 step to get in house and 7 steps to get upstairs    Full basement    2 children live nearby and 1 son lives in [place]    No pets     Social Determinants of Health     Financial Resource Strain: Low Risk     Difficulty of Paying Living Expenses: Not hard at all   Food Insecurity: No Food Insecurity    Worried About [address] in the Last Year: Never true    [place] of Food in the Last Year: Never true   Transportation Needs: No Transportation Needs    Lack of Transportation (Medical): No    Lack of Transportation (Non-Medical):  No   Physical Activity: Insufficiently Active    Days of Exercise per Week: 1 day    Minutes of Exercise per Session: 20 min   Stress:     Feeling of Stress : Not on file   Social Connections:     Frequency of Communication with Friends and Family: Not on file    Frequency of Social Gatherings with Friends and Family: Not on file    Attends Holiness Services: Not on file    Active Member of Clubs or Organizations: Not on file    Attends Club or Organization Meetings: Not on file    Marital Status: Not on file   Intimate Partner Violence:     Fear of Current or Ex-Partner: Not on file    Emotionally Abused: Not on file    Physically Abused: Not on file    Sexually Abused: Not on file   Housing Stability:     Unable to Pay for Housing in the Last Year: Not on file    Number of [place] in the Last Year: Not on file    Unstable Housing in the Last Year: Not on file     Family History   Problem Relation Age of Onset    Heart Disease Mother     Liver Disease Mother     High Blood Pressure Mother     Heart Disease Father     Arthritis Father     Cancer Sister         lung     Allergies   Allergen Reactions    Alemtuzumab      Low grade fever  Other reaction(s): Other: See Comments  Low grade fever    Glycopyrrolate-Formoterol Other (See Comments)     Dizzy and felt like heart rate sped up  Other reaction(s): Other: See Comments  Dizzy and felt like heart rate sped up    Mercaptopurine Other (See Comments)    Moxifloxacin Other (See Comments)     Pt states He gets sores in his mouth     Current Outpatient Medications   Medication Sig Dispense Refill    Probiotic Acidophilus (FLORANEX) TABS Take 1 tablet by mouth 2 times daily 60 tablet 0    loperamide (RA ANTI-DIARRHEAL) 2 MG capsule Take 1 capsule by mouth 4 times daily as needed for Diarrhea 20 capsule 0    ciclopirox (PENLAC) 8 % solution Apply topically nightly. 6 mL 1    gabapentin (NEURONTIN) 300 MG capsule TAKE 1 CAPSULE DAILY 90 capsule 3    doxazosin (CARDURA) 4 MG tablet TAKE ONE-HALF (1/2) TABLET TWICE A DAY 90 tablet 3    SPIRIVA HANDIHALER 18 MCG inhalation capsule INHALE THE CONTENTS OF 1 CAPSULE DAILY 90 capsule 3    albuterol sulfate  (90 Base) MCG/ACT inhaler USE 2 INHALATIONS EVERY 6 HOURS AS NEEDED FOR WHEEZING OR SHORTNESS OF BREATH 25.5 g 2    ipratropium (ATROVENT) 0.06 % nasal spray 2 sprays by Each Nostril route 3 times daily 1 each 1    STELARA 90 MG/ML SOSY prefilled syringe       triamcinolone (KENALOG) 0.1 % cream Apply topically 2 times daily. 15 g 1    mesalamine (PENTASA) 250 MG extended release capsule Take 2 capsules by mouth 4 times daily 120 capsule 0    donepezil (ARICEPT) 5 MG tablet Take 1 tablet by mouth nightly 30 tablet 3    triamcinolone (KENALOG) 0.1 % cream Apply topically 2 times daily.  30 g 1    tiZANidine (ZANAFLEX) 2 MG tablet Take 1 tablet by mouth every 8 hours as needed (back pain) 10 tablet 0    montelukast (SINGULAIR) 10 MG tablet TAKE 1 TABLET NIGHTLY 7 tablet 0    metoprolol tartrate (LOPRESSOR) 50 MG tablet Take 1.5 po bid 10 tablet 0    pravastatin (PRAVACHOL) 40 MG tablet TAKE 1 TABLET NIGHTLY 90 tablet 3    nystatin (MYCOSTATIN) 245816 UNIT/GM cream Apply topically 2 times daily Apply topically 2 times daily. 15 g 0    lisinopril (PRINIVIL;ZESTRIL) 20 MG tablet Take 1 tablet by mouth daily 90 tablet 3    apixaban (ELIQUIS) 5 MG TABS tablet Take 1 tablet by mouth 2 times daily 180 tablet 3    nitroGLYCERIN (NITROSTAT) 0.4 MG SL tablet up to max of 3 total doses. If no relief after 1 dose, call 911. 25 tablet 2    RESTASIS 0.05 % ophthalmic emulsion Place 1 drop into both eyes daily       ustekinumab (STELARA) 45 MG/0.5ML SOSY prefilled syringe Inject 45 mg into the skin once      furosemide (LASIX) 20 MG tablet Take 1 tablet by mouth daily (Patient taking differently: Take 40 mg by mouth daily Indications: Taking 40 mg tablet daily ) 30 tablet 2    budesonide-formoterol (SYMBICORT) 160-4.5 MCG/ACT AERO Inhale 2 puffs into the lungs 2 times daily 2 each LOT [id number] EXP [id number] 3 Inhaler 1    albuterol (PROVENTIL) (2.5 MG/3ML) 0.083% nebulizer solution Take 3 mLs by nebulization every 6 hours as needed for Wheezing 120 each 3    CPAP Machine MISC New cpap supplies mask hose filters etc 1 each 0    Saccharomyces boulardii (PROBIOTIC) 250 MG CAPS Take 1 tablet by mouth daily      esomeprazole Magnesium (NEXIUM) 20 MG PACK Take 20 mg by mouth daily      aspirin 81 MG tablet Take 81 mg by mouth daily. No current facility-administered medications for this visit. PMH, Surgical Hx, Family Hx, and Social Hx reviewed and updated. Health Maintenance reviewed.     Objective    Vitals:    06/03/22 1626   BP: 128/80   Site: Right Upper Arm   Position: Sitting   Cuff Size: Medium Adult   Pulse: 84   Temp: 98.6 °F (37 °C) TempSrc: Temporal   SpO2: 96%   Weight: 251 lb (113.9 kg)   Height: 5' 10\" (1.778 m)       Physical Exam  Constitutional:       General: He is not in acute distress. Appearance: Normal appearance. He is normal weight. He is not ill-appearing, toxic-appearing or diaphoretic. HENT:      Head: Normocephalic and atraumatic. Right Ear: External ear normal.      Left Ear: External ear normal.   Eyes:      General:         Right eye: No discharge. Left eye: No discharge. Conjunctiva/sclera: Conjunctivae normal.      Pupils: Pupils are equal, round, and reactive to light. Cardiovascular:      Rate and Rhythm: Normal rate and regular rhythm. Pulses: Normal pulses. Pulmonary:      Effort: Pulmonary effort is normal.      Breath sounds: Normal breath sounds. Abdominal:      General: There is no distension. Palpations: Abdomen is soft. There is no mass. Tenderness: There is no abdominal tenderness. There is no guarding or rebound. Hernia: No hernia is present. Musculoskeletal:         General: No tenderness or signs of injury. Normal range of motion. Cervical back: Normal range of motion and neck supple. No rigidity or tenderness. Skin:     General: Skin is warm and dry. Capillary Refill: Capillary refill takes less than 2 seconds. Neurological:      General: No focal deficit present. Mental Status: He is alert and oriented to person, place, and time. Mental status is at baseline. Assessment & Plan    Diagnosis Orders   1. Diarrhea, unspecified type  POCT COVID-19, Antigen    Probiotic Acidophilus (FLORANEX) TABS    loperamide (RA ANTI-DIARRHEAL) 2 MG capsule     Orders Placed This Encounter   Procedures    POCT COVID-19, Antigen     Order Specific Question:   Is this test for diagnosis or screening? Answer:   Diagnosis of ill patient     Order Specific Question:   Symptomatic for COVID-19 as defined by CDC?      Answer:   Yes     Order Specific Question:   Date of Symptom Onset     Answer:   6/2/2022     Order Specific Question:   Hospitalized for COVID-19? Answer:   No     Order Specific Question:   Admitted to ICU for COVID-19? Answer:   No     Order Specific Question:   Employed in healthcare setting? Answer:   No     Order Specific Question:   Resident in a congregate (group) care setting? Answer:   No     Order Specific Question:   Pregnant: Answer:   No     Order Specific Question:   Previously tested for COVID-19? Answer:   Yes     Orders Placed This Encounter   Medications    Probiotic Acidophilus (FLORANEX) TABS     Sig: Take 1 tablet by mouth 2 times daily     Dispense:  60 tablet     Refill:  0    loperamide (RA ANTI-DIARRHEAL) 2 MG capsule     Sig: Take 1 capsule by mouth 4 times daily as needed for Diarrhea     Dispense:  20 capsule     Refill:  0     There are no discontinued medications. Return in about 1 week (around 6/10/2022), or if symptoms worsen or fail to improve, for follow up with PCP. Reviewed with the patient: current clinical status,medications, activities and diet. Side effects, adverse effects of themedication prescribed today, as well as treatment plan/ rationale and result expectations have been discussed with the patient who expresses understanding and desires to proceed. Close follow up to evaluate treatment results and for coordination of care. I have reviewed the patient's medical history in detail and updated the computerized patient record.      Zahida Real, APRN - CNP

## 2022-06-06 ENCOUNTER — OFFICE VISIT (OUTPATIENT)
Dept: FAMILY MEDICINE CLINIC | Age: 72
End: 2022-06-06
Payer: MEDICARE

## 2022-06-06 VITALS — TEMPERATURE: 97.6 F | HEIGHT: 70 IN | BODY MASS INDEX: 35.93 KG/M2 | WEIGHT: 251 LBS

## 2022-06-06 DIAGNOSIS — L57.0 ACTINIC KERATOSES: Primary | ICD-10-CM

## 2022-06-06 DIAGNOSIS — B35.1 TINEA UNGUIUM: ICD-10-CM

## 2022-06-06 PROCEDURE — 99214 OFFICE O/P EST MOD 30 MIN: CPT | Performed by: FAMILY MEDICINE

## 2022-06-06 PROCEDURE — 1123F ACP DISCUSS/DSCN MKR DOCD: CPT | Performed by: FAMILY MEDICINE

## 2022-06-06 NOTE — PROGRESS NOTES
tablet 0    montelukast (SINGULAIR) 10 MG tablet TAKE 1 TABLET NIGHTLY 7 tablet 0    metoprolol tartrate (LOPRESSOR) 50 MG tablet Take 1.5 po bid 10 tablet 0    pravastatin (PRAVACHOL) 40 MG tablet TAKE 1 TABLET NIGHTLY 90 tablet 3    nystatin (MYCOSTATIN) 879920 UNIT/GM cream Apply topically 2 times daily Apply topically 2 times daily. 15 g 0    lisinopril (PRINIVIL;ZESTRIL) 20 MG tablet Take 1 tablet by mouth daily 90 tablet 3    apixaban (ELIQUIS) 5 MG TABS tablet Take 1 tablet by mouth 2 times daily 180 tablet 3    nitroGLYCERIN (NITROSTAT) 0.4 MG SL tablet up to max of 3 total doses. If no relief after 1 dose, call 911. 25 tablet 2    RESTASIS 0.05 % ophthalmic emulsion Place 1 drop into both eyes daily       ustekinumab (STELARA) 45 MG/0.5ML SOSY prefilled syringe Inject 45 mg into the skin once      furosemide (LASIX) 20 MG tablet Take 1 tablet by mouth daily (Patient taking differently: Take 40 mg by mouth daily Indications: Taking 40 mg tablet daily ) 30 tablet 2    budesonide-formoterol (SYMBICORT) 160-4.5 MCG/ACT AERO Inhale 2 puffs into the lungs 2 times daily 2 each LOT [id number] EXP [id number] Inhaler 1    albuterol (PROVENTIL) (2.5 MG/3ML) 0.083% nebulizer solution Take 3 mLs by nebulization every 6 hours as needed for Wheezing 120 each 3    CPAP Machine MISC New cpap supplies mask hose filters etc 1 each 0    Saccharomyces boulardii (PROBIOTIC) 250 MG CAPS Take 1 tablet by mouth daily      esomeprazole Magnesium (NEXIUM) 20 MG PACK Take 20 mg by mouth daily      aspirin 81 MG tablet Take 81 mg by mouth daily. No current facility-administered medications on file prior to visit.      Past Medical History:   Diagnosis Date    A-fib [facility])     Abnormal EMG 12/09/2015    Actinic keratoses     Actinic keratoses     Acute diastolic congestive heart failure (HCC) 1/6/2021    Allergic rhinitis     Anemia 11/18/2014    Arthritis     Chronic back pain     [facility] obstructive pulmonary disease) ([facility] 2012    [facility]     Crohns disease     Degenerative disc disease, lumbar     Dermatitis     Diabetes ([facility] 2015    diet controlled    Gastrointestinal problem     GERD (gastroesophageal reflux disease)     History of atrial fibrillation     Dr. Whit Sage History of throat cancer 2004     cleared for reoccurence years ago    Hyperlipidemia 2014    Hypertension     Hypogonadism male     Lung disease     Mononeuritis multiplex     Mononeuritis multiplex     Nondependent alcohol abuse, in remission 2020    Obesity (BMI 30-39. 9) 2019    Osteoarthritis of lumbar spine     Pain of right heel     Paresthesia of foot, bilateral     Prediabetes 12/3/2014    Restless leg syndrome     Seborrheic dermatitis     Seborrheic keratoses, inflamed     Throat cancer ([facility])     throat, had surgery, sees Dr Santos Gregory yearly    Tinea cruris     Tinea pedis     Tinea unguium      Past Surgical History:   Procedure Laterality Date    CARDIAC CATHETERIZATION      CARPAL TUNNEL RELEASE      CATARACT EXTRACTION W/  INTRAOCULAR LENS IMPLANT Right 2019    CATARACT EXTRACTION W/  INTRAOCULAR LENS IMPLANT Left 2019    COLON SURGERY      COLONOSCOPY  04/15/2015    KNEE ARTHROSCOPY      LARYNGECTOMY  2004    UPPER GASTROINTESTINAL ENDOSCOPY  13    Dr. 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 2 story home     1 step to get in house and 7 steps to get upstairs    Full basement    2 children live nearby and 1 son lives in [place]    No pets     Social Determinants of Health     Financial Resource Strain: Low Risk     Difficulty of Paying Living Expenses: Not hard at all   Food Insecurity: No Food Insecurity    Worried About [address] [employer] in the Last Year: Never true    [place] of Food in the Last Year: Never true   Transportation Needs: No Transportation Needs    Lack of Transportation (Medical): No    Lack of Transportation (Non-Medical):  No   Physical Activity: Insufficiently Active    Days of Exercise per Week: 1 day    Minutes of Exercise per Session: 20 min   Stress:     Feeling of Stress : Not on file   Social Connections:     Frequency of Communication with Friends and Family: Not on file    Frequency of Social Gatherings with Friends and Family: Not on file    Attends Synagogue Services: Not on file    Active Member of Clubs or Organizations: Not on file    Attends Club or Organization Meetings: Not on file    Marital Status: Not on file   Intimate Partner Violence:     Fear of Current or Ex-Partner: Not on file    Emotionally Abused: Not on file    Physically Abused: Not on file    Sexually Abused: Not on file   Housing Stability:     Unable to Pay for Housing in the Last Year: Not on file    Number of [place] in the Last Year: Not on file    Unstable Housing in the Last Year: Not on file     Family History   Problem Relation Age of Onset    Heart Disease Mother     Liver Disease Mother     High Blood Pressure Mother     Heart Disease Father     Arthritis Father     Cancer Sister         lung     Allergies: Alemtuzumab, Glycopyrrolate-formoterol, Mercaptopurine, and Moxifloxacin    Review of Systems   Constitutional: Negative for chills and fever. Skin: Positive for color change. Negative for rash and wound. Allergic/Immunologic: Negative for environmental allergies, food allergies and immunocompromised state. Hematological: Negative for adenopathy. Does not bruise/bleed easily. Psychiatric/Behavioral: Negative for behavioral problems and sleep disturbance. Objective:   Temp 97.6 °F (36.4 °C)   Ht 5' 10\" (1.778 m)   Wt 251 lb (113.9 kg)   BMI 36.01 kg/m²     Physical Exam  Constitutional:       General: He is not in acute distress. Appearance: Normal appearance. He is well-developed. He is not toxic-appearing. HENT:      Head: Normocephalic and atraumatic. Right Ear: Hearing and tympanic membrane normal.      Left Ear: Hearing and tympanic membrane normal.      Nose: Nose normal. No nasal deformity. Eyes:      General: Lids are normal.         Right eye: No discharge. Left eye: No discharge. Conjunctiva/sclera: Conjunctivae normal.      Pupils: Pupils are equal, round, and reactive to light. Neck:      Thyroid: No thyroid mass or thyromegaly. Vascular: No JVD. Trachea: Trachea and phonation normal.   Cardiovascular:      Rate and Rhythm: Normal rate and regular rhythm. Pulmonary:      Effort: No accessory muscle usage or respiratory distress. Musculoskeletal:      Cervical back: Full passive range of motion without pain. Comments: FROM all large muscle groups and joints as witnessed when walking to exam table, getting on, and getting off the exam table. Skin:     General: Skin is warm and dry. Findings: No rash. Comments: First toe bilaterally irregular texture nail irregular shaped dark in color. Neurological:      Mental Status: He is alert. Motor: No tremor or atrophy.       Gait: Gait normal.   Psychiatric:         Speech: Speech normal.         Behavior: Behavior normal.         Thought Content: Thought content normal.         No results found for this visit on 06/06/22.     Recent Results (from the past 2016 hour(s))   Brain Natriuretic Peptide    Collection Time: 05/26/22 10:43 AM   Result Value Ref Range    Pro-BNP 52 pg/mL   Comprehensive Metabolic Panel    Collection Time: 05/26/22 10:43 AM   Result Value Ref Range    Sodium 138 135 - 144 mEq/L    Potassium 4.2 3.4 - 4.9 mEq/L    Chloride 103 95 - 107 mEq/L    CO2 22 20 - 31 mEq/L    Anion Gap 13 9 - 15 mEq/L    Glucose 108 (H) 70 - 99 mg/dL    BUN 16 8 - 23 mg/dL    CREATININE 0.70 0.70 - 1.20 mg/dL    GFR Non-African American >60.0 >60    GFR  >60.0 >60    Calcium 8.7 8.5 - 9.9 mg/dL    Total Protein 6.6 6.3 - 8.0 g/dL    Albumin 3.9 3.5 - 4.6 g/dL    Total Bilirubin 0.5 0.2 - 0.7 mg/dL    Alkaline Phosphatase 81 35 - 104 U/L    ALT 14 0 - 41 U/L    AST 20 0 - 40 U/L    Globulin 2.7 2.3 - 3.5 g/dL   CBC    Collection Time: 05/26/22 10:43 AM   Result Value Ref Range    WBC 8.2 4.8 - 10.8 K/uL    RBC 4.26 (L) 4.70 - 6.10 M/uL    Hemoglobin 12.5 (L) 14.0 - 18.0 g/dL    Hematocrit 37.7 (L) 42.0 - 52.0 %    MCV 88.5 80.0 - 100.0 fL    MCH 29.4 27.0 - 31.3 pg    MCHC 33.2 33.0 - 37.0 %    RDW 14.0 11.5 - 14.5 %    Platelets 909 839 - 965 K/uL   Testosterone, free, total    Collection Time: 05/26/22 10:43 AM   Result Value Ref Range    Testosterone 216 (L) 220 - 1,000 ng/dL    Sex Hormone Binding 49 11 - 80 nmol/L    Testosterone, Free 32.0 (L) 47 - 244 pg/mL   Urinalysis    Collection Time: 05/26/22 11:19 AM   Result Value Ref Range    Color, UA Yellow Straw/Yellow    Clarity, UA Clear Clear    Glucose, Ur Negative Negative mg/dL    Bilirubin Urine Negative Negative    Ketones, Urine Negative Negative mg/dL    Specific Gravity, UA 1.023 1.005 - 1.030    Blood, Urine Negative Negative    pH, UA 7.0 5.0 - 9.0    Protein, UA Negative Negative mg/dL    Urobilinogen, Urine 0.2 <2.0 E.U./dL    Nitrite, Urine Negative Negative    Leukocyte Esterase, Urine Negative Negative   POCT COVID-19, Antigen    Collection Time: 06/03/22  6:37 PM   Result Value Ref Range    SARS-COV-2, POC Not-Detected Not Detected    Lot Number [id number]     QC Pass/Fail pass     Performing Instrument BD Veritor        [] Pt was seen by provider for      Minutes  Counseling and coordination of care was done for all assessment diagnosis listed for today with patient and any family/friend present. More than 50% of this visit was spent coordinating current care, obtaining information for prior records, and counseling for current plan of action. Assessment:       Diagnosis Orders   1. Actinic keratoses     2. Tinea unguium           No orders of the defined types were placed in this encounter. No orders of the defined types were placed in this encounter. Medication List          Accurate as of June 6, 2022 11:59 PM. If you have any questions, ask your nurse or doctor. CHANGE how you take these medications    furosemide 20 MG tablet  Commonly known as: LASIX  Take 1 tablet by mouth daily  What changed: how much to take        CONTINUE taking these medications    * albuterol (2.5 MG/3ML) 0.083% nebulizer solution  Commonly known as: PROVENTIL  Take 3 mLs by nebulization every 6 hours as needed for Wheezing     * albuterol sulfate  (90 Base) MCG/ACT inhaler  Commonly known as: PROVENTIL;VENTOLIN;PROAIR  USE 2 INHALATIONS EVERY 6 HOURS AS NEEDED FOR WHEEZING OR SHORTNESS OF BREATH     apixaban 5 MG Tabs tablet  Commonly known as: Eliquis  Take 1 tablet by mouth 2 times daily     aspirin 81 MG tablet     budesonide-formoterol 160-4.5 MCG/ACT Aero  Commonly known as: Symbicort  Inhale 2 puffs into the lungs 2 times daily 2 each LOT [id number] EXP [id number]     ciclopirox 8 % solution  Commonly known as: Penlac  Apply topically nightly.      CPAP Machine Misc  New cpap supplies mask hose filters etc     donepezil 5 MG tablet  Commonly known as: Aricept  Take 1 tablet by mouth nightly     doxazosin 4 MG tablet  Commonly known as: CARDURA  TAKE ONE-HALF (1/2) TABLET TWICE A DAY     esomeprazole Magnesium 20 MG Pack  Commonly known as: NEXIUM     gabapentin 300 MG capsule  Commonly known as: NEURONTIN  TAKE 1 CAPSULE DAILY     ipratropium 0.06 % nasal spray  Commonly known as: ATROVENT  2 sprays by Each Nostril route 3 times daily     lisinopril 20 MG tablet  Commonly known as: PRINIVIL;ZESTRIL  Take 1 tablet by mouth daily     loperamide 2 MG capsule  Commonly known as: RA Anti-Diarrheal  Take 1 capsule by mouth 4 times daily as needed for Diarrhea     mesalamine 250 MG extended release capsule  Commonly known as: PENTASA  Take 2 capsules by mouth 4 times daily     metoprolol tartrate 50 MG tablet  Commonly known as: LOPRESSOR  Take 1.5 po bid     montelukast 10 MG tablet  Commonly known as: SINGULAIR  TAKE 1 TABLET NIGHTLY     nitroGLYCERIN 0.4 MG SL tablet  Commonly known as: NITROSTAT  up to max of 3 total doses. If no relief after 1 dose, call 911.     nystatin 964940 UNIT/GM cream  Commonly known as: MYCOSTATIN  Apply topically 2 times daily Apply topically 2 times daily. pravastatin 40 MG tablet  Commonly known as: PRAVACHOL  TAKE 1 TABLET NIGHTLY     Probiotic 250 MG Caps     Probiotic Acidophilus Tabs  Take 1 tablet by mouth 2 times daily     Restasis 0.05 % ophthalmic emulsion  Generic drug: cycloSPORINE     Spiriva HandiHaler 18 MCG inhalation capsule  Generic drug: tiotropium  INHALE THE CONTENTS OF 1 CAPSULE DAILY     * Stelara 45 MG/0.5ML Sosy prefilled syringe  Generic drug: ustekinumab     * Stelara 90 MG/ML Sosy prefilled syringe  Generic drug: ustekinumab     tiZANidine 2 MG tablet  Commonly known as: ZANAFLEX  Take 1 tablet by mouth every 8 hours as needed (back pain)     * triamcinolone 0.1 % cream  Commonly known as: KENALOG  Apply topically 2 times daily.      * triamcinolone 0.1 % cream  Commonly known as: KENALOG  Apply topically 2 times daily. * This list has 6 medication(s) that are the same as other medications prescribed for you. Read the directions carefully, and ask your doctor or other care provider to review them with you. Plan:   Return in about 1 year (around 6/6/2023) for 15 min skin check. Patient Instructions   Patient will be seeing podiatrist about toenails as this condition remains untreated. He has failed 2 courses of oral therapy. No other treatment required today. This note was partially created with the assistance of dictation. This may lead to grammatical or spelling errors. Sher Horne M.D.

## 2022-06-06 NOTE — PATIENT INSTRUCTIONS
Patient will be seeing podiatrist about toenails as this condition remains untreated. He has failed 2 courses of oral therapy. No other treatment required today.

## 2022-06-09 ENCOUNTER — CARE COORDINATION (OUTPATIENT)
Dept: CARE COORDINATION | Age: 72
End: 2022-06-09

## 2022-06-09 NOTE — CARE COORDINATION
Ambulatory Care Coordination Note  6/9/2022  CM Risk Score: 4  Charlson 10 Year Mortality Risk Score: 100%     ACC: Aleshia Sandhu RN    Summary Note:     Lakshmi Perkins feels that he is doing well. He says that he and his son are continuing to look at options for living related to his memory. Lakshmi Perkins admits that he does have issues with his memory  He adds that he is using sticky notes and doing crossword puzzles  I did speak with him about exercise and doing adult memory games like Luminosity. CHF  Lakshmi Perkins is not weighing himself on a regular basis  I explained the purpose of completing this on a regular basis  He says that his feet and ankles are swollen and increase in swelling as the day goes on. He is able to wear his regular shoes. He denies any issues with chest pain, SOB, dizziness or lightheadedness  He is taking 40 mg of lasix every morning but he feels that this is not working. I suggested that he take this medication with a glass of water   He denies adding any additional salt to his food. PLAN:  Lakshmi Perkins will take all medications as prescribed  Lakshmi Perkins will weigh himself at least three times per week  He will monitor his symptoms using the zone tools  Lakshmi Perkins will use the memory games discussed. Lakshmi Perkins and his son will continue to work on long term plans regarding home arrangements. Lab Results     None              Goals Addressed    None         Prior to Admission medications    Medication Sig Start Date End Date Taking? Authorizing Provider   Probiotic Acidophilus [facility]) TABS Take 1 tablet by mouth 2 times daily 6/3/22 7/3/22  STARLA Almeida - CNP   ciclopirox [facility]) 8 % solution Apply topically nightly.  5/19/22   Skinny Pascual MD   gabapentin (NEURONTIN) 300 MG capsule TAKE 1 CAPSULE DAILY 4/14/22 4/14/23  Skinny Pascual MD   doxazosin (CARDURA) 4 MG tablet TAKE ONE-HALF (1/2) TABLET TWICE A DAY 4/14/22   Skinny Pascual MD   SPIRIVA HANDIHALER 18 MCG inhalation capsule INHALE THE CONTENTS OF 1 CAPSULE DAILY 4/13/22   Irene Bahena MD   albuterol sulfate  (90 Base) MCG/ACT inhaler USE 2 INHALATIONS EVERY 6 HOURS AS NEEDED FOR WHEEZING OR SHORTNESS OF BREATH 4/13/22   Irene Bahena MD   ipratropium (ATROVENT) 0.06 % nasal spray 2 sprays by Each Nostril route 3 times daily 3/24/22   Bret Quiet, MD   STELARA 90 MG/ML SOSY prefilled syringe  2/28/22   Historical Provider, MD   triamcinolone (KENALOG) 0.1 % cream Apply topically 2 times daily. 3/17/22   Bret Quiet, MD   mesalamine (PENTASA) 250 MG extended release capsule Take 2 capsules by mouth 4 times daily 2/28/22   Bret Quiet, MD   donepezil (ARICEPT) 5 MG tablet Take 1 tablet by mouth nightly 2/17/22   Sara Painting MD   triamcinolone (KENALOG) 0.1 % cream Apply topically 2 times daily. 2/16/22   Bret Quiet, MD   tiZANidine (ZANAFLEX) 2 MG tablet Take 1 tablet by mouth every 8 hours as needed (back pain) 1/21/22   STARLA Walsh - CNP   montelukast (SINGULAIR) 10 MG tablet TAKE 1 TABLET NIGHTLY 1/12/22   Bret Quiet, MD   metoprolol tartrate (LOPRESSOR) 50 MG tablet Take 1.5 po bid 1/12/22   Bret Quiet, MD   pravastatin (PRAVACHOL) 40 MG tablet TAKE 1 TABLET NIGHTLY 1/10/22   Bret Quiet, MD   nystatin (MYCOSTATIN) 405448 UNIT/GM cream Apply topically 2 times daily Apply topically 2 times daily. 12/27/21   Bret Quiet, MD   lisinopril (PRINIVIL;ZESTRIL) 20 MG tablet Take 1 tablet by mouth daily 11/30/21   Bret Quiet, MD   apixaban (ELIQUIS) 5 MG TABS tablet Take 1 tablet by mouth 2 times daily 11/30/21   Bret Quiet, MD   nitroGLYCERIN (NITROSTAT) 0.4 MG SL tablet up to max of 3 total doses.  If no relief after 1 dose, call 911. 11/3/21   Bret Quiet, MD   RESTASIS 0.05 % ophthalmic emulsion Place 1 drop into both eyes daily  8/23/21   Historical Provider, MD del riotekinumab Sharlee Jewels) 45 MG/0.5ML SOSY prefilled syringe Inject 45 mg into the skin once Historical Provider, MD   furosemide (LASIX) 20 MG tablet Take 1 tablet by mouth daily  Patient taking differently: Take 40 mg by mouth daily Indications: Taking 40 mg tablet daily  5/25/21   Jeet Mak MD   budesonide-formoterol [facility]) 160-4.5 MCG/ACT AERO Inhale 2 puffs into the lungs 2 times daily 2 each LOT [id number] EXP [id number] 4/29/21   Val Loya MD   albuterol (PROVENTIL) (2.5 MG/3ML) 0.083% nebulizer solution Take 3 mLs by nebulization every 6 hours as needed for Wheezing 3/31/21   Val Loya MD   CPAP Machine MISC New cpap supplies mask hose filters etc 1/15/18   Val Loya MD   Saccharomyces boulardii (PROBIOTIC) 250 MG CAPS Take 1 tablet by mouth daily    Historical Provider, MD   esomeprazole Magnesium (NEXIUM) 20 MG PACK Take 20 mg by mouth daily    Historical Provider, MD   aspirin 81 MG tablet Take 81 mg by mouth daily.     Historical Provider, MD       Future Appointments   Date Time Provider Chase Barrera   6/20/2022  4:15 PM Analilia Ross MD [facility]   9/27/2022  2:00 PM Val Loya MD [facility]

## 2022-06-10 ENCOUNTER — CARE COORDINATION (OUTPATIENT)
Dept: CARE COORDINATION | Age: 72
End: 2022-06-10

## 2022-06-10 NOTE — CARE COORDINATION
Phone call to patient son- Keli Smith. For follow up. Left message. Left phone number for call back.      Brandy Alpers, MSW  [phone]

## 2022-06-16 DIAGNOSIS — J44.1 COPD WITH EXACERBATION (HCC): Primary | ICD-10-CM

## 2022-06-16 RX ORDER — ALBUTEROL SULFATE 2.5 MG/3ML
2.5 SOLUTION RESPIRATORY (INHALATION) EVERY 6 HOURS PRN
Qty: 120 EACH | Refills: 3 | Status: SHIPPED | OUTPATIENT
Start: 2022-06-16 | End: 2022-06-17 | Stop reason: SDUPTHER

## 2022-06-16 ASSESSMENT — ENCOUNTER SYMPTOMS: COLOR CHANGE: 1

## 2022-06-16 NOTE — TELEPHONE ENCOUNTER
Rx requested:  Requested Prescriptions     Pending Prescriptions Disp Refills    albuterol (PROVENTIL) (2.5 MG/3ML) 0.083% nebulizer solution 120 each 3     Sig: Take 3 mLs by nebulization every 6 hours as needed for Wheezing       Last Office Visit:   5/25/2022      Next Visit Date:  Future Appointments   Date Time Provider Chase Barrera   6/20/2022  4:15 PM MD Lola Wilkes   9/27/2022  2:00 PM Iban Wilson MD [facility]

## 2022-06-17 ENCOUNTER — OFFICE VISIT (OUTPATIENT)
Dept: FAMILY MEDICINE CLINIC | Age: 72
End: 2022-06-17
Payer: MEDICARE

## 2022-06-17 VITALS
WEIGHT: 254 LBS | TEMPERATURE: 97.4 F | HEIGHT: 69 IN | OXYGEN SATURATION: 97 % | BODY MASS INDEX: 37.62 KG/M2 | DIASTOLIC BLOOD PRESSURE: 84 MMHG | SYSTOLIC BLOOD PRESSURE: 138 MMHG | HEART RATE: 68 BPM

## 2022-06-17 DIAGNOSIS — J44.1 COPD WITH EXACERBATION (HCC): ICD-10-CM

## 2022-06-17 DIAGNOSIS — B35.9 TINEA: Primary | ICD-10-CM

## 2022-06-17 DIAGNOSIS — Z20.822 COVID-19 RULED OUT: ICD-10-CM

## 2022-06-17 DIAGNOSIS — J44.1 COPD WITH EXACERBATION (HCC): Primary | ICD-10-CM

## 2022-06-17 LAB
Lab: NORMAL
PERFORMING INSTRUMENT: NORMAL
QC PASS/FAIL: NORMAL
SARS-COV-2, POC: NORMAL

## 2022-06-17 PROCEDURE — 1123F ACP DISCUSS/DSCN MKR DOCD: CPT | Performed by: NURSE PRACTITIONER

## 2022-06-17 PROCEDURE — 87426 SARSCOV CORONAVIRUS AG IA: CPT | Performed by: NURSE PRACTITIONER

## 2022-06-17 PROCEDURE — 99213 OFFICE O/P EST LOW 20 MIN: CPT | Performed by: NURSE PRACTITIONER

## 2022-06-17 RX ORDER — CLOTRIMAZOLE 1 %
CREAM (GRAM) TOPICAL
Qty: 60 G | Refills: 1 | Status: SHIPPED | OUTPATIENT
Start: 2022-06-17 | End: 2022-06-24

## 2022-06-17 RX ORDER — ALBUTEROL SULFATE 2.5 MG/3ML
2.5 SOLUTION RESPIRATORY (INHALATION) EVERY 6 HOURS PRN
Qty: 360 EACH | Refills: 3 | Status: SHIPPED | OUTPATIENT
Start: 2022-06-17 | End: 2022-06-17 | Stop reason: SDUPTHER

## 2022-06-17 RX ORDER — ALBUTEROL SULFATE 2.5 MG/3ML
2.5 SOLUTION RESPIRATORY (INHALATION) EVERY 6 HOURS
Qty: 360 EACH | Refills: 3 | Status: SHIPPED | OUTPATIENT
Start: 2022-06-17

## 2022-06-17 ASSESSMENT — ENCOUNTER SYMPTOMS: DIARRHEA: 1

## 2022-06-17 NOTE — PATIENT INSTRUCTIONS
Patient Education        Jock Itch: Care Instructions  Overview  Jock itch is a fungal infection of the groin. The fungus that causes jock itch lives on your skin. It often affects male athletes, but anyone can get jock itch. You may get an itchy rash on your inner thighs and rear end (buttocks). It spreads and starts to itch when you sweat or are in steamy showers or lockerrooms. Jock itch should end soon if you keep your skin dry after you clean it. You can treat jock itch at home with antifungal creams that you can buy without aprescription. Follow-up care is a key part of your treatment and safety. Be sure to make and go to all appointments, and call your doctor if you are having problems. It's also a good idea to know your test results and keep alist of the medicines you take. How can you care for yourself at home?  Wash the rash with soap and water. Pat the skin dry.  Put a cool compress on the skin to relieve itching.  Spread antifungal cream over and around the entire edge of the rash. Follow the directions on the package.  To avoid spreading it, wash your hands well after treating or touching the rash.  If your doctor prescribed medicine, take it exactly as directed. Call your doctor if you have any problems with your medicine.  Try not to scratch the rash.  Shower or bathe daily and after you exercise.  Keep your skin dry as much as possible to allow it to heal.   Until your jock itch is cured, wear loose-fitting cotton clothing. Avoid tight underwear, pants, and tights. [employer] your supporters and shorts after every wearing.  Do not share clothing, sports equipment, towels, or sheets to avoid spreading the fungi to other people. To prevent jock itch   Put on socks before you put on underwear if you have athlete's foot. This action helps prevent the fungus on your feet from spreading to your groin.  Wash your workout clothes, underwear, socks, and towels after each use.    Keep your groin, inner thighs, and buttocks clean and dry, especially after you exercise and shower.  Do not borrow or lend clothing, sports equipment, towels, or sheets.  Wear slippers or sandals in locker rooms, showers, and public bathing areas. When should you call for help? Call your doctor now or seek immediate medical care if:     You have signs of infection, such as:  ? Increased pain, swelling, warmth, or redness. ? Red streaks leading from the rash. ? Pus draining from the rash. ? A fever. Watch closely for changes in your health, and be sure to contact your doctor if:     You do not get better as expected. Where can you learn more? Go to [email or web address].[email or web address] org and sign in to your [employer] account. Enter G303 in the VideoMining box to learn more about \"Jock Itch: Care Instructions. \"     If you do not have an account, please click on the \"Sign Up Now\" link. Current as of: November 15, 2021               Content Version: 13.2  © 9537-7904 [employer]. Care instructions adapted under license by [employer]. If you have questions about a medical condition or this instruction, always ask your healthcare professional. [address] any warranty or liability for your use of this information.

## 2022-06-17 NOTE — PROGRESS NOTES
Subjective:      Patient ID: Jyoti Sheikh is a 67 y.o. male who presents today for:  Chief Complaint   Patient presents with    Rash     on arms and groin area        HPI patient comes and requesting COVID test.  He denies any COVID symptoms states that he has intermittent diarrhea however does have history of Crohn's disease. He denies any contact with COVID infected persons. Denies shortness of breath, fever, cough, chest discomfort or abdominal pain. Patient also endorses a rash to his left hand/forearm and groin. There is no specific rash identified there is a slight discoloration of a darker beige color in comparison to the rest of his arm same as with his groin. According to patient's med list is that he is being currently treated with a Kenalog cream and admits to noncompliance. Past Medical History:   Diagnosis Date    A-fib [facility])     Abnormal EMG 12/09/2015    Actinic keratoses     Actinic keratoses     Acute diastolic congestive heart failure ([facility]) 1/6/2021    Allergic rhinitis     Anemia 11/18/2014    Arthritis     Chronic back pain     COPD (chronic obstructive pulmonary disease) ([facility]) 08/09/2012    COPD (chronic obstructive pulmonary disease) ([facility])     Crohns disease     Degenerative disc disease, lumbar     Dermatitis     Diabetes ([facility] [address] 03/19/2015    diet controlled    Gastrointestinal problem     GERD (gastroesophageal reflux disease)     History of atrial fibrillation 2006    Dr. Kevin Hassan History of throat cancer 2004     cleared for reoccurence years ago    Hyperlipidemia 1/21/2014    Hypertension     Hypogonadism male     Lung disease     Mononeuritis multiplex     Mononeuritis multiplex     Nondependent alcohol abuse, in remission 7/22/2020    Obesity (BMI 30-39. 9) 7/24/2019    Osteoarthritis of lumbar spine     Pain of right heel     Paresthesia of foot, bilateral     Prediabetes 12/3/2014    Restless leg syndrome     Seborrheic dermatitis     Seborrheic keratoses, inflamed     Throat cancer (HCC)     throat, had surgery, sees Dr Shruthi Lopez yearly    Tinea cruris     Tinea pedis     Tinea unguium      Past Surgical History:   Procedure Laterality Date    CARDIAC CATHETERIZATION      CARPAL TUNNEL RELEASE      CATARACT REMOVAL WITH IMPLANT Right 2019    CATARACT REMOVAL WITH IMPLANT Left 2019    COLON SURGERY      COLONOSCOPY  04/15/2015    KNEE ARTHROSCOPY      LARYNGECTOMY  2004    UPPER GASTROINTESTINAL ENDOSCOPY  13    Dr. 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 2 story home     1 step to get in house and 7 steps to get upstairs    Full basement    2 children live nearby and 1 son lives in [place]    No pets     Social Determinants of Health     Financial Resource Strain: Low Risk     Difficulty of Paying Living Expenses: Not hard at all   Food Insecurity: No Food Insecurity    Worried About [facility] in the Last Year: Never true    [place] of Food in the Last Year: Never true   Transportation Needs: No Transportation Needs    Lack of Transportation (Medical): No    Lack of Transportation (Non-Medical):  No   Physical Activity: Insufficiently Active    Days of Exercise per Week: 1 day    Minutes of Exercise per Session: 20 min   Stress:     Feeling of Stress : Not on file   Social Connections:     Frequency of Communication with Friends and Family: Not on file    Frequency of Social Gatherings with Friends and Family: Not on file    Attends [employer] Services: Not on file    Active Member of Clubs or Organizations: Not on file    Attends Club or Organization Meetings: Not on file    Marital Status: Not on file   Intimate Partner Violence:     Fear of Current or Ex-Partner: Not on file    Emotionally Abused: Not on file    Physically Abused: Not on file    Sexually Abused: Not on file   Housing Stability:     Unable to Pay for Housing in the Last Year: Not on file    Number of [place] in the Last Year: Not on file    Unstable Housing in the Last Year: Not on file     Family History   Problem Relation Age of Onset    Heart Disease Mother     Liver Disease Mother     High Blood Pressure Mother     Heart Disease Father     Arthritis Father     Cancer Sister         lung     Allergies   Allergen Reactions    Alemtuzumab      Low grade fever  Other reaction(s): Other: See Comments  Low grade fever    Glycopyrrolate-Formoterol Other (See Comments)     Dizzy and felt like heart rate sped up  Other reaction(s): Other: See Comments  Dizzy and felt like heart rate sped up    Mercaptopurine Other (See Comments)    Moxifloxacin Other (See Comments)     Pt states He gets sores in his mouth     Current Outpatient Medications   Medication Sig Dispense Refill    albuterol (PROVENTIL) (2.5 MG/3ML) 0.083% nebulizer solution Take 3 mLs by nebulization every 6 hours As directed 360 each 3    clotrimazole (LOTRIMIN AF) 1 % cream Apply topically 2 times daily. 60 g 1    Probiotic Acidophilus (FLORANEX) TABS Take 1 tablet by mouth 2 times daily 60 tablet 0    ciclopirox (PENLAC) 8 % solution Apply topically nightly.  6 mL 1    gabapentin (NEURONTIN) 300 MG capsule TAKE 1 CAPSULE DAILY 90 capsule 3    doxazosin (CARDURA) 4 MG tablet TAKE ONE-HALF (1/2) TABLET TWICE A DAY 90 tablet 3    SPIRIVA HANDIHALER 18 MCG inhalation capsule INHALE THE CONTENTS OF 1 CAPSULE DAILY 90 capsule 3    albuterol sulfate  (90 Base) MCG/ACT inhaler USE 2 INHALATIONS EVERY 6 HOURS AS NEEDED FOR WHEEZING OR SHORTNESS OF BREATH 25.5 g 2    ipratropium (ATROVENT) 0.06 % nasal spray 2 sprays by Each Nostril route 3 times daily 1 each 1    STELARA 90 MG/ML SOSY prefilled syringe       triamcinolone (KENALOG) 0.1 % cream Apply topically 2 times daily. 15 g 1    mesalamine (PENTASA) 250 MG extended release capsule Take 2 capsules by mouth 4 times daily 120 capsule 0    donepezil (ARICEPT) 5 MG tablet Take 1 tablet by mouth nightly 30 tablet 3    triamcinolone (KENALOG) 0.1 % cream Apply topically 2 times daily. 30 g 1    tiZANidine (ZANAFLEX) 2 MG tablet Take 1 tablet by mouth every 8 hours as needed (back pain) 10 tablet 0    montelukast (SINGULAIR) 10 MG tablet TAKE 1 TABLET NIGHTLY 7 tablet 0    metoprolol tartrate (LOPRESSOR) 50 MG tablet Take 1.5 po bid 10 tablet 0    pravastatin (PRAVACHOL) 40 MG tablet TAKE 1 TABLET NIGHTLY 90 tablet 3    nystatin (MYCOSTATIN) 505321 UNIT/GM cream Apply topically 2 times daily Apply topically 2 times daily. 15 g 0    lisinopril (PRINIVIL;ZESTRIL) 20 MG tablet Take 1 tablet by mouth daily 90 tablet 3    apixaban (ELIQUIS) 5 MG TABS tablet Take 1 tablet by mouth 2 times daily 180 tablet 3    nitroGLYCERIN (NITROSTAT) 0.4 MG SL tablet up to max of 3 total doses.  If no relief after 1 dose, call 911. 25 tablet 2    RESTASIS 0.05 % ophthalmic emulsion Place 1 drop into both eyes daily       ustekinumab (STELARA) 45 MG/0.5ML SOSY prefilled syringe Inject 45 mg into the skin once      furosemide (LASIX) 20 MG tablet Take 1 tablet by mouth daily (Patient taking differently: Take 40 mg by mouth daily Indications: Taking 40 mg tablet daily ) 30 tablet 2  budesonide-formoterol (SYMBICORT) 160-4.5 MCG/ACT AERO Inhale 2 puffs into the lungs 2 times daily 2 each LOT [id number] [id number] 3 Inhaler 1    CPAP Machine MISC New cpap supplies mask hose filters etc 1 each 0    Saccharomyces boulardii (PROBIOTIC) 250 MG CAPS Take 1 tablet by mouth daily      esomeprazole Magnesium (NEXIUM) 20 MG PACK Take 20 mg by mouth daily      aspirin 81 MG tablet Take 81 mg by mouth daily. No current facility-administered medications for this visit. Review of Systems   Gastrointestinal: Positive for diarrhea. Skin: Positive for rash. All other systems reviewed and are negative. Objective:   /84 (Site: Left Upper Arm, Position: Sitting, Cuff Size: Medium Adult)   Pulse 68   Temp 97.4 °F (36.3 °C) (Temporal)   Ht 5' 9\" (1.753 m)   Wt 254 lb (115.2 kg)   SpO2 97%   BMI 37.51 kg/m²     Physical Exam  Constitutional:       General: He is not in acute distress. Appearance: Normal appearance. He is well-developed. HENT:      Head: Normocephalic and atraumatic. Nose: Nose normal.   Eyes:      Pupils: Pupils are equal, round, and reactive to light. Cardiovascular:      Rate and Rhythm: Normal rate and regular rhythm. Pulses: Normal pulses. Heart sounds: Normal heart sounds. Pulmonary:      Effort: Pulmonary effort is normal.      Breath sounds: Normal breath sounds. Abdominal:      General: Bowel sounds are normal.      Palpations: Abdomen is soft. Musculoskeletal:         General: Normal range of motion. Cervical back: Normal range of motion and neck supple. Skin:     General: Skin is warm and dry. Capillary Refill: Capillary refill takes less than 2 seconds. Neurological:      General: No focal deficit present. Mental Status: He is alert and oriented to person, place, and time. Psychiatric:         Behavior: Behavior normal.         Assessment:       Diagnosis Orders   1.  Tinea  clotrimazole (Justin Bone AF) 1 % cream   2. COVID-19 ruled out  POCT COVID-19, Antigen     No results found for this visit on 06/17/22. Plan:     Assessment & Plan   Lakshmi Perkins was seen today for rash. Diagnoses and all orders for this visit:    Tinea  -     clotrimazole (LOTRIMIN AF) 1 % cream; Apply topically 2 times daily. COVID-19 ruled out  -     POCT COVID-19, Antigen      Orders Placed This Encounter   Procedures    POCT COVID-19, Antigen     Order Specific Question:   Is this test for diagnosis or screening? Answer:   Screening     Order Specific Question:   Symptomatic for COVID-19 as defined by CDC? Answer:   No     Order Specific Question:   Date of Symptom Onset     Answer:   N/A     Order Specific Question:   Hospitalized for COVID-19? Answer:   No     Order Specific Question:   Admitted to ICU for COVID-19? Answer:   No     Order Specific Question:   Employed in healthcare setting? Answer:   No     Order Specific Question:   Resident in a congregate (group) care setting? Answer:   No     Order Specific Question:   Pregnant: Answer:   No     Order Specific Question:   Previously tested for COVID-19? Answer:   Yes     Orders Placed This Encounter   Medications    clotrimazole (LOTRIMIN AF) 1 % cream     Sig: Apply topically 2 times daily. Dispense:  60 g     Refill:  1     There are no discontinued medications. No follow-ups on file. Reviewed with the patient/family: current clinical status & medications. Side effects of the medication prescribed today, as well as treatment plan/rationale and result expectations have been discussed with the patient/family who expresses understanding. Patient will be discharged home in stable condition. Follow up with PCP to evaluate treatment results or return if symptoms worsen or fail to improve. Discussed signs and symptoms which require immediate follow-up in ED/call to 911. Understanding verbalized.      I have reviewed the patient's medical history in detail and updated the computerized patient record.     STARLA Ruano - CNP

## 2022-06-20 ENCOUNTER — OFFICE VISIT (OUTPATIENT)
Dept: FAMILY MEDICINE CLINIC | Age: 72
End: 2022-06-20
Payer: MEDICARE

## 2022-06-20 ENCOUNTER — OFFICE VISIT (OUTPATIENT)
Dept: NEUROLOGY | Age: 72
End: 2022-06-20
Payer: MEDICARE

## 2022-06-20 ENCOUNTER — CARE COORDINATION (OUTPATIENT)
Dept: CARE COORDINATION | Age: 72
End: 2022-06-20

## 2022-06-20 VITALS
OXYGEN SATURATION: 95 % | HEIGHT: 69 IN | TEMPERATURE: 97.9 F | HEART RATE: 60 BPM | SYSTOLIC BLOOD PRESSURE: 118 MMHG | BODY MASS INDEX: 37.36 KG/M2 | DIASTOLIC BLOOD PRESSURE: 60 MMHG | WEIGHT: 252.2 LBS

## 2022-06-20 VITALS
HEART RATE: 61 BPM | HEIGHT: 69 IN | DIASTOLIC BLOOD PRESSURE: 64 MMHG | WEIGHT: 252 LBS | BODY MASS INDEX: 37.33 KG/M2 | SYSTOLIC BLOOD PRESSURE: 123 MMHG

## 2022-06-20 DIAGNOSIS — R20.2 PARESTHESIA: ICD-10-CM

## 2022-06-20 DIAGNOSIS — G31.84 MCI (MILD COGNITIVE IMPAIRMENT): Primary | ICD-10-CM

## 2022-06-20 DIAGNOSIS — R41.3 MEMORY LOSS: ICD-10-CM

## 2022-06-20 DIAGNOSIS — L30.9 DERMATITIS: ICD-10-CM

## 2022-06-20 DIAGNOSIS — B35.6 TINEA CRURIS: Primary | ICD-10-CM

## 2022-06-20 PROCEDURE — 99213 OFFICE O/P EST LOW 20 MIN: CPT | Performed by: PSYCHIATRY & NEUROLOGY

## 2022-06-20 PROCEDURE — 1123F ACP DISCUSS/DSCN MKR DOCD: CPT | Performed by: PSYCHIATRY & NEUROLOGY

## 2022-06-20 PROCEDURE — 1123F ACP DISCUSS/DSCN MKR DOCD: CPT | Performed by: FAMILY MEDICINE

## 2022-06-20 PROCEDURE — 99213 OFFICE O/P EST LOW 20 MIN: CPT | Performed by: FAMILY MEDICINE

## 2022-06-20 RX ORDER — FLUCONAZOLE 100 MG/1
100 TABLET ORAL DAILY
Qty: 7 TABLET | Refills: 0 | Status: SHIPPED | OUTPATIENT
Start: 2022-06-20 | End: 2022-06-27

## 2022-06-20 RX ORDER — CLOTRIMAZOLE AND BETAMETHASONE DIPROPIONATE 10; .64 MG/G; MG/G
CREAM TOPICAL
Qty: 45 G | Refills: 1 | Status: SHIPPED | OUTPATIENT
Start: 2022-06-20

## 2022-06-20 RX ORDER — DONEPEZIL HYDROCHLORIDE 5 MG/1
5 TABLET, FILM COATED ORAL NIGHTLY
Qty: 30 TABLET | Refills: 3 | Status: SHIPPED | OUTPATIENT
Start: 2022-06-20 | End: 2022-08-02 | Stop reason: SDUPTHER

## 2022-06-20 RX ORDER — METHYLPREDNISOLONE 4 MG/1
TABLET ORAL
Qty: 1 KIT | Refills: 0 | Status: SHIPPED | OUTPATIENT
Start: 2022-06-20 | End: 2022-08-02 | Stop reason: ALTCHOICE

## 2022-06-20 ASSESSMENT — ENCOUNTER SYMPTOMS
VOMITING: 0
NAUSEA: 0
COLOR CHANGE: 0
BACK PAIN: 0
CHOKING: 0
TROUBLE SWALLOWING: 0
SHORTNESS OF BREATH: 0
PHOTOPHOBIA: 0

## 2022-06-20 NOTE — PROGRESS NOTES
Subjective:      Patient ID: Yannick Branham is a 67 y.o. male who presents today for:  Chief Complaint   Patient presents with    Follow-up     4 month f/u memory loss, patients is doing okay. memory issues have been constint, issues with directions or details        HPI 70-year-old right-handed gentleman now with a history of memory loss. Patient was seen here in February and there appeared to be some suggestion of specific amnestic syndrome. Recommended an MRI of the brain shows chronic microvascular changes which may be associated with his underlying risk factors for vascular disease and can cause memory issues. When last seen his MMSE score was recorded 28 patient was started on Aricept 5 mg.  2 falls which are both mechanical.  He at one point in time to take an extra medication which was his blood pressure medication and he had low blood pressure. He was evaluated at primary care. Otherwise he is functioning well he has help that comes over.   His son is not quite concerned about his function though he is quite concerned of the falls which are mechanical.    Past Medical History:   Diagnosis Date    A-fib [facility])     Abnormal EMG 12/09/2015    Actinic keratoses     Actinic keratoses     Acute diastolic congestive heart failure (HCC) 1/6/2021    Allergic rhinitis     Anemia 11/18/2014    Arthritis     Chronic back pain     COPD (chronic obstructive pulmonary disease) ([facility] [address] 08/09/2012    COPD [facility]     Crohns disease     Degenerative disc disease, lumbar     Dermatitis     Diabetes ([facility] [address] 03/19/2015    diet controlled    Gastrointestinal problem     GERD (gastroesophageal reflux disease)     History of atrial fibrillation 2006    Dr. Gustavo Garcia History of throat cancer 2004     cleared for reoccurence years ago    Hyperlipidemia 1/21/2014    Hypertension     Hypogonadism male     Lung disease     Mononeuritis multiplex     Mononeuritis multiplex     Nondependent alcohol abuse, in remission 2020    Obesity (BMI 30-39. 9) 2019    Osteoarthritis of lumbar spine     Pain of right heel     Paresthesia of foot, bilateral     Prediabetes 12/3/2014    Restless leg syndrome     Seborrheic dermatitis     Seborrheic keratoses, inflamed     Throat cancer (HCC)     throat, had surgery, sees Dr Glynn Putnam yearly    Tinea cruris     Tinea pedis     Tinea unguium      Past Surgical History:   Procedure Laterality Date    CARDIAC CATHETERIZATION      CARPAL TUNNEL RELEASE      CATARACT REMOVAL WITH IMPLANT Right 2019    CATARACT REMOVAL WITH IMPLANT Left 2019    COLON SURGERY      COLONOSCOPY  04/15/2015    KNEE ARTHROSCOPY      LARYNGECTOMY  2004    UPPER GASTROINTESTINAL ENDOSCOPY  13    Dr. 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     2 story home     1 step to get in house and 7 steps to get upstairs    Full basement    2 children live nearby and 1 son lives in [place]    No pets     Social Determinants of Health     Financial Resource Strain: Low Risk     Difficulty of Paying Living Expenses: Not hard at all   Food Insecurity: No Food Insecurity    Worried About Running Out of Food in the Last Year: Never true    Ran Out of Food in the Last Year: Never true   Transportation Needs: No Transportation Needs    Lack of Transportation (Medical): No    Lack of Transportation (Non-Medical): No   Physical Activity: Insufficiently Active    Days of Exercise per Week: 1 day    Minutes of Exercise per Session: 20 min   Stress:     Feeling of Stress : Not on file   Social Connections:     Frequency of Communication with Friends and Family: Not on file    Frequency of Social Gatherings with Friends and Family: Not on file    Attends Denominational Services: Not on file    Active Member of Clubs or Organizations: Not on file    Attends Club or Organization Meetings: Not on file    Marital Status: Not on file   Intimate Partner Violence:     Fear of Current or Ex-Partner: Not on file    Emotionally Abused: Not on file    Physically Abused: Not on file    Sexually Abused: Not on file   Housing Stability:     Unable to Pay for Housing in the Last Year: Not on file    Number of [place] in the Last Year: Not on file    Unstable Housing in the Last Year: Not on file     Family History   Problem Relation Age of Onset    Heart Disease Mother     Liver Disease Mother     High Blood Pressure Mother     Heart Disease Father     Arthritis Father     Cancer Sister         lung     Allergies   Allergen Reactions    Alemtuzumab      Low grade fever  Other reaction(s): Other: See Comments  Low grade fever    Glycopyrrolate-Formoterol Other (See Comments)     Dizzy and felt like heart rate sped up  Other reaction(s): Other: See Comments  Dizzy and felt like heart rate sped up    Mercaptopurine Other (See Comments)    Moxifloxacin Other (See Comments)     Pt states He gets sores in his mouth       Current Outpatient Medications   Medication Sig Dispense Refill    clotrimazole-betamethasone (LOTRISONE) 1-0.05 % cream Apply topically 2 times daily.  45 g 1    fluconazole (DIFLUCAN) 100 MG tablet Take 1 tablet by mouth daily for 7 days 7 tablet 0    methylPREDNISolone (MEDROL DOSEPACK) 4 MG tablet Take by mouth. 1 kit 0    albuterol (PROVENTIL) (2.5 MG/3ML) 0.083% nebulizer solution Take 3 mLs by nebulization every 6 hours As directed 360 each 3    clotrimazole (LOTRIMIN AF) 1 % cream Apply topically 2 times daily. 60 g 1    Probiotic Acidophilus (FLORANEX) TABS Take 1 tablet by mouth 2 times daily 60 tablet 0    ciclopirox (PENLAC) 8 % solution Apply topically nightly. 6 mL 1    gabapentin (NEURONTIN) 300 MG capsule TAKE 1 CAPSULE DAILY 90 capsule 3    doxazosin (CARDURA) 4 MG tablet TAKE ONE-HALF (1/2) TABLET TWICE A DAY 90 tablet 3    SPIRIVA HANDIHALER 18 MCG inhalation capsule INHALE THE CONTENTS OF 1 CAPSULE DAILY 90 capsule 3    albuterol sulfate  (90 Base) MCG/ACT inhaler USE 2 INHALATIONS EVERY 6 HOURS AS NEEDED FOR WHEEZING OR SHORTNESS OF BREATH 25.5 g 2    ipratropium (ATROVENT) 0.06 % nasal spray 2 sprays by Each Nostril route 3 times daily 1 each 1    STELARA 90 MG/ML SOSY prefilled syringe       mesalamine (PENTASA) 250 MG extended release capsule Take 2 capsules by mouth 4 times daily 120 capsule 0    donepezil (ARICEPT) 5 MG tablet Take 1 tablet by mouth nightly 30 tablet 3    triamcinolone (KENALOG) 0.1 % cream Apply topically 2 times daily.  30 g 1    montelukast (SINGULAIR) 10 MG tablet TAKE 1 TABLET NIGHTLY 7 tablet 0    metoprolol tartrate (LOPRESSOR) 50 MG tablet Take 1.5 po bid 10 tablet 0    pravastatin (PRAVACHOL) 40 MG tablet TAKE 1 TABLET NIGHTLY 90 tablet 3    lisinopril (PRINIVIL;ZESTRIL) 20 MG tablet Take 1 tablet by mouth daily 90 tablet 3    apixaban (ELIQUIS) 5 MG TABS tablet Take 1 tablet by mouth 2 times daily 180 tablet 3    RESTASIS 0.05 % ophthalmic emulsion Place 1 drop into both eyes daily       furosemide (LASIX) 20 MG tablet Take 1 tablet by mouth daily (Patient taking differently: Take 40 mg by mouth daily Indications: Taking 40 mg tablet daily ) 30 tablet 2    budesonide-formoterol (SYMBICORT) 160-4.5 MCG/ACT AERO Inhale 2 puffs into the lungs 2 times daily 2 each LOT [id number] [id number] 3 Inhaler 1    CPAP Machine MISC New cpap supplies mask hose filters etc 1 each 0    esomeprazole Magnesium (NEXIUM) 20 MG PACK Take 20 mg by mouth daily      aspirin 81 MG tablet Take 81 mg by mouth daily.  nystatin (MYCOSTATIN) 026344 UNIT/GM cream Apply topically 2 times daily Apply topically 2 times daily. (Patient not taking: Reported on 6/20/2022) 15 g 0    nitroGLYCERIN (NITROSTAT) 0.4 MG SL tablet up to max of 3 total doses. If no relief after 1 dose, call 911. (Patient not taking: Reported on 6/20/2022) 25 tablet 2     No current facility-administered medications for this visit. Review of Systems   Constitutional: Negative for fever. HENT: Negative for ear pain, tinnitus and trouble swallowing. Eyes: Negative for photophobia and visual disturbance. Respiratory: Negative for choking and shortness of breath. Cardiovascular: Negative for chest pain and palpitations. Gastrointestinal: Negative for nausea and vomiting. Musculoskeletal: Negative for back pain, gait problem, joint swelling, myalgias, neck pain and neck stiffness. Skin: Negative for color change. Allergic/Immunologic: Negative for food allergies. Neurological: Negative for dizziness, tremors, seizures, syncope, facial asymmetry, speech difficulty, weakness, light-headedness, numbness and headaches. Psychiatric/Behavioral: Negative for behavioral problems, confusion, hallucinations and sleep disturbance. Objective:   /64 (Site: Left Upper Arm, Position: Sitting, Cuff Size: Medium Adult)   Pulse 61   Ht 5' 9\" (1.753 m)   Wt 252 lb (114.3 kg)   BMI 37.21 kg/m²     Physical Exam  Vitals reviewed. Eyes:      Pupils: Pupils are equal, round, and reactive to light. Cardiovascular:      Rate and Rhythm: Normal rate and regular rhythm.       Heart sounds: No murmur heard. Pulmonary:      Effort: Pulmonary effort is normal.      Breath sounds: Normal breath sounds. Abdominal:      General: Bowel sounds are normal.   Musculoskeletal:         General: Normal range of motion. Cervical back: Normal range of motion. Skin:     General: Skin is warm. Neurological:      Mental Status: He is alert and oriented to person, place, and time. Cranial Nerves: No cranial nerve deficit. Sensory: No sensory deficit. Motor: No abnormal muscle tone. Coordination: Coordination normal.      Deep Tendon Reflexes: Reflexes are normal and symmetric. Babinski sign absent on the right side. Babinski sign absent on the left side. Psychiatric:         Mood and Affect: Mood normal.         CT Head WO Contrast    Result Date: 5/23/2022  EXAMINATION: CT HEAD WO CONTRAST  DATE AND TIME:5/23/2022 11:05 AM CLINICAL HISTORY: Severe headache.  fall out of bed  COMPARISON: May 11, 2022 TECHNIQUE:Axial CT from skull base to vertex without  contrast..  All CT scans at this facility use dose modulation, iterative reconstruction, and/or weight based dosing when appropriate to reduce radiation dose to as low as reasonably achievable. Some of this report was completed using  Power [employer]ribe [id number] and may include unintended errors with respect to translation of words, typographical errors or grammatical errors which may not have been identified prior to the finalization of this report. FINDINGS CSF spaces:  CSF spaces are age-appropriate. Ventricles midline in position                      Brain parenchyma: No  parenchymal mass,  mass effect,  signs of acute infarct, or extra-axial fluid. There are mild patchy nonspecific white matter hypodensities that may be related to microvascular ischemic change or  normal aging. Hemorrhage:No acute intracranial hemorrhage. Skull base:  The bony calvarium and skull base are normal.   The visualized paranasal sinuses and mastoids are clear. NO ACUTE INTRACRANIAL PATHOLOGY. CT FACIAL BONES WO CONTRAST    Result Date: 5/23/2022  EXAMINATION: CT FACIAL BONES WO CONTRAST DATE AND TIME:5/23/2022 11:05 AM CLINICAL HISTORY: Acute trauma. Facial pain  fall out of bed  COMPARISON: None Technique: Axial 2mm thick contiguous scans of the facial bones and paranasal sinuses were acquired. Coronal and sagittal reformats were generated and reviewed. All CT scans at this facility use dose modulation, iterative reconstruction, and/or weight based dosing when appropriate to reduce radiation dose to as low as reasonably achievable. FINDINGS       Osseous structures: Probable acute nondisplaced fracture through the right nasal bone. Chronic deformity anterior left nasal bone. Otherwise no acute nasal fracture. The remaining osseous structures about the mid face and orbits including the maxilla, zygoma, mandibular, palatine, sphenoid, lacrimal, and orbital bones are intact without fracture or subluxation. The mandible and TMJs are intact. Orbits: Both globes, extraocular muscles, optic nerves and retrobulbar fat appears normal.       Paranasal sinuses: Mild mucosal thickening in the anterior ethmoid air cells            UNDISPLACED RIGHT NASAL FRACTURE      CT CERVICAL SPINE WO CONTRAST    Result Date: 5/23/2022  INDICATION: 66-year-old male status post trauma. COMPARISON: 12/14/2020 TECHNIQUE: Multidetector CT imaging of the cervical spine was performed without administration of intravenous contrast. Coronal and sagittal reformatted images were obtained. Automated dose exposure control was used for this exam. FINDINGS: There is unremarkable alignment of the columns of the cervical spine visualized. No evidence of spondylolisthesis is seen. Atlantodental distance is preserved.  The craniocervical junction is normal in appearance. There is no prevertebral soft tissue edema. Cervical vertebral bodies demonstrate no evidence of compression deformity, multilevel degenerative endplate changes visualized with osteophyte formation seen. Multilevel degenerative disc osteophyte complex, uncovertebral disease and hypertrophic changes in the facet joints is seen. Decreased intervertebral disc height visualized most prominent at C6-C7 and C7-T1. Scattered cervical lymph nodes visualized. Visualized portions of bilateral lung apices are grossly clear are unremarkable. There is prominence of the right jugular foramen visualized in the skull base. Extensive atherosclerotic calcifications visualized at the carotid bifurcations bilaterally but more prominent involving the distal left common carotid bifurcation. Degenerative changes of the cervical spine, no evidence of acute osseous abnormality is seen. .    XR SHOULDER LEFT (MIN 2 VIEWS)    Result Date: 5/23/2022  INDICATION: 49-year-old male status post trauma COMPARISON: None available. TECHNIQUE: 3 views of the left shoulder were obtained. FINDINGS: No evidence of cortical irregularity and lucency to suggest infection, no evidence of lytic or sclerotic bone lesion is seen. The scapulohumeral arch is well maintained, no evidence of dislocation is seen. Degenerative changes visualized most prominent in the acromial clavicular joint and in the greater tuberosity. The overlying soft tissues are unremarkable. The underlying right upper lung field is unremarkable. No interval changes. No acute osseous abnormality.       Lab Results   Component Value Date    WBC 8.2 05/26/2022    RBC 4.26 05/26/2022    HGB 12.5 05/26/2022    HCT 37.7 05/26/2022    MCV 88.5 05/26/2022    MCH 29.4 05/26/2022    MCHC 33.2 05/26/2022    RDW 14.0 05/26/2022     05/26/2022    MPV 10.5 06/23/2015     Lab Results   Component Value Date     05/26/2022    K 4.2 05/26/2022     05/26/2022    CO2 22 05/26/2022    BUN 16 05/26/2022    CREATININE 0.70 05/26/2022    GFRAA >60.0 05/26/2022    LABGLOM >60.0 05/26/2022    GLUCOSE 108 05/26/2022    GLUCOSE 151 07/31/2021    PROT 6.6 05/26/2022    LABALBU 3.9 05/26/2022    LABALBU 3.7 07/28/2021    CALCIUM 8.7 05/26/2022    BILITOT 0.5 05/26/2022    ALKPHOS 81 05/26/2022    AST 20 05/26/2022    ALT 14 05/26/2022     Lab Results   Component Value Date    PROTIME 13.2 07/29/2021    INR 1.1 07/29/2021     Lab Results   Component Value Date    TSH 1.380 03/03/2021    MKPMXGZB37 292 12/22/2015    FOLATE 10.6 12/22/2015    IRON 74 04/21/2016    TIBC 337 04/21/2016     Lab Results   Component Value Date    TRIG 111 03/08/2019    HDL 35 07/07/2021    LDLCALC 53 07/07/2021     No results found for: LABAMPH, BARBSCNU, LABBENZ, CANNAB, COCAINESCRN, LABMETH, OPIATESCREENURINE, PHENCYCLIDINESCREENURINE, PPXUR, ETOH  No results found for: LITHIUM, DILFRTOT, VALPROATE    Assessment:       Diagnosis Orders   1. MCI (mild cognitive impairment)     2. Memory loss     3. Paresthesia     Mild cognitive  impairment patient's Mini-Mental  score was 28. Patient had an MRI of the brain which shows small vessel ischemic changes consistent with his underlying risk factors. He has good days and bad days. He lives alone and is safe in his environment. He had 2 falls of concern as he hit his head and is on Eliquis. This falls were both mechanical and therefore we were not quite concerned. On one occasion he took an extra blood pressure medication. I did discuss with the son in detail to keep an eye on this for little things that we have to worry about as he may lose his independent living if this continues due to safety issues. We will keep an eye on this and let me know and we will see him in 6 months      Plan:      No orders of the defined types were placed in this encounter. No orders of the defined types were placed in this encounter. No follow-ups on file.       Pamalee Aaron, MD

## 2022-06-20 NOTE — PROGRESS NOTES
Chief Complaint   Patient presents with    Rash     itching on arms, started on left then moved to right , has some on groin    Other     legs feel dry       HPI:  Jenise Saucedo is a 67 y.o. male     Presents acutely with itching/rash  Dr. Nazia Marie pt    Legs feel dry  Itching on arms     Rash in groin  Using lotrimin    Symptoms about a week      Patient Active Problem List   Diagnosis    Hypertension    Gastro-esophageal reflux disease without esophagitis    Chronic otitis externa    Hyperlipidemia    Crohn's disease, unspecified, without complications ([facility] [address]    Hypogonadism male    Allergic rhinitis    Seborrheic dermatitis    Mononeuritis multiplex    Degenerative disc disease, lumbar    Osteoarthritis of lumbar spine    Chronic back pain    Tinea unguium    Pain of right heel    MACIEL (dyspnea on exertion)    Actinic keratoses    JARRELL on CPAP    Obesity (BMI 30-39. 9)    Chest pain    History of cataract extraction    Acute bronchitis    Peripheral nerve disease    Paresthesia    Nondependent alcohol abuse, in remission    Blood glucose abnormal    Personal history of tobacco use, presenting hazards to health    Paroxysmal atrial fibrillation (HCC)    Morbid obesity ([facility] [address]    Diastolic heart failure (HCC)    Type 2 diabetes mellitus without complication, without long-term current use of insulin (HCC)    Chronic obstructive pulmonary disease (HCC)    Primary osteoarthritis of right knee    Type 2 diabetes mellitus with diabetic neuropathy    Pulmonary embolism (HCC)    Memory loss    MCI (mild cognitive impairment)    Primary central sleep apnea       Current Outpatient Medications   Medication Sig Dispense Refill    clotrimazole-betamethasone (LOTRISONE) 1-0.05 % cream Apply topically 2 times daily. 45 g 1    fluconazole (DIFLUCAN) 100 MG tablet Take 1 tablet by mouth daily for 7 days 7 tablet 0    methylPREDNISolone (MEDROL DOSEPACK) 4 MG tablet Take by mouth.  1 kit 0    albuterol (PROVENTIL) (2.5 MG/3ML) 0.083% nebulizer solution Take 3 mLs by nebulization every 6 hours As directed 360 each 3    clotrimazole (LOTRIMIN AF) 1 % cream Apply topically 2 times daily. 60 g 1    Probiotic Acidophilus (FLORANEX) TABS Take 1 tablet by mouth 2 times daily 60 tablet 0    ciclopirox (PENLAC) 8 % solution Apply topically nightly. 6 mL 1    gabapentin (NEURONTIN) 300 MG capsule TAKE 1 CAPSULE DAILY 90 capsule 3    doxazosin (CARDURA) 4 MG tablet TAKE ONE-HALF (1/2) TABLET TWICE A DAY 90 tablet 3    SPIRIVA HANDIHALER 18 MCG inhalation capsule INHALE THE CONTENTS OF 1 CAPSULE DAILY 90 capsule 3    albuterol sulfate  (90 Base) MCG/ACT inhaler USE 2 INHALATIONS EVERY 6 HOURS AS NEEDED FOR WHEEZING OR SHORTNESS OF BREATH 25.5 g 2    ipratropium (ATROVENT) 0.06 % nasal spray 2 sprays by Each Nostril route 3 times daily 1 each 1    STELARA 90 MG/ML SOSY prefilled syringe       mesalamine (PENTASA) 250 MG extended release capsule Take 2 capsules by mouth 4 times daily 120 capsule 0    donepezil (ARICEPT) 5 MG tablet Take 1 tablet by mouth nightly 30 tablet 3    triamcinolone (KENALOG) 0.1 % cream Apply topically 2 times daily.  30 g 1    montelukast (SINGULAIR) 10 MG tablet TAKE 1 TABLET NIGHTLY 7 tablet 0    metoprolol tartrate (LOPRESSOR) 50 MG tablet Take 1.5 po bid 10 tablet 0    pravastatin (PRAVACHOL) 40 MG tablet TAKE 1 TABLET NIGHTLY 90 tablet 3    lisinopril (PRINIVIL;ZESTRIL) 20 MG tablet Take 1 tablet by mouth daily 90 tablet 3    apixaban (ELIQUIS) 5 MG TABS tablet Take 1 tablet by mouth 2 times daily 180 tablet 3    RESTASIS 0.05 % ophthalmic emulsion Place 1 drop into both eyes daily       furosemide (LASIX) 20 MG tablet Take 1 tablet by mouth daily (Patient taking differently: Take 40 mg by mouth daily Indications: Taking 40 mg tablet daily ) 30 tablet 2    budesonide-formoterol (SYMBICORT) 160-4.5 MCG/ACT AERO Inhale 2 puffs into the lungs 2 times daily 2 each LOT [id number] EXP [id number] 3 Inhaler 1    CPAP Machine MISC New cpap supplies mask hose filters etc 1 each 0    esomeprazole Magnesium (NEXIUM) 20 MG PACK Take 20 mg by mouth daily      aspirin 81 MG tablet Take 81 mg by mouth daily.  nystatin (MYCOSTATIN) 606248 UNIT/GM cream Apply topically 2 times daily Apply topically 2 times daily. (Patient not taking: Reported on 6/20/2022) 15 g 0    nitroGLYCERIN (NITROSTAT) 0.4 MG SL tablet up to max of 3 total doses. If no relief after 1 dose, call 911. (Patient not taking: Reported on 6/20/2022) 25 tablet 2     No current facility-administered medications for this visit. Patient's medications, allergies, past medical, surgical, social and family histories were reviewed and updated as appropriate. Review of Systems:   General ROS: negative for - chills, fatigue, fever, malaise, weight gain or weight loss  Respiratory ROS: no cough, shortness of breath, or wheezing  Cardiovascular ROS: no chest pain or dyspnea on exertion  Gastrointestinal ROS: no abdominal pain, change in bowel habits, or black or bloody stools  Genito-Urinary ROS: no dysuria, trouble voiding  Musculoskeletal ROS: negative for - gait disturbance, joint pain or joint stiffness  Neurological ROS: negative for - behavioral changes, memory loss, numbness/tingling, tremors or weakness    In general patient otherwise reports feeling well.      Physical Exam:  /60 (Site: Left Upper Arm)   Pulse 60   Temp 97.9 °F (36.6 °C)   Ht 5' 9\" (1.753 m)   Wt 252 lb 3.2 oz (114.4 kg)   SpO2 95%   BMI 37.24 kg/m²     Gen: Well, NAD, Alert, Oriented x 3   HEENT: EOMI, eyes clear, MMM  Skin: macerated rash in groin, mild erythematous macular rash of wrists bilaterally    Lab Results   Component Value Date    WBC 8.2 05/26/2022    HGB 12.5 (L) 05/26/2022    HCT 37.7 (L) 05/26/2022     05/26/2022    CHOL 108 03/08/2019    TRIG 111 03/08/2019    HDL 35 (L) 07/07/2021    ALT 14 05/26/2022    AST 20 05/26/2022     05/26/2022    K 4.2 05/26/2022     05/26/2022    CREATININE 0.70 05/26/2022    BUN 16 05/26/2022    CO2 22 05/26/2022    TSH 1.380 03/03/2021    PSA 2.20 09/19/2019    INR 1.1 07/29/2021    LABA1C 5.6 10/28/2021    LABMICR 1.30 07/07/2021         A&P   Diagnosis Orders   1. Tinea cruris  clotrimazole-betamethasone (LOTRISONE) 1-0.05 % cream    fluconazole (DIFLUCAN) 100 MG tablet    methylPREDNISolone (MEDROL DOSEPACK) 4 MG tablet   2.  Dermatitis  clotrimazole-betamethasone (LOTRISONE) 1-0.05 % cream    fluconazole (DIFLUCAN) 100 MG tablet    methylPREDNISolone (MEDROL DOSEPACK) 4 MG tablet   meds as ordered    Kimmie Kate MD

## 2022-06-20 NOTE — CARE COORDINATION
Phone call to patient son Gil Lynch for follow up and to get updates on plans for Assisted Living. Gil Lynch. Reports that he received resource information for help in home and list of Assisted Living facilities in email 2 weeks ago. Gil Lynch reports that he has contacted and reviewed some facilities and noticed that they all have different rules and restrictions as far as letting patients drive as they please. Alicia Vences reports that his only concern is trying to get his father to move into a Assisted Living facility, as patient has not put any thought into moving. Criselda Boss reports that he will need to have a discussion with patient about moving. Criselda Boss reports that at this time they have all information needed but will need some time to decided and to get patient on board with moving. Discussed follow up by email instead of phone call due to Criselda Boss having other family appointments and vacation. Will email in 3-4 weeks for follow up.

## 2022-06-26 ENCOUNTER — CARE COORDINATION (OUTPATIENT)
Dept: CARE COORDINATION | Age: 72
End: 2022-06-26

## 2022-06-26 NOTE — CARE COORDINATION
I called to discuss Summer CHF Education Program.  His mailbox is full and I am unable to leave a message. I will send out the education plan and information via My Chart.    I will follow up with him later this week

## 2022-06-28 ENCOUNTER — CARE COORDINATION (OUTPATIENT)
Dept: CARE COORDINATION | Age: 72
End: 2022-06-28

## 2022-06-28 NOTE — CARE COORDINATION
Heart Failure Education outreach Date/Time: 2022 11:26 AM    Ambulatory Care Manager (ACM) contacted the patient by telephone to perform Ambulatory Care Coordination. Verified name and  with patient as identifiers. Provided introduction to self, and explanation of the Ambulatory Care Manager's role. ACM reviewed that a Health Healthy tips for the Summer packet has been sent to [facility]. ACM reviewed CHF zones, daily weights, fluid restriction, the importance of low sodium diet and healthy tips packet with the patient. Instructed patient to call their PCP if they have a weight gain of 3 lbs in 2 days or 5 lbs in a week. Patient reminded that there is a physician on call 24 hours a day / 7 days a week should the patient have questions or concerns. The patient verbalized understanding. I have reviewed the Summer CHF Information Packet with Saba Sheikh   We discussed the zone tools at length   He verbalizes understanding of the information discussed  I have asked him to call me with any questions or concerns.

## 2022-07-13 ENCOUNTER — CARE COORDINATION (OUTPATIENT)
Dept: CARE COORDINATION | Age: 72
End: 2022-07-13

## 2022-07-13 DIAGNOSIS — B35.1 ONYCHOMYCOSIS: Primary | ICD-10-CM

## 2022-07-13 NOTE — CARE COORDINATION
Ambulatory Care Coordination Note  7/13/2022  CM Risk Score: 8  Charlson 10 Year Mortality Risk Score: 100%     ACC: Mike Cruz RN    Summary Note:     Doug Stark tells me that he is feeling okay  He says that his memory is about the same. He is taking the Aricept as ordered every night   He is not sure if this is helping. He and his son Doug Stark have put looking for senior living accommodations on hold for now  Doug Stark tells me that he is having an issue with his toe nails and he needs to see a podiatrist   He states that the toenails are \"growing sideways. \"  I will send Dr Mitul Tian a message     [facility]  Doug Stark states that his weight yesterday was 254 pounds  He says that his ankles are swollen all the time  He adds that he is taking his lasix every day  He denies any issues with chest pain, lightheadedness, or dizziness. He states that he is SOB with exertion but this has not changed  He says that he is not adding any salt to his foods  He does tell me that he is eating a lot of \"junk food including potato chips and donuts. \"  We spoke about the salt in chips and he verbalizes understanding of limiting his salt intake     PLAN:  Doug Stark will weigh himself daily  He will call me with any weight gain of three pounds in two days or 5 pounds in one week  He will monitor his symptoms using the CHF zone tool  He will call myself or the office if symptoms worsen or if he drops into the yellow zone. Lab Results     None              Goals Addressed                 This Visit's Progress     Conditions and Symptoms   On track     I will schedule office visits, as directed by my provider. I will keep my appointment or reschedule if I have to cancel. I will notify my provider of any barriers to my plan of care. I will follow my Zone Management tool to seek urgent or emergent care. I will notify my provider of any symptoms that indicate a worsening of my condition.     Barriers: lack of support, medication side effects, and lack of education  Plan for overcoming my barriers: I will work with my ACM and health care team to increase my knowledge and self care management skills for my health  Confidence: 9/10  Anticipated Goal Completion Date: 5/15/2022              Prior to Admission medications    Medication Sig Start Date End Date Taking? Authorizing Provider   clotrimazole-betamethasone (LOTRISONE) 1-0.05 % cream Apply topically 2 times daily. 6/20/22   Doris Garcia MD   methylPREDNISolone (MEDROL DOSEPACK) 4 MG tablet Take by mouth. 6/20/22   Doris Garcia MD   donepezil (ARICEPT) 5 MG tablet Take 1 tablet by mouth nightly 6/20/22   Alon Gates MD   albuterol (PROVENTIL) (2.5 MG/3ML) 0.083% nebulizer solution Take 3 mLs by nebulization every 6 hours As directed 6/17/22   Yolanda Sevilla MD   ciclopirox (PENLAC) 8 % solution Apply topically nightly. 5/19/22   Karly Coffman MD   gabapentin (NEURONTIN) 300 MG capsule TAKE 1 CAPSULE DAILY 4/14/22 4/14/23  Karly Coffman MD   doxazosin (CARDURA) 4 MG tablet TAKE ONE-HALF (1/2) TABLET TWICE A DAY 4/14/22   Karly Coffman MD   SPIRIVA HANDIHALER 18 MCG inhalation capsule INHALE THE CONTENTS OF 1 CAPSULE DAILY 4/13/22   Yolanda Sevilla MD   albuterol sulfate  (90 Base) MCG/ACT inhaler USE 2 INHALATIONS EVERY 6 HOURS AS NEEDED FOR WHEEZING OR SHORTNESS OF BREATH 4/13/22   Yolanda Sevilla MD   ipratropium (ATROVENT) 0.06 % nasal spray 2 sprays by Each Nostril route 3 times daily 3/24/22   Karly Coffman MD   STELARA 90 MG/ML SOSY prefilled syringe  2/28/22   Historical Provider, MD   mesalamine (PENTASA) 250 MG extended release capsule Take 2 capsules by mouth 4 times daily 2/28/22   Karly Coffman MD   triamcinolone (KENALOG) 0.1 % cream Apply topically 2 times daily.  2/16/22   Karly Coffman MD   montelukast (SINGULAIR) 10 MG tablet TAKE 1 TABLET NIGHTLY 1/12/22   Karly Coffman MD   metoprolol tartrate (LOPRESSOR) 50 MG tablet Take 1.5 po bid 1/12/22   Yessenia Sherwood MD Laverne   pravastatin (PRAVACHOL) 40 MG tablet TAKE 1 TABLET NIGHTLY 1/10/22   Vinie Dakin, MD   nystatin (MYCOSTATIN) 042250 UNIT/GM cream Apply topically 2 times daily Apply topically 2 times daily. Patient not taking: Reported on 6/20/2022 12/27/21   Vinie Dakin, MD   lisinopril (PRINIVIL;ZESTRIL) 20 MG tablet Take 1 tablet by mouth daily 11/30/21   Vinie Dakin, MD   apixaban [facility] Lu[facility]) 5 MG TABS tablet Take 1 tablet by mouth 2 times daily 11/30/21   Vinie Dakin, MD   nitroGLYCERIN (NITROSTAT) 0.4 MG SL tablet up to max of 3 total doses. If no relief after 1 dose, call 911. Patient not taking: Reported on 6/20/2022 11/3/21   Vinie Dakin, MD   RESTASIS 0.05 % ophthalmic emulsion Place 1 drop into both eyes daily  8/23/21   Historical Provider, MD   furosemide (LASIX) 20 MG tablet Take 1 tablet by mouth daily  Patient taking differently: Take 40 mg by mouth daily Indications: Taking 40 mg tablet daily  5/25/21   Vinie Dakin, MD   budesonide-formoterol [facility]) 160-4.5 MCG/ACT AERO Inhale 2 puffs into the lungs 2 times daily 2 each LOT [id number] EXP [id number] 4/29/21   Kathleen Hendrickson MD   CPAP Machine MISC New cpap supplies mask hose filters etc 1/15/18   Kathleen Hendrickson MD   esomeprazole Magnesium (NEXIUM) 20 MG PACK Take 20 mg by mouth daily    Historical Provider, MD   aspirin 81 MG tablet Take 81 mg by mouth daily.     Historical Provider, MD       Future Appointments   Date Time Provider Chase Barrera   9/27/2022  2:00 PM Kathleen Hendrickson MD [address]   12/12/2022  3:00 PM Vinie Dakin, MD [facility]   12/21/2022  3:30 PM Kimber Coleman MD [facility]      and   Congestive Heart Failure Assessment    Are you currently restricting fluids?: No Restriction  Do you understand a low sodium diet?: Yes  Do you understand how to read food labels?: Yes  How many restaurant meals do you eat per week?: 3-4  Do you salt your food before tasting it?: No     Swelling (worse than baseline) in hands, feet/legs or around abdomen      Symptoms:  CHF associated leg swelling: Pos      Symptom course: no change  Patient-reported weight (lb): 254  Weight trend: stable  Salt intake watch compared to last visit: stable

## 2022-07-15 ENCOUNTER — CARE COORDINATION (OUTPATIENT)
Dept: CARE COORDINATION | Age: 72
End: 2022-07-15

## 2022-07-15 NOTE — CARE COORDINATION
Sent e-mail to patient son Werner Gardner. For follow up to check in on options for Assisted Living. Per our last conversation on 6/20/22 Werner Gardner. Requested to have communications through e-mails.  Will wait for a response

## 2022-07-19 ENCOUNTER — CARE COORDINATION (OUTPATIENT)
Dept: CARE COORDINATION | Age: 72
End: 2022-07-19

## 2022-07-19 NOTE — CARE COORDINATION
Phone call to patient son Fermin Trujillo. For follow up and to inquire if family has questions about Assisted Living. Sri Jackson reports that he and his father have found a few potential Assisted Living facilities, however Sri Jackson mentions that they are on hold because of pts finances and he would need to sell his home. Sri Jackson reports that patient is finding it hard to move into an Assisted living Facility. Sri Jackson reports that once family has made a final decision they will contact me and provide updates. Inquired if family needs continued follow up calls. Srisurya Jackson declines follow up calls. Reports that he will continue to work on finding the best facility and will call if they need anything     Will no longer follow patient.

## 2022-08-02 ENCOUNTER — OFFICE VISIT (OUTPATIENT)
Dept: FAMILY MEDICINE CLINIC | Age: 72
End: 2022-08-02
Payer: MEDICARE

## 2022-08-02 VITALS
WEIGHT: 254 LBS | DIASTOLIC BLOOD PRESSURE: 62 MMHG | HEIGHT: 69 IN | OXYGEN SATURATION: 97 % | TEMPERATURE: 98.3 F | HEART RATE: 63 BPM | SYSTOLIC BLOOD PRESSURE: 104 MMHG | BODY MASS INDEX: 37.62 KG/M2

## 2022-08-02 DIAGNOSIS — B35.6 TINEA CRURIS: Primary | ICD-10-CM

## 2022-08-02 DIAGNOSIS — E29.1 HYPOGONADISM MALE: ICD-10-CM

## 2022-08-02 DIAGNOSIS — I50.31 ACUTE DIASTOLIC CONGESTIVE HEART FAILURE (HCC): ICD-10-CM

## 2022-08-02 DIAGNOSIS — I10 ESSENTIAL HYPERTENSION: ICD-10-CM

## 2022-08-02 DIAGNOSIS — G31.84 MCI (MILD COGNITIVE IMPAIRMENT): ICD-10-CM

## 2022-08-02 LAB
ALBUMIN SERPL-MCNC: 4 G/DL (ref 3.5–4.6)
ALP BLD-CCNC: 83 U/L (ref 35–104)
ALT SERPL-CCNC: 13 U/L (ref 0–41)
ANION GAP SERPL CALCULATED.3IONS-SCNC: 12 MEQ/L (ref 9–15)
AST SERPL-CCNC: 20 U/L (ref 0–40)
BILIRUB SERPL-MCNC: 0.4 MG/DL (ref 0.2–0.7)
BUN BLDV-MCNC: 16 MG/DL (ref 8–23)
CALCIUM SERPL-MCNC: 8.9 MG/DL (ref 8.5–9.9)
CHLORIDE BLD-SCNC: 103 MEQ/L (ref 95–107)
CO2: 26 MEQ/L (ref 20–31)
CREAT SERPL-MCNC: 0.78 MG/DL (ref 0.7–1.2)
GFR AFRICAN AMERICAN: >60
GFR NON-AFRICAN AMERICAN: >60
GLOBULIN: 2.7 G/DL (ref 2.3–3.5)
GLUCOSE BLD-MCNC: 94 MG/DL (ref 70–99)
HCT VFR BLD CALC: 36.8 % (ref 42–52)
HEMOGLOBIN: 12.2 G/DL (ref 14–18)
MCH RBC QN AUTO: 29.5 PG (ref 27–31.3)
MCHC RBC AUTO-ENTMCNC: 33.1 % (ref 33–37)
MCV RBC AUTO: 89.1 FL (ref 80–100)
PDW BLD-RTO: 14.9 % (ref 11.5–14.5)
PLATELET # BLD: 190 K/UL (ref 130–400)
POTASSIUM SERPL-SCNC: 4.5 MEQ/L (ref 3.4–4.9)
RBC # BLD: 4.13 M/UL (ref 4.7–6.1)
SODIUM BLD-SCNC: 141 MEQ/L (ref 135–144)
TOTAL PROTEIN: 6.7 G/DL (ref 6.3–8)
WBC # BLD: 8.5 K/UL (ref 4.8–10.8)

## 2022-08-02 PROCEDURE — 1123F ACP DISCUSS/DSCN MKR DOCD: CPT | Performed by: FAMILY MEDICINE

## 2022-08-02 PROCEDURE — 99214 OFFICE O/P EST MOD 30 MIN: CPT | Performed by: FAMILY MEDICINE

## 2022-08-02 RX ORDER — FUROSEMIDE 20 MG/1
20 TABLET ORAL DAILY
Qty: 30 TABLET | Refills: 5
Start: 2022-08-02

## 2022-08-02 RX ORDER — NITROGLYCERIN 0.4 MG/1
TABLET SUBLINGUAL
Qty: 25 TABLET | Refills: 2 | Status: SHIPPED | OUTPATIENT
Start: 2022-08-02

## 2022-08-02 RX ORDER — NYSTATIN 100000 U/G
CREAM TOPICAL 2 TIMES DAILY
Qty: 15 G | Refills: 0 | Status: SHIPPED | OUTPATIENT
Start: 2022-08-02 | End: 2022-10-19 | Stop reason: SDUPTHER

## 2022-08-02 RX ORDER — DONEPEZIL HYDROCHLORIDE 10 MG/1
10 TABLET, FILM COATED ORAL NIGHTLY
Qty: 30 TABLET | Refills: 11 | Status: SHIPPED | OUTPATIENT
Start: 2022-08-02 | End: 2022-10-19 | Stop reason: SDUPTHER

## 2022-08-02 SDOH — ECONOMIC STABILITY: FOOD INSECURITY: WITHIN THE PAST 12 MONTHS, YOU WORRIED THAT YOUR FOOD WOULD RUN OUT BEFORE YOU GOT MONEY TO BUY MORE.: NEVER TRUE

## 2022-08-02 SDOH — ECONOMIC STABILITY: FOOD INSECURITY: WITHIN THE PAST 12 MONTHS, THE FOOD YOU BOUGHT JUST DIDN'T LAST AND YOU DIDN'T HAVE MONEY TO GET MORE.: NEVER TRUE

## 2022-08-02 ASSESSMENT — SOCIAL DETERMINANTS OF HEALTH (SDOH): HOW HARD IS IT FOR YOU TO PAY FOR THE VERY BASICS LIKE FOOD, HOUSING, MEDICAL CARE, AND HEATING?: NOT HARD AT ALL

## 2022-08-02 NOTE — PROGRESS NOTES
Diagnosis Orders   1. Tinea cruris  nystatin (MYCOSTATIN) 998476 UNIT/GM cream      2. MCI (mild cognitive impairment)  donepezil (ARICEPT) 10 MG tablet      3. Acute diastolic congestive heart failure (HCC)  furosemide (LASIX) 20 MG tablet    Comprehensive Metabolic Panel      4. Essential hypertension  furosemide (LASIX) 20 MG tablet    Comprehensive Metabolic Panel    CBC      5. Hypogonadism male  CBC    Ferritin        No follow-ups on file. Patient Instructions   Pt will increase donezapil to 10 mg daily    Continue nystatin as needed for tinea cruris    Continue lasix,  check renal function    Due for testosterone level check    Continue with triamcinolone cream to ears nightly. Subjective:      Patient ID: Stepan Henao is a 67 y.o. male who presents for:  Chief Complaint   Patient presents with    Discuss Medications     Follow up meds      Other     Dry ear - itching with scabs        Patient is here for follow-up of multiple conditions. States he needs a refill of his nystatin for his rash in his groin that does stay pretty well controlled with that. Reviewed again with him how to keep the area dry and avoid recurrence of infection. Patient states he uses testosterone cream recently. Reviewed the chart and see he has not had labs to confirm adequacy of treatment at this time. Patient is taking his Lasix every day but only 20 mg. Triamcinolone is available for his rash on his ears and he keeps forgetting to use it. Current Outpatient Medications on File Prior to Visit   Medication Sig Dispense Refill    clotrimazole-betamethasone (LOTRISONE) 1-0.05 % cream Apply topically 2 times daily. 45 g 1    albuterol (PROVENTIL) (2.5 MG/3ML) 0.083% nebulizer solution Take 3 mLs by nebulization every 6 hours As directed 360 each 3    ciclopirox (PENLAC) 8 % solution Apply topically nightly.  6 mL 1    gabapentin (NEURONTIN) 300 MG capsule TAKE 1 CAPSULE DAILY 90 capsule 3    doxazosin (CARDURA) 4 MG tablet TAKE ONE-HALF (1/2) TABLET TWICE A DAY 90 tablet 3    SPIRIVA HANDIHALER 18 MCG inhalation capsule INHALE THE CONTENTS OF 1 CAPSULE DAILY 90 capsule 3    albuterol sulfate  (90 Base) MCG/ACT inhaler USE 2 INHALATIONS EVERY 6 HOURS AS NEEDED FOR WHEEZING OR SHORTNESS OF BREATH 25.5 g 2    ipratropium (ATROVENT) 0.06 % nasal spray 2 sprays by Each Nostril route 3 times daily 1 each 1    STELARA 90 MG/ML SOSY prefilled syringe       mesalamine (PENTASA) 250 MG extended release capsule Take 2 capsules by mouth 4 times daily 120 capsule 0    triamcinolone (KENALOG) 0.1 % cream Apply topically 2 times daily. 30 g 1    montelukast (SINGULAIR) 10 MG tablet TAKE 1 TABLET NIGHTLY 7 tablet 0    metoprolol tartrate (LOPRESSOR) 50 MG tablet Take 1.5 po bid 10 tablet 0    pravastatin (PRAVACHOL) 40 MG tablet TAKE 1 TABLET NIGHTLY 90 tablet 3    lisinopril (PRINIVIL;ZESTRIL) 20 MG tablet Take 1 tablet by mouth daily 90 tablet 3    apixaban (ELIQUIS) 5 MG TABS tablet Take 1 tablet by mouth 2 times daily 180 tablet 3    RESTASIS 0.05 % ophthalmic emulsion Place 1 drop into both eyes daily       budesonide-formoterol (SYMBICORT) 160-4.5 MCG/ACT AERO Inhale 2 puffs into the lungs 2 times daily 2 each LOT [id number] EXP [id number] 3 Inhaler 1    CPAP Machine MISC New cpap supplies mask hose filters etc 1 each 0    esomeprazole Magnesium (NEXIUM) 20 MG PACK Take 20 mg by mouth daily      aspirin 81 MG tablet Take 81 mg by mouth daily. No current facility-administered medications on file prior to visit.      Past Medical History:   Diagnosis Date    A[facility]fib ([facility]     Abnormal EMG 12/09/2015    Actinic keratoses     Actinic keratoses     Acute diastolic congestive heart failure ([facility] 1/6/2021    Allergic rhinitis     Anemia 11/18/2014    Arthritis     Chronic back pain     COPD (chronic obstructive pulmonary disease) ([facility] 08/09/2012    COPD (chronic obstructive pulmonary disease) (HCC)     Crohns disease History Narrative    Lives alone. 2 story home     1 step to get in house and 7 steps to get upstairs    Full basement    2 children live nearby and 1 son lives in [place]    No pets     Social Determinants of Health     Financial Resource Strain: Low Risk     Difficulty of Paying Living Expenses: Not hard at all   Food Insecurity: No Food Insecurity    Worried About Running Out of Food in the Last Year: Never true    Ran Out of Food in the Last Year: Never true   Transportation Needs: Not on file   Physical Activity: Insufficiently Active    Days of Exercise per Week: 1 day    Minutes of Exercise per Session: 20 min   Stress: Not on file   Social Connections: Not on file   Intimate Partner Violence: Not on file   Housing Stability: Not on file     Family History   Problem Relation Age of Onset    Heart Disease Mother     Liver Disease Mother     High Blood Pressure Mother     Heart Disease Father     Arthritis Father     Cancer Sister         lung     Allergies:  Alemtuzumab, Glycopyrrolate-formoterol, Mercaptopurine, and Moxifloxacin    Review of Systems   Constitutional:  Negative for activity change, appetite change, diaphoresis and unexpected weight change. Eyes:  Negative for photophobia and visual disturbance. Respiratory:  Negative for chest tightness and shortness of breath. No orthopnea   Cardiovascular:  Negative for chest pain, palpitations and leg swelling. Gastrointestinal:  Negative for abdominal distention and abdominal pain. Genitourinary:  Negative for flank pain and frequency. Musculoskeletal:  Negative for gait problem and joint swelling. Skin:  Positive for rash. Neurological:  Negative for dizziness, weakness, light-headedness and headaches. Psychiatric/Behavioral:  Negative for confusion. Objective:   /62   Pulse 63   Temp 98.3 °F (36.8 °C)   Ht 5' 9\" (1.753 m)   Wt 254 lb (115.2 kg)   SpO2 97%   BMI 37.51 kg/m²     Physical Exam  Vitals reviewed. Constitutional:       General: He is not in acute distress. Appearance: He is well-developed. HENT:      Head: Normocephalic and atraumatic. Right Ear: External ear normal.      Left Ear: External ear normal.      Nose: Nose normal.   Eyes:      General:         Right eye: No discharge. Left eye: No discharge. Conjunctiva/sclera: Conjunctivae normal.      Pupils: Pupils are equal, round, and reactive to light. Neck:      Thyroid: No thyromegaly. Cardiovascular:      Rate and Rhythm: Normal rate and regular rhythm. Heart sounds: Normal heart sounds. Pulmonary:      Effort: Pulmonary effort is normal. No respiratory distress. Breath sounds: Normal breath sounds. Abdominal:      General: There is no distension. Musculoskeletal:      Cervical back: Neck supple. Skin:     General: Skin is warm and dry. Neurological:      Mental Status: He is alert and oriented to person, place, and time. Coordination: Coordination normal.   Psychiatric:         Thought Content: Thought content normal.         Judgment: Judgment normal.       No results found for this visit on 08/02/22.     Recent Results (from the past 2016 hour(s))   Brain Natriuretic Peptide    Collection Time: 05/26/22 10:43 AM   Result Value Ref Range    Pro-BNP 52 pg/mL   Comprehensive Metabolic Panel    Collection Time: 05/26/22 10:43 AM   Result Value Ref Range    Sodium 138 135 - 144 mEq/L    Potassium 4.2 3.4 - 4.9 mEq/L    Chloride 103 95 - 107 mEq/L    CO2 22 20 - 31 mEq/L    Anion Gap 13 9 - 15 mEq/L    Glucose 108 (H) 70 - 99 mg/dL    BUN 16 8 - 23 mg/dL    Creatinine 0.70 0.70 - 1.20 mg/dL    GFR Non-African American >60.0 >60    GFR  >60.0 >60    Calcium 8.7 8.5 - 9.9 mg/dL    Total Protein 6.6 6.3 - 8.0 g/dL    Albumin 3.9 3.5 - 4.6 g/dL    Total Bilirubin 0.5 0.2 - 0.7 mg/dL    Alkaline Phosphatase 81 35 - 104 U/L    ALT 14 0 - 41 U/L    AST 20 0 - 40 U/L    Globulin 2.7 2.3 - 3.5 g/dL CBC    Collection Time: 05/26/22 10:43 AM   Result Value Ref Range    WBC 8.2 4.8 - 10.8 K/uL    RBC 4.26 (L) 4.70 - 6.10 M/uL    Hemoglobin 12.5 (L) 14.0 - 18.0 g/dL    Hematocrit 37.7 (L) 42.0 - 52.0 %    MCV 88.5 80.0 - 100.0 fL    MCH 29.4 27.0 - 31.3 pg    MCHC 33.2 33.0 - 37.0 %    RDW 14.0 11.5 - 14.5 %    Platelets 575 935 - 870 K/uL   Testosterone, free, total    Collection Time: 05/26/22 10:43 AM   Result Value Ref Range    Testosterone 216 (L) 220 - 1,000 ng/dL    Sex Hormone Binding 49 11 - 80 nmol/L    Testosterone, Free 32.0 (L) 47 - 244 pg/mL   Urinalysis    Collection Time: 05/26/22 11:19 AM   Result Value Ref Range    Color, UA Yellow Straw/Yellow    Clarity, UA Clear Clear    Glucose, Ur Negative Negative mg/dL    Bilirubin Urine Negative Negative    Ketones, Urine Negative Negative mg/dL    Specific Gravity, UA 1.023 1.005 - 1.030    Blood, Urine Negative Negative    pH, UA 7.0 5.0 - 9.0    Protein, UA Negative Negative mg/dL    Urobilinogen, Urine 0.2 <2.0 E.U./dL    Nitrite, Urine Negative Negative    Leukocyte Esterase, Urine Negative Negative   POCT COVID-19, Antigen    Collection Time: 06/03/22  6:37 PM   Result Value Ref Range    SARS-COV-2, POC Not-Detected Not Detected    Lot Number [id number]     QC Pass/Fail pass     Performing Instrument [employer]    POCT COVID-19, Antigen    Collection Time: 06/17/22  5:40 PM   Result Value Ref Range    SARS-COV-2, POC Not-Detected Not Detected    Lot Number [id number]     QC Pass/Fail Pass     Performing Instrument [email or web address]    Ferritin    Collection Time: 08/02/22  4:02 PM   Result Value Ref Range    Ferritin 206 30 - 400 ng/mL   CBC    Collection Time: 08/02/22  4:02 PM   Result Value Ref Range    WBC 8.5 4.8 - 10.8 K/uL    RBC 4.13 (L) 4.70 - 6.10 M/uL    Hemoglobin 12.2 (L) 14.0 - 18.0 g/dL    Hematocrit 36.8 (L) 42.0 - 52.0 %    MCV 89.1 80.0 - 100.0 fL    MCH 29.5 27.0 - 31.3 pg    MCHC 33.1 33.0 - 37.0 %    RDW 14.9 (H) 11.5 - 14.5 %    Platelets 190 130 - 400 K/uL   Comprehensive Metabolic Panel    Collection Time: 08/02/22  4:02 PM   Result Value Ref Range    Sodium 141 135 - 144 mEq/L    Potassium 4.5 3.4 - 4.9 mEq/L    Chloride 103 95 - 107 mEq/L    CO2 26 20 - 31 mEq/L    Anion Gap 12 9 - 15 mEq/L    Glucose 94 70 - 99 mg/dL    BUN 16 8 - 23 mg/dL    Creatinine 0.78 0.70 - 1.20 mg/dL    GFR Non-African American >60.0 >60    GFR  >60.0 >60    Calcium 8.9 8.5 - 9.9 mg/dL    Total Protein 6.7 6.3 - 8.0 g/dL    Albumin 4.0 3.5 - 4.6 g/dL    Total Bilirubin 0.4 0.2 - 0.7 mg/dL    Alkaline Phosphatase 83 35 - 104 U/L    ALT 13 0 - 41 U/L    AST 20 0 - 40 U/L    Globulin 2.7 2.3 - 3.5 g/dL   Testosterone, free, total    Collection Time: 08/02/22  4:02 PM   Result Value Ref Range    Testosterone 298 220 - 1,000 ng/dL    Sex Hormone Binding 49 11 - 80 nmol/L    Testosterone, Free 45.3 (L) 47 - 244 pg/mL       [] Pt was seen by provider for      Minutes  Counseling and coordination of care was done for all assessment diagnosis listed for today with patient and any family/friend present. More than 50% of this visit was spent coordinating current care, obtaining information for prior records, and counseling for current plan of action. Assessment:       Diagnosis Orders   1. Tinea cruris  nystatin (MYCOSTATIN) 117668 UNIT/GM cream      2. MCI (mild cognitive impairment)  donepezil (ARICEPT) 10 MG tablet      3. Acute diastolic congestive heart failure (HCC)  furosemide (LASIX) 20 MG tablet    Comprehensive Metabolic Panel      4. Essential hypertension  furosemide (LASIX) 20 MG tablet    Comprehensive Metabolic Panel    CBC      5.  Hypogonadism male  CBC    Ferritin            Orders Placed This Encounter   Procedures    Comprehensive Metabolic Panel     Standing Status:   Future     Number of Occurrences:   1     Standing Expiration Date:   8/2/2023    CBC     Standing Status:   Future     Number of Occurrences:   1     Standing Expiration Date:   8/2/2023    Ferritin     Standing Status:   Future     Number of Occurrences:   1     Standing Expiration Date:   8/2/2023       Orders Placed This Encounter   Medications    nitroGLYCERIN (NITROSTAT) 0.4 MG SL tablet     Sig: up to max of 3 total doses. If no relief after 1 dose, call 911. Dispense:  25 tablet     Refill:  2    nystatin (MYCOSTATIN) 405677 UNIT/GM cream     Sig: Apply topically 2 times daily Apply topically 2 times daily. Dispense:  15 g     Refill:  0    furosemide (LASIX) 20 MG tablet     Sig: Take 1 tablet by mouth in the morning. Indications: Taking 40 mg tablet daily. Dispense:  30 tablet     Refill:  5    donepezil (ARICEPT) 10 MG tablet     Sig: Take 1 tablet by mouth nightly     Dispense:  30 tablet     Refill:  11          Medication List            Accurate as of August 2, 2022 11:59 PM. If you have any questions, ask your nurse or doctor. CHANGE how you take these medications      donepezil 10 MG tablet  Commonly known as: Aricept  Take 1 tablet by mouth nightly  What changed:   medication strength  how much to take  Changed by: Kelli Madison MD     furosemide 20 MG tablet  Commonly known as: LASIX  Take 1 tablet by mouth in the morning. Indications: Taking 40 mg tablet daily.   What changed: how much to take            CONTINUE taking these medications      * albuterol sulfate  (90 Base) MCG/ACT inhaler  Commonly known as: PROVENTIL;VENTOLIN;PROAIR  USE 2 INHALATIONS EVERY 6 HOURS AS NEEDED FOR WHEEZING OR SHORTNESS OF BREATH     * albuterol (2.5 MG/3ML) 0.083% nebulizer solution  Commonly known as: PROVENTIL  Take 3 mLs by nebulization every 6 hours As directed     apixaban 5 MG Tabs tablet  Commonly known as: Eliquis  Take 1 tablet by mouth 2 times daily     aspirin 81 MG tablet     budesonide-formoterol 160-4.5 MCG/ACT Aero  Commonly known as: Symbicort  Inhale 2 puffs into the lungs 2 times daily 2 each LOT [id number] EXP [id number]     ciclopirox 8 % solution  Commonly known as: Penlac  Apply topically nightly. clotrimazole-betamethasone 1-0.05 % cream  Commonly known as: Lotrisone  Apply topically 2 times daily. CPAP Machine Misc  New cpap supplies mask hose filters etc     doxazosin 4 MG tablet  Commonly known as: CARDURA  TAKE ONE-HALF (1/2) TABLET TWICE A DAY     esomeprazole Magnesium 20 MG Pack  Commonly known as: NEXIUM     gabapentin 300 MG capsule  Commonly known as: NEURONTIN  TAKE 1 CAPSULE DAILY     ipratropium 0.06 % nasal spray  Commonly known as: ATROVENT  2 sprays by Each Nostril route 3 times daily     lisinopril 20 MG tablet  Commonly known as: PRINIVIL;ZESTRIL  Take 1 tablet by mouth daily     mesalamine 250 MG extended release capsule  Commonly known as: PENTASA  Take 2 capsules by mouth 4 times daily     metoprolol tartrate 50 MG tablet  Commonly known as: LOPRESSOR  Take 1.5 po bid     montelukast 10 MG tablet  Commonly known as: SINGULAIR  TAKE 1 TABLET NIGHTLY     nitroGLYCERIN 0.4 MG SL tablet  Commonly known as: NITROSTAT  up to max of 3 total doses. If no relief after 1 dose, call 911.     nystatin 520473 UNIT/GM cream  Commonly known as: MYCOSTATIN  Apply topically 2 times daily Apply topically 2 times daily. pravastatin 40 MG tablet  Commonly known as: PRAVACHOL  TAKE 1 TABLET NIGHTLY     Restasis 0.05 % ophthalmic emulsion  Generic drug: cycloSPORINE     Spiriva HandiHaler 18 MCG inhalation capsule  Generic drug: tiotropium  INHALE THE CONTENTS OF 1 CAPSULE DAILY     Stelara 90 MG/ML Sosy prefilled syringe  Generic drug: ustekinumab     triamcinolone 0.1 % cream  Commonly known as: KENALOG  Apply topically 2 times daily. * This list has 2 medication(s) that are the same as other medications prescribed for you. Read the directions carefully, and ask your doctor or other care provider to review them with you.                 STOP taking these medications      methylPREDNISolone 4 MG

## 2022-08-02 NOTE — PATIENT INSTRUCTIONS
Pt will increase donezapil to 10 mg daily    Continue nystatin as needed for tinea cruris    Continue lasix,  check renal function    Due for testosterone level check    Continue with triamcinolone cream to ears nightly.

## 2022-08-03 LAB
FERRITIN: 206 NG/ML (ref 30–400)
SEX HORMONE BINDING GLOBULIN: 49 NMOL/L (ref 11–80)
TESTOSTERONE FREE-NONMALE: 45.3 PG/ML (ref 47–244)
TESTOSTERONE TOTAL: 298 NG/DL (ref 220–1000)

## 2022-08-03 ASSESSMENT — ENCOUNTER SYMPTOMS
CHEST TIGHTNESS: 0
PHOTOPHOBIA: 0
SHORTNESS OF BREATH: 0
ABDOMINAL PAIN: 0
ABDOMINAL DISTENTION: 0

## 2022-08-05 DIAGNOSIS — I26.99 PULMONARY EMBOLISM, OTHER, UNSPECIFIED CHRONICITY, UNSPECIFIED WHETHER ACUTE COR PULMONALE PRESENT (HCC): ICD-10-CM

## 2022-08-05 DIAGNOSIS — G89.29 CHRONIC MIDLINE BACK PAIN, UNSPECIFIED BACK LOCATION: ICD-10-CM

## 2022-08-05 DIAGNOSIS — I10 ESSENTIAL HYPERTENSION: ICD-10-CM

## 2022-08-05 DIAGNOSIS — M54.9 CHRONIC MIDLINE BACK PAIN, UNSPECIFIED BACK LOCATION: ICD-10-CM

## 2022-08-05 RX ORDER — LISINOPRIL 20 MG/1
20 TABLET ORAL DAILY
Qty: 14 TABLET | Refills: 0 | Status: SHIPPED | OUTPATIENT
Start: 2022-08-05 | End: 2022-10-19 | Stop reason: SDUPTHER

## 2022-08-05 RX ORDER — METOPROLOL TARTRATE 50 MG/1
TABLET, FILM COATED ORAL
Qty: 42 TABLET | Refills: 0 | Status: SHIPPED | OUTPATIENT
Start: 2022-08-05 | End: 2022-10-19 | Stop reason: SDUPTHER

## 2022-08-05 RX ORDER — GABAPENTIN 300 MG/1
CAPSULE ORAL
Qty: 14 CAPSULE | Refills: 0 | Status: SHIPPED | OUTPATIENT
Start: 2022-08-05 | End: 2022-10-19 | Stop reason: SDUPTHER

## 2022-08-05 RX ORDER — DOXAZOSIN MESYLATE 4 MG/1
TABLET ORAL
Qty: 14 TABLET | Refills: 0 | Status: SHIPPED | OUTPATIENT
Start: 2022-08-05 | End: 2022-10-19 | Stop reason: SDUPTHER

## 2022-08-05 NOTE — TELEPHONE ENCOUNTER
Pt requesting 2 week supply awaiting mail away scripts. Pt states that he is out of meds willing to fill ? Pt request this be sent to another provider in the office.

## 2022-08-11 ENCOUNTER — CARE COORDINATION (OUTPATIENT)
Dept: CARE COORDINATION | Age: 72
End: 2022-08-11

## 2022-08-11 ASSESSMENT — ENCOUNTER SYMPTOMS: DYSPNEA ASSOCIATED WITH: EXERTION

## 2022-08-11 NOTE — CARE COORDINATION
Ambulatory Care Coordination Note  8/11/2022    ACC: Lola Guthrie, RN    Summary Note:     Kevin Arguelles tells me that he is doing okay  He states that he is not doing much physically and is staying in the house most of the time. He is not sure if his memory is improving or not. I did speak with him about [employer] and crossword puzzles and word search to help   He tells me that he will give this a try. COPD  Kevin Arguelles tells me that he is not doing much walking except in the house. He tells me that he is not SOB doing normal activities   He does say that he will get SOB if he exerts himself  He is taking all medications and inhalers  He denies any issues with sleep or sleep changes  He states that his appetite is good. CHF  Kevin Arguelles tells me that he does have swelling in his ankles   He adds that this is normal as he has this during the day but when he wakes up his ankles \"look pretty normal.\"  He states that his weight is holding steady  He denies any issues with chest pain, lightheadedness, or dizziness. He says that he is trying to eat a healthy diet and he is avoiding high sodium foods    PLAN:  Kevin Arguelles will continue to monitor his symptoms using the COPD and CHF zone tool sheets. Kevin Arguelles will call me with any changes or worsening of symptoms or if he drops into the yellow zone. Kevin Arguelles will try brain games to help improve his memory. Lab Results       None                 Goals Addressed                   This Visit's Progress     Conditions and Symptoms   On track     I will schedule office visits, as directed by my provider. I will keep my appointment or reschedule if I have to cancel. I will notify my provider of any barriers to my plan of care. I will follow my Zone Management tool to seek urgent or emergent care. I will notify my provider of any symptoms that indicate a worsening of my condition. Barriers: lack of support, medication side effects, and lack of education  Plan for overcoming my barriers:  I will work with my ACM and health care team to increase my knowledge and self care management skills for my health  Confidence: 9/10  Anticipated Goal Completion Date: 5/15/2022                Prior to Admission medications    Medication Sig Start Date End Date Taking? Authorizing Provider   metoprolol tartrate (LOPRESSOR) 50 MG tablet Take 1.5 po bid 8/5/22   STARLA Levin CNP   lisinopril (PRINIVIL;ZESTRIL) 20 MG tablet Take 1 tablet by mouth in the morning. 8/5/22   STARLA Levin CNP   doxazosin (CARDURA) 4 MG tablet TAKE ONE-HALF (1/2) TABLET TWICE A DAY 8/5/22   STARLA Levin CNP   gabapentin (NEURONTIN) 300 MG capsule TAKE 1 CAPSULE DAILY 8/5/22 8/19/22  STARLA Levin CNP   apixaban (ELIQUIS) 5 MG TABS tablet Take 1 tablet by mouth in the morning and 1 tablet before bedtime. 8/5/22   STARLA Levin CNP   nitroGLYCERIN (NITROSTAT) 0.4 MG SL tablet up to max of 3 total doses. If no relief after 1 dose, call 911. 8/2/22   Teri Davis MD   nystatin (MYCOSTATIN) 477052 UNIT/GM cream Apply topically 2 times daily Apply topically 2 times daily. 8/2/22   Teri Davis MD   furosemide (LASIX) 20 MG tablet Take 1 tablet by mouth in the morning. Indications: Taking 40 mg tablet daily. 8/2/22   Teri Davis MD   donepezil (ARICEPT) 10 MG tablet Take 1 tablet by mouth nightly 8/2/22   Teri Davis MD   clotrimazole-betamethasone (LOTRISONE) 1-0.05 % cream Apply topically 2 times daily. 6/20/22   Karmen Mortimer, MD   albuterol (PROVENTIL) (2.5 MG/3ML) 0.083% nebulizer solution Take 3 mLs by nebulization every 6 hours As directed 6/17/22   Symone Carpio MD   ciclopirox (PENLAC) 8 % solution Apply topically nightly.  5/19/22   Teri Davis MD   SPIRIVA HANDIHALER 18 MCG inhalation capsule INHALE THE CONTENTS OF 1 CAPSULE DAILY 4/13/22   Symone Carpio MD   albuterol sulfate  (90 Base) MCG/ACT inhaler USE 2 INHALATIONS EVERY 6 HOURS AS NEEDED FOR WHEEZING OR SHORTNESS OF BREATH 4/13/22   Kulwant Mulligan MD   ipratropium (ATROVENT) 0.06 % nasal spray 2 sprays by Each Nostril route 3 times daily 3/24/22   Franki Vasquez MD   [facility] 90 MG/ML SOSY prefilled syringe  2/28/22   Historical Provider, MD   mesalamine (PENTASA) 250 MG extended release capsule Take 2 capsules by mouth 4 times daily 2/28/22   Franki Vasquez MD   triamcinolone (KENALOG) 0.1 % cream Apply topically 2 times daily. 2/16/22   Franki Vasquez MD   montelukast (SINGULAIR) 10 MG tablet TAKE 1 TABLET NIGHTLY 1/12/22   Franki Vasquez MD   pravastatin (PRAVACHOL) 40 MG tablet TAKE 1 TABLET NIGHTLY 1/10/22   Franki Vasquez MD   RESTASIS 0.05 % ophthalmic emulsion Place 1 drop into both eyes daily  8/23/21   Historical Provider, MD   budesonide-formoterol [facility]) 160-4.5 MCG/ACT AERO Inhale 2 puffs into the lungs 2 times daily 2 each LOT [id number] EXP [id number] 4/29/21   Kulwant Mulligan MD   CPAP Machine MISC New cpap supplies mask hose filters etc 1/15/18   Kulwant Mulligan MD   esomeprazole Magnesium (NEXIUM) 20 MG PACK Take 20 mg by mouth daily    Historical Provider, MD   aspirin 81 MG tablet Take 81 mg by mouth daily.     Historical Provider, MD       Future Appointments   Date Time Provider [place]   9/27/2022  2:00 PM Kulwant Mulligan MD [address]   12/12/2022  3:00 PM Franki Vasquez MD [facility] EMERGENCY [facility]   12/21/2022  3:30 PM Mery Brown MD [facility]   ,   Congestive Heart Failure Assessment    Are you currently restricting fluids?: No Restriction  Do you understand a low sodium diet?: Yes  Do you understand how to read food labels?: Yes  How many restaurant meals do you eat per week?: 3-4  Do you salt your food before tasting it?: No     No patient-reported symptoms      Symptoms:  CHF associated leg swelling: Pos      Symptom course: no change  Patient-reported weight (lb): 244  Weight trend: stable     , and COPD Assessment    Does the patient understand envrionmental exposure?: Yes  Is the patient able to verbalize Rescue vs. Long Acting medications?: Yes  Does the patient have a nebulizer?: No  Does the patient use a space with inhaled medications?: No     No patient-reported symptoms         Symptoms:  None: Yes      Symptom course: stable  Breathlessness: exertion  Increase use of rapid acting/rescue inhaled medications?: No  Change in chronic cough?: No/At Baseline  Change in sputum?: No/At Baseline  Sputum characteristics: Clear

## 2022-08-22 ENCOUNTER — OFFICE VISIT (OUTPATIENT)
Dept: FAMILY MEDICINE CLINIC | Age: 72
End: 2022-08-22
Payer: MEDICARE

## 2022-08-22 ENCOUNTER — TELEPHONE (OUTPATIENT)
Dept: FAMILY MEDICINE CLINIC | Age: 72
End: 2022-08-22

## 2022-08-22 VITALS
OXYGEN SATURATION: 93 % | TEMPERATURE: 101 F | SYSTOLIC BLOOD PRESSURE: 132 MMHG | HEIGHT: 69 IN | BODY MASS INDEX: 37.62 KG/M2 | WEIGHT: 254 LBS | DIASTOLIC BLOOD PRESSURE: 80 MMHG | HEART RATE: 80 BPM

## 2022-08-22 DIAGNOSIS — J40 BRONCHITIS: Primary | ICD-10-CM

## 2022-08-22 DIAGNOSIS — R05.9 COUGH: ICD-10-CM

## 2022-08-22 DIAGNOSIS — J44.9 CHRONIC OBSTRUCTIVE PULMONARY DISEASE, UNSPECIFIED COPD TYPE (HCC): ICD-10-CM

## 2022-08-22 DIAGNOSIS — R50.9 FEVER, UNSPECIFIED FEVER CAUSE: ICD-10-CM

## 2022-08-22 LAB
Lab: NORMAL
PERFORMING INSTRUMENT: NORMAL
QC PASS/FAIL: NORMAL
SARS-COV-2, POC: NORMAL

## 2022-08-22 PROCEDURE — 87426 SARSCOV CORONAVIRUS AG IA: CPT | Performed by: NURSE PRACTITIONER

## 2022-08-22 PROCEDURE — 99213 OFFICE O/P EST LOW 20 MIN: CPT | Performed by: NURSE PRACTITIONER

## 2022-08-22 PROCEDURE — 1123F ACP DISCUSS/DSCN MKR DOCD: CPT | Performed by: NURSE PRACTITIONER

## 2022-08-22 RX ORDER — TIOTROPIUM BROMIDE 18 UG/1
CAPSULE ORAL; RESPIRATORY (INHALATION)
Qty: 90 CAPSULE | Refills: 3 | Status: SHIPPED | OUTPATIENT
Start: 2022-08-22

## 2022-08-22 RX ORDER — GUAIFENESIN 600 MG/1
600 TABLET, EXTENDED RELEASE ORAL 2 TIMES DAILY
Qty: 30 TABLET | Refills: 0 | Status: SHIPPED | OUTPATIENT
Start: 2022-08-22 | End: 2022-09-06

## 2022-08-22 RX ORDER — AMOXICILLIN 875 MG/1
875 TABLET, COATED ORAL 2 TIMES DAILY
Qty: 20 TABLET | Refills: 0 | Status: SHIPPED | OUTPATIENT
Start: 2022-08-22 | End: 2022-09-01

## 2022-08-22 RX ORDER — ECHINACEA PURPUREA EXTRACT 125 MG
1 TABLET ORAL PRN
Qty: 1 EACH | Refills: 3 | Status: SHIPPED | OUTPATIENT
Start: 2022-08-22

## 2022-08-22 ASSESSMENT — ENCOUNTER SYMPTOMS
NAUSEA: 0
SHORTNESS OF BREATH: 0
COUGH: 1
ABDOMINAL PAIN: 0
CHEST TIGHTNESS: 1
RHINORRHEA: 1
SORE THROAT: 0
SINUS PRESSURE: 1
WHEEZING: 0

## 2022-08-22 NOTE — PROGRESS NOTES
Subjective  Malik Enrique, 67 y.o. male presents today with:  Chief Complaint   Patient presents with    Cough     With phlegm, sx started Thursday        HPI  Presents to [facility] for URI symptoms   Started to feel unwell on Thursday   Nasal drainage/congestion, frontal sinus pressure, cough, body aches and headache   Cough has become more productive   Thick green phlegm   Coughing fits  Cough interrupting sleep   C/o chest tightness   Denies SOB  Denies chest pain   Fatigued   Denies GI abnormalities   Eating and drinking     CPAP at night   COPD/former smoker   Not using albuterol currently   Needs refill Spiriva                   Past Medical History:   Diagnosis Date    A-fib ([facility]     Abnormal EMG 12/09/2015    Actinic keratoses     Actinic keratoses     Acute diastolic congestive heart failure ([facility] [address] 1/6/2021    Allergic rhinitis     Anemia 11/18/2014    Arthritis     Chronic back pain     COPD (chronic obstructive pulmonary disease) ([facility] [address] 08/09/2012    COPD (chronic obstructive pulmonary disease) ([facility]     Crohns disease     Degenerative disc disease, lumbar     Dermatitis     Diabetes ([facility] [address] 03/19/2015    diet controlled    Gastrointestinal problem     GERD (gastroesophageal reflux disease)     History of atrial fibrillation 2006    Dr. Kathy Villa    History of throat cancer 2004     cleared for reoccurence years ago    Hyperlipidemia 1/21/2014    Hypertension     Hypogonadism male     Lung disease     Mononeuritis multiplex     Mononeuritis multiplex     Nondependent alcohol abuse, in remission 7/22/2020    Obesity (BMI 30-39. 9) 7/24/2019    Osteoarthritis of lumbar spine     Pain of right heel     Paresthesia of foot, bilateral     Prediabetes 12/3/2014    Restless leg syndrome     Seborrheic dermatitis     Seborrheic keratoses, inflamed     Throat cancer (HCC)     throat, had surgery, sees Dr Alicia Lara yearly    Tinea cruris     Tinea pedis     Tinea unguium       Past Surgical History:   Procedure Laterality Date CARDIAC CATHETERIZATION      CARPAL TUNNEL RELEASE      CATARACT REMOVAL WITH IMPLANT Right 09/09/2019    CATARACT REMOVAL WITH IMPLANT Left 07/22/2019    COLON SURGERY      COLONOSCOPY  04/15/2015    KNEE ARTHROSCOPY      LARYNGECTOMY  2004    UPPER GASTROINTESTINAL ENDOSCOPY  03/24/13    Dr. Yaya HOWARD/NAPOLEON RODRIGUEZ  2002     Family History   Problem Relation Age of Onset    Heart Disease Mother     Liver Disease Mother     High Blood Pressure Mother     Heart Disease Father     Arthritis Father     Cancer Sister         lung           Review of Systems   Constitutional:  Positive for activity change and fatigue. Negative for appetite change, chills and diaphoresis. Fever: unsure. not taking temp. HENT:  Positive for congestion, rhinorrhea and sinus pressure (frontal). Negative for ear pain and sore throat. Respiratory:  Positive for cough and chest tightness. Negative for shortness of breath and wheezing. Cardiovascular:  Negative for chest pain and palpitations. Gastrointestinal:  Negative for abdominal pain and nausea. Diarrhea: chronic issue. not currently. Musculoskeletal:  Positive for myalgias. Negative for arthralgias. Neurological:  Positive for headaches. Negative for dizziness and light-headedness. Psychiatric/Behavioral:  Positive for sleep disturbance. PMH, Surgical Hx, Family Hx, and Social Hx reviewed and updated. Objective  Vitals:    08/22/22 0903   BP: 132/80   Site: Left Upper Arm   Position: Sitting   Cuff Size: Small Adult   Pulse: 80   Temp: (!) 101 °F (38.3 °C)   SpO2: 93%   Weight: 254 lb (115.2 kg)   Height: 5' 9\" (1.753 m)     BP Readings from Last 3 Encounters:   08/22/22 132/80   08/02/22 104/62   06/20/22 123/64     Wt Readings from Last 3 Encounters:   08/22/22 254 lb (115.2 kg)   08/02/22 254 lb (115.2 kg)   06/20/22 252 lb (114.3 kg)             Physical Exam  Vitals reviewed.    Constitutional:       General: He is not in acute distress. Appearance: He is well-developed. He is ill-appearing. He is not toxic-appearing. HENT:      Right Ear: Tympanic membrane and external ear normal.      Left Ear: Tympanic membrane, ear canal and external ear normal.      Ears:      Comments: Right ear canal dry and irritated      Nose: Congestion present. Right Sinus: Frontal sinus tenderness present. Left Sinus: Frontal sinus tenderness present. Mouth/Throat:      Lips: Pink. Mouth: Mucous membranes are moist.      Pharynx: Uvula midline. Oropharyngeal exudate present. No pharyngeal swelling, posterior oropharyngeal erythema or uvula swelling. Eyes:      General: Lids are normal.      Conjunctiva/sclera: Conjunctivae normal.   Cardiovascular:      Rate and Rhythm: Normal rate. Pulmonary:      Effort: Pulmonary effort is normal.      Breath sounds: Normal breath sounds and air entry. Musculoskeletal:      Cervical back: Normal range of motion. No rigidity. Lymphadenopathy:      Head:      Right side of head: No submental, submandibular, tonsillar, preauricular or posterior auricular adenopathy. Left side of head: No submental, submandibular, tonsillar, preauricular or posterior auricular adenopathy. Cervical: No cervical adenopathy. Skin:     General: Skin is warm and dry. Capillary Refill: Capillary refill takes less than 2 seconds. Coloration: Skin is not pale. Neurological:      General: No focal deficit present. Mental Status: He is alert and oriented to person, place, and time. Assessment & Plan    Diagnosis Orders   1. Bronchitis  amoxicillin (AMOXIL) 875 MG tablet    guaiFENesin (MUCINEX) 600 MG extended release tablet    sodium chloride (OCEAN) 0.65 % nasal spray      2. Fever, unspecified fever cause  POCT COVID-19, Antigen      3. Chronic obstructive pulmonary disease, unspecified COPD type ([facility]  tiotropium (SPIRIVA HANDIHALER) 18 MCG inhalation capsule      4. Cough  POCT COVID-19, Antigen        Orders Placed This Encounter   Procedures    POCT COVID-19, Antigen     Order Specific Question:   Is this test for diagnosis or screening? Answer:   Diagnosis of ill patient     Order Specific Question:   Symptomatic for COVID-19 as defined by CDC? Answer:   Yes     Order Specific Question:   Date of Symptom Onset     Answer:   2022     Order Specific Question:   Hospitalized for COVID-19? Answer:   No     Order Specific Question:   Admitted to ICU for COVID-19? Answer:   No     Order Specific Question:   Employed in healthcare setting? Answer:   No     Order Specific Question:   Resident in a congregate (group) care setting? Answer:   No     Order Specific Question:   Pregnant: Answer:   No     Order Specific Question:   Previously tested for COVID-19? Answer:   Yes     Orders Placed This Encounter   Medications    amoxicillin (AMOXIL) 875 MG tablet     Sig: Take 1 tablet by mouth 2 times daily for 10 days     Dispense:  20 tablet     Refill:  0    guaiFENesin (MUCINEX) 600 MG extended release tablet     Sig: Take 1 tablet by mouth 2 times daily for 15 days     Dispense:  30 tablet     Refill:  0    sodium chloride (OCEAN) 0.65 % nasal spray     Si spray by Nasal route as needed for Congestion     Dispense:  1 each     Refill:  3    tiotropium (SPIRIVA HANDIHALER) 18 MCG inhalation capsule     Sig: INHALE THE CONTENTS OF 1 CAPSULE DAILY     Dispense:  90 capsule     Refill:  3         Return if symptoms worsen or fail to improve, for follow up with PCP. Reviewed with the patient: current clinical status & medications. Side effects, adverse effects of the medications prescribed today, as well as treatment plan/rationale and result expectations have been discussed with the patient who expressed understanding. How can you care for yourself at home? Drink lots of water and other fluids.  This helps thin the mucus and soothes a dry or sore throat. Honey or lemon juice in hot water or tea may ease a dry cough. Take cough medicine as directed by your doctor. Delsym or robitussin. Prop up your head on pillows to help you breathe and ease a dry cough. Try cough drops or hard candy to soothe a dry or sore throat. Close follow up to evaluate treatment results and for coordination of care. I have reviewed the patient's medical history in detail and updated the computerized patient record.         STARLA Pate - NP

## 2022-08-22 NOTE — TELEPHONE ENCOUNTER
Patient in office, asking for refill of Test. Gel  Patient would like a normal supply sent to [facility].    Pt asking for a Short supply to [facility]  LOV: 8/2/2022  Next Appt: 12/12/22

## 2022-08-23 DIAGNOSIS — E29.1 HYPOGONADISM MALE: Primary | ICD-10-CM

## 2022-08-23 RX ORDER — TESTOSTERONE GEL, 1% 10 MG/G
GEL TRANSDERMAL
Qty: 135 EACH | Refills: 1 | Status: SHIPPED | OUTPATIENT
Start: 2022-08-23 | End: 2022-11-21

## 2022-08-23 RX ORDER — TESTOSTERONE GEL, 1% 10 MG/G
GEL TRANSDERMAL
Qty: 15 EACH | Refills: 0 | Status: SHIPPED | OUTPATIENT
Start: 2022-08-23 | End: 2022-10-19

## 2022-08-23 NOTE — TELEPHONE ENCOUNTER
DR Montesinos Memory patient :      Not seeing on current med list ?    Can we confirm if patient is still suppose to be using this ?     Last fill 8/11/2021

## 2022-08-25 ENCOUNTER — OFFICE VISIT (OUTPATIENT)
Dept: FAMILY MEDICINE CLINIC | Age: 72
End: 2022-08-25
Payer: MEDICARE

## 2022-08-25 VITALS
HEIGHT: 69 IN | SYSTOLIC BLOOD PRESSURE: 114 MMHG | HEART RATE: 64 BPM | DIASTOLIC BLOOD PRESSURE: 68 MMHG | TEMPERATURE: 98.1 F | OXYGEN SATURATION: 96 % | WEIGHT: 254 LBS | BODY MASS INDEX: 37.62 KG/M2

## 2022-08-25 DIAGNOSIS — L30.9 DERMATITIS: ICD-10-CM

## 2022-08-25 DIAGNOSIS — J30.9 ALLERGIC RHINITIS, UNSPECIFIED SEASONALITY, UNSPECIFIED TRIGGER: Primary | ICD-10-CM

## 2022-08-25 PROCEDURE — 99213 OFFICE O/P EST LOW 20 MIN: CPT | Performed by: NURSE PRACTITIONER

## 2022-08-25 PROCEDURE — 1123F ACP DISCUSS/DSCN MKR DOCD: CPT | Performed by: NURSE PRACTITIONER

## 2022-08-25 RX ORDER — FLUTICASONE PROPIONATE 50 MCG
1 SPRAY, SUSPENSION (ML) NASAL DAILY
Qty: 16 G | Refills: 0 | Status: SHIPPED | OUTPATIENT
Start: 2022-08-25

## 2022-08-25 ASSESSMENT — ENCOUNTER SYMPTOMS
SINUS PAIN: 0
RHINORRHEA: 0
SINUS PRESSURE: 0

## 2022-08-25 NOTE — PROGRESS NOTES
Past Surgical History:   Procedure Laterality Date    CARDIAC CATHETERIZATION      CARPAL TUNNEL RELEASE      CATARACT REMOVAL WITH IMPLANT Right 09/09/2019    CATARACT REMOVAL WITH IMPLANT Left 07/22/2019    COLON SURGERY      COLONOSCOPY  04/15/2015    KNEE ARTHROSCOPY      LARYNGECTOMY  2004    UPPER GASTROINTESTINAL ENDOSCOPY  03/24/13    Dr. Michael HOWARD/NAPOLEON RODRIGUEZ  2002     Family History   Problem Relation Age of Onset    Heart Disease Mother     Liver Disease Mother     High Blood Pressure Mother     Heart Disease Father     Arthritis Father     Cancer Sister         lung           Review of Systems   Constitutional:  Negative for activity change, appetite change, chills, diaphoresis, fatigue and fever. HENT:  Positive for postnasal drip. Negative for congestion, ear discharge, ear pain, rhinorrhea, sinus pressure and sinus pain. Respiratory:  Positive for cough (dry). Cardiovascular:  Negative for chest pain and palpitations. Gastrointestinal:  Negative for abdominal pain, diarrhea and nausea. Musculoskeletal:  Negative for neck pain and neck stiffness. Neurological:  Negative for dizziness, weakness, light-headedness and headaches. Psychiatric/Behavioral:  Negative for sleep disturbance. PMH, Surgical Hx, Family Hx, and Social Hx reviewed and updated. Objective  Vitals:    08/25/22 1748   BP: 114/68   Pulse: 64   Temp: 98.1 °F (36.7 °C)   SpO2: 96%   Weight: 254 lb (115.2 kg)   Height: 5' 9\" (1.753 m)     BP Readings from Last 3 Encounters:   08/25/22 114/68   08/22/22 132/80   08/02/22 104/62     Wt Readings from Last 3 Encounters:   08/25/22 254 lb (115.2 kg)   08/22/22 254 lb (115.2 kg)   08/02/22 254 lb (115.2 kg)             Physical Exam  Vitals reviewed. Constitutional:       General: He is not in acute distress. Appearance: Normal appearance. He is not toxic-appearing.    HENT:      Right Ear: Tympanic membrane and ear canal normal. No drainage, swelling or tenderness. Left Ear: Tympanic membrane and ear canal normal. No laceration, drainage, swelling or tenderness. Ears:      Comments: Dry skin in both ear canals. Has been picking at the dry skin. No bleeding or s/s of infection. Nose: Congestion present. Mouth/Throat:      Mouth: Mucous membranes are moist.      Pharynx: Oropharynx is clear. No oropharyngeal exudate or posterior oropharyngeal erythema. Eyes:      General: Lids are normal.      Conjunctiva/sclera: Conjunctivae normal.   Cardiovascular:      Rate and Rhythm: Normal rate. Pulmonary:      Effort: Pulmonary effort is normal.      Breath sounds: Normal breath sounds and air entry. Musculoskeletal:      Cervical back: Normal range of motion. No rigidity. No pain with movement. Lymphadenopathy:      Head:      Right side of head: No submental, submandibular, tonsillar, preauricular or posterior auricular adenopathy. Left side of head: No submental, submandibular, tonsillar, preauricular or posterior auricular adenopathy. Cervical: No cervical adenopathy. Skin:     General: Skin is warm and dry. Capillary Refill: Capillary refill takes less than 2 seconds. Coloration: Skin is not pale. Findings: No rash. Neurological:      General: No focal deficit present. Mental Status: He is alert and oriented to person, place, and time. Assessment & Plan    Diagnosis Orders   1. Allergic rhinitis, unspecified seasonality, unspecified trigger  fluticasone (FLONASE) 50 MCG/ACT nasal spray      2. Dermatitis  triamcinolone (KENALOG) 0.1 % ointment        No orders of the defined types were placed in this encounter.     Orders Placed This Encounter   Medications    fluticasone (FLONASE) 50 MCG/ACT nasal spray     Si spray by Nasal route daily     Dispense:  16 g     Refill:  0    triamcinolone (KENALOG) 0.1 % ointment     Sig: Apply topically 2 times daily for 7 days

## 2022-08-26 ASSESSMENT — ENCOUNTER SYMPTOMS
ABDOMINAL PAIN: 0
DIARRHEA: 0
NAUSEA: 0
COUGH: 1

## 2022-09-12 ENCOUNTER — CARE COORDINATION (OUTPATIENT)
Dept: CARE COORDINATION | Age: 72
End: 2022-09-12

## 2022-09-12 ASSESSMENT — ENCOUNTER SYMPTOMS: DYSPNEA ASSOCIATED WITH: EXERTION

## 2022-09-12 NOTE — CARE COORDINATION
Ambulatory Care Coordination Note  9/12/2022    ACC: Andriy Sanford, CARLIE    Summary Note:    Ade Frankel tells me that he feels he is doing okay  He states that he and his son have put things on hold for a bit as far as moving into a custodial community or assisted living. He adds that he has times when he feels his memory is great and other times he knows that he is struggling to remember things  He does read and complete crossword puzzles on a regular basis. COPD  Ade Frankel tells me that he is SOB with walking but he does not feel that this is any different than it has been. He denies fever, chills, increased cough, wheezing, or change in mucus. He states that his sleeping pattern is unchanged. He denies any issues with his appetite. He continues to use his inhalers as prescribed. He feels that he is in the green zone for COPD    CHF  Ade Frankel denies any issues with chest pain, lightheadedness, or dizziness. SOB as noted above. He does have ankle edema which he has daily but dissipates overnight. He is not weighing himself and we spoke at length about the importance of monitoring his weight   He verbalizes understanding. He feels that he is in the green zone for CHF    PLAN:  Ade Frankel will continue to monitor his symptoms using the COPD and CHF zone tool sheets. He will call me with any worsening of symptoms or if he drops into the yellow zone  Ade Frankel will weigh himself at least three times per week  He will call me with any weight gain of 3 pounds in two days or 5 pounds in one week. Lab Results       None                 Goals Addressed                   This Visit's Progress     Conditions and Symptoms   On track     I will schedule office visits, as directed by my provider. I will keep my appointment or reschedule if I have to cancel. I will notify my provider of any barriers to my plan of care. I will follow my Zone Management tool to seek urgent or emergent care.   I will notify my provider of any symptoms that indicate a worsening of my condition. Barriers: lack of support, medication side effects, and lack of education  Plan for overcoming my barriers: I will work with my ACM and health care team to increase my knowledge and self care management skills for my health  Confidence: 9/10  Anticipated Goal Completion Date: 5/15/2022                Prior to Admission medications    Medication Sig Start Date End Date Taking? Authorizing Provider   fluticasone (FLONASE) 50 MCG/ACT nasal spray 1 spray by Nasal route daily 8/25/22   Babs Klinefelter, APRN - NP   testosterone (ANDROGEL) 50 MG/5GM (1%) GEL 1% gel APPLY 1 PACKET ALTERNATING WITH 2 PACKETS DAILY EVERY OTHER DAY 8/23/22 11/21/22  Ana Washington DO   testosterone (ANDROGEL) 50 MG/5GM (1%) GEL 1% gel APPLY 1 PACKET ALTERNATING WITH 2 PACKETS DAILY EVERY OTHER DAY 8/23/22 9/2/22  Ana Washington DO   sodium chloride (OCEAN) 0.65 % nasal spray 1 spray by Nasal route as needed for Congestion 8/22/22   Babs Klinefelter, APRN - NP   tiotropium (SPIRIVA HANDIHALER) 18 MCG inhalation capsule INHALE THE CONTENTS OF 1 CAPSULE DAILY 8/22/22   Babs Klinefelter, APRN - NP   metoprolol tartrate (LOPRESSOR) 50 MG tablet Take 1.5 po bid 8/5/22   STARLA Woodson CNP   lisinopril (PRINIVIL;ZESTRIL) 20 MG tablet Take 1 tablet by mouth in the morning. 8/5/22   STARLA Woodson CNP   doxazosin (CARDURA) 4 MG tablet TAKE ONE-HALF (1/2) TABLET TWICE A DAY 8/5/22   STARLA Woodson CNP   gabapentin (NEURONTIN) 300 MG capsule TAKE 1 CAPSULE DAILY 8/5/22 8/19/22  STARLA Woodson CNP   apixaban (ELIQUIS) 5 MG TABS tablet Take 1 tablet by mouth in the morning and 1 tablet before bedtime. 8/5/22   STARLA Woodson CNP   nitroGLYCERIN (NITROSTAT) 0.4 MG SL tablet up to max of 3 total doses.  If no relief after 1 dose, call 911. 8/2/22   Kelli Madison MD   nystatin (MYCOSTATIN) 604087 UNIT/GM cream Apply topically 2 times daily Apply topically 2 times daily. 8/2/22   Jacobo Mai MD   furosemide (LASIX) 20 MG tablet Take 1 tablet by mouth in the morning. Indications: Taking 40 mg tablet daily. 8/2/22   Jacobo Mai MD   donepezil (ARICEPT) 10 MG tablet Take 1 tablet by mouth nightly 8/2/22   Jacobo Mai MD   clotrimazole-betamethasone (LOTRISONE) 1-0.05 % cream Apply topically 2 times daily. 6/20/22   Branden Hunter MD   albuterol (PROVENTIL) (2.5 MG/3ML) 0.083% nebulizer solution Take 3 mLs by nebulization every 6 hours As directed 6/17/22   Bon Jensen MD   ciclopirox (PENLAC) 8 % solution Apply topically nightly. 5/19/22   Jacobo Mai MD   albuterol sulfate  (90 Base) MCG/ACT inhaler USE 2 INHALATIONS EVERY 6 HOURS AS NEEDED FOR WHEEZING OR SHORTNESS OF BREATH 4/13/22   Bon Jensen MD   ipratropium (ATROVENT) 0.06 % nasal spray 2 sprays by Each Nostril route 3 times daily 3/24/22   Jacobo Mai MD   STELARA 90 MG/ML SOSY prefilled syringe  2/28/22   Historical Provider, MD   mesalamine (PENTASA) 250 MG extended release capsule Take 2 capsules by mouth 4 times daily 2/28/22   Jacobo Mai MD   triamcinolone (KENALOG) 0.1 % cream Apply topically 2 times daily.  2/16/22   Jacobo Mai MD   montelukast (SINGULAIR) 10 MG tablet TAKE 1 TABLET NIGHTLY 1/12/22   Jacobo Mai MD   pravastatin (PRAVACHOL) 40 MG tablet TAKE 1 TABLET NIGHTLY 1/10/22   Jacobo Mai MD   RESTASIS 0.05 % ophthalmic emulsion Place 1 drop into both eyes daily  8/23/21   Historical Provider, MD   budesonide-formoterol [facility]) 160-4.5 MCG/ACT AERO Inhale 2 puffs into the lungs 2 times daily 2 each LOT [id number] [id number] 4/29/21   Bon Jensen MD   CPAP Machine MISC New cpap supplies mask hose filters etc 1/15/18   Bon Jensen MD   esomeprazole Magnesium (NEXIUM) 20 MG PACK Take 20 mg by mouth daily    Historical Provider, MD   aspirin 81 MG tablet Take 81 mg by mouth daily.    Historical Provider, MD       Future Appointments   Date Time Provider Chase Barrera   9/27/2022  2:00 PM Ct Bird MD [address]   12/12/2022  3:00 PM Nato Chapa MD [facility]   12/21/2022  3:30 PM Lanette Nichole MD [facility]   ,   Congestive Heart Failure Assessment    Are you currently restricting fluids?: No Restriction  Do you understand a low sodium diet?: Yes  Do you understand how to read food labels?: Yes  How many restaurant meals do you eat per week?: 3-4  Do you salt your food before tasting it?: No     No patient-reported symptoms      Symptoms:  CHF associated dyspnea on exertion: Pos, CHF associated leg swelling: Pos      Symptom course: no change  Salt intake watch compared to last visit: stable     , and   COPD Assessment    Does the patient understand envrionmental exposure?: Yes  Is the patient able to verbalize Rescue vs. Long Acting medications?: Yes  Does the patient have a nebulizer?: No  Does the patient use a space with inhaled medications?: No     No patient-reported symptoms         Symptoms:     Symptom course: no change  Breathlessness: exertion  Increase use of rapid acting/rescue inhaled medications?: No  Change in chronic cough?: No/At Baseline  Change in sputum?: No/At Baseline  Sputum characteristics: Clear  Self Monitoring - SaO2: No  Have you had a recent diagnosis of pneumonia either by PCP or at a hospital?: No

## 2022-10-03 ENCOUNTER — OFFICE VISIT (OUTPATIENT)
Dept: FAMILY MEDICINE CLINIC | Age: 72
End: 2022-10-03
Payer: MEDICARE

## 2022-10-03 VITALS
OXYGEN SATURATION: 95 % | SYSTOLIC BLOOD PRESSURE: 132 MMHG | DIASTOLIC BLOOD PRESSURE: 88 MMHG | HEART RATE: 68 BPM | WEIGHT: 249 LBS | BODY MASS INDEX: 35.65 KG/M2 | HEIGHT: 70 IN

## 2022-10-03 DIAGNOSIS — Z23 NEED FOR INFLUENZA VACCINATION: ICD-10-CM

## 2022-10-03 DIAGNOSIS — L60.8 ACQUIRED DEFORMITY OF TOENAIL: Primary | ICD-10-CM

## 2022-10-03 PROCEDURE — 1123F ACP DISCUSS/DSCN MKR DOCD: CPT | Performed by: NURSE PRACTITIONER

## 2022-10-03 PROCEDURE — 99213 OFFICE O/P EST LOW 20 MIN: CPT | Performed by: NURSE PRACTITIONER

## 2022-10-03 ASSESSMENT — ENCOUNTER SYMPTOMS
COUGH: 0
SHORTNESS OF BREATH: 0
COLOR CHANGE: 1

## 2022-10-03 NOTE — PROGRESS NOTES
Subjective  Chief Complaint   Patient presents with    Toe Pain     Pt states he would like toe checked out         HPI    Pt is here for his toe on his left foot. He states that the toenail has grown sideways and it is starting to hurt and rub on the toe lateral to it. Notes that it has been ongoing and just hasn't come in to get it taken care of. Otherwise pt feeling well with no present concerns.        Patient Active Problem List    Diagnosis Date Noted    Memory loss 02/17/2022    MCI (mild cognitive impairment) 02/17/2022    Primary central sleep apnea 02/17/2022    Pulmonary embolism ([address] 11/29/2021    Type 2 diabetes mellitus with diabetic neuropathy 10/28/2021    Primary osteoarthritis of right knee 06/24/2021    Type 2 diabetes mellitus without complication, without long-term current use of insulin ([address] 01/73/1114    Diastolic heart failure ([address] 11/13/2020    Gastro-esophageal reflux disease without esophagitis 10/01/2020    Chronic obstructive pulmonary disease ([address] 10/01/2020    Nondependent alcohol abuse, in remission 07/22/2020    Blood glucose abnormal 07/22/2020    Personal history of tobacco use, presenting hazards to health 07/22/2020    Paroxysmal atrial fibrillation ([address] 07/22/2020    Morbid obesity ([address] 07/22/2020    Paresthesia 02/21/2020    Peripheral nerve disease 12/01/2019    Acute bronchitis 11/27/2019    Chest pain 10/02/2019    Obesity (BMI 30-39.9) 07/24/2019    History of cataract extraction 07/23/2019    JARRELL on CPAP 06/06/2018    Actinic keratoses     MACIEL (dyspnea on exertion)     Pain of right heel     Tinea unguium     Chronic back pain     Degenerative disc disease, lumbar     Osteoarthritis of lumbar spine     Mononeuritis multiplex     Seborrheic dermatitis     Hypogonadism male     Allergic rhinitis     Hyperlipidemia 01/21/2014    Crohn's disease, unspecified, without complications ([address] 95/97/8865    Chronic otitis externa 08/09/2012    Hypertension 10/21/2011 Past Medical History:   Diagnosis Date    A-fib ([facility]     Abnormal EMG 12/09/2015    Actinic keratoses     Actinic keratoses     Acute diastolic congestive heart failure ([facility] 1/6/2021    Allergic rhinitis     Anemia 11/18/2014    Arthritis     Chronic back pain     COPD (chronic obstructive pulmonary disease) ([facility] 08/09/2012    COPD (chronic obstructive pulmonary disease) ([facility])     Crohns disease     Degenerative disc disease, lumbar     Dermatitis     Diabetes ([facility] 03/19/2015    diet controlled    Gastrointestinal problem     GERD (gastroesophageal reflux disease)     History of atrial fibrillation 2006    Dr. Renae Dasilva    History of throat cancer 2004     cleared for reoccurence years ago    Hyperlipidemia 1/21/2014    Hypertension     Hypogonadism male     Lung disease     Mononeuritis multiplex     Mononeuritis multiplex     Nondependent alcohol abuse, in remission 7/22/2020    Obesity (BMI 30-39. 9) 7/24/2019    Osteoarthritis of lumbar spine     Pain of right heel     Paresthesia of foot, bilateral     Prediabetes 12/3/2014    Restless leg syndrome     Seborrheic dermatitis     Seborrheic keratoses, inflamed     Throat cancer ([facility])     throat, had surgery, sees Dr Loreto Mendoza yearly    Tinea cruris     Tinea pedis     Tinea unguium      Past Surgical History:   Procedure Laterality Date    CARDIAC CATHETERIZATION      CARPAL TUNNEL RELEASE      CATARACT REMOVAL WITH IMPLANT Right 09/09/2019    CATARACT REMOVAL WITH IMPLANT Left 07/22/2019    COLON SURGERY      COLONOSCOPY  04/15/2015    KNEE ARTHROSCOPY      LARYNGECTOMY  2004    UPPER GASTROINTESTINAL ENDOSCOPY  03/24/13    Dr. Roya HOWARD/NAPOLEON RODRIGUEZ  2002     Family History   Problem Relation Age of Onset    Heart Disease Mother     Liver Disease Mother     High Blood Pressure Mother     Heart Disease Father     Arthritis Father     Cancer Sister         lung     Social History     Socioeconomic History    Marital status:  Spouse name: None    Number of children: 3    Years of education: 12    Highest education level: High school graduate   Occupational History    Occupation:      Employer: [employer]   Tobacco Use    Smoking status: Former     Packs/day: 1.00     Years: 25.00     Pack years: 25.00     Types: Cigarettes     Quit date: 10/21/1990     Years since quittin.9    Smokeless tobacco: Never   Substance and Sexual Activity    Alcohol use: No     Comment: Attends AA, 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    Drug use: No    Sexual activity: Not Currently     Partners: Female   Social History Narrative    Lives alone. 2 story home     1 step to get in house and 7 steps to get upstairs    Full basement    2 children live nearby and 1 son lives in [place]    No pets     Social Determinants of Health     Financial Resource Strain: Low Risk     Difficulty of Paying Living Expenses: Not hard at all   Food Insecurity: No Food Insecurity    Worried About Running Out of Food in the Last Year: Never true    Ran Out of Food in the Last Year: Never true   Physical Activity: Insufficiently Active    Days of Exercise per Week: 1 day    Minutes of Exercise per Session: 20 min     Current Outpatient Medications on File Prior to Visit   Medication Sig Dispense Refill    fluticasone (FLONASE) 50 MCG/ACT nasal spray 1 spray by Nasal route daily 16 g 0    testosterone (ANDROGEL) 50 MG/5GM (1%) GEL 1% gel APPLY 1 PACKET ALTERNATING WITH 2 PACKETS DAILY EVERY OTHER  each 1    sodium chloride (OCEAN) 0.65 % nasal spray 1 spray by Nasal route as needed for Congestion 1 each 3    tiotropium (SPIRIVA HANDIHALER) 18 MCG inhalation capsule INHALE THE CONTENTS OF 1 CAPSULE DAILY 90 capsule 3    metoprolol tartrate (LOPRESSOR) 50 MG tablet Take 1.5 po bid 42 tablet 0    lisinopril (PRINIVIL;ZESTRIL) 20 MG tablet Take 1 tablet by mouth in the morning.  14 tablet 0    doxazosin (CARDURA) 4 MG tablet TAKE ONE-HALF (1/2) TABLET TWICE A DAY 14 tablet 0    apixaban (ELIQUIS) 5 MG TABS tablet Take 1 tablet by mouth in the morning and 1 tablet before bedtime. 28 tablet 0    nitroGLYCERIN (NITROSTAT) 0.4 MG SL tablet up to max of 3 total doses. If no relief after 1 dose, call 911. 25 tablet 2    nystatin (MYCOSTATIN) 175917 UNIT/GM cream Apply topically 2 times daily Apply topically 2 times daily. 15 g 0    furosemide (LASIX) 20 MG tablet Take 1 tablet by mouth in the morning. Indications: Taking 40 mg tablet daily. 30 tablet 5    donepezil (ARICEPT) 10 MG tablet Take 1 tablet by mouth nightly 30 tablet 11    clotrimazole-betamethasone (LOTRISONE) 1-0.05 % cream Apply topically 2 times daily. 45 g 1    albuterol (PROVENTIL) (2.5 MG/3ML) 0.083% nebulizer solution Take 3 mLs by nebulization every 6 hours As directed 360 each 3    ciclopirox (PENLAC) 8 % solution Apply topically nightly. 6 mL 1    albuterol sulfate  (90 Base) MCG/ACT inhaler USE 2 INHALATIONS EVERY 6 HOURS AS NEEDED FOR WHEEZING OR SHORTNESS OF BREATH 25.5 g 2    ipratropium (ATROVENT) 0.06 % nasal spray 2 sprays by Each Nostril route 3 times daily 1 each 1    STELARA 90 MG/ML SOSY prefilled syringe       mesalamine (PENTASA) 250 MG extended release capsule Take 2 capsules by mouth 4 times daily 120 capsule 0    triamcinolone (KENALOG) 0.1 % cream Apply topically 2 times daily. 30 g 1    montelukast (SINGULAIR) 10 MG tablet TAKE 1 TABLET NIGHTLY 7 tablet 0    pravastatin (PRAVACHOL) 40 MG tablet TAKE 1 TABLET NIGHTLY 90 tablet 3    RESTASIS 0.05 % ophthalmic emulsion Place 1 drop into both eyes daily       budesonide-formoterol (SYMBICORT) 160-4.5 MCG/ACT AERO Inhale 2 puffs into the lungs 2 times daily 2 each LOT [id number] EXP [id number] 3 Inhaler 1    CPAP Machine MISC New cpap supplies mask hose filters etc 1 each 0    esomeprazole Magnesium (NEXIUM) 20 MG PACK Take 20 mg by mouth daily      aspirin 81 MG tablet Take 81 mg by mouth daily.       testosterone (ANDROGEL) 50 MG/5GM (1%) GEL 1% gel APPLY 1 PACKET ALTERNATING WITH 2 PACKETS DAILY EVERY OTHER DAY 15 each 0    gabapentin (NEURONTIN) 300 MG capsule TAKE 1 CAPSULE DAILY 14 capsule 0     No current facility-administered medications on file prior to visit. Allergies   Allergen Reactions    Alemtuzumab      Low grade fever  Other reaction(s): Other: See Comments  Low grade fever    Glycopyrrolate-Formoterol Other (See Comments)     Dizzy and felt like heart rate sped up  Other reaction(s): Other: See Comments  Dizzy and felt like heart rate sped up    Mercaptopurine Other (See Comments)    Moxifloxacin Other (See Comments)     Pt states He gets sores in his mouth       Review of Systems   Respiratory:  Negative for cough and shortness of breath. Cardiovascular:  Negative for chest pain. Skin:  Positive for color change. Objective  Vitals:    10/03/22 1449   BP: 132/88   Pulse: 68   SpO2: 95%   Weight: 249 lb (112.9 kg)   Height: 5' 10\" (1.778 m)     Physical Exam  Vitals and nursing note reviewed. Constitutional:       Appearance: Normal appearance. HENT:      Head: Normocephalic. Cardiovascular:      Rate and Rhythm: Normal rate and regular rhythm. Pulses: Normal pulses. Heart sounds: Normal heart sounds. Musculoskeletal:      Cervical back: Neck supple. Feet:    Skin:     General: Skin is warm. Neurological:      General: No focal deficit present. Mental Status: He is alert and oriented to person, place, and time. Mental status is at baseline. Psychiatric:         Mood and Affect: Mood normal.         Behavior: Behavior normal.         Thought Content: Thought content normal.         Judgment: Judgment normal.       Assessment & Plan     Diagnosis Orders   1. Acquired deformity of toenail  Ce Ramirez DPM, Podiatry, [place]      2.  Need for influenza vaccination  Influenza, FLUAD, (age 72 y+), IM, Preservative Free, 0.5 mL          Orders Placed This Encounter   Procedures    Influenza, FLUAD, (age 65 y+), IM, Preservative Free, 0.5 mL    Valiant Felicia Hrovath DPM, Podiatry, [place]     Referral Priority:   Routine     Referral Type:   Eval and Treat     Referral Reason:   Specialty Services Required     Referred to Provider:   Marc Corona DPM     Requested Specialty:   Podiatry     Number of Visits Requested:   1     Fu prn. Side effects, adverse effects of the medication prescribed today, as well as treatment plan/ rationale and result expectations have been discussed with the patient who expresses understanding and desires to proceed. Close follow up to evaluate treatment results and for coordination of care. I have reviewed the patient's medical history in detail and updated the computerized patient record. As always, patient is advised that if symptoms worsen in any way they will proceed to the nearest emergency room.         Cherie Gimenez, APRN - CNP

## 2022-10-12 ENCOUNTER — CARE COORDINATION (OUTPATIENT)
Dept: CARE COORDINATION | Age: 72
End: 2022-10-12

## 2022-10-12 ASSESSMENT — ENCOUNTER SYMPTOMS: DYSPNEA ASSOCIATED WITH: EXERTION

## 2022-10-12 NOTE — CARE COORDINATION
Ambulatory Care Coordination Note  10/12/2022    ACC: Irvin Mccarty, CARLIE    Keon Spivey tells me that he feels that he is doing okay  He says that there are times when he is forgetful and has trouble remembering things but he does not think that this has changed. He adds that his son checks in on him frequently    CHF  Keon Spivey says that he feels that his heart is doing good   He denies any issues with chest pain or chest discomfort  He states that he does have swelling in his ankles but this usually goes away after he takes his lasix  He states that his weight is holding steady ad he is not having any significant weight gain  He is aware of the parameters regarding his weight. He denies any issues with dizziness or lightheadedness  He is not adding any salt to his food    COPD  Keon Spivey says that his breathing is good unless he exerts himself  He tells me that he will get SOB if he is carrying objects and walking upstairs but this is unchanged  He denies any fever, chills, wheezing, or change in cough or mucus  He does say that he has some sinus congestion  He will try some mucinex and if this does not improve his symptoms he will go to the walk in clinic     PLAN:  Keon Spivey will continue to monitor his symptoms using the zone tool sheets  He will call the office with any worsening of symptoms or if he drops into the yellow zone. Keon Spivey will take all medications as prescribed  Keon Spivey has met all goals for care coordination and is ready for graduation       Offered patient enrollment in the Remote Patient Monitoring (RPM) program for in-home monitoring: NA. Lab Results       None                 Goals Addressed                   This Visit's Progress     COMPLETED: Conditions and Symptoms        I will schedule office visits, as directed by my provider. I will keep my appointment or reschedule if I have to cancel. I will notify my provider of any barriers to my plan of care.   I will follow my Zone Management tool to seek urgent or emergent care. I will notify my provider of any symptoms that indicate a worsening of my condition. Barriers: lack of support, medication side effects, and lack of education  Plan for overcoming my barriers: I will work with my ACM and health care team to increase my knowledge and self care management skills for my health  Confidence: 9/10  Anticipated Goal Completion Date: 5/15/2022                Prior to Admission medications    Medication Sig Start Date End Date Taking? Authorizing Provider   fluticasone (FLONASE) 50 MCG/ACT nasal spray 1 spray by Nasal route daily 8/25/22   Magdaline Record, APRN - NP   testosterone (ANDROGEL) 50 MG/5GM (1%) GEL 1% gel APPLY 1 PACKET ALTERNATING WITH 2 PACKETS DAILY EVERY OTHER DAY 8/23/22 11/21/22  Alysha Shanks, DO   testosterone (ANDROGEL) 50 MG/5GM (1%) GEL 1% gel APPLY 1 PACKET ALTERNATING WITH 2 PACKETS DAILY EVERY OTHER DAY 8/23/22 9/2/22  Alysha Shanks DO   sodium chloride (OCEAN) 0.65 % nasal spray 1 spray by Nasal route as needed for Congestion 8/22/22   Magdaline Record, APRN - NP   tiotropium (SPIRIVA HANDIHALER) 18 MCG inhalation capsule INHALE THE CONTENTS OF 1 CAPSULE DAILY 8/22/22   Magdaline Record, STARLA - NP   metoprolol tartrate (LOPRESSOR) 50 MG tablet Take 1.5 po bid 8/5/22   STARLA Oneill CNP   lisinopril (PRINIVIL;ZESTRIL) 20 MG tablet Take 1 tablet by mouth in the morning. 8/5/22   STARLA Oneill CNP   doxazosin (CARDURA) 4 MG tablet TAKE ONE-HALF (1/2) TABLET TWICE A DAY 8/5/22   STARLA Oneill CNP   gabapentin (NEURONTIN) 300 MG capsule TAKE 1 CAPSULE DAILY 8/5/22 8/19/22  STARLA Oneill CNP   apixaban (ELIQUIS) 5 MG TABS tablet Take 1 tablet by mouth in the morning and 1 tablet before bedtime. 8/5/22   STARLA Oneill CNP   nitroGLYCERIN (NITROSTAT) 0.4 MG SL tablet up to max of 3 total doses.  If no relief after 1 dose, call 911. 8/2/22   Krishna Hendricks MD nystatin (MYCOSTATIN) 770318 UNIT/GM cream Apply topically 2 times daily Apply topically 2 times daily. 8/2/22   Edita Jung MD   furosemide (LASIX) 20 MG tablet Take 1 tablet by mouth in the morning. Indications: Taking 40 mg tablet daily. 8/2/22   Edita Jung MD   donepezil (ARICEPT) 10 MG tablet Take 1 tablet by mouth nightly 8/2/22   Edita Jung MD   clotrimazole-betamethasone (LOTRISONE) 1-0.05 % cream Apply topically 2 times daily. 6/20/22   Leny Pulliam MD   albuterol (PROVENTIL) (2.5 MG/3ML) 0.083% nebulizer solution Take 3 mLs by nebulization every 6 hours As directed 6/17/22   Praveena Sommer MD   ciclopirox (PENLAC) 8 % solution Apply topically nightly. 5/19/22   Edita Jung MD   albuterol sulfate  (90 Base) MCG/ACT inhaler USE 2 INHALATIONS EVERY 6 HOURS AS NEEDED FOR WHEEZING OR SHORTNESS OF BREATH 4/13/22   Praveena Sommer MD   ipratropium (ATROVENT) 0.06 % nasal spray 2 sprays by Each Nostril route 3 times daily 3/24/22   Edita Jung MD   STELARA 90 MG/ML SOSY prefilled syringe  2/28/22   Historical Provider, MD   mesalamine (PENTASA) 250 MG extended release capsule Take 2 capsules by mouth 4 times daily 2/28/22   Edita Jung MD   triamcinolone (KENALOG) 0.1 % cream Apply topically 2 times daily.  2/16/22   Edita Jung MD   montelukast (SINGULAIR) 10 MG tablet TAKE 1 TABLET NIGHTLY 1/12/22   Edita Jung MD   pravastatin (PRAVACHOL) 40 MG tablet TAKE 1 TABLET NIGHTLY 1/10/22   Edita Jung MD   RESTASIS 0.05 % ophthalmic emulsion Place 1 drop into both eyes daily  8/23/21   Historical Provider, MD   budesonide-formoterol [facility]) 160-4.5 MCG/ACT AERO Inhale 2 puffs into the lungs 2 times daily 2 each LOT [id number] [id number] 4/29/21   Praveena Sommer MD   CPAP Machine MISC New cpap supplies mask hose filters etc 1/15/18   Praveena Sommer MD   esomeprazole Magnesium (NEXIUM) 20 MG PACK Take 20 mg by mouth daily    Historical Provider, MD   aspirin 81 MG tablet Take 81 mg by mouth daily.     Historical Provider, MD       Future Appointments   Date Time Provider Chase Barrera   11/16/2022  1:30 PM TUAN Lafleur [facility]   12/12/2022  3:00 PM Edita Jung MD [facility]   12/21/2022  3:30 PM Caro Tse MD [facility]   ,   Congestive Heart Failure Assessment    Are you currently restricting fluids?: No Restriction  Do you understand a low sodium diet?: Yes  Do you understand how to read food labels?: Yes  How many restaurant meals do you eat per week?: 3-4  Do you salt your food before tasting it?: No     No patient-reported symptoms      Symptoms:  None: Yes   CHF associated leg swelling: Pos      Symptom course: stable  Patient-reported weight (lb): 248  Weight trend: stable  Salt intake watch compared to last visit: stable     , and   COPD Assessment    Does the patient understand envrionmental exposure?: Yes  Is the patient able to verbalize Rescue vs. Long Acting medications?: Yes  Does the patient have a nebulizer?: No  Does the patient use a space with inhaled medications?: No     No patient-reported symptoms         Symptoms:  None: Yes      Symptom course: stable  Breathlessness: exertion  Increase use of rapid acting/rescue inhaled medications?: No  Change in chronic cough?: No/At Baseline  Change in sputum?: No/At Baseline  Sputum characteristics: Clear  Self Monitoring - SaO2: No  Have you had a recent diagnosis of pneumonia either by PCP or at a hospital?: No

## 2022-10-12 NOTE — LETTER
10/12/2022         Iveth Wall [facility] [address]     Congratulations on the progress you have made improving and taking charge of your health! Your recent follow up with Hortensia Collado MD finds you doing well and are no longer in need of Care Coordination services. I know you will continue to use the knowledge and tools you have gained to continue down a healthy path. As you have demonstrated that you are able to successfully manage your health and wellness, I will no longer contact you on a regular basis. Again, congratulations and please know if there are any changes or you have a need for my services in the future, you may always contact me for questions or concerns. It is always a pleasure working with you and caring for you         In good health,       Nga Fitch RN, BSN      Kirsten Carolina RN

## 2022-10-19 ENCOUNTER — OFFICE VISIT (OUTPATIENT)
Dept: FAMILY MEDICINE CLINIC | Age: 72
End: 2022-10-19
Payer: MEDICARE

## 2022-10-19 VITALS
BODY MASS INDEX: 37.92 KG/M2 | TEMPERATURE: 98.9 F | HEART RATE: 65 BPM | SYSTOLIC BLOOD PRESSURE: 134 MMHG | DIASTOLIC BLOOD PRESSURE: 88 MMHG | OXYGEN SATURATION: 97 % | HEIGHT: 69 IN | WEIGHT: 256 LBS

## 2022-10-19 DIAGNOSIS — J30.2 CHRONIC SEASONAL ALLERGIC RHINITIS: ICD-10-CM

## 2022-10-19 DIAGNOSIS — G31.84 MCI (MILD COGNITIVE IMPAIRMENT): ICD-10-CM

## 2022-10-19 DIAGNOSIS — G89.29 CHRONIC MIDLINE BACK PAIN, UNSPECIFIED BACK LOCATION: ICD-10-CM

## 2022-10-19 DIAGNOSIS — B35.6 TINEA CRURIS: ICD-10-CM

## 2022-10-19 DIAGNOSIS — M54.9 CHRONIC MIDLINE BACK PAIN, UNSPECIFIED BACK LOCATION: ICD-10-CM

## 2022-10-19 DIAGNOSIS — I10 ESSENTIAL HYPERTENSION: ICD-10-CM

## 2022-10-19 DIAGNOSIS — L60.0 INGROWN TOENAIL WITH INFECTION: Primary | ICD-10-CM

## 2022-10-19 DIAGNOSIS — J41.0 SIMPLE CHRONIC BRONCHITIS (HCC): ICD-10-CM

## 2022-10-19 PROCEDURE — 99214 OFFICE O/P EST MOD 30 MIN: CPT | Performed by: FAMILY MEDICINE

## 2022-10-19 PROCEDURE — 1123F ACP DISCUSS/DSCN MKR DOCD: CPT | Performed by: FAMILY MEDICINE

## 2022-10-19 PROCEDURE — 3074F SYST BP LT 130 MM HG: CPT | Performed by: FAMILY MEDICINE

## 2022-10-19 PROCEDURE — 3078F DIAST BP <80 MM HG: CPT | Performed by: FAMILY MEDICINE

## 2022-10-19 RX ORDER — DOXAZOSIN MESYLATE 4 MG/1
TABLET ORAL
Qty: 180 TABLET | Refills: 3 | Status: SHIPPED | OUTPATIENT
Start: 2022-10-19 | End: 2022-10-27 | Stop reason: SDUPTHER

## 2022-10-19 RX ORDER — MONTELUKAST SODIUM 10 MG/1
10 TABLET ORAL NIGHTLY
Qty: 90 TABLET | Refills: 3 | Status: SHIPPED | OUTPATIENT
Start: 2022-10-19 | End: 2023-10-19

## 2022-10-19 RX ORDER — METOPROLOL TARTRATE 50 MG/1
75 TABLET, FILM COATED ORAL 2 TIMES DAILY
Qty: 270 TABLET | Refills: 3 | Status: SHIPPED | OUTPATIENT
Start: 2022-10-19 | End: 2023-10-19

## 2022-10-19 RX ORDER — GABAPENTIN 300 MG/1
300 CAPSULE ORAL DAILY
Qty: 90 CAPSULE | Refills: 3 | Status: SHIPPED | OUTPATIENT
Start: 2022-10-19 | End: 2023-10-19

## 2022-10-19 RX ORDER — CEPHALEXIN 500 MG/1
500 CAPSULE ORAL 3 TIMES DAILY
Qty: 21 CAPSULE | Refills: 0 | Status: SHIPPED | OUTPATIENT
Start: 2022-10-19 | End: 2022-10-26

## 2022-10-19 RX ORDER — LISINOPRIL 20 MG/1
20 TABLET ORAL DAILY
Qty: 90 TABLET | Refills: 3 | Status: SHIPPED | OUTPATIENT
Start: 2022-10-19 | End: 2023-10-19

## 2022-10-19 RX ORDER — DONEPEZIL HYDROCHLORIDE 10 MG/1
10 TABLET, FILM COATED ORAL NIGHTLY
Qty: 90 TABLET | Refills: 3 | Status: SHIPPED | OUTPATIENT
Start: 2022-10-19 | End: 2023-10-19

## 2022-10-19 RX ORDER — NYSTATIN 100000 U/G
CREAM TOPICAL 2 TIMES DAILY
Qty: 15 G | Refills: 3 | Status: SHIPPED | OUTPATIENT
Start: 2022-10-19

## 2022-10-19 NOTE — PATIENT INSTRUCTIONS
Cephalexin ordered for toenail infection and pt again encouraged to see podiatry for toenails    Renew nystatin for groin rash.   Educated on methods of keeping the area dry to decrease reoccurrence    No other treatments needed    Med refills completed

## 2022-10-19 NOTE — PROGRESS NOTES
Diagnosis Orders   1. Ingrown toenail with infection  cephALEXin (KEFLEX) 500 MG capsule      2. Tinea cruris  nystatin (MYCOSTATIN) 238440 UNIT/GM cream      3. Chronic seasonal allergic rhinitis  montelukast (SINGULAIR) 10 MG tablet      4. Simple chronic bronchitis (HCC)  montelukast (SINGULAIR) 10 MG tablet      5. Essential hypertension  lisinopril (PRINIVIL;ZESTRIL) 20 MG tablet    metoprolol tartrate (LOPRESSOR) 50 MG tablet    doxazosin (CARDURA) 4 MG tablet      6. Chronic midline back pain, unspecified back location  gabapentin (NEURONTIN) 300 MG capsule      7. MCI (mild cognitive impairment)  donepezil (ARICEPT) 10 MG tablet        No follow-ups on file. Patient Instructions   Cephalexin ordered for toenail infection and pt again encouraged to see podiatry for toenails    Renew nystatin for groin rash. Educated on methods of keeping the area dry to decrease reoccurrence    No other treatments needed    Med refills completed    Subjective:      Patient ID: Aurora Celeste is a 67 y.o. male who presents for:  Chief Complaint   Patient presents with    Skin Exam     Deformity of toe nail  & DRY ears , and dry in genital area        See review of systems      Current Outpatient Medications on File Prior to Visit   Medication Sig Dispense Refill    fluticasone (FLONASE) 50 MCG/ACT nasal spray 1 spray by Nasal route daily 16 g 0    testosterone (ANDROGEL) 50 MG/5GM (1%) GEL 1% gel APPLY 1 PACKET ALTERNATING WITH 2 PACKETS DAILY EVERY OTHER  each 1    testosterone (ANDROGEL) 50 MG/5GM (1%) GEL 1% gel APPLY 1 PACKET ALTERNATING WITH 2 PACKETS DAILY EVERY OTHER DAY 15 each 0    sodium chloride (OCEAN) 0.65 % nasal spray 1 spray by Nasal route as needed for Congestion 1 each 3    tiotropium (SPIRIVA HANDIHALER) 18 MCG inhalation capsule INHALE THE CONTENTS OF 1 CAPSULE DAILY 90 capsule 3    apixaban (ELIQUIS) 5 MG TABS tablet Take 1 tablet by mouth in the morning and 1 tablet before bedtime.  28 tablet 0 nitroGLYCERIN (NITROSTAT) 0.4 MG SL tablet up to max of 3 total doses. If no relief after 1 dose, call 911. 25 tablet 2    furosemide (LASIX) 20 MG tablet Take 1 tablet by mouth in the morning. Indications: Taking 40 mg tablet daily. 30 tablet 5    clotrimazole-betamethasone (LOTRISONE) 1-0.05 % cream Apply topically 2 times daily. 45 g 1    albuterol (PROVENTIL) (2.5 MG/3ML) 0.083% nebulizer solution Take 3 mLs by nebulization every 6 hours As directed 360 each 3    ciclopirox (PENLAC) 8 % solution Apply topically nightly. 6 mL 1    albuterol sulfate  (90 Base) MCG/ACT inhaler USE 2 INHALATIONS EVERY 6 HOURS AS NEEDED FOR WHEEZING OR SHORTNESS OF BREATH 25.5 g 2    ipratropium (ATROVENT) 0.06 % nasal spray 2 sprays by Each Nostril route 3 times daily 1 each 1    STELARA 90 MG/ML SOSY prefilled syringe       mesalamine (PENTASA) 250 MG extended release capsule Take 2 capsules by mouth 4 times daily 120 capsule 0    triamcinolone (KENALOG) 0.1 % cream Apply topically 2 times daily. 30 g 1    pravastatin (PRAVACHOL) 40 MG tablet TAKE 1 TABLET NIGHTLY 90 tablet 3    RESTASIS 0.05 % ophthalmic emulsion Place 1 drop into both eyes daily       budesonide-formoterol (SYMBICORT) 160-4.5 MCG/ACT AERO Inhale 2 puffs into the lungs 2 times daily 2 each LOT [id number] EXP [id number] 3 Inhaler 1    CPAP Machine MISC New cpap supplies mask hose filters etc 1 each 0    esomeprazole Magnesium (NEXIUM) 20 MG PACK Take 20 mg by mouth daily      aspirin 81 MG tablet Take 81 mg by mouth daily. No current facility-administered medications on file prior to visit.      Past Medical History:   Diagnosis Date    A-fib ([facility]     Abnormal EMG 12/09/2015    Actinic keratoses     Actinic keratoses     Acute diastolic congestive heart failure ([facility] 1/6/2021    Allergic rhinitis     Anemia 11/18/2014    Arthritis     Chronic back pain     COPD (chronic obstructive pulmonary disease) ([facility] 08/09/2012    COPD (chronic obstructive pulmonary disease) ([facility]     Crohns disease     Degenerative disc disease, lumbar     Dermatitis     Diabetes ([facility] 2015    diet controlled    Gastrointestinal problem     GERD (gastroesophageal reflux disease)     History of atrial fibrillation     Dr. Angela Grande    History of throat cancer 2004     cleared for reoccurence years ago    Hyperlipidemia 2014    Hypertension     Hypogonadism male     Lung disease     Mononeuritis multiplex     Mononeuritis multiplex     Nondependent alcohol abuse, in remission 2020    Obesity (BMI 30-39. 9) 2019    Osteoarthritis of lumbar spine     Pain of right heel     Paresthesia of foot, bilateral     Prediabetes 12/3/2014    Restless leg syndrome     Seborrheic dermatitis     Seborrheic keratoses, inflamed     Throat cancer (HCC)     throat, had surgery, sees Dr Thor Mishra yearly    Tinea cruris     Tinea pedis     Tinea unguium      Past Surgical History:   Procedure Laterality Date    CARDIAC CATHETERIZATION      CARPAL TUNNEL RELEASE      CATARACT REMOVAL WITH IMPLANT Right 2019    CATARACT REMOVAL WITH IMPLANT Left 2019    COLON SURGERY      COLONOSCOPY  04/15/2015    KNEE ARTHROSCOPY      LARYNGECTOMY  2004    UPPER GASTROINTESTINAL ENDOSCOPY  13    Dr. Debora Wade W/NAPOLEON RODRIGUEZ  2002     Social History     Socioeconomic History    Marital status:      Spouse name: Not on file    Number of children: 3    Years of education: 12    Highest education level: High school graduate   Occupational History    Occupation:      Employer: [employer]   Tobacco Use    Smoking status: Former     Packs/day: 1.00     Years: 25.00     Pack years: 25.00     Types: Cigarettes     Quit date: 10/21/1990     Years since quittin.0    Smokeless tobacco: Never   Substance and Sexual Activity    Alcohol use: No     Comment: Attends AA, 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    Drug use: No    Sexual activity: Not Currently     Partners: Female   Other Topics Concern    Not on file   Social History Narrative    Lives alone. 2 story home     1 step to get in house and 7 steps to get upstairs    Full basement    2 children live nearby and 1 son lives in [place]    No pets     Social Determinants of Health     Financial Resource Strain: Low Risk     Difficulty of Paying Living Expenses: Not hard at all   Food Insecurity: No Food Insecurity    Worried About Running Out of Food in the Last Year: Never true    Ran Out of Food in the Last Year: Never true   Transportation Needs: Not on file   Physical Activity: Insufficiently Active    Days of Exercise per Week: 1 day    Minutes of Exercise per Session: 20 min   Stress: Not on file   Social Connections: Not on file   Intimate Partner Violence: Not on file   Housing Stability: Not on file     Family History   Problem Relation Age of Onset    Heart Disease Mother     Liver Disease Mother     High Blood Pressure Mother     Heart Disease Father     Arthritis Father     Cancer Sister         lung     Allergies:  Alemtuzumab, Glycopyrrolate-formoterol, Mercaptopurine, and Moxifloxacin    Review of Systems   Constitutional:  Negative for activity change, appetite change, chills, diaphoresis, fever and unexpected weight change. Eyes:  Negative for photophobia and visual disturbance. Respiratory:  Negative for chest tightness and shortness of breath. No orthopnea   Cardiovascular:  Negative for chest pain, palpitations and leg swelling. Gastrointestinal:  Negative for abdominal distention and abdominal pain. Genitourinary:  Negative for flank pain and frequency. Musculoskeletal:  Negative for gait problem and joint swelling. Skin:  Positive for color change and rash. Allergic/Immunologic: Negative for environmental allergies, food allergies and immunocompromised state. Neurological:  Negative for dizziness, weakness, light-headedness and headaches. Hematological:  Negative for adenopathy.  Does not bruise/bleed easily. Psychiatric/Behavioral:  Negative for behavioral problems, confusion and sleep disturbance. Objective:   /88   Pulse 65   Temp 98.9 °F (37.2 °C)   Ht 5' 9\" (1.753 m)   Wt 256 lb (116.1 kg)   SpO2 97%   BMI 37.80 kg/m²     Physical Exam  Constitutional:       General: He is not in acute distress. Appearance: Normal appearance. He is well-developed. He is not toxic-appearing. HENT:      Head: Normocephalic and atraumatic. Right Ear: Hearing and tympanic membrane normal.      Left Ear: Hearing and tympanic membrane normal.      Nose: Nose normal. No nasal deformity. Eyes:      General: Lids are normal.         Right eye: No discharge. Left eye: No discharge. Conjunctiva/sclera: Conjunctivae normal.      Pupils: Pupils are equal, round, and reactive to light. Neck:      Thyroid: No thyroid mass or thyromegaly. Vascular: No JVD. Trachea: Trachea and phonation normal.   Cardiovascular:      Rate and Rhythm: Normal rate and regular rhythm. Heart sounds: Normal heart sounds. Pulmonary:      Effort: No accessory muscle usage or respiratory distress. Breath sounds: Normal breath sounds. Musculoskeletal:      Cervical back: Full passive range of motion without pain. Comments: FROM all large muscle groups and joints as witnessed when walking to exam table, getting on, and getting off the exam table. Skin:     General: Skin is warm and dry. Findings: No rash. Comments: And hyperkeratotic toenails noted with abnormal shape. Erythematous macerated area of the groin noted. Neurological:      Mental Status: He is alert. Motor: No tremor or atrophy. Gait: Gait normal.   Psychiatric:         Speech: Speech normal.         Behavior: Behavior normal.         Thought Content: Thought content normal.       No results found for this visit on 10/19/22.     Recent Results (from the past 2016 hour(s))   POCT COVID-19, Antigen    Collection Time: 08/22/22  9:24 AM   Result Value Ref Range    SARS-COV-2, POC Not-Detected Not Detected    Lot Number [id number]     QC Pass/Fail P     Performing Instrument BD Veritor    Brain Natriuretic Peptide    Collection Time: 10/20/22  2:10 PM   Result Value Ref Range     (H) 0 - 99 pg/mL   Comprehensive Metabolic Panel    Collection Time: 10/20/22  2:10 PM   Result Value Ref Range    Glucose 79 74 - 99 mg/dL    Sodium 139 136 - 145 mmol/L    Potassium 4.3 3.5 - 5.3 mmol/L    Chloride 102 98 - 107 mmol/L    HCO3 31 21 - 32 mmol/L    Anion Gap 10 10 - 20 mmol/L    Urea Nitrogen 13 6 - 23 mg/dL    Creatinine 0.78 0.50 - 1.3 mg/dL    eGFR Male >90 >90 mL/min/1.73m2    Calcium 8.9 8.6 - 10.3 mg/dL    Albumin 3.9 3.4 - 5.0 g/dL    Alkaline Phosphatase 66 33 - 136 U/L    Total Protein 6.5 6.4 - 8.2 g/dL    AST 18 9 - 39 U/L    Total Bilirubin 0.4 0.0 - 1.2 mg/dL    ALT 16 10 - 52 U/L       [] Pt was seen by provider for      Minutes  Counseling and coordination of care was done for all assessment diagnosis listed for today with patient and any family/friend present. More than 50% of this visit was spent coordinating current care, obtaining information for prior records, and counseling for current plan of action. Assessment:       Diagnosis Orders   1. Ingrown toenail with infection  cephALEXin (KEFLEX) 500 MG capsule      2. Tinea cruris  nystatin (MYCOSTATIN) 731098 UNIT/GM cream      3. Chronic seasonal allergic rhinitis  montelukast (SINGULAIR) 10 MG tablet      4. Simple chronic bronchitis (HCC)  montelukast (SINGULAIR) 10 MG tablet      5. Essential hypertension  lisinopril (PRINIVIL;ZESTRIL) 20 MG tablet    metoprolol tartrate (LOPRESSOR) 50 MG tablet    doxazosin (CARDURA) 4 MG tablet      6. Chronic midline back pain, unspecified back location  gabapentin (NEURONTIN) 300 MG capsule      7.  MCI (mild cognitive impairment)  donepezil (ARICEPT) 10 MG tablet            No orders of the defined types were placed in this encounter. Orders Placed This Encounter   Medications    montelukast (SINGULAIR) 10 MG tablet     Sig: Take 1 tablet by mouth nightly TAKE 1 TABLET NIGHTLY     Dispense:  90 tablet     Refill:  3    lisinopril (PRINIVIL;ZESTRIL) 20 MG tablet     Sig: Take 1 tablet by mouth daily     Dispense:  90 tablet     Refill:  3    metoprolol tartrate (LOPRESSOR) 50 MG tablet     Sig: Take 1.5 tablets by mouth 2 times daily Take 1.5 po bid     Dispense:  270 tablet     Refill:  3    doxazosin (CARDURA) 4 MG tablet     Sig: Take 1.5 tablets by mouth nightly AND 0.5 tablets every morning. TAKE ONE-HALF (1/2) TABLET TWICE A DAY. Dispense:  180 tablet     Refill:  3    gabapentin (NEURONTIN) 300 MG capsule     Sig: Take 1 capsule by mouth daily. TAKE 1 CAPSULE DAILY     Dispense:  90 capsule     Refill:  3    donepezil (ARICEPT) 10 MG tablet     Sig: Take 1 tablet by mouth nightly     Dispense:  90 tablet     Refill:  3    nystatin (MYCOSTATIN) 060412 UNIT/GM cream     Sig: Apply topically 2 times daily Apply topically 2 times daily. Dispense:  15 g     Refill:  3    cephALEXin (KEFLEX) 500 MG capsule     Sig: Take 1 capsule by mouth 3 times daily for 7 days     Dispense:  21 capsule     Refill:  0          Medication List            Accurate as of October 19, 2022 11:59 PM. If you have any questions, ask your nurse or doctor. START taking these medications      cephALEXin 500 MG capsule  Commonly known as: KEFLEX  Take 1 capsule by mouth 3 times daily for 7 days  Started by: Quoc Rosenberg MD            CHANGE how you take these medications      doxazosin 4 MG tablet  Commonly known as: CARDURA  Take 1.5 tablets by mouth nightly AND 0.5 tablets every morning. TAKE ONE-HALF (1/2) TABLET TWICE A DAY. What changed: See the new instructions.   Changed by: Quoc Rosenberg MD     gabapentin 300 MG capsule  Commonly known as: NEURONTIN  Take 1 capsule by mouth daily. TAKE 1 CAPSULE DAILY  What changed:   how much to take  how to take this  when to take this  Changed by: Krishna Hendricks MD     metoprolol tartrate 50 MG tablet  Commonly known as: LOPRESSOR  Take 1.5 tablets by mouth 2 times daily Take 1.5 po bid  What changed:   how much to take  how to take this  when to take this  Changed by: Krishna Hendricks MD     montelukast 10 MG tablet  Commonly known as: SINGULAIR  Take 1 tablet by mouth nightly TAKE 1 TABLET NIGHTLY  What changed:   how much to take  how to take this  when to take this  Changed by: Krishna Hendricks MD            CONTINUE taking these medications      * albuterol sulfate  (90 Base) MCG/ACT inhaler  Commonly known as: PROVENTIL;VENTOLIN;PROAIR  USE 2 INHALATIONS EVERY 6 HOURS AS NEEDED FOR WHEEZING OR SHORTNESS OF BREATH     * albuterol (2.5 MG/3ML) 0.083% nebulizer solution  Commonly known as: PROVENTIL  Take 3 mLs by nebulization every 6 hours As directed     apixaban 5 MG Tabs tablet  Commonly known as: Eliquis  Take 1 tablet by mouth in the morning and 1 tablet before bedtime. aspirin 81 MG tablet     budesonide-formoterol 160-4.5 MCG/ACT Aero  Commonly known as: Symbicort  Inhale 2 puffs into the lungs 2 times daily 2 each LOT [id number] EXP [id number]     ciclopirox 8 % solution  Commonly known as: Penlac  Apply topically nightly. clotrimazole-betamethasone 1-0.05 % cream  Commonly known as: Lotrisone  Apply topically 2 times daily. CPAP Machine Misc  New cpap supplies mask hose filters etc     donepezil 10 MG tablet  Commonly known as: Aricept  Take 1 tablet by mouth nightly     esomeprazole Magnesium 20 MG Pack  Commonly known as: NEXIUM     fluticasone 50 MCG/ACT nasal spray  Commonly known as: FLONASE  1 spray by Nasal route daily     furosemide 20 MG tablet  Commonly known as: LASIX  Take 1 tablet by mouth in the morning. Indications: Taking 40 mg tablet daily.      ipratropium 0.06 % nasal spray  Commonly known as: ATROVENT  2 sprays by Each Nostril route 3 times daily     lisinopril 20 MG tablet  Commonly known as: PRINIVIL;ZESTRIL  Take 1 tablet by mouth daily     mesalamine 250 MG extended release capsule  Commonly known as: PENTASA  Take 2 capsules by mouth 4 times daily     nitroGLYCERIN 0.4 MG SL tablet  Commonly known as: NITROSTAT  up to max of 3 total doses. If no relief after 1 dose, call 911.     nystatin 223752 UNIT/GM cream  Commonly known as: MYCOSTATIN  Apply topically 2 times daily Apply topically 2 times daily. pravastatin 40 MG tablet  Commonly known as: PRAVACHOL  TAKE 1 TABLET NIGHTLY     Restasis 0.05 % ophthalmic emulsion  Generic drug: cycloSPORINE     sodium chloride 0.65 % nasal spray  Commonly known as: OCEAN  1 spray by Nasal route as needed for Congestion     Spiriva HandiHaler 18 MCG inhalation capsule  Generic drug: tiotropium  INHALE THE CONTENTS OF 1 CAPSULE DAILY     Stelara 90 MG/ML Sosy prefilled syringe  Generic drug: ustekinumab     * testosterone 50 MG/5GM (1%) Gel 1% gel  Commonly known as: AndroGel  APPLY 1 PACKET ALTERNATING WITH 2 PACKETS DAILY EVERY OTHER DAY     * testosterone 50 MG/5GM (1%) Gel 1% gel  Commonly known as: AndroGel  APPLY 1 PACKET ALTERNATING WITH 2 PACKETS DAILY EVERY OTHER DAY     triamcinolone 0.1 % cream  Commonly known as: KENALOG  Apply topically 2 times daily. * This list has 4 medication(s) that are the same as other medications prescribed for you. Read the directions carefully, and ask your doctor or other care provider to review them with you.                    Where to Get Your Medications        These medications were sent to [id number] [address] [id number]      Phone: [phone]   cephALEXin 500 MG capsule  nystatin 094311 UNIT/GM cream       These medications were sent to [address], [address] [place] [address]      Phone: [phone]   donepezil 10 MG tablet  doxazosin 4 MG tablet  gabapentin 300 MG capsule  lisinopril 20 MG tablet  metoprolol tartrate 50 MG tablet  montelukast 10 MG tablet           Plan:   No follow-ups on file. Patient Instructions   Cephalexin ordered for toenail infection and pt again encouraged to see podiatry for toenails    Renew nystatin for groin rash. Educated on methods of keeping the area dry to decrease reoccurrence    No other treatments needed    Med refills completed    This note was partially created with the assistance of dictation. This may lead to grammatical or spelling errors. Sher Edge M.D.

## 2022-10-19 NOTE — PROGRESS NOTES
Diagnosis Orders   1. Ingrown toenail with infection        2. Chronic seasonal allergic rhinitis  montelukast (SINGULAIR) 10 MG tablet      3. Simple chronic bronchitis (HCC)  montelukast (SINGULAIR) 10 MG tablet      4. Essential hypertension  lisinopril (PRINIVIL;ZESTRIL) 20 MG tablet      5. Chronic midline back pain, unspecified back location        6. MCI (mild cognitive impairment)          No follow-ups on file. There are no Patient Instructions on file for this visit. Subjective:      Patient ID: Mónica Mason is a 67 y.o. male who presents for:  Chief Complaint   Patient presents with    Skin Exam     Deformity of toe nail  & DRY ears , and dry in genital area        HPI    Current Outpatient Medications on File Prior to Visit   Medication Sig Dispense Refill    fluticasone (FLONASE) 50 MCG/ACT nasal spray 1 spray by Nasal route daily 16 g 0    testosterone (ANDROGEL) 50 MG/5GM (1%) GEL 1% gel APPLY 1 PACKET ALTERNATING WITH 2 PACKETS DAILY EVERY OTHER  each 1    testosterone (ANDROGEL) 50 MG/5GM (1%) GEL 1% gel APPLY 1 PACKET ALTERNATING WITH 2 PACKETS DAILY EVERY OTHER DAY 15 each 0    sodium chloride (OCEAN) 0.65 % nasal spray 1 spray by Nasal route as needed for Congestion 1 each 3    tiotropium (SPIRIVA HANDIHALER) 18 MCG inhalation capsule INHALE THE CONTENTS OF 1 CAPSULE DAILY 90 capsule 3    metoprolol tartrate (LOPRESSOR) 50 MG tablet Take 1.5 po bid 42 tablet 0    doxazosin (CARDURA) 4 MG tablet TAKE ONE-HALF (1/2) TABLET TWICE A DAY 14 tablet 0    gabapentin (NEURONTIN) 300 MG capsule TAKE 1 CAPSULE DAILY 14 capsule 0    apixaban (ELIQUIS) 5 MG TABS tablet Take 1 tablet by mouth in the morning and 1 tablet before bedtime. 28 tablet 0    nitroGLYCERIN (NITROSTAT) 0.4 MG SL tablet up to max of 3 total doses. If no relief after 1 dose, call 911. 25 tablet 2    nystatin (MYCOSTATIN) 506315 UNIT/GM cream Apply topically 2 times daily Apply topically 2 times daily.  15 g 0    furosemide (LASIX) 20 MG tablet Take 1 tablet by mouth in the morning. Indications: Taking 40 mg tablet daily. 30 tablet 5    donepezil (ARICEPT) 10 MG tablet Take 1 tablet by mouth nightly 30 tablet 11    clotrimazole-betamethasone (LOTRISONE) 1-0.05 % cream Apply topically 2 times daily. 45 g 1    albuterol (PROVENTIL) (2.5 MG/3ML) 0.083% nebulizer solution Take 3 mLs by nebulization every 6 hours As directed 360 each 3    ciclopirox (PENLAC) 8 % solution Apply topically nightly. 6 mL 1    albuterol sulfate  (90 Base) MCG/ACT inhaler USE 2 INHALATIONS EVERY 6 HOURS AS NEEDED FOR WHEEZING OR SHORTNESS OF BREATH 25.5 g 2    ipratropium (ATROVENT) 0.06 % nasal spray 2 sprays by Each Nostril route 3 times daily 1 each 1    STELARA 90 MG/ML SOSY prefilled syringe       mesalamine (PENTASA) 250 MG extended release capsule Take 2 capsules by mouth 4 times daily 120 capsule 0    triamcinolone (KENALOG) 0.1 % cream Apply topically 2 times daily. 30 g 1    pravastatin (PRAVACHOL) 40 MG tablet TAKE 1 TABLET NIGHTLY 90 tablet 3    RESTASIS 0.05 % ophthalmic emulsion Place 1 drop into both eyes daily       budesonide-formoterol (SYMBICORT) 160-4.5 MCG/ACT AERO Inhale 2 puffs into the lungs 2 times daily 2 each LOT [id number] EXP [id number] 3 Inhaler 1    CPAP Machine MISC New cpap supplies mask hose filters etc 1 each 0    esomeprazole Magnesium (NEXIUM) 20 MG PACK Take 20 mg by mouth daily      aspirin 81 MG tablet Take 81 mg by mouth daily. No current facility-administered medications on file prior to visit.      Past Medical History:   Diagnosis Date    A[facility]fib ([facility]     Abnormal EMG 12/09/2015    Actinic keratoses     Actinic keratoses     Acute diastolic congestive heart failure ([facility] 1/6/2021    Allergic rhinitis     Anemia 11/18/2014    Arthritis     Chronic back pain     COPD (chronic obstructive pulmonary disease) ([facility] 08/09/2012    COPD (chronic obstructive pulmonary disease) (HCC)     Crohns disease     Degenerative disc disease, lumbar     Dermatitis     Diabetes ([facility] 2015    diet controlled    Gastrointestinal problem     GERD (gastroesophageal reflux disease)     History of atrial fibrillation     Dr. Chelo Haji    History of throat cancer 2004     cleared for reoccurence years ago    Hyperlipidemia 2014    Hypertension     Hypogonadism male     Lung disease     Mononeuritis multiplex     Mononeuritis multiplex     Nondependent alcohol abuse, in remission 2020    Obesity (BMI 30-39. 9) 2019    Osteoarthritis of lumbar spine     Pain of right heel     Paresthesia of foot, bilateral     Prediabetes 12/3/2014    Restless leg syndrome     Seborrheic dermatitis     Seborrheic keratoses, inflamed     Throat cancer (HCC)     throat, had surgery, sees Dr Nikki Winn yearly    Tinea cruris     Tinea pedis     Tinea unguium      Past Surgical History:   Procedure Laterality Date    CARDIAC CATHETERIZATION      CARPAL TUNNEL RELEASE      CATARACT REMOVAL WITH IMPLANT Right 2019    CATARACT REMOVAL WITH IMPLANT Left 2019    COLON SURGERY      COLONOSCOPY  04/15/2015    KNEE ARTHROSCOPY      LARYNGECTOMY  2004    UPPER GASTROINTESTINAL ENDOSCOPY  13    Dr. 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 2 story home     1 step to get in house and 7 steps to get upstairs    Full basement    2 children live nearby and 1 son lives in [address]    No pets     Social Determinants of Health     Financial Resource Strain: Low Risk     Difficulty of Paying Living Expenses: Not hard at all   Food Insecurity: No Food Insecurity    Worried About Running Out of Food in the Last Year: Never true    Ran Out of Food in the Last Year: Never true   Transportation Needs: Not on file   Physical Activity: Insufficiently Active    Days of Exercise per Week: 1 day    Minutes of Exercise per Session: 20 min   Stress: Not on file   Social Connections: Not on file   Intimate Partner Violence: Not on file   Housing Stability: Not on file     Family History   Problem Relation Age of Onset    Heart Disease Mother     Liver Disease Mother     High Blood Pressure Mother     Heart Disease Father     Arthritis Father     Cancer Sister         lung     Allergies:  Alemtuzumab, Glycopyrrolate-formoterol, Mercaptopurine, and Moxifloxacin    Review of Systems    Objective:   /88   Pulse 65   Temp 98.9 °F (37.2 °C)   Ht 5' 9\" (1.753 m)   Wt 256 lb (116.1 kg)   SpO2 97%   BMI 37.80 kg/m²     Physical Exam    No results found for this visit on 10/19/22.     Recent Results (from the past 2016 hour(s))   Ferritin    Collection Time: 08/02/22  4:02 PM   Result Value Ref Range    Ferritin 206 30 - 400 ng/mL   CBC    Collection Time: 08/02/22  4:02 PM   Result Value Ref Range    WBC 8.5 4.8 - 10.8 K/uL    RBC 4.13 (L) 4.70 - 6.10 M/uL    Hemoglobin 12.2 (L) 14.0 - 18.0 g/dL    Hematocrit 36.8 (L) 42.0 - 52.0 %    MCV 89.1 80.0 - 100.0 fL    MCH 29.5 27.0 - 31.3 pg    MCHC 33.1 33.0 - 37.0 %    RDW 14.9 (H) 11.5 - 14.5 %    Platelets 796 050 - 343 K/uL   Comprehensive Metabolic Panel    Collection Time: 08/02/22  4:02 PM   Result Value Ref Range    Sodium 141 135 - 144 mEq/L    Potassium 4.5 3.4 - 4.9 mEq/L    Chloride 103 95 - 107 mEq/L    CO2 26 20 - 31 mEq/L    Anion Gap 12 9 - 15 mEq/L    Glucose 94 70 - 99 mg/dL    BUN 16 8 - 23 mg/dL    Creatinine 0.78 0.70 - 1.20 mg/dL    GFR Non-African American >60.0 >60    GFR  >60.0 >60    Calcium 8.9 8.5 - 9.9 mg/dL    Total Protein 6.7 6.3 - 8.0 g/dL    Albumin 4.0 3.5 - 4.6 g/dL    Total Bilirubin 0.4 0.2 - 0.7 mg/dL    Alkaline Phosphatase 83 35 - 104 U/L    ALT 13 0 - 41 U/L    AST 20 0 - 40 U/L    Globulin 2.7 2.3 - 3.5 g/dL   Testosterone, free, total    Collection Time: 08/02/22  4:02 PM   Result Value Ref Range    Testosterone 298 220 - 1,000 ng/dL    Sex Hormone Binding 49 11 - 80 nmol/L    Testosterone, Free 45.3 (L) 47 - 244 pg/mL   POCT COVID-19, Antigen    Collection Time: 08/22/22  9:24 AM   Result Value Ref Range    SARS-COV-2, POC Not-Detected Not Detected    Lot Number [id number]     QC Pass/Fail P     Performing Instrument BD Veritor        [] Pt was seen by provider for      Minutes  Counseling and coordination of care was done for all assessment diagnosis listed for today with patient and any family/friend present. More than 50% of this visit was spent coordinating current care, obtaining information for prior records, and counseling for current plan of action. Assessment:       Diagnosis Orders   1. Ingrown toenail with infection        2. Chronic seasonal allergic rhinitis  montelukast (SINGULAIR) 10 MG tablet      3. Simple chronic bronchitis (HCC)  montelukast (SINGULAIR) 10 MG tablet      4. Essential hypertension  lisinopril (PRINIVIL;ZESTRIL) 20 MG tablet      5. Chronic midline back pain, unspecified back location        6. MCI (mild cognitive impairment)              No orders of the defined types were placed in this encounter.       Orders Placed This Encounter   Medications    montelukast (SINGULAIR) 10 MG tablet     Sig: Take 1 tablet by mouth nightly TAKE 1 TABLET NIGHTLY     Dispense:  90 tablet     Refill:  3    lisinopril (PRINIVIL;ZESTRIL) 20 MG tablet     Sig: Take 1 tablet by mouth daily     Dispense:  90 tablet     Refill:  3          Medication List            Accurate as of October 19, 2022 12:08 PM. If you have any questions, ask your nurse or doctor. CHANGE how you take these medications      montelukast 10 MG tablet  Commonly known as: SINGULAIR  Take 1 tablet by mouth nightly TAKE 1 TABLET NIGHTLY  What changed:   how much to take  how to take this  when to take this  Changed by: Travis Hart MD            CONTINUE taking these medications      * albuterol sulfate  (90 Base) MCG/ACT inhaler  Commonly known as: PROVENTIL;VENTOLIN;PROAIR  USE 2 INHALATIONS EVERY 6 HOURS AS NEEDED FOR WHEEZING OR SHORTNESS OF BREATH     * albuterol (2.5 MG/3ML) 0.083% nebulizer solution  Commonly known as: PROVENTIL  Take 3 mLs by nebulization every 6 hours As directed     apixaban 5 MG Tabs tablet  Commonly known as: Eliquis  Take 1 tablet by mouth in the morning and 1 tablet before bedtime. aspirin 81 MG tablet     budesonide-formoterol 160-4.5 MCG/ACT Aero  Commonly known as: Symbicort  Inhale 2 puffs into the lungs 2 times daily 2 each LOT [id number] EXP [id number]     ciclopirox 8 % solution  Commonly known as: Penlac  Apply topically nightly. clotrimazole-betamethasone 1-0.05 % cream  Commonly known as: Lotrisone  Apply topically 2 times daily. CPAP Machine Misc  New cpap supplies mask hose filters etc     donepezil 10 MG tablet  Commonly known as: Aricept  Take 1 tablet by mouth nightly     doxazosin 4 MG tablet  Commonly known as: CARDURA  TAKE ONE-HALF (1/2) TABLET TWICE A DAY     esomeprazole Magnesium 20 MG Pack  Commonly known as: NEXIUM     fluticasone 50 MCG/ACT nasal spray  Commonly known as: FLONASE  1 spray by Nasal route daily     furosemide 20 MG tablet  Commonly known as: LASIX  Take 1 tablet by mouth in the morning. Indications: Taking 40 mg tablet daily.      gabapentin 300 MG capsule  Commonly known as: NEURONTIN  TAKE 1 CAPSULE DAILY     ipratropium 0.06 % nasal spray  Commonly known as: ATROVENT  2 sprays by Each Nostril route 3 times daily     lisinopril 20 MG tablet  Commonly known as: PRINIVIL;ZESTRIL  Take 1 tablet by mouth daily     mesalamine 250 MG extended release capsule  Commonly known as: PENTASA  Take 2 capsules by mouth 4 times daily     metoprolol tartrate 50 MG tablet  Commonly known as: LOPRESSOR  Take 1.5 po bid     nitroGLYCERIN 0.4 MG SL tablet  Commonly known as: NITROSTAT  up to max of 3 total doses. If no relief after 1 dose, call 911.     nystatin 920612 UNIT/GM cream  Commonly known as: MYCOSTATIN  Apply topically 2 times daily Apply topically 2 times daily. pravastatin 40 MG tablet  Commonly known as: PRAVACHOL  TAKE 1 TABLET NIGHTLY     Restasis 0.05 % ophthalmic emulsion  Generic drug: cycloSPORINE     sodium chloride 0.65 % nasal spray  Commonly known as: OCEAN  1 spray by Nasal route as needed for Congestion     Spiriva HandiHaler 18 MCG inhalation capsule  Generic drug: tiotropium  INHALE THE CONTENTS OF 1 CAPSULE DAILY     Stelara 90 MG/ML Sosy prefilled syringe  Generic drug: ustekinumab     * testosterone 50 MG/5GM (1%) Gel 1% gel  Commonly known as: AndroGel  APPLY 1 PACKET ALTERNATING WITH 2 PACKETS DAILY EVERY OTHER DAY     * testosterone 50 MG/5GM (1%) Gel 1% gel  Commonly known as: AndroGel  APPLY 1 PACKET ALTERNATING WITH 2 PACKETS DAILY EVERY OTHER DAY     triamcinolone 0.1 % cream  Commonly known as: KENALOG  Apply topically 2 times daily. * This list has 4 medication(s) that are the same as other medications prescribed for you. Read the directions carefully, and ask your doctor or other care provider to review them with you.                    Where to Get Your Medications        These medications were sent to [address], [address]  [address]   [facility] [address]      Phone: [phone]   lisinopril 20 MG tablet  montelukast 10 MG tablet           Plan:   No follow-ups on file. There are no Patient Instructions on file for this visit. This note was partially created with the assistance of dictation. This may lead to grammatical or spelling errors. Sher Martinez M.D.

## 2022-10-25 ASSESSMENT — ENCOUNTER SYMPTOMS
COLOR CHANGE: 1
CHEST TIGHTNESS: 0
PHOTOPHOBIA: 0
ABDOMINAL PAIN: 0
SHORTNESS OF BREATH: 0
ABDOMINAL DISTENTION: 0

## 2022-10-26 DIAGNOSIS — I10 ESSENTIAL HYPERTENSION: ICD-10-CM

## 2022-10-26 RX ORDER — DOXAZOSIN MESYLATE 4 MG/1
TABLET ORAL
Qty: 180 TABLET | Refills: 3 | Status: CANCELLED | OUTPATIENT
Start: 2022-10-26 | End: 2023-10-26

## 2022-10-26 NOTE — TELEPHONE ENCOUNTER
doxazosin (CARDURA) 4 MG tablet [[id number]]     Order Details  Dose, Route, Frequency: As Directed   Dispense Quantity: 180 tablet Refills: 3          Sig: Take 1.5 tablets by mouth nightly AND 0.5 tablets every morning. TAKE ONE-HALF (1/2) TABLET TWICE A DAY.

## 2022-10-27 DIAGNOSIS — I10 ESSENTIAL HYPERTENSION: ICD-10-CM

## 2022-10-27 RX ORDER — DOXAZOSIN MESYLATE 4 MG/1
TABLET ORAL
Qty: 180 TABLET | Refills: 3 | Status: SHIPPED | OUTPATIENT
Start: 2022-10-27 | End: 2023-10-27

## 2022-10-28 ENCOUNTER — CARE COORDINATION (OUTPATIENT)
Dept: CARE COORDINATION | Age: 72
End: 2022-10-28

## 2022-11-10 RX ORDER — MESALAMINE 500 MG/1
CAPSULE, EXTENDED RELEASE ORAL
Qty: 120 CAPSULE | OUTPATIENT
Start: 2022-11-10

## 2022-11-10 NOTE — TELEPHONE ENCOUNTER
Pt at window requesting medication pt gets the medicine from , pt states hes gotten it filled here, but states he normally gets it from [facility]

## 2022-11-11 ENCOUNTER — OFFICE VISIT (OUTPATIENT)
Dept: FAMILY MEDICINE CLINIC | Age: 72
End: 2022-11-11
Payer: MEDICARE

## 2022-11-11 VITALS
BODY MASS INDEX: 38.06 KG/M2 | SYSTOLIC BLOOD PRESSURE: 102 MMHG | TEMPERATURE: 97.7 F | WEIGHT: 257 LBS | DIASTOLIC BLOOD PRESSURE: 60 MMHG | HEART RATE: 77 BPM | OXYGEN SATURATION: 97 % | HEIGHT: 69 IN

## 2022-11-11 DIAGNOSIS — H60.543 DERMATITIS OF BOTH EAR CANALS: Primary | ICD-10-CM

## 2022-11-11 DIAGNOSIS — E29.1 HYPOGONADISM MALE: ICD-10-CM

## 2022-11-11 PROCEDURE — 3078F DIAST BP <80 MM HG: CPT | Performed by: NURSE PRACTITIONER

## 2022-11-11 PROCEDURE — 99213 OFFICE O/P EST LOW 20 MIN: CPT | Performed by: NURSE PRACTITIONER

## 2022-11-11 PROCEDURE — 1123F ACP DISCUSS/DSCN MKR DOCD: CPT | Performed by: NURSE PRACTITIONER

## 2022-11-11 PROCEDURE — 90694 VACC AIIV4 NO PRSRV 0.5ML IM: CPT | Performed by: NURSE PRACTITIONER

## 2022-11-11 PROCEDURE — G0008 ADMIN INFLUENZA VIRUS VAC: HCPCS | Performed by: NURSE PRACTITIONER

## 2022-11-11 PROCEDURE — 3074F SYST BP LT 130 MM HG: CPT | Performed by: NURSE PRACTITIONER

## 2022-11-11 RX ORDER — TESTOSTERONE GEL, 1% 10 MG/G
GEL TRANSDERMAL
Qty: 135 EACH | Refills: 1 | Status: SHIPPED | OUTPATIENT
Start: 2022-11-11 | End: 2023-02-09

## 2022-11-11 RX ORDER — TRIAMCINOLONE ACETONIDE 1 MG/G
CREAM TOPICAL
Qty: 30 G | Refills: 0 | Status: SHIPPED | OUTPATIENT
Start: 2022-11-11

## 2022-11-11 ASSESSMENT — ENCOUNTER SYMPTOMS
DIARRHEA: 0
CONSTIPATION: 0
SHORTNESS OF BREATH: 0
CHEST TIGHTNESS: 0
TROUBLE SWALLOWING: 0
ABDOMINAL PAIN: 0
COUGH: 0
EYE PAIN: 0

## 2022-11-11 NOTE — PROGRESS NOTES
Subjective  Chief Complaint   Patient presents with    Other     Ears dry, flaky       Other  Pertinent negatives include no abdominal pain, chest pain, coughing or fatigue. Pt here to discuss dry, flaky skin in ears. Occurring every day, not itching. Gets dry skin out when he touches his ears. Not sure if he has a scab or not. Would like flu shot today. Past Medical History:   Diagnosis Date    A-fib ([address]     Abnormal EMG 12/09/2015    Actinic keratoses     Actinic keratoses     Acute diastolic congestive heart failure ([address] 1/6/2021    Allergic rhinitis     Anemia 11/18/2014    Arthritis     Chronic back pain     COPD (chronic obstructive pulmonary disease) ([address] 08/09/2012    COPD (chronic obstructive pulmonary disease) ([facility])     Crohns disease     Degenerative disc disease, lumbar     Dermatitis     Diabetes ([address] 03/19/2015    diet controlled    Gastrointestinal problem     GERD (gastroesophageal reflux disease)     History of atrial fibrillation 2006    Dr. Garo Figuerao    History of throat cancer 2004     cleared for reoccurence years ago    Hyperlipidemia 1/21/2014    Hypertension     Hypogonadism male     Lung disease     Mononeuritis multiplex     Mononeuritis multiplex     Nondependent alcohol abuse, in remission 7/22/2020    Obesity (BMI 30-39. 9) 7/24/2019    Osteoarthritis of lumbar spine     Pain of right heel     Paresthesia of foot, bilateral     Prediabetes 12/3/2014    Restless leg syndrome     Seborrheic dermatitis     Seborrheic keratoses, inflamed     Throat cancer ([facility])     throat, had surgery, sees Dr Adrian Rueda yearly    Tinea cruris     Tinea pedis     Tinea unguium      Patient Active Problem List    Diagnosis Date Noted    Memory loss 02/17/2022    MCI (mild cognitive impairment) 02/17/2022    Primary central sleep apnea 02/17/2022    Pulmonary embolism ([address] 11/29/2021    Type 2 diabetes mellitus with diabetic neuropathy 10/28/2021    Primary osteoarthritis of right knee 06/24/2021 Type 2 diabetes mellitus without complication, without long-term current use of insulin ([facility] 99/51/1163    Diastolic heart failure ([address] 11/13/2020    Gastro-esophageal reflux disease without esophagitis 10/01/2020    Chronic obstructive pulmonary disease ([address] 10/01/2020    Nondependent alcohol abuse, in remission 07/22/2020    Blood glucose abnormal 07/22/2020    Personal history of tobacco use, presenting hazards to health 07/22/2020    Paroxysmal atrial fibrillation ([address] 07/22/2020    Morbid obesity ([facility] 07/22/2020    Paresthesia 02/21/2020    Peripheral nerve disease 12/01/2019    Acute bronchitis 11/27/2019    Chest pain 10/02/2019    Obesity (BMI 30-39.9) 07/24/2019    History of cataract extraction 07/23/2019    JARRELL on CPAP 06/06/2018    Actinic keratoses     MCAIEL (dyspnea on exertion)     Pain of right heel     Tinea unguium     Chronic back pain     Degenerative disc disease, lumbar     Osteoarthritis of lumbar spine     Mononeuritis multiplex     Seborrheic dermatitis     Hypogonadism male     Allergic rhinitis     Hyperlipidemia 01/21/2014    Crohn's disease, unspecified, without complications ([facility] 61/42/8320    Chronic otitis externa 08/09/2012    Hypertension 10/21/2011     Past Surgical History:   Procedure Laterality Date    CARDIAC CATHETERIZATION      CARPAL TUNNEL RELEASE      CATARACT REMOVAL WITH IMPLANT Right 09/09/2019    CATARACT REMOVAL WITH IMPLANT Left 07/22/2019    COLON SURGERY      COLONOSCOPY  04/15/2015    KNEE ARTHROSCOPY      LARYNGECTOMY  2004    UPPER GASTROINTESTINAL ENDOSCOPY  03/24/13    Dr. Raza Moore W/NAPOLEON RODRIGUEZ  2002     Family History   Problem Relation Age of Onset    Heart Disease Mother     Liver Disease Mother     High Blood Pressure Mother     Heart Disease Father     Arthritis Father     Cancer Sister         lung     Social History     Socioeconomic History    Marital status:     Number of children: 3    Years of education: 15 Highest education level: High school graduate   Occupational History    Occupation:      Employer: [employer]   Tobacco Use    Smoking status: Former     Packs/day: 1.00     Years: 25.00     Pack years: 25.00     Types: Cigarettes     Quit date: 10/21/1990     Years since quittin.0    Smokeless tobacco: Never   Substance and Sexual Activity    Alcohol use: No     Comment: Attends AA, 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    Drug use: No    Sexual activity: Not Currently     Partners: Female   Social History Narrative    Lives alone. 2 story home     1 step to get in house and 7 steps to get upstairs    Full basement    2 children live nearby and 1 son lives in [place]    No pets     Social Determinants of Health     Financial Resource Strain: Low Risk     Difficulty of Paying Living Expenses: Not hard at all   Food Insecurity: No Food Insecurity    Worried About Running Out of Food in the Last Year: Never true    Ran Out of Food in the Last Year: Never true   Physical Activity: Insufficiently Active    Days of Exercise per Week: 1 day    Minutes of Exercise per Session: 20 min     Current Outpatient Medications on File Prior to Visit   Medication Sig Dispense Refill    doxazosin (CARDURA) 4 MG tablet Take 1.5 tablets by mouth nightly AND 0.5 tablets every morning. 180 tablet 3    montelukast (SINGULAIR) 10 MG tablet Take 1 tablet by mouth nightly TAKE 1 TABLET NIGHTLY 90 tablet 3    lisinopril (PRINIVIL;ZESTRIL) 20 MG tablet Take 1 tablet by mouth daily 90 tablet 3    metoprolol tartrate (LOPRESSOR) 50 MG tablet Take 1.5 tablets by mouth 2 times daily Take 1.5 po bid 270 tablet 3    gabapentin (NEURONTIN) 300 MG capsule Take 1 capsule by mouth daily. TAKE 1 CAPSULE DAILY 90 capsule 3    donepezil (ARICEPT) 10 MG tablet Take 1 tablet by mouth nightly 90 tablet 3    nystatin (MYCOSTATIN) 480622 UNIT/GM cream Apply topically 2 times daily Apply topically 2 times daily.  15 g 3    tiotropium ([employer] [place]) 18 MCG inhalation capsule INHALE THE CONTENTS OF 1 CAPSULE DAILY 90 capsule 3    apixaban (ELIQUIS) 5 MG TABS tablet Take 1 tablet by mouth in the morning and 1 tablet before bedtime. 28 tablet 0    nitroGLYCERIN (NITROSTAT) 0.4 MG SL tablet up to max of 3 total doses. If no relief after 1 dose, call 911. 25 tablet 2    furosemide (LASIX) 20 MG tablet Take 1 tablet by mouth in the morning. Indications: Taking 40 mg tablet daily. 30 tablet 5    clotrimazole-betamethasone (LOTRISONE) 1-0.05 % cream Apply topically 2 times daily. 45 g 1    albuterol (PROVENTIL) (2.5 MG/3ML) 0.083% nebulizer solution Take 3 mLs by nebulization every 6 hours As directed 360 each 3    ciclopirox (PENLAC) 8 % solution Apply topically nightly. 6 mL 1    albuterol sulfate  (90 Base) MCG/ACT inhaler USE 2 INHALATIONS EVERY 6 HOURS AS NEEDED FOR WHEEZING OR SHORTNESS OF BREATH 25.5 g 2    STELARA 90 MG/ML SOSY prefilled syringe       pravastatin (PRAVACHOL) 40 MG tablet TAKE 1 TABLET NIGHTLY 90 tablet 3    RESTASIS 0.05 % ophthalmic emulsion Place 1 drop into both eyes daily       budesonide-formoterol (SYMBICORT) 160-4.5 MCG/ACT AERO Inhale 2 puffs into the lungs 2 times daily 2 each LOT [id number] EXP [id number] Inhaler 1    CPAP Machine MISC New cpap supplies mask hose filters etc 1 each 0    esomeprazole Magnesium (NEXIUM) 20 MG PACK Take 20 mg by mouth daily      aspirin 81 MG tablet Take 81 mg by mouth daily. fluticasone (FLONASE) 50 MCG/ACT nasal spray 1 spray by Nasal route daily (Patient not taking: Reported on 11/11/2022) 16 g 0    sodium chloride (OCEAN) 0.65 % nasal spray 1 spray by Nasal route as needed for Congestion (Patient not taking: Reported on 11/11/2022) 1 each 3    ipratropium (ATROVENT) 0.06 % nasal spray 2 sprays by Each Nostril route 3 times daily (Patient not taking: Reported on 11/11/2022) 1 each 1     No current facility-administered medications on file prior to visit.      Allergies   Allergen Reactions wheezing, rhonchi or rales. Chest:      Chest wall: No tenderness. Musculoskeletal:         General: Normal range of motion. Cervical back: Normal range of motion and neck supple. No tenderness. Right lower leg: No edema. Left lower leg: No edema. Lymphadenopathy:      Cervical: No cervical adenopathy. Skin:     General: Skin is warm. Capillary Refill: Capillary refill takes less than 2 seconds. Coloration: Skin is not jaundiced. Findings: No erythema or lesion. Neurological:      General: No focal deficit present. Mental Status: He is alert and oriented to person, place, and time. Mental status is at baseline. Cranial Nerves: No cranial nerve deficit. Coordination: Coordination normal.      Gait: Gait normal.   Psychiatric:         Mood and Affect: Mood normal.         Behavior: Behavior normal.         Thought Content: Thought content normal.         Judgment: Judgment normal.       Assessment& Plan     Diagnosis Orders   1. Dermatitis of both ear canals  triamcinolone (KENALOG) 0.1 % cream      2. Hypogonadism male  testosterone (ANDROGEL) 50 MG/5GM (1%) GEL 1% gel        Plan as above. F/u with Dr. Penny Martinez as scheduled. Side effects, adverse effects of the medication prescribed today, as well as treatment plan/ rationale and result expectations have been discussed with the patient who expresses understanding and desires to proceed. Close follow up to evaluate treatment results and for coordination of care. I have reviewed the patient's medical history in detail and updated the computerized patient record. As always, patient is advised that if symptoms worsen in any way they will proceed to the nearest emergency room.          Orders Placed This Encounter   Procedures    Influenza, FLUAD, (age 72 y+), IM, Preservative Free, 0.5 mL       Orders Placed This Encounter   Medications    testosterone (ANDROGEL) 50 MG/5GM (1%) GEL 1% gel     Sig: APPLY 1 PACKET ALTERNATING WITH 2 PACKETS DAILY EVERY OTHER DAY     Dispense:  135 each     Refill:  1    triamcinolone (KENALOG) 0.1 % cream     Sig: Apply topically 2 times daily. Dispense:  30 g     Refill:  0    mesalamine (PENTASA) 250 MG extended release capsule     Sig: Take 2 capsules by mouth 3 times daily     Dispense:  42 capsule     Refill:  0       Medications Discontinued During This Encounter   Medication Reason    testosterone (ANDROGEL) 50 MG/5GM (1%) GEL 1% gel LIST CLEANUP    triamcinolone (KENALOG) 0.1 % cream REORDER    testosterone (ANDROGEL) 50 MG/5GM (1%) GEL 1% gel REORDER    mesalamine (PENTASA) 250 MG extended release capsule REORDER       Return if symptoms worsen or fail to improve.     Diana Jenkins, APRN - CNP

## 2022-11-11 NOTE — TELEPHONE ENCOUNTER
Comments: This request is coming from the patient. Last Office Visit (last PCP visit):   10/19/2022    Next Visit Date:  Future Appointments   Date Time Provider Chase Barrera   11/11/2022  1:30 PM STARLA Mercado - CNP [facility] EMERGENCY [facility] AT [place]   11/16/2022  1:30 PM Windy Steinberg [facility] EMERGENCY [facility] AT [place]   12/12/2022  3:00 PM Nelson Brown MD [facility]   12/21/2022  3:30 PM Didier Johnson MD [facility] Neurology -       If hasn't been seen in over a year OR hasn't followed up according to last diabetes/ADHD visit, make appointment for patient before sending refill to provider.     Rx requested:  Requested Prescriptions      No prescriptions requested or ordered in this encounter

## 2022-11-11 NOTE — PROGRESS NOTES
After obtaining consent, and per orders of Dr. Sapna Agosto, injection of high dose flu given in Left deltoid by Maximus Valderrama MA. Patient instructed to remain in clinic for 20 minutes afterwards, and to report any adverse reaction to me immediately. Vaccine Information Sheet, \"Influenza - Inactivated\"  given to Mónica Mason, or parent/legal guardian of  Mónica Mason and verbalized understanding. Patient responses:    Have you ever had a reaction to a flu vaccine? No  Are you able to eat eggs without adverse effects? Yes  Do you have any current illness? No  Have you ever had Guillian [place] Syndrome? No    Flu vaccine given per order. Please see immunization tab.

## 2022-12-13 ENCOUNTER — OFFICE VISIT (OUTPATIENT)
Dept: FAMILY MEDICINE CLINIC | Age: 72
End: 2022-12-13
Payer: MEDICARE

## 2022-12-13 VITALS
WEIGHT: 257 LBS | BODY MASS INDEX: 38.06 KG/M2 | TEMPERATURE: 96.9 F | SYSTOLIC BLOOD PRESSURE: 100 MMHG | HEIGHT: 69 IN | OXYGEN SATURATION: 97 % | DIASTOLIC BLOOD PRESSURE: 72 MMHG | HEART RATE: 75 BPM

## 2022-12-13 DIAGNOSIS — E29.1 HYPOGONADISM MALE: ICD-10-CM

## 2022-12-13 DIAGNOSIS — Z12.5 PROSTATE CANCER SCREENING: ICD-10-CM

## 2022-12-13 DIAGNOSIS — Z23 NEEDS FLU SHOT: ICD-10-CM

## 2022-12-13 DIAGNOSIS — E11.40 TYPE 2 DIABETES MELLITUS WITH DIABETIC NEUROPATHY, WITHOUT LONG-TERM CURRENT USE OF INSULIN (HCC): ICD-10-CM

## 2022-12-13 DIAGNOSIS — I10 PRIMARY HYPERTENSION: Primary | ICD-10-CM

## 2022-12-13 PROCEDURE — 3078F DIAST BP <80 MM HG: CPT | Performed by: FAMILY MEDICINE

## 2022-12-13 PROCEDURE — 90694 VACC AIIV4 NO PRSRV 0.5ML IM: CPT | Performed by: FAMILY MEDICINE

## 2022-12-13 PROCEDURE — G0008 ADMIN INFLUENZA VIRUS VAC: HCPCS | Performed by: FAMILY MEDICINE

## 2022-12-13 PROCEDURE — 1123F ACP DISCUSS/DSCN MKR DOCD: CPT | Performed by: FAMILY MEDICINE

## 2022-12-13 PROCEDURE — 99213 OFFICE O/P EST LOW 20 MIN: CPT | Performed by: FAMILY MEDICINE

## 2022-12-13 PROCEDURE — 3074F SYST BP LT 130 MM HG: CPT | Performed by: FAMILY MEDICINE

## 2022-12-13 ASSESSMENT — ENCOUNTER SYMPTOMS
CHEST TIGHTNESS: 0
ABDOMINAL DISTENTION: 0
ABDOMINAL PAIN: 0
SHORTNESS OF BREATH: 0
PHOTOPHOBIA: 0

## 2022-12-13 NOTE — PROGRESS NOTES
Diagnosis Orders   1. Ingrown toenail with infection          2. Chronic seasonal allergic rhinitis  montelukast (SINGULAIR) 10 MG tablet       3. Simple chronic bronchitis (HCC)  montelukast (SINGULAIR) 10 MG tablet       4. Essential hypertension  lisinopril (PRINIVIL;ZESTRIL) 20 MG tablet       5. Chronic midline back pain, unspecified back location          6. MCI (mild cognitive impairment)             No follow-ups on file. There are no Patient Instructions on file for this visit. After obtaining consent, and per orders of Dr. Betsy Rios, injection of high dose flu given in Right deltoid by Silvia Noriega MA. Patient instructed to remain in clinic for 20 minutes afterwards, and to report any adverse reaction to me immediately. Vaccine Information Sheet, \"Influenza - Inactivated\"  given to Brad Diaz, or parent/legal guardian of  Brad Diaz and verbalized understanding. Patient responses:    Have you ever had a reaction to a flu vaccine? No  Are you able to eat eggs without adverse effects? Yes  Do you have any current illness? No  Have you ever had Guillian [place] Syndrome? No    Flu vaccine given per order. Please see immunization tab.

## 2022-12-13 NOTE — PROGRESS NOTES
Diagnosis Orders   1. Primary hypertension  Lipid Panel    Comprehensive Metabolic Panel    CBC with Auto Differential      2. Type 2 diabetes mellitus with diabetic neuropathy, without long-term current use of insulin (HCC)  Lipid Panel    Comprehensive Metabolic Panel    Hemoglobin A1C    Microalbumin / Creatinine Urine Ratio      3. Hypogonadism male  Testosterone, free, total    CBC with Auto Differential    Ferritin      4. Prostate cancer screening  PSA Screening      5. Needs flu shot  Influenza, FLUAD, (age 72 y+), IM, PF, 0.5 mL        Return for for review of outcome of today's recommendation. Patient Instructions   No changes in medication at this time. All conditions stable. Yearly labs ordered. Subjective:      Patient ID: Chinyere Fofana is a 67 y.o. male who presents for:  Chief Complaint   Patient presents with    Diabetes    Discuss Labs     Patient due for yearly labs        Patient states he is feeling well. Denies any side effects from medication. Sees family at least twice a week. Has no difficulties at home that are concerning him. He does live alone. He is taking his testosterone supplementation without any problem. Current Outpatient Medications on File Prior to Visit   Medication Sig Dispense Refill    testosterone (ANDROGEL) 50 MG/5GM (1%) GEL 1% gel APPLY 1 PACKET ALTERNATING WITH 2 PACKETS DAILY EVERY OTHER  each 1    triamcinolone (KENALOG) 0.1 % cream Apply topically 2 times daily. 30 g 0    mesalamine (PENTASA) 250 MG extended release capsule Take 2 capsules by mouth 3 times daily 42 capsule 0    doxazosin (CARDURA) 4 MG tablet Take 1.5 tablets by mouth nightly AND 0.5 tablets every morning.  180 tablet 3    montelukast (SINGULAIR) 10 MG tablet Take 1 tablet by mouth nightly TAKE 1 TABLET NIGHTLY 90 tablet 3    lisinopril (PRINIVIL;ZESTRIL) 20 MG tablet Take 1 tablet by mouth daily 90 tablet 3    metoprolol tartrate (LOPRESSOR) 50 MG tablet Take 1.5 tablets by mouth 2 times daily Take 1.5 po bid 270 tablet 3    gabapentin (NEURONTIN) 300 MG capsule Take 1 capsule by mouth daily. TAKE 1 CAPSULE DAILY 90 capsule 3    donepezil (ARICEPT) 10 MG tablet Take 1 tablet by mouth nightly 90 tablet 3    nystatin (MYCOSTATIN) 516580 UNIT/GM cream Apply topically 2 times daily Apply topically 2 times daily. 15 g 3    fluticasone (FLONASE) 50 MCG/ACT nasal spray 1 spray by Nasal route daily 16 g 0    sodium chloride (OCEAN) 0.65 % nasal spray 1 spray by Nasal route as needed for Congestion 1 each 3    tiotropium (SPIRIVA HANDIHALER) 18 MCG inhalation capsule INHALE THE CONTENTS OF 1 CAPSULE DAILY 90 capsule 3    apixaban (ELIQUIS) 5 MG TABS tablet Take 1 tablet by mouth in the morning and 1 tablet before bedtime. 28 tablet 0    nitroGLYCERIN (NITROSTAT) 0.4 MG SL tablet up to max of 3 total doses. If no relief after 1 dose, call 911. 25 tablet 2    furosemide (LASIX) 20 MG tablet Take 1 tablet by mouth in the morning. Indications: Taking 40 mg tablet daily. 30 tablet 5    clotrimazole-betamethasone (LOTRISONE) 1-0.05 % cream Apply topically 2 times daily. 45 g 1    albuterol (PROVENTIL) (2.5 MG/3ML) 0.083% nebulizer solution Take 3 mLs by nebulization every 6 hours As directed 360 each 3    ciclopirox (PENLAC) 8 % solution Apply topically nightly.  6 mL 1    albuterol sulfate  (90 Base) MCG/ACT inhaler USE 2 INHALATIONS EVERY 6 HOURS AS NEEDED FOR WHEEZING OR SHORTNESS OF BREATH 25.5 g 2    ipratropium (ATROVENT) 0.06 % nasal spray 2 sprays by Each Nostril route 3 times daily 1 each 1    STELARA 90 MG/ML SOSY prefilled syringe       pravastatin (PRAVACHOL) 40 MG tablet TAKE 1 TABLET NIGHTLY 90 tablet 3    RESTASIS 0.05 % ophthalmic emulsion Place 1 drop into both eyes daily       budesonide-formoterol (SYMBICORT) 160-4.5 MCG/ACT AERO Inhale 2 puffs into the lungs 2 times daily 2 each LOT [id number] EXP [id number] 3 Inhaler 1    CPAP Machine MISC New cpap supplies mask hose filters etc 1 each 0    esomeprazole Magnesium (NEXIUM) 20 MG PACK Take 20 mg by mouth daily      aspirin 81 MG tablet Take 81 mg by mouth daily. No current facility-administered medications on file prior to visit. Past Medical History:   Diagnosis Date    A-fib ([facility]     Abnormal EMG 12/09/2015    Actinic keratoses     Actinic keratoses     Acute diastolic congestive heart failure ([facility] [address] 1/6/2021    Allergic rhinitis     Anemia 11/18/2014    Arthritis     Chronic back pain     [facility] 08/09/2012    COPD (chronic obstructive pulmonary disease) ([facility]     Crohns disease     Degenerative disc disease, lumbar     Dermatitis     Diabetes ([facility] 03/19/2015    diet controlled    Gastrointestinal problem     GERD (gastroesophageal reflux disease)     History of atrial fibrillation 2006    Dr. Van July    History of throat cancer 2004     cleared for reoccurence years ago    Hyperlipidemia 1/21/2014    Hypertension     Hypogonadism male     Lung disease     Mononeuritis multiplex     Mononeuritis multiplex     Nondependent alcohol abuse, in remission 7/22/2020    Obesity (BMI 30-39. 9) 7/24/2019    Osteoarthritis of lumbar spine     Pain of right heel     Paresthesia of foot, bilateral     Prediabetes 12/3/2014    Restless leg syndrome     Seborrheic dermatitis     Seborrheic keratoses, inflamed     Throat cancer ([facility])     throat, had surgery, sees Dr Edwardo Rojas yearly    Tinea cruris     Tinea pedis     Tinea unguium      Past Surgical History:   Procedure Laterality Date    CARDIAC CATHETERIZATION      CARPAL TUNNEL RELEASE      CATARACT REMOVAL WITH IMPLANT Right 09/09/2019    CATARACT REMOVAL WITH IMPLANT Left 07/22/2019    COLON SURGERY      COLONOSCOPY  04/15/2015    KNEE ARTHROSCOPY      LARYNGECTOMY  2004    UPPER GASTROINTESTINAL ENDOSCOPY  03/24/13    Dr. Genevieve Patel W/NAPOLEON RODRIGUEZ  2002     Social History     Socioeconomic History    Marital status:      Spouse name: Not on file    Number of children: 3    Years of education: 15    Highest education level: High school graduate   Occupational History    Occupation:      Employer: [employer]   Tobacco Use    Smoking status: Former     Packs/day: 1.00     Years: 25.00     Pack years: 25.00     Types: Cigarettes     Quit date: 10/21/1990     Years since quittin.1    Smokeless tobacco: Never   Substance and Sexual Activity    Alcohol use: No     Comment: Attends AA, 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    Drug use: No    Sexual activity: Not Currently     Partners: Female   Other Topics Concern    Not on file   Social History Narrative    Lives alone. 2 story home     1 step to get in house and 7 steps to get upstairs    Full basement    2 children live nearby and 1 son lives in [place]    No pets     Social Determinants of Health     Financial Resource Strain: Low Risk     Difficulty of Paying Living Expenses: Not hard at all   Food Insecurity: No Food Insecurity    Worried About Running Out of Food in the Last Year: Never true    Ran Out of Food in the Last Year: Never true   Transportation Needs: Not on file   Physical Activity: Insufficiently Active    Days of Exercise per Week: 1 day    Minutes of Exercise per Session: 20 min   Stress: Not on file   Social Connections: Not on file   Intimate Partner Violence: Not on file   Housing Stability: Not on file     Family History   Problem Relation Age of Onset    Heart Disease Mother     Liver Disease Mother     High Blood Pressure Mother     Heart Disease Father     Arthritis Father     Cancer Sister         lung     Allergies:  Alemtuzumab, Glycopyrrolate-formoterol, Mercaptopurine, and Moxifloxacin    Review of Systems   Constitutional:  Negative for activity change, appetite change, diaphoresis and unexpected weight change. Eyes:  Negative for photophobia and visual disturbance. Respiratory:  Negative for chest tightness and shortness of breath.          No orthopnea   Cardiovascular:  Negative for chest pain, palpitations and leg swelling. Gastrointestinal:  Negative for abdominal distention and abdominal pain. Genitourinary:  Negative for flank pain and frequency. Musculoskeletal:  Negative for gait problem and joint swelling. Neurological:  Negative for dizziness, weakness, light-headedness and headaches. Psychiatric/Behavioral:  Negative for confusion. Objective:   /72   Pulse 75   Temp 96.9 °F (36.1 °C)   Ht 5' 9\" (1.753 m)   Wt 257 lb (116.6 kg)   SpO2 97%   BMI 37.95 kg/m²     Physical Exam  Vitals reviewed. Constitutional:       General: He is not in acute distress. Appearance: He is well-developed. HENT:      Head: Normocephalic and atraumatic. Right Ear: External ear normal.      Left Ear: External ear normal.      Nose: Nose normal.   Eyes:      General:         Right eye: No discharge. Left eye: No discharge. Conjunctiva/sclera: Conjunctivae normal.      Pupils: Pupils are equal, round, and reactive to light. Neck:      Thyroid: No thyromegaly. Cardiovascular:      Rate and Rhythm: Normal rate and regular rhythm. Heart sounds: Normal heart sounds. Pulmonary:      Effort: Pulmonary effort is normal. No respiratory distress. Breath sounds: Normal breath sounds. Abdominal:      General: There is no distension. Musculoskeletal:      Cervical back: Neck supple. Skin:     General: Skin is warm and dry. Neurological:      Mental Status: He is alert and oriented to person, place, and time. Coordination: Coordination normal.   Psychiatric:         Thought Content: Thought content normal.         Judgment: Judgment normal.       No results found for this visit on 12/13/22.     Recent Results (from the past 2016 hour(s))   Brain Natriuretic Peptide    Collection Time: 10/20/22  2:10 PM   Result Value Ref Range     (H) 0 - 99 pg/mL   Comprehensive Metabolic Panel    Collection Time: 10/20/22  2:10 PM   Result Value Ref Range    Glucose 79 74 - 99 mg/dL    Sodium 139 136 - 145 mmol/L    Potassium 4.3 3.5 - 5.3 mmol/L    Chloride 102 98 - 107 mmol/L    HCO3 31 21 - 32 mmol/L    Anion Gap 10 10 - 20 mmol/L    Urea Nitrogen 13 6 - 23 mg/dL    Creatinine 0.78 0.50 - 1.3 mg/dL    eGFR Male >90 >90 mL/min/1.73m2    Calcium 8.9 8.6 - 10.3 mg/dL    Albumin 3.9 3.4 - 5.0 g/dL    Alkaline Phosphatase 66 33 - 136 U/L    Total Protein 6.5 6.4 - 8.2 g/dL    AST 18 9 - 39 U/L    Total Bilirubin 0.4 0.0 - 1.2 mg/dL    ALT 16 10 - 52 U/L       [] Pt was seen by provider for      Minutes  Counseling and coordination of care was done for all assessment diagnosis listed for today with patient and any family/friend present. More than 50% of this visit was spent coordinating current care, obtaining information for prior records, and counseling for current plan of action. Assessment:       Diagnosis Orders   1. Primary hypertension  Lipid Panel    Comprehensive Metabolic Panel    CBC with Auto Differential      2. Type 2 diabetes mellitus with diabetic neuropathy, without long-term current use of insulin (HCC)  Lipid Panel    Comprehensive Metabolic Panel    Hemoglobin A1C    Microalbumin / Creatinine Urine Ratio      3. Hypogonadism male  Testosterone, free, total    CBC with Auto Differential    Ferritin      4. Prostate cancer screening  PSA Screening      5.  Needs flu shot  Influenza, FLUAD, (age 72 y+), IM, PF, 0.5 mL            Orders Placed This Encounter   Procedures    Influenza, FLUAD, (age 72 y+), IM, PF, 0.5 mL    Testosterone, free, total     Standing Status:   Future     Standing Expiration Date:   12/13/2023    Lipid Panel     Standing Status:   Future     Standing Expiration Date:   12/13/2023    Comprehensive Metabolic Panel     Standing Status:   Future     Standing Expiration Date:   12/13/2023    CBC with Auto Differential     Standing Status:   Future     Standing Expiration Date: 12/13/2023    PSA Screening     Standing Status:   Future     Standing Expiration Date:   12/13/2023    Ferritin     Standing Status:   Future     Standing Expiration Date:   12/13/2023    Hemoglobin A1C     Standing Status:   Future     Standing Expiration Date:   12/13/2023    Microalbumin / Creatinine Urine Ratio     Standing Status:   Future     Standing Expiration Date:   12/13/2023       No orders of the defined types were placed in this encounter. Medication List            Accurate as of December 13, 2022  2:09 PM. If you have any questions, ask your nurse or doctor. CONTINUE taking these medications      * albuterol sulfate  (90 Base) MCG/ACT inhaler  Commonly known as: PROVENTIL;VENTOLIN;PROAIR  USE 2 INHALATIONS EVERY 6 HOURS AS NEEDED FOR WHEEZING OR SHORTNESS OF BREATH     * albuterol (2.5 MG/3ML) 0.083% nebulizer solution  Commonly known as: PROVENTIL  Take 3 mLs by nebulization every 6 hours As directed     apixaban 5 MG Tabs tablet  Commonly known as: Eliquis  Take 1 tablet by mouth in the morning and 1 tablet before bedtime. aspirin 81 MG tablet     budesonide-formoterol 160-4.5 MCG/ACT Aero  Commonly known as: Symbicort  Inhale 2 puffs into the lungs 2 times daily 2 each LOT [id number] EXP [id number]     ciclopirox 8 % solution  Commonly known as: Penlac  Apply topically nightly. clotrimazole-betamethasone 1-0.05 % cream  Commonly known as: Lotrisone  Apply topically 2 times daily. CPAP Machine Misc  New cpap supplies mask hose filters etc     donepezil 10 MG tablet  Commonly known as: Aricept  Take 1 tablet by mouth nightly     doxazosin 4 MG tablet  Commonly known as: CARDURA  Take 1.5 tablets by mouth nightly AND 0.5 tablets every morning.      esomeprazole Magnesium 20 MG Pack  Commonly known as: NEXIUM     fluticasone 50 MCG/ACT nasal spray  Commonly known as: FLONASE  1 spray by Nasal route daily     furosemide 20 MG tablet  Commonly known as: LASIX  Take 1 tablet by mouth in the morning. Indications: Taking 40 mg tablet daily. gabapentin 300 MG capsule  Commonly known as: NEURONTIN  Take 1 capsule by mouth daily. TAKE 1 CAPSULE DAILY     ipratropium 0.06 % nasal spray  Commonly known as: ATROVENT  2 sprays by Each Nostril route 3 times daily     lisinopril 20 MG tablet  Commonly known as: PRINIVIL;ZESTRIL  Take 1 tablet by mouth daily     mesalamine 250 MG extended release capsule  Commonly known as: PENTASA  Take 2 capsules by mouth 3 times daily     metoprolol tartrate 50 MG tablet  Commonly known as: LOPRESSOR  Take 1.5 tablets by mouth 2 times daily Take 1.5 po bid     montelukast 10 MG tablet  Commonly known as: SINGULAIR  Take 1 tablet by mouth nightly TAKE 1 TABLET NIGHTLY     nitroGLYCERIN 0.4 MG SL tablet  Commonly known as: NITROSTAT  up to max of 3 total doses. If no relief after 1 dose, call 911.     nystatin 505214 UNIT/GM cream  Commonly known as: MYCOSTATIN  Apply topically 2 times daily Apply topically 2 times daily. pravastatin 40 MG tablet  Commonly known as: PRAVACHOL  TAKE 1 TABLET NIGHTLY     Restasis 0.05 % ophthalmic emulsion  Generic drug: cycloSPORINE     sodium chloride 0.65 % nasal spray  Commonly known as: OCEAN  1 spray by Nasal route as needed for Congestion     Spiriva HandiHaler 18 MCG inhalation capsule  Generic drug: tiotropium  INHALE THE CONTENTS OF 1 CAPSULE DAILY     Stelara 90 MG/ML Sosy prefilled syringe  Generic drug: ustekinumab     testosterone 50 MG/5GM (1%) Gel 1% gel  Commonly known as: AndroGel  APPLY 1 PACKET ALTERNATING WITH 2 PACKETS DAILY EVERY OTHER DAY     triamcinolone 0.1 % cream  Commonly known as: KENALOG  Apply topically 2 times daily. * This list has 2 medication(s) that are the same as other medications prescribed for you. Read the directions carefully, and ask your doctor or other care provider to review them with you.                     Plan:   Return for for review of outcome of today's recommendation. Patient Instructions   No changes in medication at this time. All conditions stable. Yearly labs ordered. This note was partially created with the assistance of dictation. This may lead to grammatical or spelling errors. Sher Guzman M.D.

## 2022-12-21 ENCOUNTER — OFFICE VISIT (OUTPATIENT)
Dept: NEUROLOGY | Age: 72
End: 2022-12-21
Payer: MEDICARE

## 2022-12-21 VITALS
BODY MASS INDEX: 38.34 KG/M2 | HEART RATE: 88 BPM | WEIGHT: 259.6 LBS | SYSTOLIC BLOOD PRESSURE: 105 MMHG | DIASTOLIC BLOOD PRESSURE: 72 MMHG

## 2022-12-21 DIAGNOSIS — F01.A0 MILD VASCULAR DEMENTIA WITHOUT BEHAVIORAL DISTURBANCE, PSYCHOTIC DISTURBANCE, MOOD DISTURBANCE, OR ANXIETY: Primary | ICD-10-CM

## 2022-12-21 DIAGNOSIS — G31.84 MCI (MILD COGNITIVE IMPAIRMENT): ICD-10-CM

## 2022-12-21 DIAGNOSIS — R41.3 MEMORY LOSS: ICD-10-CM

## 2022-12-21 PROCEDURE — 99214 OFFICE O/P EST MOD 30 MIN: CPT | Performed by: PSYCHIATRY & NEUROLOGY

## 2022-12-21 PROCEDURE — 3074F SYST BP LT 130 MM HG: CPT | Performed by: PSYCHIATRY & NEUROLOGY

## 2022-12-21 PROCEDURE — 3078F DIAST BP <80 MM HG: CPT | Performed by: PSYCHIATRY & NEUROLOGY

## 2022-12-21 PROCEDURE — 1123F ACP DISCUSS/DSCN MKR DOCD: CPT | Performed by: PSYCHIATRY & NEUROLOGY

## 2022-12-21 RX ORDER — MEMANTINE HYDROCHLORIDE 5 MG/1
5 TABLET ORAL 2 TIMES DAILY
Qty: 60 TABLET | Refills: 3 | Status: SHIPPED | OUTPATIENT
Start: 2022-12-21

## 2022-12-21 ASSESSMENT — ENCOUNTER SYMPTOMS
TROUBLE SWALLOWING: 0
VOMITING: 0
COLOR CHANGE: 0
BACK PAIN: 0
SHORTNESS OF BREATH: 0
PHOTOPHOBIA: 0
NAUSEA: 0
CHOKING: 0

## 2022-12-21 NOTE — PROGRESS NOTES
Subjective:      Patient ID: Chinmay Landaverde is a 67 y.o. male who presents today for:  Chief Complaint   Patient presents with    6 Month Follow-Up     Pt son states pt details are becoming issue, pt states remembering to pay bills even with a calender is a problem. Short term memory is the biggest problem. Pt son states he's a little more irritable, and anxious than he use to be.        67 right-handed gentleman with a known history of mild cognitive impairment with memory loss and paresthesia. Patient appears to be worsening and going into early phases of dementia and not just mild cognitive impairment. Endings are based on memory issues and functional decline. Patient has microvascular changes on his MRI consistent with underlying vascular is factors and therefore mixed dementia which could be a vascular dementia is likely. Fluctuations therefore expected. Patient actually is doing the same at a functional level is still keeping good hygiene showers bathing dressing but he is having some difficulty with his bills.   He still driving around town to [place] has not gotten lost.    He has occasional very bright days    Past Medical History:   Diagnosis Date    A-fib ([address]     Abnormal EMG 12/09/2015    Actinic keratoses     Actinic keratoses     Acute diastolic congestive heart failure ([address] 1/6/2021    Allergic rhinitis     Anemia 11/18/2014    Arthritis     Chronic back pain     COPD (chronic obstructive pulmonary disease) ([address] 08/09/2012    COPD (chronic obstructive pulmonary disease) (HCC)     Crohns disease     Degenerative disc disease, lumbar     Dermatitis     Diabetes ([address] 03/19/2015    diet controlled    Gastrointestinal problem     GERD (gastroesophageal reflux disease)     History of atrial fibrillation 2006    Dr. Mila Lopez    History of throat cancer 2004     cleared for reoccurence years ago    Hyperlipidemia 1/21/2014    Hypertension     Hypogonadism male     Lung disease     Mononeuritis multiplex     Mononeuritis multiplex     Nondependent alcohol abuse, in remission 2020    Obesity (BMI 30-39. 9) 2019    Osteoarthritis of lumbar spine     Pain of right heel     Paresthesia of foot, bilateral     Prediabetes 12/3/2014    Restless leg syndrome     Seborrheic dermatitis     Seborrheic keratoses, inflamed     Throat cancer (HCC)     throat, had surgery, sees Dr Charley Ghosh yearly    Tinea cruris     Tinea pedis     Tinea unguium      Past Surgical History:   Procedure Laterality Date    CARDIAC CATHETERIZATION      CARPAL TUNNEL RELEASE      CATARACT REMOVAL WITH IMPLANT Right 2019    CATARACT REMOVAL WITH IMPLANT Left 2019    COLON SURGERY      COLONOSCOPY  04/15/2015    KNEE ARTHROSCOPY      LARYNGECTOMY      UPPER GASTROINTESTINAL ENDOSCOPY  13    Dr. Ana M Kramer W/RADIESSE INJ       Social History     Socioeconomic History    Marital status:      Spouse name: Not on file    Number of children: 3    Years of education: 12    Highest education level: High school graduate   Occupational History    Occupation:      Employer: [employer]   Tobacco Use    Smoking status: Former     Packs/day: 1.00     Years: 25.00     Pack years: 25.00     Types: Cigarettes     Quit date: 10/21/1990     Years since quittin.1    Smokeless tobacco: Never   Substance and Sexual Activity    Alcohol use: No     Comment: Attends AA, 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    Drug use: No    Sexual activity: Not Currently     Partners: Female   Other Topics Concern    Not on file   Social History Narrative    Lives alone.     2 story home     1 step to get in house and 7 steps to get upstairs    Full basement    2 children live nearby and 1 son lives in [place]    No pets     Social Determinants of Health     Financial Resource Strain: Low Risk     Difficulty of Paying Living Expenses: Not hard at all   Food Insecurity: No Food Insecurity    Worried About [facility] in the Last Year: Never true    Ran Out of Food in the Last Year: Never true   Transportation Needs: Not on file   Physical Activity: Insufficiently Active    Days of Exercise per Week: 1 day    Minutes of Exercise per Session: 20 min   Stress: Not on file   Social Connections: Not on file   Intimate Partner Violence: Not on file   Housing Stability: Not on file     Family History   Problem Relation Age of Onset    Heart Disease Mother     Liver Disease Mother     High Blood Pressure Mother     Heart Disease Father     Arthritis Father     Cancer Sister         lung     Allergies   Allergen Reactions    Alemtuzumab      Low grade fever  Other reaction(s): Other: See Comments  Low grade fever    Glycopyrrolate-Formoterol Other (See Comments)     Dizzy and felt like heart rate sped up  Other reaction(s): Other: See Comments  Dizzy and felt like heart rate sped up    Mercaptopurine Other (See Comments)    Moxifloxacin Other (See Comments)     Pt states He gets sores in his mouth       Current Outpatient Medications   Medication Sig Dispense Refill    memantine (NAMENDA) 5 MG tablet Take 1 tablet by mouth 2 times daily 60 tablet 3    testosterone (ANDROGEL) 50 MG/5GM (1%) GEL 1% gel APPLY 1 PACKET ALTERNATING WITH 2 PACKETS DAILY EVERY OTHER  each 1    triamcinolone (KENALOG) 0.1 % cream Apply topically 2 times daily. 30 g 0    mesalamine (PENTASA) 250 MG extended release capsule Take 2 capsules by mouth 3 times daily 42 capsule 0    doxazosin (CARDURA) 4 MG tablet Take 1.5 tablets by mouth nightly AND 0.5 tablets every morning. 180 tablet 3    montelukast (SINGULAIR) 10 MG tablet Take 1 tablet by mouth nightly TAKE 1 TABLET NIGHTLY 90 tablet 3    metoprolol tartrate (LOPRESSOR) 50 MG tablet Take 1.5 tablets by mouth 2 times daily Take 1.5 po bid 270 tablet 3    gabapentin (NEURONTIN) 300 MG capsule Take 1 capsule by mouth daily.  TAKE 1 CAPSULE DAILY 90 capsule 3    donepezil (ARICEPT) 10 MG tablet Take 1 tablet by mouth nightly 90 tablet 3    nystatin (MYCOSTATIN) 359514 UNIT/GM cream Apply topically 2 times daily Apply topically 2 times daily. 15 g 3    fluticasone (FLONASE) 50 MCG/ACT nasal spray 1 spray by Nasal route daily 16 g 0    sodium chloride (OCEAN) 0.65 % nasal spray 1 spray by Nasal route as needed for Congestion 1 each 3    tiotropium (SPIRIVA HANDIHALER) 18 MCG inhalation capsule INHALE THE CONTENTS OF 1 CAPSULE DAILY 90 capsule 3    apixaban (ELIQUIS) 5 MG TABS tablet Take 1 tablet by mouth in the morning and 1 tablet before bedtime. 28 tablet 0    nitroGLYCERIN (NITROSTAT) 0.4 MG SL tablet up to max of 3 total doses. If no relief after 1 dose, call 911. 25 tablet 2    furosemide (LASIX) 20 MG tablet Take 1 tablet by mouth in the morning. Indications: Taking 40 mg tablet daily. 30 tablet 5    clotrimazole-betamethasone (LOTRISONE) 1-0.05 % cream Apply topically 2 times daily. 45 g 1    albuterol (PROVENTIL) (2.5 MG/3ML) 0.083% nebulizer solution Take 3 mLs by nebulization every 6 hours As directed 360 each 3    ciclopirox (PENLAC) 8 % solution Apply topically nightly. 6 mL 1    albuterol sulfate  (90 Base) MCG/ACT inhaler USE 2 INHALATIONS EVERY 6 HOURS AS NEEDED FOR WHEEZING OR SHORTNESS OF BREATH 25.5 g 2    ipratropium (ATROVENT) 0.06 % nasal spray 2 sprays by Each Nostril route 3 times daily 1 each 1    STELARA 90 MG/ML SOSY prefilled syringe       pravastatin (PRAVACHOL) 40 MG tablet TAKE 1 TABLET NIGHTLY 90 tablet 3    RESTASIS 0.05 % ophthalmic emulsion Place 1 drop into both eyes daily       budesonide-formoterol (SYMBICORT) 160-4.5 MCG/ACT AERO Inhale 2 puffs into the lungs 2 times daily 2 each LOT [id number] [id number] 3 Inhaler 1    CPAP Machine MISC New cpap supplies mask hose filters etc 1 each 0    esomeprazole Magnesium (NEXIUM) 20 MG PACK Take 20 mg by mouth daily      aspirin 81 MG tablet Take 81 mg by mouth daily.       lisinopril (PRINIVIL;ZESTRIL) 20 MG tablet Take 1 tablet by mouth daily 90 tablet 3     No current facility-administered medications for this visit. Review of Systems   Constitutional:  Negative for fever. HENT:  Negative for ear pain, tinnitus and trouble swallowing. Eyes:  Negative for photophobia and visual disturbance. Respiratory:  Negative for choking and shortness of breath. Cardiovascular:  Negative for chest pain and palpitations. Gastrointestinal:  Negative for nausea and vomiting. Musculoskeletal:  Negative for back pain, gait problem, joint swelling, myalgias, neck pain and neck stiffness. Skin:  Negative for color change. Allergic/Immunologic: Negative for food allergies. Neurological:  Negative for dizziness, tremors, seizures, syncope, facial asymmetry, speech difficulty, weakness, light-headedness, numbness and headaches. Psychiatric/Behavioral:  Negative for behavioral problems, confusion, hallucinations and sleep disturbance. Objective:   /72 (Site: Right Upper Arm, Position: Sitting, Cuff Size: Medium Adult)   Pulse 88   Wt 259 lb 9.6 oz (117.8 kg)   BMI 38.34 kg/m²     Physical Exam  Vitals reviewed. Eyes:      Pupils: Pupils are equal, round, and reactive to light. Cardiovascular:      Rate and Rhythm: Normal rate and regular rhythm. Heart sounds: No murmur heard. Pulmonary:      Effort: Pulmonary effort is normal.      Breath sounds: Normal breath sounds. Abdominal:      General: Bowel sounds are normal.   Musculoskeletal:         General: Normal range of motion. Cervical back: Normal range of motion. Skin:     General: Skin is warm. Neurological:      Mental Status: He is alert and oriented to person, place, and time. Cranial Nerves: No cranial nerve deficit. Sensory: No sensory deficit. Motor: No abnormal muscle tone. Coordination: Coordination normal.      Deep Tendon Reflexes: Reflexes are normal and symmetric. Babinski sign absent on the right side.  Babinski sign absent on the left side. Psychiatric:         Mood and Affect: Mood normal.       CT Head WO Contrast    Result Date: 5/23/2022  EXAMINATION: CT HEAD WO CONTRAST  DATE AND TIME:5/23/2022 11:05 AM CLINICAL HISTORY: Severe headache.  fall out of bed  COMPARISON: May 11, 2022 TECHNIQUE:Axial CT from skull base to vertex without  contrast..  All CT scans at this facility use dose modulation, iterative reconstruction, and/or weight based dosing when appropriate to reduce radiation dose to as low as reasonably achievable. Some of this report was completed using  [employer] [id number] and may include unintended errors with respect to translation of words, typographical errors or grammatical errors which may not have been identified prior to the finalization of this report. FINDINGS CSF spaces:  CSF spaces are age-appropriate. Ventricles midline in position                      Brain parenchyma: No  parenchymal mass,  mass effect,  signs of acute infarct, or extra-axial fluid. There are mild patchy nonspecific white matter hypodensities that may be related to microvascular ischemic change or  normal aging. Hemorrhage:No acute intracranial hemorrhage. Skull base: The bony calvarium and skull base are normal.   The visualized paranasal sinuses and mastoids are clear. NO ACUTE INTRACRANIAL PATHOLOGY. CT FACIAL BONES WO CONTRAST    Result Date: 5/23/2022  EXAMINATION: CT FACIAL BONES WO CONTRAST DATE AND TIME:5/23/2022 11:05 AM CLINICAL HISTORY: Acute trauma. Facial pain  fall out of bed  COMPARISON: None Technique: Axial 2mm thick contiguous scans of the facial bones and paranasal sinuses were acquired. Coronal and sagittal reformats were generated and reviewed. All CT scans at this facility use dose modulation, iterative reconstruction, and/or weight based dosing when appropriate to reduce radiation dose to as low as reasonably achievable.  FINDINGS       Osseous structures: Probable acute nondisplaced fracture through the right nasal bone. Chronic deformity anterior left nasal bone. Otherwise no acute nasal fracture. The remaining osseous structures about the mid face and orbits including the maxilla, zygoma, mandibular, palatine, sphenoid, lacrimal, and orbital bones are intact without fracture or subluxation. The mandible and TMJs are intact. Orbits: Both globes, extraocular muscles, optic nerves and retrobulbar fat appears normal.       Paranasal sinuses: Mild mucosal thickening in the anterior ethmoid air cells            UNDISPLACED RIGHT NASAL FRACTURE      CT CERVICAL SPINE WO CONTRAST    Result Date: 5/23/2022  INDICATION: 75-year-old male status post trauma. COMPARISON: 12/14/2020 TECHNIQUE: Multidetector CT imaging of the cervical spine was performed without administration of intravenous contrast. Coronal and sagittal reformatted images were obtained. Automated dose exposure control was used for this exam. FINDINGS: There is unremarkable alignment of the columns of the cervical spine visualized. No evidence of spondylolisthesis is seen. Atlantodental distance is preserved. The craniocervical junction is normal in appearance. There is no prevertebral soft tissue edema. Cervical vertebral bodies demonstrate no evidence of compression deformity, multilevel degenerative endplate changes visualized with osteophyte formation seen. Multilevel degenerative disc osteophyte complex, uncovertebral disease and hypertrophic changes in the facet joints is seen. Decreased intervertebral disc height visualized most prominent at C6-C7 and C7-T1. Scattered cervical lymph nodes visualized. Visualized portions of bilateral lung apices are grossly clear are unremarkable. There is prominence of the right jugular foramen visualized in the skull base.  Extensive atherosclerotic calcifications visualized at the carotid bifurcations bilaterally but more prominent involving the distal left common carotid bifurcation. Degenerative changes of the cervical spine, no evidence of acute osseous abnormality is seen. .    XR SHOULDER LEFT (MIN 2 VIEWS)    Result Date: 5/23/2022  INDICATION: 80-year-old male status post trauma COMPARISON: None available. TECHNIQUE: 3 views of the left shoulder were obtained. FINDINGS: No evidence of cortical irregularity and lucency to suggest infection, no evidence of lytic or sclerotic bone lesion is seen. The scapulohumeral arch is well maintained, no evidence of dislocation is seen. Degenerative changes visualized most prominent in the acromial clavicular joint and in the greater tuberosity. The overlying soft tissues are unremarkable. The underlying right upper lung field is unremarkable. No interval changes. No acute osseous abnormality.       Lab Results   Component Value Date/Time    WBC 8.5 08/02/2022 04:02 PM    RBC 4.13 08/02/2022 04:02 PM    HGB 12.2 08/02/2022 04:02 PM    HCT 36.8 08/02/2022 04:02 PM    MCV 89.1 08/02/2022 04:02 PM    MCH 29.5 08/02/2022 04:02 PM    MCHC 33.1 08/02/2022 04:02 PM    RDW 14.9 08/02/2022 04:02 PM     08/02/2022 04:02 PM    MPV 10.5 06/23/2015 10:09 AM     Lab Results   Component Value Date/Time     10/20/2022 02:10 PM    K 4.3 10/20/2022 02:10 PM     10/20/2022 02:10 PM    CO2 26 08/02/2022 04:02 PM    BUN 16 08/02/2022 04:02 PM    CREATININE 0.78 10/20/2022 02:10 PM    GFRAA >60.0 08/02/2022 04:02 PM    LABGLOM >60.0 08/02/2022 04:02 PM    GLUCOSE 79 10/20/2022 02:10 PM    PROT 6.5 10/20/2022 02:10 PM    LABALBU 3.9 10/20/2022 02:10 PM    CALCIUM 8.9 10/20/2022 02:10 PM    BILITOT 0.4 10/20/2022 02:10 PM    ALKPHOS 66 10/20/2022 02:10 PM    AST 18 10/20/2022 02:10 PM    ALT 16 10/20/2022 02:10 PM     Lab Results   Component Value Date/Time    PROTIME 13.2 07/29/2021 03:25 AM    INR 1.1 07/29/2021 03:25 AM     Lab Results Component Value Date/Time    TSH 1.380 03/03/2021 04:22 PM    TRAVAIOI49 292 12/22/2015 04:08 PM    FOLATE 10.6 12/22/2015 04:08 PM    FERRITIN 206 08/02/2022 04:02 PM    IRON 74 04/21/2016 01:36 PM    TIBC 337 04/21/2016 01:36 PM     Lab Results   Component Value Date/Time    TRIG 111 03/08/2019 08:47 AM    HDL 35 07/07/2021 12:24 PM    LDLCALC 53 07/07/2021 12:24 PM     No results found for: [place], CANNAB, COCAINESCRN, LABMETH, OPIATESCREENURINE, PHENCYCLIDINESCREENURINE, PPXUR, ETOH  No results found for: LITHIUM, DILFRTOT, VALPROATE    Assessment:       Diagnosis Orders   1. Mild vascular dementia without behavioral disturbance, psychotic disturbance, mood disturbance, or anxiety        2. MCI (mild cognitive impairment)        3. Memory loss        Vascular dementia with a fluctuating course now entering a phase of early dementia. Patient was in the spectrum of MCI. We kept him on Aricept and is still on grams of Aricept showing some cognitive and functional decline. Safety issues has been discussed in detail he appears quite safe in his environment where he is. His grandson takes good care of him. Given the fluctuations have added Namenda 5 mg twice a day for now and continue to discuss the expectations and options in terms of keeping him where he is. He still driving around the town where he is familiar. He has occasional very bright is suggestive of vascular dementia which fits into his MRI findings of small streak ischemic changes from underlying risk factors. We will keep an eye on this and continue to follow      Plan:      No orders of the defined types were placed in this encounter. Orders Placed This Encounter   Medications    memantine (NAMENDA) 5 MG tablet     Sig: Take 1 tablet by mouth 2 times daily     Dispense:  60 tablet     Refill:  3       No follow-ups on file.       Fariba Ly MD

## 2023-01-02 ENCOUNTER — TELEPHONE (OUTPATIENT)
Dept: FAMILY MEDICINE CLINIC | Age: 73
End: 2023-01-02

## 2023-01-02 ENCOUNTER — OFFICE VISIT (OUTPATIENT)
Dept: FAMILY MEDICINE CLINIC | Age: 73
End: 2023-01-02
Payer: MEDICARE

## 2023-01-02 VITALS
DIASTOLIC BLOOD PRESSURE: 68 MMHG | SYSTOLIC BLOOD PRESSURE: 120 MMHG | HEART RATE: 88 BPM | OXYGEN SATURATION: 98 % | WEIGHT: 259 LBS | BODY MASS INDEX: 38.25 KG/M2 | TEMPERATURE: 97.4 F

## 2023-01-02 DIAGNOSIS — B96.89 ACUTE BACTERIAL SINUSITIS: Primary | ICD-10-CM

## 2023-01-02 DIAGNOSIS — J01.90 ACUTE BACTERIAL SINUSITIS: Primary | ICD-10-CM

## 2023-01-02 DIAGNOSIS — R68.89 FLU-LIKE SYMPTOMS: ICD-10-CM

## 2023-01-02 LAB
INFLUENZA A ANTIBODY: NORMAL
INFLUENZA B ANTIBODY: NORMAL
Lab: NORMAL
PERFORMING INSTRUMENT: NORMAL
QC PASS/FAIL: NORMAL
SARS-COV-2, POC: NORMAL

## 2023-01-02 PROCEDURE — 3078F DIAST BP <80 MM HG: CPT | Performed by: NURSE PRACTITIONER

## 2023-01-02 PROCEDURE — 87804 INFLUENZA ASSAY W/OPTIC: CPT | Performed by: NURSE PRACTITIONER

## 2023-01-02 PROCEDURE — 99214 OFFICE O/P EST MOD 30 MIN: CPT | Performed by: NURSE PRACTITIONER

## 2023-01-02 PROCEDURE — 1123F ACP DISCUSS/DSCN MKR DOCD: CPT | Performed by: NURSE PRACTITIONER

## 2023-01-02 PROCEDURE — 87426 SARSCOV CORONAVIRUS AG IA: CPT | Performed by: NURSE PRACTITIONER

## 2023-01-02 PROCEDURE — 3074F SYST BP LT 130 MM HG: CPT | Performed by: NURSE PRACTITIONER

## 2023-01-02 RX ORDER — AMOXICILLIN 875 MG/1
875 TABLET, COATED ORAL 2 TIMES DAILY
Qty: 14 TABLET | Refills: 0 | Status: SHIPPED | OUTPATIENT
Start: 2023-01-02 | End: 2023-01-09

## 2023-01-02 RX ORDER — FLUTICASONE PROPIONATE 50 MCG
1 SPRAY, SUSPENSION (ML) NASAL DAILY
Qty: 1 EACH | Refills: 0 | Status: SHIPPED | OUTPATIENT
Start: 2023-01-02

## 2023-01-02 ASSESSMENT — ENCOUNTER SYMPTOMS
WHEEZING: 0
SINUS PAIN: 1
COUGH: 1
ABDOMINAL DISTENTION: 0
SHORTNESS OF BREATH: 0
NAUSEA: 0
RHINORRHEA: 1
HOARSE VOICE: 1
DIARRHEA: 0
SORE THROAT: 0
EYE ITCHING: 0
CHOKING: 0
VOMITING: 0
EYE REDNESS: 0
CONSTIPATION: 0
TROUBLE SWALLOWING: 0
SINUS PRESSURE: 1
ABDOMINAL PAIN: 0
APNEA: 0

## 2023-01-02 ASSESSMENT — PATIENT HEALTH QUESTIONNAIRE - PHQ9
SUM OF ALL RESPONSES TO PHQ QUESTIONS 1-9: 0
1. LITTLE INTEREST OR PLEASURE IN DOING THINGS: 0
SUM OF ALL RESPONSES TO PHQ QUESTIONS 1-9: 0
SUM OF ALL RESPONSES TO PHQ9 QUESTIONS 1 & 2: 0
2. FEELING DOWN, DEPRESSED OR HOPELESS: 0

## 2023-01-02 NOTE — PROGRESS NOTES
Subjective:      Patient ID: Kyle Luevano is a 67 y.o. male who presents today for:  Chief Complaint   Patient presents with    Other     Sx started on Saturday   Runny nose, cough with phlegm   PT HERE TODAY AND REPORTS HE HAD ONE COVID VACCINE AND HE HAS FLU VACCINATION. Sinusitis  This is a new problem. The current episode started in the past 7 days. The problem has been gradually worsening since onset. There has been no fever. His pain is at a severity of 2/10. The pain is mild. Associated symptoms include congestion, coughing (FROM THE DRG), a hoarse voice and sinus pressure. Pertinent negatives include no chills, diaphoresis, ear pain, headaches, neck pain, shortness of breath or sore throat. Past treatments include nothing. Past Medical History:   Diagnosis Date    A-fib ([facility] [address] [address]     Abnormal EMG 12/09/2015    Actinic keratoses     Actinic keratoses     Acute diastolic congestive heart failure ([address] 1/6/2021    Allergic rhinitis     Anemia 11/18/2014    Arthritis     Chronic back pain     COPD (chronic obstructive pulmonary disease) ([address] 08/09/2012    COPD (chronic obstructive pulmonary disease) ([facility])     Crohns disease     Degenerative disc disease, lumbar     Dermatitis     Diabetes ([facility] [address] 03/19/2015    diet controlled    Gastrointestinal problem     GERD (gastroesophageal reflux disease)     History of atrial fibrillation 2006    Dr. Genesis Rooney    History of throat cancer 2004     cleared for reoccurence years ago    Hyperlipidemia 1/21/2014    Hypertension     Hypogonadism male     Lung disease     Mononeuritis multiplex     Mononeuritis multiplex     Nondependent alcohol abuse, in remission 7/22/2020    Obesity (BMI 30-39. 9) 7/24/2019    Osteoarthritis of lumbar spine     Pain of right heel     Paresthesia of foot, bilateral     Prediabetes 12/3/2014    Restless leg syndrome     Seborrheic dermatitis     Seborrheic keratoses, inflamed     Throat cancer (HCC)     throat, had surgery, sees Dr Janeth Smith yearly Tinea cruris     Tinea pedis     Tinea unguium      Past Surgical History:   Procedure Laterality Date    CARDIAC CATHETERIZATION      CARPAL TUNNEL RELEASE      CATARACT REMOVAL WITH IMPLANT Right 2019    CATARACT REMOVAL WITH IMPLANT Left 2019    COLON SURGERY      COLONOSCOPY  04/15/2015    KNEE ARTHROSCOPY      LARYNGECTOMY  2004    UPPER GASTROINTESTINAL ENDOSCOPY  13    Dr. 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     2 story home     1 step to get in house and 7 steps to get upstairs    Full basement    2 children live nearby and 1 son lives in [place]    No pets     Social Determinants of Health     Financial Resource Strain: Low Risk     Difficulty of Paying Living Expenses: Not hard at all   Food Insecurity: No Food Insecurity    Worried About Running Out of Food in the Last Year: Never true    Ran Out of Food in the Last Year: Never true   Transportation Needs: Not on file   Physical Activity: Insufficiently Active    Days of Exercise per Week: 1 day    Minutes of Exercise per Session: 20 min   Stress: Not on file   Social Connections: Not on file   Intimate Partner Violence: Not on file   Housing Stability: Not on file     Family History   Problem Relation Age of Onset    Heart Disease Mother     Liver Disease Mother     High Blood Pressure Mother     Heart Disease Father     Arthritis Father     Cancer Sister         lung     Allergies   Allergen Reactions    Alemtuzumab      Low grade fever  Other reaction(s): Other: See Comments  Low grade fever    Glycopyrrolate-Formoterol Other (See Comments)     Dizzy and felt like heart rate sped up  Other reaction(s): Other: See Comments  Dizzy and felt like heart rate sped up    Mercaptopurine Other (See Comments)    Moxifloxacin Other (See Comments)     Pt states He gets sores in his mouth         Review of Systems   Constitutional:  Negative for activity change, appetite change, chills, diaphoresis and fever. HENT:  Positive for congestion, hoarse voice, postnasal drip, rhinorrhea, sinus pressure and sinus pain. Negative for drooling, ear pain, hearing loss, sore throat and trouble swallowing. Eyes:  Negative for redness, itching and visual disturbance. Respiratory:  Positive for cough (FROM THE DRG). Negative for apnea, choking, shortness of breath and wheezing. Cardiovascular:  Negative for chest pain and palpitations. Gastrointestinal:  Negative for abdominal distention, abdominal pain, constipation, diarrhea, nausea and vomiting. Endocrine: Negative for heat intolerance. Genitourinary:  Negative for difficulty urinating, flank pain, genital sores and urgency. Musculoskeletal:  Negative for arthralgias, gait problem, myalgias, neck pain and neck stiffness. Skin:  Negative for rash. Neurological:  Negative for dizziness, tremors, seizures, facial asymmetry and headaches. Hematological:  Negative for adenopathy. Psychiatric/Behavioral:  Negative for behavioral problems, confusion and suicidal ideas. The patient is not hyperactive. All other systems reviewed and are negative.     Objective:   /68   Pulse 88   Temp 97.4 °F (36.3 °C)   Wt 259 lb (117.5 kg)   SpO2 98%   BMI 38.25 kg/m² Physical Exam  Vitals and nursing note reviewed. Constitutional:       General: He is awake. He is not in acute distress. Appearance: Normal appearance. He is well-developed and well-groomed. He is obese. He is not ill-appearing, toxic-appearing or diaphoretic. HENT:      Head: Normocephalic and atraumatic. Right Ear: Tympanic membrane normal.      Left Ear: Tympanic membrane normal.      Nose:      Right Turbinates: Swollen. Left Turbinates: Swollen. Right Sinus: Maxillary sinus tenderness present. Left Sinus: Maxillary sinus tenderness present. Mouth/Throat:      Lips: Pink. No lesions. Mouth: Mucous membranes are moist.      Pharynx: Posterior oropharyngeal erythema present. No oropharyngeal exudate. Tonsils: No tonsillar exudate or tonsillar abscesses. Eyes:      Extraocular Movements: Extraocular movements intact. Conjunctiva/sclera: Conjunctivae normal.      Pupils: Pupils are equal, round, and reactive to light. Cardiovascular:      Rate and Rhythm: Normal rate and regular rhythm. Pulses: Normal pulses. Heart sounds: Normal heart sounds. No murmur heard. Pulmonary:      Effort: Pulmonary effort is normal. No tachypnea, bradypnea, accessory muscle usage or respiratory distress. Breath sounds: Normal breath sounds and air entry. No decreased air movement or transmitted upper airway sounds. No decreased breath sounds, wheezing or rhonchi. Comments: LUNGS ARE CLEAR ON EXAM.   Chest:      Chest wall: No tenderness. Abdominal:      General: Bowel sounds are normal.      Palpations: Abdomen is soft. Tenderness: There is no abdominal tenderness. There is no left CVA tenderness, guarding or rebound. Musculoskeletal:         General: No deformity or signs of injury. Normal range of motion. Cervical back: Normal range of motion and neck supple. Left lower leg: No edema.    Lymphadenopathy:      Cervical: No cervical adenopathy. Skin:     General: Skin is warm and dry. Capillary Refill: Capillary refill takes less than 2 seconds. Neurological:      General: No focal deficit present. Mental Status: He is alert and oriented to person, place, and time. Mental status is at baseline. Motor: No weakness. Coordination: Coordination normal.   Psychiatric:         Attention and Perception: Attention and perception normal.         Mood and Affect: Mood and affect normal.         Speech: Speech normal.         Behavior: Behavior normal. Behavior is cooperative. Thought Content: Thought content normal.       Assessment:       Diagnosis Orders   1. Acute bacterial sinusitis  amoxicillin (AMOXIL) 875 MG tablet    fluticasone (FLONASE) 50 MCG/ACT nasal spray      2. Flu-like symptoms  POCT COVID-19, Antigen    POCT Influenza A/B            Plan:      Orders Placed This Encounter   Procedures    POCT COVID-19, Antigen     Order Specific Question:   Previously tested for COVID-19? Answer:   Yes     Order Specific Question:   Pregnant: Answer:   No    POCT Influenza A/B     Orders Placed This Encounter   Medications    amoxicillin (AMOXIL) 875 MG tablet     Sig: Take 1 tablet by mouth 2 times daily for 7 days     Dispense:  14 tablet     Refill:  0    fluticasone (FLONASE) 50 MCG/ACT nasal spray     Si spray by Nasal route daily     Dispense:  1 each     Refill:  0     Pt here today and agreed to covid and flu testing today. PT aware to increase fluids, rest and if his s/s worsen such as drooling, trouble breathing, swallowing to go to the ER. PT aware and verbalized understanding. Pt aware both tests completed came back NEG. PT left the [facility] today in stable condition. Antibiotic Instructions: Complete the full course of antibiotics as ordered. Take each dose with a small snack or meal to lessen potential GI upset.  To prevent antibiotic resistance, please take medication as ordered and for the full duration even if you start to feel better. Consider intake of yogurt or probiotic during antibiotic use and for a few days after to help reduce the risk of developing a secondary infection. Separate the yogurt and antibiotic by at least 1 hour. Avoid alcohol while taking antibiotics. Fluticasone Nasal Spray Instructions: Insert bottle into nostril and use finger to pinch the opposite nostril closed. Look slightly down with your head. Inhale through your nose while spraying the medicine. Repeat back and forth with each nostril for two sprays into each nostril, four sprays total one time per day. The effect of the medication may not be felt immediately; it builds over repeated usage. Return if symptoms worsen or fail to improve. Reviewed with the patient: current clinical status, medications, activities and diet. Side effects, adverse effects of the medication prescribed today, as well as treatment plan and result expectations have been discussed with the patient who expresses understanding and desires to proceed. Close follow up to evaluate treatment results and for coordination of care. I have reviewed the patient's medical history in detail and updated the computerized patient record.       Cielo Collins, STARLA - CNP

## 2023-01-02 NOTE — TELEPHONE ENCOUNTER
Pt called stating that he has a head cold. Wondering what to do. He was directed to go to walk in clinic.

## 2023-01-16 DIAGNOSIS — E78.5 HYPERLIPIDEMIA, UNSPECIFIED HYPERLIPIDEMIA TYPE: ICD-10-CM

## 2023-01-16 RX ORDER — PRAVASTATIN SODIUM 40 MG
40 TABLET ORAL DAILY
Qty: 90 TABLET | Refills: 3 | Status: SHIPPED | OUTPATIENT
Start: 2023-01-16 | End: 2023-01-19 | Stop reason: SDUPTHER

## 2023-01-17 ENCOUNTER — TELEPHONE (OUTPATIENT)
Dept: FAMILY MEDICINE CLINIC | Age: 73
End: 2023-01-17

## 2023-01-17 NOTE — TELEPHONE ENCOUNTER
----- Message from Honorio Badillo sent at 1/17/2023  2:06 PM EST -----  Subject: Referral Request    Reason for referral request? Covid Booster  Provider patient wants to be referred to(if known):     Provider Phone Number(if known): Additional Information for Provider?  Pt has appt with Dr. Luzma Niño on   1/19/23 at 11am and is requesting to receive covid booster at appt.  ---------------------------------------------------------------------------  --------------  [address] [employer]    [phone]; OK to leave message on voicemail  ---------------------------------------------------------------------------  --------------

## 2023-01-19 ENCOUNTER — OFFICE VISIT (OUTPATIENT)
Dept: FAMILY MEDICINE CLINIC | Age: 73
End: 2023-01-19
Payer: MEDICARE

## 2023-01-19 ENCOUNTER — TELEPHONE (OUTPATIENT)
Dept: NEUROLOGY | Age: 73
End: 2023-01-19

## 2023-01-19 VITALS
BODY MASS INDEX: 38.21 KG/M2 | OXYGEN SATURATION: 97 % | HEIGHT: 69 IN | SYSTOLIC BLOOD PRESSURE: 110 MMHG | WEIGHT: 258 LBS | DIASTOLIC BLOOD PRESSURE: 62 MMHG | TEMPERATURE: 97.8 F | HEART RATE: 75 BPM

## 2023-01-19 DIAGNOSIS — E66.01 SEVERE OBESITY (BMI 35.0-39.9) WITH COMORBIDITY (HCC): ICD-10-CM

## 2023-01-19 DIAGNOSIS — I10 PRIMARY HYPERTENSION: ICD-10-CM

## 2023-01-19 DIAGNOSIS — E11.40 TYPE 2 DIABETES MELLITUS WITH DIABETIC NEUROPATHY, WITHOUT LONG-TERM CURRENT USE OF INSULIN (HCC): ICD-10-CM

## 2023-01-19 DIAGNOSIS — B35.6 TINEA CRURIS: ICD-10-CM

## 2023-01-19 DIAGNOSIS — K50.90 CROHN'S DISEASE WITHOUT COMPLICATION, UNSPECIFIED GASTROINTESTINAL TRACT LOCATION (HCC): ICD-10-CM

## 2023-01-19 DIAGNOSIS — I26.99 PULMONARY EMBOLISM, OTHER, UNSPECIFIED CHRONICITY, UNSPECIFIED WHETHER ACUTE COR PULMONALE PRESENT (HCC): ICD-10-CM

## 2023-01-19 DIAGNOSIS — I48.0 PAROXYSMAL ATRIAL FIBRILLATION (HCC): ICD-10-CM

## 2023-01-19 DIAGNOSIS — E78.5 HYPERLIPIDEMIA, UNSPECIFIED HYPERLIPIDEMIA TYPE: ICD-10-CM

## 2023-01-19 DIAGNOSIS — E29.1 HYPOGONADISM MALE: ICD-10-CM

## 2023-01-19 DIAGNOSIS — I50.31 ACUTE DIASTOLIC CONGESTIVE HEART FAILURE (HCC): ICD-10-CM

## 2023-01-19 DIAGNOSIS — J41.0 SIMPLE CHRONIC BRONCHITIS (HCC): ICD-10-CM

## 2023-01-19 DIAGNOSIS — I10 ESSENTIAL HYPERTENSION: ICD-10-CM

## 2023-01-19 DIAGNOSIS — Z12.5 PROSTATE CANCER SCREENING: ICD-10-CM

## 2023-01-19 PROBLEM — E11.9 TYPE 2 DIABETES MELLITUS WITHOUT COMPLICATION, WITHOUT LONG-TERM CURRENT USE OF INSULIN (HCC): Status: RESOLVED | Noted: 2021-01-06 | Resolved: 2023-01-19

## 2023-01-19 PROBLEM — J20.9 ACUTE BRONCHITIS: Status: RESOLVED | Noted: 2019-11-27 | Resolved: 2023-01-19

## 2023-01-19 LAB
ALBUMIN SERPL-MCNC: 3.6 G/DL (ref 3.5–4.6)
ALP BLD-CCNC: 75 U/L (ref 35–104)
ALT SERPL-CCNC: 11 U/L (ref 0–41)
ANION GAP SERPL CALCULATED.3IONS-SCNC: 11 MEQ/L (ref 9–15)
AST SERPL-CCNC: 20 U/L (ref 0–40)
BASOPHILS ABSOLUTE: 0.1 K/UL (ref 0–0.2)
BASOPHILS RELATIVE PERCENT: 1 %
BILIRUB SERPL-MCNC: 0.5 MG/DL (ref 0.2–0.7)
BUN BLDV-MCNC: 18 MG/DL (ref 8–23)
CALCIUM SERPL-MCNC: 8.8 MG/DL (ref 8.5–9.9)
CHLORIDE BLD-SCNC: 102 MEQ/L (ref 95–107)
CHOLESTEROL, TOTAL: 158 MG/DL (ref 0–199)
CO2: 27 MEQ/L (ref 20–31)
CREAT SERPL-MCNC: 0.85 MG/DL (ref 0.7–1.2)
CREATININE URINE: 37.6 MG/DL
EOSINOPHILS ABSOLUTE: 0.9 K/UL (ref 0–0.7)
EOSINOPHILS RELATIVE PERCENT: 8.7 %
GFR SERPL CREATININE-BSD FRML MDRD: >60 ML/MIN/{1.73_M2}
GLOBULIN: 2.5 G/DL (ref 2.3–3.5)
GLUCOSE BLD-MCNC: 87 MG/DL (ref 70–99)
HBA1C MFR BLD: 5.8 % (ref 4.8–5.9)
HCT VFR BLD CALC: 38.6 % (ref 42–52)
HDLC SERPL-MCNC: 35 MG/DL (ref 40–59)
HEMOGLOBIN: 12.8 G/DL (ref 14–18)
LDL CHOLESTEROL CALCULATED: 90 MG/DL (ref 0–129)
LYMPHOCYTES ABSOLUTE: 1.9 K/UL (ref 1–4.8)
LYMPHOCYTES RELATIVE PERCENT: 18.3 %
MCH RBC QN AUTO: 29.1 PG (ref 27–31.3)
MCHC RBC AUTO-ENTMCNC: 33.1 % (ref 33–37)
MCV RBC AUTO: 88 FL (ref 79–92.2)
MICROALBUMIN UR-MCNC: <1.2 MG/DL
MICROALBUMIN/CREAT UR-RTO: NORMAL MG/G (ref 0–30)
MONOCYTES ABSOLUTE: 1 K/UL (ref 0.2–0.8)
MONOCYTES RELATIVE PERCENT: 9.3 %
NEUTROPHILS ABSOLUTE: 6.6 K/UL (ref 1.4–6.5)
NEUTROPHILS RELATIVE PERCENT: 62.7 %
PDW BLD-RTO: 14.4 % (ref 11.5–14.5)
PLATELET # BLD: 205 K/UL (ref 130–400)
POTASSIUM SERPL-SCNC: 4.1 MEQ/L (ref 3.4–4.9)
PROSTATE SPECIFIC ANTIGEN: 1.39 NG/ML (ref 0–4)
RBC # BLD: 4.38 M/UL (ref 4.7–6.1)
SODIUM BLD-SCNC: 140 MEQ/L (ref 135–144)
TOTAL PROTEIN: 6.1 G/DL (ref 6.3–8)
TRIGL SERPL-MCNC: 164 MG/DL (ref 0–150)
WBC # BLD: 10.6 K/UL (ref 4.8–10.8)

## 2023-01-19 PROCEDURE — 3023F SPIROM DOC REV: CPT | Performed by: FAMILY MEDICINE

## 2023-01-19 PROCEDURE — G8427 DOCREV CUR MEDS BY ELIG CLIN: HCPCS | Performed by: FAMILY MEDICINE

## 2023-01-19 PROCEDURE — G8484 FLU IMMUNIZE NO ADMIN: HCPCS | Performed by: FAMILY MEDICINE

## 2023-01-19 PROCEDURE — 3017F COLORECTAL CA SCREEN DOC REV: CPT | Performed by: FAMILY MEDICINE

## 2023-01-19 PROCEDURE — 3044F HG A1C LEVEL LT 7.0%: CPT | Performed by: FAMILY MEDICINE

## 2023-01-19 PROCEDURE — 99215 OFFICE O/P EST HI 40 MIN: CPT | Performed by: FAMILY MEDICINE

## 2023-01-19 PROCEDURE — 3078F DIAST BP <80 MM HG: CPT | Performed by: FAMILY MEDICINE

## 2023-01-19 PROCEDURE — 1036F TOBACCO NON-USER: CPT | Performed by: FAMILY MEDICINE

## 2023-01-19 PROCEDURE — 2022F DILAT RTA XM EVC RTNOPTHY: CPT | Performed by: FAMILY MEDICINE

## 2023-01-19 PROCEDURE — 1123F ACP DISCUSS/DSCN MKR DOCD: CPT | Performed by: FAMILY MEDICINE

## 2023-01-19 PROCEDURE — 3074F SYST BP LT 130 MM HG: CPT | Performed by: FAMILY MEDICINE

## 2023-01-19 PROCEDURE — G8417 CALC BMI ABV UP PARAM F/U: HCPCS | Performed by: FAMILY MEDICINE

## 2023-01-19 RX ORDER — LISINOPRIL 20 MG/1
10 TABLET ORAL DAILY
Qty: 1 TABLET | Refills: 0
Start: 2023-01-19 | End: 2023-01-20

## 2023-01-19 RX ORDER — PRAVASTATIN SODIUM 40 MG
40 TABLET ORAL DAILY
Qty: 90 TABLET | Refills: 3 | Status: SHIPPED | OUTPATIENT
Start: 2023-01-19

## 2023-01-19 RX ORDER — FUROSEMIDE 20 MG/1
20 TABLET ORAL DAILY
Qty: 90 TABLET | Refills: 1 | Status: SHIPPED | OUTPATIENT
Start: 2023-01-19

## 2023-01-19 RX ORDER — ESOMEPRAZOLE MAGNESIUM 20 MG/1
20 FOR SUSPENSION ORAL DAILY
Qty: 90 PACKET | Refills: 2 | Status: SHIPPED | OUTPATIENT
Start: 2023-01-19

## 2023-01-19 RX ORDER — NYSTATIN 100000 U/G
CREAM TOPICAL 2 TIMES DAILY
Qty: 30 G | Refills: 3 | Status: SHIPPED | OUTPATIENT
Start: 2023-01-19

## 2023-01-19 NOTE — PATIENT INSTRUCTIONS
Follow-up appointment 1 week to check on patient's home blood pressure monitor which they will bring and a few other issues. Decrease lisinopril to 10 mg, one half of the 20 mg tablet until follow-up next week. Discontinue aspirin. Discontinue clobetasol betamethasone cream for the groin and use nystatin in its place. Family to check with neurology as to whether or not Eliquis would be beneficial for his vascular dementia. Therapy with Eliquis for pulmonary embolism is complete. Patient is due to have blood work to monitor multiple medical conditions today. Patient will be seeing GI in clarifying medications specifically whether or not he needs to be on as much Pentasa as he is at this time.

## 2023-01-19 NOTE — TELEPHONE ENCOUNTER
Spoke with your office about Alfonse Coma being on eliquis ,verbally spoke with Dr. Ruby Martinez, He states that he should be on it since he has a dx of a-fib

## 2023-01-19 NOTE — PROGRESS NOTES
Diagnosis Orders   1. Pulmonary embolism, other, unspecified chronicity, unspecified whether acute cor pulmonale present ([facility]        2. Simple chronic bronchitis ([facility]        3. Acute diastolic congestive heart failure (HCC)  furosemide (LASIX) 20 MG tablet      4. Crohn's disease without complication, unspecified gastrointestinal tract location ([facility]        5. Type 2 diabetes mellitus with diabetic neuropathy, without long-term current use of insulin (HCC)        6. Paroxysmal atrial fibrillation ([facility]        7. Hyperlipidemia, unspecified hyperlipidemia type  pravastatin (PRAVACHOL) 40 MG tablet      8. Pulmonary embolism, 11/2021 anticoag for 1 year  apixaban (ELIQUIS) 5 MG TABS tablet      9. Essential hypertension  furosemide (LASIX) 20 MG tablet    lisinopril (PRINIVIL;ZESTRIL) 20 MG tablet      10. Tinea cruris  nystatin (MYCOSTATIN) 884203 UNIT/GM cream      11. Severe obesity (BMI 35.0-39. 9) with comorbidity ([facility]          Return in about 1 week (around 1/26/2023) for for review of outcome of today's recommendation. Patient Instructions   Follow-up appointment 1 week to check on patient's home blood pressure monitor which they will bring and a few other issues. Decrease lisinopril to 10 mg, one half of the 20 mg tablet until follow-up next week. Discontinue aspirin. Discontinue clobetasol betamethasone cream for the groin and use nystatin in its place. Family to check with neurology as to whether or not Eliquis would be beneficial for his vascular dementia. Therapy with Eliquis for pulmonary embolism is complete. Patient is due to have blood work to monitor multiple medical conditions today. Patient will be seeing GI in clarifying medications specifically whether or not he needs to be on as much Pentasa as he is at this time.         Subjective:      Patient ID: Tia Alcaraz is a 67 y.o. male who presents for:  Chief Complaint   Patient presents with    Health Maintenance Patient is here with family member to work on getting his medications etc. denies. They want a make sure what they are on the same page since he is having some memory deficit. We reviewed the medication list in depth including what medications are for. Discussed patient's symptoms in depth. Current Outpatient Medications on File Prior to Visit   Medication Sig Dispense Refill    fluticasone (FLONASE) 50 MCG/ACT nasal spray 1 spray by Nasal route daily 1 each 0    memantine (NAMENDA) 5 MG tablet Take 1 tablet by mouth 2 times daily 60 tablet 3    testosterone (ANDROGEL) 50 MG/5GM (1%) GEL 1% gel APPLY 1 PACKET ALTERNATING WITH 2 PACKETS DAILY EVERY OTHER  each 1    triamcinolone (KENALOG) 0.1 % cream Apply topically 2 times daily. 30 g 0    mesalamine (PENTASA) 250 MG extended release capsule Take 2 capsules by mouth 3 times daily 42 capsule 0    doxazosin (CARDURA) 4 MG tablet Take 1.5 tablets by mouth nightly AND 0.5 tablets every morning. 180 tablet 3    montelukast (SINGULAIR) 10 MG tablet Take 1 tablet by mouth nightly TAKE 1 TABLET NIGHTLY 90 tablet 3    metoprolol tartrate (LOPRESSOR) 50 MG tablet Take 1.5 tablets by mouth 2 times daily Take 1.5 po bid 270 tablet 3    gabapentin (NEURONTIN) 300 MG capsule Take 1 capsule by mouth daily. TAKE 1 CAPSULE DAILY 90 capsule 3    donepezil (ARICEPT) 10 MG tablet Take 1 tablet by mouth nightly 90 tablet 3    sodium chloride (OCEAN) 0.65 % nasal spray 1 spray by Nasal route as needed for Congestion 1 each 3    tiotropium (SPIRIVA HANDIHALER) 18 MCG inhalation capsule INHALE THE CONTENTS OF 1 CAPSULE DAILY 90 capsule 3    nitroGLYCERIN (NITROSTAT) 0.4 MG SL tablet up to max of 3 total doses. If no relief after 1 dose, call 911. 25 tablet 2    albuterol (PROVENTIL) (2.5 MG/3ML) 0.083% nebulizer solution Take 3 mLs by nebulization every 6 hours As directed 360 each 3    ciclopirox (PENLAC) 8 % solution Apply topically nightly.  6 mL 1    albuterol sulfate  (90 Base) MCG/ACT inhaler USE 2 INHALATIONS EVERY 6 HOURS AS NEEDED FOR WHEEZING OR SHORTNESS OF BREATH 25.5 g 2    ipratropium (ATROVENT) 0.06 % nasal spray 2 sprays by Each Nostril route 3 times daily 1 each 1    STELARA 90 MG/ML SOSY prefilled syringe       RESTASIS 0.05 % ophthalmic emulsion Place 1 drop into both eyes daily       budesonide-formoterol (SYMBICORT) 160-4.5 MCG/ACT AERO Inhale 2 puffs into the lungs 2 times daily 2 each LOT [id number] [id number] Inhaler 1    CPAP Machine MISC New cpap supplies mask hose filters etc 1 each 0     No current facility-administered medications on file prior to visit. Past Medical History:   Diagnosis Date    A-fib ([facility]     Abnormal EMG 12/09/2015    Actinic keratoses     Actinic keratoses     Acute diastolic congestive heart failure ([facility] 1/6/2021    Allergic rhinitis     Anemia 11/18/2014    Arthritis     Chronic back pain     COPD (chronic obstructive pulmonary disease) ([facility] 08/09/2012    COPD (chronic obstructive pulmonary disease) [facility]     Crohns disease     Degenerative disc disease, lumbar     Dermatitis     Diabetes ([facility] 03/19/2015    diet controlled    Gastrointestinal problem     GERD (gastroesophageal reflux disease)     History of atrial fibrillation 2006    Dr. Dean Uribe    History of throat cancer 2004     cleared for reoccurence years ago    Hyperlipidemia 1/21/2014    Hypertension     Hypogonadism male     Lung disease     Mononeuritis multiplex     Mononeuritis multiplex     Nondependent alcohol abuse, in remission 7/22/2020    Obesity (BMI 30-39. 9) 7/24/2019    Osteoarthritis of lumbar spine     Pain of right heel     Paresthesia of foot, bilateral     Prediabetes 12/3/2014    Restless leg syndrome     Seborrheic dermatitis     Seborrheic keratoses, inflamed     Throat cancer (HCC)     throat, had surgery, sees Dr Dana Lilly yearly    Tinea cruris     Tinea pedis     Tinea unguium      Past Surgical History:   Procedure Laterality Date CARDIAC CATHETERIZATION      CARPAL TUNNEL RELEASE      CATARACT REMOVAL WITH IMPLANT Right 2019    CATARACT REMOVAL WITH IMPLANT Left 2019    COLON SURGERY      COLONOSCOPY  04/15/2015    KNEE ARTHROSCOPY      LARYNGECTOMY  2004    UPPER GASTROINTESTINAL ENDOSCOPY  13    Dr. Mario HOWARD/NAPOLEON RODRIGUEZ       Social History     Socioeconomic History    Marital status:      Spouse name: Not on file    Number of children: 3    Years of education: 12    Highest education level: High school graduate   Occupational History    Occupation:      Employer: [employer]   Tobacco Use    Smoking status: Former     Packs/day: 1.00     Years: 25.00     Pack years: 25.00     Types: Cigarettes     Quit date: 10/21/1990     Years since quittin.2    Smokeless tobacco: Never   Substance and Sexual Activity    Alcohol use: No     Comment: Attends AA, 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    Drug use: No    Sexual activity: Not Currently     Partners: Female   Other Topics Concern    Not on file   Social History Narrative    Lives alone.     2 story home     1 step to get in house and 7 steps to get upstairs    Full basement    2 children live nearby and 1 son lives in [place]    No pets     Social Determinants of Health     Financial Resource Strain: Low Risk     Difficulty of Paying Living Expenses: Not hard at all   Food Insecurity: No Food Insecurity    Worried About Running Out of Food in the Last Year: Never true    Ran Out of Food in the Last Year: Never true   Transportation Needs: Not on file   Physical Activity: Insufficiently Active    Days of Exercise per Week: 1 day    Minutes of Exercise per Session: 20 min   Stress: Not on file   Social Connections: Not on file   Intimate Partner Violence: Not on file   Housing Stability: Not on file     Family History   Problem Relation Age of Onset    Heart Disease Mother     Liver Disease Mother     High Blood Pressure Mother     Heart Disease Father     Arthritis Father     Cancer Sister         lung     Allergies:  Alemtuzumab, Glycopyrrolate-formoterol, Mercaptopurine, and Moxifloxacin    Review of Systems   Constitutional:  Negative for activity change, appetite change, diaphoresis and unexpected weight change. Eyes:  Negative for photophobia and visual disturbance. Respiratory:  Negative for chest tightness and shortness of breath. No orthopnea   Cardiovascular:  Negative for chest pain, palpitations and leg swelling. Gastrointestinal:  Negative for abdominal distention and abdominal pain. Genitourinary:  Negative for flank pain and frequency. Musculoskeletal:  Negative for gait problem and joint swelling. Neurological:  Negative for dizziness, weakness, light-headedness and headaches. Psychiatric/Behavioral:  Negative for confusion. Objective:   /62   Pulse 75   Temp 97.8 °F (36.6 °C)   Ht 5' 9\" (1.753 m)   Wt 258 lb (117 kg)   SpO2 97%   BMI 38.10 kg/m²     Physical Exam  Vitals reviewed. Constitutional:       General: He is not in acute distress. Appearance: He is well-developed. HENT:      Head: Normocephalic and atraumatic. Right Ear: External ear normal.      Left Ear: External ear normal.      Nose: Nose normal.   Eyes:      General:         Right eye: No discharge. Left eye: No discharge. Conjunctiva/sclera: Conjunctivae normal.      Pupils: Pupils are equal, round, and reactive to light. Neck:      Thyroid: No thyromegaly. Cardiovascular:      Rate and Rhythm: Normal rate and regular rhythm. Pulmonary:      Effort: Pulmonary effort is normal. No respiratory distress. Abdominal:      General: There is no distension. Musculoskeletal:      Cervical back: Neck supple. Skin:     General: Skin is warm and dry. Neurological:      Mental Status: He is alert and oriented to person, place, and time.       Coordination: Coordination normal.   Psychiatric: Thought Content: Thought content normal.         Judgment: Judgment normal.       No results found for this visit on 01/19/23. Recent Results (from the past 2016 hour(s))   POCT COVID-19, Antigen    Collection Time: 01/02/23  5:16 PM   Result Value Ref Range    SARS-COV-2, POC Not-Detected Not Detected    Lot Number [id number]     QC Pass/Fail p     Performing Instrument BD Veritor    POCT Influenza A/B    Collection Time: 01/02/23  5:17 PM   Result Value Ref Range    Influenza A Ab neg     Influenza B Ab neg    Renal Function Panel    Collection Time: 01/13/23  1:32 PM   Result Value Ref Range    Sodium 143 135 - 144 mEq/L    Potassium 4.4 3.4 - 4.9 mEq/L    Chloride 103 95 - 107 mEq/L    CO2 28 20 - 31 mEq/L    Anion Gap 12 9 - 15 mEq/L    Glucose 114 (H) 70 - 99 mg/dL    BUN 14 8 - 23 mg/dL    Creatinine 0.77 0.70 - 1.20 mg/dL    Est, Glom Filt Rate >60.0 >60    Calcium 8.9 8.5 - 9.9 mg/dL    Phosphorus 3.7 2.3 - 4.8 mg/dL    Albumin 3.8 3.5 - 4.6 g/dL       [x] Pt was seen by provider for  15      Minutes  Counseling and coordination of care was done for all assessment diagnosis listed for today with patient and any family/friend present. More than 50% of this visit was spent coordinating current care, obtaining information for prior records, and counseling for current plan of action. Assessment:       Diagnosis Orders   1. Pulmonary embolism, other, unspecified chronicity, unspecified whether acute cor pulmonale present ([address]        2. Simple chronic bronchitis ([address]        3. Acute diastolic congestive heart failure (HCC)  furosemide (LASIX) 20 MG tablet      4. Crohn's disease without complication, unspecified gastrointestinal tract location ([address]        5. Type 2 diabetes mellitus with diabetic neuropathy, without long-term current use of insulin (HCC)        6. Paroxysmal atrial fibrillation ([address]        7.  Hyperlipidemia, unspecified hyperlipidemia type  pravastatin (PRAVACHOL) 40 MG tablet 8. Pulmonary embolism, 11/2021 anticoag for 1 year  apixaban (ELIQUIS) 5 MG TABS tablet      9. Essential hypertension  furosemide (LASIX) 20 MG tablet    lisinopril (PRINIVIL;ZESTRIL) 20 MG tablet      10. Tinea cruris  nystatin (MYCOSTATIN) [id number] UNIT/GM cream      11. Severe obesity (BMI 35.0-39. 9) with comorbidity ([facility]              No orders of the defined types were placed in this encounter. Orders Placed This Encounter   Medications    pravastatin (PRAVACHOL) 40 MG tablet     Sig: Take 1 tablet by mouth daily     Dispense:  90 tablet     Refill:  3    apixaban (ELIQUIS) 5 MG TABS tablet     Sig: Take 1 tablet by mouth 2 times daily     Dispense:  90 tablet     Refill:  3    furosemide (LASIX) 20 MG tablet     Sig: Take 1 tablet by mouth daily Indications: Taking 40 mg tablet daily     Dispense:  90 tablet     Refill:  1    esomeprazole Magnesium (NEXIUM) 20 MG PACK     Sig: Take 1 packet by mouth daily     Dispense:  90 packet     Refill:  2    lisinopril (PRINIVIL;ZESTRIL) 20 MG tablet     Sig: Take 0.5 tablets by mouth daily for 1 day     Dispense:  1 tablet     Refill:  0    nystatin (MYCOSTATIN) 191296 UNIT/GM cream     Sig: Apply topically 2 times daily Apply topically 2 times daily. Dispense:  30 g     Refill:  3          Medication List            Accurate as of January 19, 2023 12:12 PM. If you have any questions, ask your nurse or doctor.                 CHANGE how you take these medications      lisinopril 20 MG tablet  Commonly known as: PRINIVIL;ZESTRIL  Take 0.5 tablets by mouth daily for 1 day  What changed: how much to take  Changed by: Frandy Knight MD            CONTINUE taking these medications      * albuterol sulfate  (90 Base) MCG/ACT inhaler  Commonly known as: PROVENTIL;VENTOLIN;PROAIR  USE 2 INHALATIONS EVERY 6 HOURS AS NEEDED FOR WHEEZING OR SHORTNESS OF BREATH     * albuterol (2.5 MG/3ML) 0.083% nebulizer solution  Commonly known as: PROVENTIL  Take 3 mLs by nebulization every 6 hours As directed     apixaban 5 MG Tabs tablet  Commonly known as: Eliquis  Take 1 tablet by mouth 2 times daily     budesonide-formoterol 160-4.5 MCG/ACT Aero  Commonly known as: Symbicort  Inhale 2 puffs into the lungs 2 times daily 2 each LOT [id number] [id number]     ciclopirox 8 % solution  Commonly known as: Penlac  Apply topically nightly. CPAP Machine Misc  New cpap supplies mask hose filters etc     donepezil 10 MG tablet  Commonly known as: Aricept  Take 1 tablet by mouth nightly     doxazosin 4 MG tablet  Commonly known as: CARDURA  Take 1.5 tablets by mouth nightly AND 0.5 tablets every morning. esomeprazole Magnesium 20 MG Pack  Commonly known as: NEXIUM  Take 1 packet by mouth daily     fluticasone 50 MCG/ACT nasal spray  Commonly known as: FLONASE  1 spray by Nasal route daily     furosemide 20 MG tablet  Commonly known as: LASIX  Take 1 tablet by mouth daily Indications: Taking 40 mg tablet daily     gabapentin 300 MG capsule  Commonly known as: NEURONTIN  Take 1 capsule by mouth daily. TAKE 1 CAPSULE DAILY     ipratropium 0.06 % nasal spray  Commonly known as: ATROVENT  2 sprays by Each Nostril route 3 times daily     memantine 5 MG tablet  Commonly known as: NAMENDA  Take 1 tablet by mouth 2 times daily     mesalamine 250 MG extended release capsule  Commonly known as: PENTASA  Take 2 capsules by mouth 3 times daily     metoprolol tartrate 50 MG tablet  Commonly known as: LOPRESSOR  Take 1.5 tablets by mouth 2 times daily Take 1.5 po bid     montelukast 10 MG tablet  Commonly known as: SINGULAIR  Take 1 tablet by mouth nightly TAKE 1 TABLET NIGHTLY     nitroGLYCERIN 0.4 MG SL tablet  Commonly known as: NITROSTAT  up to max of 3 total doses. If no relief after 1 dose, call 911.     nystatin [id number] UNIT/GM cream  Commonly known as: MYCOSTATIN  Apply topically 2 times daily Apply topically 2 times daily.      pravastatin 40 MG tablet  Commonly known as: PRAVACHOL  Take 1 tablet by mouth daily     Restasis 0.05 % ophthalmic emulsion  Generic drug: cycloSPORINE     sodium chloride 0.65 % nasal spray  Commonly known as: OCEAN  1 spray by Nasal route as needed for Congestion     Spiriva HandiHaler 18 MCG inhalation capsule  Generic drug: tiotropium  INHALE THE CONTENTS OF 1 CAPSULE DAILY     Stelara 90 MG/ML Sosy prefilled syringe  Generic drug: ustekinumab     testosterone 50 MG/5GM (1%) Gel 1% gel  Commonly known as: AndroGel  APPLY 1 PACKET ALTERNATING WITH 2 PACKETS DAILY EVERY OTHER DAY     triamcinolone 0.1 % cream  Commonly known as: KENALOG  Apply topically 2 times daily. * This list has 2 medication(s) that are the same as other medications prescribed for you. Read the directions carefully, and ask your doctor or other care provider to review them with you. STOP taking these medications      aspirin 81 MG tablet  Stopped by: Almaz Zacarias MD     clotrimazole-betamethasone 1-0.05 % cream  Commonly known as: Lotrisone  Stopped by: Almaz Zacarias MD               Where to Get Your Medications        These medications were sent to [address] [place] [address] [address] [place] [address]      Phone: [phone]   nystatin [id number] UNIT/GM cream       These medications were sent to [facility] ([facility] ) - [place]  [phone] Marisol Gaitanine [phone]  [address], [facility] [address]      Phone: [phone]   apixaban 5 MG Tabs tablet  esomeprazole Magnesium 20 MG Pack  furosemide 20 MG tablet  pravastatin 40 MG tablet       Information about where to get these medications is not yet available    Ask your nurse or doctor about these medications  lisinopril 20 MG tablet           Plan:   Return in about 1 week (around 1/26/2023) for for review of outcome of today's recommendation.     Patient Instructions Follow-up appointment 1 week to check on patient's home blood pressure monitor which they will bring and a few other issues. Decrease lisinopril to 10 mg, one half of the 20 mg tablet until follow-up next week. Discontinue aspirin. Discontinue clobetasol betamethasone cream for the groin and use nystatin in its place. Family to check with neurology as to whether or not Eliquis would be beneficial for his vascular dementia. Therapy with Eliquis for pulmonary embolism is complete. Patient is due to have blood work to monitor multiple medical conditions today. Patient will be seeing GI in clarifying medications specifically whether or not he needs to be on as much Pentasa as he is at this time. This note was partially created with the assistance of dictation. This may lead to grammatical or spelling errors. Sher Tian M.D.

## 2023-01-19 NOTE — PROGRESS NOTES
Diagnosis Orders   1. Primary hypertension  Lipid Panel     Comprehensive Metabolic Panel     CBC with Auto Differential       2. Type 2 diabetes mellitus with diabetic neuropathy, without long-term current use of insulin (HCC)  Lipid Panel     Comprehensive Metabolic Panel     Hemoglobin A1C     Microalbumin / Creatinine Urine Ratio       3. Hypogonadism male  Testosterone, free, total     CBC with Auto Differential     Ferritin       4. Prostate cancer screening  PSA Screening       5. Needs flu shot  Influenza, FLUAD, (age 72 y+), IM, PF, 0.5 mL          Return for for review of outcome of today's recommendation. Patient Instructions   No changes in medication at this time. All conditions stable. Yearly labs ordered.

## 2023-01-20 LAB
FERRITIN: 213 NG/ML (ref 30–400)
SEX HORMONE BINDING GLOBULIN: 59 NMOL/L (ref 11–80)
TESTOSTERONE FREE-NONMALE: 24.3 PG/ML (ref 47–244)
TESTOSTERONE TOTAL: 189 NG/DL (ref 220–1000)

## 2023-01-22 NOTE — RESULT ENCOUNTER NOTE
Result noted. Any abnormalities noted can be discussed at the upcoming appointment. Patient has appropriately timed appointment scheduled for follow up.

## 2023-01-24 ASSESSMENT — ENCOUNTER SYMPTOMS
PHOTOPHOBIA: 0
CHEST TIGHTNESS: 0
ABDOMINAL DISTENTION: 0
SHORTNESS OF BREATH: 0
ABDOMINAL PAIN: 0

## 2023-01-25 DIAGNOSIS — I50.31 ACUTE DIASTOLIC CONGESTIVE HEART FAILURE (HCC): ICD-10-CM

## 2023-01-25 DIAGNOSIS — I10 ESSENTIAL HYPERTENSION: ICD-10-CM

## 2023-01-26 RX ORDER — FUROSEMIDE 20 MG/1
TABLET ORAL
Qty: 30 TABLET | Refills: 2 | OUTPATIENT
Start: 2023-01-26

## 2023-01-26 NOTE — TELEPHONE ENCOUNTER
Future Appointments    Encounter Information    Provider Department Appt Notes   1/31/2023 Arjun Carrasquillo MD [facility] Return in about 1 week (around 1/26/2023) for for review of today's rec.   restart eliquis   6/13/2023 Arjun Carrasquillo MD [facility] Return in about 6 months (around 6/13/2023)     Past Visits    Date Provider Specialty Visit Type Primary Dx   01/19/2023 Arujn Carrasquillo MD Family Medicine Office Visit Pulmonary embolism, other, unspecified chronicity, unspecified whether acute cor pulmonale present

## 2023-01-31 ENCOUNTER — OFFICE VISIT (OUTPATIENT)
Dept: FAMILY MEDICINE CLINIC | Age: 73
End: 2023-01-31
Payer: MEDICARE

## 2023-01-31 VITALS
TEMPERATURE: 98.7 F | DIASTOLIC BLOOD PRESSURE: 62 MMHG | SYSTOLIC BLOOD PRESSURE: 98 MMHG | WEIGHT: 258 LBS | HEIGHT: 69 IN | BODY MASS INDEX: 38.21 KG/M2

## 2023-01-31 DIAGNOSIS — Z51.81 ENCOUNTER FOR MEDICATION MONITORING: ICD-10-CM

## 2023-01-31 DIAGNOSIS — E29.1 HYPOGONADISM MALE: Primary | ICD-10-CM

## 2023-01-31 DIAGNOSIS — I10 ESSENTIAL HYPERTENSION: ICD-10-CM

## 2023-01-31 DIAGNOSIS — D63.8 ANEMIA IN OTHER CHRONIC DISEASES CLASSIFIED ELSEWHERE: ICD-10-CM

## 2023-01-31 PROCEDURE — G8427 DOCREV CUR MEDS BY ELIG CLIN: HCPCS | Performed by: FAMILY MEDICINE

## 2023-01-31 PROCEDURE — 3074F SYST BP LT 130 MM HG: CPT | Performed by: FAMILY MEDICINE

## 2023-01-31 PROCEDURE — 1036F TOBACCO NON-USER: CPT | Performed by: FAMILY MEDICINE

## 2023-01-31 PROCEDURE — 1123F ACP DISCUSS/DSCN MKR DOCD: CPT | Performed by: FAMILY MEDICINE

## 2023-01-31 PROCEDURE — 99214 OFFICE O/P EST MOD 30 MIN: CPT | Performed by: FAMILY MEDICINE

## 2023-01-31 PROCEDURE — G8417 CALC BMI ABV UP PARAM F/U: HCPCS | Performed by: FAMILY MEDICINE

## 2023-01-31 PROCEDURE — G8484 FLU IMMUNIZE NO ADMIN: HCPCS | Performed by: FAMILY MEDICINE

## 2023-01-31 PROCEDURE — 3078F DIAST BP <80 MM HG: CPT | Performed by: FAMILY MEDICINE

## 2023-01-31 PROCEDURE — 3017F COLORECTAL CA SCREEN DOC REV: CPT | Performed by: FAMILY MEDICINE

## 2023-01-31 RX ORDER — DOXAZOSIN MESYLATE 4 MG/1
2 TABLET ORAL 2 TIMES DAILY
Qty: 1 TABLET | Refills: 0 | Status: SHIPPED | OUTPATIENT
Start: 2023-01-31 | End: 2024-01-31

## 2023-01-31 RX ORDER — TESTOSTERONE GEL, 1% 10 MG/G
2 GEL TRANSDERMAL DAILY
Qty: 180 EACH | Refills: 1 | Status: SHIPPED | OUTPATIENT
Start: 2023-01-31 | End: 2023-07-30

## 2023-01-31 ASSESSMENT — ENCOUNTER SYMPTOMS
CHEST TIGHTNESS: 0
ABDOMINAL DISTENTION: 0
ABDOMINAL PAIN: 0
PHOTOPHOBIA: 0
SHORTNESS OF BREATH: 0

## 2023-01-31 NOTE — PATIENT INSTRUCTIONS
Pt bp cuff verified as accurate    Patient will check blood pressure 1-2 times a day varying times. Log blood pressure values and communicate with this office. We may consider backing off of some of his medications    Patient has restarted Eliquis for vascular dementia. Omeprazole may be taken if pharmacy will not provide esomeprazole. Increasing testosterone gel to 2 packets a day. No further action needed for his anemia. Family will contact GI to see whether patient can be weaned off of Pentasa.

## 2023-01-31 NOTE — PROGRESS NOTES
Diagnosis Orders   1. Hypogonadism male  testosterone (ANDROGEL) 50 MG/5GM (1%) GEL 1% gel      2. Essential hypertension  doxazosin (CARDURA) 4 MG tablet    Basic Metabolic Panel      3. Anemia in other chronic diseases classified elsewhere        4. Encounter for medication monitoring  Basic Metabolic Panel        Return in about 3 months (around 4/30/2023) for for routine major medical condition management. Patient Instructions   Pt bp cuff verified as accurate    Patient will check blood pressure 1-2 times a day varying times. Log blood pressure values and communicate with this office. We may consider backing off of some of his medications    Patient has restarted Eliquis for vascular dementia. Omeprazole may be taken if pharmacy will not provide esomeprazole. Increasing testosterone gel to 2 packets a day. No further action needed for his anemia. Family will contact GI to see whether patient can be weaned off of Pentasa. Subjective:      Patient ID: Clara Lay is a 67 y.o. male who presents for:  Chief Complaint   Patient presents with    Blood Pressure Check         She comes in with daughter-in-law to continue to work on clarifying medications. We are reviewing labs today noticing his testosterone level is low. She states he has a hard time remembering to do the testosterone level especially the alternating 2 packets 1 day and 1 packet the next. We are going to make adjustments for that. Patient has been sent omeprazole instead of esomeprazole. Wondering if this is okay to take we reviewed this. Patient did restart Eliquis for vascular dementia. Brought his blood pressure cuff in today for verification of accuracy. Values look good.     Family has not yet spoken with GI about treatment plan          Current Outpatient Medications on File Prior to Visit   Medication Sig Dispense Refill    pravastatin (PRAVACHOL) 40 MG tablet Take 1 tablet by mouth daily 90 tablet 3 apixaban (ELIQUIS) 5 MG TABS tablet Take 1 tablet by mouth 2 times daily 90 tablet 3    furosemide (LASIX) 20 MG tablet Take 1 tablet by mouth daily Indications: Taking 40 mg tablet daily 90 tablet 1    esomeprazole Magnesium (NEXIUM) 20 MG PACK Take 1 packet by mouth daily 90 packet 2    nystatin (MYCOSTATIN) 244139 UNIT/GM cream Apply topically 2 times daily Apply topically 2 times daily. 30 g 3    fluticasone (FLONASE) 50 MCG/ACT nasal spray 1 spray by Nasal route daily 1 each 0    memantine (NAMENDA) 5 MG tablet Take 1 tablet by mouth 2 times daily 60 tablet 3    triamcinolone (KENALOG) 0.1 % cream Apply topically 2 times daily. 30 g 0    mesalamine (PENTASA) 250 MG extended release capsule Take 2 capsules by mouth 3 times daily 42 capsule 0    montelukast (SINGULAIR) 10 MG tablet Take 1 tablet by mouth nightly TAKE 1 TABLET NIGHTLY 90 tablet 3    metoprolol tartrate (LOPRESSOR) 50 MG tablet Take 1.5 tablets by mouth 2 times daily Take 1.5 po bid 270 tablet 3    gabapentin (NEURONTIN) 300 MG capsule Take 1 capsule by mouth daily. TAKE 1 CAPSULE DAILY 90 capsule 3    donepezil (ARICEPT) 10 MG tablet Take 1 tablet by mouth nightly 90 tablet 3    sodium chloride (OCEAN) 0.65 % nasal spray 1 spray by Nasal route as needed for Congestion 1 each 3    tiotropium (SPIRIVA HANDIHALER) 18 MCG inhalation capsule INHALE THE CONTENTS OF 1 CAPSULE DAILY 90 capsule 3    nitroGLYCERIN (NITROSTAT) 0.4 MG SL tablet up to max of 3 total doses. If no relief after 1 dose, call 911. 25 tablet 2    albuterol (PROVENTIL) (2.5 MG/3ML) 0.083% nebulizer solution Take 3 mLs by nebulization every 6 hours As directed 360 each 3    ciclopirox (PENLAC) 8 % solution Apply topically nightly.  6 mL 1    albuterol sulfate  (90 Base) MCG/ACT inhaler USE 2 INHALATIONS EVERY 6 HOURS AS NEEDED FOR WHEEZING OR SHORTNESS OF BREATH 25.5 g 2    ipratropium (ATROVENT) 0.06 % nasal spray 2 sprays by Each Nostril route 3 times daily 1 each 1    STELARA 90 MG/ML SOSY prefilled syringe       RESTASIS 0.05 % ophthalmic emulsion Place 1 drop into both eyes daily       CPAP Machine MISC New cpap supplies mask hose filters etc 1 each 0    lisinopril (PRINIVIL;ZESTRIL) 20 MG tablet Take 0.5 tablets by mouth daily for 1 day 1 tablet 0     No current facility-administered medications on file prior to visit. Past Medical History:   Diagnosis Date    [facility]     Abnormal EMG 12/09/2015    Actinic keratoses     Actinic keratoses     Acute diastolic congestive heart failure ([facility] 1/6/2021    Allergic rhinitis     Anemia 11/18/2014    Arthritis     Chronic back pain     COPD (chronic obstructive pulmonary disease) ([facility] 08/09/2012    COPD (chronic obstructive pulmonary disease) ([facility])     Crohns disease     Degenerative disc disease, lumbar     Dermatitis     Diabetes ([facility] 03/19/2015    diet controlled    Gastrointestinal problem     GERD (gastroesophageal reflux disease)     History of atrial fibrillation 2006    Dr. Radha Kyle    History of throat cancer 2004     cleared for reoccurence years ago    Hyperlipidemia 1/21/2014    Hypertension     Hypogonadism male     Lung disease     Mononeuritis multiplex     Mononeuritis multiplex     Nondependent alcohol abuse, in remission 7/22/2020    Obesity (BMI 30-39. 9) 7/24/2019    Osteoarthritis of lumbar spine     Pain of right heel     Paresthesia of foot, bilateral     Prediabetes 12/3/2014    Restless leg syndrome     Seborrheic dermatitis     Seborrheic keratoses, inflamed     Throat cancer ([facility])     throat, had surgery, sees Dr Rachel Caro yearly    Tinea cruris     Tinea pedis     Tinea unguium      Past Surgical History:   Procedure Laterality Date    CARDIAC CATHETERIZATION      CARPAL TUNNEL RELEASE      CATARACT REMOVAL WITH IMPLANT Right 09/09/2019    CATARACT REMOVAL WITH IMPLANT Left 07/22/2019    COLON SURGERY      COLONOSCOPY  04/15/2015    KNEE ARTHROSCOPY      LARYNGECTOMY  2004    UPPER GASTROINTESTINAL ENDOSCOPY 13    Dr. Rita HOWARD/NAPOLEON RODRIGUEZ       Social History     Socioeconomic History    Marital status:      Spouse name: Not on file    Number of children: 3    Years of education: 12    Highest education level: High school graduate   Occupational History    Occupation:      Employer: [employer]   Tobacco Use    Smoking status: Former     Packs/day: 1.00     Years: 25.00     Pack years: 25.00     Types: Cigarettes     Quit date: 10/21/1990     Years since quittin.3    Smokeless tobacco: Never   Substance and Sexual Activity    Alcohol use: No     Comment: Attends AA, 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    Drug use: No    Sexual activity: Not Currently     Partners: Female   Other Topics Concern    Not on file   Social History Narrative    Lives alone. 2 story home     1 step to get in house and 7 steps to get upstairs    Full basement    2 children live nearby and 1 son lives in [place]    No pets     Social Determinants of Health     Financial Resource Strain: Low Risk     Difficulty of Paying Living Expenses: Not hard at all   Food Insecurity: No Food Insecurity    Worried About Running Out of Food in the Last Year: Never true    Ran Out of Food in the Last Year: Never true   Transportation Needs: Not on file   Physical Activity: Insufficiently Active    Days of Exercise per Week: 1 day    Minutes of Exercise per Session: 20 min   Stress: Not on file   Social Connections: Not on file   Intimate Partner Violence: Not on file   Housing Stability: Not on file     Family History   Problem Relation Age of Onset    Heart Disease Mother     Liver Disease Mother     High Blood Pressure Mother     Heart Disease Father     Arthritis Father     Cancer Sister         lung     Allergies:  Alemtuzumab, Glycopyrrolate-formoterol, Mercaptopurine, and Moxifloxacin    Review of Systems   Constitutional:  Negative for activity change, appetite change, diaphoresis and unexpected weight change. Eyes:  Negative for photophobia and visual disturbance. Respiratory:  Negative for chest tightness and shortness of breath. No orthopnea   Cardiovascular:  Negative for chest pain, palpitations and leg swelling. Gastrointestinal:  Negative for abdominal distention and abdominal pain. Genitourinary:  Negative for flank pain and frequency. Musculoskeletal:  Negative for gait problem and joint swelling. Neurological:  Negative for dizziness, weakness, light-headedness and headaches. Psychiatric/Behavioral:  Negative for confusion. Objective:   BP 98/62 Comment: 101/63 pt cuff  Temp 98.7 °F (37.1 °C)   Ht 5' 9\" (1.753 m)   Wt 258 lb (117 kg)   BMI 38.10 kg/m²     Physical Exam  Vitals reviewed. Constitutional:       General: He is not in acute distress. Appearance: He is well-developed. HENT:      Head: Normocephalic and atraumatic. Right Ear: External ear normal.      Left Ear: External ear normal.      Nose: Nose normal.   Eyes:      General:         Right eye: No discharge. Left eye: No discharge. Conjunctiva/sclera: Conjunctivae normal.      Pupils: Pupils are equal, round, and reactive to light. Neck:      Thyroid: No thyromegaly. Cardiovascular:      Rate and Rhythm: Normal rate and regular rhythm. Pulmonary:      Effort: Pulmonary effort is normal. No respiratory distress. Abdominal:      General: There is no distension. Musculoskeletal:      Cervical back: Neck supple. Skin:     General: Skin is warm and dry. Neurological:      Mental Status: He is alert and oriented to person, place, and time. Coordination: Coordination normal.   Psychiatric:         Thought Content: Thought content normal.         Judgment: Judgment normal.       No results found for this visit on 01/31/23.     Recent Results (from the past 2016 hour(s))   POCT COVID-19, Antigen    Collection Time: 01/02/23  5:16 PM   Result Value Ref Range    SARS-COV-2, POC Not-Detected Not Detected    Lot Number [id number]     QC Pass/Fail p     Performing Instrument BD Veritor    POCT Influenza A/B    Collection Time: 01/02/23  5:17 PM   Result Value Ref Range    Influenza A Ab neg     Influenza B Ab neg    Renal Function Panel    Collection Time: 01/13/23  1:32 PM   Result Value Ref Range    Sodium 143 135 - 144 mEq/L    Potassium 4.4 3.4 - 4.9 mEq/L    Chloride 103 95 - 107 mEq/L    CO2 28 20 - 31 mEq/L    Anion Gap 12 9 - 15 mEq/L    Glucose 114 (H) 70 - 99 mg/dL    BUN 14 8 - 23 mg/dL    Creatinine 0.77 0.70 - 1.20 mg/dL    Est, Glom Filt Rate >60.0 >60    Calcium 8.9 8.5 - 9.9 mg/dL    Phosphorus 3.7 2.3 - 4.8 mg/dL    Albumin 3.8 3.5 - 4.6 g/dL   Hemoglobin A1C    Collection Time: 01/19/23  1:34 PM   Result Value Ref Range    Hemoglobin A1C 5.8 4.8 - 5.9 %   Ferritin    Collection Time: 01/19/23  1:34 PM   Result Value Ref Range    Ferritin 213 30 - 400 ng/mL   PSA Screening    Collection Time: 01/19/23  1:34 PM   Result Value Ref Range    PSA 1.39 0.00 - 4.00 ng/mL   CBC with Auto Differential    Collection Time: 01/19/23  1:34 PM   Result Value Ref Range    WBC 10.6 4.8 - 10.8 K/uL    RBC 4.38 (L) 4.70 - 6.10 M/uL    Hemoglobin 12.8 (L) 14.0 - 18.0 g/dL    Hematocrit 38.6 (L) 42.0 - 52.0 %    MCV 88.0 79.0 - 92.2 fL    MCH 29.1 27.0 - 31.3 pg    MCHC 33.1 33.0 - 37.0 %    RDW 14.4 11.5 - 14.5 %    Platelets 094 778 - 661 K/uL    Neutrophils % 62.7 %    Lymphocytes % 18.3 %    Monocytes % 9.3 %    Eosinophils % 8.7 %    Basophils % 1.0 %    Neutrophils Absolute 6.6 (H) 1.4 - 6.5 K/uL    Lymphocytes Absolute 1.9 1.0 - 4.8 K/uL    Monocytes Absolute 1.0 (H) 0.2 - 0.8 K/uL    Eosinophils Absolute 0.9 (H) 0.0 - 0.7 K/uL    Basophils Absolute 0.1 0.0 - 0.2 K/uL   Comprehensive Metabolic Panel    Collection Time: 01/19/23  1:34 PM   Result Value Ref Range    Sodium 140 135 - 144 mEq/L    Potassium 4.1 3.4 - 4.9 mEq/L    Chloride 102 95 - 107 mEq/L    CO2 27 20 - 31 mEq/L    Anion Gap 11 9 - 15 mEq/L    Glucose 87 70 - 99 mg/dL    BUN 18 8 - 23 mg/dL    Creatinine 0.85 0.70 - 1.20 mg/dL    Est, Glom Filt Rate >60.0 >60    Calcium 8.8 8.5 - 9.9 mg/dL    Total Protein 6.1 (L) 6.3 - 8.0 g/dL    Albumin 3.6 3.5 - 4.6 g/dL    Total Bilirubin 0.5 0.2 - 0.7 mg/dL    Alkaline Phosphatase 75 35 - 104 U/L    ALT 11 0 - 41 U/L    AST 20 0 - 40 U/L    Globulin 2.5 2.3 - 3.5 g/dL   Lipid Panel    Collection Time: 01/19/23  1:34 PM   Result Value Ref Range    Cholesterol, Total 158 0 - 199 mg/dL    Triglycerides 164 (H) 0 - 150 mg/dL    HDL 35 (L) 40 - 59 mg/dL    LDL Calculated 90 0 - 129 mg/dL   Testosterone, free, total    Collection Time: 01/19/23  1:34 PM   Result Value Ref Range    Testosterone 189 (L) 220 - 1,000 ng/dL    Sex Hormone Binding 59 11 - 80 nmol/L    Testosterone, Free 24.3 (L) 47 - 244 pg/mL   Microalbumin / Creatinine Urine Ratio    Collection Time: 01/19/23  2:12 PM   Result Value Ref Range    Microalbumin, Random Urine <1.20 Not Established mg/dL    Creatinine, Ur 37.6 Not Established mg/dL    Microalbumin Creatinine Ratio see below 0.0 - 30.0 mg/G       [] Pt was seen by provider for      Minutes  Counseling and coordination of care was done for all assessment diagnosis listed for today with patient and any family/friend present. More than 50% of this visit was spent coordinating current care, obtaining information for prior records, and counseling for current plan of action. Assessment:       Diagnosis Orders   1. Hypogonadism male  testosterone (ANDROGEL) 50 MG/5GM (1%) GEL 1% gel      2. Essential hypertension  doxazosin (CARDURA) 4 MG tablet    Basic Metabolic Panel      3. Anemia in other chronic diseases classified elsewhere        4.  Encounter for medication monitoring  Basic Metabolic Panel            Orders Placed This Encounter   Procedures    Basic Metabolic Panel     Standing Status:   Future     Standing Expiration Date:   1/31/2024         Orders Placed This Encounter   Medications    testosterone (ANDROGEL) 50 MG/5GM (1%) GEL 1% gel     Sig: Apply 2 Packages topically daily for 180 days. APPLY 1 PACKET ALTERNATING WITH 2 PACKETS DAILY EVERY OTHER DAY Max Daily Amount: 2 Packages     Dispense:  180 each     Refill:  1    doxazosin (CARDURA) 4 MG tablet     Sig: Take 0.5 tablets by mouth in the morning and 0.5 tablets in the evening. Dispense:  1 tablet     Refill:  0            Medication List            Accurate as of January 31, 2023 11:51 AM. If you have any questions, ask your nurse or doctor. CHANGE how you take these medications      doxazosin 4 MG tablet  Commonly known as: CARDURA  Take 0.5 tablets by mouth in the morning and 0.5 tablets in the evening. What changed: See the new instructions. Changed by: Marcella Stallworth MD     testosterone 50 MG/5GM (1%) Gel 1% gel  Commonly known as: AndroGel  Apply 2 Packages topically daily for 180 days. APPLY 1 PACKET ALTERNATING WITH 2 PACKETS DAILY EVERY OTHER DAY Max Daily Amount: 2 Packages  What changed:   how much to take  how to take this  when to take this  Changed by: Marcella Stallworth MD            CONTINUE taking these medications      * albuterol sulfate  (90 Base) MCG/ACT inhaler  Commonly known as: PROVENTIL;VENTOLIN;PROAIR  USE 2 INHALATIONS EVERY 6 HOURS AS NEEDED FOR WHEEZING OR SHORTNESS OF BREATH     * albuterol (2.5 MG/3ML) 0.083% nebulizer solution  Commonly known as: PROVENTIL  Take 3 mLs by nebulization every 6 hours As directed     apixaban 5 MG Tabs tablet  Commonly known as: Eliquis  Take 1 tablet by mouth 2 times daily     ciclopirox 8 % solution  Commonly known as: Penlac  Apply topically nightly.      CPAP Machine Misc  New cpap supplies mask hose filters etc     donepezil 10 MG tablet  Commonly known as: Aricept  Take 1 tablet by mouth nightly     esomeprazole Magnesium 20 MG Pack  Commonly known as: NEXIUM  Take 1 packet by mouth daily     fluticasone 50 MCG/ACT nasal spray  Commonly known as: FLONASE  1 spray by Nasal route daily     furosemide 20 MG tablet  Commonly known as: LASIX  Take 1 tablet by mouth daily Indications: Taking 40 mg tablet daily     gabapentin 300 MG capsule  Commonly known as: NEURONTIN  Take 1 capsule by mouth daily. TAKE 1 CAPSULE DAILY     ipratropium 0.06 % nasal spray  Commonly known as: ATROVENT  2 sprays by Each Nostril route 3 times daily     lisinopril 20 MG tablet  Commonly known as: PRINIVIL;ZESTRIL  Take 0.5 tablets by mouth daily for 1 day     memantine 5 MG tablet  Commonly known as: NAMENDA  Take 1 tablet by mouth 2 times daily     mesalamine 250 MG extended release capsule  Commonly known as: PENTASA  Take 2 capsules by mouth 3 times daily     metoprolol tartrate 50 MG tablet  Commonly known as: LOPRESSOR  Take 1.5 tablets by mouth 2 times daily Take 1.5 po bid     montelukast 10 MG tablet  Commonly known as: SINGULAIR  Take 1 tablet by mouth nightly TAKE 1 TABLET NIGHTLY     nitroGLYCERIN 0.4 MG SL tablet  Commonly known as: NITROSTAT  up to max of 3 total doses. If no relief after 1 dose, call 911.     nystatin 427087 UNIT/GM cream  Commonly known as: MYCOSTATIN  Apply topically 2 times daily Apply topically 2 times daily. pravastatin 40 MG tablet  Commonly known as: PRAVACHOL  Take 1 tablet by mouth daily     Restasis 0.05 % ophthalmic emulsion  Generic drug: cycloSPORINE     sodium chloride 0.65 % nasal spray  Commonly known as: OCEAN  1 spray by Nasal route as needed for Congestion     Spiriva HandiHaler 18 MCG inhalation capsule  Generic drug: tiotropium  INHALE THE CONTENTS OF 1 CAPSULE DAILY     Stelara 90 MG/ML Sosy prefilled syringe  Generic drug: ustekinumab     triamcinolone 0.1 % cream  Commonly known as: KENALOG  Apply topically 2 times daily. * This list has 2 medication(s) that are the same as other medications prescribed for you.  Read the directions carefully, and ask your doctor or other care provider to review them with you. STOP taking these medications      budesonide-formoterol 160-4.5 MCG/ACT Aero  Commonly known as: Symbicort  Stopped by: Aniceto Ennis MD               Where to Get Your Medications        These medications were sent to [address], [facility]. [address]  [address] [address]      Phone: [phone]   doxazosin 4 MG tablet       These medications were sent to [facility] ([facility] ) - [address] [phone] Marsha Rondon [phone]  [address] [facility] [address]      Phone: [phone]   testosterone 50 MG/5GM (1%) Gel 1% gel           Plan:   Return in about 3 months (around 4/30/2023) for for routine major medical condition management. Patient Instructions   Pt bp cuff verified as accurate    Patient will check blood pressure 1-2 times a day varying times. Log blood pressure values and communicate with this office. We may consider backing off of some of his medications    Patient has restarted Eliquis for vascular dementia. Omeprazole may be taken if pharmacy will not provide esomeprazole. Increasing testosterone gel to 2 packets a day. No further action needed for his anemia. Family will contact GI to see whether patient can be weaned off of Pentasa. This note was partially created with the assistance of dictation. This may lead to grammatical or spelling errors. Sher Murcia M.D.

## 2023-02-06 ENCOUNTER — TELEPHONE (OUTPATIENT)
Dept: FAMILY MEDICINE CLINIC | Age: 73
End: 2023-02-06

## 2023-02-17 DIAGNOSIS — Z51.81 ENCOUNTER FOR MEDICATION MONITORING: ICD-10-CM

## 2023-02-17 DIAGNOSIS — I10 ESSENTIAL HYPERTENSION: ICD-10-CM

## 2023-02-17 LAB
ANION GAP SERPL CALCULATED.3IONS-SCNC: 10 MEQ/L (ref 9–15)
BUN BLDV-MCNC: 15 MG/DL (ref 8–23)
CALCIUM SERPL-MCNC: 8.8 MG/DL (ref 8.5–9.9)
CHLORIDE BLD-SCNC: 106 MEQ/L (ref 95–107)
CO2: 25 MEQ/L (ref 20–31)
CREAT SERPL-MCNC: 0.74 MG/DL (ref 0.7–1.2)
GFR SERPL CREATININE-BSD FRML MDRD: >60 ML/MIN/{1.73_M2}
GLUCOSE BLD-MCNC: 107 MG/DL (ref 70–99)
POTASSIUM SERPL-SCNC: 4.2 MEQ/L (ref 3.4–4.9)
SODIUM BLD-SCNC: 141 MEQ/L (ref 135–144)

## 2023-04-04 ENCOUNTER — HOSPITAL ENCOUNTER (EMERGENCY)
Age: 73
Discharge: HOME | End: 2023-04-04
Attending: STUDENT IN AN ORGANIZED HEALTH CARE EDUCATION/TRAINING PROGRAM
Payer: MEDICARE

## 2023-04-04 ENCOUNTER — APPOINTMENT (OUTPATIENT)
Dept: GENERAL RADIOLOGY | Age: 73
End: 2023-04-04
Payer: MEDICARE

## 2023-04-04 VITALS
TEMPERATURE: 97.8 F | OXYGEN SATURATION: 94 % | DIASTOLIC BLOOD PRESSURE: 59 MMHG | HEART RATE: 95 BPM | SYSTOLIC BLOOD PRESSURE: 107 MMHG | RESPIRATION RATE: 18 BRPM | WEIGHT: 263.6 LBS | HEIGHT: 69 IN | BODY MASS INDEX: 39.04 KG/M2

## 2023-04-04 DIAGNOSIS — I48.91 ATRIAL FIBRILLATION WITH RVR (HCC): Primary | ICD-10-CM

## 2023-04-04 DIAGNOSIS — I50.33 ACUTE ON CHRONIC DIASTOLIC CONGESTIVE HEART FAILURE (HCC): ICD-10-CM

## 2023-04-04 DIAGNOSIS — R07.9 CHEST PAIN, UNSPECIFIED TYPE: ICD-10-CM

## 2023-04-04 DIAGNOSIS — I50.31 ACUTE DIASTOLIC CONGESTIVE HEART FAILURE (HCC): ICD-10-CM

## 2023-04-04 DIAGNOSIS — I10 ESSENTIAL HYPERTENSION: ICD-10-CM

## 2023-04-04 LAB
ALBUMIN SERPL-MCNC: 3.2 G/DL (ref 3.5–4.6)
ALP SERPL-CCNC: 74 U/L (ref 35–104)
ALT SERPL-CCNC: 11 U/L (ref 0–41)
ANION GAP SERPL CALCULATED.3IONS-SCNC: 8 MEQ/L (ref 9–15)
AST SERPL-CCNC: 17 U/L (ref 0–40)
BASOPHILS # BLD: 0.1 K/UL (ref 0–0.2)
BASOPHILS NFR BLD: 0.9 %
BILIRUB SERPL-MCNC: <0.2 MG/DL (ref 0.2–0.7)
BILIRUB UR QL STRIP: NEGATIVE
BNP BLD-MCNC: 408 PG/ML
BUN SERPL-MCNC: 14 MG/DL (ref 8–23)
CALCIUM SERPL-MCNC: 8.5 MG/DL (ref 8.5–9.9)
CHLORIDE SERPL-SCNC: 105 MEQ/L (ref 95–107)
CK SERPL-CCNC: 150 U/L (ref 0–190)
CLARITY UR: CLEAR
CO2 SERPL-SCNC: 28 MEQ/L (ref 20–31)
COLOR UR: YELLOW
CREAT SERPL-MCNC: 0.99 MG/DL (ref 0.7–1.2)
EOSINOPHIL # BLD: 1.2 K/UL (ref 0–0.7)
EOSINOPHIL NFR BLD: 14.1 %
ERYTHROCYTE [DISTWIDTH] IN BLOOD BY AUTOMATED COUNT: 13.7 % (ref 11.5–14.5)
GLOBULIN SER CALC-MCNC: 2.4 G/DL (ref 2.3–3.5)
GLUCOSE SERPL-MCNC: 132 MG/DL (ref 70–99)
GLUCOSE UR STRIP-MCNC: NEGATIVE MG/DL
HCT VFR BLD AUTO: 36.2 % (ref 42–52)
HGB BLD-MCNC: 12.1 G/DL (ref 14–18)
HGB UR QL STRIP: NEGATIVE
KETONES UR STRIP-MCNC: NEGATIVE MG/DL
LACTATE BLDV-SCNC: 1.7 MMOL/L (ref 0.5–2.2)
LEUKOCYTE ESTERASE UR QL STRIP: NEGATIVE
LYMPHOCYTES # BLD: 1.5 K/UL (ref 1–4.8)
LYMPHOCYTES NFR BLD: 17.9 %
MAGNESIUM SERPL-MCNC: 1.8 MG/DL (ref 1.7–2.4)
MCH RBC QN AUTO: 29 PG (ref 27–31.3)
MCHC RBC AUTO-ENTMCNC: 33.4 % (ref 33–37)
MCV RBC AUTO: 86.9 FL (ref 79–92.2)
MONOCYTES # BLD: 1.2 K/UL (ref 0.2–0.8)
MONOCYTES NFR BLD: 13.8 %
NEUTROPHILS # BLD: 4.6 K/UL (ref 1.4–6.5)
NEUTS SEG NFR BLD: 53.3 %
NITRITE UR QL STRIP: NEGATIVE
PH UR STRIP: 7 [PH] (ref 5–9)
PLATELET # BLD AUTO: 182 K/UL (ref 130–400)
POTASSIUM SERPL-SCNC: 3.7 MEQ/L (ref 3.4–4.9)
PROT SERPL-MCNC: 5.6 G/DL (ref 6.3–8)
PROT UR STRIP-MCNC: NEGATIVE MG/DL
RBC # BLD AUTO: 4.17 M/UL (ref 4.7–6.1)
SARS-COV-2 RDRP RESP QL NAA+PROBE: NOT DETECTED
SODIUM SERPL-SCNC: 141 MEQ/L (ref 135–144)
SP GR UR STRIP: 1.01 (ref 1–1.03)
TROPONIN T SERPL-MCNC: <0.01 NG/ML (ref 0–0.01)
TSH REFLEX: 1.63 UIU/ML (ref 0.44–3.86)
URINE REFLEX TO CULTURE: NORMAL
UROBILINOGEN UR STRIP-ACNC: 0.2 E.U./DL
WBC # BLD AUTO: 8.6 K/UL (ref 4.8–10.8)

## 2023-04-04 PROCEDURE — 6360000002 HC RX W HCPCS: Performed by: STUDENT IN AN ORGANIZED HEALTH CARE EDUCATION/TRAINING PROGRAM

## 2023-04-04 PROCEDURE — 80053 COMPREHEN METABOLIC PANEL: CPT

## 2023-04-04 PROCEDURE — 83880 ASSAY OF NATRIURETIC PEPTIDE: CPT

## 2023-04-04 PROCEDURE — 83735 ASSAY OF MAGNESIUM: CPT

## 2023-04-04 PROCEDURE — 96375 TX/PRO/DX INJ NEW DRUG ADDON: CPT

## 2023-04-04 PROCEDURE — 84443 ASSAY THYROID STIM HORMONE: CPT

## 2023-04-04 PROCEDURE — 71045 X-RAY EXAM CHEST 1 VIEW: CPT

## 2023-04-04 PROCEDURE — 2500000003 HC RX 250 WO HCPCS: Performed by: STUDENT IN AN ORGANIZED HEALTH CARE EDUCATION/TRAINING PROGRAM

## 2023-04-04 PROCEDURE — 87635 SARS-COV-2 COVID-19 AMP PRB: CPT

## 2023-04-04 PROCEDURE — 93005 ELECTROCARDIOGRAM TRACING: CPT | Performed by: STUDENT IN AN ORGANIZED HEALTH CARE EDUCATION/TRAINING PROGRAM

## 2023-04-04 PROCEDURE — 83605 ASSAY OF LACTIC ACID: CPT

## 2023-04-04 PROCEDURE — 36415 COLL VENOUS BLD VENIPUNCTURE: CPT

## 2023-04-04 PROCEDURE — 84484 ASSAY OF TROPONIN QUANT: CPT

## 2023-04-04 PROCEDURE — 96376 TX/PRO/DX INJ SAME DRUG ADON: CPT

## 2023-04-04 PROCEDURE — 81003 URINALYSIS AUTO W/O SCOPE: CPT

## 2023-04-04 PROCEDURE — 85025 COMPLETE CBC W/AUTO DIFF WBC: CPT

## 2023-04-04 PROCEDURE — 82550 ASSAY OF CK (CPK): CPT

## 2023-04-04 PROCEDURE — 99285 EMERGENCY DEPT VISIT HI MDM: CPT

## 2023-04-04 PROCEDURE — 96365 THER/PROPH/DIAG IV INF INIT: CPT

## 2023-04-04 RX ORDER — FUROSEMIDE 10 MG/ML
40 INJECTION INTRAMUSCULAR; INTRAVENOUS ONCE
Status: COMPLETED | OUTPATIENT
Start: 2023-04-04 | End: 2023-04-04

## 2023-04-04 RX ORDER — DILTIAZEM HYDROCHLORIDE 5 MG/ML
0.25 INJECTION INTRAVENOUS ONCE
Status: COMPLETED | OUTPATIENT
Start: 2023-04-04 | End: 2023-04-04

## 2023-04-04 RX ORDER — MAGNESIUM SULFATE IN WATER 40 MG/ML
2000 INJECTION, SOLUTION INTRAVENOUS ONCE
Status: COMPLETED | OUTPATIENT
Start: 2023-04-04 | End: 2023-04-04

## 2023-04-04 RX ADMIN — MAGNESIUM SULFATE HEPTAHYDRATE 2000 MG: 40 INJECTION, SOLUTION INTRAVENOUS at 20:27

## 2023-04-04 RX ADMIN — FUROSEMIDE 40 MG: 10 INJECTION, SOLUTION INTRAMUSCULAR; INTRAVENOUS at 20:24

## 2023-04-04 RX ADMIN — DILTIAZEM HYDROCHLORIDE 28 MG: 5 INJECTION INTRAVENOUS at 22:09

## 2023-04-04 RX ADMIN — DILTIAZEM HYDROCHLORIDE 28 MG: 5 INJECTION INTRAVENOUS at 20:23

## 2023-04-04 ASSESSMENT — PAIN DESCRIPTION - LOCATION: LOCATION: CHEST

## 2023-04-04 ASSESSMENT — PAIN DESCRIPTION - PAIN TYPE: TYPE: ACUTE PAIN

## 2023-04-04 ASSESSMENT — PAIN - FUNCTIONAL ASSESSMENT: PAIN_FUNCTIONAL_ASSESSMENT: 0-10

## 2023-04-04 ASSESSMENT — PAIN DESCRIPTION - DESCRIPTORS: DESCRIPTORS: PRESSURE

## 2023-04-04 ASSESSMENT — PAIN SCALES - GENERAL: PAINLEVEL_OUTOF10: 2

## 2023-04-05 ENCOUNTER — OFFICE VISIT (OUTPATIENT)
Dept: FAMILY MEDICINE CLINIC | Age: 73
End: 2023-04-05
Payer: MEDICARE

## 2023-04-05 VITALS
OXYGEN SATURATION: 95 % | SYSTOLIC BLOOD PRESSURE: 102 MMHG | HEART RATE: 72 BPM | DIASTOLIC BLOOD PRESSURE: 66 MMHG | BODY MASS INDEX: 37.77 KG/M2 | HEIGHT: 69 IN | TEMPERATURE: 98.7 F | WEIGHT: 255 LBS

## 2023-04-05 DIAGNOSIS — B35.6 TINEA CRURIS: ICD-10-CM

## 2023-04-05 DIAGNOSIS — I48.0 PAROXYSMAL ATRIAL FIBRILLATION (HCC): ICD-10-CM

## 2023-04-05 DIAGNOSIS — R60.0 BILATERAL LEG EDEMA: Primary | ICD-10-CM

## 2023-04-05 DIAGNOSIS — H61.23 EXCESSIVE CERUMEN IN BOTH EAR CANALS: ICD-10-CM

## 2023-04-05 LAB
EKG ATRIAL RATE: 129 BPM
EKG Q-T INTERVAL: 302 MS
EKG QRS DURATION: 84 MS
EKG QTC CALCULATION (BAZETT): 462 MS
EKG R AXIS: -18 DEGREES
EKG T AXIS: -25 DEGREES
EKG VENTRICULAR RATE: 141 BPM

## 2023-04-05 PROCEDURE — 99214 OFFICE O/P EST MOD 30 MIN: CPT | Performed by: FAMILY MEDICINE

## 2023-04-05 PROCEDURE — 1123F ACP DISCUSS/DSCN MKR DOCD: CPT | Performed by: FAMILY MEDICINE

## 2023-04-05 PROCEDURE — A6537 GC STOCKING FULL LNGTH 30-40: HCPCS | Performed by: FAMILY MEDICINE

## 2023-04-05 PROCEDURE — 3017F COLORECTAL CA SCREEN DOC REV: CPT | Performed by: FAMILY MEDICINE

## 2023-04-05 PROCEDURE — G8417 CALC BMI ABV UP PARAM F/U: HCPCS | Performed by: FAMILY MEDICINE

## 2023-04-05 PROCEDURE — 3078F DIAST BP <80 MM HG: CPT | Performed by: FAMILY MEDICINE

## 2023-04-05 PROCEDURE — 93010 ELECTROCARDIOGRAM REPORT: CPT | Performed by: INTERNAL MEDICINE

## 2023-04-05 PROCEDURE — 1036F TOBACCO NON-USER: CPT | Performed by: FAMILY MEDICINE

## 2023-04-05 PROCEDURE — G8427 DOCREV CUR MEDS BY ELIG CLIN: HCPCS | Performed by: FAMILY MEDICINE

## 2023-04-05 PROCEDURE — 3074F SYST BP LT 130 MM HG: CPT | Performed by: FAMILY MEDICINE

## 2023-04-05 SDOH — ECONOMIC STABILITY: INCOME INSECURITY: HOW HARD IS IT FOR YOU TO PAY FOR THE VERY BASICS LIKE FOOD, HOUSING, MEDICAL CARE, AND HEATING?: NOT HARD AT ALL

## 2023-04-05 SDOH — ECONOMIC STABILITY: FOOD INSECURITY: WITHIN THE PAST 12 MONTHS, YOU WORRIED THAT YOUR FOOD WOULD RUN OUT BEFORE YOU GOT MONEY TO BUY MORE.: NEVER TRUE

## 2023-04-05 SDOH — ECONOMIC STABILITY: HOUSING INSECURITY
IN THE LAST 12 MONTHS, WAS THERE A TIME WHEN YOU DID NOT HAVE A STEADY PLACE TO SLEEP OR SLEPT IN A SHELTER (INCLUDING NOW)?: NO

## 2023-04-05 SDOH — ECONOMIC STABILITY: FOOD INSECURITY: WITHIN THE PAST 12 MONTHS, THE FOOD YOU BOUGHT JUST DIDN'T LAST AND YOU DIDN'T HAVE MONEY TO GET MORE.: NEVER TRUE

## 2023-04-05 NOTE — ED NOTES
Patient resting in bed, A & O x4, skin warm, dry and pink, 0 distress, resp even and unlabored. Call light in reach. Vital signs stable. Patient remains on bedside monitor. Patient denies any needs at this time.       Adama Butcher RN  04/04/23 [id number]

## 2023-04-05 NOTE — ED PROVIDER NOTES
[address] [facility] ED  eMERGENCY dEPARTMENT eNCOUnter      Pt Name: Michelle Medrano  MRN: [id number]  [place] 1950  Date of evaluation: 4/4/2023  Provider: Barb Hsu MD      HISTORY OF PRESENT ILLNESS      Chief Complaint   Patient presents with    Chest Pain     Mid sternal CP x 2hrs. Patient reporting Pressure and SOB. Hx of AF. Currently AF RVR       The history is provided by the Patient. Michelle Medrano is a 68 y.o. male with a PMH clinically significant for PE, HTN, HLD, DM II, Afib on Eliquis, HFpEF, COPD, Obesity, GERD, and IBD (Crohns) presenting to the ED via PV c/o ***. Denies any associated: {.NAASSOC:03453}. Precipitating Factors: {. NAPRECIP:37573:a}. Denies preceding {. NAPRECIP:83571}. Worsening Factors: {. EXACERBATINGFACTORS:47819}. Denies worsening with {.EXACERBATINGFACTORS:06433:o}. Alleviating Factors: {.ALLEVIATINGFACTORS:82638}. Denies any alleviation with {.ALLEVIATINGFACTORS:01296:o}. States ***history of similar previous episodes. States they have otherwise been feeling well***. Taking all medications as indicated: {Response; yes/no:64}. Per Chart Review: PMH as noted above obtained via outpatient chart review. Most recent echo in 08/2021 showing LVEF of 50 to 55%. Normal LV structure and function. Normal diastolic filling pattern. Mild tricuspid regurgitation. No recent evaluations in the ED.       REVIEW OF SYSTEMS       Review of Systems      PAST MEDICAL HISTORY     Past Medical History:   Diagnosis Date    A-fib [facility])     Abnormal EMG 12/09/2015    Actinic keratoses     Actinic keratoses     Acute diastolic congestive heart failure (HCC) 1/6/2021    Allergic rhinitis     Anemia 11/18/2014    Arthritis     Chronic back pain     COPD (chronic obstructive pulmonary disease) ([facility] 08/09/2012    COPD (chronic obstructive pulmonary disease) (HCC)     Crohns disease     Degenerative disc disease, lumbar     Dermatitis     Diabetes ([facility] 03/19/2015

## 2023-04-05 NOTE — ED NOTES
Patient resting in bed with call light in reach. Breathes are even and unlabored. Skin is warm and dry. Vital signs are stable. Patient remains on bedside monitor. Patient denies any needs at this time.       Elizabeth Vincent RN  04/04/23 [id number]

## 2023-04-05 NOTE — DISCHARGE INSTRUCTIONS
Take one extra table of lasix daily for the next 3 days. Follow up with your Cardiologist at the first available appointment. Return to the ED if any new or worsening symptoms.

## 2023-04-05 NOTE — ED NOTES
Discharge instructions reviewed with patient. Verbalized understanding. A&O x4. Skin p/w/d. Respirations even and unlabored. No acute distress noted at this time. Patient amb with steady gait on discharge.          Janae Martinez RN  04/04/23 [id number]

## 2023-04-18 ASSESSMENT — ENCOUNTER SYMPTOMS
ABDOMINAL DISTENTION: 0
CHEST TIGHTNESS: 0
PHOTOPHOBIA: 0
ABDOMINAL PAIN: 0
SHORTNESS OF BREATH: 0

## 2023-04-23 ASSESSMENT — ENCOUNTER SYMPTOMS
SHORTNESS OF BREATH: 1
BACK PAIN: 0
DIARRHEA: 0
ABDOMINAL PAIN: 0
NAUSEA: 0
RHINORRHEA: 0
SORE THROAT: 0
COUGH: 0
EYE PAIN: 0
VOMITING: 0
CHEST TIGHTNESS: 1

## 2023-04-26 DIAGNOSIS — J44.9 CHRONIC OBSTRUCTIVE PULMONARY DISEASE, UNSPECIFIED COPD TYPE (HCC): ICD-10-CM

## 2023-04-27 RX ORDER — TIOTROPIUM BROMIDE 18 UG/1
CAPSULE ORAL; RESPIRATORY (INHALATION)
Qty: 90 CAPSULE | Refills: 3 | Status: SHIPPED | OUTPATIENT
Start: 2023-04-27

## 2023-04-27 NOTE — TELEPHONE ENCOUNTER
Comments:     Last Office Visit (last PCP visit):   8/25/2022    Next Visit Date:  Future Appointments   Date Time Provider Chase Hopperi   5/1/2023  5:30 PM Ade Rodriguez MD [facility]   6/13/2023  1:15 PM Ade Rodriguez MD [address]   6/21/2023  3:00 PM Didier Thomas MD [facility] Neu[facility] -       **If hasn't been seen in over a year OR hasn't followed up according to last diabetes/ADHD visit, make appointment for patient before sending refill to provider.     Rx requested:  Requested Prescriptions     Pending Prescriptions Disp Refills    tiotropium (Ash Mon) 18 MCG inhalation capsule [Pharmacy Med Name: Spiriva HandiHaler 18 MCG Inhalation Capsule] 90 capsule 3     Sig: INHALE THE CONTENTS OF 1 CAPSULE BY MOUTH VIA HANDIHALER DAILY

## 2023-05-01 ENCOUNTER — OFFICE VISIT (OUTPATIENT)
Dept: FAMILY MEDICINE CLINIC | Age: 73
End: 2023-05-01
Payer: MEDICARE

## 2023-05-01 VITALS
BODY MASS INDEX: 37.74 KG/M2 | OXYGEN SATURATION: 96 % | DIASTOLIC BLOOD PRESSURE: 62 MMHG | TEMPERATURE: 98.9 F | HEART RATE: 77 BPM | HEIGHT: 69 IN | WEIGHT: 254.8 LBS | SYSTOLIC BLOOD PRESSURE: 110 MMHG

## 2023-05-01 DIAGNOSIS — Z00.00 MEDICARE ANNUAL WELLNESS VISIT, SUBSEQUENT: Primary | ICD-10-CM

## 2023-05-01 PROCEDURE — 3078F DIAST BP <80 MM HG: CPT | Performed by: FAMILY MEDICINE

## 2023-05-01 PROCEDURE — 3074F SYST BP LT 130 MM HG: CPT | Performed by: FAMILY MEDICINE

## 2023-05-01 PROCEDURE — G0439 PPPS, SUBSEQ VISIT: HCPCS | Performed by: FAMILY MEDICINE

## 2023-05-01 PROCEDURE — 3017F COLORECTAL CA SCREEN DOC REV: CPT | Performed by: FAMILY MEDICINE

## 2023-05-01 PROCEDURE — 1123F ACP DISCUSS/DSCN MKR DOCD: CPT | Performed by: FAMILY MEDICINE

## 2023-05-01 RX ORDER — METOPROLOL TARTRATE 100 MG/1
100 TABLET ORAL 2 TIMES DAILY
COMMUNITY
Start: 2023-04-12

## 2023-05-01 ASSESSMENT — PATIENT HEALTH QUESTIONNAIRE - PHQ9
SUM OF ALL RESPONSES TO PHQ QUESTIONS 1-9: 0
2. FEELING DOWN, DEPRESSED OR HOPELESS: 0
SUM OF ALL RESPONSES TO PHQ9 QUESTIONS 1 & 2: 0
SUM OF ALL RESPONSES TO PHQ QUESTIONS 1-9: 0
SUM OF ALL RESPONSES TO PHQ QUESTIONS 1-9: 0
1. LITTLE INTEREST OR PLEASURE IN DOING THINGS: 0
SUM OF ALL RESPONSES TO PHQ QUESTIONS 1-9: 0

## 2023-05-01 ASSESSMENT — LIFESTYLE VARIABLES
HOW OFTEN DO YOU HAVE A DRINK CONTAINING ALCOHOL: NEVER
HOW MANY STANDARD DRINKS CONTAINING ALCOHOL DO YOU HAVE ON A TYPICAL DAY: PATIENT DOES NOT DRINK

## 2023-05-01 NOTE — PATIENT INSTRUCTIONS
health. Get family and friends involved to provide support. Talk to them about why you are trying to lose weight, and ask them to help. They can help by participating in exercise and having meals with you, even if they may be eating something different. Find what works best for you. If you do not have time or do not like to cook, a program that offers meal replacement bars or shakes may be better for you. Or if you like to prepare meals, finding a plan that includes daily menus and recipes may be best.  Ask your doctor about other health professionals who can help you achieve your weight loss goals. A dietitian can help you make healthy changes in your diet. An exercise specialist or  can help you develop a safe and effective exercise program.  A counselor or psychiatrist can help you cope with issues such as depression, anxiety, or family problems that can make it hard to focus on weight loss. Consider joining a support group for people who are trying to lose weight. Your doctor can suggest groups in your area. Where can you learn more? Go to http://www.woods.com/ and enter U357 to learn more about \"Starting a Weight Loss Plan: Care Instructions. \"  Current as of: May 9, 2022               Content Version: 13.6  © 2006-2023 [employer]. Care instructions adapted under license by [facility] [employer] [facility] ([facility]). If you have questions about a medical condition or this instruction, always ask your healthcare professional. [place] [address] any warranty or liability for your use of this information. A Healthy Heart: Care Instructions  Your Care Instructions     Coronary artery disease, also called heart disease, occurs when a substance called plaque builds up in the vessels that supply oxygen-rich blood to your heart muscle. This can narrow the blood vessels and reduce blood flow. A heart attack happens when blood flow is completely blocked.  A high-fat diet,

## 2023-05-04 ENCOUNTER — APPOINTMENT (OUTPATIENT)
Dept: GENERAL RADIOLOGY | Age: 73
End: 2023-05-04
Payer: MEDICARE

## 2023-05-04 ENCOUNTER — HOSPITAL ENCOUNTER (EMERGENCY)
Age: 73
Discharge: HOME | End: 2023-05-04
Payer: MEDICARE

## 2023-05-04 VITALS
HEART RATE: 66 BPM | WEIGHT: 254 LBS | DIASTOLIC BLOOD PRESSURE: 69 MMHG | BODY MASS INDEX: 37.62 KG/M2 | TEMPERATURE: 98.2 F | OXYGEN SATURATION: 97 % | HEIGHT: 69 IN | RESPIRATION RATE: 18 BRPM | SYSTOLIC BLOOD PRESSURE: 102 MMHG

## 2023-05-04 DIAGNOSIS — R00.2 PALPITATIONS: Primary | ICD-10-CM

## 2023-05-04 LAB
ALBUMIN SERPL-MCNC: 3.5 G/DL (ref 3.5–4.6)
ALP SERPL-CCNC: 73 U/L (ref 35–104)
ALT SERPL-CCNC: 10 U/L (ref 0–41)
ANION GAP SERPL CALCULATED.3IONS-SCNC: 9 MEQ/L (ref 9–15)
AST SERPL-CCNC: 19 U/L (ref 0–40)
BASOPHILS # BLD: 0.1 K/UL (ref 0–0.2)
BASOPHILS NFR BLD: 1.1 %
BILIRUB SERPL-MCNC: 0.4 MG/DL (ref 0.2–0.7)
BNP BLD-MCNC: 543 PG/ML
BUN SERPL-MCNC: 10 MG/DL (ref 8–23)
CALCIUM SERPL-MCNC: 8.5 MG/DL (ref 8.5–9.9)
CHLORIDE SERPL-SCNC: 105 MEQ/L (ref 95–107)
CO2 SERPL-SCNC: 25 MEQ/L (ref 20–31)
CREAT SERPL-MCNC: 0.74 MG/DL (ref 0.7–1.2)
EOSINOPHIL # BLD: 1 K/UL (ref 0–0.7)
EOSINOPHIL NFR BLD: 12.6 %
ERYTHROCYTE [DISTWIDTH] IN BLOOD BY AUTOMATED COUNT: 13.7 % (ref 11.5–14.5)
GLOBULIN SER CALC-MCNC: 2.5 G/DL (ref 2.3–3.5)
GLUCOSE SERPL-MCNC: 109 MG/DL (ref 70–99)
HCT VFR BLD AUTO: 38.8 % (ref 42–52)
HGB BLD-MCNC: 13.1 G/DL (ref 14–18)
INR PPP: 1.1
LYMPHOCYTES # BLD: 1.4 K/UL (ref 1–4.8)
LYMPHOCYTES NFR BLD: 18.2 %
MAGNESIUM SERPL-MCNC: 1.8 MG/DL (ref 1.7–2.4)
MCH RBC QN AUTO: 29.2 PG (ref 27–31.3)
MCHC RBC AUTO-ENTMCNC: 33.8 % (ref 33–37)
MCV RBC AUTO: 86.5 FL (ref 79–92.2)
MONOCYTES # BLD: 1 K/UL (ref 0.2–0.8)
MONOCYTES NFR BLD: 12.3 %
NEUTROPHILS # BLD: 4.4 K/UL (ref 1.4–6.5)
NEUTS SEG NFR BLD: 55.8 %
PLATELET # BLD AUTO: 170 K/UL (ref 130–400)
POTASSIUM SERPL-SCNC: 3.7 MEQ/L (ref 3.4–4.9)
PROT SERPL-MCNC: 6 G/DL (ref 6.3–8)
PROTHROMBIN TIME: 14.6 SEC (ref 12.3–14.9)
RBC # BLD AUTO: 4.48 M/UL (ref 4.7–6.1)
SODIUM SERPL-SCNC: 139 MEQ/L (ref 135–144)
TROPONIN T SERPL-MCNC: <0.01 NG/ML (ref 0–0.01)
TROPONIN T SERPL-MCNC: <0.01 NG/ML (ref 0–0.01)
WBC # BLD AUTO: 7.9 K/UL (ref 4.8–10.8)

## 2023-05-04 PROCEDURE — 99285 EMERGENCY DEPT VISIT HI MDM: CPT

## 2023-05-04 PROCEDURE — 80053 COMPREHEN METABOLIC PANEL: CPT

## 2023-05-04 PROCEDURE — 85610 PROTHROMBIN TIME: CPT

## 2023-05-04 PROCEDURE — 84484 ASSAY OF TROPONIN QUANT: CPT

## 2023-05-04 PROCEDURE — 71045 X-RAY EXAM CHEST 1 VIEW: CPT

## 2023-05-04 PROCEDURE — 83735 ASSAY OF MAGNESIUM: CPT

## 2023-05-04 PROCEDURE — 93005 ELECTROCARDIOGRAM TRACING: CPT | Performed by: PHYSICIAN ASSISTANT

## 2023-05-04 PROCEDURE — 83880 ASSAY OF NATRIURETIC PEPTIDE: CPT

## 2023-05-04 PROCEDURE — 85025 COMPLETE CBC W/AUTO DIFF WBC: CPT

## 2023-05-04 PROCEDURE — 36415 COLL VENOUS BLD VENIPUNCTURE: CPT

## 2023-05-04 ASSESSMENT — ENCOUNTER SYMPTOMS
DIARRHEA: 0
SHORTNESS OF BREATH: 1
BACK PAIN: 0
VOMITING: 0
COUGH: 1

## 2023-05-04 ASSESSMENT — PAIN - FUNCTIONAL ASSESSMENT: PAIN_FUNCTIONAL_ASSESSMENT: NONE - DENIES PAIN

## 2023-05-04 NOTE — ED PROVIDER NOTES
Radiologist below, if available at the time of this note:    XR CHEST PORTABLE   Final Result   No acute process. ED BEDSIDE ULTRASOUND:   Performed by ED Physician - none    LABS:  Labs Reviewed   CBC WITH AUTO DIFFERENTIAL - Abnormal; Notable for the following components:       Result Value    RBC 4.48 (*)     Hemoglobin 13.1 (*)     Hematocrit 38.8 (*)     Monocytes Absolute 1.0 (*)     Eosinophils Absolute 1.0 (*)     All other components within normal limits   COMPREHENSIVE METABOLIC PANEL - Abnormal; Notable for the following components:    Glucose 109 (*)     Total Protein 6.0 (*)     All other components within normal limits   MAGNESIUM   TROPONIN   BRAIN NATRIURETIC PEPTIDE   PROTIME-INR   TROPONIN       All other labs were within normal range or not returned as of this dictation. EMERGENCY DEPARTMENT COURSE and DIFFERENTIAL DIAGNOSIS/MDM:   Vitals:    Vitals:    05/04/23 1614 05/04/23 1915   BP: 112/70 106/66   Pulse: 83 75   Resp: 18 18   Temp: 98.2 °F (36.8 °C)    TempSrc: Temporal    SpO2: 94% 97%   Weight: 254 lb (115.2 kg)    Height: 5' 9\" (1.753 m)        Patient presented to the emergency department following an episode of palpitations with associated shortness of breath. Given patient's history of chronic atrial fibrillation I do believe that this was in A-fib with RVR exacerbation that resolved after patient took his metoprolol. Patient is to remain in a sinus rhythm throughout his emergency department stay. Patient is appropriately anticoagulated and 2 sets of cardiac enzymes negative. Patient was advised to take medicines when he is supposed to and to limit caffeine intake. Close PCP and cardiology follow-up    Medical Decision Making  Amount and/or Complexity of Data Reviewed  Labs: ordered. Radiology: ordered. ECG/medicine tests: ordered.           REASSESSMENT          CONSULTS:  None    PROCEDURES:  Unless otherwise noted below, none     Procedures      FINAL

## 2023-05-04 NOTE — ED NOTES
Pt states had an episode of tachycardia and low pulse ox at home. Pt denies any complaints at this time. Daughter states pt not taking medication as he should be. Pt states took his medications and he felt better.      Criss Messina  05/04/23 9000

## 2023-05-04 NOTE — ED NOTES
Discharge instructions reviewed with pt and family  Pt and family verbalized understanding with no questions or concerns. Resps even, non labored. Skin p/w/d. IV removed.       Ginger Valadez, RN  05/04/23 6535

## 2023-05-05 LAB
EKG ATRIAL RATE: 76 BPM
EKG P AXIS: 40 DEGREES
EKG P-R INTERVAL: 170 MS
EKG Q-T INTERVAL: 382 MS
EKG QRS DURATION: 94 MS
EKG QTC CALCULATION (BAZETT): 429 MS
EKG R AXIS: -4 DEGREES
EKG T AXIS: 8 DEGREES
EKG VENTRICULAR RATE: 76 BPM

## 2023-05-05 PROCEDURE — 93010 ELECTROCARDIOGRAM REPORT: CPT | Performed by: INTERNAL MEDICINE

## 2023-05-08 DIAGNOSIS — I10 ESSENTIAL HYPERTENSION: ICD-10-CM

## 2023-05-08 RX ORDER — MEMANTINE HYDROCHLORIDE 5 MG/1
5 TABLET ORAL 2 TIMES DAILY
Qty: 180 TABLET | Refills: 1 | Status: SHIPPED | OUTPATIENT
Start: 2023-05-08

## 2023-05-08 NOTE — TELEPHONE ENCOUNTER
Pharmacy is requesting medication refill.  Please approve or deny this request.    Rx requested:  Requested Prescriptions     Pending Prescriptions Disp Refills    memantine (NAMENDA) 5 MG tablet 180 tablet 1     Sig: Take 1 tablet by mouth 2 times daily         Last Office Visit:   12/21/2022      Next Visit Date:  Future Appointments   Date Time Provider Chase Barrera   6/13/2023  1:15 PM Darlin Venegas MD [facility]   6/21/2023  3:00 PM Mari Christy MD [facility] Neurology -   11/6/2023  5:45 PM Darlin Venegas MD [facility]

## 2023-05-09 DIAGNOSIS — I10 ESSENTIAL HYPERTENSION: ICD-10-CM

## 2023-05-09 RX ORDER — DOXAZOSIN MESYLATE 4 MG/1
TABLET ORAL
Qty: 1 TABLET | Refills: 0 | OUTPATIENT
Start: 2023-05-09

## 2023-05-09 RX ORDER — DOXAZOSIN MESYLATE 4 MG/1
2 TABLET ORAL 2 TIMES DAILY
Qty: 30 TABLET | Refills: 12 | Status: SHIPPED | OUTPATIENT
Start: 2023-05-09 | End: 2024-05-08

## 2023-05-15 ENCOUNTER — TELEPHONE (OUTPATIENT)
Dept: FAMILY MEDICINE CLINIC | Age: 73
End: 2023-05-15

## 2023-05-15 DIAGNOSIS — R41.0 CONFUSION: Primary | ICD-10-CM

## 2023-05-15 NOTE — TELEPHONE ENCOUNTER
Pt daughter calling and she would like to get a script for a med tower for her dad since he has dementia pt's daughter would like a call please her number is [phone] her name is Michalene Goldmann

## 2023-05-24 ENCOUNTER — OFFICE VISIT (OUTPATIENT)
Dept: FAMILY MEDICINE CLINIC | Age: 73
End: 2023-05-24
Payer: MEDICARE

## 2023-05-24 VITALS
OXYGEN SATURATION: 95 % | BODY MASS INDEX: 36.88 KG/M2 | SYSTOLIC BLOOD PRESSURE: 104 MMHG | HEART RATE: 71 BPM | DIASTOLIC BLOOD PRESSURE: 72 MMHG | TEMPERATURE: 97.6 F | WEIGHT: 249 LBS | HEIGHT: 69 IN

## 2023-05-24 DIAGNOSIS — M54.12 CERVICAL RADICULITIS: ICD-10-CM

## 2023-05-24 DIAGNOSIS — B35.6 TINEA CRURIS: Primary | ICD-10-CM

## 2023-05-24 PROCEDURE — G8427 DOCREV CUR MEDS BY ELIG CLIN: HCPCS | Performed by: FAMILY MEDICINE

## 2023-05-24 PROCEDURE — 99213 OFFICE O/P EST LOW 20 MIN: CPT | Performed by: FAMILY MEDICINE

## 2023-05-24 PROCEDURE — G8417 CALC BMI ABV UP PARAM F/U: HCPCS | Performed by: FAMILY MEDICINE

## 2023-05-24 PROCEDURE — 3074F SYST BP LT 130 MM HG: CPT | Performed by: FAMILY MEDICINE

## 2023-05-24 PROCEDURE — 3017F COLORECTAL CA SCREEN DOC REV: CPT | Performed by: FAMILY MEDICINE

## 2023-05-24 PROCEDURE — 1036F TOBACCO NON-USER: CPT | Performed by: FAMILY MEDICINE

## 2023-05-24 PROCEDURE — 1123F ACP DISCUSS/DSCN MKR DOCD: CPT | Performed by: FAMILY MEDICINE

## 2023-05-24 PROCEDURE — 3078F DIAST BP <80 MM HG: CPT | Performed by: FAMILY MEDICINE

## 2023-05-24 RX ORDER — TESTOSTERONE 12.5 MG/1.25G
5 GEL TOPICAL DAILY
COMMUNITY
Start: 2023-05-23

## 2023-05-24 ASSESSMENT — ENCOUNTER SYMPTOMS
ABDOMINAL DISTENTION: 0
ABDOMINAL PAIN: 0
CHEST TIGHTNESS: 0
SHORTNESS OF BREATH: 0
PHOTOPHOBIA: 0

## 2023-05-24 NOTE — PATIENT INSTRUCTIONS
Reeducated patient on position of neck and shoulder to avoid pinching the nerve that is responsible for causing the itching sensation of the left upper extremity. Regarding the groin rash:    Patient has been advised that yeast and body folds is related to moisture, darkness, and heat.  patient is advised to keep the area clean by daily bathing. More importantly, the patient is to keep the area dry. This may require putting gauze or cloth in between body folds and changing out routinely to keep the skin dry. After drying from bathing the area should be blown dry with a house fan or blow dryer. Skinfolds should be  and the area should be dried multiple times per day. Creams such as Lamisil, available over-the-counter, may be used in the skin folds to treat any redness and irritation that may occur. However, the base to all treatment is skin dryness. Pt may use Selsun blue original shampoo as body wash to help decrease the presence of yeast on the skin.      Blissfieldcristo Tam approved my calling and speaking to his son about these 2 subjects to help with his memory and dealing with long-term medical issues

## 2023-05-24 NOTE — PROGRESS NOTES
92.2 fL    MCH 29.0 27.0 - 31.3 pg    MCHC 33.4 33.0 - 37.0 %    RDW 13.7 11.5 - 14.5 %    Platelets 637 429 - 419 K/uL    Neutrophils % 53.3 %    Lymphocytes % 17.9 %    Monocytes % 13.8 %    Eosinophils % 14.1 %    Basophils % 0.9 %    Neutrophils Absolute 4.6 1.4 - 6.5 K/uL    Lymphocytes Absolute 1.5 1.0 - 4.8 K/uL    Monocytes Absolute 1.2 (H) 0.2 - 0.8 K/uL    Eosinophils Absolute 1.2 (H) 0.0 - 0.7 K/uL    Basophils Absolute 0.1 0.0 - 0.2 K/uL   Troponin    Collection Time: 04/04/23  8:15 PM   Result Value Ref Range    Troponin <0.010 0.000 - 0.010 ng/mL   Brain Natriuretic Peptide    Collection Time: 04/04/23  8:15 PM   Result Value Ref Range    Pro- pg/mL   CK    Collection Time: 04/04/23  8:15 PM   Result Value Ref Range    Total  0 - 190 U/L   TSH with Reflex    Collection Time: 04/04/23  8:15 PM   Result Value Ref Range    TSH 1.630 0.440 - 3.860 uIU/mL   Urinalysis with Reflex to Culture    Collection Time: 04/04/23  8:15 PM    Specimen: Urine, clean catch   Result Value Ref Range    Color, UA Yellow Straw/Yellow    Clarity, UA Clear Clear    Glucose, Ur Negative Negative mg/dL    Bilirubin Urine Negative Negative    Ketones, Urine Negative Negative mg/dL    Specific Gravity, UA 1.007 1.005 - 1.030    Blood, Urine Negative Negative    pH, UA 7.0 5.0 - 9.0    Protein, UA Negative Negative mg/dL    Urobilinogen, Urine 0.2 <2.0 E.U./dL    Nitrite, Urine Negative Negative    Leukocyte Esterase, Urine Negative Negative    Urine Reflex to Culture Not Indicated    COVID-19, Rapid    Collection Time: 04/04/23  8:32 PM    Specimen: Nasopharyngeal Swab   Result Value Ref Range    SARS-CoV-2, NAAT Not Detected Not Detected   EKG 12 Lead    Collection Time: 05/04/23  4:23 PM   Result Value Ref Range    Ventricular Rate 76 BPM    Atrial Rate 76 BPM    P-R Interval 170 ms    QRS Duration 94 ms    Q-T Interval 382 ms    QTc Calculation (Bazett) 429 ms    P Axis 40 degrees    R Axis -4 degrees    T Axis 8

## 2023-06-13 DIAGNOSIS — E29.1 HYPOGONADISM MALE: ICD-10-CM

## 2023-06-13 DIAGNOSIS — I50.32 CHRONIC DIASTOLIC HEART FAILURE (HCC): ICD-10-CM

## 2023-06-13 PROBLEM — F03.B0 UNSPECIFIED DEMENTIA, MODERATE, WITHOUT BEHAVIORAL DISTURBANCE, PSYCHOTIC DISTURBANCE, MOOD DISTURBANCE, AND ANXIETY (HCC): Status: ACTIVE | Noted: 2022-02-17

## 2023-06-13 LAB
ALBUMIN SERPL-MCNC: 3.6 G/DL (ref 3.5–4.6)
ALP SERPL-CCNC: 76 U/L (ref 35–104)
ALT SERPL-CCNC: 12 U/L (ref 0–41)
ANION GAP SERPL CALCULATED.3IONS-SCNC: 12 MEQ/L (ref 9–15)
AST SERPL-CCNC: 21 U/L (ref 0–40)
BASOPHILS # BLD: 0.1 K/UL (ref 0–0.2)
BASOPHILS NFR BLD: 0.8 %
BILIRUB SERPL-MCNC: 0.4 MG/DL (ref 0.2–0.7)
BUN SERPL-MCNC: 16 MG/DL (ref 8–23)
CALCIUM SERPL-MCNC: 9 MG/DL (ref 8.5–9.9)
CHLORIDE SERPL-SCNC: 101 MEQ/L (ref 95–107)
CO2 SERPL-SCNC: 24 MEQ/L (ref 20–31)
CREAT SERPL-MCNC: 0.73 MG/DL (ref 0.7–1.2)
EOSINOPHIL # BLD: 0.9 K/UL (ref 0–0.7)
EOSINOPHIL NFR BLD: 10.2 %
ERYTHROCYTE [DISTWIDTH] IN BLOOD BY AUTOMATED COUNT: 14.1 % (ref 11.5–14.5)
GLOBULIN SER CALC-MCNC: 2.6 G/DL (ref 2.3–3.5)
GLUCOSE SERPL-MCNC: 94 MG/DL (ref 70–99)
HCT VFR BLD AUTO: 40.2 % (ref 42–52)
HGB BLD-MCNC: 13.2 G/DL (ref 14–18)
LYMPHOCYTES # BLD: 2 K/UL (ref 1–4.8)
LYMPHOCYTES NFR BLD: 22.8 %
MCH RBC QN AUTO: 28.6 PG (ref 27–31.3)
MCHC RBC AUTO-ENTMCNC: 32.8 % (ref 33–37)
MCV RBC AUTO: 87.1 FL (ref 79–92.2)
MONOCYTES # BLD: 1.2 K/UL (ref 0.2–0.8)
MONOCYTES NFR BLD: 13.8 %
NEUTROPHILS # BLD: 4.5 K/UL (ref 1.4–6.5)
NEUTS SEG NFR BLD: 52.4 %
PLATELET # BLD AUTO: 182 K/UL (ref 130–400)
POTASSIUM SERPL-SCNC: 4.4 MEQ/L (ref 3.4–4.9)
PROT SERPL-MCNC: 6.2 G/DL (ref 6.3–8)
RBC # BLD AUTO: 4.61 M/UL (ref 4.7–6.1)
SODIUM SERPL-SCNC: 137 MEQ/L (ref 135–144)
WBC # BLD AUTO: 8.7 K/UL (ref 4.8–10.8)

## 2023-06-14 LAB
SHBG SERPL-SCNC: 64 NMOL/L (ref 11–80)
TESTOST FREE SERPL-MCNC: 3.6 PG/ML (ref 47–244)
TESTOST SERPL-MCNC: 31 NG/DL (ref 220–1000)

## 2023-06-14 NOTE — RESULT ENCOUNTER NOTE
Contact patient and let him know I think we should consider switching to testosterone injections because he is not absorbing the gel well or is not getting it on routinely. Is he open to that?

## 2023-06-19 ENCOUNTER — OFFICE VISIT (OUTPATIENT)
Dept: FAMILY MEDICINE CLINIC | Age: 73
End: 2023-06-19
Payer: MEDICARE

## 2023-06-19 ENCOUNTER — NURSE ONLY (OUTPATIENT)
Dept: FAMILY MEDICINE CLINIC | Age: 73
End: 2023-06-19
Payer: MEDICARE

## 2023-06-19 VITALS
OXYGEN SATURATION: 96 % | WEIGHT: 248 LBS | HEART RATE: 64 BPM | SYSTOLIC BLOOD PRESSURE: 110 MMHG | HEIGHT: 69 IN | BODY MASS INDEX: 36.73 KG/M2 | DIASTOLIC BLOOD PRESSURE: 68 MMHG

## 2023-06-19 DIAGNOSIS — M54.12 CERVICAL RADICULITIS: ICD-10-CM

## 2023-06-19 DIAGNOSIS — E29.1 HYPOGONADISM MALE: Primary | ICD-10-CM

## 2023-06-19 DIAGNOSIS — B35.6 TINEA CRURIS: Primary | ICD-10-CM

## 2023-06-19 PROCEDURE — G8427 DOCREV CUR MEDS BY ELIG CLIN: HCPCS | Performed by: NURSE PRACTITIONER

## 2023-06-19 PROCEDURE — 3074F SYST BP LT 130 MM HG: CPT | Performed by: NURSE PRACTITIONER

## 2023-06-19 PROCEDURE — 99213 OFFICE O/P EST LOW 20 MIN: CPT | Performed by: NURSE PRACTITIONER

## 2023-06-19 PROCEDURE — 3017F COLORECTAL CA SCREEN DOC REV: CPT | Performed by: NURSE PRACTITIONER

## 2023-06-19 PROCEDURE — 1036F TOBACCO NON-USER: CPT | Performed by: NURSE PRACTITIONER

## 2023-06-19 PROCEDURE — G8417 CALC BMI ABV UP PARAM F/U: HCPCS | Performed by: NURSE PRACTITIONER

## 2023-06-19 PROCEDURE — 1123F ACP DISCUSS/DSCN MKR DOCD: CPT | Performed by: NURSE PRACTITIONER

## 2023-06-19 PROCEDURE — 3078F DIAST BP <80 MM HG: CPT | Performed by: NURSE PRACTITIONER

## 2023-06-19 RX ORDER — NYSTATIN 100000 U/G
CREAM TOPICAL 2 TIMES DAILY
Qty: 30 G | Refills: 3 | Status: SHIPPED | OUTPATIENT
Start: 2023-06-19

## 2023-06-19 RX ORDER — TESTOSTERONE CYPIONATE 200 MG/ML
100 INJECTION, SOLUTION INTRAMUSCULAR ONCE
Status: COMPLETED | OUTPATIENT
Start: 2023-06-19 | End: 2023-06-19

## 2023-06-19 RX ORDER — TESTOSTERONE CYPIONATE 200 MG/ML
100 INJECTION, SOLUTION INTRAMUSCULAR ONCE
COMMUNITY
Start: 2023-06-13 | End: 2023-12-13

## 2023-06-19 RX ADMIN — TESTOSTERONE CYPIONATE 100 MG: 200 INJECTION, SOLUTION INTRAMUSCULAR at 14:10

## 2023-06-19 ASSESSMENT — ENCOUNTER SYMPTOMS
SHORTNESS OF BREATH: 0
VOMITING: 0
TROUBLE SWALLOWING: 0
ABDOMINAL DISTENTION: 0
SINUS PRESSURE: 0
SINUS PAIN: 0
CONSTIPATION: 0
EYE REDNESS: 0
NAUSEA: 0
CHEST TIGHTNESS: 0
SORE THROAT: 0
APNEA: 0
WHEEZING: 0
DIARRHEA: 0
COUGH: 0
EYE ITCHING: 0

## 2023-06-19 NOTE — PROGRESS NOTES
Patient given Testosterone 100mg IM right gluteal..  Will return in 2 weeks for next injection    Patient tolerated injection well.     Administrations This Visit       testosterone cypionate (DEPOTESTOTERONE CYPIONATE) injection 100 mg       Admin Date  06/19/2023 Action  Given Dose  100 mg Route  IntraMUSCular Administered By  Evi Light LPN

## 2023-06-19 NOTE — PROGRESS NOTES
Subjective:      Patient ID: Eugenia Phipps is a 68 y.o. male who presents today for:  Chief Complaint   Patient presents with    Rash     Left arm & groin area. Pt states he has had the rash for a long time. Using nystatin cream.        Rash  This is a recurrent problem. The current episode started 1 to 4 weeks ago. The problem is unchanged. Location: inner groin. The rash is characterized by redness and itchiness. He was exposed to nothing. Pertinent negatives include no congestion, cough, diarrhea, fatigue, fever, shortness of breath, sore throat or vomiting. Treatments tried: daily nystatin. The treatment provided moderate relief. There is no history of allergies, asthma, eczema or varicella. Pt here today with concern for a rash noted to his left arm and to have his groin rash checked. Pt has a hx of memory loss and taking Namenda for this. Pt has been seen in past by pcp for cervical radiculitis and tinea cruis and pt here again today about same issues. Pt was re educated about the cervical issue that can cause the itching sensation that he is noting. Pt on exam has  no rash noted to his left arm and was advised and educated about the positioning of his neck and how to avoid pinching his nerve in neck. Pt also educated about the moisture he may hold onto as he is larger and now the warmth from hot weather can increase the groin rash. Pt aware to avoid moisture, darkness and to keep his inner groin area clean and dry to avoid yeast infections. Pt would like a refill on the nystatin cream today and this was sent in for pt.    Past Medical History:   Diagnosis Date    A-fib [facility])     Abnormal EMG 12/09/2015    Actinic keratoses     Actinic keratoses     Acute diastolic congestive heart failure (HCC) 1/6/2021    Allergic rhinitis     Anemia 11/18/2014    Arthritis     Chronic back pain     COPD [facility]chronic obstructive pulmonary disease[facility] 08/09/2012    COPD (chronic obstructive pulmonary

## 2023-06-20 PROBLEM — G31.84 MCI (MILD COGNITIVE IMPAIRMENT): Status: ACTIVE | Noted: 2023-06-20

## 2023-06-21 ENCOUNTER — OFFICE VISIT (OUTPATIENT)
Dept: NEUROLOGY | Age: 73
End: 2023-06-21
Payer: MEDICARE

## 2023-06-21 VITALS
HEART RATE: 72 BPM | SYSTOLIC BLOOD PRESSURE: 120 MMHG | BODY MASS INDEX: 36.77 KG/M2 | DIASTOLIC BLOOD PRESSURE: 74 MMHG | WEIGHT: 249 LBS

## 2023-06-21 DIAGNOSIS — G31.84 MCI (MILD COGNITIVE IMPAIRMENT): ICD-10-CM

## 2023-06-21 DIAGNOSIS — R20.2 PARESTHESIA: ICD-10-CM

## 2023-06-21 DIAGNOSIS — F01.A0 MILD VASCULAR DEMENTIA WITHOUT BEHAVIORAL DISTURBANCE, PSYCHOTIC DISTURBANCE, MOOD DISTURBANCE, OR ANXIETY (HCC): Primary | ICD-10-CM

## 2023-06-21 PROCEDURE — 3017F COLORECTAL CA SCREEN DOC REV: CPT | Performed by: PSYCHIATRY & NEUROLOGY

## 2023-06-21 PROCEDURE — 3078F DIAST BP <80 MM HG: CPT | Performed by: PSYCHIATRY & NEUROLOGY

## 2023-06-21 PROCEDURE — G8417 CALC BMI ABV UP PARAM F/U: HCPCS | Performed by: PSYCHIATRY & NEUROLOGY

## 2023-06-21 PROCEDURE — 1036F TOBACCO NON-USER: CPT | Performed by: PSYCHIATRY & NEUROLOGY

## 2023-06-21 PROCEDURE — G8427 DOCREV CUR MEDS BY ELIG CLIN: HCPCS | Performed by: PSYCHIATRY & NEUROLOGY

## 2023-06-21 PROCEDURE — 1123F ACP DISCUSS/DSCN MKR DOCD: CPT | Performed by: PSYCHIATRY & NEUROLOGY

## 2023-06-21 PROCEDURE — 99214 OFFICE O/P EST MOD 30 MIN: CPT | Performed by: PSYCHIATRY & NEUROLOGY

## 2023-06-21 PROCEDURE — 3074F SYST BP LT 130 MM HG: CPT | Performed by: PSYCHIATRY & NEUROLOGY

## 2023-06-21 ASSESSMENT — ENCOUNTER SYMPTOMS
TROUBLE SWALLOWING: 0
NAUSEA: 0
BACK PAIN: 0
CHOKING: 0
PHOTOPHOBIA: 0
VOMITING: 0
COLOR CHANGE: 0
SHORTNESS OF BREATH: 0

## 2023-07-02 DIAGNOSIS — I10 ESSENTIAL HYPERTENSION: ICD-10-CM

## 2023-07-02 DIAGNOSIS — I50.31 ACUTE DIASTOLIC CONGESTIVE HEART FAILURE (HCC): ICD-10-CM

## 2023-07-03 RX ORDER — FUROSEMIDE 20 MG/1
TABLET ORAL
Qty: 90 TABLET | Refills: 3 | Status: SHIPPED | OUTPATIENT
Start: 2023-07-03

## 2023-07-06 ENCOUNTER — TELEPHONE (OUTPATIENT)
Dept: FAMILY MEDICINE CLINIC | Age: 73
End: 2023-07-06

## 2023-07-06 NOTE — TELEPHONE ENCOUNTER
Nurse triage call spoke with pt son pt has leg swelling offered walk in or other providers he said would danelle to stick with pcp she knows him best gave him first  available then he said if swelling gets worse he will CX and go through walk in

## 2023-07-18 DIAGNOSIS — I10 ESSENTIAL HYPERTENSION: ICD-10-CM

## 2023-07-18 RX ORDER — DOXAZOSIN MESYLATE 4 MG/1
2 TABLET ORAL 2 TIMES DAILY
Qty: 30 TABLET | Refills: 12 | Status: CANCELLED | OUTPATIENT
Start: 2023-07-18 | End: 2024-07-17

## 2023-07-19 ENCOUNTER — OFFICE VISIT (OUTPATIENT)
Dept: FAMILY MEDICINE CLINIC | Age: 73
End: 2023-07-19
Payer: MEDICARE

## 2023-07-19 VITALS
BODY MASS INDEX: 36.67 KG/M2 | HEIGHT: 69 IN | SYSTOLIC BLOOD PRESSURE: 110 MMHG | WEIGHT: 247.6 LBS | DIASTOLIC BLOOD PRESSURE: 62 MMHG | TEMPERATURE: 98.9 F | HEART RATE: 77 BPM | OXYGEN SATURATION: 96 %

## 2023-07-19 DIAGNOSIS — I50.31 ACUTE DIASTOLIC CONGESTIVE HEART FAILURE (HCC): ICD-10-CM

## 2023-07-19 DIAGNOSIS — R60.0 LEG EDEMA: ICD-10-CM

## 2023-07-19 DIAGNOSIS — K50.90 CROHN'S DISEASE WITHOUT COMPLICATION, UNSPECIFIED GASTROINTESTINAL TRACT LOCATION (HCC): Primary | ICD-10-CM

## 2023-07-19 DIAGNOSIS — I10 ESSENTIAL HYPERTENSION: ICD-10-CM

## 2023-07-19 LAB
ANION GAP SERPL CALCULATED.3IONS-SCNC: 8 MEQ/L (ref 9–15)
BNP BLD-MCNC: 161 PG/ML
BUN SERPL-MCNC: 12 MG/DL (ref 8–23)
CALCIUM SERPL-MCNC: 8.6 MG/DL (ref 8.5–9.9)
CHLORIDE SERPL-SCNC: 105 MEQ/L (ref 95–107)
CO2 SERPL-SCNC: 25 MEQ/L (ref 20–31)
CREAT SERPL-MCNC: 0.72 MG/DL (ref 0.7–1.2)
GLUCOSE SERPL-MCNC: 88 MG/DL (ref 70–99)
POTASSIUM SERPL-SCNC: 4.1 MEQ/L (ref 3.4–4.9)
SODIUM SERPL-SCNC: 138 MEQ/L (ref 135–144)

## 2023-07-19 PROCEDURE — G8417 CALC BMI ABV UP PARAM F/U: HCPCS | Performed by: FAMILY MEDICINE

## 2023-07-19 PROCEDURE — 3078F DIAST BP <80 MM HG: CPT | Performed by: FAMILY MEDICINE

## 2023-07-19 PROCEDURE — G8427 DOCREV CUR MEDS BY ELIG CLIN: HCPCS | Performed by: FAMILY MEDICINE

## 2023-07-19 PROCEDURE — 1036F TOBACCO NON-USER: CPT | Performed by: FAMILY MEDICINE

## 2023-07-19 PROCEDURE — 1123F ACP DISCUSS/DSCN MKR DOCD: CPT | Performed by: FAMILY MEDICINE

## 2023-07-19 PROCEDURE — 99214 OFFICE O/P EST MOD 30 MIN: CPT | Performed by: FAMILY MEDICINE

## 2023-07-19 PROCEDURE — 3074F SYST BP LT 130 MM HG: CPT | Performed by: FAMILY MEDICINE

## 2023-07-19 PROCEDURE — 3017F COLORECTAL CA SCREEN DOC REV: CPT | Performed by: FAMILY MEDICINE

## 2023-07-19 RX ORDER — LISINOPRIL 20 MG/1
TABLET ORAL
COMMUNITY
Start: 2023-07-01 | End: 2023-07-19

## 2023-07-19 RX ORDER — FUROSEMIDE 20 MG/1
20 TABLET ORAL EVERY MORNING
Qty: 1 TABLET | Refills: 0
Start: 2023-07-19 | End: 2023-07-21 | Stop reason: SDUPTHER

## 2023-07-19 RX ORDER — METOPROLOL TARTRATE 100 MG/1
100 TABLET ORAL EVERY 12 HOURS
Qty: 1 TABLET | Refills: 0 | Status: SHIPPED | COMMUNITY
Start: 2023-07-19

## 2023-07-19 ASSESSMENT — ENCOUNTER SYMPTOMS
ABDOMINAL DISTENTION: 0
ABDOMINAL PAIN: 0
SHORTNESS OF BREATH: 0
PHOTOPHOBIA: 0
CHEST TIGHTNESS: 0

## 2023-07-19 NOTE — PROGRESS NOTES
Diagnosis Orders   1. Crohn's disease without complication, unspecified gastrointestinal tract location (HCC)  mesalamine (PENTASA) 250 MG extended release capsule      2. Acute diastolic congestive heart failure (HCC)  furosemide (LASIX) 20 MG tablet    Basic Metabolic Panel    Brain Natriuretic Peptide      3. Essential hypertension  furosemide (LASIX) 20 MG tablet      4. Leg edema  Basic Metabolic Panel    Brain Natriuretic Peptide        Return for 2-4 weeks for review of log with family and pt. Patient Instructions   Family will start a daily log to include patient's pain level and location, frequency of bowel movements, and amount of swelling in the legs. Follow-up in 2 to 4 weeks to review the log. Pt may take an extra 20 mg of furosemide on certain days for increased swelling. Not to be more than 3 days per week. Pt will take an extra dose of Furosemide today     Family is to help with pill organization and monitoring that meds are being taken. Subjective:      Patient ID: Nicole Monroe is a 68 y.o. male who presents for:  Chief Complaint   Patient presents with    Leg Swelling     Both ankles     Discuss Medications     Added lisinopril to pt list today states he has been on this for years        Patient's youngest daughter is here with him. Going over medications. States he is not taking lisinopril according to his med list and what she packs in his pills at home. However he recently had a prescription for lisinopril filled and was trying to take it. She is removed it from the household. We have discussed medication difficulties and family involvement today and how to work through this with patient's dementia. Patient is having swelling of the lower extremities again. He did have an increasing BNP noted on the last set of labs. Denies shortness of breath.       Current Outpatient Medications on File Prior to Visit   Medication Sig Dispense Refill    metoprolol (LOPRESSOR) 100 MG

## 2023-07-19 NOTE — PATIENT INSTRUCTIONS
Family will start a daily log to include patient's pain level and location, frequency of bowel movements, and amount of swelling in the legs. Follow-up in 2 to 4 weeks to review the log. Pt may take an extra 20 mg of furosemide on certain days for increased swelling. Not to be more than 3 days per week. Pt will take an extra dose of Furosemide today     Family is to help with pill organization and monitoring that meds are being taken.

## 2023-07-21 ENCOUNTER — OFFICE VISIT (OUTPATIENT)
Dept: CARDIOLOGY CLINIC | Age: 73
End: 2023-07-21
Payer: MEDICARE

## 2023-07-21 VITALS
SYSTOLIC BLOOD PRESSURE: 122 MMHG | WEIGHT: 249.6 LBS | BODY MASS INDEX: 36.97 KG/M2 | HEART RATE: 74 BPM | OXYGEN SATURATION: 96 % | HEIGHT: 69 IN | DIASTOLIC BLOOD PRESSURE: 72 MMHG

## 2023-07-21 DIAGNOSIS — I48.0 PAROXYSMAL ATRIAL FIBRILLATION (HCC): Primary | ICD-10-CM

## 2023-07-21 DIAGNOSIS — I50.32 CHRONIC DIASTOLIC HEART FAILURE (HCC): ICD-10-CM

## 2023-07-21 DIAGNOSIS — I50.31 ACUTE DIASTOLIC CONGESTIVE HEART FAILURE (HCC): ICD-10-CM

## 2023-07-21 DIAGNOSIS — I10 ESSENTIAL HYPERTENSION: ICD-10-CM

## 2023-07-21 DIAGNOSIS — E66.9 OBESITY (BMI 30-39.9): ICD-10-CM

## 2023-07-21 PROBLEM — R07.9 CHEST PAIN: Status: RESOLVED | Noted: 2019-10-02 | Resolved: 2023-07-21

## 2023-07-21 PROCEDURE — 3074F SYST BP LT 130 MM HG: CPT | Performed by: INTERNAL MEDICINE

## 2023-07-21 PROCEDURE — G8417 CALC BMI ABV UP PARAM F/U: HCPCS | Performed by: INTERNAL MEDICINE

## 2023-07-21 PROCEDURE — 1123F ACP DISCUSS/DSCN MKR DOCD: CPT | Performed by: INTERNAL MEDICINE

## 2023-07-21 PROCEDURE — 99204 OFFICE O/P NEW MOD 45 MIN: CPT | Performed by: INTERNAL MEDICINE

## 2023-07-21 PROCEDURE — 1036F TOBACCO NON-USER: CPT | Performed by: INTERNAL MEDICINE

## 2023-07-21 PROCEDURE — 93000 ELECTROCARDIOGRAM COMPLETE: CPT | Performed by: INTERNAL MEDICINE

## 2023-07-21 PROCEDURE — G8427 DOCREV CUR MEDS BY ELIG CLIN: HCPCS | Performed by: INTERNAL MEDICINE

## 2023-07-21 PROCEDURE — 3017F COLORECTAL CA SCREEN DOC REV: CPT | Performed by: INTERNAL MEDICINE

## 2023-07-21 PROCEDURE — 3078F DIAST BP <80 MM HG: CPT | Performed by: INTERNAL MEDICINE

## 2023-07-21 RX ORDER — POTASSIUM CHLORIDE 20 MEQ/1
20 TABLET, EXTENDED RELEASE ORAL DAILY
Qty: 90 TABLET | Refills: 3 | Status: SHIPPED | OUTPATIENT
Start: 2023-07-21

## 2023-07-21 RX ORDER — FUROSEMIDE 40 MG/1
40 TABLET ORAL DAILY
Qty: 90 TABLET | Refills: 3 | Status: SHIPPED | OUTPATIENT
Start: 2023-07-21

## 2023-07-29 DIAGNOSIS — I26.99 PULMONARY EMBOLISM, OTHER, UNSPECIFIED CHRONICITY, UNSPECIFIED WHETHER ACUTE COR PULMONALE PRESENT (HCC): ICD-10-CM

## 2023-07-30 RX ORDER — APIXABAN 5 MG/1
TABLET, FILM COATED ORAL
Qty: 180 TABLET | Refills: 3 | Status: SHIPPED | OUTPATIENT
Start: 2023-07-30

## 2023-07-30 NOTE — TELEPHONE ENCOUNTER
Comments:     Last Office Visit (last PCP visit):   7/19/2023    Next Visit Date:  Future Appointments   Date Time Provider [address]   8/2/2023  9:10 AM SCHEDULE, JESSICA CHAVEZ PCP TESTOSTERONE MLOX [facility]   8/16/2023  1:45 PM Joshua Dennis MD [facility] [place]   11/6/2023  5:45 PM Joshua Dennis MD [facility]   11/9/2023  4:15 PM Didier Temple MD [facility] Neurology -   [facility]       **If hasn't been seen in over a year OR hasn't followed up according to last diabetes/ADHD visit, make appointment for patient before sending refill to provider.     Rx requested:  Requested Prescriptions     Pending Prescriptions Disp Refills    ELIQUIS 5 MG TABS tablet [Pharmacy Med Name: Eliquis 5 MG Oral Tablet] 180 tablet 3     Sig: TAKE 1 TABLET BY MOUTH TWICE  DAILY

## 2023-08-02 ENCOUNTER — NURSE ONLY (OUTPATIENT)
Dept: FAMILY MEDICINE CLINIC | Age: 73
End: 2023-08-02
Payer: MEDICARE

## 2023-08-02 DIAGNOSIS — E29.1 HYPOGONADISM MALE: Primary | ICD-10-CM

## 2023-08-02 PROCEDURE — 96372 THER/PROPH/DIAG INJ SC/IM: CPT | Performed by: FAMILY MEDICINE

## 2023-08-02 RX ORDER — TESTOSTERONE CYPIONATE 200 MG/ML
100 INJECTION, SOLUTION INTRAMUSCULAR ONCE
Status: COMPLETED | OUTPATIENT
Start: 2023-08-02 | End: 2023-08-02

## 2023-08-02 RX ADMIN — TESTOSTERONE CYPIONATE 100 MG: 200 INJECTION, SOLUTION INTRAMUSCULAR at 09:29

## 2023-08-02 NOTE — PROGRESS NOTES
Patient given Testosterone 100mg IM left gluteal..  Will return in 2 weeks for next injection    Patient tolerated injection well.     Administrations This Visit       testosterone cypionate (DEPOTESTOTERONE CYPIONATE) injection 100 mg       Admin Date  08/02/2023 Action  Given Dose  100 mg Route  IntraMUSCular Administered By  Gris Patel LPN

## 2023-08-03 ENCOUNTER — TELEPHONE (OUTPATIENT)
Dept: FAMILY MEDICINE CLINIC | Age: 73
End: 2023-08-03

## 2023-08-03 DIAGNOSIS — R60.0 LEG EDEMA: Primary | ICD-10-CM

## 2023-08-03 RX ORDER — METOLAZONE 2.5 MG/1
2.5 TABLET ORAL DAILY
Qty: 30 TABLET | Refills: 3 | Status: SHIPPED | OUTPATIENT
Start: 2023-08-03

## 2023-08-03 NOTE — TELEPHONE ENCOUNTER
Pt daughter is calling wanting donzepil discontinued/removed from patients medications. Stating it was removed from his current meds by neurologist and should not be ordered. Also asking if the furosemide can be increased due to the patients legs being swelled more often .

## 2023-08-03 NOTE — TELEPHONE ENCOUNTER
Medication requested to be taken off is not on pts current list of medications. Please address lasix question .

## 2023-08-16 ENCOUNTER — OFFICE VISIT (OUTPATIENT)
Dept: FAMILY MEDICINE CLINIC | Age: 73
End: 2023-08-16
Payer: MEDICARE

## 2023-08-16 ENCOUNTER — NURSE ONLY (OUTPATIENT)
Dept: ENDOCRINOLOGY | Age: 73
End: 2023-08-16
Payer: MEDICARE

## 2023-08-16 VITALS
OXYGEN SATURATION: 93 % | HEART RATE: 79 BPM | HEIGHT: 69 IN | WEIGHT: 249 LBS | BODY MASS INDEX: 36.88 KG/M2 | DIASTOLIC BLOOD PRESSURE: 60 MMHG | SYSTOLIC BLOOD PRESSURE: 112 MMHG

## 2023-08-16 DIAGNOSIS — E29.1 HYPOGONADISM MALE: Primary | ICD-10-CM

## 2023-08-16 DIAGNOSIS — I10 ESSENTIAL HYPERTENSION: ICD-10-CM

## 2023-08-16 DIAGNOSIS — I48.0 PAROXYSMAL ATRIAL FIBRILLATION (HCC): ICD-10-CM

## 2023-08-16 DIAGNOSIS — R60.0 LEG EDEMA: ICD-10-CM

## 2023-08-16 DIAGNOSIS — B35.6 TINEA CRURIS: ICD-10-CM

## 2023-08-16 DIAGNOSIS — R60.0 LEG EDEMA: Primary | ICD-10-CM

## 2023-08-16 LAB
ANION GAP SERPL CALCULATED.3IONS-SCNC: 11 MEQ/L (ref 9–15)
BUN SERPL-MCNC: 16 MG/DL (ref 8–23)
CALCIUM SERPL-MCNC: 8.5 MG/DL (ref 8.5–9.9)
CHLORIDE SERPL-SCNC: 102 MEQ/L (ref 95–107)
CO2 SERPL-SCNC: 26 MEQ/L (ref 20–31)
CREAT SERPL-MCNC: 0.91 MG/DL (ref 0.7–1.2)
GLUCOSE SERPL-MCNC: 98 MG/DL (ref 70–99)
POTASSIUM SERPL-SCNC: 4.5 MEQ/L (ref 3.4–4.9)
SODIUM SERPL-SCNC: 139 MEQ/L (ref 135–144)

## 2023-08-16 PROCEDURE — 3017F COLORECTAL CA SCREEN DOC REV: CPT | Performed by: FAMILY MEDICINE

## 2023-08-16 PROCEDURE — 3078F DIAST BP <80 MM HG: CPT | Performed by: FAMILY MEDICINE

## 2023-08-16 PROCEDURE — 1036F TOBACCO NON-USER: CPT | Performed by: FAMILY MEDICINE

## 2023-08-16 PROCEDURE — 99213 OFFICE O/P EST LOW 20 MIN: CPT | Performed by: FAMILY MEDICINE

## 2023-08-16 PROCEDURE — G8417 CALC BMI ABV UP PARAM F/U: HCPCS | Performed by: FAMILY MEDICINE

## 2023-08-16 PROCEDURE — 1123F ACP DISCUSS/DSCN MKR DOCD: CPT | Performed by: FAMILY MEDICINE

## 2023-08-16 PROCEDURE — 96372 THER/PROPH/DIAG INJ SC/IM: CPT | Performed by: PHYSICIAN ASSISTANT

## 2023-08-16 PROCEDURE — G8427 DOCREV CUR MEDS BY ELIG CLIN: HCPCS | Performed by: FAMILY MEDICINE

## 2023-08-16 PROCEDURE — 3074F SYST BP LT 130 MM HG: CPT | Performed by: FAMILY MEDICINE

## 2023-08-16 RX ORDER — TESTOSTERONE CYPIONATE 200 MG/ML
100 INJECTION, SOLUTION INTRAMUSCULAR ONCE
Status: COMPLETED | OUTPATIENT
Start: 2023-08-16 | End: 2023-08-16

## 2023-08-16 RX ORDER — OMEPRAZOLE 20 MG/1
CAPSULE, DELAYED RELEASE ORAL
COMMUNITY
Start: 2023-08-09

## 2023-08-16 RX ADMIN — TESTOSTERONE CYPIONATE 100 MG: 200 INJECTION, SOLUTION INTRAMUSCULAR at 16:01

## 2023-08-16 ASSESSMENT — ENCOUNTER SYMPTOMS
SHORTNESS OF BREATH: 0
PHOTOPHOBIA: 0
ABDOMINAL DISTENTION: 0
CHEST TIGHTNESS: 0
ABDOMINAL PAIN: 0

## 2023-08-16 NOTE — PROGRESS NOTES
More than 50% of this visit was spent coordinating current care, obtaining information for prior records, and counseling for current plan of action. Assessment:       Diagnosis Orders   1. Leg edema  Basic Metabolic Panel      2. Essential hypertension  Basic Metabolic Panel      3. Paroxysmal atrial fibrillation (HCC)        4. Tinea cruris              Orders Placed This Encounter   Procedures    Basic Metabolic Panel     Standing Status:   Future     Number of Occurrences:   1     Standing Expiration Date:   8/15/2024       No orders of the defined types were placed in this encounter. Medication List            Accurate as of August 16, 2023  3:30 PM. If you have any questions, ask your nurse or doctor. CONTINUE taking these medications      * albuterol sulfate  (90 Base) MCG/ACT inhaler  Commonly known as: PROVENTIL;VENTOLIN;PROAIR  USE 2 INHALATIONS EVERY 6 HOURS AS NEEDED FOR WHEEZING OR SHORTNESS OF BREATH     * albuterol (2.5 MG/3ML) 0.083% nebulizer solution  Commonly known as: PROVENTIL  Take 3 mLs by nebulization every 6 hours As directed     ciclopirox 8 % solution  Commonly known as: Penlac  Apply topically nightly. CPAP Machine Mis  New cpap supplies mask hose filters etc     doxazosin 4 MG tablet  Commonly known as: CARDURA  Take 0.5 tablets by mouth in the morning and 0.5 tablets in the evening. Eliquis 5 MG Tabs tablet  Generic drug: apixaban  TAKE 1 TABLET BY MOUTH TWICE  DAILY     esomeprazole Magnesium 20 MG Pack  Commonly known as: NEXIUM  Take 1 packet by mouth daily     fluticasone 50 MCG/ACT nasal spray  Commonly known as: FLONASE  1 spray by Nasal route daily     furosemide 40 MG tablet  Commonly known as: LASIX  Take 1 tablet by mouth daily     gabapentin 300 MG capsule  Commonly known as: NEURONTIN  Take 1 capsule by mouth daily.  TAKE 1 CAPSULE DAILY     ipratropium 0.06 % nasal spray  Commonly known as: ATROVENT  2 sprays by Each Nostril

## 2023-08-16 NOTE — PATIENT INSTRUCTIONS
Patient has now been notified metolazone has been called to the pharmacy they will get that picked up and started. This should help with the swelling in the legs. This will be used in combination with the furosemide whether he utilizes 2-20 mg tablets or 1-40 mg tablet    Discontinuing Aricept made a big difference in patient's bowel symptoms. No further concern there. Medications are being organized by family members. Patient still self administers with reminders going well. Patient has been advised that yeast and body folds is related to moisture, darkness, and heat.  patient is advised to keep the area clean by daily bathing. More importantly, the patient is to keep the area dry. This may require putting gauze or cloth in between body folds and changing out routinely to keep the skin dry. After drying from bathing the area should be blown dry with a house fan or blow dryer. Skinfolds should be  and the area should be dried multiple times per day. Creams such as Lamisil, available over-the-counter, may be used in the skin folds to treat any redness and irritation that may occur. However, the base to all treatment is skin dryness. Pt may use Selsun blue original shampoo as body wash to help decrease the presence of yeast on the skin.

## 2023-08-16 NOTE — PROGRESS NOTES
Patient given Testosterone 100 mg IM right gluteal..  Will return in 3 weeks for next injection    Patient tolerated injection well.     Administrations This Visit       testosterone cypionate (DEPOTESTOTERONE CYPIONATE) injection 100 mg       Admin Date  08/16/2023 Action  Given Dose  100 mg Route  IntraMUSCular Administered By  Robe Manzano LPN

## 2023-08-20 ENCOUNTER — TELEPHONE (OUTPATIENT)
Dept: FAMILY MEDICINE CLINIC | Age: 73
End: 2023-08-20

## 2023-08-20 ENCOUNTER — HOSPITAL ENCOUNTER (EMERGENCY)
Age: 73
Discharge: HOME | End: 2023-08-20
Attending: FAMILY MEDICINE
Payer: MEDICARE

## 2023-08-20 ENCOUNTER — OFFICE VISIT (OUTPATIENT)
Dept: FAMILY MEDICINE CLINIC | Age: 73
End: 2023-08-20

## 2023-08-20 VITALS
SYSTOLIC BLOOD PRESSURE: 115 MMHG | HEART RATE: 65 BPM | OXYGEN SATURATION: 100 % | DIASTOLIC BLOOD PRESSURE: 64 MMHG | TEMPERATURE: 97.7 F | RESPIRATION RATE: 20 BRPM

## 2023-08-20 DIAGNOSIS — I48.0 AF (PAROXYSMAL ATRIAL FIBRILLATION) (HCC): ICD-10-CM

## 2023-08-20 DIAGNOSIS — R00.2 PALPITATIONS: Primary | ICD-10-CM

## 2023-08-20 LAB
ALBUMIN SERPL-MCNC: 3.1 G/DL (ref 3.5–4.6)
ALP SERPL-CCNC: 65 U/L (ref 35–104)
ALT SERPL-CCNC: 9 U/L (ref 0–41)
ANION GAP SERPL CALCULATED.3IONS-SCNC: 8 MEQ/L (ref 9–15)
AST SERPL-CCNC: 18 U/L (ref 0–40)
BASOPHILS # BLD: 0.1 K/UL (ref 0–0.2)
BASOPHILS NFR BLD: 1.2 %
BILIRUB SERPL-MCNC: 0.5 MG/DL (ref 0.2–0.7)
BNP BLD-MCNC: 651 PG/ML
BUN SERPL-MCNC: 14 MG/DL (ref 8–23)
CALCIUM SERPL-MCNC: 8.2 MG/DL (ref 8.5–9.9)
CHLORIDE SERPL-SCNC: 104 MEQ/L (ref 95–107)
CO2 SERPL-SCNC: 27 MEQ/L (ref 20–31)
CREAT SERPL-MCNC: 0.79 MG/DL (ref 0.7–1.2)
EOSINOPHIL # BLD: 0.4 K/UL (ref 0–0.7)
EOSINOPHIL NFR BLD: 5.5 %
ERYTHROCYTE [DISTWIDTH] IN BLOOD BY AUTOMATED COUNT: 14 % (ref 11.5–14.5)
GLOBULIN SER CALC-MCNC: 2.5 G/DL (ref 2.3–3.5)
GLUCOSE SERPL-MCNC: 102 MG/DL (ref 70–99)
HCT VFR BLD AUTO: 37.9 % (ref 42–52)
HGB BLD-MCNC: 12.6 G/DL (ref 14–18)
LYMPHOCYTES # BLD: 1.6 K/UL (ref 1–4.8)
LYMPHOCYTES NFR BLD: 20.7 %
MCH RBC QN AUTO: 28.3 PG (ref 27–31.3)
MCHC RBC AUTO-ENTMCNC: 33.2 % (ref 33–37)
MCV RBC AUTO: 85.4 FL (ref 79–92.2)
MONOCYTES # BLD: 1.2 K/UL (ref 0.2–0.8)
MONOCYTES NFR BLD: 15.8 %
NEUTROPHILS # BLD: 4.5 K/UL (ref 1.4–6.5)
NEUTS SEG NFR BLD: 56.8 %
PLATELET # BLD AUTO: 177 K/UL (ref 130–400)
POTASSIUM SERPL-SCNC: 4.1 MEQ/L (ref 3.4–4.9)
PROT SERPL-MCNC: 5.6 G/DL (ref 6.3–8)
RBC # BLD AUTO: 4.44 M/UL (ref 4.7–6.1)
SODIUM SERPL-SCNC: 139 MEQ/L (ref 135–144)
TROPONIN T SERPL-MCNC: <0.01 NG/ML (ref 0–0.01)
WBC # BLD AUTO: 7.9 K/UL (ref 4.8–10.8)

## 2023-08-20 PROCEDURE — 93005 ELECTROCARDIOGRAM TRACING: CPT | Performed by: FAMILY MEDICINE

## 2023-08-20 PROCEDURE — 83880 ASSAY OF NATRIURETIC PEPTIDE: CPT

## 2023-08-20 PROCEDURE — 84484 ASSAY OF TROPONIN QUANT: CPT

## 2023-08-20 PROCEDURE — 99999 PR OFFICE/OUTPT VISIT,PROCEDURE ONLY: CPT | Performed by: NURSE PRACTITIONER

## 2023-08-20 PROCEDURE — 85025 COMPLETE CBC W/AUTO DIFF WBC: CPT

## 2023-08-20 PROCEDURE — 36415 COLL VENOUS BLD VENIPUNCTURE: CPT

## 2023-08-20 PROCEDURE — 99284 EMERGENCY DEPT VISIT MOD MDM: CPT

## 2023-08-20 PROCEDURE — 80053 COMPREHEN METABOLIC PANEL: CPT

## 2023-08-20 ASSESSMENT — PAIN DESCRIPTION - PAIN TYPE
TYPE: ACUTE PAIN

## 2023-08-20 ASSESSMENT — PAIN - FUNCTIONAL ASSESSMENT
PAIN_FUNCTIONAL_ASSESSMENT: NONE - DENIES PAIN

## 2023-08-20 ASSESSMENT — PAIN DESCRIPTION - FREQUENCY: FREQUENCY: CONTINUOUS

## 2023-08-20 ASSESSMENT — PAIN DESCRIPTION - ONSET: ONSET: ON-GOING

## 2023-08-20 NOTE — ED PROVIDER NOTES
Ear: External ear normal.      Left Ear: External ear normal.      Nose: Nose normal.   Eyes:      Pupils: Pupils are equal, round, and reactive to light. Cardiovascular:      Rate and Rhythm: Normal rate and regular rhythm. Heart sounds: Normal heart sounds. Pulmonary:      Effort: Pulmonary effort is normal. No respiratory distress. Breath sounds: Normal breath sounds. No wheezing or rales. Chest:      Chest wall: No tenderness. Abdominal:      General: Bowel sounds are normal.      Palpations: Abdomen is soft. Musculoskeletal:         General: Normal range of motion. Cervical back: Normal range of motion and neck supple. Skin:     General: Skin is warm and dry. Neurological:      Mental Status: He is alert and oriented to person, place, and time. Cranial Nerves: No cranial nerve deficit. Sensory: No sensory deficit. Motor: No abnormal muscle tone. Coordination: Coordination normal.      Deep Tendon Reflexes: Reflexes normal.   Psychiatric:         Behavior: Behavior normal.         Thought Content: Thought content normal.         Judgment: Judgment normal.           RESULTS     EKG: All EKG's are interpreted by the Emergency Department Physician who either signs or Co-signsthis chart in the absence of a cardiologist.     EKG: Sinus rhythm with premature atrial complexes rate 67 no acute ST or T wave.       RADIOLOGY:   Non-plain filmimages such as CT, Ultrasound and MRI are read by the radiologist. Plain radiographic images are visualized and preliminarily interpreted by the emergency physician with the below findings:        Interpretation per the Radiologist below, if available at the time ofthis note:    No orders to display         ED BEDSIDE ULTRASOUND:   Performed by ED Physician - none    LABS:  Labs Reviewed   CBC WITH AUTO DIFFERENTIAL - Abnormal; Notable for the following components:       Result Value    RBC 4.44 (*)     Hemoglobin 12.6 (*)     Hematocrit

## 2023-08-20 NOTE — PROGRESS NOTES
Pt came in for Lab Visit to recheck BP.     1st Reading Sitting (RA): 90/60  2nd Reading Standing (RA): 82/60    Ox: 96  HR: 83

## 2023-08-20 NOTE — ED TRIAGE NOTES
Patient has a history a-fib. Patient reports palpitations that started last night. Per daughter patient was hypotensive this morning but patient was given blood pressure medication.

## 2023-08-21 ENCOUNTER — TELEPHONE (OUTPATIENT)
Dept: FAMILY MEDICINE CLINIC | Age: 73
End: 2023-08-21

## 2023-08-21 LAB
EKG ATRIAL RATE: 67 BPM
EKG P AXIS: 30 DEGREES
EKG P-R INTERVAL: 166 MS
EKG Q-T INTERVAL: 400 MS
EKG QRS DURATION: 96 MS
EKG QTC CALCULATION (BAZETT): 422 MS
EKG R AXIS: -3 DEGREES
EKG T AXIS: 14 DEGREES
EKG VENTRICULAR RATE: 67 BPM

## 2023-08-21 PROCEDURE — 93010 ELECTROCARDIOGRAM REPORT: CPT | Performed by: INTERNAL MEDICINE

## 2023-08-21 NOTE — TELEPHONE ENCOUNTER
8/20/2023 10:57 AM  Patient's daughter Cari Bussing calling, patient had episode earlier this morning that lasted about half hour where he felt like his heart was out of rhythm. Patient history of proximal atrial fibrillation. For like his heart was racing for about 1/2-hour and that resolved on its own. He does forget to take his medications and his daughter goes over there on a daily basis to make sure he takes them. He had not taken his medications that morning. When his daughter arrived he was feeling better, no chest pain, shortness of breath, palpitations, lightheadedness, dizziness. They did check his blood pressure several times and it was reading low -33R to 83K systolic over 91J to 46O. They are not sure if his cuff is accurate. Due to symptoms and low blood pressure readings recommended further evaluation in the ER.   Patient's daughter will drive him there

## 2023-08-22 NOTE — PROGRESS NOTES
Pt arrived to [facility] c/o palpitations & low BP at home   Denies chest pain   Pt states having diarrhea over the last 3 days   Appt changed to ma visit for BP check   Due to weekend at [facility], advised ER visit for labs/evaluation   VS stable upon leaving [facility]   Pt alert/oriented upon leaving [facility]   Pt's daughter present with pt & will drive him to ER

## 2023-08-25 DIAGNOSIS — M54.9 CHRONIC MIDLINE BACK PAIN, UNSPECIFIED BACK LOCATION: ICD-10-CM

## 2023-08-25 DIAGNOSIS — J30.2 CHRONIC SEASONAL ALLERGIC RHINITIS: ICD-10-CM

## 2023-08-25 DIAGNOSIS — G89.29 CHRONIC MIDLINE BACK PAIN, UNSPECIFIED BACK LOCATION: ICD-10-CM

## 2023-08-25 DIAGNOSIS — J41.0 SIMPLE CHRONIC BRONCHITIS (HCC): ICD-10-CM

## 2023-08-28 NOTE — TELEPHONE ENCOUNTER
Future Appointments    Encounter Information    Provider Department Appt Notes   8/30/2023 Jonas Rodriguez MD [facility] Primary Care Return in about 2 weeks (around 8/30/2023).   9/6/2023 SCHEDULE, [address] [facility] [place] inj   9/20/2023 SCHEDULE, [address] T inj   11/6/2023 Jonas Rodriguez MD [facility] Primary Care 6 month follow up   11/9/2023 Christina Mejia MD [facility] Neurology 4 MOS FUP   [facility]     Past Visits    Date Provider Specialty Visit Type Primary Dx   08/20/2023 STARLA Tarango - NP Family Medicine Office Visit Palpitations   08/16/2023  Endocrinology Nurse Only Hypogonadism male   08/16/2023 Jonas Rodriguez MD Family Medicine Office Visit Leg edema   08/02/2023  Family Medicine Nurse Only Hypogonadism male

## 2023-08-28 NOTE — TELEPHONE ENCOUNTER
Contact the family and find out after his recent hospitalization if he still on these medications and these doses

## 2023-08-29 NOTE — TELEPHONE ENCOUNTER
Spoke to Flako Langford states that he has an appointment tomorrow, pt aware to bring family member to confirm medications. Patient believes there has not been any changes.

## 2023-08-30 RX ORDER — DONEPEZIL HYDROCHLORIDE 10 MG/1
TABLET, FILM COATED ORAL
Qty: 90 TABLET | Refills: 3 | OUTPATIENT
Start: 2023-08-30

## 2023-08-30 RX ORDER — MONTELUKAST SODIUM 10 MG/1
TABLET ORAL
Qty: 90 TABLET | Refills: 3 | OUTPATIENT
Start: 2023-08-30

## 2023-08-30 RX ORDER — GABAPENTIN 300 MG/1
CAPSULE ORAL
Qty: 90 CAPSULE | Refills: 3 | OUTPATIENT
Start: 2023-08-30

## 2023-09-06 ENCOUNTER — NURSE ONLY (OUTPATIENT)
Dept: ENDOCRINOLOGY | Age: 73
End: 2023-09-06
Payer: MEDICARE

## 2023-09-06 DIAGNOSIS — E29.1 HYPOGONADISM MALE: Primary | ICD-10-CM

## 2023-09-06 PROCEDURE — 96372 THER/PROPH/DIAG INJ SC/IM: CPT | Performed by: INTERNAL MEDICINE

## 2023-09-06 RX ORDER — TESTOSTERONE CYPIONATE 200 MG/ML
100 INJECTION, SOLUTION INTRAMUSCULAR ONCE
Status: COMPLETED | OUTPATIENT
Start: 2023-09-06 | End: 2023-09-06

## 2023-09-06 RX ADMIN — TESTOSTERONE CYPIONATE 100 MG: 200 INJECTION, SOLUTION INTRAMUSCULAR at 15:34

## 2023-09-06 NOTE — PROGRESS NOTES
Patient given Testosterone 100mg IM left gluteal..  Will return in 2 weeks for next injection    Patient tolerated injection well.     Administrations This Visit       testosterone cypionate (DEPOTESTOTERONE CYPIONATE) injection 100 mg       Admin Date  09/06/2023 Action  Given Dose  100 mg Route  IntraMUSCular Administered By  Yulia Cole LPN

## 2023-09-15 RX ORDER — MEMANTINE HYDROCHLORIDE 5 MG/1
5 TABLET ORAL 2 TIMES DAILY
Qty: 180 TABLET | Refills: 2 | Status: SHIPPED | OUTPATIENT
Start: 2023-09-15

## 2023-09-15 NOTE — TELEPHONE ENCOUNTER
Pharmacy is requesting medication refill.  Please approve or deny this request.    Rx requested:  Requested Prescriptions     Pending Prescriptions Disp Refills    memantine (NAMENDA) 5 MG tablet [Pharmacy Med Name: Memantine HCl 5 MG Oral Tablet] 180 tablet 2     Sig: TAKE 1 TABLET BY MOUTH TWICE  DAILY         Last Office Visit:   6/21/2023      Next Visit Date:  Future Appointments   Date Time Provider [address]   9/20/2023  3:20 PM [address]   11/6/2023  5:45 PM Zoe Lerma MD [facility]   11/9/2023  4:15 PM Didier Rivera MD [address] [facility] Neurology -   3/22/2024 12:15 PM Wes Winston [place], [facility]

## 2023-09-20 ENCOUNTER — NURSE ONLY (OUTPATIENT)
Dept: ENDOCRINOLOGY | Age: 73
End: 2023-09-20

## 2023-09-20 DIAGNOSIS — E29.1 HYPOGONADISM MALE: Primary | ICD-10-CM

## 2023-09-20 LAB
ALBUMIN (G/DL) IN SER/PLAS: 3.6 G/DL (ref 3.4–5)
ANION GAP IN SER/PLAS: 9 MMOL/L (ref 10–20)
CALCIUM (MG/DL) IN SER/PLAS: 8.9 MG/DL (ref 8.6–10.3)
CARBON DIOXIDE, TOTAL (MMOL/L) IN SER/PLAS: 36 MMOL/L (ref 21–32)
CHLORIDE (MMOL/L) IN SER/PLAS: 95 MMOL/L (ref 98–107)
CREATININE (MG/DL) IN SER/PLAS: 0.93 MG/DL (ref 0.5–1.3)
GFR MALE: 86 ML/MIN/1.73M2
GLUCOSE (MG/DL) IN SER/PLAS: 104 MG/DL (ref 74–99)
NATRIURETIC PEPTIDE B (PG/ML) IN SER/PLAS: 128 PG/ML (ref 0–99)
PHOSPHATE (MG/DL) IN SER/PLAS: 2.9 MG/DL (ref 2.5–4.9)
POTASSIUM (MMOL/L) IN SER/PLAS: 3.4 MMOL/L (ref 3.5–5.3)
SODIUM (MMOL/L) IN SER/PLAS: 137 MMOL/L (ref 136–145)
UREA NITROGEN (MG/DL) IN SER/PLAS: 19 MG/DL (ref 6–23)

## 2023-09-20 RX ORDER — TESTOSTERONE CYPIONATE 200 MG/ML
100 INJECTION, SOLUTION INTRAMUSCULAR ONCE
Status: COMPLETED | OUTPATIENT
Start: 2023-09-20 | End: 2023-09-20

## 2023-09-20 RX ADMIN — TESTOSTERONE CYPIONATE 100 MG: 200 INJECTION, SOLUTION INTRAMUSCULAR at 16:11

## 2023-09-20 NOTE — PROGRESS NOTES
Patient given Testosterone 100mg IM right gluteal..  Will return in 2 weeks for next injection    Patient tolerated injection well.     Administrations This Visit       testosterone cypionate (DEPOTESTOTERONE CYPIONATE) injection 100 mg       Admin Date  09/20/2023 Action  Given Dose  100 mg Route  IntraMUSCular Administered By  Saloni Leo LPN

## 2023-10-02 ENCOUNTER — NURSE ONLY (OUTPATIENT)
Dept: FAMILY MEDICINE CLINIC | Age: 73
End: 2023-10-02
Payer: MEDICARE

## 2023-10-02 DIAGNOSIS — E29.1 HYPOGONADISM MALE: Primary | ICD-10-CM

## 2023-10-02 PROCEDURE — 96372 THER/PROPH/DIAG INJ SC/IM: CPT | Performed by: FAMILY MEDICINE

## 2023-10-02 RX ORDER — TESTOSTERONE CYPIONATE 200 MG/ML
100 INJECTION, SOLUTION INTRAMUSCULAR ONCE
Status: COMPLETED | OUTPATIENT
Start: 2023-10-02 | End: 2023-10-02

## 2023-10-02 RX ADMIN — TESTOSTERONE CYPIONATE 100 MG: 200 INJECTION, SOLUTION INTRAMUSCULAR at 14:58

## 2023-10-02 NOTE — PROGRESS NOTES
Patient given Testosterone 100mg IM left gluteal..  Will return in 2 weeks for next injection    Patient tolerated injection well.     Administrations This Visit       testosterone cypionate (DEPOTESTOTERONE CYPIONATE) injection 100 mg       Admin Date  10/02/2023 Action  Given Dose  100 mg Route  IntraMUSCular Administered By  Abhijeet Parry LPN

## 2023-10-13 RX ORDER — POTASSIUM CHLORIDE 20 MEQ/1
20 TABLET, EXTENDED RELEASE ORAL DAILY
Qty: 90 TABLET | Refills: 3 | Status: SHIPPED | OUTPATIENT
Start: 2023-10-13

## 2023-10-13 NOTE — TELEPHONE ENCOUNTER
Pharmacy is requesting medication refill.  Please approve or deny this request.    Rx requested:  Requested Prescriptions     Pending Prescriptions Disp Refills    potassium chloride (KLOR-CON M) 20 MEQ extended release tablet 90 tablet 3     Sig: Take 1 tablet by mouth daily         Last Office Visit:   7/21/2023      Next Visit Date:  Future Appointments   Date Time Provider [address]   10/16/2023  4:00 PM [facility]   10/30/2023  4:00 PM [facility]   11/6/2023  5:45 PM Sanna Blood MD [facility] [place]   11/9/2023  4:15 PM [facility]   3/22/2024 12:15 PM Holiday, Yulia Canseco, DO [address] [facility]

## 2023-10-16 ENCOUNTER — NURSE ONLY (OUTPATIENT)
Dept: FAMILY MEDICINE CLINIC | Age: 73
End: 2023-10-16
Payer: MEDICARE

## 2023-10-16 DIAGNOSIS — E29.1 HYPOGONADISM MALE: Primary | ICD-10-CM

## 2023-10-16 PROCEDURE — 96372 THER/PROPH/DIAG INJ SC/IM: CPT | Performed by: FAMILY MEDICINE

## 2023-10-16 RX ORDER — TESTOSTERONE CYPIONATE 200 MG/ML
100 INJECTION, SOLUTION INTRAMUSCULAR ONCE
Status: COMPLETED | OUTPATIENT
Start: 2023-10-16 | End: 2023-10-16

## 2023-10-16 RX ADMIN — TESTOSTERONE CYPIONATE 100 MG: 200 INJECTION, SOLUTION INTRAMUSCULAR at 16:09

## 2023-10-16 NOTE — PROGRESS NOTES
Patient given Testosterone 100mg IM right gluteal..    Patient tolerated injection well.     Administrations This Visit       testosterone cypionate (DEPOTESTOTERONE CYPIONATE) injection 100 mg       Admin Date  10/16/2023 Action  Given Dose  100 mg Route  IntraMUSCular Administered By  Surinder Aguilera LPN

## 2023-10-17 ENCOUNTER — OFFICE VISIT (OUTPATIENT)
Dept: FAMILY MEDICINE CLINIC | Age: 73
End: 2023-10-17
Payer: MEDICARE

## 2023-10-17 VITALS
OXYGEN SATURATION: 96 % | HEART RATE: 98 BPM | DIASTOLIC BLOOD PRESSURE: 74 MMHG | WEIGHT: 245.4 LBS | BODY MASS INDEX: 36.35 KG/M2 | HEIGHT: 69 IN | SYSTOLIC BLOOD PRESSURE: 136 MMHG

## 2023-10-17 DIAGNOSIS — L50.9 HIVES: Primary | ICD-10-CM

## 2023-10-17 PROCEDURE — 3078F DIAST BP <80 MM HG: CPT | Performed by: STUDENT IN AN ORGANIZED HEALTH CARE EDUCATION/TRAINING PROGRAM

## 2023-10-17 PROCEDURE — 99213 OFFICE O/P EST LOW 20 MIN: CPT | Performed by: STUDENT IN AN ORGANIZED HEALTH CARE EDUCATION/TRAINING PROGRAM

## 2023-10-17 PROCEDURE — 1123F ACP DISCUSS/DSCN MKR DOCD: CPT | Performed by: STUDENT IN AN ORGANIZED HEALTH CARE EDUCATION/TRAINING PROGRAM

## 2023-10-17 PROCEDURE — G8417 CALC BMI ABV UP PARAM F/U: HCPCS | Performed by: STUDENT IN AN ORGANIZED HEALTH CARE EDUCATION/TRAINING PROGRAM

## 2023-10-17 PROCEDURE — 3075F SYST BP GE 130 - 139MM HG: CPT | Performed by: STUDENT IN AN ORGANIZED HEALTH CARE EDUCATION/TRAINING PROGRAM

## 2023-10-17 PROCEDURE — G8484 FLU IMMUNIZE NO ADMIN: HCPCS | Performed by: STUDENT IN AN ORGANIZED HEALTH CARE EDUCATION/TRAINING PROGRAM

## 2023-10-17 PROCEDURE — G8427 DOCREV CUR MEDS BY ELIG CLIN: HCPCS | Performed by: STUDENT IN AN ORGANIZED HEALTH CARE EDUCATION/TRAINING PROGRAM

## 2023-10-17 PROCEDURE — 1036F TOBACCO NON-USER: CPT | Performed by: STUDENT IN AN ORGANIZED HEALTH CARE EDUCATION/TRAINING PROGRAM

## 2023-10-17 PROCEDURE — 3017F COLORECTAL CA SCREEN DOC REV: CPT | Performed by: STUDENT IN AN ORGANIZED HEALTH CARE EDUCATION/TRAINING PROGRAM

## 2023-10-17 RX ORDER — NYSTATIN 100000 U/G
CREAM TOPICAL
COMMUNITY

## 2023-10-17 RX ORDER — PREDNISONE 20 MG/1
20 TABLET ORAL 2 TIMES DAILY
Qty: 10 TABLET | Refills: 0 | Status: SHIPPED | OUTPATIENT
Start: 2023-10-17 | End: 2023-10-22

## 2023-10-17 ASSESSMENT — ENCOUNTER SYMPTOMS
COUGH: 0
SORE THROAT: 0
VOMITING: 0
SHORTNESS OF BREATH: 0
SINUS PRESSURE: 0
ABDOMINAL PAIN: 0

## 2023-10-23 PROBLEM — I10 HYPERTENSION: Status: ACTIVE | Noted: 2023-10-23

## 2023-10-23 PROBLEM — J44.9 COPD (CHRONIC OBSTRUCTIVE PULMONARY DISEASE) (MULTI): Status: ACTIVE | Noted: 2023-10-23

## 2023-10-23 PROBLEM — R06.09 DYSPNEA ON MINIMAL EXERTION: Status: ACTIVE | Noted: 2023-10-23

## 2023-10-23 PROBLEM — H60.60 CHRONIC OTITIS EXTERNA: Status: ACTIVE | Noted: 2023-10-23

## 2023-10-23 PROBLEM — G47.33 OBSTRUCTIVE SLEEP APNEA ON CPAP: Status: ACTIVE | Noted: 2023-10-23

## 2023-10-23 PROBLEM — Z86.79 HISTORY OF SUPRAVENTRICULAR TACHYCARDIA: Status: ACTIVE | Noted: 2023-10-23

## 2023-10-23 PROBLEM — I25.10 CAD (CORONARY ARTERY DISEASE): Status: ACTIVE | Noted: 2023-10-23

## 2023-10-23 PROBLEM — R06.02 SHORTNESS OF BREATH: Status: ACTIVE | Noted: 2023-10-23

## 2023-10-23 PROBLEM — I50.30 DIASTOLIC HEART FAILURE (MULTI): Status: ACTIVE | Noted: 2023-10-23

## 2023-10-23 PROBLEM — E87.6 HYPOKALEMIA: Status: ACTIVE | Noted: 2023-10-23

## 2023-10-23 PROBLEM — Z85.21 HISTORY OF LARYNGEAL CANCER: Status: ACTIVE | Noted: 2023-10-23

## 2023-10-23 PROBLEM — I20.9 ANGINA, CLASS III (CMS-HCC): Status: ACTIVE | Noted: 2023-10-23

## 2023-10-23 PROBLEM — I48.0 PAROXYSMAL ATRIAL FIBRILLATION (MULTI): Status: ACTIVE | Noted: 2023-10-23

## 2023-10-23 PROBLEM — Z79.899 HIGH RISK MEDICATION USE: Status: ACTIVE | Noted: 2023-10-23

## 2023-10-23 PROBLEM — Z87.19 HISTORY OF CROHN'S DISEASE: Status: ACTIVE | Noted: 2023-10-23

## 2023-10-23 PROBLEM — I87.2 CHRONIC VENOUS INSUFFICIENCY: Status: ACTIVE | Noted: 2023-10-23

## 2023-10-23 PROBLEM — R93.1 ELEVATED CORONARY ARTERY CALCIUM SCORE: Status: ACTIVE | Noted: 2023-10-23

## 2023-10-23 PROBLEM — R00.2 PALPITATIONS: Status: ACTIVE | Noted: 2023-10-23

## 2023-10-23 PROBLEM — E78.5 HYPERLIPEMIA: Status: ACTIVE | Noted: 2023-10-23

## 2023-10-23 RX ORDER — NYSTATIN 100000 U/G
CREAM TOPICAL 2 TIMES DAILY
COMMUNITY

## 2023-10-23 RX ORDER — MEMANTINE HYDROCHLORIDE 5 MG/1
5 TABLET ORAL 2 TIMES DAILY
COMMUNITY

## 2023-10-23 RX ORDER — POTASSIUM CHLORIDE 20 MEQ/1
20 TABLET, EXTENDED RELEASE ORAL DAILY
COMMUNITY

## 2023-10-23 RX ORDER — GABAPENTIN 300 MG/1
300 CAPSULE ORAL DAILY
COMMUNITY

## 2023-10-23 RX ORDER — METOPROLOL TARTRATE 50 MG/1
50 TABLET ORAL 2 TIMES DAILY
COMMUNITY
Start: 2022-04-14

## 2023-10-23 RX ORDER — TIOTROPIUM BROMIDE 18 UG/1
1 CAPSULE ORAL; RESPIRATORY (INHALATION)
COMMUNITY

## 2023-10-23 RX ORDER — DOXAZOSIN 2 MG/1
1 TABLET ORAL 2 TIMES DAILY
COMMUNITY

## 2023-10-23 RX ORDER — FUROSEMIDE 20 MG/1
20 TABLET ORAL DAILY
COMMUNITY

## 2023-10-23 RX ORDER — USTEKINUMAB 90 MG/ML
90 INJECTION, SOLUTION SUBCUTANEOUS
COMMUNITY

## 2023-10-23 RX ORDER — NITROGLYCERIN 0.4 MG/1
0.4 TABLET SUBLINGUAL
COMMUNITY

## 2023-10-23 RX ORDER — PRAVASTATIN SODIUM 40 MG/1
40 TABLET ORAL NIGHTLY
COMMUNITY

## 2023-10-23 RX ORDER — HYDROGEN PEROXIDE 3 %
20 SOLUTION, NON-ORAL MISCELLANEOUS DAILY PRN
COMMUNITY

## 2023-10-23 RX ORDER — TESTOSTERONE 50 MG/5G
50 GEL TRANSDERMAL
COMMUNITY
End: 2023-12-11 | Stop reason: WASHOUT

## 2023-10-23 RX ORDER — MONTELUKAST SODIUM 10 MG/1
10 TABLET ORAL DAILY
COMMUNITY

## 2023-10-23 RX ORDER — DONEPEZIL HYDROCHLORIDE 10 MG/1
10 TABLET, FILM COATED ORAL NIGHTLY
COMMUNITY
End: 2023-12-11 | Stop reason: WASHOUT

## 2023-10-23 RX ORDER — BUDESONIDE AND FORMOTEROL FUMARATE DIHYDRATE 160; 4.5 UG/1; UG/1
AEROSOL RESPIRATORY (INHALATION)
COMMUNITY

## 2023-10-24 ENCOUNTER — OFFICE VISIT (OUTPATIENT)
Dept: FAMILY MEDICINE CLINIC | Age: 73
End: 2023-10-24
Payer: MEDICARE

## 2023-10-24 VITALS
HEIGHT: 70 IN | SYSTOLIC BLOOD PRESSURE: 122 MMHG | BODY MASS INDEX: 33.5 KG/M2 | HEART RATE: 80 BPM | DIASTOLIC BLOOD PRESSURE: 70 MMHG | TEMPERATURE: 98.8 F | WEIGHT: 234 LBS | OXYGEN SATURATION: 95 %

## 2023-10-24 DIAGNOSIS — Z23 NEED FOR VACCINATION FOR H FLU TYPE B: Primary | ICD-10-CM

## 2023-10-24 DIAGNOSIS — Z23 NEED FOR INFLUENZA VACCINATION: ICD-10-CM

## 2023-10-24 PROCEDURE — 3074F SYST BP LT 130 MM HG: CPT | Performed by: STUDENT IN AN ORGANIZED HEALTH CARE EDUCATION/TRAINING PROGRAM

## 2023-10-24 PROCEDURE — 1123F ACP DISCUSS/DSCN MKR DOCD: CPT | Performed by: STUDENT IN AN ORGANIZED HEALTH CARE EDUCATION/TRAINING PROGRAM

## 2023-10-24 PROCEDURE — G8484 FLU IMMUNIZE NO ADMIN: HCPCS | Performed by: STUDENT IN AN ORGANIZED HEALTH CARE EDUCATION/TRAINING PROGRAM

## 2023-10-24 PROCEDURE — 1036F TOBACCO NON-USER: CPT | Performed by: STUDENT IN AN ORGANIZED HEALTH CARE EDUCATION/TRAINING PROGRAM

## 2023-10-24 PROCEDURE — 90694 VACC AIIV4 NO PRSRV 0.5ML IM: CPT | Performed by: STUDENT IN AN ORGANIZED HEALTH CARE EDUCATION/TRAINING PROGRAM

## 2023-10-24 PROCEDURE — 3078F DIAST BP <80 MM HG: CPT | Performed by: STUDENT IN AN ORGANIZED HEALTH CARE EDUCATION/TRAINING PROGRAM

## 2023-10-24 PROCEDURE — 99213 OFFICE O/P EST LOW 20 MIN: CPT | Performed by: STUDENT IN AN ORGANIZED HEALTH CARE EDUCATION/TRAINING PROGRAM

## 2023-10-24 PROCEDURE — G8417 CALC BMI ABV UP PARAM F/U: HCPCS | Performed by: STUDENT IN AN ORGANIZED HEALTH CARE EDUCATION/TRAINING PROGRAM

## 2023-10-24 PROCEDURE — G0008 ADMIN INFLUENZA VIRUS VAC: HCPCS | Performed by: STUDENT IN AN ORGANIZED HEALTH CARE EDUCATION/TRAINING PROGRAM

## 2023-10-24 PROCEDURE — 3017F COLORECTAL CA SCREEN DOC REV: CPT | Performed by: STUDENT IN AN ORGANIZED HEALTH CARE EDUCATION/TRAINING PROGRAM

## 2023-10-24 PROCEDURE — G8427 DOCREV CUR MEDS BY ELIG CLIN: HCPCS | Performed by: STUDENT IN AN ORGANIZED HEALTH CARE EDUCATION/TRAINING PROGRAM

## 2023-10-24 ASSESSMENT — ENCOUNTER SYMPTOMS
COUGH: 0
VOMITING: 0
SORE THROAT: 0
SHORTNESS OF BREATH: 0
SINUS PRESSURE: 0
ABDOMINAL PAIN: 0

## 2023-10-24 NOTE — PROGRESS NOTES
10/24/2023    Anatoliy Cavazos (:  1950) is a 68 y.o. male, here for evaluation of the following medical concerns:  Chief Complaint   Patient presents with    Follow-up    Rash     Left leg and groin area , pt states has cleared up      HPI  Rash  Symptoms started about a week ago  Progressing over time  Rash slightly itchy  Initially started on the thighs and spread to the trunk and arms  He is not sure if any soaps or detergents were recently changed  No recent changes in his medications  Has never had similar symptoms in the past      Started Prednisone 40mg x five days for hives    Today  Significant improvement  No new lesions    Review of Systems   Constitutional:  Negative for chills and fever. HENT:  Negative for congestion, sinus pressure and sore throat. Respiratory:  Negative for cough and shortness of breath. Cardiovascular:  Negative for chest pain and palpitations. Gastrointestinal:  Negative for abdominal pain and vomiting. Musculoskeletal:  Negative for arthralgias and myalgias. Skin:  Negative for rash and wound. Neurological:  Negative for speech difficulty and light-headedness. Psychiatric/Behavioral:  Negative for suicidal ideas. The patient is not nervous/anxious. Prior to Visit Medications    Medication Sig Taking? Authorizing Provider   nystatin (MYCOSTATIN) 941486 UNIT/GM cream Nystatin CREA APPLY AND RUB IN A THIN FILM TO AFFECTED AREAS TWICE DAILY. (AM AND PM).  Quantity: 0 Refills: 0 Ordered: 20-Sep-2023 DO Active Yes Dale Martinez MD   potassium chloride (KLOR-CON M) 20 MEQ extended release tablet Take 1 tablet by mouth daily Yes Yousif Prather DO   omeprazole (PRILOSEC) 20 MG delayed release capsule  Yes Dale Martinez MD   ELIQUIS 5 MG TABS tablet TAKE 1 TABLET BY MOUTH TWICE  DAILY Yes Arvid Bence, MD   furosemide (LASIX) 40 MG tablet Take 1 tablet by mouth daily  Patient taking differently: Take 0.5 tablets by mouth daily Yes

## 2023-10-24 NOTE — PROGRESS NOTES
After obtaining consent, and per orders of Dr. Thao Bermudez, injection of high dose flu given in Right deltoid by Molly Esteves MA. Patient instructed to remain in clinic for 20 minutes afterwards, and to report any adverse reaction to me immediately. Vaccine Information Sheet, \"Influenza - Inactivated\"  given to Juan Alberto Stevenson, or parent/legal guardian of  Juan Alberto Stevenson and verbalized understanding. Patient responses:    Have you ever had a reaction to a flu vaccine? No  Are you able to eat eggs without adverse effects? Yes  Do you have any current illness? No  Have you ever had Guillian [place] Syndrome? No    Flu vaccine given per order. Please see immunization tab.

## 2023-10-30 ENCOUNTER — NURSE ONLY (OUTPATIENT)
Dept: FAMILY MEDICINE CLINIC | Age: 73
End: 2023-10-30
Payer: MEDICARE

## 2023-10-30 DIAGNOSIS — E29.1 HYPOGONADISM MALE: Primary | ICD-10-CM

## 2023-10-30 PROCEDURE — 96372 THER/PROPH/DIAG INJ SC/IM: CPT | Performed by: FAMILY MEDICINE

## 2023-10-30 RX ORDER — TESTOSTERONE CYPIONATE 200 MG/ML
100 INJECTION, SOLUTION INTRAMUSCULAR ONCE
Status: COMPLETED | OUTPATIENT
Start: 2023-10-30 | End: 2023-10-30

## 2023-10-30 RX ADMIN — TESTOSTERONE CYPIONATE 100 MG: 200 INJECTION, SOLUTION INTRAMUSCULAR at 15:42

## 2023-10-30 NOTE — PROGRESS NOTES
Patient given Testosterone 100mg IM left gluteal..  Will return in 2 weeks for next injection    Patient tolerated injection well.     Administrations This Visit       testosterone cypionate (DEPOTESTOTERONE CYPIONATE) injection 100 mg       Admin Date  10/30/2023 Action  Given Dose  100 mg Route  IntraMUSCular Administered By  Kenyatta Chavez LPN

## 2023-11-02 ENCOUNTER — APPOINTMENT (OUTPATIENT)
Dept: CARDIOLOGY | Facility: CLINIC | Age: 73
End: 2023-11-02
Payer: MEDICARE

## 2023-11-02 ENCOUNTER — OFFICE VISIT (OUTPATIENT)
Dept: FAMILY MEDICINE CLINIC | Age: 73
End: 2023-11-02
Payer: MEDICARE

## 2023-11-02 VITALS
OXYGEN SATURATION: 98 % | SYSTOLIC BLOOD PRESSURE: 114 MMHG | WEIGHT: 245.4 LBS | DIASTOLIC BLOOD PRESSURE: 72 MMHG | TEMPERATURE: 97.9 F | HEART RATE: 81 BPM | BODY MASS INDEX: 35.13 KG/M2 | HEIGHT: 70 IN

## 2023-11-02 DIAGNOSIS — J06.9 VIRAL URI: Primary | ICD-10-CM

## 2023-11-02 PROBLEM — F17.200 NICOTINE DEPENDENCE: Status: ACTIVE | Noted: 2023-11-02

## 2023-11-02 PROCEDURE — G8427 DOCREV CUR MEDS BY ELIG CLIN: HCPCS | Performed by: NURSE PRACTITIONER

## 2023-11-02 PROCEDURE — G8484 FLU IMMUNIZE NO ADMIN: HCPCS | Performed by: NURSE PRACTITIONER

## 2023-11-02 PROCEDURE — 3074F SYST BP LT 130 MM HG: CPT | Performed by: NURSE PRACTITIONER

## 2023-11-02 PROCEDURE — G8417 CALC BMI ABV UP PARAM F/U: HCPCS | Performed by: NURSE PRACTITIONER

## 2023-11-02 PROCEDURE — 1123F ACP DISCUSS/DSCN MKR DOCD: CPT | Performed by: NURSE PRACTITIONER

## 2023-11-02 PROCEDURE — 99213 OFFICE O/P EST LOW 20 MIN: CPT | Performed by: NURSE PRACTITIONER

## 2023-11-02 PROCEDURE — 3017F COLORECTAL CA SCREEN DOC REV: CPT | Performed by: NURSE PRACTITIONER

## 2023-11-02 PROCEDURE — 3078F DIAST BP <80 MM HG: CPT | Performed by: NURSE PRACTITIONER

## 2023-11-02 PROCEDURE — 1036F TOBACCO NON-USER: CPT | Performed by: NURSE PRACTITIONER

## 2023-11-02 RX ORDER — DEXTROMETHORPHAN HYDROBROMIDE AND PROMETHAZINE HYDROCHLORIDE 15; 6.25 MG/5ML; MG/5ML
5 SYRUP ORAL 4 TIMES DAILY PRN
Qty: 150 ML | Refills: 0 | Status: SHIPPED | OUTPATIENT
Start: 2023-11-02 | End: 2023-11-10

## 2023-11-02 RX ORDER — DOXYCYCLINE HYCLATE 100 MG
100 TABLET ORAL 2 TIMES DAILY
Qty: 14 TABLET | Refills: 0 | Status: SHIPPED | OUTPATIENT
Start: 2023-11-02 | End: 2023-11-09

## 2023-11-02 RX ORDER — PREDNISONE 20 MG/1
40 TABLET ORAL DAILY
Qty: 10 TABLET | Refills: 0 | Status: SHIPPED | OUTPATIENT
Start: 2023-11-02 | End: 2023-11-07

## 2023-11-02 RX ORDER — MEMANTINE HYDROCHLORIDE 5 MG/1
TABLET ORAL
COMMUNITY
Start: 2023-10-19

## 2023-11-02 ASSESSMENT — ENCOUNTER SYMPTOMS
SHORTNESS OF BREATH: 0
NAUSEA: 0
CHEST TIGHTNESS: 0
DIARRHEA: 0
TROUBLE SWALLOWING: 0
VOMITING: 0
RHINORRHEA: 1
ABDOMINAL PAIN: 0
WHEEZING: 0
SORE THROAT: 0
COUGH: 1

## 2023-11-02 NOTE — PROGRESS NOTES
Subjective  Lily Matias, 68 y.o. male presents today with:  Chief Complaint   Patient presents with    URI     Onset- 2 days ago   Headache- N   Body aches- N   Ear ache- N   Runny/stuffy nose- Y   Problem with smell- N   Problem with taste-N   Sore throat- N   Cough- Y   Scratchy throat-N   Chest symptoms- N  SOB/ MACIEL- N  Hx of Asthma Chest cold or bronchitis- N   Fever/chills- N   Nausea/vomiting- Y   Gi symptoms- N   COVID exposures- N   Treatments- none       I reviewed staff HPI/chief complaint and do agree with above    Patient presents for concerns of symptoms as noted above that been present for 2-3 days. Patient states has had a lot of phlegm draining down the back of her throat. Does have a history of COPD and does have a chronic cough, denies any changes in severity of cough or increased production, denies any shortness of breath/troubles breathing or wheezing. Denies use of any over-the-counter medications at this time, does continue on inhalers at home. Review of Systems   Constitutional:  Negative for appetite change, chills, fatigue and fever. HENT:  Positive for congestion, postnasal drip and rhinorrhea. Negative for ear pain, sore throat and trouble swallowing. Respiratory:  Positive for cough. Negative for chest tightness, shortness of breath and wheezing. Cardiovascular:  Negative for chest pain and palpitations. Gastrointestinal:  Negative for abdominal pain, diarrhea, nausea and vomiting. Musculoskeletal:  Negative for arthralgias and myalgias. Skin:  Negative for rash. Neurological:  Negative for dizziness, weakness, light-headedness, numbness and headaches. Hematological:  Negative for adenopathy.        Past Medical History:   Diagnosis Date    A-fib [facility])     Abnormal EMG 12/09/2015    Actinic keratoses     Actinic keratoses     Acute diastolic congestive heart failure ([address]) 1/6/2021    Allergic rhinitis     Anemia 11/18/2014    Arthritis     Chronic back

## 2023-11-09 ENCOUNTER — OFFICE VISIT (OUTPATIENT)
Dept: NEUROLOGY | Age: 73
End: 2023-11-09
Payer: MEDICARE

## 2023-11-09 VITALS
WEIGHT: 239.4 LBS | HEART RATE: 73 BPM | SYSTOLIC BLOOD PRESSURE: 138 MMHG | BODY MASS INDEX: 34.35 KG/M2 | DIASTOLIC BLOOD PRESSURE: 73 MMHG

## 2023-11-09 DIAGNOSIS — F01.A0 MILD VASCULAR DEMENTIA WITHOUT BEHAVIORAL DISTURBANCE, PSYCHOTIC DISTURBANCE, MOOD DISTURBANCE, OR ANXIETY (HCC): Primary | ICD-10-CM

## 2023-11-09 DIAGNOSIS — G47.31 PRIMARY CENTRAL SLEEP APNEA: ICD-10-CM

## 2023-11-09 DIAGNOSIS — R41.9 COGNITIVE SAFETY ISSUE: ICD-10-CM

## 2023-11-09 DIAGNOSIS — Z91.89 DRIVING SAFETY ISSUE: ICD-10-CM

## 2023-11-09 PROBLEM — F17.200 NICOTINE DEPENDENCE: Status: ACTIVE | Noted: 2023-11-02

## 2023-11-09 PROBLEM — R93.1 ELEVATED CORONARY ARTERY CALCIUM SCORE: Status: ACTIVE | Noted: 2023-10-23

## 2023-11-09 PROBLEM — I87.2 CHRONIC VENOUS INSUFFICIENCY: Status: ACTIVE | Noted: 2023-10-23

## 2023-11-09 PROBLEM — R00.2 PALPITATIONS: Status: ACTIVE | Noted: 2023-10-23

## 2023-11-09 PROBLEM — E87.6 HYPOKALEMIA: Status: ACTIVE | Noted: 2023-10-23

## 2023-11-09 PROBLEM — I25.10 CORONARY ATHEROSCLEROSIS: Status: ACTIVE | Noted: 2023-10-23

## 2023-11-09 PROBLEM — I20.9 ANGINA, CLASS III (HCC): Status: ACTIVE | Noted: 2023-10-23

## 2023-11-09 PROBLEM — Z77.098 EXPOSURE TO AGENT ORANGE: Status: ACTIVE | Noted: 2023-11-09

## 2023-11-09 PROCEDURE — 1036F TOBACCO NON-USER: CPT | Performed by: PSYCHIATRY & NEUROLOGY

## 2023-11-09 PROCEDURE — 3078F DIAST BP <80 MM HG: CPT | Performed by: PSYCHIATRY & NEUROLOGY

## 2023-11-09 PROCEDURE — 1123F ACP DISCUSS/DSCN MKR DOCD: CPT | Performed by: PSYCHIATRY & NEUROLOGY

## 2023-11-09 PROCEDURE — G8417 CALC BMI ABV UP PARAM F/U: HCPCS | Performed by: PSYCHIATRY & NEUROLOGY

## 2023-11-09 PROCEDURE — G8484 FLU IMMUNIZE NO ADMIN: HCPCS | Performed by: PSYCHIATRY & NEUROLOGY

## 2023-11-09 PROCEDURE — 3074F SYST BP LT 130 MM HG: CPT | Performed by: PSYCHIATRY & NEUROLOGY

## 2023-11-09 PROCEDURE — 99214 OFFICE O/P EST MOD 30 MIN: CPT | Performed by: PSYCHIATRY & NEUROLOGY

## 2023-11-09 PROCEDURE — G8427 DOCREV CUR MEDS BY ELIG CLIN: HCPCS | Performed by: PSYCHIATRY & NEUROLOGY

## 2023-11-09 PROCEDURE — 3017F COLORECTAL CA SCREEN DOC REV: CPT | Performed by: PSYCHIATRY & NEUROLOGY

## 2023-11-09 RX ORDER — MEMANTINE HYDROCHLORIDE 5 MG/1
5 TABLET ORAL 2 TIMES DAILY
Qty: 180 TABLET | Refills: 1 | Status: SHIPPED | OUTPATIENT
Start: 2023-11-09

## 2023-11-09 RX ORDER — MEMANTINE HYDROCHLORIDE 5 MG/1
5 TABLET ORAL 2 TIMES DAILY
COMMUNITY
End: 2023-11-09 | Stop reason: SDUPTHER

## 2023-11-09 ASSESSMENT — ENCOUNTER SYMPTOMS
SHORTNESS OF BREATH: 0
CHOKING: 0
TROUBLE SWALLOWING: 0
PHOTOPHOBIA: 0
BACK PAIN: 0
VOMITING: 0
NAUSEA: 0
COLOR CHANGE: 0

## 2023-11-09 NOTE — PROGRESS NOTES
Subjective:      Patient ID: Radonna Libman is a 68 y.o. male who presents today for:  Chief Complaint   Patient presents with    Follow-up     Pts states he is doing okay. Pts son states that his amount of forgetting has increased. He states that his dad is in denial and doesn't think that he has the dementia. Son states that he is not able to hold on to details of anything for very long. He had to come off of the donepezil because it was causing problems with his stomach. He is no getting in home care 3 times a week for a few hours to help with household things and care as well. Son states that finances are done by the children and meds also    Other     Pts son is questioning how long he may be able to drive for. He states that [place] [employer], [place], [place], and [place]. He states that there is a tracker on his phone as well. He states that his reaction time has slowed some but not to bad yet. 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 Patient actually is doing well without any major changes he is on Eliquis without any further strokes. This is still psychotic features with anxiety. And actually doing better with no behavioral issues patient had some GI issues with Aricept which we changed up. He still lives alone and his grandson actually organizes people to come in 3 times a week to help him. He forgets most of the conversations and that goes with driving. He has a 's assessment pending. There appears to be a session of a gun in the house for protection and we had a lengthy discussion regarding this and he should not be a candidate for the same. He uses a CPAP machine or is supposed to use 1. His medication list is noted and he does have a reminders for the same as well. In his own environment he is still functioning well he sleeps about 6 to 8 hours. He [place] MRI is now inserted so he is well looked after that way.   He

## 2023-11-13 ENCOUNTER — NURSE ONLY (OUTPATIENT)
Dept: FAMILY MEDICINE CLINIC | Age: 73
End: 2023-11-13

## 2023-11-13 DIAGNOSIS — E29.1 HYPOGONADISM MALE: Primary | ICD-10-CM

## 2023-11-13 RX ORDER — TESTOSTERONE CYPIONATE 200 MG/ML
100 INJECTION, SOLUTION INTRAMUSCULAR ONCE
Status: COMPLETED | OUTPATIENT
Start: 2023-11-13 | End: 2023-11-13

## 2023-11-13 RX ADMIN — TESTOSTERONE CYPIONATE 100 MG: 200 INJECTION, SOLUTION INTRAMUSCULAR at 15:59

## 2023-11-13 NOTE — PROGRESS NOTES
Patient given Testosterone 100mg IM right gluteal..  Will return in 2 weeks for next injection    Patient tolerated injection well.     Administrations This Visit       testosterone cypionate (DEPOTESTOTERONE CYPIONATE) injection 100 mg       Admin Date  11/13/2023 Action  Given Dose  100 mg Route  IntraMUSCular Administered By  Polo Marrero LPN

## 2023-11-18 DIAGNOSIS — E78.5 HYPERLIPIDEMIA, UNSPECIFIED HYPERLIPIDEMIA TYPE: ICD-10-CM

## 2023-11-19 NOTE — TELEPHONE ENCOUNTER
Comments:     Last Office Visit (last PCP visit):   8/16/2023    Next Visit Date:  Future Appointments   Date Time Provider [address]   11/21/2023  1:30 PM Lupe Collado MD [address]   11/27/2023  4:00 PM SCHEDULE, JESSICA GÓMEZ PCP [address]   3/22/2024 12:15 PM Holiday, Vanna Haynes, DO [address] [facility]   5/9/2024  4:00 PM Brittney Marques MD [facility] Neu[facility] -       **If hasn't been seen in over a year OR hasn't followed up according to last diabetes/ADHD visit, make appointment for patient before sending refill to provider.     Rx requested:  Requested Prescriptions     Pending Prescriptions Disp Refills    pravastatin (PRAVACHOL) 40 MG tablet [Pharmacy Med Name: Pravastatin Sodium 40 MG Oral Tablet] 90 tablet 3     Sig: TAKE 1 TABLET BY MOUTH DAILY

## 2023-11-20 RX ORDER — PRAVASTATIN SODIUM 40 MG
40 TABLET ORAL DAILY
Qty: 90 TABLET | Refills: 3 | Status: SHIPPED | OUTPATIENT
Start: 2023-11-20

## 2023-11-21 ENCOUNTER — OFFICE VISIT (OUTPATIENT)
Dept: FAMILY MEDICINE CLINIC | Age: 73
End: 2023-11-21
Payer: MEDICARE

## 2023-11-21 VITALS
WEIGHT: 244.4 LBS | DIASTOLIC BLOOD PRESSURE: 62 MMHG | SYSTOLIC BLOOD PRESSURE: 110 MMHG | TEMPERATURE: 97.8 F | HEART RATE: 83 BPM | BODY MASS INDEX: 34.99 KG/M2 | HEIGHT: 70 IN | OXYGEN SATURATION: 96 %

## 2023-11-21 DIAGNOSIS — E29.1 HYPOGONADISM MALE: ICD-10-CM

## 2023-11-21 DIAGNOSIS — E55.9 VITAMIN D DEFICIENCY: ICD-10-CM

## 2023-11-21 DIAGNOSIS — I10 ESSENTIAL HYPERTENSION: ICD-10-CM

## 2023-11-21 DIAGNOSIS — R09.82 POST-NASAL DRAINAGE: Primary | ICD-10-CM

## 2023-11-21 LAB
ALBUMIN SERPL-MCNC: 3.4 G/DL (ref 3.5–4.6)
ALP SERPL-CCNC: 78 U/L (ref 35–104)
ALT SERPL-CCNC: 8 U/L (ref 0–41)
ANION GAP SERPL CALCULATED.3IONS-SCNC: 6 MEQ/L (ref 9–15)
AST SERPL-CCNC: 21 U/L (ref 0–40)
BASOPHILS # BLD: 0 K/UL (ref 0–0.2)
BASOPHILS NFR BLD: 0.4 %
BILIRUB SERPL-MCNC: 0.4 MG/DL (ref 0.2–0.7)
BUN SERPL-MCNC: 14 MG/DL (ref 8–23)
CALCIUM SERPL-MCNC: 8.8 MG/DL (ref 8.5–9.9)
CHLORIDE SERPL-SCNC: 106 MEQ/L (ref 95–107)
CO2 SERPL-SCNC: 28 MEQ/L (ref 20–31)
CREAT SERPL-MCNC: 0.69 MG/DL (ref 0.7–1.2)
EOSINOPHIL # BLD: 0.3 K/UL (ref 0–0.7)
EOSINOPHIL NFR BLD: 3 %
ERYTHROCYTE [DISTWIDTH] IN BLOOD BY AUTOMATED COUNT: 14.1 % (ref 11.5–14.5)
GLOBULIN SER CALC-MCNC: 2.9 G/DL (ref 2.3–3.5)
GLUCOSE SERPL-MCNC: 105 MG/DL (ref 70–99)
HCT VFR BLD AUTO: 44 % (ref 42–52)
HGB BLD-MCNC: 14 G/DL (ref 14–18)
LYMPHOCYTES # BLD: 2.1 K/UL (ref 1–4.8)
LYMPHOCYTES NFR BLD: 23.7 %
MCH RBC QN AUTO: 27.8 PG (ref 27–31.3)
MCHC RBC AUTO-ENTMCNC: 31.8 % (ref 33–37)
MCV RBC AUTO: 87.5 FL (ref 79–92.2)
MONOCYTES # BLD: 1.1 K/UL (ref 0.2–0.8)
MONOCYTES NFR BLD: 12.8 %
NEUTROPHILS # BLD: 5.3 K/UL (ref 1.4–6.5)
NEUTS SEG NFR BLD: 59.8 %
PLATELET # BLD AUTO: 185 K/UL (ref 130–400)
POTASSIUM SERPL-SCNC: 4.6 MEQ/L (ref 3.4–4.9)
PROT SERPL-MCNC: 6.3 G/DL (ref 6.3–8)
RBC # BLD AUTO: 5.03 M/UL (ref 4.7–6.1)
SODIUM SERPL-SCNC: 140 MEQ/L (ref 135–144)
WBC # BLD AUTO: 8.9 K/UL (ref 4.8–10.8)

## 2023-11-21 PROCEDURE — 3078F DIAST BP <80 MM HG: CPT | Performed by: FAMILY MEDICINE

## 2023-11-21 PROCEDURE — 1123F ACP DISCUSS/DSCN MKR DOCD: CPT | Performed by: FAMILY MEDICINE

## 2023-11-21 PROCEDURE — 3017F COLORECTAL CA SCREEN DOC REV: CPT | Performed by: FAMILY MEDICINE

## 2023-11-21 PROCEDURE — 1036F TOBACCO NON-USER: CPT | Performed by: FAMILY MEDICINE

## 2023-11-21 PROCEDURE — G8484 FLU IMMUNIZE NO ADMIN: HCPCS | Performed by: FAMILY MEDICINE

## 2023-11-21 PROCEDURE — G8427 DOCREV CUR MEDS BY ELIG CLIN: HCPCS | Performed by: FAMILY MEDICINE

## 2023-11-21 PROCEDURE — G8417 CALC BMI ABV UP PARAM F/U: HCPCS | Performed by: FAMILY MEDICINE

## 2023-11-21 PROCEDURE — 3074F SYST BP LT 130 MM HG: CPT | Performed by: FAMILY MEDICINE

## 2023-11-21 PROCEDURE — 99214 OFFICE O/P EST MOD 30 MIN: CPT | Performed by: FAMILY MEDICINE

## 2023-11-21 RX ORDER — GUAIFENESIN 600 MG/1
600 TABLET, EXTENDED RELEASE ORAL 2 TIMES DAILY
Qty: 30 TABLET | Refills: 0 | Status: SHIPPED | OUTPATIENT
Start: 2023-11-21 | End: 2023-12-06

## 2023-11-21 NOTE — PROGRESS NOTES
Diagnosis Orders   1. Post-nasal drainage  guaiFENesin (MUCINEX) 600 MG extended release tablet      2. Hypogonadism male  Testosterone, free, total      3. Essential hypertension  CBC with Auto Differential    Comprehensive Metabolic Panel      4. Vitamin D deficiency  Vitamin D 25 Hydroxy        Return in about 3 months (around 2/21/2024) for for review of outcome of today's recommendation. Patient Instructions   For current condition of congestion will recommend Mucinex twice a day. Sent to the pharmacy. No change in blood pressure medications at this time. Sending patient directly to the lab to have testosterone level and vitamin D level checked since we need up-to-date labs    Subjective:      Patient ID: Nati Pro is a 68 y.o. male who presents for:  Chief Complaint   Patient presents with    Hypertension       Address hcc's     Diabetes    Discuss Medications     Seen script for stelara - pt states that he is not taking this. Ear Fullness                                                                                                                                                    Patient states he has been getting congestion and thick mucus again. Making him cough more. Has not had his testosterone level done in a while.     Here for routine check of blood pressure        Current Outpatient Medications on File Prior to Visit   Medication Sig Dispense Refill    pravastatin (PRAVACHOL) 40 MG tablet TAKE 1 TABLET BY MOUTH DAILY 90 tablet 3    memantine (NAMENDA) 5 MG tablet Take 1 tablet by mouth 2 times daily 180 tablet 1    nystatin (MYCOSTATIN) [id number] UNIT/GM cream       potassium chloride (KLOR-CON M) 20 MEQ extended release tablet Take 1 tablet by mouth daily 90 tablet 3    omeprazole (PRILOSEC) 20 MG delayed release capsule       ELIQUIS 5 MG TABS tablet TAKE 1 TABLET BY MOUTH TWICE  DAILY 180 tablet 3    furosemide (LASIX) 40 MG tablet Take 1 tablet by mouth daily 90 tablet 3

## 2023-11-22 LAB
SHBG SERPL-SCNC: 57 NMOL/L (ref 11–80)
TESTOST FREE SERPL-MCNC: 39.6 PG/ML (ref 47–244)
TESTOST SERPL-MCNC: 291 NG/DL (ref 220–1000)
VITAMIN D 25-HYDROXY: 9.8 NG/ML

## 2023-11-28 ASSESSMENT — ENCOUNTER SYMPTOMS
CHEST TIGHTNESS: 0
PHOTOPHOBIA: 0
ABDOMINAL PAIN: 0
COUGH: 1
SHORTNESS OF BREATH: 0
ABDOMINAL DISTENTION: 0

## 2023-11-28 NOTE — PATIENT INSTRUCTIONS
For current condition of congestion will recommend Mucinex twice a day. Sent to the pharmacy. No change in blood pressure medications at this time.     Sending patient directly to the lab to have testosterone level and vitamin D level checked since we need up-to-date labs

## 2023-11-29 DIAGNOSIS — E55.9 VITAMIN D DEFICIENCY: Primary | ICD-10-CM

## 2023-11-29 NOTE — RESULT ENCOUNTER NOTE
Notify patient vitamin D level is significantly low.  Following recommendation noted.    Prescription for vitamin D 5000 international units daily will be called into pharmacy.  Repeat lab for vitamin D in 3 months will be ordered. hx of prior dellen formation and possible corneal melt with progressive thinning.

## 2023-12-01 DIAGNOSIS — E55.9 VITAMIN D DEFICIENCY: Primary | ICD-10-CM

## 2023-12-04 ENCOUNTER — NURSE ONLY (OUTPATIENT)
Dept: FAMILY MEDICINE CLINIC | Age: 73
End: 2023-12-04
Payer: MEDICARE

## 2023-12-04 DIAGNOSIS — E29.1 HYPOGONADISM MALE: Primary | ICD-10-CM

## 2023-12-04 PROCEDURE — 96372 THER/PROPH/DIAG INJ SC/IM: CPT | Performed by: FAMILY MEDICINE

## 2023-12-04 RX ORDER — TESTOSTERONE CYPIONATE 200 MG/ML
100 INJECTION, SOLUTION INTRAMUSCULAR ONCE
Status: COMPLETED | OUTPATIENT
Start: 2023-12-04 | End: 2023-12-04

## 2023-12-04 RX ADMIN — TESTOSTERONE CYPIONATE 100 MG: 200 INJECTION, SOLUTION INTRAMUSCULAR at 15:28

## 2023-12-04 NOTE — PROGRESS NOTES
Patient given Testosterone 100mg IM left gluteal..  Will return in 2 weeks for next injection    Patient tolerated injection well.     Administrations This Visit       testosterone cypionate (DEPOTESTOTERONE CYPIONATE) injection 100 mg       Admin Date  12/04/2023 Action  Given Dose  100 mg Route  IntraMUSCular Administered By  Nohelia Gama LPN

## 2023-12-06 ENCOUNTER — APPOINTMENT (OUTPATIENT)
Dept: CARDIOLOGY | Facility: CLINIC | Age: 73
End: 2023-12-06
Payer: MEDICARE

## 2023-12-11 ENCOUNTER — OFFICE VISIT (OUTPATIENT)
Dept: CARDIOLOGY | Facility: CLINIC | Age: 73
End: 2023-12-11
Payer: MEDICARE

## 2023-12-11 VITALS
HEIGHT: 68 IN | WEIGHT: 241 LBS | HEART RATE: 72 BPM | BODY MASS INDEX: 36.53 KG/M2 | DIASTOLIC BLOOD PRESSURE: 78 MMHG | SYSTOLIC BLOOD PRESSURE: 128 MMHG

## 2023-12-11 DIAGNOSIS — I48.0 PAROXYSMAL ATRIAL FIBRILLATION (MULTI): ICD-10-CM

## 2023-12-11 DIAGNOSIS — E66.09 CLASS 2 OBESITY DUE TO EXCESS CALORIES WITH BODY MASS INDEX (BMI) OF 36.0 TO 36.9 IN ADULT, UNSPECIFIED WHETHER SERIOUS COMORBIDITY PRESENT: ICD-10-CM

## 2023-12-11 DIAGNOSIS — Z87.19 HISTORY OF CROHN'S DISEASE: ICD-10-CM

## 2023-12-11 DIAGNOSIS — I87.2 CHRONIC VENOUS INSUFFICIENCY: ICD-10-CM

## 2023-12-11 DIAGNOSIS — E78.2 MIXED HYPERLIPIDEMIA: ICD-10-CM

## 2023-12-11 DIAGNOSIS — Z87.891 FORMER SMOKER: ICD-10-CM

## 2023-12-11 DIAGNOSIS — G47.33 OBSTRUCTIVE SLEEP APNEA ON CPAP: ICD-10-CM

## 2023-12-11 DIAGNOSIS — J44.9 CHRONIC OBSTRUCTIVE PULMONARY DISEASE, UNSPECIFIED COPD TYPE (MULTI): ICD-10-CM

## 2023-12-11 DIAGNOSIS — I10 PRIMARY HYPERTENSION: ICD-10-CM

## 2023-12-11 DIAGNOSIS — I25.10 CORONARY ARTERY DISEASE INVOLVING NATIVE CORONARY ARTERY OF NATIVE HEART WITHOUT ANGINA PECTORIS: ICD-10-CM

## 2023-12-11 DIAGNOSIS — I50.32 CHRONIC DIASTOLIC HEART FAILURE (MULTI): ICD-10-CM

## 2023-12-11 PROBLEM — E66.812 CLASS 2 OBESITY DUE TO EXCESS CALORIES WITH BODY MASS INDEX (BMI) OF 36.0 TO 36.9 IN ADULT: Status: ACTIVE | Noted: 2023-12-11

## 2023-12-11 PROBLEM — F03.90 DEMENTIA (MULTI): Status: ACTIVE | Noted: 2023-12-11

## 2023-12-11 PROCEDURE — 1159F MED LIST DOCD IN RCRD: CPT | Performed by: INTERNAL MEDICINE

## 2023-12-11 PROCEDURE — 3008F BODY MASS INDEX DOCD: CPT | Performed by: INTERNAL MEDICINE

## 2023-12-11 PROCEDURE — 1036F TOBACCO NON-USER: CPT | Performed by: INTERNAL MEDICINE

## 2023-12-11 PROCEDURE — 3074F SYST BP LT 130 MM HG: CPT | Performed by: INTERNAL MEDICINE

## 2023-12-11 PROCEDURE — 3078F DIAST BP <80 MM HG: CPT | Performed by: INTERNAL MEDICINE

## 2023-12-11 PROCEDURE — 99214 OFFICE O/P EST MOD 30 MIN: CPT | Performed by: INTERNAL MEDICINE

## 2023-12-11 RX ORDER — CHOLECALCIFEROL (VITAMIN D3) 125 MCG
125 CAPSULE ORAL DAILY
COMMUNITY
Start: 2023-11-29

## 2023-12-11 RX ORDER — TESTOSTERONE CYPIONATE 1000 MG/10ML
200 INJECTION, SOLUTION INTRAMUSCULAR
COMMUNITY

## 2023-12-11 NOTE — PATIENT INSTRUCTIONS
Follow up office visit in 1 year.    Continue same medications/treatment.  Patient educated on proper medication use.  Please bring all medicines, vitamins and herbal supplements with you when you come to the office.    I, Ema Max LPN, am scribing for and in the presence of Dr. Carloz Fitzgerald MD, FACC

## 2023-12-11 NOTE — PROGRESS NOTES
CARDIOLOGY OFFICE VISIT      CHIEF COMPLAINT      HISTORY OF PRESENT ILLNESS  The patient states that he has been doing well.  He denies chest discomfort or symptoms of myocardial ischemia.  He denies any significant problem with dyspnea.  Jin uncertain about his history because of his dementia but his family concurs with this.  They state that the swelling in his legs is much less.  He really has had no problems.      Impression:  Chronic diastolic congestive heart failure  Hypertension  Hyperlipidemia  COPD  Former smoker  Obstructive sleep apnea being managed with CPAP  obesity  Coronary artery disease , elevated coronary artery CT calcium score, 3062  Paroxysmal atrial fibrillation  Chronic venous insufficiency of lower extremities bilaterally  Crohn's disease  Vascular dementia        Please excuse any errors in grammar or translation related to this dictation. Voice recognition software was utilized to prepare this document.       Past Medical History  Past Medical History:   Diagnosis Date    Personal history of other endocrine, nutritional and metabolic disease 10/11/2021    History of hyperlipidemia       Social History  Social History     Tobacco Use    Smoking status: Former     Packs/day: 1.00     Years: 30.00     Additional pack years: 0.00     Total pack years: 30.00     Types: Cigarettes     Quit date:      Years since quittin.9    Smokeless tobacco: Never   Substance Use Topics    Alcohol use: Not Currently    Drug use: Never       Family History     Family History   Problem Relation Name Age of Onset    Coronary artery disease Mother      Other (arteriosclerotic cardiovascular disease) Father      Coronary artery disease Father      Uterine cancer Sister          Allergies:  Allergies   Allergen Reactions    Mercaptopurine Unknown        Outpatient Medications:  Current Outpatient Medications   Medication Instructions    albuterol 90 mcg/actuation aerosol powdr breath activated inhaler 2  puffs, inhalation, Every 6 hours PRN    apixaban (ELIQUIS) 5 mg, oral, 2 times daily    budesonide-formoteroL (Symbicort) 160-4.5 mcg/actuation inhaler inhalation, Use as directed    cholecalciferol (VITAMIN D-3) 125 mcg, oral, Daily    doxazosin (Cardura) 2 mg tablet 1 tablet, oral, 2 times daily    esomeprazole (NEXIUM) 20 mg, oral, Daily PRN, Do not open capsule.    furosemide (LASIX) 20 mg, oral, Daily    gabapentin (NEURONTIN) 300 mg, oral, Daily    memantine (NAMENDA) 5 mg, oral, 2 times daily    mesalamine (PENTASA) 250 mg, oral, 4 times daily, Do not crush, chew, or split.    metoprolol tartrate (LOPRESSOR) 50 mg, oral, 2 times daily    montelukast (SINGULAIR) 10 mg, oral, Daily    nitroglycerin (NITROSTAT) 0.4 mg, sublingual    nystatin (Mycostatin) cream Topical, 2 times daily    potassium chloride CR 20 mEq ER tablet 20 mEq, oral, Daily, Do not crush or chew.    pravastatin (PRAVACHOL) 40 mg, oral, Nightly    Stelara 90 mg, subcutaneous, Use as directed    testosterone cypionate (DEPO-TESTOSTERONE) 200 mg, intramuscular, Every 14 days    tiotropium (Spiriva) 18 mcg inhalation capsule 1 capsule, inhalation, Daily RT          REVIEW OF SYSTEMS  Review of Systems   All other systems reviewed and are negative.        VITALS  Vitals:    12/11/23 1507   BP: 128/78   Pulse: 72       PHYSICAL EXAM  Constitutional:       Appearance: Healthy appearance. Not in distress.   Eyes:      Conjunctiva/sclera: Conjunctivae normal.      Pupils: Pupils are equal, round, and reactive to light.   Neck:      Vascular: No JVR. JVD normal.   Pulmonary:      Effort: Pulmonary effort is normal.      Breath sounds: Normal breath sounds. No wheezing. No rhonchi. No rales.   Chest:      Chest wall: Not tender to palpatation.   Cardiovascular:      PMI at left midclavicular line. Normal rate. Regular rhythm. Normal S1. Normal S2.       Murmurs: There is no murmur.      No gallop.  No click. No rub.   Pulses:     Intact distal pulses.    Edema:     Peripheral edema absent.   Abdominal:      Tenderness: There is no abdominal tenderness.   Musculoskeletal: Normal range of motion.         General: No tenderness.      Cervical back: Normal range of motion. Skin:     General: Skin is warm and dry.   Neurological:      General: No focal deficit present.      Mental Status: Alert and oriented to person, place and time.           ASSESSMENT AND PLAN  Diagnoses and all orders for this visit:  Chronic diastolic heart failure (CMS/HCC)  Primary hypertension  Mixed hyperlipidemia  Chronic obstructive pulmonary disease, unspecified COPD type (CMS/HCC)  Former smoker  Obstructive sleep apnea on CPAP  Coronary artery disease involving native coronary artery of native heart without angina pectoris  Paroxysmal atrial fibrillation (CMS/HCC)  Chronic venous insufficiency  History of Crohn's disease      [unfilled]

## 2024-01-02 ENCOUNTER — NURSE ONLY (OUTPATIENT)
Dept: FAMILY MEDICINE CLINIC | Age: 74
End: 2024-01-02
Payer: MEDICARE

## 2024-01-02 DIAGNOSIS — E29.1 HYPOGONADISM MALE: Primary | ICD-10-CM

## 2024-01-02 PROCEDURE — 96372 THER/PROPH/DIAG INJ SC/IM: CPT | Performed by: FAMILY MEDICINE

## 2024-01-02 RX ORDER — TESTOSTERONE CYPIONATE 200 MG/ML
100 INJECTION, SOLUTION INTRAMUSCULAR ONCE
Status: COMPLETED | OUTPATIENT
Start: 2024-01-02 | End: 2024-01-02

## 2024-01-02 RX ADMIN — TESTOSTERONE CYPIONATE 100 MG: 200 INJECTION, SOLUTION INTRAMUSCULAR at 15:33

## 2024-01-02 NOTE — PROGRESS NOTES
Patient given Testosterone 100mg IM left gluteal..  Will return in 2 weeks for next injection    Patient tolerated injection well.    Administrations This Visit       testosterone cypionate (DEPOTESTOTERONE CYPIONATE) injection 100 mg       Admin Date  01/02/2024 Action  Given Dose  100 mg Route  IntraMUSCular Administered By  Yumiko Carlson LPN

## 2024-01-05 DIAGNOSIS — I26.99 PULMONARY EMBOLISM, OTHER, UNSPECIFIED CHRONICITY, UNSPECIFIED WHETHER ACUTE COR PULMONALE PRESENT (HCC): ICD-10-CM

## 2024-01-05 NOTE — TELEPHONE ENCOUNTER
Future Appointments    Encounter Information   Provider Department Appt Notes                     2/21/2024 Sher Galloway MD [facility] Primary Care Return in about 3 months (around 2/21/2024) for for review of outcome of today's recommendation.   3/22/2024 Yousif Prather DO [facility] Cardiology 8 month follow up   5/9/2024 Didier Kerr MD [facility] Neurology 6 MOS FUP     Past Visits    Date Provider Specialty Visit Type Primary Dx   01/02/2024  Family Medicine Nurse Only Hypogonadism male   12/20/2023 Yudith Greer, APRN - CNP Family Medicine Office Visit Intertrigo   12/18/2023  Family Medicine Nurse Only Hypogonadism male   12/04/2023  Family Medicine Nurse Only Hypogonadism male   11/21/2023 Sher Galloway MD Family Medicine Office Visit Post-nasal drainage

## 2024-01-09 ENCOUNTER — TELEPHONE (OUTPATIENT)
Dept: FAMILY MEDICINE CLINIC | Age: 74
End: 2024-01-09

## 2024-01-09 NOTE — TELEPHONE ENCOUNTER
Pt son calling requesting a letter to excuse the pt from Jury duty. Please advise. Son phone number is [phone].

## 2024-01-15 ENCOUNTER — NURSE ONLY (OUTPATIENT)
Dept: FAMILY MEDICINE CLINIC | Age: 74
End: 2024-01-15
Payer: MEDICARE

## 2024-01-15 DIAGNOSIS — E29.1 HYPOGONADISM MALE: Primary | ICD-10-CM

## 2024-01-15 PROCEDURE — 96372 THER/PROPH/DIAG INJ SC/IM: CPT | Performed by: FAMILY MEDICINE

## 2024-01-15 RX ORDER — TESTOSTERONE CYPIONATE 200 MG/ML
100 INJECTION, SOLUTION INTRAMUSCULAR ONCE
Status: COMPLETED | OUTPATIENT
Start: 2024-01-15 | End: 2024-01-15

## 2024-01-15 RX ADMIN — TESTOSTERONE CYPIONATE 100 MG: 200 INJECTION, SOLUTION INTRAMUSCULAR at 16:31

## 2024-01-15 NOTE — PROGRESS NOTES
Patient given Testosterone 100mg IM right gluteal..  Will return in 2 weeks for next injection    Patient tolerated injection well.    Administrations This Visit       testosterone cypionate (DEPOTESTOTERONE CYPIONATE) injection 100 mg       Admin Date  01/15/2024 Action  Given Dose  100 mg Route  IntraMUSCular Administered By  Yumiko Carlson LPN

## 2024-01-23 ENCOUNTER — OFFICE VISIT (OUTPATIENT)
Dept: FAMILY MEDICINE CLINIC | Age: 74
End: 2024-01-23
Payer: MEDICARE

## 2024-01-23 VITALS
OXYGEN SATURATION: 93 % | DIASTOLIC BLOOD PRESSURE: 64 MMHG | TEMPERATURE: 97.7 F | HEIGHT: 70 IN | SYSTOLIC BLOOD PRESSURE: 102 MMHG | WEIGHT: 241 LBS | HEART RATE: 91 BPM | BODY MASS INDEX: 34.5 KG/M2

## 2024-01-23 DIAGNOSIS — J44.9 CHRONIC OBSTRUCTIVE PULMONARY DISEASE, UNSPECIFIED COPD TYPE (HCC): ICD-10-CM

## 2024-01-23 DIAGNOSIS — I48.0 PAROXYSMAL ATRIAL FIBRILLATION (HCC): ICD-10-CM

## 2024-01-23 DIAGNOSIS — R05.2 SUBACUTE COUGH: ICD-10-CM

## 2024-01-23 DIAGNOSIS — I50.32 CHRONIC DIASTOLIC HEART FAILURE (HCC): ICD-10-CM

## 2024-01-23 DIAGNOSIS — E11.40 TYPE 2 DIABETES MELLITUS WITH DIABETIC NEUROPATHY, WITHOUT LONG-TERM CURRENT USE OF INSULIN (HCC): Primary | ICD-10-CM

## 2024-01-23 PROBLEM — I20.9 ANGINA, CLASS III (HCC): Status: RESOLVED | Noted: 2023-10-23 | Resolved: 2024-01-23

## 2024-01-23 LAB — HBA1C MFR BLD: 5.5 %

## 2024-01-23 PROCEDURE — 3078F DIAST BP <80 MM HG: CPT | Performed by: FAMILY MEDICINE

## 2024-01-23 PROCEDURE — 99214 OFFICE O/P EST MOD 30 MIN: CPT | Performed by: FAMILY MEDICINE

## 2024-01-23 PROCEDURE — 3044F HG A1C LEVEL LT 7.0%: CPT | Performed by: FAMILY MEDICINE

## 2024-01-23 PROCEDURE — 1036F TOBACCO NON-USER: CPT | Performed by: FAMILY MEDICINE

## 2024-01-23 PROCEDURE — 3023F SPIROM DOC REV: CPT | Performed by: FAMILY MEDICINE

## 2024-01-23 PROCEDURE — 83036 HEMOGLOBIN GLYCOSYLATED A1C: CPT | Performed by: FAMILY MEDICINE

## 2024-01-23 PROCEDURE — 3017F COLORECTAL CA SCREEN DOC REV: CPT | Performed by: FAMILY MEDICINE

## 2024-01-23 PROCEDURE — G8417 CALC BMI ABV UP PARAM F/U: HCPCS | Performed by: FAMILY MEDICINE

## 2024-01-23 PROCEDURE — 3074F SYST BP LT 130 MM HG: CPT | Performed by: FAMILY MEDICINE

## 2024-01-23 PROCEDURE — 2022F DILAT RTA XM EVC RTNOPTHY: CPT | Performed by: FAMILY MEDICINE

## 2024-01-23 PROCEDURE — G8484 FLU IMMUNIZE NO ADMIN: HCPCS | Performed by: FAMILY MEDICINE

## 2024-01-23 PROCEDURE — 1123F ACP DISCUSS/DSCN MKR DOCD: CPT | Performed by: FAMILY MEDICINE

## 2024-01-23 PROCEDURE — G8427 DOCREV CUR MEDS BY ELIG CLIN: HCPCS | Performed by: FAMILY MEDICINE

## 2024-01-23 RX ORDER — GUAIFENESIN 600 MG/1
600 TABLET, EXTENDED RELEASE ORAL 2 TIMES DAILY
Qty: 30 TABLET | Refills: 0 | Status: SHIPPED | OUTPATIENT
Start: 2024-01-23 | End: 2024-02-07

## 2024-01-23 RX ORDER — FUROSEMIDE 20 MG/1
20 TABLET ORAL 2 TIMES DAILY
COMMUNITY

## 2024-01-23 ASSESSMENT — PATIENT HEALTH QUESTIONNAIRE - PHQ9
SUM OF ALL RESPONSES TO PHQ9 QUESTIONS 1 & 2: 0
SUM OF ALL RESPONSES TO PHQ QUESTIONS 1-9: 0
1. LITTLE INTEREST OR PLEASURE IN DOING THINGS: 0
SUM OF ALL RESPONSES TO PHQ QUESTIONS 1-9: 0
2. FEELING DOWN, DEPRESSED OR HOPELESS: 0
SUM OF ALL RESPONSES TO PHQ QUESTIONS 1-9: 0
SUM OF ALL RESPONSES TO PHQ QUESTIONS 1-9: 0

## 2024-01-23 ASSESSMENT — ENCOUNTER SYMPTOMS
ABDOMINAL PAIN: 0
PHOTOPHOBIA: 0
ABDOMINAL DISTENTION: 0
SHORTNESS OF BREATH: 0
CHEST TIGHTNESS: 0

## 2024-01-23 NOTE — PROGRESS NOTES
this visit was spent coordinating current care, obtaining information for prior records, and counseling for current plan of action.           Assessment:       Diagnosis Orders   1. Type 2 diabetes mellitus with diabetic neuropathy, without long-term current use of insulin (HCC)  POCT glycosylated hemoglobin (Hb A1C)      2. Subacute cough  guaiFENesin (MUCINEX) 600 MG extended release tablet      3. Paroxysmal atrial fibrillation (HCC)        4. Chronic diastolic heart failure (HCC)        5. Chronic obstructive pulmonary disease, unspecified COPD type ([facility])              Orders Placed This Encounter   Procedures    POCT glycosylated hemoglobin (Hb A1C)       Orders Placed This Encounter   Medications    guaiFENesin (MUCINEX) 600 MG extended release tablet     Sig: Take 1 tablet by mouth 2 times daily for 15 days     Dispense:  30 tablet     Refill:  0          Medication List            Accurate as of January 23, 2024  2:20 PM. If you have any questions, ask your nurse or doctor.                START taking these medications      guaiFENesin 600 MG extended release tablet  Commonly known as: MUCINEX  Take 1 tablet by mouth 2 times daily for 15 days  Started by: Sher Galloway MD            CONTINUE taking these medications      * albuterol sulfate  (90 Base) MCG/ACT inhaler  Commonly known as: PROVENTIL;VENTOLIN;PROAIR  USE 2 INHALATIONS EVERY 6 HOURS AS NEEDED FOR WHEEZING OR SHORTNESS OF BREATH     * albuterol (2.5 MG/3ML) 0.083% nebulizer solution  Commonly known as: PROVENTIL  Take 3 mLs by nebulization every 6 hours As directed     apixaban 5 MG Tabs tablet  Commonly known as: Eliquis  Take 1 tablet by mouth 2 times daily     CPAP Machine Misc  New cpap supplies mask hose filters etc     doxazosin 4 MG tablet  Commonly known as: CARDURA  Take 0.5 tablets by mouth in the morning and 0.5 tablets in the evening.     esomeprazole Magnesium 20 MG Pack  Commonly known as: NEXIUM  Take 1 packet by mouth

## 2024-01-23 NOTE — PATIENT INSTRUCTIONS
Patient will add Mucinex back on for mucus thinning.    Medication list has been verified with family and corrected.    Diabetes is under good control hemoglobin A1c 5.5 today.

## 2024-01-29 ENCOUNTER — NURSE ONLY (OUTPATIENT)
Dept: FAMILY MEDICINE CLINIC | Age: 74
End: 2024-01-29
Payer: MEDICARE

## 2024-01-29 DIAGNOSIS — E29.1 HYPOGONADISM MALE: Primary | ICD-10-CM

## 2024-01-29 PROCEDURE — 96372 THER/PROPH/DIAG INJ SC/IM: CPT | Performed by: FAMILY MEDICINE

## 2024-01-29 RX ORDER — TESTOSTERONE CYPIONATE 200 MG/ML
100 INJECTION, SOLUTION INTRAMUSCULAR ONCE
Status: COMPLETED | OUTPATIENT
Start: 2024-01-29 | End: 2024-01-29

## 2024-01-29 RX ADMIN — TESTOSTERONE CYPIONATE 100 MG: 200 INJECTION, SOLUTION INTRAMUSCULAR at 15:01

## 2024-01-29 NOTE — PROGRESS NOTES
Patient given Testosterone 100mg IM left gluteal..  Will return in 2 weeks for next injection    Patient tolerated injection well.    Administrations This Visit       testosterone cypionate (DEPOTESTOTERONE CYPIONATE) injection 100 mg       Admin Date  01/29/2024 Action  Given Dose  100 mg Route  [facility] Administered By  Yumiko Carlson LPN

## 2024-02-06 ENCOUNTER — OFFICE VISIT (OUTPATIENT)
Dept: FAMILY MEDICINE CLINIC | Age: 74
End: 2024-02-06
Payer: MEDICARE

## 2024-02-06 VITALS
DIASTOLIC BLOOD PRESSURE: 62 MMHG | WEIGHT: 241 LBS | TEMPERATURE: 98.7 F | HEIGHT: 70 IN | SYSTOLIC BLOOD PRESSURE: 100 MMHG | OXYGEN SATURATION: 95 % | HEART RATE: 70 BPM | BODY MASS INDEX: 34.5 KG/M2

## 2024-02-06 DIAGNOSIS — J01.90 ACUTE BACTERIAL SINUSITIS: Primary | ICD-10-CM

## 2024-02-06 DIAGNOSIS — B35.6 TINEA CRURIS: ICD-10-CM

## 2024-02-06 DIAGNOSIS — R05.2 SUBACUTE COUGH: ICD-10-CM

## 2024-02-06 DIAGNOSIS — B96.89 ACUTE BACTERIAL SINUSITIS: Primary | ICD-10-CM

## 2024-02-06 PROCEDURE — G8417 CALC BMI ABV UP PARAM F/U: HCPCS | Performed by: FAMILY MEDICINE

## 2024-02-06 PROCEDURE — 1123F ACP DISCUSS/DSCN MKR DOCD: CPT | Performed by: FAMILY MEDICINE

## 2024-02-06 PROCEDURE — 1036F TOBACCO NON-USER: CPT | Performed by: FAMILY MEDICINE

## 2024-02-06 PROCEDURE — 3074F SYST BP LT 130 MM HG: CPT | Performed by: FAMILY MEDICINE

## 2024-02-06 PROCEDURE — 3017F COLORECTAL CA SCREEN DOC REV: CPT | Performed by: FAMILY MEDICINE

## 2024-02-06 PROCEDURE — 99214 OFFICE O/P EST MOD 30 MIN: CPT | Performed by: FAMILY MEDICINE

## 2024-02-06 PROCEDURE — 3078F DIAST BP <80 MM HG: CPT | Performed by: FAMILY MEDICINE

## 2024-02-06 PROCEDURE — G8484 FLU IMMUNIZE NO ADMIN: HCPCS | Performed by: FAMILY MEDICINE

## 2024-02-06 PROCEDURE — G8427 DOCREV CUR MEDS BY ELIG CLIN: HCPCS | Performed by: FAMILY MEDICINE

## 2024-02-06 RX ORDER — USTEKINUMAB 90 MG/ML
INJECTION, SOLUTION SUBCUTANEOUS
COMMUNITY
Start: 2024-01-23

## 2024-02-06 RX ORDER — AMOXICILLIN AND CLAVULANATE POTASSIUM 875; 125 MG/1; MG/1
1 TABLET, FILM COATED ORAL 2 TIMES DAILY
Qty: 14 TABLET | Refills: 0 | Status: SHIPPED | OUTPATIENT
Start: 2024-02-06 | End: 2024-02-13

## 2024-02-06 RX ORDER — NYSTATIN 100000 U/G
CREAM TOPICAL 2 TIMES DAILY
Qty: 30 G | Refills: 3 | Status: SHIPPED | OUTPATIENT
Start: 2024-02-06

## 2024-02-06 RX ORDER — GUAIFENESIN 600 MG/1
1200 TABLET, EXTENDED RELEASE ORAL 2 TIMES DAILY
Qty: 30 TABLET | Refills: 0 | Status: SHIPPED | OUTPATIENT
Start: 2024-02-06 | End: 2024-02-13

## 2024-02-06 ASSESSMENT — ENCOUNTER SYMPTOMS
NAUSEA: 0
SHORTNESS OF BREATH: 0
ABDOMINAL PAIN: 0
COUGH: 1
VOMITING: 0
CHEST TIGHTNESS: 0
ABDOMINAL DISTENTION: 0
PHOTOPHOBIA: 0
WHEEZING: 0
SORE THROAT: 1
DIARRHEA: 0

## 2024-02-06 NOTE — PROGRESS NOTES
Diagnosis Orders   1. Acute bacterial sinusitis  amoxicillin-clavulanate (AUGMENTIN) 875-125 MG per tablet    guaiFENesin (MUCINEX) 600 MG extended release tablet      2. Subacute cough  amoxicillin-clavulanate (AUGMENTIN) 875-125 MG per tablet    guaiFENesin (MUCINEX) 600 MG extended release tablet        Return if symptoms worsen or fail to improve.  Patient Instructions   Patient will be placed on Augmentin and guaifenesin for sinus infection.  I believe this was stimulating the cough.    No further treatment    Subjective:      Patient ID: Janusz Barajas is a 73 y.o. male who presents for:  Chief Complaint   Patient presents with    Nasal Congestion     Phlem-  Pt did come by himself today   Per pt states that he did not get any medications for the pharmacy after the last visit        Janusz states he is here for cough.  He states he did take the guaifenesin and it helped a little bit but now this is different.  He is noting a lot of drainage down the back of his throat.  Makes him cough.  No chest symptoms.  Denies fever or chills.  Been present for over a week.    Patient also states he is out of his cream for his tinea cruris that recurs.        Current Outpatient Medications on File Prior to Visit   Medication Sig Dispense Refill    STELARA 90 MG/ML SOSY prefilled syringe       furosemide (LASIX) 20 MG tablet Take 1 tablet by mouth 2 times daily CHANGED BY CARDIOLOGY TO 20 MG      apixaban (ELIQUIS) 5 MG TABS tablet Take 1 tablet by mouth 2 times daily 180 tablet 3    Cholecalciferol (VITAMIN D3) 125 MCG (5000 UT) CAPS Take 1 capsule by mouth daily 90 capsule 3    pravastatin (PRAVACHOL) 40 MG tablet TAKE 1 TABLET BY MOUTH DAILY 90 tablet 3    memantine (NAMENDA) 5 MG tablet Take 1 tablet by mouth 2 times daily 180 tablet 1    nystatin (MYCOSTATIN) 426252 UNIT/GM cream       potassium chloride (KLOR-CON M) 20 MEQ extended release tablet Take 1 tablet by mouth daily 90 tablet 3    metoprolol tartrate

## 2024-02-06 NOTE — PATIENT INSTRUCTIONS
Patient will be placed on Augmentin and guaifenesin for sinus infection.  I believe this was stimulating the cough.    No further treatment    Tinea cruris likely to recur with antibiotic usage so nystatin cream is being refilled.  Reminded patient dryness is one of the biggest components to avoiding yeast.  Be sure to fan or blow dry the areas affected multiple times throughout the course of the day.

## 2024-02-09 DIAGNOSIS — J30.2 CHRONIC SEASONAL ALLERGIC RHINITIS: ICD-10-CM

## 2024-02-09 DIAGNOSIS — J41.0 SIMPLE CHRONIC BRONCHITIS (HCC): ICD-10-CM

## 2024-02-09 NOTE — TELEPHONE ENCOUNTER
Future Appointments    Encounter Information   Provider Department Appt Notes   2/12/2024 SCHEDULE, JESSICA BALDWIN [place] PCP TESTOSTERONE [facility] Primary [facility] T inj   2/21/2024 Sher Galloway MD [facility] Return in about 3 months (around 2/21/2024) for for review of outcome of today's recommendation.   3/22/2024 Yousif Prather DO [facility] Cardiology 8 month follow up   5/9/2024 Didier Kerr MD [facility] Neurology 6 MOS FUP   5/22/2024 Sher Galloway MD [facility] Return for for routine major medical condition management, MAW exam due IN MAY 2024.     Past Visits    Date Provider Specialty Visit Type Primary Dx   02/06/2024 Sher Galloway MD Family Medicine Office Visit Acute bacterial sinusitis   01/29/2024  Family Medicine Nurse Only Hypogonadism male   01/23/2024 Sher Galloway MD Family Medicine Office Visit Type 2 diabetes mellitus with diabetic neuropathy, without long-term current use of insulin (HCC)

## 2024-02-11 RX ORDER — MONTELUKAST SODIUM 10 MG/1
10 TABLET ORAL NIGHTLY
Qty: 90 TABLET | Refills: 3 | Status: SHIPPED | OUTPATIENT
Start: 2024-02-11 | End: 2025-02-10

## 2024-02-12 ENCOUNTER — NURSE ONLY (OUTPATIENT)
Dept: FAMILY MEDICINE CLINIC | Age: 74
End: 2024-02-12
Payer: MEDICARE

## 2024-02-12 DIAGNOSIS — E29.1 HYPOGONADISM MALE: Primary | ICD-10-CM

## 2024-02-12 PROCEDURE — 96372 THER/PROPH/DIAG INJ SC/IM: CPT | Performed by: FAMILY MEDICINE

## 2024-02-12 RX ORDER — TESTOSTERONE CYPIONATE 200 MG/ML
100 INJECTION, SOLUTION INTRAMUSCULAR ONCE
Status: COMPLETED | OUTPATIENT
Start: 2024-02-12 | End: 2024-02-12

## 2024-02-12 RX ADMIN — TESTOSTERONE CYPIONATE 100 MG: 200 INJECTION, SOLUTION INTRAMUSCULAR at 15:25

## 2024-02-12 NOTE — PROGRESS NOTES
Patient given Testosterone 100mg IM right gluteal..  Will return in 2 weeks for next injection    Patient tolerated injection well.    Administrations This Visit       testosterone cypionate (DEPOTESTOTERONE CYPIONATE) injection 100 mg       Admin Date  02/12/2024 Action  Given Dose  100 mg Route  [facility] Administered By  Yumiko Carlson LPN

## 2024-02-23 ENCOUNTER — OFFICE VISIT (OUTPATIENT)
Dept: CARDIOLOGY | Facility: CLINIC | Age: 74
End: 2024-02-23
Payer: MEDICARE

## 2024-02-23 VITALS
DIASTOLIC BLOOD PRESSURE: 70 MMHG | HEART RATE: 74 BPM | SYSTOLIC BLOOD PRESSURE: 112 MMHG | BODY MASS INDEX: 35.43 KG/M2 | WEIGHT: 233 LBS

## 2024-02-23 DIAGNOSIS — Z51.81 ANTICOAGULATION MANAGEMENT ENCOUNTER: ICD-10-CM

## 2024-02-23 DIAGNOSIS — Z79.899 HIGH RISK MEDICATION USE: Primary | ICD-10-CM

## 2024-02-23 DIAGNOSIS — I25.10 CORONARY ARTERY DISEASE INVOLVING NATIVE CORONARY ARTERY OF NATIVE HEART WITHOUT ANGINA PECTORIS: ICD-10-CM

## 2024-02-23 DIAGNOSIS — Z87.891 FORMER SMOKER: ICD-10-CM

## 2024-02-23 DIAGNOSIS — I48.11 LONGSTANDING PERSISTENT ATRIAL FIBRILLATION (MULTI): ICD-10-CM

## 2024-02-23 DIAGNOSIS — R00.2 PALPITATIONS: ICD-10-CM

## 2024-02-23 DIAGNOSIS — Z79.01 ANTICOAGULATION MANAGEMENT ENCOUNTER: ICD-10-CM

## 2024-02-23 PROCEDURE — 1159F MED LIST DOCD IN RCRD: CPT | Performed by: INTERNAL MEDICINE

## 2024-02-23 PROCEDURE — 3008F BODY MASS INDEX DOCD: CPT | Performed by: INTERNAL MEDICINE

## 2024-02-23 PROCEDURE — 3078F DIAST BP <80 MM HG: CPT | Performed by: INTERNAL MEDICINE

## 2024-02-23 PROCEDURE — 1036F TOBACCO NON-USER: CPT | Performed by: INTERNAL MEDICINE

## 2024-02-23 PROCEDURE — 99215 OFFICE O/P EST HI 40 MIN: CPT | Performed by: INTERNAL MEDICINE

## 2024-02-23 PROCEDURE — 3074F SYST BP LT 130 MM HG: CPT | Performed by: INTERNAL MEDICINE

## 2024-02-23 NOTE — PROGRESS NOTES
CARDIOLOGY OFFICE VISIT      CHIEF COMPLAINT  Chief Complaint   Patient presents with    Follow-up     1 year       HISTORY OF PRESENT ILLNESS  HPI    72-year-old  male who is followed for palpitations controlled on beta-blockade. He has a history of COPD and is followed by Dr. Perez.     Since the last visit, he has been doing well. He denies any symptoms of chest pain or shortness of breath or palpitations.     Patient was  evaluated by cardiology service due to significant edema in the lower extremity. He was placed on furosemide with significant improvement of his symptoms. He states that his edema in the lower extremities is still present but they are not too bad.     Patient now is on Eliquis therapy.    Echocardiogram in 2023        CONCLUSIONS:   1. Left ventricular systolic function is normal with a 60-65% estimated ejection fraction.   2. Spectral Doppler shows an abnormal pattern of left ventricular diastolic filling.   3. Normal RV size and function      Borderline pulmonary hypertension RVSP 32 to 38 mmHg.   4. Left atrium mildly dilated.   5. Trace-1+ MR secondary to mild leaflet tip thickening.   6. Tricuspid aortic valve with 1+ aortic insufficiency secondary to leaflet thickening. There is very mild aortic stenosis V-max at 1.9 m/s. Mean gradient 7 mmHg. Calculated valve area 1.98 cm sq.   7. Mild aortic valve regurgitation.   8. Aortic root measures 39 mm mildly increased ascending aorta measurement is normal. However the vast majority of ascending aorta is not optimally visualized on this study and if clinically indicated would consider repeat imaging in that echocardiogram report of February 2021 described ascending aorta at 3.7 cm.    Since the last weeks office visit he had at least 2 episodes of palpitations.  Patient went to emergency department in April 2023 and also July 2023 for palpitations.  He received diltiazem therapy.  Patient states that since then he has been doing well.   He still on Eliquis therapy and beta-blocker therapy.        Past Medical History  Past Medical History:   Diagnosis Date    Personal history of other endocrine, nutritional and metabolic disease 10/11/2021    History of hyperlipidemia       Social History  Social History     Tobacco Use    Smoking status: Former     Packs/day: 1.00     Years: 30.00     Additional pack years: 0.00     Total pack years: 30.00     Types: Cigarettes     Quit date:      Years since quittin.1    Smokeless tobacco: Never   Substance Use Topics    Alcohol use: Not Currently    Drug use: Never       Family History     Family History   Problem Relation Name Age of Onset    Coronary artery disease Mother      Other (arteriosclerotic cardiovascular disease) Father      Coronary artery disease Father      Uterine cancer Sister          Allergies:  Allergies   Allergen Reactions    Mercaptopurine Unknown        Outpatient Medications:  Current Outpatient Medications   Medication Instructions    albuterol 90 mcg/actuation aerosol powdr breath activated inhaler 2 puffs, inhalation, Every 6 hours PRN    apixaban (ELIQUIS) 5 mg, oral, 2 times daily    budesonide-formoteroL (Symbicort) 160-4.5 mcg/actuation inhaler inhalation, Use as directed    cholecalciferol (VITAMIN D-3) 125 mcg, oral, Daily    doxazosin (Cardura) 2 mg tablet 1 tablet, oral, 2 times daily    esomeprazole (NEXIUM) 20 mg, oral, Daily PRN, Do not open capsule.    furosemide (LASIX) 20 mg, oral, Daily    gabapentin (NEURONTIN) 300 mg, oral, Daily    memantine (NAMENDA) 5 mg, oral, 2 times daily    mesalamine (PENTASA) 250 mg, oral, 4 times daily, Do not crush, chew, or split.    metoprolol tartrate (LOPRESSOR) 50 mg, oral, 2 times daily    montelukast (SINGULAIR) 10 mg, oral, Daily    nitroglycerin (NITROSTAT) 0.4 mg, sublingual    nystatin (Mycostatin) cream Topical, 2 times daily    potassium chloride CR 20 mEq ER tablet 20 mEq, oral, Daily, Do not crush or chew.     pravastatin (PRAVACHOL) 40 mg, oral, Nightly    Stelara 90 mg, subcutaneous, Use as directed    testosterone cypionate (DEPO-TESTOSTERONE) 200 mg, intramuscular, Every 14 days    tiotropium (Spiriva) 18 mcg inhalation capsule 1 capsule, inhalation, Daily RT          REVIEW OF SYSTEMS  ROS      VITALS  Vitals:    02/23/24 0938   BP: 112/70   Pulse: 74       PHYSICAL EXAM  Constitutional:       Appearance: Healthy appearance. Not in distress.   Neck:      Vascular: No JVR. JVD normal.   Pulmonary:      Effort: Pulmonary effort is normal.      Breath sounds: Normal breath sounds. No wheezing. No rhonchi. No rales.   Chest:      Chest wall: Not tender to palpatation.   Cardiovascular:      PMI at left midclavicular line. Normal rate. Regular rhythm. Normal S1. Normal S2.       Murmurs: There is no murmur.      No gallop.  No click. No rub.   Pulses:     Intact distal pulses.   Edema:     Peripheral edema absent.   Abdominal:      General: Bowel sounds are normal.      Palpations: Abdomen is soft.      Tenderness: There is no abdominal tenderness.   Musculoskeletal: Normal range of motion.         General: No tenderness. Skin:     General: Skin is warm and dry.   Neurological:      General: No focal deficit present.      Mental Status: Alert and oriented to person, place and time.           ASSESSMENT AND PLAN  CLINICAL IMPRESSION:   1. Normal left ventricular function per 2D echo dated February 1, 2021.  2. Left atrial enlargement with the left atrial size of 4.8 cm per  2D echo.  3. Valvular heart disease, consisting of mild MR and TR.  4. Mild pulmonary hypertension with a right ventricular systolic  pressure of 34 mmHg.  5. Tachyarrhythmic palpitations, controlled on beta-blockade   6. Hypertension, controlled   7. Chronic obstructive pulmonary disease. Followed with Dr. Perez.  Hospitalization for pneumonia on July 28, 2021, resolved.  8. History of Crohn's disease, hiatal hernia, chronic ulcer at  the  gastroesophageal junction with gastritis per  esophagogastroduodenoscopy, dated March 24, 2013.  9. Left heart catheterization dated November 13, 2020 revealing less than 10% left main, 30% proximal LAD, 10 to 30% proximal, mid and distal circumflex, 40% second OMB, and 60% mid RCA stenosis with recommendation for medical management.  10.  Episodes of atrial fibrillation documented by emergency department    Plan-recommendations    I had a lengthy discussion with patient and family member regarding plan to follow for management of atrial fibrillation palpitations.  Definitely patient is to continue using Eliquis therapy.    Regarding atrial fibrillation management, patient will benefit of long-term monitoring with loop recorder implantation. Procedure, risk, benefits and possible complications were explained to patient.  All questions were answered.    Patient and family members will discuss about this.  If they are okay for loop recorder implantation he will be scheduled for this procedure and Eliquis continues to be placed on hold for 48 hours prior to procedure.    Follow my office in 3 months or sooner needed.      Risk factor modification and lifestyle modification discussed with patient. Diet , exercise and hydration discussed with patient.    I have personally review with patient during this office visit, laboratory data, echocardiogram results, stress test results, Holter-event monitor results prior and after the last electrophysiology visit. All questions has been answered.    Please excuse any errors in grammar or translation related to this dictation.  Voice recognition software was utilized to prepare this document.      Scribe Attestation  By signing my name below, I, Nadine Chaney CMA   , Scribe   attest that this documentation has been prepared under the direction and in the presence of Fuad Neville MD.

## 2024-02-23 NOTE — PATIENT INSTRUCTIONS
DISCUSS LINQ LOOP RECORDER.  THIS IS A MECHANISM THAT IS A MONITORING SYSTEM WITH A BATTERY LIFE OF 3-4 YEARS.  IMPLANTING THE DEVICE IS OUTPATIENT USING LOCAL ANESTHESIA.  THE DEVICE IS IMPLANTED IN THE PECTORAL AREA.  THERE ARE NO WIRES THAT HAVE TO BE INSERTED AND THE DEVICE ITSELF IS SLIGHTLY LARGER THAN A MATCH.  A REPORT WILL BE RAN EVERY 31 DAY    IF YOU DECIDE TO MOVE FORWARD WITH LOOP RECORDER IMPLANT HOLD ELIQUIS 48 HOURS PRIOR TO THE PROCEDURE.

## 2024-02-26 ENCOUNTER — NURSE ONLY (OUTPATIENT)
Dept: FAMILY MEDICINE CLINIC | Age: 74
End: 2024-02-26
Payer: MEDICARE

## 2024-02-26 DIAGNOSIS — E29.1 HYPOGONADISM MALE: Primary | ICD-10-CM

## 2024-02-26 PROCEDURE — 96372 THER/PROPH/DIAG INJ SC/IM: CPT | Performed by: FAMILY MEDICINE

## 2024-02-26 RX ORDER — TESTOSTERONE CYPIONATE 200 MG/ML
100 INJECTION, SOLUTION INTRAMUSCULAR
COMMUNITY

## 2024-02-26 RX ORDER — TESTOSTERONE CYPIONATE 200 MG/ML
100 INJECTION, SOLUTION INTRAMUSCULAR ONCE
Status: COMPLETED | OUTPATIENT
Start: 2024-02-26 | End: 2024-02-26

## 2024-02-26 RX ADMIN — TESTOSTERONE CYPIONATE 100 MG: 200 INJECTION, SOLUTION INTRAMUSCULAR at 14:52

## 2024-02-26 NOTE — PROGRESS NOTES
Patient given Testosterone 100mg IM left gluteal..  Will return in 2 weeks for next injection    Patient tolerated injection well.    Administrations This Visit       testosterone cypionate (DEPOTESTOTERONE CYPIONATE) injection 100 mg       Admin Date  02/26/2024 Action  Given Dose  100 mg Route  [facility] Administered By  Yumiko Carlson LPN

## 2024-03-05 ENCOUNTER — OFFICE VISIT (OUTPATIENT)
Dept: FAMILY MEDICINE CLINIC | Age: 74
End: 2024-03-05
Payer: MEDICARE

## 2024-03-05 VITALS
SYSTOLIC BLOOD PRESSURE: 108 MMHG | BODY MASS INDEX: 34.5 KG/M2 | HEIGHT: 70 IN | WEIGHT: 241 LBS | HEART RATE: 71 BPM | DIASTOLIC BLOOD PRESSURE: 70 MMHG | OXYGEN SATURATION: 96 % | TEMPERATURE: 98 F

## 2024-03-05 DIAGNOSIS — J06.9 BACTERIAL URI: Primary | ICD-10-CM

## 2024-03-05 DIAGNOSIS — B96.89 BACTERIAL URI: Primary | ICD-10-CM

## 2024-03-05 DIAGNOSIS — H61.23 BILATERAL IMPACTED CERUMEN: ICD-10-CM

## 2024-03-05 PROCEDURE — G8427 DOCREV CUR MEDS BY ELIG CLIN: HCPCS | Performed by: FAMILY MEDICINE

## 2024-03-05 PROCEDURE — 1123F ACP DISCUSS/DSCN MKR DOCD: CPT | Performed by: FAMILY MEDICINE

## 2024-03-05 PROCEDURE — G8484 FLU IMMUNIZE NO ADMIN: HCPCS | Performed by: FAMILY MEDICINE

## 2024-03-05 PROCEDURE — 69209 REMOVE IMPACTED EAR WAX UNI: CPT | Performed by: FAMILY MEDICINE

## 2024-03-05 PROCEDURE — 1036F TOBACCO NON-USER: CPT | Performed by: FAMILY MEDICINE

## 2024-03-05 PROCEDURE — 3074F SYST BP LT 130 MM HG: CPT | Performed by: FAMILY MEDICINE

## 2024-03-05 PROCEDURE — 3078F DIAST BP <80 MM HG: CPT | Performed by: FAMILY MEDICINE

## 2024-03-05 PROCEDURE — 3017F COLORECTAL CA SCREEN DOC REV: CPT | Performed by: FAMILY MEDICINE

## 2024-03-05 PROCEDURE — 99213 OFFICE O/P EST LOW 20 MIN: CPT | Performed by: FAMILY MEDICINE

## 2024-03-05 PROCEDURE — G8417 CALC BMI ABV UP PARAM F/U: HCPCS | Performed by: FAMILY MEDICINE

## 2024-03-05 RX ORDER — BENZONATATE 100 MG/1
100 CAPSULE ORAL 3 TIMES DAILY PRN
Qty: 30 CAPSULE | Refills: 0 | Status: SHIPPED | OUTPATIENT
Start: 2024-03-05 | End: 2024-03-15

## 2024-03-05 RX ORDER — GUAIFENESIN 600 MG/1
1200 TABLET, EXTENDED RELEASE ORAL 2 TIMES DAILY
Qty: 40 TABLET | Refills: 0 | Status: SHIPPED | OUTPATIENT
Start: 2024-03-05 | End: 2024-03-15

## 2024-03-05 RX ORDER — AZITHROMYCIN 250 MG/1
TABLET, FILM COATED ORAL
Qty: 6 TABLET | Refills: 0 | Status: SHIPPED | OUTPATIENT
Start: 2024-03-05 | End: 2024-03-15

## 2024-03-05 ASSESSMENT — ENCOUNTER SYMPTOMS
COUGH: 1
NAUSEA: 0
VOMITING: 0
SHORTNESS OF BREATH: 0
CHEST TIGHTNESS: 1
SORE THROAT: 0
EYE DISCHARGE: 0
ABDOMINAL PAIN: 0
DIARRHEA: 0
EYE REDNESS: 0
RHINORRHEA: 1

## 2024-03-05 NOTE — PROGRESS NOTES
by Nasal route daily, Disp: 1 each, Rfl: 0    gabapentin (NEURONTIN) 300 MG capsule, Take 1 capsule by mouth daily. TAKE 1 CAPSULE DAILY, Disp: 90 capsule, Rfl: 3    sodium chloride (OCEAN) 0.65 % nasal spray, 1 spray by Nasal route as needed for Congestion, Disp: 1 each, Rfl: 3    nitroGLYCERIN (NITROSTAT) 0.4 MG SL tablet, up to max of 3 total doses. If no relief after 1 dose, call 911., Disp: 25 tablet, Rfl: 2    albuterol (PROVENTIL) (2.5 MG/3ML) 0.083% nebulizer solution, Take 3 mLs by nebulization every 6 hours As directed, Disp: 360 each, Rfl: 3    albuterol sulfate  (90 Base) MCG/ACT inhaler, USE 2 INHALATIONS EVERY 6 HOURS AS NEEDED FOR WHEEZING OR SHORTNESS OF BREATH, Disp: 25.5 g, Rfl: 2    CPAP Machine MISC, New cpap supplies mask hose filters etc, Disp: 1 each, Rfl: 0       Objective     Vitals:    03/05/24 1509   BP: 108/70   Site: Right Upper Arm   Position: Sitting   Cuff Size: Large Adult   Pulse: 71   Temp: 98 °F (36.7 °C)   SpO2: 96%   Weight: 109.3 kg (241 lb)   Height: 1.778 m (5' 10\")        Physical Exam  Constitutional:       General: He is not in acute distress.     Appearance: Normal appearance. He is not toxic-appearing.   HENT:      Ears:      Comments: Minimal cerumen impaction bilateral ears, tympanic membrane could be visualized.  Eyes:      General:         Right eye: No discharge.         Left eye: No discharge.   Cardiovascular:      Rate and Rhythm: Normal rate and regular rhythm.      Pulses: Normal pulses.      Heart sounds: Normal heart sounds.   Pulmonary:      Effort: Pulmonary effort is normal.      Breath sounds: Normal breath sounds.   Abdominal:      General: Abdomen is flat. Bowel sounds are normal.      Palpations: Abdomen is soft.      Tenderness: There is no abdominal tenderness.         Herve Waters MD          Please note, this report has been partially produced using speech recognition software and may cause  and /or contain errors related to that system

## 2024-03-18 ENCOUNTER — NURSE ONLY (OUTPATIENT)
Dept: FAMILY MEDICINE CLINIC | Age: 74
End: 2024-03-18
Payer: MEDICARE

## 2024-03-18 DIAGNOSIS — E29.1 HYPOGONADISM MALE: Primary | ICD-10-CM

## 2024-03-18 PROCEDURE — 96372 THER/PROPH/DIAG INJ SC/IM: CPT | Performed by: FAMILY MEDICINE

## 2024-03-18 RX ORDER — TESTOSTERONE CYPIONATE 200 MG/ML
100 INJECTION, SOLUTION INTRAMUSCULAR ONCE
Status: COMPLETED | OUTPATIENT
Start: 2024-03-18 | End: 2024-03-18

## 2024-03-18 RX ADMIN — TESTOSTERONE CYPIONATE 100 MG: 200 INJECTION, SOLUTION INTRAMUSCULAR at 14:49

## 2024-03-18 NOTE — PROGRESS NOTES
Patient given Testosterone 100mg IM right gluteal..  Will return in 2 weeks for next injection    Patient tolerated injection well.    Administrations This Visit       testosterone cypionate (DEPOTESTOTERONE CYPIONATE) injection 100 mg       Admin Date  03/18/2024 Action  Given Dose  100 mg Route  [facility] Administered By  Yumiko Carlson LPN

## 2024-03-22 ENCOUNTER — OFFICE VISIT (OUTPATIENT)
Dept: CARDIOLOGY CLINIC | Age: 74
End: 2024-03-22
Payer: MEDICARE

## 2024-03-22 VITALS
HEART RATE: 69 BPM | SYSTOLIC BLOOD PRESSURE: 130 MMHG | WEIGHT: 231.4 LBS | RESPIRATION RATE: 16 BRPM | DIASTOLIC BLOOD PRESSURE: 60 MMHG | BODY MASS INDEX: 33.2 KG/M2 | OXYGEN SATURATION: 97 %

## 2024-03-22 DIAGNOSIS — I48.0 PAROXYSMAL ATRIAL FIBRILLATION (HCC): Primary | ICD-10-CM

## 2024-03-22 DIAGNOSIS — Z86.79 HISTORY OF CHF (CONGESTIVE HEART FAILURE): ICD-10-CM

## 2024-03-22 DIAGNOSIS — I10 ESSENTIAL HYPERTENSION: ICD-10-CM

## 2024-03-22 DIAGNOSIS — E66.9 OBESITY (BMI 30-39.9): ICD-10-CM

## 2024-03-22 DIAGNOSIS — I50.32 CHRONIC DIASTOLIC HEART FAILURE (HCC): ICD-10-CM

## 2024-03-22 PROCEDURE — 99214 OFFICE O/P EST MOD 30 MIN: CPT | Performed by: INTERNAL MEDICINE

## 2024-03-22 PROCEDURE — G8417 CALC BMI ABV UP PARAM F/U: HCPCS | Performed by: INTERNAL MEDICINE

## 2024-03-22 PROCEDURE — 1036F TOBACCO NON-USER: CPT | Performed by: INTERNAL MEDICINE

## 2024-03-22 PROCEDURE — 1123F ACP DISCUSS/DSCN MKR DOCD: CPT | Performed by: INTERNAL MEDICINE

## 2024-03-22 PROCEDURE — G8427 DOCREV CUR MEDS BY ELIG CLIN: HCPCS | Performed by: INTERNAL MEDICINE

## 2024-03-22 PROCEDURE — 3075F SYST BP GE 130 - 139MM HG: CPT | Performed by: INTERNAL MEDICINE

## 2024-03-22 PROCEDURE — 3078F DIAST BP <80 MM HG: CPT | Performed by: INTERNAL MEDICINE

## 2024-03-22 PROCEDURE — 3017F COLORECTAL CA SCREEN DOC REV: CPT | Performed by: INTERNAL MEDICINE

## 2024-03-22 PROCEDURE — G8484 FLU IMMUNIZE NO ADMIN: HCPCS | Performed by: INTERNAL MEDICINE

## 2024-03-22 PROCEDURE — 93000 ELECTROCARDIOGRAM COMPLETE: CPT | Performed by: INTERNAL MEDICINE

## 2024-03-22 NOTE — PROGRESS NOTES
Hypertension    Gastro-esophageal reflux disease without esophagitis    Chronic otitis externa    Hyperlipidemia    Crohn's disease, unspecified, without complications (HCC)    Hypogonadism male    Allergic rhinitis    Seborrheic dermatitis    Mononeuritis multiplex    Degenerative disc disease, lumbar    Osteoarthritis of lumbar spine    Chronic back pain    Tinea unguium    Pain of right heel    Shortness of breath    Actinic keratoses    Sleep apnea    Obesity (BMI 30-39.9)    History of cataract extraction    Peripheral nerve disease    Paresthesia    Nondependent alcohol abuse, in remission    Blood glucose abnormal    Personal history of tobacco use, presenting hazards to health    Paroxysmal atrial fibrillation (HCC)    Morbid obesity (HCC)    Diastolic heart failure (HCC)    Chronic obstructive pulmonary disease, unspecified (HCC)    Primary osteoarthritis of right knee    Type 2 diabetes mellitus with diabetic neuropathy    Pulmonary embolism (HCC)    Memory loss    Unspecified dementia, moderate, without behavioral disturbance, psychotic disturbance, mood disturbance, and anxiety (HCC)    Primary central sleep apnea    Mild vascular dementia without behavioral disturbance, psychotic disturbance, mood disturbance, or anxiety (HCC)    MCI (mild cognitive impairment)    Chronic venous insufficiency    Coronary atherosclerosis    Elevated coronary artery calcium score    Exposure to Agent Orange    Hypokalemia    Nicotine dependence    Palpitations    Cognitive safety issue    Driving safety issue       Past Surgical History:   Procedure Laterality Date    CARDIAC CATHETERIZATION      CARPAL TUNNEL RELEASE      CATARACT REMOVAL WITH IMPLANT Right 09/09/2019    CATARACT REMOVAL WITH IMPLANT Left 07/22/2019    COLON SURGERY      COLONOSCOPY  04/15/2015    KNEE ARTHROSCOPY      LARYNGECTOMY  2004    UPPER GASTROINTESTINAL ENDOSCOPY  03/24/13    Dr. Esparza    VOCAL CORD AUGMENTATION W/RADIESSE INJ  2002

## 2024-03-25 ENCOUNTER — NURSE ONLY (OUTPATIENT)
Dept: FAMILY MEDICINE CLINIC | Age: 74
End: 2024-03-25
Payer: MEDICARE

## 2024-03-25 DIAGNOSIS — E29.1 HYPOGONADISM MALE: Primary | ICD-10-CM

## 2024-03-25 PROCEDURE — 96372 THER/PROPH/DIAG INJ SC/IM: CPT | Performed by: FAMILY MEDICINE

## 2024-03-25 RX ORDER — TESTOSTERONE CYPIONATE 200 MG/ML
100 INJECTION, SOLUTION INTRAMUSCULAR ONCE
Status: COMPLETED | OUTPATIENT
Start: 2024-03-25 | End: 2024-03-25

## 2024-03-25 RX ADMIN — TESTOSTERONE CYPIONATE 100 MG: 200 INJECTION, SOLUTION INTRAMUSCULAR at 15:02

## 2024-03-25 NOTE — PROGRESS NOTES
Patient given Testosterone 100mg IM left gluteal..  Will return in 2 weeks for next injection    Patient tolerated injection well.    Administrations This Visit       testosterone cypionate (DEPOTESTOTERONE CYPIONATE) injection 100 mg       Admin Date  03/25/2024 Action  Given Dose  100 mg Route  [facility] Administered By  Yumiko Carlson LPN

## 2024-03-26 ENCOUNTER — TELEPHONE (OUTPATIENT)
Dept: FAMILY MEDICINE CLINIC | Age: 74
End: 2024-03-26

## 2024-03-26 NOTE — TELEPHONE ENCOUNTER
Notified son that pt has again called for appt and then no show.    Son states that pt has a home care person who comes to the home 3 days per week.

## 2024-03-29 ENCOUNTER — OFFICE VISIT (OUTPATIENT)
Dept: FAMILY MEDICINE CLINIC | Age: 74
End: 2024-03-29

## 2024-03-29 VITALS
DIASTOLIC BLOOD PRESSURE: 70 MMHG | SYSTOLIC BLOOD PRESSURE: 126 MMHG | BODY MASS INDEX: 32.34 KG/M2 | HEIGHT: 71 IN | TEMPERATURE: 98.2 F | WEIGHT: 231 LBS | OXYGEN SATURATION: 97 % | HEART RATE: 58 BPM

## 2024-03-29 DIAGNOSIS — J44.9 CHRONIC OBSTRUCTIVE PULMONARY DISEASE, UNSPECIFIED COPD TYPE (HCC): Primary | ICD-10-CM

## 2024-03-29 DIAGNOSIS — H61.22 IMPACTED CERUMEN OF LEFT EAR: ICD-10-CM

## 2024-03-29 RX ORDER — GUAIFENESIN AND DEXTROMETHORPHAN HYDROBROMIDE 1200; 60 MG/1; MG/1
1 TABLET, EXTENDED RELEASE ORAL 2 TIMES DAILY
Qty: 30 TABLET | Refills: 0 | Status: SHIPPED | OUTPATIENT
Start: 2024-03-29 | End: 2024-04-13

## 2024-03-29 ASSESSMENT — ENCOUNTER SYMPTOMS
WHEEZING: 1
VOMITING: 0
RHINORRHEA: 1
NAUSEA: 0
SHORTNESS OF BREATH: 0
DIARRHEA: 0
COUGH: 1
SORE THROAT: 0

## 2024-03-29 NOTE — PROGRESS NOTES
Pharynx: Oropharynx is clear.   Eyes:      Conjunctiva/sclera: Conjunctivae normal.   Cardiovascular:      Rate and Rhythm: Normal rate and regular rhythm.      Pulses: Normal pulses.      Heart sounds: Normal heart sounds.   Pulmonary:      Effort: Pulmonary effort is normal.      Breath sounds: Normal breath sounds.   Skin:     General: Skin is warm and dry.   Neurological:      General: No focal deficit present.      Mental Status: He is alert and oriented to person, place, and time.   Psychiatric:         Mood and Affect: Mood normal.         Behavior: Behavior normal.         ASSESSMENT/PLAN:  1. Chronic obstructive pulmonary disease, unspecified COPD type (HCC)  -     Dextromethorphan-guaiFENesin (MUCINEX DM MAXIMUM STRENGTH)  MG TB12; Take 1 tablet by mouth 2 times daily for 15 days, Disp-30 tablet, R-0Normal  2. Impacted cerumen of left ear  -     03244 - DE REMOVAL IMPACTED CERUMEN IRRIGATION/LVG UNILAT    - Discussed with patient - it is typical to have a productive cough with COPD. Lungs are clear to auscultation. No fevers or chills. No shortness of breath. Low concern for pneumonia. Recommend Mucinex to thin secretions. Drink plenty of fluids. Follow up if symptoms worsen or do not improve.  - Left ear irrigated with 75% of cerumen removed.    Electronically signed by STARLA Ibarra - CNP on 3/29/24 at 3:39 PM EDT

## 2024-04-15 ENCOUNTER — NURSE ONLY (OUTPATIENT)
Dept: FAMILY MEDICINE CLINIC | Age: 74
End: 2024-04-15
Payer: MEDICARE

## 2024-04-15 DIAGNOSIS — E29.1 HYPOGONADISM MALE: Primary | ICD-10-CM

## 2024-04-15 PROCEDURE — 96372 THER/PROPH/DIAG INJ SC/IM: CPT | Performed by: FAMILY MEDICINE

## 2024-04-15 RX ORDER — TESTOSTERONE CYPIONATE 200 MG/ML
100 INJECTION, SOLUTION INTRAMUSCULAR ONCE
Status: COMPLETED | OUTPATIENT
Start: 2024-04-15 | End: 2024-04-15

## 2024-04-15 RX ADMIN — TESTOSTERONE CYPIONATE 100 MG: 200 INJECTION, SOLUTION INTRAMUSCULAR at 15:30

## 2024-04-15 NOTE — PROGRESS NOTES
Patient given Testosterone 100mg IM right gluteal..  Will return in 2 weeks for next injection    Patient tolerated injection well.    Administrations This Visit       testosterone cypionate (DEPOTESTOTERONE CYPIONATE) injection 100 mg       Admin Date  04/15/2024 Action  Given Dose  100 mg Route  [facility] Administered By  Yumiko Carlson LPN

## 2024-04-19 PROBLEM — Z86.79 HISTORY OF SUPRAVENTRICULAR TACHYCARDIA: Status: ACTIVE | Noted: 2023-10-23

## 2024-04-19 PROBLEM — Z87.19 HISTORY OF CROHN'S DISEASE: Status: ACTIVE | Noted: 2023-10-23

## 2024-04-25 DIAGNOSIS — G89.29 CHRONIC MIDLINE BACK PAIN, UNSPECIFIED BACK LOCATION: ICD-10-CM

## 2024-04-25 DIAGNOSIS — M54.9 CHRONIC MIDLINE BACK PAIN, UNSPECIFIED BACK LOCATION: ICD-10-CM

## 2024-04-25 RX ORDER — GABAPENTIN 300 MG/1
300 CAPSULE ORAL DAILY
Qty: 90 CAPSULE | Refills: 3 | Status: SHIPPED | OUTPATIENT
Start: 2024-04-25 | End: 2025-04-25

## 2024-04-25 NOTE — TELEPHONE ENCOUNTER
Comments:     Last Office Visit (last PCP visit):   2/6/2024    Next Visit Date:  Future Appointments   Date Time Provider Department Center   4/29/2024  2:30 PM SCHEDULE, JESSICA GÓMEZ PCP TESTOSTERONE [facility]   5/9/2024  4:00 PM Didier Kerr MD [facility] -   5/22/2024  2:30 PM Sher Galloway MD [facility]   12/13/2024 12:00 PM Holiday, DO Nickie Quach [facility]       **If hasn't been seen in over a year OR hasn't followed up according to last diabetes/ADHD visit, make appointment for patient before sending refill to provider.    Rx requested:  Requested Prescriptions      No prescriptions requested or ordered in this encounter

## 2024-04-29 ENCOUNTER — NURSE ONLY (OUTPATIENT)
Dept: FAMILY MEDICINE CLINIC | Age: 74
End: 2024-04-29
Payer: MEDICARE

## 2024-04-29 DIAGNOSIS — E29.1 HYPOGONADISM MALE: Primary | ICD-10-CM

## 2024-04-29 PROCEDURE — 96372 THER/PROPH/DIAG INJ SC/IM: CPT | Performed by: FAMILY MEDICINE

## 2024-04-29 RX ORDER — TESTOSTERONE CYPIONATE 200 MG/ML
100 INJECTION, SOLUTION INTRAMUSCULAR ONCE
Status: COMPLETED | OUTPATIENT
Start: 2024-04-29 | End: 2024-04-29

## 2024-04-29 RX ADMIN — TESTOSTERONE CYPIONATE 100 MG: 200 INJECTION, SOLUTION INTRAMUSCULAR at 14:50

## 2024-04-29 NOTE — PROGRESS NOTES
Patient given Testosterone 100mg IM left gluteal..  Will return in 2 weeks for next injection    Patient tolerated injection well.    Administrations This Visit       testosterone cypionate (DEPOTESTOTERONE CYPIONATE) injection 100 mg       Admin Date  04/29/2024 Action  Given Dose  100 mg Route  [facility] Administered By  Yumiko Carlson LPN

## 2024-05-09 ENCOUNTER — OFFICE VISIT (OUTPATIENT)
Dept: NEUROLOGY | Age: 74
End: 2024-05-09
Payer: MEDICARE

## 2024-05-09 VITALS
WEIGHT: 232 LBS | BODY MASS INDEX: 32.36 KG/M2 | SYSTOLIC BLOOD PRESSURE: 122 MMHG | DIASTOLIC BLOOD PRESSURE: 62 MMHG | HEART RATE: 71 BPM

## 2024-05-09 DIAGNOSIS — G31.84 MCI (MILD COGNITIVE IMPAIRMENT): ICD-10-CM

## 2024-05-09 DIAGNOSIS — G58.7 MONONEURITIS MULTIPLEX: ICD-10-CM

## 2024-05-09 DIAGNOSIS — R41.9 COGNITIVE SAFETY ISSUE: ICD-10-CM

## 2024-05-09 DIAGNOSIS — F01.A0 MILD VASCULAR DEMENTIA WITHOUT BEHAVIORAL DISTURBANCE, PSYCHOTIC DISTURBANCE, MOOD DISTURBANCE, OR ANXIETY (HCC): Primary | ICD-10-CM

## 2024-05-09 DIAGNOSIS — I48.0 PAROXYSMAL ATRIAL FIBRILLATION (HCC): ICD-10-CM

## 2024-05-09 DIAGNOSIS — Z91.89 DRIVING SAFETY ISSUE: ICD-10-CM

## 2024-05-09 PROCEDURE — 99214 OFFICE O/P EST MOD 30 MIN: CPT | Performed by: PSYCHIATRY & NEUROLOGY

## 2024-05-09 PROCEDURE — 3074F SYST BP LT 130 MM HG: CPT | Performed by: PSYCHIATRY & NEUROLOGY

## 2024-05-09 PROCEDURE — G8427 DOCREV CUR MEDS BY ELIG CLIN: HCPCS | Performed by: PSYCHIATRY & NEUROLOGY

## 2024-05-09 PROCEDURE — 3017F COLORECTAL CA SCREEN DOC REV: CPT | Performed by: PSYCHIATRY & NEUROLOGY

## 2024-05-09 PROCEDURE — 1123F ACP DISCUSS/DSCN MKR DOCD: CPT | Performed by: PSYCHIATRY & NEUROLOGY

## 2024-05-09 PROCEDURE — 3078F DIAST BP <80 MM HG: CPT | Performed by: PSYCHIATRY & NEUROLOGY

## 2024-05-09 PROCEDURE — G8417 CALC BMI ABV UP PARAM F/U: HCPCS | Performed by: PSYCHIATRY & NEUROLOGY

## 2024-05-09 PROCEDURE — 1036F TOBACCO NON-USER: CPT | Performed by: PSYCHIATRY & NEUROLOGY

## 2024-05-09 NOTE — PROGRESS NOTES
Negative for back pain, gait problem, joint swelling, myalgias, neck pain and neck stiffness.   Skin:  Negative for color change.   Allergic/Immunologic: Negative for food allergies.   Neurological:  Negative for dizziness, tremors, seizures, syncope, facial asymmetry, speech difficulty, weakness, light-headedness, numbness and headaches.   Psychiatric/Behavioral:  Negative for behavioral problems, confusion, hallucinations and sleep disturbance.        Objective:   /62 (Site: Right Upper Arm, Position: Sitting, Cuff Size: Medium Adult)   Pulse 71   Wt 105.2 kg (232 lb)   BMI 32.36 kg/m²     Physical Exam  Vitals reviewed.   Eyes:      Pupils: Pupils are equal, round, and reactive to light.   Cardiovascular:      Rate and Rhythm: Normal rate and regular rhythm.      Heart sounds: No murmur heard.  Pulmonary:      Effort: Pulmonary effort is normal.      Breath sounds: Normal breath sounds.   Abdominal:      General: Bowel sounds are normal.   Musculoskeletal:         General: Normal range of motion.      Cervical back: Normal range of motion.   Skin:     General: Skin is warm.   Neurological:      Mental Status: He is alert and oriented to person, place, and time.      Cranial Nerves: No cranial nerve deficit.      Sensory: No sensory deficit.      Motor: No abnormal muscle tone.      Coordination: Coordination normal.      Deep Tendon Reflexes: Reflexes are normal and symmetric. Babinski sign absent on the right side. Babinski sign absent on the left side.   Psychiatric:         Mood and Affect: Mood normal.     Normal alert examination with a stooped posture but no parkinsonian features are noted.  He has some degree of apathy    No results found.    Lab Results   Component Value Date/Time    WBC 8.9 11/21/2023 02:28 PM    RBC 5.03 11/21/2023 02:28 PM    HGB 14.0 11/21/2023 02:28 PM    HCT 44.0 11/21/2023 02:28 PM    MCV 87.5 11/21/2023 02:28 PM    MCH 27.8 11/21/2023 02:28 PM    MCHC 31.8 11/21/2023

## 2024-05-13 ENCOUNTER — NURSE ONLY (OUTPATIENT)
Dept: FAMILY MEDICINE CLINIC | Age: 74
End: 2024-05-13
Payer: MEDICARE

## 2024-05-13 DIAGNOSIS — E29.1 HYPOGONADISM MALE: Primary | ICD-10-CM

## 2024-05-13 PROCEDURE — 96372 THER/PROPH/DIAG INJ SC/IM: CPT | Performed by: FAMILY MEDICINE

## 2024-05-13 RX ORDER — TESTOSTERONE CYPIONATE 200 MG/ML
100 INJECTION, SOLUTION INTRAMUSCULAR ONCE
Status: COMPLETED | OUTPATIENT
Start: 2024-05-13 | End: 2024-05-13

## 2024-05-13 RX ADMIN — TESTOSTERONE CYPIONATE 100 MG: 200 INJECTION, SOLUTION INTRAMUSCULAR at 14:53

## 2024-05-13 NOTE — PROGRESS NOTES
Patient given Testosterone 100mg IM right gluteal..  Will return in 2 weeks for next injection    Patient tolerated injection well.    Administrations This Visit       testosterone cypionate (DEPOTESTOTERONE CYPIONATE) injection 100 mg       Admin Date  05/13/2024 Action  Given Dose  100 mg Route  [facility] Administered By  Yumiko Carlson LPN

## 2024-05-15 RX ORDER — OMEPRAZOLE 20 MG/1
20 CAPSULE, DELAYED RELEASE ORAL DAILY
Qty: 90 CAPSULE | Refills: 3 | OUTPATIENT
Start: 2024-05-15

## 2024-05-15 NOTE — TELEPHONE ENCOUNTER
Comments:     Last Office Visit (last PCP visit):   2/6/2024    Next Visit Date:  Future Appointments   Date Time Provider Department Center   5/22/2024  2:30 PM Sher Galloway MD [facility]   5/28/2024  2:30 [facility]   6/10/2024  2:30 PM [place] [facility]   6/24/2024  2:30 PM SCHEDULE, JESSICA BALDWIN [place] PCP [facility]   11/7/2024  3:15 PM Didier Kerr MD [facility] -   12/13/2024 12:00 PM Holal, DO Nickie Quach       **If hasn't been seen in over a year OR hasn't followed up according to last diabetes/ADHD visit, make appointment for patient before sending refill to provider.    Rx requested:  Requested Prescriptions      No prescriptions requested or ordered in this encounter

## 2024-05-21 SDOH — ECONOMIC STABILITY: FOOD INSECURITY: WITHIN THE PAST 12 MONTHS, THE FOOD YOU BOUGHT JUST DIDN'T LAST AND YOU DIDN'T HAVE MONEY TO GET MORE.: NEVER TRUE

## 2024-05-21 SDOH — HEALTH STABILITY: PHYSICAL HEALTH: ON AVERAGE, HOW MANY MINUTES DO YOU ENGAGE IN EXERCISE AT THIS LEVEL?: 0 MIN

## 2024-05-21 SDOH — ECONOMIC STABILITY: INCOME INSECURITY: HOW HARD IS IT FOR YOU TO PAY FOR THE VERY BASICS LIKE FOOD, HOUSING, MEDICAL CARE, AND HEATING?: NOT HARD AT ALL

## 2024-05-21 SDOH — ECONOMIC STABILITY: FOOD INSECURITY: WITHIN THE PAST 12 MONTHS, YOU WORRIED THAT YOUR FOOD WOULD RUN OUT BEFORE YOU GOT MONEY TO BUY MORE.: NEVER TRUE

## 2024-05-21 SDOH — HEALTH STABILITY: PHYSICAL HEALTH: ON AVERAGE, HOW MANY DAYS PER WEEK DO YOU ENGAGE IN MODERATE TO STRENUOUS EXERCISE (LIKE A BRISK WALK)?: 0 DAYS

## 2024-05-21 ASSESSMENT — LIFESTYLE VARIABLES
HOW OFTEN DO YOU HAVE A DRINK CONTAINING ALCOHOL: 1
HOW MANY STANDARD DRINKS CONTAINING ALCOHOL DO YOU HAVE ON A TYPICAL DAY: 0
HOW OFTEN DO YOU HAVE A DRINK CONTAINING ALCOHOL: NEVER
HOW OFTEN DO YOU HAVE SIX OR MORE DRINKS ON ONE OCCASION: 1
HOW MANY STANDARD DRINKS CONTAINING ALCOHOL DO YOU HAVE ON A TYPICAL DAY: PATIENT DOES NOT DRINK

## 2024-05-21 ASSESSMENT — PATIENT HEALTH QUESTIONNAIRE - PHQ9
SUM OF ALL RESPONSES TO PHQ QUESTIONS 1-9: 0
1. LITTLE INTEREST OR PLEASURE IN DOING THINGS: NOT AT ALL
SUM OF ALL RESPONSES TO PHQ QUESTIONS 1-9: 0
SUM OF ALL RESPONSES TO PHQ9 QUESTIONS 1 & 2: 0
2. FEELING DOWN, DEPRESSED OR HOPELESS: NOT AT ALL
SUM OF ALL RESPONSES TO PHQ QUESTIONS 1-9: 0
SUM OF ALL RESPONSES TO PHQ QUESTIONS 1-9: 0

## 2024-05-22 ENCOUNTER — OFFICE VISIT (OUTPATIENT)
Dept: FAMILY MEDICINE CLINIC | Age: 74
End: 2024-05-22
Payer: MEDICARE

## 2024-05-22 VITALS
TEMPERATURE: 98 F | SYSTOLIC BLOOD PRESSURE: 134 MMHG | HEART RATE: 80 BPM | HEIGHT: 67 IN | BODY MASS INDEX: 36.41 KG/M2 | DIASTOLIC BLOOD PRESSURE: 78 MMHG | OXYGEN SATURATION: 97 % | WEIGHT: 232 LBS

## 2024-05-22 DIAGNOSIS — Z00.00 MEDICARE ANNUAL WELLNESS VISIT, SUBSEQUENT: Primary | ICD-10-CM

## 2024-05-22 DIAGNOSIS — R05.9 COUGH, UNSPECIFIED TYPE: ICD-10-CM

## 2024-05-22 PROCEDURE — 3075F SYST BP GE 130 - 139MM HG: CPT | Performed by: FAMILY MEDICINE

## 2024-05-22 PROCEDURE — 3017F COLORECTAL CA SCREEN DOC REV: CPT | Performed by: FAMILY MEDICINE

## 2024-05-22 PROCEDURE — 1123F ACP DISCUSS/DSCN MKR DOCD: CPT | Performed by: FAMILY MEDICINE

## 2024-05-22 PROCEDURE — 3078F DIAST BP <80 MM HG: CPT | Performed by: FAMILY MEDICINE

## 2024-05-22 PROCEDURE — G0439 PPPS, SUBSEQ VISIT: HCPCS | Performed by: FAMILY MEDICINE

## 2024-05-22 RX ORDER — GUAIFENESIN 600 MG/1
600 TABLET, EXTENDED RELEASE ORAL 2 TIMES DAILY
Qty: 120 TABLET | Refills: 5 | Status: SHIPPED | OUTPATIENT
Start: 2024-05-22

## 2024-05-22 RX ORDER — OMEPRAZOLE 20 MG/1
20 CAPSULE, DELAYED RELEASE ORAL DAILY
Qty: 90 CAPSULE | Refills: 3 | Status: SHIPPED | OUTPATIENT
Start: 2024-05-22

## 2024-05-22 NOTE — PATIENT INSTRUCTIONS
Learning About Being Active as an Older Adult  Why is being active important as you get older?     Being active is one of the best things you can do for your health. And it's never too late to start. Being active--or getting active, if you aren't already--has definite benefits. It can:  Give you more energy,  Keep your mind sharp.  Improve balance to reduce your risk of falls.  Help you manage chronic illness with fewer medicines.  No matter how old you are, how fit you are, or what health problems you have, there is a form of activity that will work for you. And the more physical activity you can do, the better your overall health will be.  What kinds of activity can help you stay healthy?  Being more active will make your daily activities easier. Physical activity includes planned exercise and things you do in daily life. There are four types of activity:  Aerobic.  Doing aerobic activity makes your heart and lungs strong.  Includes walking, dancing, and gardening.  Aim for at least 2½ hours spread throughout the week.  It improves your energy and can help you sleep better.  Muscle-strengthening.  This type of activity can help maintain muscle and strengthen bones.  Includes climbing stairs, using resistance bands, and lifting or carrying heavy loads.  Aim for at least twice a week.  It can help protect the knees and other joints.  Stretching.  Stretching gives you better range of motion in joints and muscles.  Includes upper arm stretches, calf stretches, and gentle yoga.  Aim for at least twice a week, preferably after your muscles are warmed up from other activities.  It can help you function better in daily life.  Balancing.  This helps you stay coordinated and have good posture.  Includes heel-to-toe walking, corrine chi, and certain types of yoga.  Aim for at least 3 days a week.  It can reduce your risk of falling.  Even if you have a hard time meeting the recommendations, it's better to be more active

## 2024-05-22 NOTE — PROGRESS NOTES
Medicare Annual Wellness Visit    Janusz Barajas is here for Medicare AWV and Discuss Medications (Pt daughter states that he has not has his mematine today and there is a difference when he does take it )    Assessment & Plan   Medicare annual wellness visit, subsequent  Cough, unspecified type  -     guaiFENesin (MUCINEX) 600 MG extended release tablet; Take 1 tablet by mouth 2 times daily, Disp-120 tablet, R-5Normal  Recommendations for Preventive Services Due: see orders and patient instructions/AVS.  Recommended screening schedule for the next 5-10 years is provided to the patient in written form: see Patient Instructions/AVS.     Return in about 1 year (around 5/22/2025) for MAW exam due.     Subjective       Patient's complete Health Risk Assessment and screening values have been reviewed and are found in Flowsheets. The following problems were reviewed today and where indicated follow up appointments were made and/or referrals ordered.    Positive Risk Factor Screenings with Interventions:                Activity, Diet, and Weight:  On average, how many days per week do you engage in moderate to strenuous exercise (like a brisk walk)?: 0 days  On average, how many minutes do you engage in exercise at this level?: 0 min    Do you eat balanced/healthy meals regularly?: Yes    Body mass index is 36.34 kg/m². (!) Abnormal      Inactivity Interventions:  Patient declined any further interventions or treatment  Obesity Interventions:  Patient declines any further evaluation or treatment                ADL's:   Patient reports needing help with:  Select all that apply: (!) Laundry, Housekeeping, Banking/Finances, Food Preparation, Transportation, Taking Medications  Interventions:  Patient comments: family involved and helping                  Objective   Vitals:    05/22/24 1446   BP: 134/78   Pulse: 80   Temp: 98 °F (36.7 °C)   SpO2: 97%   Weight: 105.2 kg (232 lb)   Height: 1.702 m (5' 7\")      Body mass index is

## 2024-05-28 ENCOUNTER — NURSE ONLY (OUTPATIENT)
Dept: FAMILY MEDICINE CLINIC | Age: 74
End: 2024-05-28
Payer: MEDICARE

## 2024-05-28 DIAGNOSIS — E29.1 HYPOGONADISM MALE: Primary | ICD-10-CM

## 2024-05-28 PROCEDURE — 96372 THER/PROPH/DIAG INJ SC/IM: CPT | Performed by: FAMILY MEDICINE

## 2024-05-28 RX ORDER — TESTOSTERONE CYPIONATE 200 MG/ML
100 INJECTION, SOLUTION INTRAMUSCULAR ONCE
Status: COMPLETED | OUTPATIENT
Start: 2024-05-28 | End: 2024-05-28

## 2024-05-28 RX ADMIN — TESTOSTERONE CYPIONATE 100 MG: 200 INJECTION, SOLUTION INTRAMUSCULAR at 17:09

## 2024-05-28 NOTE — PROGRESS NOTES
Patient given Testosterone 100mg IM left gluteal..  Will return in 2 weeks for next injection    Patient tolerated injection well.    Administrations This Visit       testosterone cypionate (DEPOTESTOTERONE CYPIONATE) injection 100 mg       Admin Date  05/28/2024 Action  Given Dose  100 mg Route  IntraMUSCular Administered By  Yumiko Carlson LPN

## 2024-05-31 RX ORDER — MEMANTINE HYDROCHLORIDE 5 MG/1
5 TABLET ORAL 2 TIMES DAILY
Qty: 180 TABLET | Refills: 3 | Status: SHIPPED | OUTPATIENT
Start: 2024-05-31

## 2024-05-31 NOTE — TELEPHONE ENCOUNTER
Pharmacy is requesting medication refill. Please approve or deny this request.    Rx requested:  Requested Prescriptions     Pending Prescriptions Disp Refills    memantine (NAMENDA) 5 MG tablet [Pharmacy Med Name: Memantine HCl 5 MG Oral Tablet] 180 tablet 3     Sig: TAKE 1 TABLET BY MOUTH TWICE  DAILY         Last Office Visit:   5/9/2024      Next Visit Date:  Future Appointments   Date Time Provider Department Center   6/10/2024  2:30 PM SCHEDULE, JESSICA GÓMEZ PCP [facility]   6/24/2024  2:30 PM SCHEDULE, JESSICA ROSA [facility]   11/7/2024  3:15 PM Didier Kerr MD [facility] -   12/13/2024 12:00 PM Holiday, DO Nickie Quach [facility]

## 2024-07-01 ENCOUNTER — NURSE ONLY (OUTPATIENT)
Dept: FAMILY MEDICINE CLINIC | Age: 74
End: 2024-07-01
Payer: MEDICARE

## 2024-07-01 DIAGNOSIS — E29.1 HYPOGONADISM MALE: Primary | ICD-10-CM

## 2024-07-01 PROCEDURE — 96372 THER/PROPH/DIAG INJ SC/IM: CPT | Performed by: FAMILY MEDICINE

## 2024-07-01 RX ORDER — TESTOSTERONE CYPIONATE 200 MG/ML
100 INJECTION, SOLUTION INTRAMUSCULAR ONCE
Status: COMPLETED | OUTPATIENT
Start: 2024-07-01 | End: 2024-07-01

## 2024-07-01 RX ADMIN — TESTOSTERONE CYPIONATE 100 MG: 200 INJECTION, SOLUTION INTRAMUSCULAR at 14:40

## 2024-07-01 NOTE — PROGRESS NOTES
Patient given Testosterone 100mg IM right gluteal..  Will return in 2 weeks for next injection    Patient tolerated injection well.    Administrations This Visit       testosterone cypionate (DEPOTESTOTERONE CYPIONATE) injection 100 mg       Admin Date  07/01/2024 Action  Given Dose  100 mg Route  IntraMUSCular Administered By  Yumiko Carlson LPN

## 2024-07-27 DIAGNOSIS — I48.11 LONGSTANDING PERSISTENT ATRIAL FIBRILLATION (MULTI): ICD-10-CM

## 2024-07-29 ENCOUNTER — NURSE ONLY (OUTPATIENT)
Dept: FAMILY MEDICINE CLINIC | Age: 74
End: 2024-07-29
Payer: MEDICARE

## 2024-07-29 DIAGNOSIS — E29.1 HYPOGONADISM MALE: Primary | ICD-10-CM

## 2024-07-29 DIAGNOSIS — G89.29 CHRONIC MIDLINE BACK PAIN, UNSPECIFIED BACK LOCATION: ICD-10-CM

## 2024-07-29 DIAGNOSIS — M54.9 CHRONIC MIDLINE BACK PAIN, UNSPECIFIED BACK LOCATION: ICD-10-CM

## 2024-07-29 PROCEDURE — 96372 THER/PROPH/DIAG INJ SC/IM: CPT | Performed by: FAMILY MEDICINE

## 2024-07-29 RX ORDER — GABAPENTIN 300 MG/1
300 CAPSULE ORAL DAILY
Qty: 90 CAPSULE | Refills: 3 | Status: SHIPPED | OUTPATIENT
Start: 2024-07-29 | End: 2025-07-29

## 2024-07-29 RX ORDER — TESTOSTERONE CYPIONATE 200 MG/ML
200 INJECTION, SOLUTION INTRAMUSCULAR ONCE
Status: COMPLETED | OUTPATIENT
Start: 2024-07-29 | End: 2024-07-29

## 2024-07-29 RX ADMIN — TESTOSTERONE CYPIONATE 100 MG: 200 INJECTION, SOLUTION INTRAMUSCULAR at 15:51

## 2024-07-29 NOTE — TELEPHONE ENCOUNTER
Comments:      Last Office Visit (last PCP visit):   5/22/2024     Next Visit Date:    Future Appointments   Date Time Provider Department Center   11/7/2024  3:15 PM Didier Kerr MD [facility] -   12/13/2024 12:00 PM Holal, DO Nickie Quach        **If hasn't been seen in over a year OR hasn't followed up according to last diabetes/ADHD visit, make appointment for patient before sending refill to provider.     Rx requested:    Requested Prescriptions     Pending Prescriptions Disp Refills    gabapentin (NEURONTIN) 300 MG capsule 90 capsule 3     Sig: Take 1 capsule by mouth daily. TAKE 1 CAPSULE DAILY

## 2024-07-29 NOTE — PROGRESS NOTES
Patient given Testosterone 100 mg IM left gluteal..  Will return in 2 weeks for next injection    Patient tolerated injection well.

## 2024-07-29 NOTE — TELEPHONE ENCOUNTER
Received request for prescription refills for patient.   Patient follows with Dr. Fuad Neville      Request is for Eliquis 5 mg   Is patient currently on medication yes    Last OV 2/23/24  Next OV 9/25/24    Pended for signing and sent to provider

## 2024-07-30 RX ORDER — APIXABAN 5 MG/1
5 TABLET, FILM COATED ORAL 2 TIMES DAILY
Qty: 180 TABLET | Refills: 3 | Status: SHIPPED | OUTPATIENT
Start: 2024-07-30

## 2024-08-05 RX ORDER — POTASSIUM CHLORIDE 20 MEQ/1
20 TABLET, EXTENDED RELEASE ORAL DAILY
Qty: 90 TABLET | Refills: 3 | Status: SHIPPED | OUTPATIENT
Start: 2024-08-05

## 2024-08-05 NOTE — TELEPHONE ENCOUNTER
Requesting medication refill. Please approve or deny this request.    Rx requested:  Requested Prescriptions     Pending Prescriptions Disp Refills    potassium chloride (KLOR-CON M) 20 MEQ extended release tablet [Pharmacy Med Name: Potassium Chloride Cynthia ER 20 MEQ Oral Tablet Extended Release] 90 tablet 3     Sig: TAKE 1 TABLET BY MOUTH DAILY         Last Office Visit:   3/22/2024      Next Visit Date:  Future Appointments   Date Time Provider Department Center   11/7/2024  3:15 PM Didier Kerr MD [facility] -   12/13/2024 12:00 PM Yousif Prather DO [place] [facility]               Last refill 10/13/2023. Please approve or deny.

## 2024-08-12 ENCOUNTER — NURSE ONLY (OUTPATIENT)
Dept: FAMILY MEDICINE CLINIC | Age: 74
End: 2024-08-12
Payer: MEDICARE

## 2024-08-12 DIAGNOSIS — E29.1 HYPOGONADISM MALE: Primary | ICD-10-CM

## 2024-08-12 PROCEDURE — 96372 THER/PROPH/DIAG INJ SC/IM: CPT | Performed by: FAMILY MEDICINE

## 2024-08-12 RX ORDER — TESTOSTERONE CYPIONATE 200 MG/ML
100 INJECTION, SOLUTION INTRAMUSCULAR ONCE
Status: COMPLETED | OUTPATIENT
Start: 2024-08-12 | End: 2024-08-12

## 2024-08-12 RX ADMIN — TESTOSTERONE CYPIONATE 100 MG: 200 INJECTION, SOLUTION INTRAMUSCULAR at 16:26

## 2024-08-12 NOTE — PROGRESS NOTES
Patient given Testosterone 100mg IM right gluteal..  Will return in 2 weeks for next injection    Patient tolerated injection well.    Administrations This Visit       testosterone cypionate (DEPOTESTOTERONE CYPIONATE) injection 100 mg       Admin Date  08/12/2024 Action  Given Dose  100 mg Route  IntraMUSCular Documented By  Yumiko Carlson LPN

## 2024-08-14 ENCOUNTER — APPOINTMENT (OUTPATIENT)
Dept: CARDIOLOGY | Facility: CLINIC | Age: 74
End: 2024-08-14
Payer: MEDICARE

## 2024-08-23 DIAGNOSIS — Z86.79 HISTORY OF SUPRAVENTRICULAR TACHYCARDIA: ICD-10-CM

## 2024-08-23 DIAGNOSIS — I48.0 PAROXYSMAL ATRIAL FIBRILLATION (MULTI): ICD-10-CM

## 2024-08-26 ENCOUNTER — NURSE ONLY (OUTPATIENT)
Dept: FAMILY MEDICINE CLINIC | Age: 74
End: 2024-08-26
Payer: MEDICARE

## 2024-08-26 DIAGNOSIS — E29.1 HYPOGONADISM MALE: Primary | ICD-10-CM

## 2024-08-26 PROCEDURE — 96372 THER/PROPH/DIAG INJ SC/IM: CPT | Performed by: FAMILY MEDICINE

## 2024-08-26 RX ORDER — TESTOSTERONE CYPIONATE 200 MG/ML
100 INJECTION, SOLUTION INTRAMUSCULAR ONCE
Status: COMPLETED | OUTPATIENT
Start: 2024-08-26 | End: 2024-08-26

## 2024-08-26 RX ADMIN — TESTOSTERONE CYPIONATE 100 MG: 200 INJECTION, SOLUTION INTRAMUSCULAR at 14:39

## 2024-08-26 NOTE — TELEPHONE ENCOUNTER
Received request for prescription refills for patient.   Patient follows with Dr. Fuad Neville     Request is for Metoprolol tartrate  Is patient currently on medication yes    Last OV 2/23/24  Next OV 10/9/24    Pended for signing and sent to provider

## 2024-08-26 NOTE — PROGRESS NOTES
Patient given Testosterone 100mg IM left gluteal..  Will return in 2 weeks for next injection    Patient tolerated injection well.    Administrations This Visit       testosterone cypionate (DEPOTESTOTERONE CYPIONATE) injection 100 mg       Admin Date  08/26/2024 Action  Given Dose  100 mg Route  IntraMUSCular Documented By  Yumiko Carlson LPN

## 2024-08-28 RX ORDER — METOPROLOL TARTRATE 50 MG/1
50 TABLET ORAL 2 TIMES DAILY
Qty: 180 TABLET | Refills: 3 | Status: SHIPPED | OUTPATIENT
Start: 2024-08-28

## 2024-08-29 DIAGNOSIS — J44.9 CHRONIC OBSTRUCTIVE PULMONARY DISEASE, UNSPECIFIED COPD TYPE (HCC): ICD-10-CM

## 2024-08-30 RX ORDER — TIOTROPIUM BROMIDE 18 UG/1
CAPSULE ORAL; RESPIRATORY (INHALATION)
Qty: 90 CAPSULE | Refills: 3 | Status: SHIPPED | OUTPATIENT
Start: 2024-08-30

## 2024-08-30 NOTE — TELEPHONE ENCOUNTER
Comments:     Last Office Visit (last PCP visit):   5/22/2024    Next Visit Date:  Future Appointments   Date Time Provider Department Center   9/9/2024  3:00 PM SCHEDULE, JESSICA GÓMEZ PCP [facility]   9/23/2024  3:00 PM SCHEDULE, JESSICA BALDWIN [place] [facility]   10/7/2024  3:00 PM SCHEDULE, JESSICA BALDWIN [place] PCP [facility]   10/21/2024  3:00 PM SCHEDULE, JESSICA BALDWIN [place] PCP [facility]   11/7/2024  3:15 PM Didier Kerr MD [facility] -   12/13/2024 12:00 PM Yousif Prather DO [place] [facility]       **If hasn't been seen in over a year OR hasn't followed up according to last diabetes/ADHD visit, make appointment for patient before sending refill to provider.    Rx requested:  Requested Prescriptions     Pending Prescriptions Disp Refills    tiotropium (SPIRIVA) 18 MCG inhalation capsule [Pharmacy Med Name: TIOTROPIUM BROMIDE INH CAP 18MCG] 90 capsule 3     Sig: INHALE THE CONTENTS OF 1 CAPSULE BY MOUTH VIA HANDIHALER DAILY

## 2024-09-09 ENCOUNTER — NURSE ONLY (OUTPATIENT)
Dept: FAMILY MEDICINE CLINIC | Age: 74
End: 2024-09-09
Payer: MEDICARE

## 2024-09-09 DIAGNOSIS — E29.1 HYPOGONADISM MALE: Primary | ICD-10-CM

## 2024-09-09 PROCEDURE — 96372 THER/PROPH/DIAG INJ SC/IM: CPT | Performed by: FAMILY MEDICINE

## 2024-09-09 RX ORDER — TESTOSTERONE CYPIONATE 200 MG/ML
100 INJECTION, SOLUTION INTRAMUSCULAR ONCE
Status: COMPLETED | OUTPATIENT
Start: 2024-09-09 | End: 2024-09-09

## 2024-09-09 RX ADMIN — TESTOSTERONE CYPIONATE 100 MG: 200 INJECTION, SOLUTION INTRAMUSCULAR at 14:33

## 2024-09-23 ENCOUNTER — NURSE ONLY (OUTPATIENT)
Dept: FAMILY MEDICINE CLINIC | Age: 74
End: 2024-09-23
Payer: MEDICARE

## 2024-09-23 DIAGNOSIS — E29.1 HYPOGONADISM MALE: Primary | ICD-10-CM

## 2024-09-23 PROCEDURE — 96372 THER/PROPH/DIAG INJ SC/IM: CPT | Performed by: FAMILY MEDICINE

## 2024-09-23 RX ORDER — TESTOSTERONE CYPIONATE 200 MG/ML
100 INJECTION, SOLUTION INTRAMUSCULAR ONCE
Status: COMPLETED | OUTPATIENT
Start: 2024-09-23 | End: 2024-09-23

## 2024-09-23 RX ADMIN — TESTOSTERONE CYPIONATE 100 MG: 200 INJECTION, SOLUTION INTRAMUSCULAR at 14:15

## 2024-09-23 NOTE — TELEPHONE ENCOUNTER
Comments: Spoke with patient and is scheduled for 11/25/2024 @ 12:30 p.m      Last Office Visit (last PCP visit):   5/22/2024    Next Visit Date:  Future Appointments   Date Time Provider Department Center   9/23/2024  3:00 PM SCHEDULE, JESSICA GÓMEZ PCP [facility]   10/7/2024  3:00 PM SCHEDULE, JESSICA GÓMEZ PCP [facility]   10/21/2024  3:00 PM SCHEDULE, JESSICA BALDWIN [place] PCP [facility]   11/7/2024  3:15 PM Didier Kerr MD [facility] -   12/13/2024 12:00 PM Yousif Prather DO [place] [facility]       **If hasn't been seen in over a year OR hasn't followed up according to last diabetes/ADHD visit, make appointment for patient before sending refill to provider.    Rx requested:  Requested Prescriptions      No prescriptions requested or ordered in this encounter

## 2024-09-24 DIAGNOSIS — E78.5 HYPERLIPIDEMIA, UNSPECIFIED HYPERLIPIDEMIA TYPE: ICD-10-CM

## 2024-09-25 ENCOUNTER — APPOINTMENT (OUTPATIENT)
Dept: CARDIOLOGY | Facility: CLINIC | Age: 74
End: 2024-09-25
Payer: MEDICARE

## 2024-09-25 RX ORDER — PRAVASTATIN SODIUM 40 MG
40 TABLET ORAL DAILY
Qty: 90 TABLET | Refills: 3 | Status: SHIPPED | OUTPATIENT
Start: 2024-09-25

## 2024-10-07 ENCOUNTER — NURSE ONLY (OUTPATIENT)
Dept: FAMILY MEDICINE CLINIC | Age: 74
End: 2024-10-07
Payer: MEDICARE

## 2024-10-07 DIAGNOSIS — E29.1 HYPOGONADISM MALE: Primary | ICD-10-CM

## 2024-10-07 PROCEDURE — 96372 THER/PROPH/DIAG INJ SC/IM: CPT | Performed by: FAMILY MEDICINE

## 2024-10-07 RX ORDER — TESTOSTERONE CYPIONATE 200 MG/ML
100 INJECTION, SOLUTION INTRAMUSCULAR
OUTPATIENT
Start: 2024-10-07

## 2024-10-07 RX ORDER — TESTOSTERONE CYPIONATE 200 MG/ML
100 INJECTION, SOLUTION INTRAMUSCULAR ONCE
Status: COMPLETED | OUTPATIENT
Start: 2024-10-07 | End: 2024-10-07

## 2024-10-07 RX ADMIN — TESTOSTERONE CYPIONATE 100 MG: 200 INJECTION, SOLUTION INTRAMUSCULAR at 14:18

## 2024-10-07 NOTE — TELEPHONE ENCOUNTER
Comments: please update rx    Last Office Visit (last PCP visit):   5/22/2024    Next Visit Date:  Future Appointments   Date Time Provider Department Center   10/7/2024  3:00 PM SCHEDULE, JESSICA GÓMEZ PCP [facility]   10/21/2024  3:00 PM SCHEDULE, JESSICA BALDWIN [place] PCP [facility]   11/7/2024  3:15 PM Didier Kerr MD [facility] -   11/25/2024 12:30 PM Sher Galloway MD [facility]   12/13/2024 12:00 PM Holiday, DO Nickie Quach       **If hasn't been seen in over a year OR hasn't followed up according to last diabetes/ADHD visit, make appointment for patient before sending refill to provider.    Rx requested:  Requested Prescriptions     Pending Prescriptions Disp Refills    testosterone cypionate (DEPOTESTOTERONE CYPIONATE) 200 MG/ML injection       Sig: Inject 0.5 mLs into the muscle every 14 days. Order in 6/15/23 lab note Max Daily Amount: 100 mg

## 2024-10-07 NOTE — PROGRESS NOTES
Patient given Testosterone 100mg IM right gluteal..  Will return in 2 weeks for next injection    Patient tolerated injection well.    Administrations This Visit       testosterone cypionate (DEPOTESTOTERONE CYPIONATE) injection 100 mg       Admin Date  10/07/2024 Action  Given Dose  100 mg Route  IntraM[facility]Cular Documented By  Yumiko Carlson LPN

## 2024-10-09 ENCOUNTER — APPOINTMENT (OUTPATIENT)
Dept: CARDIOLOGY | Facility: CLINIC | Age: 74
End: 2024-10-09
Payer: MEDICARE

## 2024-10-09 VITALS
SYSTOLIC BLOOD PRESSURE: 110 MMHG | DIASTOLIC BLOOD PRESSURE: 76 MMHG | HEART RATE: 76 BPM | BODY MASS INDEX: 32.58 KG/M2 | WEIGHT: 215 LBS | HEIGHT: 68 IN

## 2024-10-09 DIAGNOSIS — R00.2 PALPITATIONS: ICD-10-CM

## 2024-10-09 DIAGNOSIS — Z87.891 FORMER SMOKER: ICD-10-CM

## 2024-10-09 DIAGNOSIS — Z79.01 ANTICOAGULATION MANAGEMENT ENCOUNTER: ICD-10-CM

## 2024-10-09 DIAGNOSIS — I25.10 CORONARY ARTERY DISEASE INVOLVING NATIVE CORONARY ARTERY OF NATIVE HEART WITHOUT ANGINA PECTORIS: ICD-10-CM

## 2024-10-09 DIAGNOSIS — I48.11 LONGSTANDING PERSISTENT ATRIAL FIBRILLATION (MULTI): ICD-10-CM

## 2024-10-09 DIAGNOSIS — Z79.899 HIGH RISK MEDICATION USE: ICD-10-CM

## 2024-10-09 DIAGNOSIS — Z51.81 ANTICOAGULATION MANAGEMENT ENCOUNTER: ICD-10-CM

## 2024-10-09 PROCEDURE — 93000 ELECTROCARDIOGRAM COMPLETE: CPT | Performed by: INTERNAL MEDICINE

## 2024-10-09 PROCEDURE — 3008F BODY MASS INDEX DOCD: CPT | Performed by: INTERNAL MEDICINE

## 2024-10-09 PROCEDURE — 1036F TOBACCO NON-USER: CPT | Performed by: INTERNAL MEDICINE

## 2024-10-09 PROCEDURE — 99214 OFFICE O/P EST MOD 30 MIN: CPT | Performed by: INTERNAL MEDICINE

## 2024-10-09 PROCEDURE — 3078F DIAST BP <80 MM HG: CPT | Performed by: INTERNAL MEDICINE

## 2024-10-09 PROCEDURE — 3074F SYST BP LT 130 MM HG: CPT | Performed by: INTERNAL MEDICINE

## 2024-10-09 PROCEDURE — 1159F MED LIST DOCD IN RCRD: CPT | Performed by: INTERNAL MEDICINE

## 2024-10-09 RX ORDER — IPRATROPIUM BROMIDE 42 UG/1
SPRAY, METERED NASAL
COMMUNITY
Start: 2023-03-06

## 2024-10-09 RX ORDER — CETIRIZINE HYDROCHLORIDE 5 MG/1
TABLET ORAL
COMMUNITY

## 2024-10-09 RX ORDER — GUAIFENESIN 600 MG/1
1 TABLET, EXTENDED RELEASE ORAL 2 TIMES DAILY
COMMUNITY
Start: 2022-08-22

## 2024-10-09 RX ORDER — LOPERAMIDE HYDROCHLORIDE 2 MG/1
2 CAPSULE ORAL
COMMUNITY
Start: 2023-03-06

## 2024-10-09 ASSESSMENT — ENCOUNTER SYMPTOMS
DYSPNEA ON EXERTION: 0
PALPITATIONS: 0

## 2024-10-09 NOTE — PROGRESS NOTES
CARDIOLOGY OFFICE VISIT      CHIEF COMPLAINT  Chief Complaint   Patient presents with    Follow-up     Pt is here today following up after 6 months        HISTORY OF PRESENT ILLNESS  HPI  74-year-old  male who is followed for palpitations controlled on beta-blockade. He has a history of COPD and is followed by Dr. Perez.       Patient was  evaluated by cardiology service due to significant edema in the lower extremity. He was placed on furosemide with significant improvement of his symptoms.      Patient now is on Eliquis therapy.     Echocardiogram in 2023           CONCLUSIONS:   1. Left ventricular systolic function is normal with a 60-65% estimated ejection fraction.   2. Spectral Doppler shows an abnormal pattern of left ventricular diastolic filling.   3. Normal RV size and function      Borderline pulmonary hypertension RVSP 32 to 38 mmHg.   4. Left atrium mildly dilated.   5. Trace-1+ MR secondary to mild leaflet tip thickening.   6. Tricuspid aortic valve with 1+ aortic insufficiency secondary to leaflet thickening. There is very mild aortic stenosis V-max at 1.9 m/s. Mean gradient 7 mmHg. Calculated valve area 1.98 cm sq.   7. Mild aortic valve regurgitation.   8. Aortic root measures 39 mm mildly increased ascending aorta measurement is normal. However the vast majority of ascending aorta is not optimally visualized on this study and if clinically indicated would consider repeat imaging in that echocardiogram report of February 2021 described ascending aorta at 3.7 cm.    Since the last office visit he has been doing well.  Denies any symptoms of chest pain or shortness breath or palpitations but family members are very concerned that he is losing weight.  He also is maintaining furosemide 20 mg 1 tablet daily.    EKG performed today shows sinus rhythm at a rate of 76 bpm QRS duration 96 ms QT corrected 432 ms.  Rhythm strip shows the same pattern.        Past Medical History  Past Medical  History:   Diagnosis Date    Personal history of other endocrine, nutritional and metabolic disease 10/11/2021    History of hyperlipidemia       Social History  Social History     Tobacco Use    Smoking status: Former     Current packs/day: 0.00     Average packs/day: 1 pack/day for 30.0 years (30.0 ttl pk-yrs)     Types: Cigarettes     Start date:      Quit date:      Years since quittin.7    Smokeless tobacco: Never   Substance Use Topics    Alcohol use: Not Currently    Drug use: Never       Family History     Family History   Problem Relation Name Age of Onset    Coronary artery disease Mother      Other (arteriosclerotic cardiovascular disease) Father      Coronary artery disease Father      Uterine cancer Sister          Allergies:  Allergies   Allergen Reactions    Alemtuzumab Other     Low grade fever    Glycopyrrolate-Formoterol Other     Dizzy and felt like heart rate sped up    Mercaptopurine Unknown    Moxifloxacin Other     Pt states He gets sores in his mouth        Outpatient Medications:  Current Outpatient Medications   Medication Instructions    albuterol 90 mcg/actuation aerosol powdr breath activated inhaler 2 puffs, inhalation, Every 6 hours PRN    budesonide-formoteroL (Symbicort) 160-4.5 mcg/actuation inhaler inhalation, Use as directed    cetirizine (ZyrTEC) 5 mg tablet TAKE ONE TABLET BY MOUTH  PRN    cholecalciferol (VITAMIN D-3) 125 mcg, oral, Daily    Eliquis 5 mg, oral, 2 times daily    esomeprazole (NEXIUM) 20 mg, oral, Daily before breakfast, Do not open capsule.    furosemide (LASIX) 20 mg, oral, Daily    gabapentin (NEURONTIN) 300 mg, oral, Daily    guaiFENesin (Mucinex) 600 mg 12 hr tablet 1 tablet, oral, 2 times daily    ipratropium (Atrovent) 42 mcg (0.06 %) nasal spray nasal    loperamide (IMODIUM) 2 mg, oral    memantine (NAMENDA) 5 mg, oral, 2 times daily    mesalamine (PENTASA) 250 mg, oral, 2 times daily, Do not crush, chew, or split.<BR>4 caps twice a day      metoprolol tartrate (LOPRESSOR) 50 mg, oral, 2 times daily    montelukast (SINGULAIR) 10 mg, oral, Daily    nitroglycerin (NITROSTAT) 0.4 mg, sublingual    nystatin (Mycostatin) cream Topical, As needed    potassium chloride CR 20 mEq ER tablet 20 mEq, oral, Daily, Do not crush or chew.    pravastatin (PRAVACHOL) 40 mg, oral, Nightly    Stelara 90 mg, subcutaneous, Use as directed    testosterone cypionate (DEPO-TESTOSTERONE) 200 mg, intramuscular, Every 14 days    tiotropium (Spiriva) 18 mcg inhalation capsule 1 capsule, inhalation, As needed          REVIEW OF SYSTEMS  Review of Systems   Cardiovascular:  Negative for chest pain, dyspnea on exertion and palpitations.   All other systems reviewed and are negative.        VITALS  Vitals:    10/09/24 1354   BP: 110/76   Pulse: 76       PHYSICAL EXAM  Constitutional:       Appearance: Normal and healthy appearance. Well-developed and not in distress.   Neck:      Vascular: No JVR. JVD normal.   Pulmonary:      Effort: Pulmonary effort is normal.      Breath sounds: Normal breath sounds. No wheezing. No rhonchi. No rales.   Chest:      Chest wall: Not tender to palpatation.   Cardiovascular:      PMI at left midclavicular line. Normal rate. Regular rhythm. Normal S1. Normal S2.       Murmurs: There is no murmur.      No gallop.  No click. No rub.   Pulses:     Intact distal pulses.   Edema:     Peripheral edema absent.   Abdominal:      General: Bowel sounds are normal.      Palpations: Abdomen is soft.      Tenderness: There is no abdominal tenderness.   Musculoskeletal: Normal range of motion.         General: No tenderness. Skin:     General: Skin is warm and dry.   Neurological:      General: No focal deficit present.      Mental Status: Alert and oriented to person, place and time.           ASSESSMENT AND PLAN  CLINICAL IMPRESSION:   1. Normal left ventricular function per 2D echo dated February 1, 2021.  2. Left atrial enlargement with the left atrial size of  4.8 cm per  2D echo.  3. Valvular heart disease, consisting of mild MR and TR.  4. Mild pulmonary hypertension with a right ventricular systolic  pressure of 34 mmHg.  5. Tachyarrhythmic palpitations, controlled on beta-blockade   6. Hypertension, controlled   7. Chronic obstructive pulmonary disease. Followed with Dr. Perez.  Hospitalization for pneumonia on July 28, 2021, resolved.  8. History of Crohn's disease, hiatal hernia, chronic ulcer at the  gastroesophageal junction with gastritis per  esophagogastroduodenoscopy, dated March 24, 2013.  9. Left heart catheterization dated November 13, 2020 revealing less than 10% left main, 30% proximal LAD, 10 to 30% proximal, mid and distal circumflex, 40% second OMB, and 60% mid RCA stenosis with recommendation for medical management.  10.  Episodes of atrial fibrillation documented by emergency department     Plan-recommendations    Patient is doing well from the electrophysiology standpoint.  No recurrence of arrhythmias.  Continue with Eliquis therapy and beta-blocker therapy.    Regarding weight loss, he was instructed to hold furosemide for now.  Patient is to talk to primary care physician for evaluation of weight loss in the near future.    Follow-up in my office in 6 months or year or sooner if needed.    Risk factor modification and lifestyle modification discussed with patient. Diet , exercise and hydration discussed with patient.    I have personally review with patient during this office visit, laboratory data, echocardiogram results, stress test results, Holter-event monitor results prior and after the last electrophysiology visit. All questions has been answered.    Please excuse any errors in grammar or translation related to this dictation.  Voice recognition software was utilized to prepare this document.

## 2024-10-09 NOTE — PATIENT INSTRUCTIONS
Follow up with Dr. Hawthorne in 6 months.    Change Lasix (Furosemide) to as needed for swelling, weight gain once daily.    Continue same medications and treatments.   Patient educated on proper medication use.   Patient educated on risk factor modification.   Please bring any lab results from other providers / physicians to your next appointment.     Please bring all medicines, vitamins, and herbal supplements with you when you come to the office.     Prescriptions will not be filled unless you are compliant with your follow up appointments or have a follow up appointment scheduled as per instruction of your physician. Refills should be requested at the time of your visit.    SUNDEEP NEAL LPN, AM SCRIBING FOR, AND IN THE PRESENCE OF DR. JAYNE HAWTHORNE MD

## 2024-10-16 DIAGNOSIS — I10 PRIMARY HYPERTENSION: ICD-10-CM

## 2024-10-16 DIAGNOSIS — I50.32 CHRONIC DIASTOLIC HEART FAILURE: ICD-10-CM

## 2024-10-16 RX ORDER — FUROSEMIDE 20 MG/1
20 TABLET ORAL DAILY
Qty: 90 TABLET | Refills: 3 | Status: SHIPPED | OUTPATIENT
Start: 2024-10-16

## 2024-10-16 NOTE — TELEPHONE ENCOUNTER
Received request for prescription refills for patient.   Patient follows with Dr. Fitzgerald     Request is for Lasix 20mg QD  Is patient currently on medication yes    Last OV 12/11/23  Next OV 12/11/24    Pended for signing and sent to provider

## 2024-10-21 ENCOUNTER — NURSE ONLY (OUTPATIENT)
Dept: FAMILY MEDICINE CLINIC | Age: 74
End: 2024-10-21
Payer: MEDICARE

## 2024-10-21 DIAGNOSIS — E29.1 HYPOGONADISM MALE: Primary | ICD-10-CM

## 2024-10-21 PROCEDURE — 96372 THER/PROPH/DIAG INJ SC/IM: CPT | Performed by: FAMILY MEDICINE

## 2024-10-21 RX ORDER — TESTOSTERONE CYPIONATE 200 MG/ML
100 INJECTION, SOLUTION INTRAMUSCULAR ONCE
Status: COMPLETED | OUTPATIENT
Start: 2024-10-21 | End: 2024-10-21

## 2024-10-21 RX ADMIN — TESTOSTERONE CYPIONATE 100 MG: 200 INJECTION, SOLUTION INTRAMUSCULAR at 15:28

## 2024-10-21 NOTE — PROGRESS NOTES
Patient given Testosterone 100mg IM right gluteal..  Will return in 2 weeks for next injection    Patient tolerated injection well.    Administrations This Visit       testosterone cypionate (DEPOTESTOTERONE CYPIONATE) injection 100 mg       Admin Date  10/21/2024 Action  Given Dose  100 mg Route  IntraMUSCular Documented By  Yumiko Carlson LPN

## 2024-10-22 ENCOUNTER — OFFICE VISIT (OUTPATIENT)
Dept: FAMILY MEDICINE CLINIC | Age: 74
End: 2024-10-22

## 2024-10-22 VITALS
OXYGEN SATURATION: 98 % | DIASTOLIC BLOOD PRESSURE: 70 MMHG | WEIGHT: 219 LBS | SYSTOLIC BLOOD PRESSURE: 138 MMHG | BODY MASS INDEX: 34.3 KG/M2 | TEMPERATURE: 98.2 F | HEART RATE: 67 BPM

## 2024-10-22 DIAGNOSIS — I10 PRIMARY HYPERTENSION: ICD-10-CM

## 2024-10-22 DIAGNOSIS — E29.1 HYPOGONADISM MALE: ICD-10-CM

## 2024-10-22 DIAGNOSIS — E11.40 TYPE 2 DIABETES MELLITUS WITH DIABETIC NEUROPATHY, WITHOUT LONG-TERM CURRENT USE OF INSULIN (HCC): ICD-10-CM

## 2024-10-22 DIAGNOSIS — I50.33 ACUTE ON CHRONIC DIASTOLIC CONGESTIVE HEART FAILURE (HCC): ICD-10-CM

## 2024-10-22 DIAGNOSIS — I26.99 PULMONARY EMBOLISM, OTHER, UNSPECIFIED CHRONICITY, UNSPECIFIED WHETHER ACUTE COR PULMONALE PRESENT (HCC): ICD-10-CM

## 2024-10-22 DIAGNOSIS — F03.B0 MODERATE DEMENTIA WITHOUT BEHAVIORAL DISTURBANCE, PSYCHOTIC DISTURBANCE, MOOD DISTURBANCE, OR ANXIETY, UNSPECIFIED DEMENTIA TYPE (HCC): Primary | ICD-10-CM

## 2024-10-22 DIAGNOSIS — Z23 NEED FOR INFLUENZA VACCINATION: ICD-10-CM

## 2024-10-22 DIAGNOSIS — K50.90 CROHN'S DISEASE WITHOUT COMPLICATION, UNSPECIFIED GASTROINTESTINAL TRACT LOCATION (HCC): ICD-10-CM

## 2024-10-22 LAB
ALBUMIN SERPL-MCNC: 3.8 G/DL (ref 3.5–4.6)
ALP SERPL-CCNC: 78 U/L (ref 35–104)
ALT SERPL-CCNC: 7 U/L (ref 0–41)
ANION GAP SERPL CALCULATED.3IONS-SCNC: 11 MEQ/L (ref 9–15)
AST SERPL-CCNC: 18 U/L (ref 0–40)
BASOPHILS # BLD: 0.1 K/UL (ref 0–0.2)
BASOPHILS NFR BLD: 0.5 %
BILIRUB SERPL-MCNC: 0.6 MG/DL (ref 0.2–0.7)
BUN SERPL-MCNC: 11 MG/DL (ref 8–23)
CALCIUM SERPL-MCNC: 8.6 MG/DL (ref 8.5–9.9)
CHLORIDE SERPL-SCNC: 103 MEQ/L (ref 95–107)
CHOLEST SERPL-MCNC: 114 MG/DL (ref 0–199)
CO2 SERPL-SCNC: 25 MEQ/L (ref 20–31)
CREAT SERPL-MCNC: 0.77 MG/DL (ref 0.7–1.2)
CREAT UR-MCNC: 122 MG/DL
EOSINOPHIL # BLD: 0.3 K/UL (ref 0–0.7)
EOSINOPHIL NFR BLD: 2.7 %
ERYTHROCYTE [DISTWIDTH] IN BLOOD BY AUTOMATED COUNT: 14 % (ref 11.5–14.5)
GLOBULIN SER CALC-MCNC: 2.7 G/DL (ref 2.3–3.5)
GLUCOSE SERPL-MCNC: 84 MG/DL (ref 70–99)
HCT VFR BLD AUTO: 46.8 % (ref 42–52)
HDLC SERPL-MCNC: 31 MG/DL (ref 40–59)
HGB BLD-MCNC: 15.1 G/DL (ref 14–18)
LDLC SERPL CALC-MCNC: 42 MG/DL (ref 0–129)
LYMPHOCYTES # BLD: 3 K/UL (ref 1–4.8)
LYMPHOCYTES NFR BLD: 32 %
MCH RBC QN AUTO: 28.8 PG (ref 27–31.3)
MCHC RBC AUTO-ENTMCNC: 32.3 % (ref 33–37)
MCV RBC AUTO: 89.1 FL (ref 79–92.2)
MICROALBUMIN UR-MCNC: <1.2 MG/DL
MICROALBUMIN/CREAT UR-RTO: NORMAL MG/G (ref 0–30)
MONOCYTES # BLD: 1.2 K/UL (ref 0.2–0.8)
MONOCYTES NFR BLD: 12.8 %
NEUTROPHILS # BLD: 4.9 K/UL (ref 1.4–6.5)
NEUTS SEG NFR BLD: 51.7 %
PLATELET # BLD AUTO: 180 K/UL (ref 130–400)
POTASSIUM SERPL-SCNC: 4.9 MEQ/L (ref 3.4–4.9)
PROT SERPL-MCNC: 6.5 G/DL (ref 6.3–8)
RBC # BLD AUTO: 5.25 M/UL (ref 4.7–6.1)
SODIUM SERPL-SCNC: 139 MEQ/L (ref 135–144)
TRIGL SERPL-MCNC: 203 MG/DL (ref 0–150)
WBC # BLD AUTO: 9.5 K/UL (ref 4.8–10.8)

## 2024-10-22 RX ORDER — FUROSEMIDE 20 MG/1
20 TABLET ORAL AS NEEDED
Qty: 1 TABLET | Refills: 0
Start: 2024-10-22

## 2024-10-22 NOTE — PATIENT INSTRUCTIONS
No change in medications at this time    Patient will have his labs done today.  There is a possibility he is not fasting.  Patient states he has not eaten but he has dementia.  Family member states they have not witnessed him eating so they think he probably is fasting.

## 2024-10-22 NOTE — PROGRESS NOTES
External ear normal.      Nose: Nose normal.   Eyes:      General:         Right eye: No discharge.         Left eye: No discharge.      Conjunctiva/sclera: Conjunctivae normal.      Pupils: Pupils are equal, round, and reactive to light.   Neck:      Thyroid: No thyromegaly.   Cardiovascular:      Rate and Rhythm: Normal rate and regular rhythm.   Pulmonary:      Effort: Pulmonary effort is normal. No respiratory distress.   Abdominal:      General: There is no distension.   Musculoskeletal:      Cervical back: Neck supple.   Skin:     General: Skin is warm and dry.   Neurological:      Mental Status: He is alert and oriented to person, place, and time.      Coordination: Coordination normal.   Psychiatric:         Thought Content: Thought content normal.         Judgment: Judgment normal.         No results found for this visit on 10/22/24.    Recent Results (from the past 2016 hour(s))   Hemoglobin A1C    Collection Time: 10/22/24  3:44 PM   Result Value Ref Range    Hemoglobin A1C 5.1 4.0 - 6.0 %    Estimated Avg Glucose 100 mg/dL   Comprehensive Metabolic Panel    Collection Time: 10/22/24  3:45 PM   Result Value Ref Range    Sodium 139 135 - 144 mEq/L    Potassium 4.9 3.4 - 4.9 mEq/L    Chloride 103 95 - 107 mEq/L    CO2 25 20 - 31 mEq/L    Anion Gap 11 9 - 15 mEq/L    Glucose 84 70 - 99 mg/dL    BUN 11 8 - 23 mg/dL    Creatinine 0.77 0.70 - 1.20 mg/dL    Est, Glom Filt Rate >90.0 >60    Calcium 8.6 8.5 - 9.9 mg/dL    Total Protein 6.5 6.3 - 8.0 g/dL    Albumin 3.8 3.5 - 4.6 g/dL    Total Bilirubin 0.6 0.2 - 0.7 mg/dL    Alkaline Phosphatase 78 35 - 104 U/L    ALT 7 0 - 41 U/L    AST 18 0 - 40 U/L    Globulin 2.7 2.3 - 3.5 g/dL   CBC with Auto Differential    Collection Time: 10/22/24  3:45 PM   Result Value Ref Range    WBC 9.5 4.8 - 10.8 K/uL    RBC 5.25 4.70 - 6.10 M/uL    Hemoglobin 15.1 14.0 - 18.0 g/dL    Hematocrit 46.8 42.0 - 52.0 %    MCV 89.1 79.0 - 92.2 fL    MCH 28.8 27.0 - 31.3 pg    MCHC 32.3 (L)

## 2024-10-23 LAB
ESTIMATED AVERAGE GLUCOSE: 100 MG/DL
HBA1C MFR BLD: 5.1 % (ref 4–6)
SHBG SERPL-SCNC: 46 NMOL/L (ref 19–76)
TESTOST FREE SERPL-MCNC: 91.3 PG/ML (ref 47–244)
TESTOST SERPL-MCNC: 533 NG/DL (ref 193–740)

## 2024-10-23 ASSESSMENT — ENCOUNTER SYMPTOMS
SHORTNESS OF BREATH: 0
ABDOMINAL DISTENTION: 0
ABDOMINAL PAIN: 0
PHOTOPHOBIA: 0
CHEST TIGHTNESS: 0

## 2024-11-04 DIAGNOSIS — E29.1 HYPOGONADISM MALE: Primary | ICD-10-CM

## 2024-11-04 RX ORDER — TESTOSTERONE CYPIONATE 200 MG/ML
100 INJECTION, SOLUTION INTRAMUSCULAR
Qty: 3 ML | Refills: 0 | Status: SHIPPED | OUTPATIENT
Start: 2024-11-04 | End: 2025-01-27

## 2024-11-04 NOTE — TELEPHONE ENCOUNTER
Comments:     Last Office Visit (last PCP visit):   10/22/2024    Next Visit Date:  Future Appointments   Date Time Provider Department Center   11/7/2024  3:15 PM Didier Kerr MD [place] [facility] -   11/18/2024  3:00 PM SCHEDULE, JESSICA GÓMEZ PCP [facility]OST[facility]ONE [facility]   11/26/2024  1:00 PM hSer Galloway MD [facility]   12/2/2024  3:00 PM SCHEDULE, JESSICA GÓMEZ PCP TESTOSTERONE [facility]   12/13/2024 12:00 PM Holiday, DO [facility]   1/22/2025  1:30 PM Sher Galloway MD [facility]       **If hasn't been seen in over a year OR hasn't followed up according to last diabetes/ADHD visit, make appointment for patient before sending refill to provider.    Rx requested:  Requested Prescriptions     Pending Prescriptions Disp Refills    testosterone cypionate (DEPOTESTOTERONE CYPIONATE) 200 MG/ML injection 1 mL 0     Sig: Inject 0.5 mLs into the muscle every 14 days for 30 days. Order in 6/15/23 lab note Max Daily Amount: 100 mg

## 2024-11-11 ENCOUNTER — NURSE ONLY (OUTPATIENT)
Dept: FAMILY MEDICINE CLINIC | Age: 74
End: 2024-11-11
Payer: MEDICARE

## 2024-11-11 DIAGNOSIS — E29.1 HYPOGONADISM MALE: Primary | ICD-10-CM

## 2024-11-11 PROCEDURE — 96372 THER/PROPH/DIAG INJ SC/IM: CPT | Performed by: FAMILY MEDICINE

## 2024-11-11 RX ORDER — TESTOSTERONE CYPIONATE 200 MG/ML
100 INJECTION, SOLUTION INTRAMUSCULAR ONCE
Status: COMPLETED | OUTPATIENT
Start: 2024-11-11 | End: 2024-11-11

## 2024-11-11 RX ADMIN — TESTOSTERONE CYPIONATE 100 MG: 200 INJECTION, SOLUTION INTRAMUSCULAR at 15:19

## 2024-11-11 NOTE — PROGRESS NOTES
Patient given Testosterone 100mg IM right gluteal..  Will return in 2 weeks for next injection    Patient tolerated injection well.    Administrations This Visit       testosterone cypionate (DEPOTESTOTERONE CYPIONATE) injection 100 mg       Admin Date  11/11/2024 Action  Given Dose  100 mg Route  [employer] Documented By  Yumiko Carlson LPN

## 2024-12-01 DIAGNOSIS — J30.2 CHRONIC SEASONAL ALLERGIC RHINITIS: ICD-10-CM

## 2024-12-01 DIAGNOSIS — J41.0 SIMPLE CHRONIC BRONCHITIS (HCC): ICD-10-CM

## 2024-12-02 ENCOUNTER — TELEPHONE (OUTPATIENT)
Dept: FAMILY MEDICINE CLINIC | Age: 74
End: 2024-12-02

## 2024-12-02 DIAGNOSIS — E29.1 HYPOGONADISM MALE: Primary | ICD-10-CM

## 2024-12-02 RX ORDER — TESTOSTERONE CYPIONATE 1000 MG/10ML
100 INJECTION, SOLUTION INTRAMUSCULAR
Qty: 2 ML | Refills: 2 | Status: SHIPPED | OUTPATIENT
Start: 2024-12-02 | End: 2028-05-02

## 2024-12-02 RX ORDER — MONTELUKAST SODIUM 10 MG/1
10 TABLET ORAL NIGHTLY
Qty: 90 TABLET | Refills: 3 | Status: SHIPPED | OUTPATIENT
Start: 2024-12-02

## 2024-12-02 NOTE — TELEPHONE ENCOUNTER
Comments:     Last Office Visit (last PCP visit):   11/2/2023    Next Visit Date:  Future Appointments   Date Time Provider Department Center   12/9/2024  2:30 PM SCHEDULE, JESSICA GÓMEZ PCP [facility] DEP   12/13/2024 12:00 PM Holiday, DO Nickie Quach [facility]   1/22/2025  1:30 PM Sher Galloway MD [facility]       **If hasn't been seen in over a year OR hasn't followed up according to last diabetes/ADHD visit, make appointment for patient before sending refill to provider.    Rx requested:  Requested Prescriptions     Pending Prescriptions Disp Refills    montelukast (SINGULAIR) 10 MG tablet [Pharmacy Med Name: Montelukast Sodium 10 MG Oral Tablet] 90 tablet 3     Sig: TAKE 1 TABLET BY MOUTH EVERY  NIGHT

## 2024-12-02 NOTE — TELEPHONE ENCOUNTER
Calling about the testosterone that was sent on 11/4   The bottle is a 1 ml bottle and pt injects 0.5 ml and bottle can only be accessed once and the bottle then needs to be discarded so it is not an 84 day supply it is only a 42 day supply.  So pt will be needing a refill sooner and may need a prior auth with insurance.

## 2024-12-11 ENCOUNTER — APPOINTMENT (OUTPATIENT)
Dept: CARDIOLOGY | Facility: CLINIC | Age: 74
End: 2024-12-11
Payer: MEDICARE

## 2024-12-11 VITALS
HEART RATE: 68 BPM | SYSTOLIC BLOOD PRESSURE: 108 MMHG | DIASTOLIC BLOOD PRESSURE: 70 MMHG | WEIGHT: 212.6 LBS | HEIGHT: 68 IN | BODY MASS INDEX: 32.22 KG/M2

## 2024-12-11 DIAGNOSIS — E78.2 MIXED HYPERLIPIDEMIA: ICD-10-CM

## 2024-12-11 DIAGNOSIS — G47.33 OBSTRUCTIVE SLEEP APNEA ON CPAP: ICD-10-CM

## 2024-12-11 DIAGNOSIS — J44.9 CHRONIC OBSTRUCTIVE PULMONARY DISEASE, UNSPECIFIED COPD TYPE (MULTI): ICD-10-CM

## 2024-12-11 DIAGNOSIS — I87.2 CHRONIC VENOUS INSUFFICIENCY: ICD-10-CM

## 2024-12-11 DIAGNOSIS — F03.90 DEMENTIA, UNSPECIFIED DEMENTIA SEVERITY, UNSPECIFIED DEMENTIA TYPE, UNSPECIFIED WHETHER BEHAVIORAL, PSYCHOTIC, OR MOOD DISTURBANCE OR ANXIETY (MULTI): ICD-10-CM

## 2024-12-11 DIAGNOSIS — Z87.891 FORMER SMOKER: ICD-10-CM

## 2024-12-11 DIAGNOSIS — I50.32 CHRONIC DIASTOLIC HEART FAILURE: ICD-10-CM

## 2024-12-11 DIAGNOSIS — I25.10 CORONARY ARTERY DISEASE INVOLVING NATIVE CORONARY ARTERY OF NATIVE HEART WITHOUT ANGINA PECTORIS: ICD-10-CM

## 2024-12-11 DIAGNOSIS — I10 PRIMARY HYPERTENSION: ICD-10-CM

## 2024-12-11 DIAGNOSIS — Z87.19 HISTORY OF CROHN'S DISEASE: ICD-10-CM

## 2024-12-11 PROCEDURE — 3074F SYST BP LT 130 MM HG: CPT | Performed by: INTERNAL MEDICINE

## 2024-12-11 PROCEDURE — 3078F DIAST BP <80 MM HG: CPT | Performed by: INTERNAL MEDICINE

## 2024-12-11 PROCEDURE — 99214 OFFICE O/P EST MOD 30 MIN: CPT | Performed by: INTERNAL MEDICINE

## 2024-12-11 PROCEDURE — 1159F MED LIST DOCD IN RCRD: CPT | Performed by: INTERNAL MEDICINE

## 2024-12-11 PROCEDURE — 3008F BODY MASS INDEX DOCD: CPT | Performed by: INTERNAL MEDICINE

## 2024-12-11 PROCEDURE — 1036F TOBACCO NON-USER: CPT | Performed by: INTERNAL MEDICINE

## 2024-12-11 NOTE — PROGRESS NOTES
CARDIOLOGY OFFICE VISIT      CHIEF COMPLAINT      HISTORY OF PRESENT ILLNESS  The patient apparently has been doing well.  He does have dementia still never certain about what he tells me but his family agrees with his answers.  He denies chest discomfort.  He denies any Setoprime with dyspnea.  He has not had a recent edema of his lower extremities.  He denies palpitations and syncope.  He denies any problem with his current medication.    Lipid profile: Cholesterol 141, HDL 31, LDL 42, triglycerides 203, 1 month ago      Impression:  Chronic diastolic congestive heart failure  Hypertension  Hyperlipidemia  COPD  Former smoker  Obstructive sleep apnea being managed with CPAP  obesity  Coronary artery disease , elevated coronary artery CT calcium score, 3062  Paroxysmal atrial fibrillation  Chronic venous insufficiency of lower extremities bilaterally  Crohn's disease  Vascular dementia        Please excuse any errors in grammar or translation related to this dictation. Voice recognition software was utilized to prepare this document.       Past Medical History  Past Medical History:   Diagnosis Date    Personal history of other endocrine, nutritional and metabolic disease 10/11/2021    History of hyperlipidemia       Social History  Social History     Tobacco Use    Smoking status: Former     Current packs/day: 0.00     Average packs/day: 1 pack/day for 30.0 years (30.0 ttl pk-yrs)     Types: Cigarettes     Start date:      Quit date: 2000     Years since quittin.9    Smokeless tobacco: Never   Substance Use Topics    Alcohol use: Not Currently    Drug use: Never       Family History     Family History   Problem Relation Name Age of Onset    Coronary artery disease Mother      Other (arteriosclerotic cardiovascular disease) Father      Coronary artery disease Father      Uterine cancer Sister          Allergies:  Allergies   Allergen Reactions    Alemtuzumab Other     Low grade fever     Glycopyrrolate-Formoterol Other     Dizzy and felt like heart rate sped up    Mercaptopurine Unknown    Moxifloxacin Other     Pt states He gets sores in his mouth        Outpatient Medications:  Current Outpatient Medications   Medication Instructions    albuterol 90 mcg/actuation aerosol powdr breath activated inhaler 2 puffs, Every 6 hours PRN    budesonide-formoteroL (Symbicort) 160-4.5 mcg/actuation inhaler Inhale. Use as directed    cetirizine (ZyrTEC) 5 mg tablet TAKE ONE TABLET BY MOUTH  PRN    cholecalciferol (VITAMIN D-3) 125 mcg, Daily    Eliquis 5 mg, oral, 2 times daily    esomeprazole (NEXIUM) 20 mg, Daily before breakfast    furosemide (LASIX) 20 mg, oral, Daily    gabapentin (NEURONTIN) 300 mg, Daily    guaiFENesin (Mucinex) 600 mg 12 hr tablet 1 tablet, 2 times daily    ipratropium (Atrovent) 42 mcg (0.06 %) nasal spray Administer into affected nostril(s).    loperamide (IMODIUM) 2 mg    memantine (NAMENDA) 5 mg, 2 times daily    mesalamine (PENTASA) 250 mg, 2 times daily    metoprolol tartrate (LOPRESSOR) 50 mg, oral, 2 times daily    montelukast (SINGULAIR) 10 mg, Daily    nitroglycerin (NITROSTAT) 0.4 mg    nystatin (Mycostatin) cream As needed    potassium chloride CR 20 mEq ER tablet 20 mEq, Daily    pravastatin (PRAVACHOL) 40 mg, Nightly    Stelara 90 mg    testosterone cypionate (DEPO-TESTOSTERONE) 200 mg, Every 14 days    tiotropium (Spiriva) 18 mcg inhalation capsule 1 capsule, As needed          REVIEW OF SYSTEMS  Review of Systems   All other systems reviewed and are negative.        VITALS  Vitals:    12/11/24 1309   BP: 108/70   Pulse: 68       PHYSICAL EXAM  Constitutional:       Appearance: Healthy appearance. Not in distress.   Eyes:      Conjunctiva/sclera: Conjunctivae normal.      Pupils: Pupils are equal, round, and reactive to light.   Neck:      Vascular: No JVR. JVD normal.   Pulmonary:      Effort: Pulmonary effort is normal.      Breath sounds: Normal breath sounds. No  wheezing. No rhonchi. No rales.   Chest:      Chest wall: Not tender to palpatation.   Cardiovascular:      PMI at left midclavicular line. Normal rate. Regular rhythm. Normal S1. Normal S2.       Murmurs: There is no murmur.      No gallop.  No click. No rub.   Pulses:     Intact distal pulses.   Edema:     Peripheral edema absent.   Abdominal:      Tenderness: There is no abdominal tenderness.   Musculoskeletal: Normal range of motion.         General: No tenderness.      Cervical back: Normal range of motion. Skin:     General: Skin is warm and dry.   Neurological:      General: No focal deficit present.      Mental Status: Alert and oriented to person, place and time.           ASSESSMENT AND PLAN  Diagnoses and all orders for this visit:  Chronic diastolic heart failure  Primary hypertension  Mixed hyperlipidemia  Chronic obstructive pulmonary disease, unspecified COPD type (Multi)  Obstructive sleep apnea on CPAP  Coronary artery disease involving native coronary artery of native heart without angina pectoris  Chronic venous insufficiency  History of Crohn's disease  Dementia, unspecified dementia severity, unspecified dementia type, unspecified whether behavioral, psychotic, or mood disturbance or anxiety (Multi)  Former smoker      [unfilled]

## 2024-12-11 NOTE — PATIENT INSTRUCTIONS
Follow up office visit in 1 year.  Continue same medications/treatment.  Patient educated on proper medication use.  Patient educated on risk factor modification.  Please bring any lab results from other providers / physicians to your next appointment.    Please bring all medicines, vitamins and herbal supplements with you when you come to the office.    Prescriptions will not be filled unless you are compliant with your follow up appointments or have a follow up  appointment scheduled as per instruction of your physician.  Refills should be requested at the time of  Your visit.    Ema NEAL LPN, am scribing for and in the presence of Dr. Carloz Fitzgerald MD, FACC

## 2024-12-13 ENCOUNTER — OFFICE VISIT (OUTPATIENT)
Dept: CARDIOLOGY CLINIC | Age: 74
End: 2024-12-13
Payer: MEDICARE

## 2024-12-13 VITALS
DIASTOLIC BLOOD PRESSURE: 80 MMHG | BODY MASS INDEX: 32.58 KG/M2 | WEIGHT: 208 LBS | SYSTOLIC BLOOD PRESSURE: 110 MMHG | HEART RATE: 67 BPM

## 2024-12-13 DIAGNOSIS — I48.0 PAROXYSMAL ATRIAL FIBRILLATION (HCC): Primary | ICD-10-CM

## 2024-12-13 DIAGNOSIS — I10 ESSENTIAL HYPERTENSION: ICD-10-CM

## 2024-12-13 DIAGNOSIS — E66.9 OBESITY (BMI 30-39.9): ICD-10-CM

## 2024-12-13 DIAGNOSIS — I50.32 CHRONIC DIASTOLIC HEART FAILURE (HCC): ICD-10-CM

## 2024-12-13 DIAGNOSIS — Z86.79 HISTORY OF CHF (CONGESTIVE HEART FAILURE): ICD-10-CM

## 2024-12-13 PROCEDURE — 93000 ELECTROCARDIOGRAM COMPLETE: CPT | Performed by: INTERNAL MEDICINE

## 2024-12-13 PROCEDURE — G8482 FLU IMMUNIZE ORDER/ADMIN: HCPCS | Performed by: INTERNAL MEDICINE

## 2024-12-13 PROCEDURE — 3017F COLORECTAL CA SCREEN DOC REV: CPT | Performed by: INTERNAL MEDICINE

## 2024-12-13 PROCEDURE — 1123F ACP DISCUSS/DSCN MKR DOCD: CPT | Performed by: INTERNAL MEDICINE

## 2024-12-13 PROCEDURE — 1159F MED LIST DOCD IN RCRD: CPT | Performed by: INTERNAL MEDICINE

## 2024-12-13 PROCEDURE — G8427 DOCREV CUR MEDS BY ELIG CLIN: HCPCS | Performed by: INTERNAL MEDICINE

## 2024-12-13 PROCEDURE — 3079F DIAST BP 80-89 MM HG: CPT | Performed by: INTERNAL MEDICINE

## 2024-12-13 PROCEDURE — 99214 OFFICE O/P EST MOD 30 MIN: CPT | Performed by: INTERNAL MEDICINE

## 2024-12-13 PROCEDURE — G8417 CALC BMI ABV UP PARAM F/U: HCPCS | Performed by: INTERNAL MEDICINE

## 2024-12-13 PROCEDURE — 1036F TOBACCO NON-USER: CPT | Performed by: INTERNAL MEDICINE

## 2024-12-13 PROCEDURE — 1160F RVW MEDS BY RX/DR IN RCRD: CPT | Performed by: INTERNAL MEDICINE

## 2024-12-13 PROCEDURE — 3074F SYST BP LT 130 MM HG: CPT | Performed by: INTERNAL MEDICINE

## 2024-12-13 NOTE — PROGRESS NOTES
Chief Complaint   Patient presents with    Atrial Fibrillation       7-21-23: Patient presents for initial medical evaluation. Patient is followed on a regular basis by Sher Marcos MD. patient with multiple medical problems presents for evaluation of atrial fibrillation.  Appears he has a history of paroxysmal atrial fibrillation last episode occurred 3 months ago approximately.  Patient previously followed by [facility] but switching.  Patient with history of diastolic congestive heart failure.  Most recent echo in 2021 with normal LV function no significant valvular abnormalities.  Patient is compliant with his CPAP machine.  Does have history of remote pulm embolism.  Patient complains of bilateral extremity edema.  He is on Lasix 20 mg daily.  She is currently maintained on Eliquis 5 mg twice daily, Lopressor 100 mg twice a day  Patient denies any history of myocardial infarction.  N  Patient did have a coronary calcium score of 3000, has had multiple negative nuclear stress test in the past.  Status post cardiac catheterization in November 2020 with a 60% right coronary artery stenosis otherwise less than 30% in the remaining arteries  EKG today with normal sinus rhythm.      3-22-24: Lower extremity edema has improved on increased dose of Lasix 40 mg daily.  Patient with history of paroxysmal atrial fibrillation.  History of cardiac catheterization in 2020 with 60% RCA stenosis otherwise mild CAD.  He is on anticoagulation with Eliquis.  No bleeding issues.  No recent hospitalizations  Blood pressure is very well-controlled.  Patient with history of chronic congestive heart failure.  Denies any angina or heart failure type symptoms now.  He also has a history of remote pulmonary embolism  EKG today with normal sinus rhythm.  Nonspecific ST-T changes  Patient also follows up with Dr Flores and Dr KAPOOR at [facility].       12-13-24: history of paroxysmal atrial fibrillation.  History of cardiac catheterization in 2020

## 2024-12-16 ENCOUNTER — TELEPHONE (OUTPATIENT)
Dept: FAMILY MEDICINE CLINIC | Age: 74
End: 2024-12-16

## 2024-12-16 NOTE — TELEPHONE ENCOUNTER
Pt's daughter calling in regards to t-shot. Has to reschedule all the way out to 1/13 due to nurse no longer doing them.     Pt's daughter would gladly do them at home if [facility]'t [facility]. Has been having a hard time getting testosterone.    Is there any way you could help pt/pt's daughter? Is daughter allowed to start giving t-shot to patient at home?

## 2025-01-13 DIAGNOSIS — E29.1 HYPOGONADISM MALE: ICD-10-CM

## 2025-01-13 NOTE — TELEPHONE ENCOUNTER
Comments: daughter is asking that we confirm if this is to be 100 mg or the 200 mg. Pt will also need needles. Please advise. Daughters phone number is [phone].    Last Office Visit (last PCP visit):   10/22/2024    Next Visit Date:  Future Appointments   Date Time Provider Department Center   1/22/2025  1:30 PM Sher Galloway MD [facility]   6/13/2025 11:15 AM Yousif Prather DO [place] [facility]       **If hasn't been seen in over a year OR hasn't followed up according to last diabetes/ADHD visit, make appointment for patient before sending refill to provider.    Rx requested:  Requested Prescriptions     Pending Prescriptions Disp Refills    testosterone cypionate (DEPOTESTOTERONE CYPIONATE) 100 MG/ML injection 2 mL 2     Sig: Inject 1 mL into the muscle every 14 days for 90 doses. Max Daily Amount: 100 mg

## 2025-01-14 RX ORDER — TESTOSTERONE CYPIONATE 1000 MG/10ML
100 INJECTION, SOLUTION INTRAMUSCULAR
Qty: 2 ML | Refills: 2 | Status: SHIPPED | OUTPATIENT
Start: 2025-01-14 | End: 2028-06-14

## 2025-01-22 NOTE — TELEPHONE ENCOUNTER
Hesitant to order this as patient has not no showed or canceled multiple appointments.  Appointment necessary with family member who plans to get the injection

## 2025-01-28 ENCOUNTER — OFFICE VISIT (OUTPATIENT)
Dept: FAMILY MEDICINE CLINIC | Age: 75
End: 2025-01-28

## 2025-01-28 ENCOUNTER — TELEPHONE (OUTPATIENT)
Dept: FAMILY MEDICINE CLINIC | Age: 75
End: 2025-01-28

## 2025-01-28 VITALS
SYSTOLIC BLOOD PRESSURE: 130 MMHG | WEIGHT: 209 LBS | TEMPERATURE: 98.2 F | OXYGEN SATURATION: 94 % | DIASTOLIC BLOOD PRESSURE: 74 MMHG | HEIGHT: 71 IN | HEART RATE: 70 BPM | BODY MASS INDEX: 29.26 KG/M2

## 2025-01-28 DIAGNOSIS — K50.90 CROHN'S DISEASE WITHOUT COMPLICATION, UNSPECIFIED GASTROINTESTINAL TRACT LOCATION (HCC): ICD-10-CM

## 2025-01-28 DIAGNOSIS — I87.2 STASIS DERMATITIS: Primary | ICD-10-CM

## 2025-01-28 DIAGNOSIS — J44.9 CHRONIC OBSTRUCTIVE PULMONARY DISEASE, UNSPECIFIED COPD TYPE (HCC): ICD-10-CM

## 2025-01-28 DIAGNOSIS — I48.0 PAROXYSMAL ATRIAL FIBRILLATION (HCC): ICD-10-CM

## 2025-01-28 DIAGNOSIS — Z51.81 ENCOUNTER FOR MEDICATION MONITORING: ICD-10-CM

## 2025-01-28 DIAGNOSIS — Z12.5 PROSTATE CANCER SCREENING: ICD-10-CM

## 2025-01-28 DIAGNOSIS — E29.1 HYPOGONADISM MALE: ICD-10-CM

## 2025-01-28 DIAGNOSIS — E78.2 MIXED HYPERLIPIDEMIA: ICD-10-CM

## 2025-01-28 DIAGNOSIS — F03.B0 MODERATE DEMENTIA WITHOUT BEHAVIORAL DISTURBANCE, PSYCHOTIC DISTURBANCE, MOOD DISTURBANCE, OR ANXIETY, UNSPECIFIED DEMENTIA TYPE (HCC): ICD-10-CM

## 2025-01-28 DIAGNOSIS — E11.40 TYPE 2 DIABETES MELLITUS WITH DIABETIC NEUROPATHY, WITHOUT LONG-TERM CURRENT USE OF INSULIN (HCC): ICD-10-CM

## 2025-01-28 DIAGNOSIS — I50.33 ACUTE ON CHRONIC DIASTOLIC CONGESTIVE HEART FAILURE (HCC): ICD-10-CM

## 2025-01-28 PROBLEM — F03.90 DEMENTIA (HCC): Status: ACTIVE | Noted: 2022-02-17

## 2025-01-28 RX ORDER — TRIAMCINOLONE ACETONIDE 1 MG/G
CREAM TOPICAL
Qty: 454 G | Refills: 0 | Status: SHIPPED | OUTPATIENT
Start: 2025-01-28

## 2025-01-28 RX ORDER — TESTOSTERONE CYPIONATE 1000 MG/10ML
100 INJECTION, SOLUTION INTRAMUSCULAR
Qty: 2 ML | Refills: 3 | Status: SHIPPED | OUTPATIENT
Start: 2025-01-28 | End: 2028-06-28

## 2025-01-28 SDOH — ECONOMIC STABILITY: FOOD INSECURITY: WITHIN THE PAST 12 MONTHS, YOU WORRIED THAT YOUR FOOD WOULD RUN OUT BEFORE YOU GOT MONEY TO BUY MORE.: NEVER TRUE

## 2025-01-28 SDOH — ECONOMIC STABILITY: FOOD INSECURITY: WITHIN THE PAST 12 MONTHS, THE FOOD YOU BOUGHT JUST DIDN'T LAST AND YOU DIDN'T HAVE MONEY TO GET MORE.: NEVER TRUE

## 2025-01-28 ASSESSMENT — PATIENT HEALTH QUESTIONNAIRE - PHQ9
1. LITTLE INTEREST OR PLEASURE IN DOING THINGS: NOT AT ALL
SUM OF ALL RESPONSES TO PHQ QUESTIONS 1-9: 0
2. FEELING DOWN, DEPRESSED OR HOPELESS: NOT AT ALL
SUM OF ALL RESPONSES TO PHQ QUESTIONS 1-9: 0
SUM OF ALL RESPONSES TO PHQ QUESTIONS 1-9: 0
SUM OF ALL RESPONSES TO PHQ9 QUESTIONS 1 & 2: 0
SUM OF ALL RESPONSES TO PHQ QUESTIONS 1-9: 0

## 2025-01-28 ASSESSMENT — ENCOUNTER SYMPTOMS
SHORTNESS OF BREATH: 0
ABDOMINAL DISTENTION: 0
CHEST TIGHTNESS: 0
PHOTOPHOBIA: 0
ABDOMINAL PAIN: 0

## 2025-01-28 NOTE — PROGRESS NOTES
Diagnosis Orders   1. Stasis dermatitis  triamcinolone (KENALOG) 0.1 % cream      2. Hypogonadism male  CBC with Auto Differential    Testosterone, free, total    Comprehensive Metabolic Panel    PSA Screening    testosterone cypionate (DEPOTESTOTERONE CYPIONATE) 100 MG/ML injection      3. Crohn's disease without complication, unspecified gastrointestinal tract location (HCC)  CBC with Auto Differential      4. Acute on chronic diastolic congestive heart failure (HCC)        5. Moderate dementia without behavioral disturbance, psychotic disturbance, mood disturbance, or anxiety, unspecified dementia type (HCC)        6. Chronic obstructive pulmonary disease, unspecified COPD type (HCC)        7. Paroxysmal atrial fibrillation (HCC)        8. Type 2 diabetes mellitus with diabetic neuropathy, without long-term current use of insulin (HCC)        9. Encounter for medication monitoring  CBC with Auto Differential    Testosterone, free, total    Comprehensive Metabolic Panel    PSA Screening      10. Prostate cancer screening  PSA Screening      11. Mixed hyperlipidemia  Lipid Panel        Return in about 3 months (around 4/28/2025) for for review of outcome of today's recommendation.  There are no Patient Instructions on file for this visit.    Subjective:      Patient ID: Janusz Barajas is a 74 y.o. male who presents for:  Chief Complaint   Patient presents with    Rash     On left leg     Discuss Medications     For sleep        HPI  Daughter present with patient's appointment today.  States her father has a rash on the lower extremity she like to have looked at.  States he gets worse when he is more swollen.  Then when the swelling goes away leaves little red dots.  She wonders if there is anything to help with that.  Janusz states they do not itch or bother him.  Neither one of them note them breaking open or leaking.    Patient's daughter states she has given his testosterone injections.  We talked through

## 2025-01-28 NOTE — TELEPHONE ENCOUNTER
DM/V needs to know where the kenalog cream is being applied.  Please send new script to DM.    Fax [phone]  Ph [phone]

## 2025-01-28 NOTE — PATIENT INSTRUCTIONS
Will continue to work on leg elevation for avoiding edema that is leading to the brawny discoloration and the stasis edema.  Will give triamcinolone cream for any dry itchy skin associated with it.    Continue all other medications at this time as most conditions are under good control.  Social changes need to occur in the household for the dementia changes patient is experiencing.  Do not recommend sedative for the early morning awakenings.    Continue all specialty follow-up.

## 2025-02-01 DIAGNOSIS — K50.90 CROHN'S DISEASE WITHOUT COMPLICATION, UNSPECIFIED GASTROINTESTINAL TRACT LOCATION (HCC): ICD-10-CM

## 2025-02-01 DIAGNOSIS — Z51.81 ENCOUNTER FOR MEDICATION MONITORING: ICD-10-CM

## 2025-02-01 DIAGNOSIS — Z12.5 PROSTATE CANCER SCREENING: ICD-10-CM

## 2025-02-01 DIAGNOSIS — E29.1 HYPOGONADISM MALE: ICD-10-CM

## 2025-02-01 DIAGNOSIS — E78.2 MIXED HYPERLIPIDEMIA: ICD-10-CM

## 2025-02-01 LAB
ALBUMIN SERPL-MCNC: 3.9 G/DL (ref 3.5–4.6)
ALP SERPL-CCNC: 82 U/L (ref 35–104)
ALT SERPL-CCNC: 11 U/L (ref 0–41)
ANION GAP SERPL CALCULATED.3IONS-SCNC: 10 MEQ/L (ref 9–15)
AST SERPL-CCNC: 19 U/L (ref 0–40)
BASOPHILS # BLD: 0 K/UL (ref 0–0.2)
BASOPHILS NFR BLD: 0.4 %
BILIRUB SERPL-MCNC: 0.8 MG/DL (ref 0.2–0.7)
BUN SERPL-MCNC: 15 MG/DL (ref 8–23)
CALCIUM SERPL-MCNC: 9.2 MG/DL (ref 8.5–9.9)
CHLORIDE SERPL-SCNC: 100 MEQ/L (ref 95–107)
CHOLEST SERPL-MCNC: 138 MG/DL (ref 0–199)
CO2 SERPL-SCNC: 29 MEQ/L (ref 20–31)
CREAT SERPL-MCNC: 0.75 MG/DL (ref 0.7–1.2)
EOSINOPHIL # BLD: 0.2 K/UL (ref 0–0.7)
EOSINOPHIL NFR BLD: 2.3 %
ERYTHROCYTE [DISTWIDTH] IN BLOOD BY AUTOMATED COUNT: 13.8 % (ref 11.5–14.5)
GLOBULIN SER CALC-MCNC: 2.9 G/DL (ref 2.3–3.5)
GLUCOSE SERPL-MCNC: 86 MG/DL (ref 70–99)
HCT VFR BLD AUTO: 48.1 % (ref 42–52)
HDLC SERPL-MCNC: 42 MG/DL (ref 40–59)
HGB BLD-MCNC: 15.9 G/DL (ref 14–18)
LDLC SERPL CALC-MCNC: 76 MG/DL (ref 0–129)
LYMPHOCYTES # BLD: 3.4 K/UL (ref 1–4.8)
LYMPHOCYTES NFR BLD: 36.9 %
MCH RBC QN AUTO: 29.1 PG (ref 27–31.3)
MCHC RBC AUTO-ENTMCNC: 33.1 % (ref 33–37)
MCV RBC AUTO: 88.1 FL (ref 79–92.2)
MONOCYTES # BLD: 0.8 K/UL (ref 0.2–0.8)
MONOCYTES NFR BLD: 8.9 %
NEUTROPHILS # BLD: 4.7 K/UL (ref 1.4–6.5)
NEUTS SEG NFR BLD: 51.3 %
PLATELET # BLD AUTO: 154 K/UL (ref 130–400)
POTASSIUM SERPL-SCNC: 4 MEQ/L (ref 3.4–4.9)
PROT SERPL-MCNC: 6.8 G/DL (ref 6.3–8)
PSA SERPL-MCNC: 2.69 NG/ML (ref 0–4)
RBC # BLD AUTO: 5.46 M/UL (ref 4.7–6.1)
SHBG SERPL-SCNC: 53 NMOL/L (ref 19–76)
SODIUM SERPL-SCNC: 139 MEQ/L (ref 135–144)
TESTOST FREE SERPL-MCNC: 62.7 PG/ML (ref 47–244)
TESTOST SERPL-MCNC: 420 NG/DL (ref 193–740)
TRIGL SERPL-MCNC: 98 MG/DL (ref 0–150)
WBC # BLD AUTO: 9.2 K/UL (ref 4.8–10.8)

## 2025-03-13 ENCOUNTER — PATIENT MESSAGE (OUTPATIENT)
Dept: FAMILY MEDICINE CLINIC | Age: 75
End: 2025-03-13

## 2025-03-13 DIAGNOSIS — E29.1 HYPOGONADISM MALE: ICD-10-CM

## 2025-03-13 RX ORDER — MEMANTINE HYDROCHLORIDE 5 MG/1
5 TABLET ORAL 2 TIMES DAILY
Qty: 180 TABLET | Refills: 3 | OUTPATIENT
Start: 2025-03-13

## 2025-03-14 NOTE — TELEPHONE ENCOUNTER
Comments: Pt requesting refill be sent through [facility] instead of in-person pharmacy.     Last Office Visit (last PCP visit):   1/28/2025    Next Visit Date:  Future Appointments   Date Time Provider Department Center   4/29/2025  1:00 PM Sher Galloway MD [facility]   6/13/2025 11:15 AM Holiday, DO [facility] Quach       **If hasn't been seen in over a year OR hasn't followed up according to last diabetes/ADHD visit, make appointment for patient before sending refill to provider.    Rx requested:  Requested Prescriptions     Pending Prescriptions Disp Refills    testosterone cypionate (DEPOTESTOTERONE CYPIONATE) 100 MG/ML injection 2 mL 3     Sig: Inject 1 mL into the muscle every 14 days for 90 doses. Max Daily Amount: 100 mg

## 2025-03-17 RX ORDER — TESTOSTERONE CYPIONATE 1000 MG/10ML
100 INJECTION, SOLUTION INTRAMUSCULAR
Qty: 2 ML | Refills: 3 | Status: SHIPPED | OUTPATIENT
Start: 2025-03-17 | End: 2025-03-19 | Stop reason: DRUGHIGH

## 2025-03-19 DIAGNOSIS — E29.1 HYPOGONADISM MALE: ICD-10-CM

## 2025-03-19 RX ORDER — TESTOSTERONE CYPIONATE 200 MG/ML
100 INJECTION, SOLUTION INTRAMUSCULAR
Qty: 6 ML | Refills: 0 | Status: SHIPPED | OUTPATIENT
Start: 2025-03-19 | End: 2028-08-17

## 2025-03-21 DIAGNOSIS — E29.1 HYPOGONADISM MALE: ICD-10-CM

## 2025-03-21 NOTE — TELEPHONE ENCOUNTER
Comments: [facility] Clarification Rx. They do not do 100mg TRT, only 200mg.    Sig states 0.5mLs every 14 days.    Last Office Visit (last PCP visit):   1/28/2025    Next Visit Date:  Future Appointments   Date Time Provider Department Center   4/29/2025  1:00 PM Sher Galloway MD [facility]   6/13/2025 11:15 AM Holiday, Yousif TAYLOR DO [facility]       **If hasn't been seen in over a year OR hasn't followed up according to last diabetes/ADHD visit, make appointment for patient before sending refill to provider.    Rx requested:  Requested Prescriptions     Pending Prescriptions Disp Refills    testosterone cypionate (DEPOTESTOTERONE CYPIONATE) 200 MG/ML injection 6 mL 0     Sig: Inject 0.5 mLs into the muscle every 14 days for 90 doses. Max Daily Amount: 100 mg

## 2025-03-24 RX ORDER — OMEPRAZOLE 20 MG/1
20 CAPSULE, DELAYED RELEASE ORAL DAILY
Qty: 90 CAPSULE | Refills: 3 | Status: SHIPPED | OUTPATIENT
Start: 2025-03-24

## 2025-03-24 RX ORDER — TESTOSTERONE CYPIONATE 200 MG/ML
100 INJECTION, SOLUTION INTRAMUSCULAR
Qty: 6 ML | Refills: 0 | Status: SHIPPED | OUTPATIENT
Start: 2025-03-24 | End: 2028-08-22

## 2025-03-24 NOTE — TELEPHONE ENCOUNTER
Comments:     Last Office Visit (last PCP visit):   1/28/2025    Next Visit Date:  Future Appointments   Date Time Provider Department Center   4/29/2025  1:00 PM Sher Galloway MD [facility]   6/13/2025 11:15 AM Holal, DO Nickie Quach       **If hasn't been seen in over a year OR hasn't followed up according to last diabetes/ADHD visit, make appointment for patient before sending refill to provider.    Rx requested:  Requested Prescriptions     Pending Prescriptions Disp Refills    omeprazole (PRILOSEC) 20 MG delayed release capsule [Pharmacy Med Name: Omeprazole 20 MG Oral Capsule Delayed Release] 90 capsule 3     Sig: TAKE 1 CAPSULE BY MOUTH DAILY

## 2025-04-16 ENCOUNTER — APPOINTMENT (OUTPATIENT)
Dept: CARDIOLOGY | Facility: CLINIC | Age: 75
End: 2025-04-16
Payer: MEDICARE

## 2025-04-24 NOTE — TELEPHONE ENCOUNTER
Comments:     Last Office Visit (last PCP visit):   2/6/2024    Next Visit Date:  Future Appointments   Date Time Provider Department Center   5/22/2024  2:30 PM [facility]   5/28/2024  2:30 PM SCHEDULE, JESSICA BALDWIN [place] PCP [facility]   6/10/2024  2:30 PM SCHEDULE, JESSICA BALDWIN [place] PCP [facility] [place]   6/24/2024  2:30 PM SCHEDULE, JESSICA BALDWIN [place] PCP [facility]ONE [facility]   11/7/2024  3:15 PM Didier Kerr MD [place] [facility] -   12/13/2024 12:00 PM Holal, DO Nickie Quach [facility] [place]       **If hasn't been seen in over a year OR hasn't followed up according to last diabetes/ADHD visit, make appointment for patient before sending refill to provider.    Rx requested:  Requested Prescriptions     Pending Prescriptions Disp Refills    omeprazole (PRILOSEC) 20 MG delayed release capsule [Pharmacy Med Name: Omeprazole 20 MG Oral Capsule Delayed Release] 90 capsule 3     Sig: TAKE 1 CAPSULE BY MOUTH DAILY                - - -

## 2025-05-14 ENCOUNTER — OFFICE VISIT (OUTPATIENT)
Dept: FAMILY MEDICINE CLINIC | Age: 75
End: 2025-05-14
Payer: MEDICARE

## 2025-05-14 VITALS
DIASTOLIC BLOOD PRESSURE: 66 MMHG | HEART RATE: 64 BPM | TEMPERATURE: 97.2 F | BODY MASS INDEX: 29.03 KG/M2 | OXYGEN SATURATION: 97 % | SYSTOLIC BLOOD PRESSURE: 122 MMHG | HEIGHT: 71 IN | WEIGHT: 207.4 LBS

## 2025-05-14 DIAGNOSIS — I10 PRIMARY HYPERTENSION: ICD-10-CM

## 2025-05-14 DIAGNOSIS — E29.1 HYPOGONADISM MALE: ICD-10-CM

## 2025-05-14 DIAGNOSIS — E11.40 TYPE 2 DIABETES MELLITUS WITH DIABETIC NEUROPATHY, WITHOUT LONG-TERM CURRENT USE OF INSULIN (HCC): Primary | ICD-10-CM

## 2025-05-14 PROCEDURE — 3046F HEMOGLOBIN A1C LEVEL >9.0%: CPT | Performed by: FAMILY MEDICINE

## 2025-05-14 PROCEDURE — 3074F SYST BP LT 130 MM HG: CPT | Performed by: FAMILY MEDICINE

## 2025-05-14 PROCEDURE — 2022F DILAT RTA XM EVC RTNOPTHY: CPT | Performed by: FAMILY MEDICINE

## 2025-05-14 PROCEDURE — 1159F MED LIST DOCD IN RCRD: CPT | Performed by: FAMILY MEDICINE

## 2025-05-14 PROCEDURE — 3017F COLORECTAL CA SCREEN DOC REV: CPT | Performed by: FAMILY MEDICINE

## 2025-05-14 PROCEDURE — G8417 CALC BMI ABV UP PARAM F/U: HCPCS | Performed by: FAMILY MEDICINE

## 2025-05-14 PROCEDURE — 1036F TOBACCO NON-USER: CPT | Performed by: FAMILY MEDICINE

## 2025-05-14 PROCEDURE — G8427 DOCREV CUR MEDS BY ELIG CLIN: HCPCS | Performed by: FAMILY MEDICINE

## 2025-05-14 PROCEDURE — 99214 OFFICE O/P EST MOD 30 MIN: CPT | Performed by: FAMILY MEDICINE

## 2025-05-14 PROCEDURE — 1126F AMNT PAIN NOTED NONE PRSNT: CPT | Performed by: FAMILY MEDICINE

## 2025-05-14 PROCEDURE — 1160F RVW MEDS BY RX/DR IN RCRD: CPT | Performed by: FAMILY MEDICINE

## 2025-05-14 PROCEDURE — 1123F ACP DISCUSS/DSCN MKR DOCD: CPT | Performed by: FAMILY MEDICINE

## 2025-05-14 PROCEDURE — 3078F DIAST BP <80 MM HG: CPT | Performed by: FAMILY MEDICINE

## 2025-05-14 NOTE — PROGRESS NOTES
INHALATIONS EVERY 6 HOURS AS NEEDED FOR WHEEZING OR SHORTNESS OF BREATH 25.5 g 2    CPAP Machine MISC New cpap supplies mask hose filters etc 1 each 0     No current facility-administered medications on file prior to visit.     Past Medical History:   Diagnosis Date    A-fib ([facility])     Abnormal EMG 12/09/2015    Actinic keratoses     Actinic keratoses     Acute diastolic congestive heart failure (Former[facility]) 1/6/2021    Allergic rhinitis     Anemia 11/18/2014    Arthritis     Chronic back pain     COPD (chronic obstructive pulmonary disease) (Formerly [facility]) 08/09/2012    COPD (chronic obstructive pulmonary disease) (Formerly [facility])     Coronary atherosclerosis 10/23/2023    Crohns disease     Degenerative disc disease, lumbar     Dermatitis     Diabetes (Former[facility]) 03/19/2015    diet controlled    Gastrointestinal problem     GERD (gastroesophageal reflux disease)     History of atrial fibrillation 2006    Dr. Flores    History of throat cancer 2004     cleared for reoccurence years ago    Hyperlipidemia 1/21/2014    Hypertension     Hypogonadism male     Lung disease     Mononeuritis multiplex     Mononeuritis multiplex     Nondependent alcohol abuse, in remission 7/22/2020    Obesity (BMI 30-39.9) 7/24/2019    Osteoarthritis of lumbar spine     Pain of right heel     Paresthesia of foot, bilateral     Prediabetes 12/3/2014    Restless leg syndrome     Seborrheic dermatitis     Seborrheic keratoses, inflamed     Throat cancer (Formerly [facility])     throat, had surgery, sees Dr Christine yearly    Tinea cruris     Tinea pedis     Tinea unguium      Past Surgical History:   Procedure Laterality Date    CARDIAC CATHETERIZATION      CARPAL TUNNEL RELEASE      CATARACT REMOVAL WITH IMPLANT Right 09/09/2019    CATARACT REMOVAL WITH IMPLANT Left 07/22/2019    COLON SURGERY      COLONOSCOPY  04/15/2015    KNEE ARTHROSCOPY      LARYNGECTOMY  2004    UPPER GASTROINTESTINAL ENDOSCOPY  03/24/13    Dr. Esparza    VOCAL CORD AUGMENTATION W/RADIESSE INJ  2002     Social History

## 2025-05-14 NOTE — PATIENT INSTRUCTIONS
No medication changes at this time as the family is working on organizing home meds.    Emphasized the need for Spiriva to be utilized for improved respiration and breathing benefits.    Testosterone and hemoglobin A1c are coming due.  Have been ordered.    Continue [facility] and [facility] appointments.

## 2025-05-20 DIAGNOSIS — I48.11 LONGSTANDING PERSISTENT ATRIAL FIBRILLATION (MULTI): ICD-10-CM

## 2025-05-21 RX ORDER — MEMANTINE HYDROCHLORIDE 5 MG/1
5 TABLET ORAL 2 TIMES DAILY
Qty: 180 TABLET | Refills: 3 | OUTPATIENT
Start: 2025-05-21

## 2025-05-21 RX ORDER — POTASSIUM CHLORIDE 1500 MG/1
20 TABLET, EXTENDED RELEASE ORAL DAILY
Qty: 90 TABLET | Refills: 3 | Status: SHIPPED | OUTPATIENT
Start: 2025-05-21

## 2025-05-21 NOTE — TELEPHONE ENCOUNTER
Received request for prescription refills for patient.   Patient follows with Dr. Neville and Dr. Fitzgerald    Request is for Eliquis  Is patient currently on medication yes    Last OV 10/9/2024  Next OV 12/10/2025 with Dr. Fitzgerald    Pended for signing and sent to provider

## 2025-05-21 NOTE — TELEPHONE ENCOUNTER
Requesting medication refill. Please approve or deny this request.    Rx requested:  Requested Prescriptions     Pending Prescriptions Disp Refills    potassium chloride (KLOR-CON M) 20 MEQ extended release tablet [Pharmacy Med Name: Potassium Chloride Cynthia ER 20 MEQ Oral Tablet Extended Release] 90 tablet 3     Sig: TAKE 1 TABLET BY MOUTH DAILY         Last Office Visit:   12/13/2024      Next Visit Date:  Future Appointments   Date Time Provider Department Center   6/13/2025 11:15 AM [place], DO [facility]   8/5/2025 11:00 AM Sher Galloway MD [facility] DE[facility]

## 2025-06-04 DIAGNOSIS — E29.1 HYPOGONADISM MALE: ICD-10-CM

## 2025-06-04 DIAGNOSIS — E11.40 TYPE 2 DIABETES MELLITUS WITH DIABETIC NEUROPATHY, WITHOUT LONG-TERM CURRENT USE OF INSULIN (HCC): ICD-10-CM

## 2025-06-05 ENCOUNTER — RESULTS FOLLOW-UP (OUTPATIENT)
Dept: FAMILY MEDICINE CLINIC | Age: 75
End: 2025-06-05

## 2025-06-05 LAB
ESTIMATED AVERAGE GLUCOSE: 108 MG/DL
HBA1C MFR BLD: 5.4 % (ref 4–6)
SHBG SERPL-SCNC: 64 NMOL/L (ref 19–76)
TESTOST FREE SERPL-MCNC: 5 PG/ML (ref 47–244)
TESTOST SERPL-MCNC: 43 NG/DL (ref 193–740)

## 2025-06-13 ENCOUNTER — OFFICE VISIT (OUTPATIENT)
Age: 75
End: 2025-06-13
Payer: MEDICARE

## 2025-06-13 VITALS
DIASTOLIC BLOOD PRESSURE: 60 MMHG | BODY MASS INDEX: 29.15 KG/M2 | WEIGHT: 209 LBS | HEART RATE: 63 BPM | SYSTOLIC BLOOD PRESSURE: 110 MMHG

## 2025-06-13 DIAGNOSIS — I48.0 PAROXYSMAL ATRIAL FIBRILLATION (HCC): Primary | ICD-10-CM

## 2025-06-13 DIAGNOSIS — I50.32 CHRONIC DIASTOLIC HEART FAILURE (HCC): ICD-10-CM

## 2025-06-13 DIAGNOSIS — E66.9 OBESITY (BMI 30-39.9): ICD-10-CM

## 2025-06-13 DIAGNOSIS — I10 ESSENTIAL HYPERTENSION: ICD-10-CM

## 2025-06-13 DIAGNOSIS — Z86.79 HISTORY OF CHF (CONGESTIVE HEART FAILURE): ICD-10-CM

## 2025-06-13 PROCEDURE — 1123F ACP DISCUSS/DSCN MKR DOCD: CPT | Performed by: INTERNAL MEDICINE

## 2025-06-13 PROCEDURE — 1160F RVW MEDS BY RX/DR IN RCRD: CPT | Performed by: INTERNAL MEDICINE

## 2025-06-13 PROCEDURE — G8417 CALC BMI ABV UP PARAM F/U: HCPCS | Performed by: INTERNAL MEDICINE

## 2025-06-13 PROCEDURE — 3017F COLORECTAL CA SCREEN DOC REV: CPT | Performed by: INTERNAL MEDICINE

## 2025-06-13 PROCEDURE — 99214 OFFICE O/P EST MOD 30 MIN: CPT | Performed by: INTERNAL MEDICINE

## 2025-06-13 PROCEDURE — 1126F AMNT PAIN NOTED NONE PRSNT: CPT | Performed by: INTERNAL MEDICINE

## 2025-06-13 PROCEDURE — G8427 DOCREV CUR MEDS BY ELIG CLIN: HCPCS | Performed by: INTERNAL MEDICINE

## 2025-06-13 PROCEDURE — 93000 ELECTROCARDIOGRAM COMPLETE: CPT | Performed by: INTERNAL MEDICINE

## 2025-06-13 PROCEDURE — 3074F SYST BP LT 130 MM HG: CPT | Performed by: INTERNAL MEDICINE

## 2025-06-13 PROCEDURE — 3078F DIAST BP <80 MM HG: CPT | Performed by: INTERNAL MEDICINE

## 2025-06-13 PROCEDURE — 1036F TOBACCO NON-USER: CPT | Performed by: INTERNAL MEDICINE

## 2025-06-13 PROCEDURE — 1159F MED LIST DOCD IN RCRD: CPT | Performed by: INTERNAL MEDICINE

## 2025-06-13 NOTE — PROGRESS NOTES
Chief Complaint   Patient presents with    Atrial Fibrillation    Coronary Artery Disease    Congestive Heart Failure    Hypertension    6 Month Follow-Up       7-21-23: Patient presents for initial medical evaluation. Patient is followed on a regular basis by Sher Marcos MD. patient with multiple medical problems presents for evaluation of atrial fibrillation.  Appears he has a history of paroxysmal atrial fibrillation last episode occurred 3 months ago approximately.  Patient previously followed by [facility] but switching.  Patient with history of diastolic congestive heart failure.  Most recent echo in 2021 with normal LV function no significant valvular abnormalities.  Patient is compliant with his CPAP machine.  Does have history of remote pulm embolism.  Patient complains of bilateral extremity edema.  He is on Lasix 20 mg daily.  She is currently maintained on Eliquis 5 mg twice daily, Lopressor 100 mg twice a day  Patient denies any history of myocardial infarction.  N  Patient did have a coronary calcium score of 3000, has had multiple negative nuclear stress test in the past.  Status post cardiac catheterization in November 2020 with a 60% right coronary artery stenosis otherwise less than 30% in the remaining arteries  EKG today with normal sinus rhythm.      3-22-24: Lower extremity edema has improved on increased dose of Lasix 40 mg daily.  Patient with history of paroxysmal atrial fibrillation.  History of cardiac catheterization in 2020 with 60% RCA stenosis otherwise mild CAD.  He is on anticoagulation with Eliquis.  No bleeding issues.  No recent hospitalizations  Blood pressure is very well-controlled.  Patient with history of chronic congestive heart failure.  Denies any angina or heart failure type symptoms now.  He also has a history of remote pulmonary embolism  EKG today with normal sinus rhythm.  Nonspecific ST-T changes  Patient also follows up with Dr Flores and Dr KAPOOR at [facility].

## 2025-06-14 DIAGNOSIS — Z86.79 HISTORY OF SUPRAVENTRICULAR TACHYCARDIA: ICD-10-CM

## 2025-06-14 DIAGNOSIS — I48.0 PAROXYSMAL ATRIAL FIBRILLATION (MULTI): ICD-10-CM

## 2025-06-16 NOTE — TELEPHONE ENCOUNTER
Called patient's main number and left voicemail on Sigrid's voicemail requesting a call back to set up appointment with  for patient. Attempted contacted Joyce as well in regards to appointment per cardiology comment message but number was not in service.

## 2025-06-16 NOTE — TELEPHONE ENCOUNTER
Received request for prescription refills for patient.   Patient follows with Dr. Neville    Request is for metoprolol tartrate  Is patient currently on medication yes    Last OV 10/9/2024  Next OV 8/27/2025    Medication pended for a 90 day supply until appointment    Pended for signing and sent to provider

## 2025-06-17 NOTE — TELEPHONE ENCOUNTER
Left vm with Sigrid Elizabeth contact to please call us back to schedule appointment. Left call back number.

## 2025-06-18 RX ORDER — METOPROLOL TARTRATE 50 MG/1
50 TABLET ORAL 2 TIMES DAILY
Qty: 180 TABLET | Refills: 0 | Status: SHIPPED | OUTPATIENT
Start: 2025-06-18 | End: 2025-09-16

## 2025-07-26 ENCOUNTER — HOSPITAL ENCOUNTER (EMERGENCY)
Age: 75
Discharge: HOME | End: 2025-07-26
Payer: MEDICARE

## 2025-07-26 ENCOUNTER — APPOINTMENT (OUTPATIENT)
Dept: GENERAL RADIOLOGY | Age: 75
End: 2025-07-26
Payer: MEDICARE

## 2025-07-26 ENCOUNTER — APPOINTMENT (OUTPATIENT)
Dept: CT IMAGING | Age: 75
End: 2025-07-26
Payer: MEDICARE

## 2025-07-26 VITALS
OXYGEN SATURATION: 93 % | HEIGHT: 71 IN | SYSTOLIC BLOOD PRESSURE: 136 MMHG | DIASTOLIC BLOOD PRESSURE: 75 MMHG | BODY MASS INDEX: 26.18 KG/M2 | HEART RATE: 73 BPM | RESPIRATION RATE: 21 BRPM | WEIGHT: 187 LBS | TEMPERATURE: 97.8 F

## 2025-07-26 DIAGNOSIS — R41.82 ALTERED MENTAL STATUS, UNSPECIFIED ALTERED MENTAL STATUS TYPE: Primary | ICD-10-CM

## 2025-07-26 LAB
ALBUMIN SERPL-MCNC: 3.5 G/DL (ref 3.5–4.6)
ALP SERPL-CCNC: 77 U/L (ref 35–104)
ALT SERPL-CCNC: 14 U/L (ref 0–41)
AMPHET UR QL SCN: NORMAL
ANION GAP SERPL CALCULATED.3IONS-SCNC: 10 MEQ/L (ref 9–15)
AST SERPL-CCNC: 30 U/L (ref 0–40)
BARBITURATES UR QL SCN: NORMAL
BASOPHILS # BLD: 0.1 K/UL (ref 0–0.2)
BASOPHILS NFR BLD: 0.4 %
BENZODIAZ UR QL SCN: NORMAL
BILIRUB SERPL-MCNC: 0.5 MG/DL (ref 0.2–0.7)
BILIRUB UR QL STRIP: NEGATIVE
BUN SERPL-MCNC: 20 MG/DL (ref 8–23)
CALCIUM SERPL-MCNC: 7.9 MG/DL (ref 8.5–9.9)
CANNABINOIDS UR QL SCN: NORMAL
CHLORIDE SERPL-SCNC: 105 MEQ/L (ref 95–107)
CK SERPL-CCNC: 731 U/L (ref 0–190)
CLARITY UR: CLEAR
CO2 SERPL-SCNC: 21 MEQ/L (ref 20–31)
COCAINE UR QL SCN: NORMAL
COLOR UR: YELLOW
CREAT SERPL-MCNC: 0.6 MG/DL (ref 0.7–1.2)
DRUG SCREEN COMMENT UR-IMP: NORMAL
EKG ATRIAL RATE: 94 BPM
EKG DIAGNOSIS: NORMAL
EKG P AXIS: 59 DEGREES
EKG P-R INTERVAL: 192 MS
EKG Q-T INTERVAL: 370 MS
EKG QRS DURATION: 82 MS
EKG QTC CALCULATION (BAZETT): 462 MS
EKG R AXIS: 1 DEGREES
EKG T AXIS: 17 DEGREES
EKG VENTRICULAR RATE: 94 BPM
EOSINOPHIL # BLD: 0.1 K/UL (ref 0–0.7)
EOSINOPHIL NFR BLD: 0.3 %
ERYTHROCYTE [DISTWIDTH] IN BLOOD BY AUTOMATED COUNT: 12.9 % (ref 11.5–14.5)
ETHANOL PERCENT: NORMAL G/DL
ETHANOLAMINE SERPL-MCNC: <10 MG/DL (ref 0–0.08)
FENTANYL SCREEN, URINE: NORMAL
GLOBULIN SER CALC-MCNC: 2.4 G/DL (ref 2.3–3.5)
GLUCOSE SERPL-MCNC: 90 MG/DL (ref 70–99)
GLUCOSE UR STRIP-MCNC: NEGATIVE MG/DL
HCT VFR BLD AUTO: 40.9 % (ref 42–52)
HGB BLD-MCNC: 13.9 G/DL (ref 14–18)
HGB UR QL STRIP: NEGATIVE
KETONES UR STRIP-MCNC: ABNORMAL MG/DL
LACTIC ACID, SEPSIS: 1.9 MMOL/L (ref 0.5–1.9)
LACTIC ACID, SEPSIS: 2.2 MMOL/L (ref 0.5–1.9)
LEUKOCYTE ESTERASE UR QL STRIP: NEGATIVE
LYMPHOCYTES # BLD: 3.5 K/UL (ref 1–4.8)
LYMPHOCYTES NFR BLD: 23.9 %
MAGNESIUM SERPL-MCNC: 1.8 MG/DL (ref 1.7–2.4)
MCH RBC QN AUTO: 31.2 PG (ref 27–31.3)
MCHC RBC AUTO-ENTMCNC: 34 % (ref 33–37)
MCV RBC AUTO: 91.7 FL (ref 79–92.2)
METHADONE UR QL SCN: NORMAL
MONOCYTES # BLD: 1.2 K/UL (ref 0.2–0.8)
MONOCYTES NFR BLD: 7.9 %
NEUTROPHILS # BLD: 9.8 K/UL (ref 1.4–6.5)
NEUTS SEG NFR BLD: 67.2 %
NITRITE UR QL STRIP: NEGATIVE
OPIATES UR QL SCN: NORMAL
OXYCODONE UR QL SCN: NORMAL
PCP UR QL SCN: NORMAL
PH UR STRIP: 6 [PH] (ref 5–9)
PLATELET # BLD AUTO: 167 K/UL (ref 130–400)
POTASSIUM SERPL-SCNC: 4.4 MEQ/L (ref 3.4–4.9)
PROCALCITONIN SERPL IA-MCNC: 0.03 NG/ML (ref 0–0.15)
PROPOXYPH UR QL SCN: NORMAL
PROT SERPL-MCNC: 5.9 G/DL (ref 6.3–8)
PROT UR STRIP-MCNC: ABNORMAL MG/DL
RBC # BLD AUTO: 4.46 M/UL (ref 4.7–6.1)
REASON FOR REJECTION: NORMAL
REJECTED TEST: NORMAL
SODIUM SERPL-SCNC: 136 MEQ/L (ref 135–144)
SP GR UR STRIP: 1.02 (ref 1–1.03)
TROPONIN, HIGH SENSITIVITY: 12 NG/L (ref 0–19)
TROPONIN, HIGH SENSITIVITY: 12 NG/L (ref 0–19)
TROPONIN, HIGH SENSITIVITY: 15 NG/L (ref 0–19)
URINE REFLEX TO CULTURE: ABNORMAL
UROBILINOGEN UR STRIP-ACNC: 0.2 E.U./DL
WBC # BLD AUTO: 14.5 K/UL (ref 4.8–10.8)

## 2025-07-26 PROCEDURE — 81003 URINALYSIS AUTO W/O SCOPE: CPT

## 2025-07-26 PROCEDURE — 73080 X-RAY EXAM OF ELBOW: CPT

## 2025-07-26 PROCEDURE — 84145 PROCALCITONIN (PCT): CPT

## 2025-07-26 PROCEDURE — 72131 CT LUMBAR SPINE W/O DYE: CPT

## 2025-07-26 PROCEDURE — 73030 X-RAY EXAM OF SHOULDER: CPT

## 2025-07-26 PROCEDURE — 2580000003 HC RX 258: Performed by: PHYSICIAN ASSISTANT

## 2025-07-26 PROCEDURE — 71045 X-RAY EXAM CHEST 1 VIEW: CPT

## 2025-07-26 PROCEDURE — 80307 DRUG TEST PRSMV CHEM ANLYZR: CPT

## 2025-07-26 PROCEDURE — 87040 BLOOD CULTURE FOR BACTERIA: CPT

## 2025-07-26 PROCEDURE — 72125 CT NECK SPINE W/O DYE: CPT

## 2025-07-26 PROCEDURE — 85025 COMPLETE CBC W/AUTO DIFF WBC: CPT

## 2025-07-26 PROCEDURE — 82550 ASSAY OF CK (CPK): CPT

## 2025-07-26 PROCEDURE — 70450 CT HEAD/BRAIN W/O DYE: CPT

## 2025-07-26 PROCEDURE — 82077 ASSAY SPEC XCP UR&BREATH IA: CPT

## 2025-07-26 PROCEDURE — 80053 COMPREHEN METABOLIC PANEL: CPT

## 2025-07-26 PROCEDURE — 72128 CT CHEST SPINE W/O DYE: CPT

## 2025-07-26 PROCEDURE — 73110 X-RAY EXAM OF WRIST: CPT

## 2025-07-26 PROCEDURE — 36415 COLL VENOUS BLD VENIPUNCTURE: CPT

## 2025-07-26 PROCEDURE — 99285 EMERGENCY DEPT VISIT HI MDM: CPT

## 2025-07-26 PROCEDURE — 83605 ASSAY OF LACTIC ACID: CPT

## 2025-07-26 PROCEDURE — 83735 ASSAY OF MAGNESIUM: CPT

## 2025-07-26 PROCEDURE — 84484 ASSAY OF TROPONIN QUANT: CPT

## 2025-07-26 RX ORDER — 0.9 % SODIUM CHLORIDE 0.9 %
1000 INTRAVENOUS SOLUTION INTRAVENOUS ONCE
Status: COMPLETED | OUTPATIENT
Start: 2025-07-26 | End: 2025-07-26

## 2025-07-26 RX ORDER — 0.9 % SODIUM CHLORIDE 0.9 %
500 INTRAVENOUS SOLUTION INTRAVENOUS ONCE
Status: COMPLETED | OUTPATIENT
Start: 2025-07-26 | End: 2025-07-26

## 2025-07-26 RX ADMIN — SODIUM CHLORIDE 1000 ML: 0.9 INJECTION, SOLUTION INTRAVENOUS at 08:04

## 2025-07-26 RX ADMIN — SODIUM CHLORIDE 500 ML: 0.9 INJECTION, SOLUTION INTRAVENOUS at 09:36

## 2025-07-26 ASSESSMENT — ENCOUNTER SYMPTOMS
SORE THROAT: 0
PHOTOPHOBIA: 0
NAUSEA: 0
BACK PAIN: 0
SHORTNESS OF BREATH: 0
VOMITING: 0
ABDOMINAL PAIN: 0
RHINORRHEA: 0
DIARRHEA: 0
EYE PAIN: 0

## 2025-07-26 ASSESSMENT — LIFESTYLE VARIABLES
HOW MANY STANDARD DRINKS CONTAINING ALCOHOL DO YOU HAVE ON A TYPICAL DAY: PATIENT DOES NOT DRINK
HOW OFTEN DO YOU HAVE A DRINK CONTAINING ALCOHOL: NEVER

## 2025-07-26 ASSESSMENT — PAIN SCALES - GENERAL: PAINLEVEL_OUTOF10: 6

## 2025-07-26 ASSESSMENT — PAIN DESCRIPTION - LOCATION: LOCATION: LEG

## 2025-07-26 ASSESSMENT — PAIN DESCRIPTION - DESCRIPTORS: DESCRIPTORS: ACHING

## 2025-07-26 ASSESSMENT — PAIN - FUNCTIONAL ASSESSMENT
PAIN_FUNCTIONAL_ASSESSMENT: 0-10
PAIN_FUNCTIONAL_ASSESSMENT: ACTIVITIES ARE NOT PREVENTED

## 2025-07-26 ASSESSMENT — PAIN DESCRIPTION - ORIENTATION: ORIENTATION: RIGHT;LEFT

## 2025-07-26 NOTE — ED TRIAGE NOTES
Pt arrived via [facility] from outside.  Pt was found [place].  Pt states is unsure how long he was outside on the ground.   Pt is wet skin is cold.   Pt is aox4.

## 2025-07-26 NOTE — ED PROVIDER NOTES
[facility] EMERGENCY DEPARTMENT  EMERGENCY DEPARTMENT ENCOUNTER      Pt Name: Janusz Barajas  MRN: [id number]  Birthdate 1950  Date of evaluation: 7/26/2025  Provider: Marcie Wiggins PA-C  Note Started: 7/26/25 7:10 AM EDT    CHIEF COMPLAINT       Chief Complaint   Patient presents with    Fall     Found outside          HISTORY OF PRESENT ILLNESS   (Location/Symptom, Timing/Onset, Context/Setting, Quality, Duration, Modifying Factors, Severity)  Note limiting factors.   Janusz Barajas is a 75 y.o. male who presents to the emergency department found wandering.  A bystander called EMS after seeing him walking around in the rain.  Per chart review patient does have a history of dementia.  On arrival to the emergency department he is able to tell me his name birthday he tells me that it is 2023 and he knows he is at [facility].  He is not sure why he was outside in the rain.  I spoke to the patient's son Janusz Araujo who reports that he does live at home but has a caregiver.  He has not had any of these episodes of leaving the house since they got the caregiver he is going to review the cameras to see if he can find out what time he got out.  Patient did mention his right arm hurt he is unsure if there is any falls.  He was called on arrival to the emergency department.    HPI    Nursing Notes were reviewed.    REVIEW OF SYSTEMS    (2-9 systems for level 4, 10 or more for level 5)     Review of Systems   Constitutional:  Negative for chills, diaphoresis, fatigue and fever.   HENT:  Negative for congestion, rhinorrhea and sore throat.    Eyes:  Negative for photophobia and pain.   Respiratory:  Negative for cough and shortness of breath.    Cardiovascular:  Negative for chest pain and palpitations.   Gastrointestinal:  Negative for abdominal pain, diarrhea, nausea and vomiting.   Genitourinary:  Negative for dysuria and flank pain.   Musculoskeletal:  Positive for arthralgias. Negative for back pain.   Skin:

## 2025-07-27 LAB
BACTERIA BLD CULT ORG #2: NORMAL
BACTERIA BLD CULT: NORMAL

## 2025-07-28 ENCOUNTER — OFFICE VISIT (OUTPATIENT)
Dept: FAMILY MEDICINE CLINIC | Age: 75
End: 2025-07-28
Payer: MEDICARE

## 2025-07-28 VITALS
HEIGHT: 71 IN | BODY MASS INDEX: 26.18 KG/M2 | DIASTOLIC BLOOD PRESSURE: 62 MMHG | HEART RATE: 69 BPM | OXYGEN SATURATION: 95 % | WEIGHT: 187 LBS | SYSTOLIC BLOOD PRESSURE: 110 MMHG

## 2025-07-28 DIAGNOSIS — Z91.81 AT HIGH RISK FOR FALLS: ICD-10-CM

## 2025-07-28 DIAGNOSIS — R26.89 LOSS OF BALANCE: ICD-10-CM

## 2025-07-28 DIAGNOSIS — R89.9 ABNORMAL LABORATORY TEST: Primary | ICD-10-CM

## 2025-07-28 DIAGNOSIS — R29.818 PARKINSONIAN FEATURES: ICD-10-CM

## 2025-07-28 DIAGNOSIS — R53.1 WEAKNESS: ICD-10-CM

## 2025-07-28 DIAGNOSIS — R25.1 TREMOR: ICD-10-CM

## 2025-07-28 DIAGNOSIS — F03.B0 MODERATE DEMENTIA WITHOUT BEHAVIORAL DISTURBANCE, PSYCHOTIC DISTURBANCE, MOOD DISTURBANCE, OR ANXIETY, UNSPECIFIED DEMENTIA TYPE (HCC): ICD-10-CM

## 2025-07-28 LAB
EKG ATRIAL RATE: 94 BPM
EKG DIAGNOSIS: NORMAL
EKG P AXIS: 59 DEGREES
EKG P-R INTERVAL: 192 MS
EKG Q-T INTERVAL: 370 MS
EKG QRS DURATION: 82 MS
EKG QTC CALCULATION (BAZETT): 462 MS
EKG R AXIS: 1 DEGREES
EKG T AXIS: 17 DEGREES
EKG VENTRICULAR RATE: 94 BPM

## 2025-07-28 PROCEDURE — G8427 DOCREV CUR MEDS BY ELIG CLIN: HCPCS | Performed by: FAMILY MEDICINE

## 2025-07-28 PROCEDURE — 1036F TOBACCO NON-USER: CPT | Performed by: FAMILY MEDICINE

## 2025-07-28 PROCEDURE — 1126F AMNT PAIN NOTED NONE PRSNT: CPT | Performed by: FAMILY MEDICINE

## 2025-07-28 PROCEDURE — 1123F ACP DISCUSS/DSCN MKR DOCD: CPT | Performed by: FAMILY MEDICINE

## 2025-07-28 PROCEDURE — 1160F RVW MEDS BY RX/DR IN RCRD: CPT | Performed by: FAMILY MEDICINE

## 2025-07-28 PROCEDURE — 3074F SYST BP LT 130 MM HG: CPT | Performed by: FAMILY MEDICINE

## 2025-07-28 PROCEDURE — 3017F COLORECTAL CA SCREEN DOC REV: CPT | Performed by: FAMILY MEDICINE

## 2025-07-28 PROCEDURE — 1159F MED LIST DOCD IN RCRD: CPT | Performed by: FAMILY MEDICINE

## 2025-07-28 PROCEDURE — G8417 CALC BMI ABV UP PARAM F/U: HCPCS | Performed by: FAMILY MEDICINE

## 2025-07-28 PROCEDURE — 99215 OFFICE O/P EST HI 40 MIN: CPT | Performed by: FAMILY MEDICINE

## 2025-07-28 PROCEDURE — 3078F DIAST BP <80 MM HG: CPT | Performed by: FAMILY MEDICINE

## 2025-07-28 RX ORDER — CARBIDOPA AND LEVODOPA 25; 100 MG/1; MG/1
1 TABLET ORAL NIGHTLY
Qty: 30 TABLET | Refills: 0 | Status: SHIPPED | OUTPATIENT
Start: 2025-07-28

## 2025-07-28 NOTE — PATIENT INSTRUCTIONS
For strengthening related issues and parkinsonian movement mobility we will look to initiate physical therapy.    For dementia and parkinsonianism we will look to consult home health for available services.    Initiating carbidopa levodopa at bedtime for parkinsonian features.  Likely to advance the dose.    Repeating labs that were abnormal in the emergency room to confirm normalization

## 2025-07-28 NOTE — PROGRESS NOTES
Diagnosis Orders   1. Abnormal laboratory test  CBC with Auto Differential    CK      2. At high risk for falls        3. Weakness  External Referral to Physical Therapy      4. Loss of balance  External Referral to Physical Therapy      5. Tremor  External Referral to Physical Therapy      6. Moderate dementia without behavioral disturbance, psychotic disturbance, mood disturbance, or anxiety, unspecified dementia type (HCC)  External Referral to Physical Therapy      7. Parkinsonian features  External Referral to Physical Therapy    carbidopa-levodopa (SINEMET)  MG per tablet          See patient instructions for further assessment/plan specifics    Return in about 2 weeks (around 8/11/2025) for for review of outcome of today's recommendation.  Patient Instructions   For strengthening related issues and parkinsonian movement mobility we will look to initiate physical therapy.    For dementia and parkinsonianism we will look to consult home health for available services.    Initiating carbidopa levodopa at bedtime for parkinsonian features.  Likely to advance the dose.    Repeating labs that were abnormal in the emergency room to confirm normalization    Subjective:      Patient ID: Janusz Barajas is a 75 y.o. male who presents for:  Chief Complaint   Patient presents with    ED Follow-up     ER follow up, walked by himself down the road and fell on his left leg and has been favoring that side since then        HPI  Son is here with the father.  Piecing together the event of last week where patient was found wandering by police.  Family thought patient had been missing for about 4 hours but the more they pieced together looks like he was going about 8 hours.  Does have some discomfort in his right upper extremity and right leg still but fully functional.  Still noticing tremor on the right side which is baseline for him.  No tremor on the left.  Patient has no recollection of the event.  Family is working on

## 2025-07-30 DIAGNOSIS — E78.5 HYPERLIPIDEMIA, UNSPECIFIED HYPERLIPIDEMIA TYPE: ICD-10-CM

## 2025-07-31 LAB
BACTERIA BLD CULT ORG #2: NORMAL
BACTERIA BLD CULT: NORMAL

## 2025-07-31 RX ORDER — PRAVASTATIN SODIUM 40 MG
40 TABLET ORAL DAILY
Qty: 90 TABLET | Refills: 3 | Status: SHIPPED | OUTPATIENT
Start: 2025-07-31

## 2025-07-31 NOTE — TELEPHONE ENCOUNTER
Comments:     Last Office Visit (last PCP visit):   7/28/2025    Next Visit Date:  Future Appointments   Date Time Provider Department Center   8/5/2025 11:00 AM Sher Galloway MD [facility]   6/25/2026  3:30 PM Holiday, DO Nickie Quach       **If hasn't been seen in over a year OR hasn't followed up according to last diabetes/ADHD visit, make appointment for patient before sending refill to provider.    Rx requested:  Requested Prescriptions     Pending Prescriptions Disp Refills    pravastatin (PRAVACHOL) 40 MG tablet [Pharmacy Med Name: Pravastatin Sodium 40 MG Oral Tablet] 90 tablet 3     Sig: TAKE 1 TABLET BY MOUTH DAILY

## 2025-08-11 ENCOUNTER — OFFICE VISIT (OUTPATIENT)
Dept: FAMILY MEDICINE CLINIC | Age: 75
End: 2025-08-11
Payer: MEDICARE

## 2025-08-11 VITALS
SYSTOLIC BLOOD PRESSURE: 110 MMHG | DIASTOLIC BLOOD PRESSURE: 62 MMHG | WEIGHT: 217 LBS | OXYGEN SATURATION: 96 % | HEART RATE: 87 BPM | HEIGHT: 71 IN | BODY MASS INDEX: 30.38 KG/M2

## 2025-08-11 DIAGNOSIS — R29.818 PARKINSONIAN FEATURES: Primary | ICD-10-CM

## 2025-08-11 DIAGNOSIS — I48.0 PAROXYSMAL ATRIAL FIBRILLATION (HCC): ICD-10-CM

## 2025-08-11 DIAGNOSIS — E29.1 HYPOGONADISM MALE: ICD-10-CM

## 2025-08-11 DIAGNOSIS — J44.9 CHRONIC OBSTRUCTIVE PULMONARY DISEASE, UNSPECIFIED COPD TYPE (HCC): ICD-10-CM

## 2025-08-11 DIAGNOSIS — Z51.81 ENCOUNTER FOR MEDICATION MONITORING: ICD-10-CM

## 2025-08-11 DIAGNOSIS — I50.32 CHRONIC DIASTOLIC HEART FAILURE (HCC): ICD-10-CM

## 2025-08-11 DIAGNOSIS — R25.1 TREMOR: ICD-10-CM

## 2025-08-11 PROBLEM — I50.33 ACUTE ON CHRONIC DIASTOLIC CONGESTIVE HEART FAILURE (HCC): Status: RESOLVED | Noted: 2024-10-22 | Resolved: 2025-08-11

## 2025-08-11 PROCEDURE — 1123F ACP DISCUSS/DSCN MKR DOCD: CPT | Performed by: FAMILY MEDICINE

## 2025-08-11 PROCEDURE — 1159F MED LIST DOCD IN RCRD: CPT | Performed by: FAMILY MEDICINE

## 2025-08-11 PROCEDURE — 3078F DIAST BP <80 MM HG: CPT | Performed by: FAMILY MEDICINE

## 2025-08-11 PROCEDURE — 1036F TOBACCO NON-USER: CPT | Performed by: FAMILY MEDICINE

## 2025-08-11 PROCEDURE — 99214 OFFICE O/P EST MOD 30 MIN: CPT | Performed by: FAMILY MEDICINE

## 2025-08-11 PROCEDURE — G8417 CALC BMI ABV UP PARAM F/U: HCPCS | Performed by: FAMILY MEDICINE

## 2025-08-11 PROCEDURE — 3023F SPIROM DOC REV: CPT | Performed by: FAMILY MEDICINE

## 2025-08-11 PROCEDURE — 1160F RVW MEDS BY RX/DR IN RCRD: CPT | Performed by: FAMILY MEDICINE

## 2025-08-11 PROCEDURE — 3017F COLORECTAL CA SCREEN DOC REV: CPT | Performed by: FAMILY MEDICINE

## 2025-08-11 PROCEDURE — 1126F AMNT PAIN NOTED NONE PRSNT: CPT | Performed by: FAMILY MEDICINE

## 2025-08-11 PROCEDURE — G8427 DOCREV CUR MEDS BY ELIG CLIN: HCPCS | Performed by: FAMILY MEDICINE

## 2025-08-11 PROCEDURE — 3074F SYST BP LT 130 MM HG: CPT | Performed by: FAMILY MEDICINE

## 2025-08-11 RX ORDER — TIOTROPIUM BROMIDE 18 UG/1
CAPSULE ORAL; RESPIRATORY (INHALATION)
Qty: 90 CAPSULE | Refills: 3 | Status: SHIPPED | OUTPATIENT
Start: 2025-08-11

## 2025-08-11 RX ORDER — TESTOSTERONE CYPIONATE 200 MG/ML
100 INJECTION, SOLUTION INTRAMUSCULAR
Qty: 6 ML | Refills: 0 | Status: SHIPPED | OUTPATIENT
Start: 2025-08-11 | End: 2029-01-09

## 2025-08-11 RX ORDER — CARBIDOPA AND LEVODOPA 25; 100 MG/1; MG/1
1 TABLET ORAL 2 TIMES DAILY
Qty: 60 TABLET | Refills: 5 | Status: SHIPPED | OUTPATIENT
Start: 2025-08-11

## 2025-08-11 RX ORDER — ALBUTEROL SULFATE 90 UG/1
2 INHALANT RESPIRATORY (INHALATION) EVERY 6 HOURS PRN
Qty: 18 G | Refills: 2 | Status: SHIPPED | OUTPATIENT
Start: 2025-08-11

## 2025-08-11 ASSESSMENT — ENCOUNTER SYMPTOMS
PHOTOPHOBIA: 0
ABDOMINAL DISTENTION: 0
COUGH: 1
ABDOMINAL PAIN: 0
CHEST TIGHTNESS: 0
SHORTNESS OF BREATH: 0

## 2025-08-16 DIAGNOSIS — G89.29 CHRONIC MIDLINE BACK PAIN, UNSPECIFIED BACK LOCATION: ICD-10-CM

## 2025-08-16 DIAGNOSIS — I48.0 PAROXYSMAL ATRIAL FIBRILLATION (MULTI): ICD-10-CM

## 2025-08-16 DIAGNOSIS — M54.9 CHRONIC MIDLINE BACK PAIN, UNSPECIFIED BACK LOCATION: ICD-10-CM

## 2025-08-16 DIAGNOSIS — Z86.79 HISTORY OF SUPRAVENTRICULAR TACHYCARDIA: ICD-10-CM

## 2025-08-18 RX ORDER — GABAPENTIN 300 MG/1
300 CAPSULE ORAL DAILY
Qty: 90 CAPSULE | Refills: 3 | Status: SHIPPED | OUTPATIENT
Start: 2025-08-18 | End: 2026-08-18

## 2025-08-18 RX ORDER — METOPROLOL TARTRATE 50 MG/1
50 TABLET ORAL 2 TIMES DAILY
Qty: 180 TABLET | Refills: 3 | Status: SHIPPED | OUTPATIENT
Start: 2025-08-18

## 2025-08-20 ENCOUNTER — TELEMEDICINE (OUTPATIENT)
Dept: FAMILY MEDICINE CLINIC | Age: 75
End: 2025-08-20

## 2025-08-20 DIAGNOSIS — F05 SUNDOWNING: Primary | ICD-10-CM

## 2025-08-20 RX ORDER — QUETIAPINE FUMARATE 25 MG/1
25 TABLET, FILM COATED ORAL NIGHTLY
Qty: 30 TABLET | Refills: 3 | Status: SHIPPED | OUTPATIENT
Start: 2025-08-20

## 2025-08-20 ASSESSMENT — VISUAL ACUITY: OU: 1

## 2025-08-27 ENCOUNTER — APPOINTMENT (OUTPATIENT)
Dept: CARDIOLOGY | Facility: CLINIC | Age: 75
End: 2025-08-27
Payer: MEDICARE

## 2025-08-27 DIAGNOSIS — R29.818 PARKINSONIAN FEATURES: ICD-10-CM

## 2025-08-27 DIAGNOSIS — R25.1 TREMOR: ICD-10-CM

## 2025-08-28 RX ORDER — CARBIDOPA AND LEVODOPA 25; 100 MG/1; MG/1
1 TABLET ORAL 3 TIMES DAILY
Qty: 90 TABLET | Refills: 5 | Status: SHIPPED | OUTPATIENT
Start: 2025-08-28

## 2025-08-31 DIAGNOSIS — F05 SUNDOWNING: ICD-10-CM

## 2025-09-02 RX ORDER — QUETIAPINE FUMARATE 25 MG/1
25 TABLET, FILM COATED ORAL NIGHTLY
Qty: 30 TABLET | Refills: 3 | Status: SHIPPED | OUTPATIENT
Start: 2025-09-02

## 2025-12-10 ENCOUNTER — APPOINTMENT (OUTPATIENT)
Dept: CARDIOLOGY | Facility: CLINIC | Age: 75
End: 2025-12-10
Payer: MEDICARE